# Patient Record
Sex: MALE | Race: WHITE | NOT HISPANIC OR LATINO
[De-identification: names, ages, dates, MRNs, and addresses within clinical notes are randomized per-mention and may not be internally consistent; named-entity substitution may affect disease eponyms.]

---

## 2017-03-29 ENCOUNTER — APPOINTMENT (OUTPATIENT)
Dept: ELECTROPHYSIOLOGY | Facility: CLINIC | Age: 82
End: 2017-03-29

## 2017-05-16 ENCOUNTER — APPOINTMENT (OUTPATIENT)
Dept: INTERNAL MEDICINE | Facility: CLINIC | Age: 82
End: 2017-05-16

## 2017-05-16 ENCOUNTER — RECORD ABSTRACTING (OUTPATIENT)
Age: 82
End: 2017-05-16

## 2017-05-16 VITALS
WEIGHT: 229 LBS | RESPIRATION RATE: 18 BRPM | DIASTOLIC BLOOD PRESSURE: 70 MMHG | TEMPERATURE: 98.3 F | HEIGHT: 72 IN | SYSTOLIC BLOOD PRESSURE: 132 MMHG | HEART RATE: 82 BPM | OXYGEN SATURATION: 96 % | BODY MASS INDEX: 31.02 KG/M2

## 2017-05-16 DIAGNOSIS — M25.561 PAIN IN RIGHT KNEE: ICD-10-CM

## 2017-05-16 DIAGNOSIS — E11.9 TYPE 2 DIABETES MELLITUS W/OUT COMPLICATIONS: ICD-10-CM

## 2017-05-16 DIAGNOSIS — I11.9 HYPERTENSIVE HEART DISEASE W/OUT HEART FAILURE: ICD-10-CM

## 2017-05-16 DIAGNOSIS — Z87.891 PERSONAL HISTORY OF NICOTINE DEPENDENCE: ICD-10-CM

## 2017-08-09 ENCOUNTER — EMERGENCY (EMERGENCY)
Facility: HOSPITAL | Age: 82
LOS: 0 days | Discharge: HOME | End: 2017-08-09
Attending: EMERGENCY MEDICINE | Admitting: EMERGENCY MEDICINE
Payer: MEDICARE

## 2017-08-09 VITALS
WEIGHT: 227.96 LBS | SYSTOLIC BLOOD PRESSURE: 134 MMHG | HEART RATE: 76 BPM | TEMPERATURE: 98 F | OXYGEN SATURATION: 99 % | DIASTOLIC BLOOD PRESSURE: 70 MMHG | RESPIRATION RATE: 16 BRPM

## 2017-08-09 VITALS
RESPIRATION RATE: 20 BRPM | SYSTOLIC BLOOD PRESSURE: 164 MMHG | HEART RATE: 72 BPM | OXYGEN SATURATION: 100 % | TEMPERATURE: 99 F | DIASTOLIC BLOOD PRESSURE: 82 MMHG

## 2017-08-09 DIAGNOSIS — S09.90XA UNSPECIFIED INJURY OF HEAD, INITIAL ENCOUNTER: ICD-10-CM

## 2017-08-09 DIAGNOSIS — Z95.810 PRESENCE OF AUTOMATIC (IMPLANTABLE) CARDIAC DEFIBRILLATOR: Chronic | ICD-10-CM

## 2017-08-09 DIAGNOSIS — I10 ESSENTIAL (PRIMARY) HYPERTENSION: ICD-10-CM

## 2017-08-09 DIAGNOSIS — Z95.810 PRESENCE OF AUTOMATIC (IMPLANTABLE) CARDIAC DEFIBRILLATOR: ICD-10-CM

## 2017-08-09 DIAGNOSIS — I25.10 ATHEROSCLEROTIC HEART DISEASE OF NATIVE CORONARY ARTERY WITHOUT ANGINA PECTORIS: ICD-10-CM
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
## 2017-08-09 DIAGNOSIS — M54.9 DORSALGIA, UNSPECIFIED: ICD-10-CM
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
## 2017-08-09 PROCEDURE — 72128 CT CHEST SPINE W/O DYE: CPT | Mod: 26

## 2017-08-09 PROCEDURE — 72125 CT NECK SPINE W/O DYE: CPT | Mod: 26

## 2017-08-09 PROCEDURE — 99285 EMERGENCY DEPT VISIT HI MDM: CPT

## 2017-08-09 PROCEDURE — 70450 CT HEAD/BRAIN W/O DYE: CPT | Mod: 26

## 2017-08-09 PROCEDURE — 71010: CPT | Mod: 26

## 2017-08-09 NOTE — ED ADULT NURSE NOTE - OBJECTIVE STATEMENT
Pt was pushing garbage down in trash receptacle and fell backward, striking his head.  Pt without complaints on arrival.  Trauma Alert called on arrival.  VS and cardiac monitoring initiated.  See flowsheet.

## 2017-08-09 NOTE — ED ADULT NURSE NOTE - CHIEF COMPLAINT QUOTE
pt was pushing garbage down in trash receptacle and fell backward, striking his head.  pt without complaints on arrival.

## 2017-08-09 NOTE — ED PROVIDER NOTE - SKIN, MLM
Skin normal color for race, warm, dry and intact. Abrasion to posterior scalp.  No bleeding.  No laceration.

## 2017-08-09 NOTE — ED PROVIDER NOTE - CARE PLAN
Principal Discharge DX:	Closed head injury  Secondary Diagnosis:	Hypertension  Secondary Diagnosis:	Upper back pain

## 2017-08-09 NOTE — ED ADULT NURSE NOTE - CHPI ED SYMPTOMS NEG
no vomiting/no tingling/no weakness/no confusion/no fever/no deformity/no loss of consciousness/no numbness/no bleeding

## 2017-08-09 NOTE — ED PROVIDER NOTE - PROGRESS NOTE DETAILS
Pt feels well and passed ambulation challenge.  BP elevated at bedside, pt w/o symptoms.  Discussed with pt that he needs to f/u with his PMD for bp recheck and possible changes in medicine. Given the circumstances of being in the ed after fall and w/ some pain, BP elevated and may not reflect accurate daily BP. Family agrees to f/u.

## 2017-08-09 NOTE — ED PROVIDER NOTE - MEDICAL DECISION MAKING DETAILS
82yr old male w/ mechanical fall, CHI.  CT head/neck/thoracic spine.  Appears well overall. Mechanical fall, low risk of injury, will hold on labs.

## 2017-08-09 NOTE — ED PROVIDER NOTE - OBJECTIVE STATEMENT
82yr old male w/ PMHx of AICD/Pacer, CABG, CAD, HTN, on ecotrin presenting after fall w/ CHI.  Pt put foot in garbage to compress the trash when he lost balance and fell back.  Fall on back, no LOC, no N/V.  C/o upper back pain.  Able to walk after fall.

## 2017-12-07 ENCOUNTER — APPOINTMENT (OUTPATIENT)
Dept: INTERNAL MEDICINE | Facility: CLINIC | Age: 82
End: 2017-12-07
Payer: MEDICARE

## 2017-12-07 ENCOUNTER — NON-APPOINTMENT (OUTPATIENT)
Age: 82
End: 2017-12-07

## 2017-12-07 VITALS
TEMPERATURE: 98.5 F | WEIGHT: 228 LBS | SYSTOLIC BLOOD PRESSURE: 132 MMHG | BODY MASS INDEX: 30.88 KG/M2 | HEART RATE: 66 BPM | OXYGEN SATURATION: 97 % | DIASTOLIC BLOOD PRESSURE: 72 MMHG | HEIGHT: 72 IN | RESPIRATION RATE: 18 BRPM

## 2017-12-07 PROCEDURE — 94060 EVALUATION OF WHEEZING: CPT

## 2017-12-07 PROCEDURE — 99214 OFFICE O/P EST MOD 30 MIN: CPT | Mod: 25

## 2018-03-05 ENCOUNTER — APPOINTMENT (OUTPATIENT)
Dept: ELECTROPHYSIOLOGY | Facility: CLINIC | Age: 83
End: 2018-03-05
Payer: MEDICARE

## 2018-03-05 DIAGNOSIS — Z86.79 PERSONAL HISTORY OF OTHER DISEASES OF THE CIRCULATORY SYSTEM: ICD-10-CM

## 2018-03-05 PROCEDURE — 93297 REM INTERROG DEV EVAL ICPMS: CPT

## 2018-03-05 PROCEDURE — 93296 REM INTERROG EVL PM/IDS: CPT

## 2018-03-05 PROCEDURE — 93295 DEV INTERROG REMOTE 1/2/MLT: CPT

## 2018-03-12 PROBLEM — Z86.79 HISTORY OF VENTRICULAR FIBRILLATION: Status: ACTIVE | Noted: 2017-05-16

## 2018-04-03 ENCOUNTER — RX RENEWAL (OUTPATIENT)
Age: 83
End: 2018-04-03

## 2018-06-07 RX ORDER — PRAVASTATIN SODIUM 40 MG/1
40 TABLET ORAL
Refills: 0 | Status: ACTIVE | COMMUNITY

## 2018-06-08 ENCOUNTER — APPOINTMENT (OUTPATIENT)
Dept: INTERNAL MEDICINE | Facility: CLINIC | Age: 83
End: 2018-06-08
Payer: MEDICARE

## 2018-06-08 VITALS
WEIGHT: 221 LBS | OXYGEN SATURATION: 95 % | HEIGHT: 72 IN | DIASTOLIC BLOOD PRESSURE: 72 MMHG | TEMPERATURE: 98 F | SYSTOLIC BLOOD PRESSURE: 128 MMHG | HEART RATE: 78 BPM | RESPIRATION RATE: 18 BRPM | BODY MASS INDEX: 29.93 KG/M2

## 2018-06-08 DIAGNOSIS — R09.02 HYPOXEMIA: ICD-10-CM

## 2018-06-08 DIAGNOSIS — Z72.3 LACK OF PHYSICAL EXERCISE: ICD-10-CM

## 2018-06-08 PROCEDURE — 99215 OFFICE O/P EST HI 40 MIN: CPT | Mod: 25

## 2018-06-08 PROCEDURE — 94727 GAS DIL/WSHOT DETER LNG VOL: CPT

## 2018-06-08 PROCEDURE — 94729 DIFFUSING CAPACITY: CPT

## 2018-06-08 PROCEDURE — 94060 EVALUATION OF WHEEZING: CPT

## 2018-06-08 PROCEDURE — ZZZZZ: CPT

## 2018-06-08 NOTE — HISTORY OF PRESENT ILLNESS
[de-identified] : Patient comes in today for a followup evaluation, and yearly reassessment of his pulmonary status.\par \par From a pulmonary standpoint, the patient continues to do well. He is not on any maintenance medications for treatment of his underlying mild obstructive lung disease. He denies any significant cough or dyspnea. Occasionally, he does complain of pharyngeal type mucus upon awakening in the morning. He attributes this recently, to the pollen and other seasonal type allergens. He is otherwise asymptomatic.\par \par The patient does not exercise. He does not have difficulty performing activities of daily living. He denies any chest pain or pleuritic pain. He states that he recently underwent an extensive workup at [facility] determine whether or not he is a candidate for an aortic valve replacement, but he states that he has not yet gotten the results of that evaluation. He now comes in for this assessment

## 2018-06-08 NOTE — PLAN
[FreeTextEntry1] : 1. At this time, will continue to observe pulmonary status without maintenance medications.\par \par 2. A repeat CAT scan of the chest will be performed in the spring of 2019 for surveillance.\par \par 3. The patient will contact his primary care physician regarding his recent weight loss.\par \par 4. Follow up with myself in 6 months with pre-post spirometry and DLCO.

## 2018-06-08 NOTE — DATA REVIEWED
[FreeTextEntry1] : A pulmonary function test is performed. Lung volumes are symmetrically reduced and a mild  fashion. Lung mechanics reveal a mild decrease in flow rates with slight upper motor activity. The DLCO is mildly reduced. This are present and mildly restrictive lung disease most likely secondary to the patient's prior lung resection. A destructive process cannot be entirely ruled out. Mildly reactive is demonstrated.

## 2018-06-08 NOTE — PHYSICAL EXAM
[General Appearance - Alert] : alert [General Appearance - In No Acute Distress] : in no acute distress [Sclera] : the sclera and conjunctiva were normal [PERRL With Normal Accommodation] : pupils were equal in size, round, and reactive to light [Extraocular Movements] : extraocular movements were intact [Neck Appearance] : the appearance of the neck was normal [] : the neck was supple [Neck Cervical Mass (___cm)] : no neck mass was observed [Edema] : there was no peripheral edema [Cervical Lymph Nodes Enlarged Posterior Bilaterally] : posterior cervical [Nail Clubbing] : no clubbing  or cyanosis of the fingernails [FreeTextEntry1] : Seborrheic keratoses

## 2018-06-08 NOTE — REVIEW OF SYSTEMS
[Recent Change In Weight] : ~T recent weight change [Negative] : Heme/Lymph [FreeTextEntry2] : The patient notes having an approximate 10 pound weight loss over the past few months

## 2018-06-18 ENCOUNTER — EMERGENCY (EMERGENCY)
Facility: HOSPITAL | Age: 83
LOS: 0 days | Discharge: HOME | End: 2018-06-18
Attending: EMERGENCY MEDICINE | Admitting: EMERGENCY MEDICINE
Payer: MEDICARE

## 2018-06-18 VITALS
DIASTOLIC BLOOD PRESSURE: 56 MMHG | SYSTOLIC BLOOD PRESSURE: 123 MMHG | RESPIRATION RATE: 23 BRPM | TEMPERATURE: 98 F | HEART RATE: 74 BPM | OXYGEN SATURATION: 96 %

## 2018-06-18 VITALS
WEIGHT: 220.9 LBS | TEMPERATURE: 98 F | OXYGEN SATURATION: 98 % | HEIGHT: 73 IN | RESPIRATION RATE: 18 BRPM | SYSTOLIC BLOOD PRESSURE: 141 MMHG | HEART RATE: 77 BPM | DIASTOLIC BLOOD PRESSURE: 72 MMHG

## 2018-06-18 DIAGNOSIS — Z95.810 PRESENCE OF AUTOMATIC (IMPLANTABLE) CARDIAC DEFIBRILLATOR: Chronic | ICD-10-CM

## 2018-06-18 LAB
ALBUMIN SERPL ELPH-MCNC: 3.9 G/DL — SIGNIFICANT CHANGE UP (ref 3.3–5)
ALP SERPL-CCNC: 62 U/L — SIGNIFICANT CHANGE UP (ref 40–120)
ALT FLD-CCNC: 26 U/L — SIGNIFICANT CHANGE UP (ref 12–78)
ANION GAP SERPL CALC-SCNC: 8 MMOL/L — SIGNIFICANT CHANGE UP (ref 5–17)
APPEARANCE UR: CLEAR — SIGNIFICANT CHANGE UP
APTT BLD: 28.9 SEC — SIGNIFICANT CHANGE UP (ref 27.5–37.4)
AST SERPL-CCNC: 46 U/L — HIGH (ref 15–37)
BACTERIA # UR AUTO: ABNORMAL
BASOPHILS # BLD AUTO: 0.03 K/UL — SIGNIFICANT CHANGE UP (ref 0–0.2)
BASOPHILS NFR BLD AUTO: 0.4 % — SIGNIFICANT CHANGE UP (ref 0–2)
BILIRUB SERPL-MCNC: 0.6 MG/DL — SIGNIFICANT CHANGE UP (ref 0.2–1.2)
BILIRUB UR-MCNC: NEGATIVE — SIGNIFICANT CHANGE UP
BUN SERPL-MCNC: 25 MG/DL — HIGH (ref 7–23)
CALCIUM SERPL-MCNC: 9.1 MG/DL — SIGNIFICANT CHANGE UP (ref 8.5–10.1)
CHLORIDE SERPL-SCNC: 103 MMOL/L — SIGNIFICANT CHANGE UP (ref 96–108)
CK SERPL-CCNC: 258 U/L — SIGNIFICANT CHANGE UP (ref 26–308)
CO2 SERPL-SCNC: 29 MMOL/L — SIGNIFICANT CHANGE UP (ref 22–31)
COLOR SPEC: YELLOW — SIGNIFICANT CHANGE UP
COMMENT - URINE: SIGNIFICANT CHANGE UP
CREAT SERPL-MCNC: 1.52 MG/DL — HIGH (ref 0.5–1.3)
DIFF PNL FLD: NEGATIVE — SIGNIFICANT CHANGE UP
EOSINOPHIL # BLD AUTO: 0.22 K/UL — SIGNIFICANT CHANGE UP (ref 0–0.5)
EOSINOPHIL NFR BLD AUTO: 3.2 % — SIGNIFICANT CHANGE UP (ref 0–6)
EPI CELLS # UR: SIGNIFICANT CHANGE UP
GLUCOSE SERPL-MCNC: 129 MG/DL — HIGH (ref 70–99)
GLUCOSE UR QL: NEGATIVE MG/DL — SIGNIFICANT CHANGE UP
HCT VFR BLD CALC: 40.9 % — SIGNIFICANT CHANGE UP (ref 39–50)
HGB BLD-MCNC: 14 G/DL — SIGNIFICANT CHANGE UP (ref 13–17)
IMM GRANULOCYTES NFR BLD AUTO: 0.4 % — SIGNIFICANT CHANGE UP (ref 0–1.5)
INR BLD: 1.07 RATIO — SIGNIFICANT CHANGE UP (ref 0.88–1.16)
KETONES UR-MCNC: ABNORMAL
LEUKOCYTE ESTERASE UR-ACNC: ABNORMAL
LYMPHOCYTES # BLD AUTO: 1.44 K/UL — SIGNIFICANT CHANGE UP (ref 1–3.3)
LYMPHOCYTES # BLD AUTO: 20.9 % — SIGNIFICANT CHANGE UP (ref 13–44)
MCHC RBC-ENTMCNC: 31.5 PG — SIGNIFICANT CHANGE UP (ref 27–34)
MCHC RBC-ENTMCNC: 34.2 GM/DL — SIGNIFICANT CHANGE UP (ref 32–36)
MCV RBC AUTO: 92.1 FL — SIGNIFICANT CHANGE UP (ref 80–100)
MONOCYTES # BLD AUTO: 0.61 K/UL — SIGNIFICANT CHANGE UP (ref 0–0.9)
MONOCYTES NFR BLD AUTO: 8.9 % — SIGNIFICANT CHANGE UP (ref 2–14)
NEUTROPHILS # BLD AUTO: 4.55 K/UL — SIGNIFICANT CHANGE UP (ref 1.8–7.4)
NEUTROPHILS NFR BLD AUTO: 66.2 % — SIGNIFICANT CHANGE UP (ref 43–77)
NITRITE UR-MCNC: NEGATIVE — SIGNIFICANT CHANGE UP
NRBC # BLD: 0 /100 WBCS — SIGNIFICANT CHANGE UP (ref 0–0)
NT-PROBNP SERPL-SCNC: 992 PG/ML — HIGH (ref 0–450)
PH UR: 6 — SIGNIFICANT CHANGE UP (ref 5–8)
PLATELET # BLD AUTO: 179 K/UL — SIGNIFICANT CHANGE UP (ref 150–400)
POTASSIUM SERPL-MCNC: 4.8 MMOL/L — SIGNIFICANT CHANGE UP (ref 3.5–5.3)
POTASSIUM SERPL-SCNC: 4.8 MMOL/L — SIGNIFICANT CHANGE UP (ref 3.5–5.3)
PROT SERPL-MCNC: 7.6 GM/DL — SIGNIFICANT CHANGE UP (ref 6–8.3)
PROT UR-MCNC: NEGATIVE MG/DL — SIGNIFICANT CHANGE UP
PROTHROM AB SERPL-ACNC: 11.6 SEC — SIGNIFICANT CHANGE UP (ref 9.8–12.7)
RBC # BLD: 4.44 M/UL — SIGNIFICANT CHANGE UP (ref 4.2–5.8)
RBC # FLD: 13.7 % — SIGNIFICANT CHANGE UP (ref 10.3–14.5)
RBC CASTS # UR COMP ASSIST: SIGNIFICANT CHANGE UP /HPF (ref 0–4)
SODIUM SERPL-SCNC: 140 MMOL/L — SIGNIFICANT CHANGE UP (ref 135–145)
SP GR SPEC: 1.01 — SIGNIFICANT CHANGE UP (ref 1.01–1.02)
TROPONIN I SERPL-MCNC: <0.015 NG/ML — SIGNIFICANT CHANGE UP (ref 0.01–0.04)
TROPONIN I SERPL-MCNC: <0.015 NG/ML — SIGNIFICANT CHANGE UP (ref 0.01–0.04)
UROBILINOGEN FLD QL: NEGATIVE MG/DL — SIGNIFICANT CHANGE UP
WBC # BLD: 6.88 K/UL — SIGNIFICANT CHANGE UP (ref 3.8–10.5)
WBC # FLD AUTO: 6.88 K/UL — SIGNIFICANT CHANGE UP (ref 3.8–10.5)
WBC UR QL: SIGNIFICANT CHANGE UP

## 2018-06-18 PROCEDURE — 71045 X-RAY EXAM CHEST 1 VIEW: CPT | Mod: 26

## 2018-06-18 PROCEDURE — 99285 EMERGENCY DEPT VISIT HI MDM: CPT

## 2018-06-18 PROCEDURE — 93010 ELECTROCARDIOGRAM REPORT: CPT

## 2018-06-18 RX ORDER — SODIUM CHLORIDE 9 MG/ML
3 INJECTION INTRAMUSCULAR; INTRAVENOUS; SUBCUTANEOUS ONCE
Qty: 0 | Refills: 0 | Status: COMPLETED | OUTPATIENT
Start: 2018-06-18 | End: 2018-06-18

## 2018-06-18 RX ADMIN — SODIUM CHLORIDE 3 MILLILITER(S): 9 INJECTION INTRAMUSCULAR; INTRAVENOUS; SUBCUTANEOUS at 19:06

## 2018-06-18 NOTE — ED PROVIDER NOTE - SKIN, MLM
Skin normal color for race, warm, dry and intact. No evidence of rash. no Diaphoresis. superficial scratch forearm no evidence of cellulitis.

## 2018-06-18 NOTE — ED PROVIDER NOTE - MEDICAL DECISION MAKING DETAILS
Elderly male BIBA from home s/p 2 episodes of light headedness, no LOC, CP, abdominal pain, or HA. +pacemaker AICD. Pt is presently asymptomatic upon ED arrival. Plans for CXR, EKG, monitor cardiac enzymes, labs. Monitor, observe and reassess

## 2018-06-18 NOTE — ED PROVIDER NOTE - CONSTITUTIONAL, MLM
normal... 83 year old male well appearing, well nourished, awake, alert, oriented to person, place, time/situation and in no apparent distress. No sentence shortening

## 2018-06-18 NOTE — ED PROVIDER NOTE - ENMT, MLM
Airway patent, Nasal mucosa clear. Mouth with dry mucosa. Throat has no vesicles, no oropharyngeal exudates and uvula is midline. PERRL. EOMI

## 2018-06-18 NOTE — ED ADULT NURSE NOTE - OBJECTIVE STATEMENT
pt reports sudden episode of sob while reading newspaper , household was very hot when ems arrived , air conditioner was off

## 2018-06-18 NOTE — ED PROVIDER NOTE - OBJECTIVE STATEMENT
82 y/o male PMhx AICD/PPM, CAD , pacemaker, defibrillator, CABG, HTN BIBA from home presents to the ED  c/o acute spontaneous onset of SOB. Pt describes feeling lightheaded around 2:00 pm during his first episode which lasted about 15 minutes. During the second episode, the pt states feeling nausea and SOB. Sx were resolved after ems administered o2. Denies LOC, CP, abd pain  +plegm. 84 y/o male PMhx AICD/PPM, CAD , pacemaker, defibrillator, CABG, HTN BIBA from home presents to the ED  c/o acute spontaneous onset of SOB. Pt describes feeling lightheaded around 2:00 pm during his first episode which lasted about 15 minutes. During the second episode, the pt states feeling nausea and SOB. Sx were resolved after EMS  administered O2. Denies LOC, CP, abd pain  +plegm. 82 y/o male PMhx AICD/PPM, CAD , pacemaker, defibrillator, CABG, HTN BIBA from home presents to the ED  c/o acute spontaneous onset of SOB. Pt describes feeling lightheaded around 2:00 pm during his first episode which lasted about 15 minutes. During the second episode, the pt states feeling nausea and SOB. +plegm lingering at the back of throat. Sx were resolved after EMS  administered O2. Denies LOC, CP, abd pain 82 y/o male PMhx AICD/PPM, CAD , pacemaker, defibrillator, CABG, HTN BIBA from home presents to the ED  c/o acute spontaneous onset of SOB. Pt describes feeling lightheaded around 2:00 pm during his first episode which lasted about 15 minutes. During the second episode of lightheadedness, the pt states was also feeling nausea and SOB. +phlegm lingering at the back of throat. Sx were resolved after EMS  administered O2. Denies LOC, CP, abd pain

## 2018-06-18 NOTE — ED PROVIDER NOTE - CONDUCTED A DETAILED DISCUSSION WITH PATIENT AND/OR GUARDIAN REGARDING, MDM
ppm interrogation report/lab results/need for outpatient follow-up/return to ED if symptoms worsen, persist or questions arise/radiology results

## 2018-06-18 NOTE — ED PROVIDER NOTE - MUSCULOSKELETAL, MLM
Spine appears normal, range of motion is not limited, no muscle or joint tenderness. TORRES x4. No focal deficits, or swelling.

## 2018-06-18 NOTE — ED PROVIDER NOTE - PROGRESS NOTE DETAILS
Dr. Pedraza:  Interrogation of ppm verbal report by Dr. Waters: negative study, fax of written report to arrive any moment. Dr. Pedraza:  Official fax of interrogation received & included in ED chart: negative arrythmia.

## 2018-06-19 ENCOUNTER — APPOINTMENT (OUTPATIENT)
Dept: ELECTROPHYSIOLOGY | Facility: CLINIC | Age: 83
End: 2018-06-19
Payer: MEDICARE

## 2018-06-19 VITALS
WEIGHT: 222 LBS | SYSTOLIC BLOOD PRESSURE: 118 MMHG | HEIGHT: 72 IN | HEART RATE: 72 BPM | BODY MASS INDEX: 30.07 KG/M2 | DIASTOLIC BLOOD PRESSURE: 72 MMHG

## 2018-06-19 PROCEDURE — 93290 INTERROG DEV EVAL ICPMS IP: CPT | Mod: 26

## 2018-06-19 PROCEDURE — 93284 PRGRMG EVAL IMPLANTABLE DFB: CPT

## 2018-06-19 RX ORDER — FUROSEMIDE 40 MG/1
40 TABLET ORAL
Refills: 0 | Status: DISCONTINUED | COMMUNITY
End: 2018-06-19

## 2018-06-20 DIAGNOSIS — R11.0 NAUSEA: ICD-10-CM

## 2018-06-20 DIAGNOSIS — Z95.1 PRESENCE OF AORTOCORONARY BYPASS GRAFT: ICD-10-CM

## 2018-06-20 DIAGNOSIS — Z95.0 PRESENCE OF CARDIAC PACEMAKER: ICD-10-CM

## 2018-06-20 DIAGNOSIS — I10 ESSENTIAL (PRIMARY) HYPERTENSION: ICD-10-CM

## 2018-06-20 DIAGNOSIS — R06.02 SHORTNESS OF BREATH: ICD-10-CM

## 2018-06-20 DIAGNOSIS — R42 DIZZINESS AND GIDDINESS: ICD-10-CM

## 2018-06-20 DIAGNOSIS — R55 SYNCOPE AND COLLAPSE: ICD-10-CM

## 2018-06-20 DIAGNOSIS — Z79.899 OTHER LONG TERM (CURRENT) DRUG THERAPY: ICD-10-CM

## 2018-06-20 DIAGNOSIS — Z95.810 PRESENCE OF AUTOMATIC (IMPLANTABLE) CARDIAC DEFIBRILLATOR: ICD-10-CM

## 2018-06-20 DIAGNOSIS — I25.10 ATHEROSCLEROTIC HEART DISEASE OF NATIVE CORONARY ARTERY WITHOUT ANGINA PECTORIS: ICD-10-CM

## 2018-09-24 ENCOUNTER — APPOINTMENT (OUTPATIENT)
Dept: ELECTROPHYSIOLOGY | Facility: CLINIC | Age: 83
End: 2018-09-24
Payer: MEDICARE

## 2018-09-24 PROCEDURE — 93295 DEV INTERROG REMOTE 1/2/MLT: CPT

## 2018-09-24 PROCEDURE — 93297 REM INTERROG DEV EVAL ICPMS: CPT

## 2018-09-24 PROCEDURE — 93296 REM INTERROG EVL PM/IDS: CPT

## 2018-12-14 ENCOUNTER — APPOINTMENT (OUTPATIENT)
Dept: INTERNAL MEDICINE | Facility: CLINIC | Age: 83
End: 2018-12-14
Payer: MEDICARE

## 2018-12-14 VITALS
SYSTOLIC BLOOD PRESSURE: 120 MMHG | TEMPERATURE: 98.6 F | BODY MASS INDEX: 30.2 KG/M2 | WEIGHT: 223 LBS | HEART RATE: 70 BPM | HEIGHT: 72 IN | OXYGEN SATURATION: 97 % | DIASTOLIC BLOOD PRESSURE: 62 MMHG | RESPIRATION RATE: 18 BRPM

## 2018-12-14 DIAGNOSIS — I47.2 VENTRICULAR TACHYCARDIA: ICD-10-CM

## 2018-12-14 PROCEDURE — 94060 EVALUATION OF WHEEZING: CPT

## 2018-12-14 PROCEDURE — 99214 OFFICE O/P EST MOD 30 MIN: CPT | Mod: 25

## 2018-12-14 PROCEDURE — 94729 DIFFUSING CAPACITY: CPT

## 2018-12-14 NOTE — HISTORY OF PRESENT ILLNESS
[de-identified] : Patient comes in today for a followup evaluation, and reassessment of his pulmonary status.\par \par Overall, from a primary standpoint, the patient states that he is relatively stable. He denies any dyspnea, or dyspnea on exertion. He does not perform any formal type of exercise, but he continues to remain active. He does perform yard work. He denies any sputum production. There has been no pleuritic pain or hemoptysis. He does occasionally complain of a postnasal drip.\par \par The patient recently, has noted a mild degree of dizziness. He has been using a topical over-the-counter natural substance which has been helping. He has not loss consciousness. He denies any palpitations or chest pain. He now comes in for this assessment.

## 2018-12-14 NOTE — DATA REVIEWED
[FreeTextEntry1] : Spirometric analysis with DLCO is performed. The vital capacity is moderately reduced at 56%. Lung mechanics reveal a moderate decrease in flow rates with mild bronchodilator reactivity. The DLCO is mildly reduced. Saturation is maintained. This represents a combination of both restrictive and obstructive processes. Mild bronchodilator reactivity is demonstrated.

## 2018-12-14 NOTE — PLAN
[FreeTextEntry1] : 1. Continue with medication as outlined above.\par \par 2. A repeat CAT scan of the chest will performed in the spring. This will be a surveillance study. Of note is the fact that the patient is somewhat more restricted so we'll be looking for any possible pleural effusion.\par \par 3. Routine medical followup with his primary care physician, Dr. Maldonado.\par \par 4. The patient has been told that he needs to contact either his primary care physician, or his cardiologist, Dr. Tinoco and if his episodes of dizziness persist or worsen. I have told him that an arrhythmia would need to be potentially evaluated as a cause of his symptoms.\par \par 5. Follow up with myself in 6 months with full pulmonary function testing and review of the most recent CAT scan of the chest.

## 2018-12-14 NOTE — PHYSICAL EXAM
[General Appearance - Alert] : alert [General Appearance - In No Acute Distress] : in no acute distress [Neck Appearance] : the appearance of the neck was normal [] : the neck was supple [Neck Cervical Mass (___cm)] : no neck mass was observed [Edema] : there was no peripheral edema [FreeTextEntry1] : Centripetal obesity [Cervical Lymph Nodes Enlarged Posterior Bilaterally] : posterior cervical [Nail Clubbing] : no clubbing  or cyanosis of the fingernails

## 2018-12-15 PROBLEM — I10 ESSENTIAL (PRIMARY) HYPERTENSION: Chronic | Status: ACTIVE | Noted: 2017-08-12

## 2018-12-15 PROBLEM — I25.10 ATHEROSCLEROTIC HEART DISEASE OF NATIVE CORONARY ARTERY WITHOUT ANGINA PECTORIS: Chronic | Status: ACTIVE | Noted: 2017-08-12

## 2018-12-25 ENCOUNTER — INPATIENT (INPATIENT)
Facility: HOSPITAL | Age: 83
LOS: 1 days | Discharge: HOME | End: 2018-12-27
Attending: INTERNAL MEDICINE | Admitting: INTERNAL MEDICINE
Payer: MEDICARE

## 2018-12-25 VITALS
HEART RATE: 85 BPM | OXYGEN SATURATION: 100 % | TEMPERATURE: 98 F | HEIGHT: 71 IN | DIASTOLIC BLOOD PRESSURE: 71 MMHG | SYSTOLIC BLOOD PRESSURE: 146 MMHG | RESPIRATION RATE: 19 BRPM | WEIGHT: 222.01 LBS

## 2018-12-25 DIAGNOSIS — Z90.2 ACQUIRED ABSENCE OF LUNG [PART OF]: Chronic | ICD-10-CM

## 2018-12-25 DIAGNOSIS — Z98.890 OTHER SPECIFIED POSTPROCEDURAL STATES: Chronic | ICD-10-CM

## 2018-12-25 DIAGNOSIS — Z90.49 ACQUIRED ABSENCE OF OTHER SPECIFIED PARTS OF DIGESTIVE TRACT: Chronic | ICD-10-CM

## 2018-12-25 DIAGNOSIS — Z95.810 PRESENCE OF AUTOMATIC (IMPLANTABLE) CARDIAC DEFIBRILLATOR: Chronic | ICD-10-CM

## 2018-12-25 LAB
ALBUMIN SERPL ELPH-MCNC: 3.9 G/DL — SIGNIFICANT CHANGE UP (ref 3.3–5)
ALP SERPL-CCNC: 74 U/L — SIGNIFICANT CHANGE UP (ref 40–120)
ALT FLD-CCNC: 20 U/L — SIGNIFICANT CHANGE UP (ref 12–78)
ANION GAP SERPL CALC-SCNC: 8 MMOL/L — SIGNIFICANT CHANGE UP (ref 5–17)
APTT BLD: 31.1 SEC — SIGNIFICANT CHANGE UP (ref 27.5–36.3)
AST SERPL-CCNC: 24 U/L — SIGNIFICANT CHANGE UP (ref 15–37)
BASOPHILS # BLD AUTO: 0.05 K/UL — SIGNIFICANT CHANGE UP (ref 0–0.2)
BASOPHILS NFR BLD AUTO: 0.5 % — SIGNIFICANT CHANGE UP (ref 0–2)
BILIRUB SERPL-MCNC: 0.4 MG/DL — SIGNIFICANT CHANGE UP (ref 0.2–1.2)
BUN SERPL-MCNC: 27 MG/DL — HIGH (ref 7–23)
CALCIUM SERPL-MCNC: 9.2 MG/DL — SIGNIFICANT CHANGE UP (ref 8.5–10.1)
CHLORIDE SERPL-SCNC: 105 MMOL/L — SIGNIFICANT CHANGE UP (ref 96–108)
CO2 SERPL-SCNC: 28 MMOL/L — SIGNIFICANT CHANGE UP (ref 22–31)
CREAT SERPL-MCNC: 1.51 MG/DL — HIGH (ref 0.5–1.3)
EOSINOPHIL # BLD AUTO: 0.3 K/UL — SIGNIFICANT CHANGE UP (ref 0–0.5)
EOSINOPHIL NFR BLD AUTO: 3.3 % — SIGNIFICANT CHANGE UP (ref 0–6)
GLUCOSE SERPL-MCNC: 134 MG/DL — HIGH (ref 70–99)
HCT VFR BLD CALC: 43.9 % — SIGNIFICANT CHANGE UP (ref 39–50)
HGB BLD-MCNC: 14.8 G/DL — SIGNIFICANT CHANGE UP (ref 13–17)
IMM GRANULOCYTES NFR BLD AUTO: 0.3 % — SIGNIFICANT CHANGE UP (ref 0–1.5)
INR BLD: 1.16 RATIO — SIGNIFICANT CHANGE UP (ref 0.88–1.16)
LYMPHOCYTES # BLD AUTO: 1.82 K/UL — SIGNIFICANT CHANGE UP (ref 1–3.3)
LYMPHOCYTES # BLD AUTO: 19.7 % — SIGNIFICANT CHANGE UP (ref 13–44)
MCHC RBC-ENTMCNC: 31.4 PG — SIGNIFICANT CHANGE UP (ref 27–34)
MCHC RBC-ENTMCNC: 33.7 GM/DL — SIGNIFICANT CHANGE UP (ref 32–36)
MCV RBC AUTO: 93 FL — SIGNIFICANT CHANGE UP (ref 80–100)
MONOCYTES # BLD AUTO: 0.84 K/UL — SIGNIFICANT CHANGE UP (ref 0–0.9)
MONOCYTES NFR BLD AUTO: 9.1 % — SIGNIFICANT CHANGE UP (ref 2–14)
NEUTROPHILS # BLD AUTO: 6.18 K/UL — SIGNIFICANT CHANGE UP (ref 1.8–7.4)
NEUTROPHILS NFR BLD AUTO: 67.1 % — SIGNIFICANT CHANGE UP (ref 43–77)
NRBC # BLD: 0 /100 WBCS — SIGNIFICANT CHANGE UP (ref 0–0)
PLATELET # BLD AUTO: 206 K/UL — SIGNIFICANT CHANGE UP (ref 150–400)
POTASSIUM SERPL-MCNC: 4.4 MMOL/L — SIGNIFICANT CHANGE UP (ref 3.5–5.3)
POTASSIUM SERPL-SCNC: 4.4 MMOL/L — SIGNIFICANT CHANGE UP (ref 3.5–5.3)
PROT SERPL-MCNC: 7.4 GM/DL — SIGNIFICANT CHANGE UP (ref 6–8.3)
PROTHROM AB SERPL-ACNC: 12.9 SEC — SIGNIFICANT CHANGE UP (ref 10–12.9)
RBC # BLD: 4.72 M/UL — SIGNIFICANT CHANGE UP (ref 4.2–5.8)
RBC # FLD: 13.3 % — SIGNIFICANT CHANGE UP (ref 10.3–14.5)
SODIUM SERPL-SCNC: 141 MMOL/L — SIGNIFICANT CHANGE UP (ref 135–145)
TROPONIN I SERPL-MCNC: <0.015 NG/ML — SIGNIFICANT CHANGE UP (ref 0.01–0.04)
WBC # BLD: 9.22 K/UL — SIGNIFICANT CHANGE UP (ref 3.8–10.5)
WBC # FLD AUTO: 9.22 K/UL — SIGNIFICANT CHANGE UP (ref 3.8–10.5)

## 2018-12-25 PROCEDURE — 71045 X-RAY EXAM CHEST 1 VIEW: CPT | Mod: 26

## 2018-12-25 PROCEDURE — 93010 ELECTROCARDIOGRAM REPORT: CPT

## 2018-12-25 PROCEDURE — 99285 EMERGENCY DEPT VISIT HI MDM: CPT

## 2018-12-25 RX ORDER — SODIUM CHLORIDE 9 MG/ML
1000 INJECTION INTRAMUSCULAR; INTRAVENOUS; SUBCUTANEOUS
Qty: 0 | Refills: 0 | Status: DISCONTINUED | OUTPATIENT
Start: 2018-12-25 | End: 2018-12-26

## 2018-12-25 RX ADMIN — SODIUM CHLORIDE 150 MILLILITER(S): 9 INJECTION INTRAMUSCULAR; INTRAVENOUS; SUBCUTANEOUS at 20:13

## 2018-12-25 NOTE — H&P ADULT - RS GEN PE MLT RESP DETAILS PC
no chest wall tenderness/no intercostal retractions/airway patent/breath sounds equal/good air movement

## 2018-12-25 NOTE — H&P ADULT - PMH
CAD (coronary artery disease)    Cardiac arrest with successful resuscitation    CKD (chronic kidney disease)    Heart failure    Hypertension    Lung cancer    PAD (peripheral artery disease)

## 2018-12-25 NOTE — H&P ADULT - HISTORY OF PRESENT ILLNESS
83 yrs M with PMH of HTN ,CHF( ECHO 2016 :EF 25-30% left ventricular dysfunction ,mild TR and Mild MR ),COPD ,CAD s/p CABG and 1 stent ,Left neck CEA ,PVD S/P stent  ,? CKD-3 ,HLD ,lung cancer ,Cardiac arrest s/p AICD/pacemaker Implanted 2016  comes to ED with CC of Dizziness for the past 4 days .  Patient states             Family h/o : Doesn't remember 81 yrs M with PMH of HTN ,CHF (EF 25-30% left ventricular dysfunction ,mild TR and Mild MR)  ,COPD ,CAD s/p CABG and 1 stent ,Left neck CEA ,PVD S/P stent CKD-3 ,HLD ,lung cancer s/p Lobectomy ,Cardiac arrest s/p AICD/pacemaker Implanted 2016  comes to ED with CC of Dizziness for the past 4 days.  Patient reports for the past  4 days he have been feeling dizzy especially when he walks up from the bed in the AM and lasts until middle of days and resolves worse while tries to position/rolls himself in the bed . Feels like he spinning . Today when he woke up he felt dizzy and was associated with nausea which resolved . As per ED chart note he was SOB and had Right ear popping sensation .But he denies chest pain  , palpations , SOB , diaphoresis ,vomiting ,abdominal pain  ,dysuria ,diarrhoea ,leg swelling ,loss of consciousness .    Patient forgetful/mild cognitive impairment doesn't remember his history and medication and called spouse for clarification of h/o and med reconciliation  phone number listed in the chart no answer .    Family h/o : Doesn't remember 81 yrs M with PMH of HTN ,CHF ( 2016 :EF 25-30% left ventricular dysfunction ,mild TR and Mild MR)  ,COPD ,CAD s/p CABG and 1 stent ,Left neck CEA ,PVD S/P stent CKD-3 ,HLD ,lung cancer s/p Lobectomy ,Cardiac arrest s/p AICD/pacemaker Implanted 2016  comes to ED with CC of Dizziness for the past 4 days.  Patient reports for the past  4 days he have been feeling dizzy especially when he walks up from the bed in the AM and lasts until middle of days and resolves worse while tries to position/rolls himself in the bed . Feels like he spinning . Today when he woke up he felt dizzy and was associated with nausea which resolved . As per ED chart note he was SOB and had Right ear popping sensation .But he denies chest pain  , palpations , SOB , diaphoresis ,vomiting ,abdominal pain  ,dysuria ,diarrhoea ,leg swelling ,loss of consciousness .    Patient forgetful/mild cognitive impairment doesn't remember his history and medication and called spouse for clarification of h/o and med reconciliation  phone number listed in the chart no answer .    Family h/o : Doesn't remember 81 yrs M with PMH of HTN ,CHF ( 2016 :EF 25-30% left ventricular dysfunction ,mild TR and Mild MR)  ,COPD ,CAD s/p CABG and 1 stent ,Left neck CEA ,PVD S/P stent CKD-3 ,HLD ,lung cancer s/p Lobectomy ,Cardiac arrest s/p AICD/pacemaker Implanted 2016  comes to ED with CC of Dizziness for the past 4 days.  Patient reports for the past  4 days he have been feeling dizzy especially when he walks up from the bed in the AM and lasts until middle of days and resolves worse while tries to position/rolls himself in the bed . Feels like he spinning . Today when he woke up he felt dizzy and was associated with nausea which resolved in the ED . As per ED chart note he was SOB and had Right ear popping sensation .But he denies chest pain  , palpations , SOB , diaphoresis ,vomiting ,abdominal pain  ,dysuria ,diarrhoea ,leg swelling ,loss of consciousness .    Patient forgetful/mild cognitive impairment doesn't remember his history and medication and called spouse for clarification of h/o and med reconciliation  phone number listed in the chart no answer .    Family h/o : Doesn't remember

## 2018-12-25 NOTE — ED ADULT NURSE NOTE - OBJECTIVE STATEMENT
Patient shortness of breath at home, dizziness and nausea.  Patient states he has had a similar episode in the past.  Patient reports that he had a CT scan 2 weeks ago for dizziness.  Color pale skin warm and dry.  Patient reports that by the time he arrived in the ED all of his symptoms had dissipated.  Hx of pacemaker with AICD.

## 2018-12-25 NOTE — H&P ADULT - ATTENDING COMMENTS
82 y/o M with PMH of HTN, CHF, COPD, CAD s/p CABG, L neck CEA, PVD s/p stent, CKD III, dyslipidemia, lung cancer s/p lobectomy, cardiac arrest s/p AICD, p/w dizziness    *Dizziness  -EP consult for AICD interrogation to check for arrhythmia  -Neuro checks  -Neuro consult  -Fall precautions  -CT Head  -Echo  -ASA  -Orthostatics  -Possibly benign positional vertigo     *CKD III  -Continue to monitor creatinine  -Avoid nephrotoxic agents    *HTN / CHF / COPD / CAD / PVD / dyslipidemia / lung cancer   -C/w home meds and outpatient management if conditions remain stable during hospitalization     *DVT ppx  -Heparin subQ

## 2018-12-25 NOTE — H&P ADULT - NSHPSOCIALHISTORY_GEN_ALL_CORE
Lives with family ,past smoker quit 1996 and drinks 1-2 cocktails daily for the past many years at dinner time

## 2018-12-25 NOTE — ED PROVIDER NOTE - MUSCULOSKELETAL, MLM
Spine appears normal, range of motion is not limited, no muscle or joint tenderness Spine appears normal, range of motion is not limited, no muscle or joint tenderness. +1+ LE edema.

## 2018-12-25 NOTE — ED ADULT TRIAGE NOTE - CHIEF COMPLAINT QUOTE
pt brought by EMS from home c/o dizziness SOB for the past 3 days, worsening today when he was eating dinner. associated with chest discomfort. hx of cardiac arrest.

## 2018-12-25 NOTE — H&P ADULT - ASSESSMENT
81 yrs M with PMH of HTN ,CHF (EF 25-30% left ventricular dysfunction ,mild TR and Mild MR)  ,COPD ,CAD s/p CABG and 1 stent ,Left neck CEA ,PVD S/P stent CKD-3 ,HLD ,lung cancer s/p Lobectomy ,Cardiac arrest s/p AICD/pacemaker Implanted 2016  comes to ED with CC of Dizziness for the past 4 days.    #Dizziness   Dizziness multifactorial  likely secondary underlying cardiac arrhythmia and or valvular heart dz and or orthostasis .   -Admit to Telemetry   -Trend troponin   -cardiology consult  -orthostatics vitals  -EP consult -AICD/ Pacemaker interrogation     #CAD s/p CABG and stent s/p AICD and pacemaker    -Last ECHO 2016:  EF 25-30% left ventricular dysfunction ,mild TR and Mild MR  -continue home medication -ASA  ,Metoprolol ,lisinopril    -Trend troponin    #CHF   -continue ASA ,metoprolol and lisinopril   -hold lasix in light of dizziness   -In and outs   -daily weights   -ECHO  -Cardiology consult    #COPD  -Stable  -Duoneb PRN    #PAD s/p stents   -continue ASA     #Elevated creatinine   -continue to monitor   -As per previous charts CKD stage 3  -Chart review baseline creatinine 1.3     #HTN  -Elevated   -continue home medication    #HLD  -continue home medication    #DVT prophylaxis  -IMPROVE VTE Individual Risk Assessment    RISK                                                                Points    [  ] Previous VTE                                                  3    [  ] Thrombophilia                                               2    [  ] Lower limb paralysis                                      2        (unable to hold up >15 seconds)      [  ] Current Cancer                                              2         (within 6 months)    [  1] Immobilization > 24 hrs                                1    [  ] ICU/CCU stay > 24 hours                              1    [ 1 ] Age > 60                                                      1    IMPROVE VTE Score ___2______ 81 yrs M with PMH of HTN ,CHF (EF 25-30% left ventricular dysfunction ,mild TR and Mild MR)  ,COPD ,CAD s/p CABG and 1 stent ,Left neck CEA ,PVD S/P stent CKD-3 ,HLD ,lung cancer s/p Lobectomy ,Cardiac arrest s/p AICD/pacemaker Implanted 2016  comes to ED with CC of Dizziness for the past 4 days.    #Dizziness /Near syncope   Dizziness multifactorial  likely secondary underlying cardiac arrhythmia and or valvular heart dz and or orthostasis and or Neurological .   -Admit to Telemetry   -Trend troponin   -cardiology consult  -Ct head   -Neurology consult   -orthostatics vitals  -EP consult -AICD/ Pacemaker interrogation     #CAD s/p CABG and stent s/p AICD and pacemaker    -Last ECHO 2016:  EF 25-30% left ventricular dysfunction ,mild TR and Mild MR  -continue home medication -ASA  ,Metoprolol ,lisinopril    -Trend troponin    #CHF   -continue ASA ,metoprolol and lisinopril   -hold lasix in light of dizziness   -In and outs   -daily weights   -ECHO  -Cardiology consult    #COPD  -Stable  -Duoneb PRN    #PAD s/p stents   -continue ASA     #Elevated creatinine   -continue to monitor   -As per previous charts CKD stage 3  -Chart review baseline creatinine 1.3     #HTN  -Elevated   -continue home medication    #HLD  -continue home medication    #DVT prophylaxis  -IMPROVE VTE Individual Risk Assessment    RISK                                                                Points    [  ] Previous VTE                                                  3    [  ] Thrombophilia                                               2    [  ] Lower limb paralysis                                      2        (unable to hold up >15 seconds)      [  ] Current Cancer                                              2         (within 6 months)    [  1] Immobilization > 24 hrs                                1    [  ] ICU/CCU stay > 24 hours                              1    [ 1 ] Age > 60                                                      1    IMPROVE VTE Score ___2______ 81 yrs M with PMH of HTN ,CHF (EF 25-30% left ventricular dysfunction ,mild TR and Mild MR)  ,COPD ,CAD s/p CABG and 1 stent ,Left neck CEA ,PVD S/P stent CKD-3 ,HLD ,lung cancer s/p Lobectomy ,Cardiac arrest s/p AICD/pacemaker Implanted 2016  comes to ED with CC of Dizziness for the past 4 days.    #Dizziness /Near syncope   Dizziness multifactorial  likely secondary underlying cardiac arrhythmia and or valvular heart dz and or orthostasis and or Neurological .   -Admit to Telemetry   -Trend troponin   -cardiology consult  -Ct head   -Neurology consult   -orthostatics vitals  -EP consult -AICD/ Pacemaker interrogation     #CAD s/p CABG and stent s/p AICD and pacemaker    -Last ECHO 2016:  EF 25-30% left ventricular dysfunction ,mild TR and Mild MR  -continue home medication -ASA  ,Metoprolol ,lisinopril    -Trend troponin    #CHF   -stable   -continue ASA ,metoprolol and lisinopril   -hold lasix in light of dizziness   -In and outs   -daily weights   -ECHO  -Cardiology consult    #COPD  -Stable  -Duoneb PRN    #PAD s/p stents   -continue ASA     #Elevated creatinine   -continue to monitor   -As per previous charts CKD stage 3  -Chart review baseline creatinine 1.3     #HTN  -Elevated   -Will give extra lisinopril 5 mg ,will increase to lisinopril 10 mg  -continue Metoprolol 25 bid ,and lisinopril 5mg  hold lasix     #HLD  -continue home medication    #DVT prophylaxis  -IMPROVE VTE Individual Risk Assessment    RISK                                                                Points    [  ] Previous VTE                                                  3    [  ] Thrombophilia                                               2    [  ] Lower limb paralysis                                      2        (unable to hold up >15 seconds)      [  ] Current Cancer                                              2         (within 6 months)    [  1] Immobilization > 24 hrs                                1    [  ] ICU/CCU stay > 24 hours                              1    [ 1 ] Age > 60                                                      1    IMPROVE VTE Score ___2______

## 2018-12-25 NOTE — H&P ADULT - NEUROLOGICAL DETAILS
sensation intact/deep reflexes intact/alert and oriented x 3/responds to verbal commands/responds to pain

## 2018-12-25 NOTE — ED PROVIDER NOTE - CARDIAC, MLM
Normal rate, regular rhythm.  Heart sounds S1, S2.  No murmurs, rubs or gallops. Normal rate, regular rhythm.  Heart sounds S1, S2.  +Systolic murmur. +Left sternal border murmur.

## 2018-12-25 NOTE — H&P ADULT - PSH
AICD (automatic cardioverter/defibrillator) present    H/O left knee surgery    History of appendectomy    History of lobectomy of lung    History of tonsillectomy and adenoidectomy    S/P carotid endarterectomy

## 2018-12-25 NOTE — ED PROVIDER NOTE - OBJECTIVE STATEMENT
84 y/o male with a PMHx of CAD s/p AICD, HTN presents to the ED c/o worsening intermittent episodes of lightheadedness with nausea x 4 days. According to pt and pt's family, he has been waking up with worsening spells of lightheadedness with nausea and dizziness that continue intermittently throughout the day. This morning pt's lightheadedness with nausea and dizziness were much more severe, and were accompanied by SOB later in the day. According to pt's family who is present, pt's symptoms cause a near-syncope sensation. Evaluated at [facility] 2 months ago for similar symptoms with a dx of dehydration. H/o cardiac arrest with 22 minutes of CPR and ICU recovery. C/o right ear discomfort secondary to lightheadedness and SOB. Cardiologist: Frank Victor. [facility] Cardiologist: Jt. 82 y/o male with a PMHx of CAD s/p AICD, HTN presents to the ED c/o worsening intermittent episodes of lightheadedness with nausea and dizziness that occur shortly after waking up x 4 days. This morning pt's lightheadedness with nausea and dizziness were much more severe, and were accompanied by SOB later in the day. According to pt's family who is present, pt's symptoms cause a near-syncope sensation. Evaluated at [facility] 2 months ago for similar symptoms with a dx of dehydration. H/o cardiac arrest with 22 minutes of CPR and ICU recovery. C/o right ear discomfort, described as "popping" secondary to chief complaint. Cardiologist: Frank Victor. [facility] Cardiologist: Yanick Waters.

## 2018-12-26 LAB
ADD ON TEST-SPECIMEN IN LAB: SIGNIFICANT CHANGE UP
ANION GAP SERPL CALC-SCNC: 8 MMOL/L — SIGNIFICANT CHANGE UP (ref 5–17)
BASOPHILS # BLD AUTO: 0.04 K/UL — SIGNIFICANT CHANGE UP (ref 0–0.2)
BASOPHILS NFR BLD AUTO: 0.5 % — SIGNIFICANT CHANGE UP (ref 0–2)
CHLORIDE SERPL-SCNC: 106 MMOL/L — SIGNIFICANT CHANGE UP (ref 96–108)
CO2 SERPL-SCNC: 28 MMOL/L — SIGNIFICANT CHANGE UP (ref 22–31)
CREAT SERPL-MCNC: 1.29 MG/DL — SIGNIFICANT CHANGE UP (ref 0.5–1.3)
EOSINOPHIL # BLD AUTO: 0.36 K/UL — SIGNIFICANT CHANGE UP (ref 0–0.5)
EOSINOPHIL NFR BLD AUTO: 4.3 % — SIGNIFICANT CHANGE UP (ref 0–6)
HCT VFR BLD CALC: 42.2 % — SIGNIFICANT CHANGE UP (ref 39–50)
HGB BLD-MCNC: 14.2 G/DL — SIGNIFICANT CHANGE UP (ref 13–17)
IMM GRANULOCYTES NFR BLD AUTO: 0.6 % — SIGNIFICANT CHANGE UP (ref 0–1.5)
LYMPHOCYTES # BLD AUTO: 1.94 K/UL — SIGNIFICANT CHANGE UP (ref 1–3.3)
LYMPHOCYTES # BLD AUTO: 22.9 % — SIGNIFICANT CHANGE UP (ref 13–44)
MAGNESIUM SERPL-MCNC: 1.8 MG/DL — SIGNIFICANT CHANGE UP (ref 1.6–2.6)
MCHC RBC-ENTMCNC: 31 PG — SIGNIFICANT CHANGE UP (ref 27–34)
MCHC RBC-ENTMCNC: 33.6 GM/DL — SIGNIFICANT CHANGE UP (ref 32–36)
MCV RBC AUTO: 92.1 FL — SIGNIFICANT CHANGE UP (ref 80–100)
MONOCYTES # BLD AUTO: 0.73 K/UL — SIGNIFICANT CHANGE UP (ref 0–0.9)
MONOCYTES NFR BLD AUTO: 8.6 % — SIGNIFICANT CHANGE UP (ref 2–14)
NEUTROPHILS # BLD AUTO: 5.34 K/UL — SIGNIFICANT CHANGE UP (ref 1.8–7.4)
NEUTROPHILS NFR BLD AUTO: 63.1 % — SIGNIFICANT CHANGE UP (ref 43–77)
NRBC # BLD: 0 /100 WBCS — SIGNIFICANT CHANGE UP (ref 0–0)
PHOSPHATE SERPL-MCNC: 2.3 MG/DL — LOW (ref 2.5–4.5)
PLATELET # BLD AUTO: 192 K/UL — SIGNIFICANT CHANGE UP (ref 150–400)
POTASSIUM SERPL-MCNC: 4.1 MMOL/L — SIGNIFICANT CHANGE UP (ref 3.5–5.3)
POTASSIUM SERPL-SCNC: 4.1 MMOL/L — SIGNIFICANT CHANGE UP (ref 3.5–5.3)
RBC # BLD: 4.58 M/UL — SIGNIFICANT CHANGE UP (ref 4.2–5.8)
RBC # FLD: 13.4 % — SIGNIFICANT CHANGE UP (ref 10.3–14.5)
SODIUM SERPL-SCNC: 142 MMOL/L — SIGNIFICANT CHANGE UP (ref 135–145)
TROPONIN I SERPL-MCNC: 0.02 NG/ML — SIGNIFICANT CHANGE UP (ref 0.01–0.04)
TROPONIN I SERPL-MCNC: <0.015 NG/ML — SIGNIFICANT CHANGE UP (ref 0.01–0.04)
WBC # BLD: 8.46 K/UL — SIGNIFICANT CHANGE UP (ref 3.8–10.5)
WBC # FLD AUTO: 8.46 K/UL — SIGNIFICANT CHANGE UP (ref 3.8–10.5)

## 2018-12-26 PROCEDURE — 93306 TTE W/DOPPLER COMPLETE: CPT | Mod: 26

## 2018-12-26 PROCEDURE — 99222 1ST HOSP IP/OBS MODERATE 55: CPT

## 2018-12-26 PROCEDURE — 93284 PRGRMG EVAL IMPLANTABLE DFB: CPT | Mod: 26

## 2018-12-26 PROCEDURE — 70450 CT HEAD/BRAIN W/O DYE: CPT | Mod: 26

## 2018-12-26 RX ORDER — IPRATROPIUM/ALBUTEROL SULFATE 18-103MCG
3 AEROSOL WITH ADAPTER (GRAM) INHALATION EVERY 6 HOURS
Qty: 0 | Refills: 0 | Status: DISCONTINUED | OUTPATIENT
Start: 2018-12-26 | End: 2018-12-27

## 2018-12-26 RX ORDER — PANTOPRAZOLE SODIUM 20 MG/1
40 TABLET, DELAYED RELEASE ORAL
Qty: 0 | Refills: 0 | Status: DISCONTINUED | OUTPATIENT
Start: 2018-12-26 | End: 2018-12-27

## 2018-12-26 RX ORDER — METOPROLOL TARTRATE 50 MG
25 TABLET ORAL
Qty: 0 | Refills: 0 | Status: DISCONTINUED | OUTPATIENT
Start: 2018-12-26 | End: 2018-12-27

## 2018-12-26 RX ORDER — HEPARIN SODIUM 5000 [USP'U]/ML
5000 INJECTION INTRAVENOUS; SUBCUTANEOUS EVERY 12 HOURS
Qty: 0 | Refills: 0 | Status: DISCONTINUED | OUTPATIENT
Start: 2018-12-26 | End: 2018-12-27

## 2018-12-26 RX ORDER — SODIUM CHLORIDE 0.65 %
1 AEROSOL, SPRAY (ML) NASAL
Qty: 0 | Refills: 0 | Status: DISCONTINUED | OUTPATIENT
Start: 2018-12-26 | End: 2018-12-27

## 2018-12-26 RX ORDER — ASPIRIN/CALCIUM CARB/MAGNESIUM 324 MG
81 TABLET ORAL DAILY
Qty: 0 | Refills: 0 | Status: DISCONTINUED | OUTPATIENT
Start: 2018-12-26 | End: 2018-12-27

## 2018-12-26 RX ORDER — LISINOPRIL 2.5 MG/1
5 TABLET ORAL ONCE
Qty: 0 | Refills: 0 | Status: COMPLETED | OUTPATIENT
Start: 2018-12-26 | End: 2018-12-26

## 2018-12-26 RX ORDER — LISINOPRIL 2.5 MG/1
5 TABLET ORAL DAILY
Qty: 0 | Refills: 0 | Status: DISCONTINUED | OUTPATIENT
Start: 2018-12-26 | End: 2018-12-26

## 2018-12-26 RX ORDER — MECLIZINE HCL 12.5 MG
25 TABLET ORAL EVERY 6 HOURS
Qty: 0 | Refills: 0 | Status: DISCONTINUED | OUTPATIENT
Start: 2018-12-26 | End: 2018-12-27

## 2018-12-26 RX ORDER — LANOLIN ALCOHOL/MO/W.PET/CERES
5 CREAM (GRAM) TOPICAL AT BEDTIME
Qty: 0 | Refills: 0 | Status: DISCONTINUED | OUTPATIENT
Start: 2018-12-26 | End: 2018-12-27

## 2018-12-26 RX ORDER — LISINOPRIL 2.5 MG/1
10 TABLET ORAL DAILY
Qty: 0 | Refills: 0 | Status: DISCONTINUED | OUTPATIENT
Start: 2018-12-26 | End: 2018-12-27

## 2018-12-26 RX ORDER — ATORVASTATIN CALCIUM 80 MG/1
10 TABLET, FILM COATED ORAL AT BEDTIME
Qty: 0 | Refills: 0 | Status: DISCONTINUED | OUTPATIENT
Start: 2018-12-26 | End: 2018-12-27

## 2018-12-26 RX ADMIN — LISINOPRIL 10 MILLIGRAM(S): 2.5 TABLET ORAL at 13:26

## 2018-12-26 RX ADMIN — HEPARIN SODIUM 5000 UNIT(S): 5000 INJECTION INTRAVENOUS; SUBCUTANEOUS at 19:00

## 2018-12-26 RX ADMIN — ATORVASTATIN CALCIUM 10 MILLIGRAM(S): 80 TABLET, FILM COATED ORAL at 21:38

## 2018-12-26 RX ADMIN — Medication 25 MILLIGRAM(S): at 06:07

## 2018-12-26 RX ADMIN — Medication 25 MILLIGRAM(S): at 17:19

## 2018-12-26 RX ADMIN — PANTOPRAZOLE SODIUM 40 MILLIGRAM(S): 20 TABLET, DELAYED RELEASE ORAL at 06:07

## 2018-12-26 RX ADMIN — HEPARIN SODIUM 5000 UNIT(S): 5000 INJECTION INTRAVENOUS; SUBCUTANEOUS at 06:06

## 2018-12-26 RX ADMIN — Medication 81 MILLIGRAM(S): at 13:26

## 2018-12-26 RX ADMIN — LISINOPRIL 5 MILLIGRAM(S): 2.5 TABLET ORAL at 06:07

## 2018-12-26 RX ADMIN — Medication 5 MILLIGRAM(S): at 21:38

## 2018-12-26 NOTE — PROGRESS NOTE ADULT - SUBJECTIVE AND OBJECTIVE BOX
c/c: dizziness  HPI:  81 yrs M with PMH of HTN ,CHF ( 2016 :EF 25-30% left ventricular dysfunction ,mild TR and Mild MR)  ,COPD ,CAD s/p CABG and 1 stent ,Left neck CEA ,PVD S/P stent CKD-3 ,HLD ,lung cancer s/p Lobectomy ,Cardiac arrest s/p AICD/pacemaker Implanted 2016  comes to ED with CC of Dizziness for the past 4 days.  Patient reports for the past  4 days he have been feeling dizzy especially when he walks up from the bed in the AM and lasts until middle of days and resolves worse while tries to position/rolls himself in the bed . Feels like he spinning . on the day of admission, when he woke up he felt dizzy and was associated with nausea which resolved in the ED . As per ED chart note he was SOB and had Right ear popping sensation .But he denies chest pain  , palpations , SOB , diaphoresis ,vomiting ,abdominal pain  ,dysuria ,diarrhoea ,leg swelling ,loss of consciousness .    Patient forgetful/mild cognitive impairment doesn't remember his history and medication and called spouse for clarification of h/o and med reconciliation  phone number listed in the chart no answer .    12/26: pt seen and examined this am. Felt a little better than yesterday. denies tinnitus/fullness of ears or ear pain. no recent URI. States he was going to see Dr. Macias as outpt for ENT.    Review of system- All 10 systems reviewed and is as per HPI otherwise negative.     VITALS  T(C): 36.2 (12-26-18 @ 15:48), Max: 36.7 (12-25-18 @ 18:53)  HR: 70 (12-26-18 @ 11:00) (60 - 85)  BP: 141/116 (12-26-18 @ 11:00) (104/81 - 176/84)  RR: 17 (12-26-18 @ 11:00) (10 - 21)  SpO2: 100% (12-26-18 @ 09:00) (93% - 100%)      PHYSICAL EXAM:    GENERAL: Comfortable, no acute distress  HEAD:  Atraumatic, Normocephalic  EYES: EOMI, PERRLA  HEENT: Moist mucous membranes  NECK: Supple, No JVD  NERVOUS SYSTEM:  Alert & Oriented X3, non focal  CHEST/LUNG: Clear to auscultation bilaterally  HEART: Regular rate and rhythm; No murmurs, rubs, or gallops  ABDOMEN: Soft, Nontender, Nondistended; Bowel sounds present  GENITOURINARY- Voiding, no palpable bladder  EXTREMITIES:  No clubbing, cyanosis, or edema  MUSCULOSKELETAL- No muscle tenderness, No joint tenderness  SKIN-no rash        LABS:                        14.2   8.46  )-----------( 192      ( 26 Dec 2018 04:53 )             42.2     12-26    142  |  106  |  x   ----------------------------<  x   4.1   |  28  |  1.29    Ca    9.2      25 Dec 2018 19:51  Phos  2.3     12-26  Mg     1.8     12-26    TPro  7.4  /  Alb  3.9  /  TBili  0.4  /  DBili  x   /  AST  24  /  ALT  20  /  AlkPhos  74  12-25    PT/INR - ( 25 Dec 2018 19:51 )   PT: 12.9 sec;   INR: 1.16 ratio         PTT - ( 25 Dec 2018 19:51 )  PTT:31.1 sec        CARDIAC MARKERS ( 26 Dec 2018 04:53 )  0.017 ng/mL / x     / x     / x     / x      CARDIAC MARKERS ( 26 Dec 2018 00:15 )  <0.015 ng/mL / x     / x     / x     / x      CARDIAC MARKERS ( 25 Dec 2018 19:51 )  <0.015 ng/mL / x     / x     / x     / x            MEDS  ALBUTerol/ipratropium for Nebulization 3 milliLiter(s) Nebulizer every 6 hours PRN  aspirin enteric coated 81 milliGRAM(s) Oral daily  atorvastatin 10 milliGRAM(s) Oral at bedtime  heparin  Injectable 5000 Unit(s) SubCutaneous every 12 hours  lisinopril 10 milliGRAM(s) Oral daily  melatonin 5 milliGRAM(s) Oral at bedtime  metoprolol tartrate 25 milliGRAM(s) Oral two times a day  pantoprazole    Tablet 40 milliGRAM(s) Oral before breakfast    ASSESSMENT AND PLAN:  81m, PMH AS ABOVE A/W:    1. DIZZINESS:  -?bppv  -trial of meclizine.   -ENT eval   -cardio eval appreciated, PPM interogation with no ventricular arrhythmias  -f/u echo   -orthostatics negative.     2. h/o CAd/CABG/Stent/aortic stenosis/chronic systolic CHF:  -f/u echo  -lasix on hold for now.     3. COPD  -stable  -prn nebs    4. PAD/stents:  -asa/statin    5. CKD III:  -stable.     6. HTN:  -continue acei (dose increased), bb  -lasix on hold      7. DVT px

## 2018-12-26 NOTE — PROCEDURE NOTE - INTERROGATION NOTE: COMMENTS
No vent arrhythmias noted since last interrogation on Dec 25, 2018.  No interrogations noted on chart.

## 2018-12-26 NOTE — CONSULT NOTE ADULT - ASSESSMENT
81 yrs M with PMH of HTN ,CHF ( 2016 :EF 25-30% left ventricular dysfunction ,mild TR and Mild MR)  ,COPD ,CAD s/p CABG and 1 stent ,Left neck CEA ,PVD S/P stent CKD-3 ,HLD ,lung cancer s/p Lobectomy ,Cardiac arrest s/p AICD/pacemaker Implanted 2016  comes to ED with CC of Dizziness for the past 4 days.  Patient reports for the past  4 days he have been feeling dizzy especially when he walks up from the bed in the AM and lasts until middle of days and resolves worse while tries to position/rolls himself in the bed . Feels like he spinning . Today when he woke up he felt dizzy and was associated with nausea which resolved in the ED . As per ED chart note he was SOB and had Right ear popping sensation .But he denies chest pain  , palpations , SOB , diaphoresis ,vomiting ,abdominal pain  ,dysuria ,diarrhoea ,leg swelling ,loss of consciousness .    Patient forgetful/mild cognitive impairment doesn't remember his history and medication and called spouse for clarification of h/o and med reconciliation  phone number listed in the chart no answer .    States sx's are more vertiginous, described as a sensation of imbalance and dysequilibrium, that even sitting at the edge of the bed, he feels the need to hold on or he might fall, associated with mild nausea. States he gets similar sensation, but very transient, when he rolls over in bed. Also has noted popping and crackling noises in right ear sporadically over the past month. Has known hx of significant AS and cardiomyopathy, which  has been followed at [facility] over past several years. Although he previously had had EF as low as 20-25% at the time of his cardiac arrest and ICD implantation, more recent echo from [facility] from 2017 revealed a 55-60% EF. KACIE was 1.2cm2, consistent with moderate but non-critical AS.     Impression is of episodic dizziness, etiology unclear. Character of sx's sound more vertiginous by hx., but obviously concern for arrythmia in light of prior hx., as well as potential for worsening of AS, orthostasis, etc... EP evaluation and ICD interrogation pending. No arrythmia by tele monitoring thus far. Echo ordered to reassess LVF and extent of AS. Orthostatic VS ordered. Consider ENT eval for right ear sx's.
81 yrs M with PMH of HTN ,CHF (EF 25-30%), COPD ,CAD s/p CABG, Left CEA ,PVD S/P stent, CKD-3 ,HLD ,lung cancer s/p Lobectomy, Cardiac arrest s/p AICD implantation in 2016 presented to ED with C/o Dizziness with associated with nausea, sx improved in ED. Pt has had dizziness off and on since past  4-5 days, usually starts in the mornings, is excaerbated with postural changes or when he rolls over in the bed especially to his right side, at times he feels his head is spinning; he denies URI or fever, he does admit he has had right ear popping sensation and some discomfort for which he needs to see ENT.    # Vertigo; most likley positional ; underlying vestibular neuritis needs to be excluded    # Multiple risk factors for stroke    - CT angio H/N can be obtained if renal fn permits   - Vestibular therapy as OP  - Continue ASA / Pravastatin    D/W pt

## 2018-12-26 NOTE — CONSULT NOTE ADULT - SUBJECTIVE AND OBJECTIVE BOX
CC: 83 y old  Male who presents with a chief complaint of Dizziness (26 Dec 2018 16:54)    HPI:  81 yrs M with PMH of HTN ,CHF (EF 25-30%)  ,COPD ,CAD s/p CABG,Left CEA ,PVD S/P stent CKD-3 ,HLD ,lung cancer s/p Lobectomy, Cardiac arrest s/p AICD/pacemaker Implanted 2016 presented to ED with CC of Dizziness for the past 4 days.  Patient reports for the past  4 days he have been feeling dizzy especially when he walks up from the bed in the AM and lasts until middle of days and resolves worse while tries to position/rolls himself in the bed . Feels like he spinning . Today when he woke up he felt dizzy and was associated with nausea which resolved in the ED . As per ED chart note he was SOB and had Right ear popping sensation .But he denies chest pain  , palpations , SOB , diaphoresis ,vomiting ,abdominal pain  ,dysuria ,diarrhoea ,leg swelling ,loss of consciousness .    Patient forgetful/mild cognitive impairment doesn't remember his history and medication and called spouse for clarification of h/o and med reconciliation  phone number listed in the chart no answer .    PAST MEDICAL & SURGICAL HISTORY:  Heart failure  Lung cancer  PAD (peripheral artery disease)  CKD (chronic kidney disease)  Cardiac arrest with successful resuscitation  CAD (coronary artery disease)  Hypertension  History of tonsillectomy and adenoidectomy  H/O left knee surgery  History of appendectomy  History of lobectomy of lung  S/P carotid endarterectomy  AICD (automatic cardioverter/defibrillator) present    FAMILY HISTORY: Negative    Social Hx: Lives with family ,past smoker quit 1996 and drinks 1-2 cocktails daily for the past many years at dinner time    MEDICATIONS  (STANDING):  aspirin enteric coated 81 milliGRAM(s) Oral daily  atorvastatin 10 milliGRAM(s) Oral at bedtime  heparin  Injectable 5000 Unit(s) SubCutaneous every 12 hours  lisinopril 10 milliGRAM(s) Oral daily  melatonin 5 milliGRAM(s) Oral at bedtime  metoprolol tartrate 25 milliGRAM(s) Oral two times a day  pantoprazole    Tablet 40 milliGRAM(s) Oral before breakfast     Home Medications:   * Incomplete Medication History as of 25-Dec-2018 19:13 documented in Structured Notes  · 	metoprolol tartrate 25 mg oral tablet: 1 tab(s) orally 2 times a day, Last Dose Taken:    · 	Ecotrin Adult Low Strength 81 mg oral delayed release tablet: 1 tab(s) orally once a day, Last Dose Taken:    · 	pantoprazole 40 mg oral delayed release tablet: 1 tab(s) orally once a day, Last Dose Taken:    · 	lisinopril 5 mg oral tablet: 1 tab(s) orally once a day, Last Dose Taken:    · 	pravastatin 40 mg oral tablet: 1 tab(s) orally once a day, Last Dose Taken:    · 	melatonin 5 mg oral tablet: 1 tab(s) orally once a day (at bedtime), Last Dose Taken:    · 	furosemide 20 mg oral tablet: 1 tab(s) orally once a day, Last Dose Taken:      Allergies  No Known Allergies    Intolerances    ROS: Pertinent positives in HPI, all other ROS were reviewed and are negative.      Vital Signs Last 24 Hrs  T(C): 36.2 (26 Dec 2018 15:48), Max: 36.7 (26 Dec 2018 00:28)  T(F): 97.2 (26 Dec 2018 15:48), Max: 98 (26 Dec 2018 00:28)  HR: 70 (26 Dec 2018 11:00) (60 - 72)  BP: 141/116 (26 Dec 2018 11:00) (104/81 - 176/84)  BP(mean): 122 (26 Dec 2018 11:00) (81 - 122)  RR: 17 (26 Dec 2018 11:00) (10 - 21)  SpO2: 100% (26 Dec 2018 09:00) (93% - 100%)    GE:   Constitutional: awake and alert, normocephalic  HEENT: PERRLA, EOMI,   Neck: Supple.  Respiratory: Breath sounds are clear bilaterally  Cardiovascular: S1 and S2, regular  Extremities:  no edema  Vascular: Caritid Bruit - no  Musculoskeletal: no joint swelling/tenderness, no abnormal movements  Skin: No rashes    Neurological exam:  HF: A x O x 3. Appropriately interactive, normal affect. Speech fluent, No Aphasia or paraphasic errors. Naming /repetition intact   CN: PRISCILLA, EOMI, VFF, facial sensation normal, no NLFD, tongue midline, Palate moves equally, SCM equal bilaterally  Motor: No pronator drift, Strength 5/5 in all 4 ext, normal bulk and tone, no tremor, rigidity.    Sens: Intact to light touch / PP/ VS/ JS    Reflexes: Symmetric and normal .  downgoing toes b/l  Coord:  No FNFA, dysmetria, SO intact   Gait/Balance: Normal    Labs:   12-26    142  |  106  |  x   ----------------------------<  x   4.1   |  28  |  1.29    Ca    9.2      25 Dec 2018 19:51  Phos  2.3     12-26  Mg     1.8     12-26    TPro  7.4  /  Alb  3.9  /  TBili  0.4  /  DBili  x   /  AST  24  /  ALT  20  /  AlkPhos  74  12-25                        14.2   8.46  )-----------( 192      ( 26 Dec 2018 04:53 )             42.2       Radiology:  - CT Head: < from: CT Head No Cont (12.26.18 @ 09:20) >  No acute intracranial hemorrhage, mass effect or midline shift. No   interval change of the intracranial contents sincehead CT of 8/9/17.    Chronic left thalamocapsular lacunar infarct.    Chronic left caudate head lacunar infarct.    Chronic mild bilateral cerebral white matter microvascular changes.    New moderate mucosal thickening of the right maxillary sinus. CC: 83 y old  Male who presents with a chief complaint of Dizziness (26 Dec 2018 16:54)    HPI:  81 yrs M with PMH of HTN ,CHF (EF 25-30%)  ,COPD ,CAD s/p CABG,Left CEA ,PVD S/P stent CKD-3 ,HLD ,lung cancer s/p Lobectomy, Cardiac arrest s/p AICD/pacemaker Implanted 2016 presented to ED with CC of Dizziness with associated with nausea, sx improved in ED.    Pt reports that for the past  4-5 days he have been feeling dizzy off and on when he gets out of bed in the morning and starts walking, is excaerbated with postural changes or when he rolls over in the bed especially to his right side, at times he feels his head is spinning. He denies chest pain, palpations , HA, SOB , diaphoresis ,vomiting , tinnitus, loss of consciousness, URI or fever / skin rashes, he does admit he has had Right ear popping sensation and some discomfort for which he needs to see ENT.    PAST MEDICAL & SURGICAL HISTORY:  Heart failure  Lung cancer  PAD (peripheral artery disease)  CKD (chronic kidney disease)  Cardiac arrest with successful resuscitation  CAD (coronary artery disease)  Hypertension  History of tonsillectomy and adenoidectomy  H/O left knee surgery  History of appendectomy  History of lobectomy of lung  S/P carotid endarterectomy  AICD (automatic cardioverter/defibrillator) present    FAMILY HISTORY: Negative    Social Hx: Lives with family ,past smoker quit 1996 and drinks 1-2 cocktails daily for the past many years at dinner time    MEDICATIONS  (STANDING):  aspirin enteric coated 81 milliGRAM(s) Oral daily  atorvastatin 10 milliGRAM(s) Oral at bedtime  heparin  Injectable 5000 Unit(s) SubCutaneous every 12 hours  lisinopril 10 milliGRAM(s) Oral daily  melatonin 5 milliGRAM(s) Oral at bedtime  metoprolol tartrate 25 milliGRAM(s) Oral two times a day  pantoprazole    Tablet 40 milliGRAM(s) Oral before breakfast     Home Medications:   * Incomplete Medication History as of 25-Dec-2018 19:13 documented in Structured Notes  · 	metoprolol tartrate 25 mg oral tablet: 1 tab(s) orally 2 times a day, Last Dose Taken:    · 	Ecotrin Adult Low Strength 81 mg oral delayed release tablet: 1 tab(s) orally once a day, Last Dose Taken:    · 	pantoprazole 40 mg oral delayed release tablet: 1 tab(s) orally once a day, Last Dose Taken:    · 	lisinopril 5 mg oral tablet: 1 tab(s) orally once a day, Last Dose Taken:    · 	pravastatin 40 mg oral tablet: 1 tab(s) orally once a day, Last Dose Taken:    · 	melatonin 5 mg oral tablet: 1 tab(s) orally once a day (at bedtime), Last Dose Taken:    · 	furosemide 20 mg oral tablet: 1 tab(s) orally once a day, Last Dose Taken:      Allergies  No Known Allergies    Intolerances    ROS: Pertinent positives in HPI, all other ROS were reviewed and are negative.      Vital Signs Last 24 Hrs  T(C): 36.2 (26 Dec 2018 15:48), Max: 36.7 (26 Dec 2018 00:28)  T(F): 97.2 (26 Dec 2018 15:48), Max: 98 (26 Dec 2018 00:28)  HR: 70 (26 Dec 2018 11:00) (60 - 72)  BP: 141/116 (26 Dec 2018 11:00) (104/81 - 176/84)  BP(mean): 122 (26 Dec 2018 11:00) (81 - 122)  RR: 17 (26 Dec 2018 11:00) (10 - 21)  SpO2: 100% (26 Dec 2018 09:00) (93% - 100%)    GE:   Constitutional: awake and alert, normocephalic  HEENT: PERRLA, EOMI,   Neck: Supple.  Respiratory: Breath sounds are clear bilaterally  Cardiovascular: S1 and S2, regular  Extremities:  no edema  Vascular: Caritid Bruit - no  Musculoskeletal: no joint swelling/tenderness, no abnormal movements  Skin: No rashes    Neurological exam:  HF: A x O x 3. Appropriately interactive, normal affect. Speech fluent, No Aphasia or paraphasic errors. Naming /repetition intact   CN: PRISCILLA, EOMI, VFF, facial sensation normal, no NLFD, tongue midline, Palate moves equally, SCM equal bilaterally  Motor: No pronator drift, Strength 5/5 in all 4 ext, normal bulk and tone, no tremor, rigidity.    Sens: Intact to light touch / PP/ VS/ JS    Reflexes: Symmetric and normal .  downgoing toes b/l  Coord:  No FNFA, dysmetria, SO intact   Gait/Balance: Normal    Labs:   12-26    142  |  106  |  x   ----------------------------<  x   4.1   |  28  |  1.29    Ca    9.2      25 Dec 2018 19:51  Phos  2.3     12-26  Mg     1.8     12-26    TPro  7.4  /  Alb  3.9  /  TBili  0.4  /  DBili  x   /  AST  24  /  ALT  20  /  AlkPhos  74  12-25                        14.2   8.46  )-----------( 192      ( 26 Dec 2018 04:53 )             42.2       Radiology:  - CT Head: < from: CT Head No Cont (12.26.18 @ 09:20) >  No acute intracranial hemorrhage, mass effect or midline shift. No   interval change of the intracranial contents sincehead CT of 8/9/17.    Chronic left thalamocapsular lacunar infarct.    Chronic left caudate head lacunar infarct.    Chronic mild bilateral cerebral white matter microvascular changes.    New moderate mucosal thickening of the right maxillary sinus.

## 2018-12-26 NOTE — PROVIDER CONTACT NOTE (OTHER) - SITUATION
Called office of Dr. CRAMER, Neurology on call as per Dr. Doe. Dr. Cramer 's office aware of consult.

## 2018-12-26 NOTE — CONSULT NOTE ADULT - SUBJECTIVE AND OBJECTIVE BOX
Cardiology Consultation    HPI:  81 yrs M with PMH of HTN ,CHF ( 2016 :EF 25-30% left ventricular dysfunction ,mild TR and Mild MR)  ,COPD ,CAD s/p CABG and 1 stent ,Left neck CEA ,PVD S/P stent CKD-3 ,HLD ,lung cancer s/p Lobectomy ,Cardiac arrest s/p AICD/pacemaker Implanted 2016  comes to ED with CC of Dizziness for the past 4 days.  Patient reports for the past  4 days he have been feeling dizzy especially when he walks up from the bed in the AM and lasts until middle of days and resolves worse while tries to position/rolls himself in the bed . Feels like he spinning . Today when he woke up he felt dizzy and was associated with nausea which resolved in the ED . As per ED chart note he was SOB and had Right ear popping sensation .But he denies chest pain  , palpations , SOB , diaphoresis ,vomiting ,abdominal pain  ,dysuria ,diarrhoea ,leg swelling ,loss of consciousness .    Patient forgetful/mild cognitive impairment doesn't remember his history and medication and called spouse for clarification of h/o and med reconciliation  phone number listed in the chart no answer .    States sx's are more vertiginous, described as a sensation of imbalance and dysequilibrium, that even sitting at the edge of the bed, he feels the need to hold on or he might fall, associated with mild nausea. States he gets similar sensation, but very transient, when he rolls over in bed. Also has noted popping and crackling noises in right ear sporadically over the past month. Has known hx of significant AS and cardiomyopathy, which  has been followed at [facility] over past several years. Although he previously had had EF as low as 20-25% at the time of his cardiac arrest and ICD implantation, more recent echo from [facility] from 2017 revealed a 55-60% EF. KACIE was 1.2cm2, consistent with moderate but non-critical AS.     Family h/o : Doesn't remember (25 Dec 2018 23:30)      PAST MEDICAL & SURGICAL HISTORY:  Heart failure  Lung cancer  PAD (peripheral artery disease)  CKD (chronic kidney disease)  Cardiac arrest with successful resuscitation  CAD (coronary artery disease)  Hypertension  History of tonsillectomy and adenoidectomy  H/O left knee surgery  History of appendectomy  History of lobectomy of lung  S/P carotid endarterectomy  AICD (automatic cardioverter/defibrillator) present    SOCIAL HISTORY: Former smoker    MEDICATIONS  (STANDING):  aspirin enteric coated 81 milliGRAM(s) Oral daily  atorvastatin 10 milliGRAM(s) Oral at bedtime  heparin  Injectable 5000 Unit(s) SubCutaneous every 12 hours  lisinopril 10 milliGRAM(s) Oral daily  lisinopril 5 milliGRAM(s) Oral once  melatonin 5 milliGRAM(s) Oral at bedtime  metoprolol tartrate 25 milliGRAM(s) Oral two times a day  pantoprazole    Tablet 40 milliGRAM(s) Oral before breakfast    MEDICATIONS  (PRN):  ALBUTerol/ipratropium for Nebulization 3 milliLiter(s) Nebulizer every 6 hours PRN Shortness of Breath and/or Wheezing      REVIEW OF SYSTEMS:    CARDIOVASCULAR: As per HPI  REMAINDER: Non-contributory    PHYSICAL EXAMINATION:  Vital Signs Last 24 Hrs  T(C): 36.6 (26 Dec 2018 07:22), Max: 36.7 (25 Dec 2018 18:53)  T(F): 97.9 (26 Dec 2018 07:22), Max: 98 (25 Dec 2018 18:53)  HR: 70 (26 Dec 2018 11:00) (60 - 85)  BP: 141/116 (26 Dec 2018 11:00) (104/81 - 176/84)  BP(mean): 122 (26 Dec 2018 11:00) (81 - 122)  RR: 17 (26 Dec 2018 11:00) (10 - 21)  SpO2: 100% (26 Dec 2018 09:00) (93% - 100%)  GENERAL APPEARANCE: Alert and oriented. No apparent distress.  NECK: Supple. Without adenopathy, thyromegaly, or bruit.  CHEST:  Clear to auscultation.    HEART:   Normal S1, S2. III/VI AS murmur  ABDOMEN:  Soft, nontender.   EXTREMITIES:  Without clubbing, cyanosis, or edema.    I&O's Summary    25 Dec 2018 07:01  -  26 Dec 2018 07:00  --------------------------------------------------------  IN: 1000 mL / OUT: 200 mL / NET: 800 mL    26 Dec 2018 07:01  -  26 Dec 2018 11:20  --------------------------------------------------------  IN: 0 mL / OUT: 450 mL / NET: -450 mL        LABS:                        14.2   8.46  )-----------( 192      ( 26 Dec 2018 04:53 )             42.2     12-26    142  |  106  |  x   ----------------------------<  x   4.1   |  28  |  1.29    Ca    9.2      25 Dec 2018 19:51  Phos  2.3     12-26  Mg     1.8     12-26    TPro  7.4  /  Alb  3.9  /  TBili  0.4  /  DBili  x   /  AST  24  /  ALT  20  /  AlkPhos  74  12-25    LIVER FUNCTIONS - ( 25 Dec 2018 19:51 )  Alb: 3.9 g/dL / Pro: 7.4 gm/dL / ALK PHOS: 74 U/L / ALT: 20 U/L / AST: 24 U/L / GGT: x           PT/INR - ( 25 Dec 2018 19:51 )   PT: 12.9 sec;   INR: 1.16 ratio         PTT - ( 25 Dec 2018 19:51 )  PTT:31.1 sec  CARDIAC MARKERS ( 26 Dec 2018 04:53 )  0.017 ng/mL / x     / x     / x     / x      CARDIAC MARKERS ( 26 Dec 2018 00:15 )  <0.015 ng/mL / x     / x     / x     / x      CARDIAC MARKERS ( 25 Dec 2018 19:51 )  <0.015 ng/mL / x     / x     / x     / x        EKG: < from: 12 Lead ECG (12.25.18 @ 18:59) >  Ventricular Rate 71 BPM    Atrial Rate 71 BPM    P-R Interval 148 ms    QRS Duration 120 ms    Q-T Interval 402 ms    QTC Calculation(Bezet) 436 ms    P Axis 83 degrees    R Axis 83 degrees    T Axis 248 degrees    Diagnosis Line Atrial-sensed ventricular-paced rhythm  Abnormal ECG  When compared with ECG of 18-JUN-2018 16:37,  Vent. rate has decreased BY   2 BPM  Confirmed by Palla MD, Venugopal ([id number]) on 12/26/2018 9:43:48 AM    < end of copied text >

## 2018-12-27 ENCOUNTER — TRANSCRIPTION ENCOUNTER (OUTPATIENT)
Age: 83
End: 2018-12-27

## 2018-12-27 VITALS — WEIGHT: 225.53 LBS

## 2018-12-27 LAB
ALBUMIN SERPL ELPH-MCNC: 3.7 G/DL — SIGNIFICANT CHANGE UP (ref 3.3–5)
ALP SERPL-CCNC: 54 U/L — SIGNIFICANT CHANGE UP (ref 40–120)
ALT FLD-CCNC: 21 U/L — SIGNIFICANT CHANGE UP (ref 12–78)
ANION GAP SERPL CALC-SCNC: 6 MMOL/L — SIGNIFICANT CHANGE UP (ref 5–17)
AST SERPL-CCNC: 19 U/L — SIGNIFICANT CHANGE UP (ref 15–37)
BILIRUB SERPL-MCNC: 0.7 MG/DL — SIGNIFICANT CHANGE UP (ref 0.2–1.2)
BUN SERPL-MCNC: 17 MG/DL — SIGNIFICANT CHANGE UP (ref 7–23)
CALCIUM SERPL-MCNC: 9.4 MG/DL — SIGNIFICANT CHANGE UP (ref 8.5–10.1)
CHLORIDE SERPL-SCNC: 105 MMOL/L — SIGNIFICANT CHANGE UP (ref 96–108)
CO2 SERPL-SCNC: 30 MMOL/L — SIGNIFICANT CHANGE UP (ref 22–31)
CREAT SERPL-MCNC: 1.17 MG/DL — SIGNIFICANT CHANGE UP (ref 0.5–1.3)
GLUCOSE SERPL-MCNC: 138 MG/DL — HIGH (ref 70–99)
POTASSIUM SERPL-MCNC: 4.5 MMOL/L — SIGNIFICANT CHANGE UP (ref 3.5–5.3)
POTASSIUM SERPL-SCNC: 4.5 MMOL/L — SIGNIFICANT CHANGE UP (ref 3.5–5.3)
PROT SERPL-MCNC: 7.3 GM/DL — SIGNIFICANT CHANGE UP (ref 6–8.3)
SODIUM SERPL-SCNC: 141 MMOL/L — SIGNIFICANT CHANGE UP (ref 135–145)

## 2018-12-27 RX ORDER — FUROSEMIDE 40 MG
20 TABLET ORAL ONCE
Qty: 0 | Refills: 0 | Status: COMPLETED | OUTPATIENT
Start: 2018-12-27 | End: 2018-12-27

## 2018-12-27 RX ORDER — MECLIZINE HCL 12.5 MG
1 TABLET ORAL
Qty: 30 | Refills: 0
Start: 2018-12-27

## 2018-12-27 RX ORDER — LISINOPRIL 2.5 MG/1
1 TABLET ORAL
Qty: 0 | Refills: 0 | COMMUNITY

## 2018-12-27 RX ORDER — LISINOPRIL 2.5 MG/1
1 TABLET ORAL
Qty: 30 | Refills: 0
Start: 2018-12-27

## 2018-12-27 RX ORDER — FUROSEMIDE 40 MG
1 TABLET ORAL
Qty: 0 | Refills: 0 | COMMUNITY

## 2018-12-27 RX ORDER — SODIUM CHLORIDE 0.65 %
2 AEROSOL, SPRAY (ML) NASAL
Qty: 0 | Refills: 0 | DISCHARGE
Start: 2018-12-27

## 2018-12-27 RX ADMIN — PANTOPRAZOLE SODIUM 40 MILLIGRAM(S): 20 TABLET, DELAYED RELEASE ORAL at 06:08

## 2018-12-27 RX ADMIN — LISINOPRIL 10 MILLIGRAM(S): 2.5 TABLET ORAL at 06:08

## 2018-12-27 RX ADMIN — Medication 25 MILLIGRAM(S): at 06:08

## 2018-12-27 RX ADMIN — Medication 81 MILLIGRAM(S): at 12:06

## 2018-12-27 RX ADMIN — HEPARIN SODIUM 5000 UNIT(S): 5000 INJECTION INTRAVENOUS; SUBCUTANEOUS at 06:08

## 2018-12-27 RX ADMIN — Medication 20 MILLIGRAM(S): at 12:05

## 2018-12-27 NOTE — DISCHARGE NOTE ADULT - MEDICATION SUMMARY - MEDICATIONS TO TAKE
I will START or STAY ON the medications listed below when I get home from the hospital:    Ecotrin Adult Low Strength 81 mg oral delayed release tablet  -- 1 tab(s) by mouth once a day  -- Indication: For Home med    lisinopril 10 mg oral tablet  -- 1 tab(s) by mouth once a day  -- Indication: For Home med, dose increased for high blood pressure    meclizine 25 mg oral tablet  -- 1 tab(s) by mouth every 6 hours, As needed, Dizziness  -- Indication: For dizziness/vertigo    pravastatin 40 mg oral tablet  -- 1 tab(s) by mouth once a day  -- Indication: For Home med for cholesterol    metoprolol tartrate 25 mg oral tablet  -- 1 tab(s) by mouth 2 times a day  -- Indication: For Home med for hypertension    furosemide 20 mg oral tablet  -- 1 tab(s) by mouth every other day  -- Indication: For  home med for hypertension, dose changed to every other day    sodium chloride 0.65% nasal spray  -- 2 spray(s) into nose every 2 hours, As Needed for into nose congestion  -- Indication: For Nasal congestion    melatonin 5 mg oral tablet  -- 1 tab(s) by mouth once a day (at bedtime)  -- Indication: For Home med for sleep    pantoprazole 40 mg oral delayed release tablet  -- 1 tab(s) by mouth once a day  -- Indication: For Home med for gerd

## 2018-12-27 NOTE — DISCHARGE NOTE ADULT - PATIENT PORTAL LINK FT
You can access the [facility] Patient Portal, offered by [facility], by registering with the following website: [email or web address]

## 2018-12-27 NOTE — DISCHARGE NOTE ADULT - HOSPITAL COURSE
81M with PMH of HTN , Chronic systolic CHF ( 2016 :EF 25-30% left ventricular dysfunction ,mild TR and Mild MR) , Moderate AS, COPD ,CAD s/p CABG and 1 stent, Left neck CEA ,PVD S/P stent CKD-3 , HLD  ,lung cancer s/p Lobectomy, Cardiac arrest s/p AICD/pacemaker Implanted 2016 p/w CC of Dizziness for the past 4 days.  Patient reports for the past  4 days he have been feeling dizzy especially when he walks up from the bed in the AM and lasts until middle of days and resolves worse while tries to position/rolls himself in the bed . Feels like he spinning . on the day of admission, when he woke up he felt dizzy and was associated with nausea which resolved in the ED . As per ED chart note he was SOB and had Right ear popping sensation .But he denies chest pain  , palpations , SOB , diaphoresis ,vomiting ,abdominal pain  ,dysuria ,diarrhoea ,leg swelling ,loss of consciousness .  He was admitted for further evaluation. His diuretic was held due to dehydration. Tele was negative for significant arrythmia. His ace-i was increased for uncontrolled hypertension. He was seen by cardiology and neurology while here. He underwent echo which showed LVEF 40% and moderate AS. Orthostatic vitals were negative.  It was felt his symptoms were most likely related to vertigo and he was advised outpt f/u with Dr. Macias . His symptoms have improved and he is ambulating without difficuly.  He will be discharged on meclizine for vertigo, an increased dose of lisinopril for hypertension and advised to decrease lasix to everyother day for now due to OLIVIA/Dehydration on admission. He is advised close outpt f/u with cardio as well.     Vital Signs Last 24 Hrs  T(C): 36.6 (27 Dec 2018 06:00), Max: 36.6 (27 Dec 2018 06:00)  T(F): 97.9 (27 Dec 2018 06:00), Max: 97.9 (27 Dec 2018 06:00)  HR: 60 (27 Dec 2018 11:55) (60 - 73)  BP: 139/65 (27 Dec 2018 11:55) (120/53 - 175/66)  BP(mean): 83 (27 Dec 2018 11:55) (70 - 107)  RR: 16 (27 Dec 2018 11:55) (0 - 27)  SpO2: 100% (27 Dec 2018 11:55) (91% - 100%)    PHYSICAL EXAM:    GENERAL: Comfortable, no acute distress   HEAD:  Normocephalic, atraumatic  EYES: EOMI, PERRLA  HEENT: Moist mucous membranes  NECK: Supple, No JVD  NERVOUS SYSTEM:  Alert & Oriented X3, non focal.   CHEST/LUNG: Clear to auscultation bilaterally  HEART: Regular rate and rhythm  ABDOMEN: Soft, Nontender, Nondistended, Bowel sounds present  GENITOURINARY: Voiding, no palpable bladder  EXTREMITIES:   No clubbing, cyanosis, or edema  MUSCULOSKELETAL- No muscle tenderness, no joint tenderness  SKIN-no rash  LABS:                        14.2   8.46  )-----------( 192      ( 26 Dec 2018 04:53 )             42.2     12-27    141  |  105  |  17  ----------------------------<  138<H>  4.5   |  30  |  1.17    Ca    9.4      27 Dec 2018 04:34  Phos  2.3     12-26  Mg     1.8     12-26    TPro  7.3  /  Alb  3.7  /  TBili  0.7  /  DBili  x   /  AST  19  /  ALT  21  /  AlkPhos  54  12-27    PT/INR - ( 25 Dec 2018 19:51 )   PT: 12.9 sec;   INR: 1.16 ratio    PTT - ( 25 Dec 2018 19:51 )  PTT:31.1 sec    1. Vertigo.   2. h/o CAd/CABG/Stent/aortic stenosis/chronic systolic CHF:  3. COPD  4. PAD/stents  5. OLIVIA due to hypovolemia  6. HTN    time taken for dc 65 min  plan d/w patient in detail.   left message for pcp re: dc.

## 2018-12-27 NOTE — DISCHARGE NOTE ADULT - CARE PROVIDERS DIRECT ADDRESSES
,DirectAddress_Unknown,DirectAddress_Unknown,[email or web address]

## 2018-12-27 NOTE — DISCHARGE NOTE ADULT - PLAN OF CARE
prevent recurrence follow up with Dr. Macias from ENT for further evaluation  can use meclizine as needed for dizziness  dose of lasix was changed to every other day for dehydration.   Lisinopril dose was increased for hypertension.

## 2018-12-27 NOTE — PROGRESS NOTE ADULT - ASSESSMENT
81 yrs M with PMH of HTN ,CHF ( 2016 :EF 25-30% left ventricular dysfunction ,mild TR and Mild MR)  ,COPD ,CAD s/p CABG and 1 stent ,Left neck CEA ,PVD S/P stent CKD-3 ,HLD ,lung cancer s/p Lobectomy ,Cardiac arrest s/p AICD/pacemaker Implanted 2016  comes to ED with CC of Dizziness for the past 4 days.  Patient reports for the past  4 days he have been feeling dizzy especially when he walks up from the bed in the AM and lasts until middle of days and resolves worse while tries to position/rolls himself in the bed . Feels like he spinning . Today when he woke up he felt dizzy and was associated with nausea which resolved in the ED . As per ED chart note he was SOB and had Right ear popping sensation .But he denies chest pain  , palpations , SOB , diaphoresis ,vomiting ,abdominal pain  ,dysuria ,diarrhoea ,leg swelling ,loss of consciousness .    Patient forgetful/mild cognitive impairment doesn't remember his history and medication and called spouse for clarification of h/o and med reconciliation  phone number listed in the chart no answer .    States sx's are more vertiginous, described as a sensation of imbalance and dysequilibrium, that even sitting at the edge of the bed, he feels the need to hold on or he might fall, associated with mild nausea. States he gets similar sensation, but very transient, when he rolls over in bed. Also has noted popping and crackling noises in right ear sporadically over the past month. Has known hx of significant AS and cardiomyopathy, which  has been followed at [facility] over past several years. Although he previously had had EF as low as 20-25% at the time of his cardiac arrest and ICD implantation, more recent echo from [facility] from 2017 revealed a 55-60% EF. KACIE was 1.2cm2, consistent with moderate but non-critical AS.     Impression is of episodic dizziness, etiology unclear. Character of sx's sound more vertiginous by hx., but obviously concern for arrythmia in light of prior hx., as well as potential for worsening of AS, orthostasis, etc... EP evaluation and ICD interrogation pending. No arrythmia by tele monitoring thus far. Echo ordered to reassess LVF and extent of AS. Orthostatic VS ordered. Consider ENT eval for right ear sx's.     12/27 - Clinically improved. No further vertigo. Echo confirms moderate AS. TLS with ?mild diffuse hypokinesia and ?40% EF. Last echo from [facility] with normal EF. Prior  echo's from 2016 with 20% EF. Regardless, is clinically stable on medical therapy with no evidence CHF. ICD interrogation negative. Monitor negative. Ok for DC from cardiac standpoint. Disposition per medicine.

## 2018-12-27 NOTE — PROGRESS NOTE ADULT - SUBJECTIVE AND OBJECTIVE BOX
Subjective: Asymptomatic    PHYSICAL EXAM:    Vital Signs Last 24 Hrs  T(C): 36.6 (27 Dec 2018 06:00), Max: 36.6 (27 Dec 2018 06:00)  T(F): 97.9 (27 Dec 2018 06:00), Max: 97.9 (27 Dec 2018 06:00)  HR: 60 (27 Dec 2018 06:00) (60 - 73)  BP: 128/50 (27 Dec 2018 06:00) (120/53 - 175/66)  BP(mean): 70 (27 Dec 2018 06:00) (70 - 122)  RR: 17 (27 Dec 2018 06:00) (0 - 27)  SpO2: 97% (27 Dec 2018 04:00) (97% - 100%)    Constitutional: A + O x 3  HEENT: WNL  Neck: Supple, without adenopathy, thyromegaly, or bruits  Lungs: Clear to auscultation  Cardiovascular: Normal S1, S2, III/VI AS murmur  Gastrointestinal: Positive bowel sounds, soft, non-tender  Extremities: Without clubbing, cyanosis, or edema  Neurological: Without focal deficit    MEDICATIONS  (STANDING):  aspirin enteric coated 81 milliGRAM(s) Oral daily  atorvastatin 10 milliGRAM(s) Oral at bedtime  heparin  Injectable 5000 Unit(s) SubCutaneous every 12 hours  lisinopril 10 milliGRAM(s) Oral daily  melatonin 5 milliGRAM(s) Oral at bedtime  metoprolol tartrate 25 milliGRAM(s) Oral two times a day  pantoprazole    Tablet 40 milliGRAM(s) Oral before breakfast    MEDICATIONS  (PRN):  ALBUTerol/ipratropium for Nebulization 3 milliLiter(s) Nebulizer every 6 hours PRN Shortness of Breath and/or Wheezing  meclizine 25 milliGRAM(s) Oral every 6 hours PRN Dizziness  sodium chloride 0.65% Nasal 1 Spray(s) Both Nostrils every 2 hours PRN Nasal Congestion      LABS:                          14.2   8.46  )-----------( 192      ( 26 Dec 2018 04:53 )             42.2     12-27    141  |  105  |  17  ----------------------------<  138<H>  4.5   |  30  |  1.17    Ca    9.4      27 Dec 2018 04:34  Phos  2.3     12-26  Mg     1.8     12-26    TPro  7.3  /  Alb  3.7  /  TBili  0.7  /  DBili  x   /  AST  19  /  ALT  21  /  AlkPhos  54  12-27    CARDIAC MARKERS ( 26 Dec 2018 04:53 )  0.017 ng/mL / x     / x     / x     / x      CARDIAC MARKERS ( 26 Dec 2018 00:15 )  <0.015 ng/mL / x     / x     / x     / x      CARDIAC MARKERS ( 25 Dec 2018 19:51 )  <0.015 ng/mL / x     / x     / x     / x          PT/INR - ( 25 Dec 2018 19:51 )   PT: 12.9 sec;   INR: 1.16 ratio         PTT - ( 25 Dec 2018 19:51 )  PTT:31.1 sec  BNP    I&O's Summary    26 Dec 2018 07:01  -  27 Dec 2018 07:00  --------------------------------------------------------  IN: 0 mL / OUT: 1250 mL / NET: -1250 mL

## 2018-12-27 NOTE — DISCHARGE NOTE ADULT - CARE PLAN
Principal Discharge DX:	Vertigo  Goal:	prevent recurrence  Assessment and plan of treatment:	follow up with Dr. Macias from ENT for further evaluation  can use meclizine as needed for dizziness  dose of lasix was changed to every other day for dehydration.   Lisinopril dose was increased for hypertension.

## 2018-12-27 NOTE — DISCHARGE NOTE ADULT - MEDICATION SUMMARY - MEDICATIONS TO CHANGE
I will SWITCH the dose or number of times a day I take the medications listed below when I get home from the hospital:    lisinopril 5 mg oral tablet  -- 1 tab(s) by mouth once a day    furosemide 20 mg oral tablet  -- 1 tab(s) by mouth once a day

## 2018-12-27 NOTE — DISCHARGE NOTE ADULT - CARE PROVIDER_API CALL
Albaro Maldonado), Internal Medicine  [address]  [place] [address]  Phone: [phone]  Fax: [phone]    Ced Tinoco (MD), Cardiology  [address] [place] [address] [address]  [place] [address]  Phone: [phone]  Fax: [phone]    Trevon Macias), Otolaryngology  [address] [facility]  [place] [address]  Phone: [phone]  Fax: [phone]

## 2018-12-31 PROBLEM — I50.9 HEART FAILURE, UNSPECIFIED: Chronic | Status: ACTIVE | Noted: 2018-12-25

## 2018-12-31 PROBLEM — C34.90 MALIGNANT NEOPLASM OF UNSPECIFIED PART OF UNSPECIFIED BRONCHUS OR LUNG: Chronic | Status: ACTIVE | Noted: 2018-12-25

## 2018-12-31 PROBLEM — I73.9 PERIPHERAL VASCULAR DISEASE, UNSPECIFIED: Chronic | Status: ACTIVE | Noted: 2018-12-25

## 2018-12-31 PROBLEM — I46.9 CARDIAC ARREST, CAUSE UNSPECIFIED: Chronic | Status: ACTIVE | Noted: 2018-12-25

## 2018-12-31 PROBLEM — N18.9 CHRONIC KIDNEY DISEASE, UNSPECIFIED: Chronic | Status: ACTIVE | Noted: 2018-12-25

## 2019-01-02 DIAGNOSIS — E86.1 HYPOVOLEMIA: ICD-10-CM

## 2019-01-02 DIAGNOSIS — E78.5 HYPERLIPIDEMIA, UNSPECIFIED: ICD-10-CM

## 2019-01-02 DIAGNOSIS — Z87.891 PERSONAL HISTORY OF NICOTINE DEPENDENCE: ICD-10-CM

## 2019-01-02 DIAGNOSIS — Z95.810 PRESENCE OF AUTOMATIC (IMPLANTABLE) CARDIAC DEFIBRILLATOR: ICD-10-CM

## 2019-01-02 DIAGNOSIS — R42 DIZZINESS AND GIDDINESS: ICD-10-CM

## 2019-01-02 DIAGNOSIS — I73.9 PERIPHERAL VASCULAR DISEASE, UNSPECIFIED: ICD-10-CM

## 2019-01-02 DIAGNOSIS — I25.10 ATHEROSCLEROTIC HEART DISEASE OF NATIVE CORONARY ARTERY WITHOUT ANGINA PECTORIS: ICD-10-CM

## 2019-01-02 DIAGNOSIS — N17.9 ACUTE KIDNEY FAILURE, UNSPECIFIED: ICD-10-CM

## 2019-01-02 DIAGNOSIS — J44.9 CHRONIC OBSTRUCTIVE PULMONARY DISEASE, UNSPECIFIED: ICD-10-CM

## 2019-01-02 DIAGNOSIS — Z79.82 LONG TERM (CURRENT) USE OF ASPIRIN: ICD-10-CM

## 2019-01-02 DIAGNOSIS — Z95.0 PRESENCE OF CARDIAC PACEMAKER: ICD-10-CM

## 2019-01-02 DIAGNOSIS — Z95.1 PRESENCE OF AORTOCORONARY BYPASS GRAFT: ICD-10-CM

## 2019-01-02 DIAGNOSIS — I35.0 NONRHEUMATIC AORTIC (VALVE) STENOSIS: ICD-10-CM

## 2019-01-02 DIAGNOSIS — N18.3 CHRONIC KIDNEY DISEASE, STAGE 3 (MODERATE): ICD-10-CM

## 2019-01-02 DIAGNOSIS — Z85.118 PERSONAL HISTORY OF OTHER MALIGNANT NEOPLASM OF BRONCHUS AND LUNG: ICD-10-CM

## 2019-01-02 DIAGNOSIS — I50.22 CHRONIC SYSTOLIC (CONGESTIVE) HEART FAILURE: ICD-10-CM

## 2019-01-07 ENCOUNTER — APPOINTMENT (OUTPATIENT)
Dept: OTOLARYNGOLOGY | Facility: CLINIC | Age: 84
End: 2019-01-07
Payer: MEDICARE

## 2019-01-07 ENCOUNTER — APPOINTMENT (OUTPATIENT)
Dept: ELECTROPHYSIOLOGY | Facility: CLINIC | Age: 84
End: 2019-01-07
Payer: MEDICARE

## 2019-01-07 VITALS
SYSTOLIC BLOOD PRESSURE: 158 MMHG | WEIGHT: 222 LBS | BODY MASS INDEX: 30.07 KG/M2 | DIASTOLIC BLOOD PRESSURE: 76 MMHG | HEART RATE: 77 BPM | HEIGHT: 72 IN

## 2019-01-07 DIAGNOSIS — H81.23 VESTIBULAR NEURONITIS, BILATERAL: ICD-10-CM

## 2019-01-07 PROCEDURE — 93297 REM INTERROG DEV EVAL ICPMS: CPT

## 2019-01-07 PROCEDURE — 93296 REM INTERROG EVL PM/IDS: CPT

## 2019-01-07 PROCEDURE — 99214 OFFICE O/P EST MOD 30 MIN: CPT

## 2019-01-07 PROCEDURE — 93295 DEV INTERROG REMOTE 1/2/MLT: CPT

## 2019-01-07 NOTE — PHYSICAL EXAM
[Normal] : mucosa is normal [Midline] : trachea located in midline position [de-identified] : cerumen impaction cleared au

## 2019-01-07 NOTE — ASSESSMENT
[FreeTextEntry1] : cerumen cleared au\par hx most consistent w vestibular neuritis\par now asymptomatic\par observe\par consider vng if relapse

## 2019-01-07 NOTE — REVIEW OF SYSTEMS
[Hearing Loss] : hearing loss [Dizziness] : dizziness [Vertigo] : vertigo [Negative] : Heme/Lymph [Patient Intake Form Reviewed] : Patient intake form was reviewed [FreeTextEntry1] : joint aches

## 2019-01-07 NOTE — HISTORY OF PRESENT ILLNESS
[de-identified] : 12/26/18 acute spinning and nausea lasting 30 min \par to hospital relapse w movement and w turning to either side in bed\par slow improvement now no symptoms no focal weakness\par had mri head and ct reported negative\par no acute hearing loss\par au aids for sn loss

## 2019-03-19 ENCOUNTER — APPOINTMENT (OUTPATIENT)
Dept: ELECTROPHYSIOLOGY | Facility: CLINIC | Age: 84
End: 2019-03-19
Payer: MEDICARE

## 2019-03-19 VITALS
WEIGHT: 224 LBS | HEART RATE: 79 BPM | HEIGHT: 71 IN | BODY MASS INDEX: 31.36 KG/M2 | DIASTOLIC BLOOD PRESSURE: 79 MMHG | SYSTOLIC BLOOD PRESSURE: 157 MMHG

## 2019-03-19 PROCEDURE — 93290 INTERROG DEV EVAL ICPMS IP: CPT | Mod: 26

## 2019-03-19 PROCEDURE — 93284 PRGRMG EVAL IMPLANTABLE DFB: CPT

## 2019-03-19 RX ORDER — LISINOPRIL 5 MG/1
5 TABLET ORAL
Refills: 0 | Status: DISCONTINUED | COMMUNITY
End: 2019-03-19

## 2019-03-19 RX ORDER — ASPIRIN 81 MG/1
81 TABLET ORAL
Refills: 0 | Status: DISCONTINUED | COMMUNITY
End: 2019-03-19

## 2019-03-19 RX ORDER — PANTOPRAZOLE SODIUM 40 MG/1
40 TABLET, DELAYED RELEASE ORAL
Refills: 0 | Status: DISCONTINUED | COMMUNITY
End: 2019-03-19

## 2019-03-19 RX ORDER — POTASSIUM CHLORIDE 750 MG/1
10 TABLET, FILM COATED, EXTENDED RELEASE ORAL
Refills: 0 | Status: DISCONTINUED | COMMUNITY
End: 2019-03-19

## 2019-04-09 ENCOUNTER — RX RENEWAL (OUTPATIENT)
Age: 84
End: 2019-04-09

## 2019-06-10 ENCOUNTER — APPOINTMENT (OUTPATIENT)
Dept: ELECTROPHYSIOLOGY | Facility: CLINIC | Age: 84
End: 2019-06-10
Payer: MEDICARE

## 2019-06-10 ENCOUNTER — APPOINTMENT (OUTPATIENT)
Dept: INTERNAL MEDICINE | Facility: CLINIC | Age: 84
End: 2019-06-10
Payer: MEDICARE

## 2019-06-10 VITALS
DIASTOLIC BLOOD PRESSURE: 60 MMHG | RESPIRATION RATE: 18 BRPM | WEIGHT: 224 LBS | HEIGHT: 71 IN | SYSTOLIC BLOOD PRESSURE: 122 MMHG | BODY MASS INDEX: 31.36 KG/M2 | TEMPERATURE: 98.7 F | HEART RATE: 80 BPM | OXYGEN SATURATION: 96 %

## 2019-06-10 PROCEDURE — 94060 EVALUATION OF WHEEZING: CPT

## 2019-06-10 PROCEDURE — 93295 DEV INTERROG REMOTE 1/2/MLT: CPT

## 2019-06-10 PROCEDURE — 94729 DIFFUSING CAPACITY: CPT

## 2019-06-10 PROCEDURE — 99215 OFFICE O/P EST HI 40 MIN: CPT | Mod: 25

## 2019-06-10 PROCEDURE — 93296 REM INTERROG EVL PM/IDS: CPT

## 2019-06-10 PROCEDURE — ZZZZZ: CPT

## 2019-06-10 PROCEDURE — 94727 GAS DIL/WSHOT DETER LNG VOL: CPT

## 2019-06-10 PROCEDURE — 93297 REM INTERROG DEV EVAL ICPMS: CPT

## 2019-06-10 NOTE — HISTORY OF PRESENT ILLNESS
[de-identified] : The patient comes in today for a followup evaluation, and yearly reassessment of his pulmonary status.\par \par Overall, from a palmar standpoint, the patient remains stable. He is not on any bronchodilators at this time. He does note that he will develop mild degrees of shortness of breath primarily with exertional type activities. He is not very active due to the fact that he has significant problems with both knees. He denies any cough or sputum production.\par \par With regards for his underlying past history of lung cancer, he continues in a surveillance program. He did undergo a followup CAT scan of the chest performed one month ago which was remarkable for the fact that he is status post right upper lobectomy. Peripheral scarring was noted. There is no evidence for recurrence of tumor.\par \par The patient continues to be followed by his primary care physician Dr. Ruiz he'll, as well as his cardiologist, Dr. Tinoco. He has been having issues with labile hypertension. His medications are currently in the midst of being adjusted. He denies chest pains. He now comes in for this assessment

## 2019-06-10 NOTE — PHYSICAL EXAM
[General Appearance - In No Acute Distress] : in no acute distress [General Appearance - Alert] : alert [Sclera] : the sclera and conjunctiva were normal [PERRL With Normal Accommodation] : pupils were equal in size, round, and reactive to light [Extraocular Movements] : extraocular movements were intact [Neck Appearance] : the appearance of the neck was normal [] : the neck was supple [Neck Cervical Mass (___cm)] : no neck mass was observed [Edema] : there was no peripheral edema [Nail Clubbing] : no clubbing  or cyanosis of the fingernails [Cervical Lymph Nodes Enlarged Posterior Bilaterally] : posterior cervical [FreeTextEntry1] : Seborrheic keratoses

## 2019-06-10 NOTE — REVIEW OF SYSTEMS
[Negative] : Psychiatric [FreeTextEntry6] : see history of present illness [FreeTextEntry9] : see history of present illness

## 2019-06-10 NOTE — PLAN
[FreeTextEntry1] : 1. Continue with medication as outlined above.\par \par 2. Will observe pulmonary status without inhalers.\par \par 3. The patient will contact me immediately if he develops breathlessness with exertional activity. At that point, we would consider further workup to possibly include a 6 minute walk test.\par \par 4. Followup in 6 months with pre-post spirometry and DLCO.\par \par 5. Routine medical followup with his primary care physician, Dr. Maldonado, and his cardiologist, Dr. Tinoco.

## 2019-06-10 NOTE — DATA REVIEWED
[FreeTextEntry1] : A pulmonary function test is performed. Lung volumes are moderately reduced in a symmetric fashion. Lung mechanics reveal a mild to moderate decrease in flow rates with negligible bronchodilator reactivity. The DLCO is mildly reduced to 61%, however Klarissa for alveolar volume it is normal. Saturation is maintained. This represents a combination of both restrictive and obstructive processes. The restriction is most likely due to his lung resection and centripetal obesity. A mild diffusion abnormality is present.\par \par A CAT scan of the chest is reviewed from 5/8/19. The patient is status post right upper lobectomy. Bilateral fibrotic changes are noted with atelectasis. There are no new masses or lesions demonstrated. There are no acute consolidations noted. There is no pleural or pericardial effusion.

## 2019-09-17 ENCOUNTER — APPOINTMENT (OUTPATIENT)
Dept: ELECTROPHYSIOLOGY | Facility: CLINIC | Age: 84
End: 2019-09-17
Payer: MEDICARE

## 2019-09-17 VITALS
OXYGEN SATURATION: 95 % | SYSTOLIC BLOOD PRESSURE: 124 MMHG | TEMPERATURE: 98.6 F | DIASTOLIC BLOOD PRESSURE: 71 MMHG | HEIGHT: 71 IN | HEART RATE: 79 BPM | BODY MASS INDEX: 31.08 KG/M2 | WEIGHT: 222 LBS

## 2019-09-17 DIAGNOSIS — I46.9 CARDIAC ARREST, CAUSE UNSPECIFIED: ICD-10-CM

## 2019-09-17 PROCEDURE — 99024 POSTOP FOLLOW-UP VISIT: CPT

## 2019-11-29 ENCOUNTER — EMERGENCY (EMERGENCY)
Facility: HOSPITAL | Age: 84
LOS: 0 days | Discharge: HOME | End: 2019-11-29
Attending: EMERGENCY MEDICINE
Payer: MEDICARE

## 2019-11-29 VITALS
DIASTOLIC BLOOD PRESSURE: 75 MMHG | HEART RATE: 69 BPM | OXYGEN SATURATION: 98 % | TEMPERATURE: 98 F | SYSTOLIC BLOOD PRESSURE: 133 MMHG | RESPIRATION RATE: 22 BRPM

## 2019-11-29 VITALS — WEIGHT: 218.92 LBS | HEIGHT: 72 IN

## 2019-11-29 DIAGNOSIS — I73.9 PERIPHERAL VASCULAR DISEASE, UNSPECIFIED: ICD-10-CM

## 2019-11-29 DIAGNOSIS — Z90.49 ACQUIRED ABSENCE OF OTHER SPECIFIED PARTS OF DIGESTIVE TRACT: Chronic | ICD-10-CM
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
## 2019-11-29 DIAGNOSIS — Z95.810 PRESENCE OF AUTOMATIC (IMPLANTABLE) CARDIAC DEFIBRILLATOR: Chronic | ICD-10-CM

## 2019-11-29 DIAGNOSIS — Z98.890 OTHER SPECIFIED POSTPROCEDURAL STATES: Chronic | ICD-10-CM

## 2019-11-29 DIAGNOSIS — Z90.2 ACQUIRED ABSENCE OF LUNG [PART OF]: Chronic | ICD-10-CM

## 2019-11-29 DIAGNOSIS — N18.9 CHRONIC KIDNEY DISEASE, UNSPECIFIED: ICD-10-CM

## 2019-11-29 DIAGNOSIS — I50.9 HEART FAILURE, UNSPECIFIED: ICD-10-CM

## 2019-11-29 DIAGNOSIS — S00.83XA CONTUSION OF OTHER PART OF HEAD, INITIAL ENCOUNTER: ICD-10-CM

## 2019-11-29 DIAGNOSIS — Z85.118 PERSONAL HISTORY OF OTHER MALIGNANT NEOPLASM OF BRONCHUS AND LUNG: ICD-10-CM

## 2019-11-29 DIAGNOSIS — I13.0 HYPERTENSIVE HEART AND CHRONIC KIDNEY DISEASE WITH HEART FAILURE AND STAGE 1 THROUGH STAGE 4 CHRONIC KIDNEY DISEASE, OR UNSPECIFIED CHRONIC KIDNEY DISEASE: ICD-10-CM

## 2019-11-29 PROCEDURE — 73120 X-RAY EXAM OF HAND: CPT | Mod: RT

## 2019-11-29 PROCEDURE — 70450 CT HEAD/BRAIN W/O DYE: CPT | Mod: 26

## 2019-11-29 PROCEDURE — 70450 CT HEAD/BRAIN W/O DYE: CPT

## 2019-11-29 PROCEDURE — 72125 CT NECK SPINE W/O DYE: CPT

## 2019-11-29 PROCEDURE — 99284 EMERGENCY DEPT VISIT MOD MDM: CPT

## 2019-11-29 PROCEDURE — 73120 X-RAY EXAM OF HAND: CPT | Mod: 26,RT

## 2019-11-29 PROCEDURE — 72125 CT NECK SPINE W/O DYE: CPT | Mod: 26

## 2019-11-29 PROCEDURE — 73620 X-RAY EXAM OF FOOT: CPT | Mod: 26,RT

## 2019-11-29 PROCEDURE — 99284 EMERGENCY DEPT VISIT MOD MDM: CPT | Mod: 25

## 2019-11-29 PROCEDURE — 93005 ELECTROCARDIOGRAM TRACING: CPT

## 2019-11-29 PROCEDURE — 93010 ELECTROCARDIOGRAM REPORT: CPT

## 2019-11-29 PROCEDURE — 73620 X-RAY EXAM OF FOOT: CPT | Mod: RT

## 2019-11-29 NOTE — ED PROVIDER NOTE - MUSCULOSKELETAL, MLM
Spine appears normal. +Blueness to right hand, 3rd digit, fairly stable and can move it reasonably. +Small abrasion to right big toe with some TTP, no deformity. +Bruising on left side of forehead with some mild tenderness, no crepitus or stepoff. +Neck discomfort in general, but no bony tenderness.

## 2019-11-29 NOTE — ED ADULT TRIAGE NOTE - CHIEF COMPLAINT QUOTE
c/o mechanical unwitnessed fall on stoop while taking garbage out at 6 am this morning, pt states he hit forehead on door and right hand, c/o right hand pain, right foot pain and forehead pain, denies LOC, takes ecotrin daily

## 2019-11-29 NOTE — ED ADULT NURSE NOTE - OBJECTIVE STATEMENT
Patient presents with family, reports unwitnessed fall this morning, patient tripped and hit head on the door and caught his right hand on the door. Patient denies LOC reports taking ecotrin daily. Patient baseline mental status.

## 2019-11-29 NOTE — ED PROVIDER NOTE - OBJECTIVE STATEMENT
83 y/o male with a PMHx of heart failure, cardiac arrest with successful resuscitation, CAD, HTN, CKD, PAD, lung CA, PSHx of s/p AICD, s/p carotid endarterectomy, s/p lobectomy, s/p appendectomy presents to the ED s/p mechanical fall this AM at approximately 06:30. Pt was reaching in front of him to open his front door while taking out garbage and struck the left side of his forehead on the door after tripping forward. No LOC. Currently c/o right knee pain, right great toe pain and right hand pain. +Bruising to left side of forehead. Denies abd pain, hip pain. +Neck discomfort. Per daughter at bedside pt takes 81 mg Ecotrin every day, last dose this morning. Daughter states pt was home alone at time of fall.

## 2019-11-29 NOTE — ED PROVIDER NOTE - PATIENT PORTAL LINK FT
You can access the FollowMyHealth Patient Portal offered by [facility] by registering at the following website: [email or web address]. By joining [email or web address]’s FollowMyHealth portal, you will also be able to view your health information using other applications (apps) compatible with our system.

## 2019-11-29 NOTE — ED ADULT NURSE NOTE - NSIMPLEMENTINTERV_GEN_ALL_ED
Implemented All Universal Safety Interventions:  [place] to call system. Call bell, personal items and telephone within reach. Instruct patient to call for assistance. Room bathroom lighting operational. Non-slip footwear when patient is off stretcher. Physically safe environment: no spills, clutter or unnecessary equipment. Stretcher in lowest position, wheels locked, appropriate side rails in place.

## 2019-12-09 ENCOUNTER — APPOINTMENT (OUTPATIENT)
Dept: ELECTROPHYSIOLOGY | Facility: CLINIC | Age: 84
End: 2019-12-09
Payer: MEDICARE

## 2019-12-09 PROCEDURE — 93297 REM INTERROG DEV EVAL ICPMS: CPT

## 2019-12-09 PROCEDURE — 93295 DEV INTERROG REMOTE 1/2/MLT: CPT

## 2019-12-09 PROCEDURE — 93296 REM INTERROG EVL PM/IDS: CPT

## 2020-01-09 ENCOUNTER — APPOINTMENT (OUTPATIENT)
Dept: ELECTROPHYSIOLOGY | Facility: CLINIC | Age: 85
End: 2020-01-09

## 2020-01-13 ENCOUNTER — APPOINTMENT (OUTPATIENT)
Dept: INTERNAL MEDICINE | Facility: CLINIC | Age: 85
End: 2020-01-13
Payer: MEDICARE

## 2020-01-13 VITALS
HEIGHT: 71 IN | WEIGHT: 218 LBS | SYSTOLIC BLOOD PRESSURE: 150 MMHG | TEMPERATURE: 98 F | OXYGEN SATURATION: 99 % | DIASTOLIC BLOOD PRESSURE: 65 MMHG | RESPIRATION RATE: 16 BRPM | HEART RATE: 58 BPM | BODY MASS INDEX: 30.52 KG/M2

## 2020-01-13 DIAGNOSIS — I10 ESSENTIAL (PRIMARY) HYPERTENSION: ICD-10-CM

## 2020-01-13 DIAGNOSIS — K21.9 GASTRO-ESOPHAGEAL REFLUX DISEASE W/OUT ESOPHAGITIS: ICD-10-CM

## 2020-01-13 DIAGNOSIS — E78.00 PURE HYPERCHOLESTEROLEMIA, UNSPECIFIED: ICD-10-CM

## 2020-01-13 PROCEDURE — 94729 DIFFUSING CAPACITY: CPT

## 2020-01-13 PROCEDURE — 94010 BREATHING CAPACITY TEST: CPT

## 2020-01-13 PROCEDURE — 99214 OFFICE O/P EST MOD 30 MIN: CPT | Mod: 25

## 2020-01-13 PROCEDURE — ZZZZZ: CPT

## 2020-01-13 RX ORDER — AMLODIPINE BESYLATE 5 MG/1
5 TABLET ORAL
Refills: 0 | Status: COMPLETED | COMMUNITY

## 2020-01-13 NOTE — PLAN
[FreeTextEntry1] : 1. Continue with medication outlined above.\par \par 2. Follow up with Dr. Tinoco for routine cardiac evaluation.\par \par 3. A diet and weight loss regimen was recommended.\par \par 4. Follow up with myself in 6 months for a PFT.\par \par 5. Follow up with PCP, Dr. Maldonado, for routine evaluation.\par \par 6. Begin flonase, 2 sprays per nostril, qhs for nocturnal postnasal drip.\par \par 7. A follow up surveillance CT scan of his chest will be performed in 2021 due to history of lung cancer.

## 2020-01-13 NOTE — HISTORY OF PRESENT ILLNESS
[de-identified] : This patient comes in today for a follow up evaluation and reassessment of his pulmonary status.\par \par Overall, from a pulmonary standpoint, the patient states that he is stable. He has a history of COPD. He is not on any bronchodilators at this time. He notes postnasal drip nocturnally. His last CT scan of his chest was performed in May 2019 due to his history of lung cancer. The results indicated s/p right upper lobectomy, bilateral chronic fibrotic change with atelectatic change, and no recurrent mass or adenopathy. He denies coughing, wheezing, SOB, purulent nasal secretions, and sputum production.\par \par The patient has a history of HLD, CHF, and HTN. He remains compliant with his maintenance regimen for management. He does not follow a formal exercise regimen due to bilateral knee pain. He denies CP, tightness, palpitations, and dyspnea on exertion. He is followed by Dr. Tinoco.\par \par He denies fevers, chills, night sweats, and any other constitutional symptoms. He now comes in for this assessment.

## 2020-01-13 NOTE — ADDENDUM
[FreeTextEntry1] : I, Dylan Hill, acted solely as a scribe for Dr. Paul Garza on this date 01/13/2020.

## 2020-01-13 NOTE — HISTORY OF PRESENT ILLNESS
[de-identified] : This patient comes in today for a follow up evaluation and reassessment of his pulmonary status.\par \par Overall, from a pulmonary standpoint, the patient states that he is stable. He has a history of COPD. He is not on any bronchodilators at this time. He notes postnasal drip nocturnally. His last CT scan of his chest was performed in May 2019 due to his history of lung cancer. The results indicated s/p right upper lobectomy, bilateral chronic fibrotic change with atelectatic change, and no recurrent mass or adenopathy. He denies coughing, wheezing, SOB, purulent nasal secretions, and sputum production.\par \par The patient has a history of HLD, CHF, and HTN. He remains compliant with his maintenance regimen for management. He does not follow a formal exercise regimen due to bilateral knee pain. He denies CP, tightness, palpitations, and dyspnea on exertion. He is followed by Dr. Tinoco.\par \par He denies fevers, chills, night sweats, and any other constitutional symptoms. He now comes in for this assessment.

## 2020-01-13 NOTE — PHYSICAL EXAM
[Sclera] : the sclera and conjunctiva were normal [PERRL With Normal Accommodation] : pupils were equal in size, round, and reactive to light [Extraocular Movements] : extraocular movements were intact [General Appearance - Alert] : alert [General Appearance - In No Acute Distress] : in no acute distress [Neck Appearance] : the appearance of the neck was normal [] : the neck was supple [Neck Cervical Mass (___cm)] : no neck mass was observed [Edema] : there was no peripheral edema [Cervical Lymph Nodes Enlarged Posterior Bilaterally] : posterior cervical [Nail Clubbing] : no clubbing  or cyanosis of the fingernails [FreeTextEntry1] : Centripetal obesity

## 2020-01-13 NOTE — END OF VISIT
[FreeTextEntry3] : All medical record entries made by the scribe were at my, Dr. Paul Garza, direction and personally dictated by me on 01/13/2020. I have reviewed the chart and agree that the record accurately reflects my personal performance of the history, physical exam, assessment and plan. I have also personally directed, reviewed, and agreed with the chart.

## 2020-01-13 NOTE — DATA REVIEWED
[FreeTextEntry1] : Pre-post spirometry with DLCO was performed. The vital capacity is mildly reduced. Lung mechanics reveal a mild to moderate decrease in flow rates. Bronchodilator reactivity is not assessed. The DLCO is at the low end of normal at 79%, and when correct for alveolar volume it is normal at 117%. Saturation is normal. This represents a mild degree of restriction. Obstruction is most likely present as well.

## 2020-01-30 ENCOUNTER — APPOINTMENT (OUTPATIENT)
Dept: OTOLARYNGOLOGY | Facility: CLINIC | Age: 85
End: 2020-01-30
Payer: MEDICARE

## 2020-01-30 VITALS — BODY MASS INDEX: 30.52 KG/M2 | WEIGHT: 218 LBS | HEIGHT: 71 IN

## 2020-01-30 PROCEDURE — 99213 OFFICE O/P EST LOW 20 MIN: CPT

## 2020-01-30 NOTE — ASSESSMENT
[FreeTextEntry1] : Large amount cerumen cleared each ear canal.\par Ear canals and tympanic membranes  unremarkable.\par symptoms relieved. F/u 6 mo\par \par

## 2020-01-30 NOTE — PHYSICAL EXAM
[de-identified] : large amount cerumen cleared au irrigation [Midline] : trachea located in midline position [Normal] : orientation to person, place, and time: normal

## 2020-03-16 NOTE — PATIENT PROFILE ADULT - NSTOBACCO TYPE_GEN_A_CORE_RD
Patient Education     Constipation (Adult)  Constipation means that you have bowel movements that are less frequent than usual. Stools often become very hard and difficult to pass.  Constipation is very common. At some point in life it affects almost everyone. Since everyone's bowel habits are different, what is constipation to one person may not be to another. Your healthcare provider may do tests to diagnose constipation. It depends on what he or she finds when evaluating you.    Symptoms of constipation include:  · Abdominal pain  · Bloating  · Vomiting  · Painful bowel movements  · Itching, swelling, bleeding, or pain around the anus  Causes  Constipation can have many causes. These include:  · Diet low in fiber  · Too much dairy  · Not drinking enough liquids  · Lack of exercise or physical activity. This is especially true for older adults.  · Changes in lifestyle or daily routine, including pregnancy, aging, work, and travel  · Frequent use or misuse of laxatives  · Ignoring the urge to have a bowel movement or delaying it until later  · Medicines, such as certain prescription pain medicines, iron supplements, antacids, certain antidepressants, and calcium supplements  · Diseases like irritable bowel syndrome, bowel obstructions, stroke, diabetes, thyroid disease, Parkinson disease, hemorrhoids, and colon cancer  Complications  Potential complications of constipation can include:  · Hemorrhoids  · Rectal bleeding from hemorrhoids or anal fissures (skin tears)  · Hernias  · Dependency on laxatives  · Chronic constipation  · Fecal impaction  · Bowel obstruction or perforation  Home care  All treatment should be done after talking with your healthcare provider. This is especially true if you have another medical problems, are taking prescription medicines, or are an older adult. Treatment most often involves lifestyle changes. You may also need medicines. Your healthcare provider will tell you which will work  best for you. Follow the advice below to help avoid this problem in the future.  Lifestyle changes  These lifestyle changes can help prevent constipation:  · Diet. Eat a high-fiber diet, with fresh fruit and vegetables, and reduce dairy intake, meats, and processed foods  · Fluids. It's important to get enough fluids each day. Drink plenty of water when you eat more fiber. If you are on diet that limits the amount of fluid you can have, talk about this with your healthcare provider.  · Regular exercise. Check with your healthcare provider first.  Medicines  Take any medicines as directed. Some laxatives are safe to use only every now and then. Others can be taken on a regular basis. Talk with your doctor or pharmacist if you have questions.  Prescription pain medicines can cause constipation. If you are taking this kind of medicine, ask your healthcare provider if you should also take a stool softener.  Medicines you may take to treat constipation include:  · Fiber supplements  · Stool softeners  · Laxatives  · Enemas  · Rectal suppositories  Follow-up care  Follow up with your healthcare provider if symptoms don't get better in the next few days. You may need to have more tests or see a specialist.  Call 911  Call 911 if any of these occur:  · Trouble breathing  · Stiff, rigid abdomen that is severely painful to touch  · Confusion  · Fainting or loss of consciousness  · Rapid heart rate  · Chest pain  When to seek medical advice  Call your healthcare provider right away if any of these occur:  · Fever of 100.4°F (38°C) or higher, or as directed by your healthcare provider  · Failure to resume normal bowel movements  · Pain in your abdomen or back gets worse  · Nausea or vomiting  · Swelling in your abdomen  · Blood in the stool  · Black, tarry stool  · Involuntary weight loss  · Weakness  Date Last Reviewed: 12/30/2015  © 6741-9991 [employer]. [address] [place] [address], [place] [address]. All rights  reserved. This information is not intended as a substitute for professional medical care. Always follow your healthcare professional's instructions.           Patient Education     Treating Constipation    Constipation is a common and often uncomfortable problem. Constipation means you have bowel movements fewer than 3 times per week, or strain to pass hard, dry stool. It can last a short time. Or it can be a problem that never seems to go away. The good news is that it can often be treated and controlled.  Eat more fiber  One of the best ways to help treat constipation is to increase your fiber intake. You can do this either through diet or by using fiber supplements. Fiber (in whole grains, fruits, and vegetables) adds bulk and absorbs water to soften the stool. This helps the stool pass through the colon more easily. When you increase your fiber intake, do it slowly to avoid side effects such as bloating. Also increase the amount of water that you drink. Eating more of the following foods can add fiber to your diet.  · High-fiber cereals  · Whole grains, bran, and brown rice  · Vegetables such as carrots, broccoli, and greens  · Fresh fruits (especially apples, pears, and dried fruits like raisins and apricots)  · Nuts and legumes (especially beans such as lentils, kidney beans, and lima beans)  Get physically active  Exercise helps improve the working of your colon which helps ease constipation. Try to get some physical activity every day. If you haven’t been active for a while, talk to your healthcare provider before starting again.  Laxatives  Your healthcare provider may suggest an over-the-counter product to help ease your constipation. He or she may suggest the use of bulk-forming agents or laxatives. The use of laxatives, if used as directed, is common and safe. Follow directions carefully when using them. See your healthcare provider for new-onset constipation, or long-term constipation, to rule out other  causes such as medicines or thyroid disease.  Date Last Reviewed: 7/1/2016  © 8445-0936 [employer]. [address] [place] [address], [place] [address]. All rights reserved. This information is not intended as a substitute for professional medical care. Always follow your healthcare professional's instructions.           Patient Education     Discharge Instructions: Eating a High-Fiber Diet    Your healthcare provider has prescribed a high-fiber diet for you. Fiber is what gives strength and structure to plants. Most grains, beans, vegetables, and fruits contain fiber. Foods rich in fiber are often low in calories and fat, but they fill you up more. These foods may also reduce the risk of certain health problems.  There are two types of fiber:  · Insoluble fiber. This is found in whole-grains, cereals, and certain fruits and vegetables (such as apple skins, corn, and beans). Insoluble fiber is made up mainly of plant cell walls. It may prevent constipation and reduce the risk of certain types of cancer.  · Soluble fiber. This type of fiber is found in oats, beans, nuts, and certain fruits and vegetables (such as strawberries and peas). Soluble fiber turns to gel in the digestive system, slowing the movement of the digestive tract. It helps control blood sugar levels and can reduce cholesterol, which may help lower the risk of heart disease. Soluble fiber can also help control appetite.   Home care  · Know how much fiber you need a day. The recommended daily amount of fiber is 25 grams for women and 38 grams for men. After age 50, daily fiber needs drop to 21 grams for women and 30 grams for men.  · Ask your healthcare provider about a fiber supplement. (Always take fiber supplements with a large glass of water.)  · Keep track of how much fiber you eat.  · Eat a variety of foods high in fiber.  · Learn to read and understand food labels.  · Ask your healthcare provider how much water you should be  drinking.  · Look for these high-fiber foods:  ? Whole-grain breads and cereals  § 6 ounces a day give you about 18 grams of fiber (1 ounce is equal to 1 slice of bread, 1 cup of dry cereal, or 1/2 cup of cooked rice).  § Include wheat and oat bran cereals, whole-wheat muffins or toast, and corn tortillas in your meals.  ? Fruits   § 2 cups a day give you about 8 grams of fiber.  § Apples, oranges, strawberries, pears, and bananas are good sources.  § Fruit juice does not contain as much fiber as the fruit it was made from.  ? Vegetables  § 2½ cups a day give you about 11 grams of fiber. Add asparagus, carrots, broccoli, peas, and corn to your meals.  ? Legumes  § 1/4 cup a day (in place of meat) gives you about 4 grams of fiber. Try navy beans, lentils, chickpeas, and soybeans.  ? Seeds   § A small handful of seeds gives you about 3 grams of fiber. Try sunflower seeds.  Follow-up  Make a follow-up appointment, or as advised. Ask your healthcare provider if seeing a registered dietitian may help you plan a high fiber diet.  Date Last Reviewed: 6/1/2017 © 2000-2018 [employer]. [address] [place] [address], [place] [address]. All rights reserved. This information is not intended as a substitute for professional medical care. Always follow your healthcare professional's instructions.           Patient Education     Eating a High-Fiber Diet  Fiber is what gives strength and structure to plants. Most grains, beans, vegetables, and fruits contain fiber. Foods rich in fiber are often low in calories and fat, and they fill you up more. They may also reduce your risks for certain health problems. To find out the amount of fiber in canned, packaged, or frozen foods, read the Nutrition Facts label. It tells you how much fiber is in one serving.    Types of fiber and their benefits  There are two types of fiber: insoluble and soluble. They both aid digestion and help you maintain a healthy weight.  · Insoluble fiber.  This is found in whole grains, cereals, certain fruits and vegetables such as apple skin, corn, and carrots. Insoluble fiber may prevent constipation and reduce the risk for certain types of cancer. It is called insoluble because it does not dissolve in water.  · Soluble fiber. This type of fiber is in oats, beans, and certain fruits and vegetables such as strawberries and peas. Soluble fiber can reduce cholesterol, which may help lower the risk for heart disease. It also helps control blood sugar levels.  Look for high-fiber foods  Try these foods to add fiber to your diet:  · Whole-grain breads and cereals. Try to eat 6 to 8 ounces a day. Include wheat and oat bran cereals, whole-wheat muffins or toast, and corn tortillas in your meals.  · Fruits. Try to eat 2 cups a day. Apples, oranges, strawberries, pears, and bananas are good sources. (Note: Fruit juice is low in fiber.)  · Vegetables. Try to eat at least 2.5 cups a day. Add asparagus, carrots, broccoli, peas, and corn to your meals.  · Beans. One cup of cooked lentils gives you over 15 grams of fiber. Try navy beans, lentils, and chickpeas.  · Seeds. A small handful of seeds gives you about 3 grams of fiber. Try sunflower or isaac seeds.  Keep track of your fiber  Keep track of how much fiber you eat. Start by reading food labels. Then eat a variety of foods high in fiber. As you start to eat more fiber, ask your healthcare provider how much water you should be drinking to keep your digestive system working smoothly.  Aim for a certain amount of fiber in your diet each day. If you are a woman, that amount is between 25 and 28 grams per day. Men should aim for 30 to 33 grams per day. After age 50, your daily fiber needs drop to 22 grams for women and 28 grams for men.  Before you reach for the fiber supplements, think about this. Fiber is found naturally in healthy whole foods. It gives you that feeling of fullness after you eat. Taking fiber supplements or  eating fiber-enriched foods will not give you this full feeling.  Your fiber intake is a good measure for the quality of your overall diet. If you are missing out on your daily amount of fiber, you may be lacking other important nutrients as well.  Date Last Reviewed: 6/1/2017  © 4701-9261 [employer]. [address] [place] [address]. All rights reserved. This information is not intended as a substitute for professional medical care. Always follow your healthcare professional's instructions.         Constipation:  1) increase fiber in diet  2) miralax (over the counter) 17g powder in water daily   Cigarettes

## 2020-03-27 ENCOUNTER — APPOINTMENT (OUTPATIENT)
Dept: ELECTROPHYSIOLOGY | Facility: CLINIC | Age: 85
End: 2020-03-27
Payer: MEDICARE

## 2020-03-27 PROCEDURE — 93295 DEV INTERROG REMOTE 1/2/MLT: CPT

## 2020-03-27 PROCEDURE — 93296 REM INTERROG EVL PM/IDS: CPT

## 2020-06-15 RX ORDER — CHLORHEXIDINE GLUCONATE 4 %
5 LIQUID (ML) TOPICAL
Refills: 0 | Status: ACTIVE | COMMUNITY

## 2020-06-15 RX ORDER — PANTOPRAZOLE 40 MG/1
40 TABLET, DELAYED RELEASE ORAL DAILY
Refills: 0 | Status: ACTIVE | COMMUNITY

## 2020-06-16 ENCOUNTER — APPOINTMENT (OUTPATIENT)
Dept: ELECTROPHYSIOLOGY | Facility: CLINIC | Age: 85
End: 2020-06-16
Payer: MEDICARE

## 2020-06-16 VITALS
TEMPERATURE: 97 F | HEART RATE: 78 BPM | BODY MASS INDEX: 30.1 KG/M2 | WEIGHT: 215 LBS | OXYGEN SATURATION: 94 % | HEIGHT: 71 IN

## 2020-06-16 PROCEDURE — 93284 PRGRMG EVAL IMPLANTABLE DFB: CPT

## 2020-06-16 PROCEDURE — 93290 INTERROG DEV EVAL ICPMS IP: CPT

## 2020-07-22 ENCOUNTER — APPOINTMENT (OUTPATIENT)
Dept: OTOLARYNGOLOGY | Facility: CLINIC | Age: 85
End: 2020-07-22
Payer: MEDICARE

## 2020-07-22 VITALS — TEMPERATURE: 98.3 F | HEIGHT: 71 IN | WEIGHT: 211 LBS | BODY MASS INDEX: 29.54 KG/M2

## 2020-07-22 PROCEDURE — 99213 OFFICE O/P EST LOW 20 MIN: CPT

## 2020-07-22 NOTE — PHYSICAL EXAM
[de-identified] : mel cleared au [Midline] : trachea located in midline position [Normal] : no rashes

## 2020-07-22 NOTE — ASSESSMENT
[FreeTextEntry1] : Large amount cerumen cleared each ear canal.\par Ear canals and tympanic membranes  unremarkable.\par symptoms relieved. F/u   .\par \par 4 mo

## 2020-07-22 NOTE — HISTORY OF PRESENT ILLNESS
[de-identified] : Patient is complaining of ear plugging of the left /right ear over the past few weeks.\par There is  hearing loss no  tinnitus.\par The history is significant for hearing aids.\par \par

## 2020-07-25 NOTE — DISCHARGE NOTE ADULT - NS AS DC PROVIDER CONTACT Y/N MULTI
AC as per above, currently rate controlled.  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    nevertheless check lipid panel    continue preventive measures and treatment for cva    get pt/ot

## 2020-07-28 ENCOUNTER — APPOINTMENT (OUTPATIENT)
Dept: INTERNAL MEDICINE | Facility: CLINIC | Age: 85
End: 2020-07-28
Payer: MEDICARE

## 2020-07-28 VITALS
RESPIRATION RATE: 16 BRPM | TEMPERATURE: 98.7 F | BODY MASS INDEX: 30.1 KG/M2 | DIASTOLIC BLOOD PRESSURE: 80 MMHG | OXYGEN SATURATION: 95 % | HEIGHT: 71 IN | HEART RATE: 66 BPM | WEIGHT: 215 LBS | SYSTOLIC BLOOD PRESSURE: 150 MMHG

## 2020-07-28 DIAGNOSIS — Z11.59 ENCOUNTER FOR SCREENING FOR OTHER VIRAL DISEASES: ICD-10-CM

## 2020-07-28 DIAGNOSIS — I25.10 ATHEROSCLEROTIC HEART DISEASE OF NATIVE CORONARY ARTERY W/OUT ANGINA PECTORIS: ICD-10-CM

## 2020-07-28 PROCEDURE — 99214 OFFICE O/P EST MOD 30 MIN: CPT | Mod: 25

## 2020-07-28 NOTE — PLAN
[FreeTextEntry1] : 1. At this time, will continue to observe on her status without bronchodilators.\par \par 2. The patient will return on 7/30/24 his physiologic pulmonary function study.\par \par 3. Will now institute Flonase 2 squirts in each nostril b.i.d. for his postnasal drip.\par \par 4. Flu shot in the fall.\par \par 5. Followup in 6 months with pre-post-spirometry and DLCO. The patient will be scheduled for his followup surveillance CAT scan of the chest which will be performed in the spring of 2021, at that visit.\par \par 6. Routine medical followup with his primary care physician, Dr. Maldonado.\par \par

## 2020-07-28 NOTE — HISTORY OF PRESENT ILLNESS
[de-identified] : The patient comes in today for a followup evaluation, and reassessment of his pulmonary status.\par \par Overall, at this time, the patient states that he is doing well. He has been compliant with his cardiac regimen of medications. He notes that his breathing has been good. He does not use any metered-dose inhalers. He denies any dyspnea on exertion, sputum production, or hemoptysis.\par \par The patient has been complaining of postnasal drip. He has been using only saline nasal spray which gives him some relief, but only if he is congested. He denies having any allergy mediated symptoms at this time. He denies purulent nasal secretions. He now comes in for this assessment.

## 2020-07-29 LAB — SARS-COV-2 N GENE NPH QL NAA+PROBE: NOT DETECTED

## 2020-07-30 ENCOUNTER — APPOINTMENT (OUTPATIENT)
Dept: INTERNAL MEDICINE | Facility: CLINIC | Age: 85
End: 2020-07-30
Payer: MEDICARE

## 2020-07-30 VITALS
OXYGEN SATURATION: 96 % | BODY MASS INDEX: 29.82 KG/M2 | WEIGHT: 213 LBS | HEART RATE: 72 BPM | TEMPERATURE: 99.1 F | HEIGHT: 71 IN | DIASTOLIC BLOOD PRESSURE: 78 MMHG | RESPIRATION RATE: 16 BRPM | SYSTOLIC BLOOD PRESSURE: 162 MMHG

## 2020-07-30 PROCEDURE — 94729 DIFFUSING CAPACITY: CPT

## 2020-07-30 PROCEDURE — 94727 GAS DIL/WSHOT DETER LNG VOL: CPT

## 2020-07-30 PROCEDURE — 94010 BREATHING CAPACITY TEST: CPT

## 2020-08-06 NOTE — ED PROVIDER NOTE - GASTROINTESTINAL NEGATIVE STATEMENT, MLM
no known allergies
no abdominal pain, no bloating, no constipation, no diarrhea, no nausea and no vomiting.

## 2020-09-16 ENCOUNTER — APPOINTMENT (OUTPATIENT)
Dept: ELECTROPHYSIOLOGY | Facility: CLINIC | Age: 85
End: 2020-09-16
Payer: MEDICARE

## 2020-09-16 PROCEDURE — 93296 REM INTERROG EVL PM/IDS: CPT

## 2020-09-16 PROCEDURE — 93294 REM INTERROG EVL PM/LDLS PM: CPT

## 2020-11-23 ENCOUNTER — APPOINTMENT (OUTPATIENT)
Dept: OTOLARYNGOLOGY | Facility: CLINIC | Age: 85
End: 2020-11-23
Payer: MEDICARE

## 2020-11-23 VITALS — TEMPERATURE: 97.2 F | BODY MASS INDEX: 29.54 KG/M2 | WEIGHT: 211 LBS | HEIGHT: 71 IN

## 2020-11-23 DIAGNOSIS — H69.83 OTHER SPECIFIED DISORDERS OF EUSTACHIAN TUBE, BILATERAL: ICD-10-CM

## 2020-11-23 PROCEDURE — 92557 COMPREHENSIVE HEARING TEST: CPT

## 2020-11-23 PROCEDURE — 92567 TYMPANOMETRY: CPT

## 2020-11-23 PROCEDURE — 99213 OFFICE O/P EST LOW 20 MIN: CPT

## 2020-11-23 NOTE — ASSESSMENT
[FreeTextEntry1] : cerumen cleared au\par audio moderate au sn loss fair disc\par fu for hearing aid refitting\par fu 4 no

## 2020-12-07 NOTE — PATIENT PROFILE ADULT - NSPROPOAPRESSUREINJURY_GEN_A_NUR
Price (Do Not Change): 0.00 Detail Level: Simple Instructions: This plan will send the code FBSE to the PM system.  DO NOT or CHANGE the price. no

## 2020-12-16 ENCOUNTER — APPOINTMENT (OUTPATIENT)
Dept: ELECTROPHYSIOLOGY | Facility: CLINIC | Age: 85
End: 2020-12-16
Payer: MEDICARE

## 2020-12-16 PROCEDURE — 93295 DEV INTERROG REMOTE 1/2/MLT: CPT

## 2020-12-16 PROCEDURE — 93296 REM INTERROG EVL PM/IDS: CPT

## 2021-02-17 ENCOUNTER — NON-APPOINTMENT (OUTPATIENT)
Age: 86
End: 2021-02-17

## 2021-02-23 ENCOUNTER — APPOINTMENT (OUTPATIENT)
Dept: INTERNAL MEDICINE | Facility: CLINIC | Age: 86
End: 2021-02-23
Payer: MEDICARE

## 2021-02-23 ENCOUNTER — NON-APPOINTMENT (OUTPATIENT)
Age: 86
End: 2021-02-23

## 2021-02-23 VITALS
WEIGHT: 213 LBS | SYSTOLIC BLOOD PRESSURE: 164 MMHG | HEART RATE: 82 BPM | RESPIRATION RATE: 16 BRPM | TEMPERATURE: 98.4 F | OXYGEN SATURATION: 94 % | HEIGHT: 71 IN | BODY MASS INDEX: 29.82 KG/M2 | DIASTOLIC BLOOD PRESSURE: 90 MMHG

## 2021-02-23 PROCEDURE — 99214 OFFICE O/P EST MOD 30 MIN: CPT

## 2021-02-23 NOTE — PHYSICAL EXAM
[General Appearance - In No Acute Distress] : in no acute distress [General Appearance - Alert] : alert [Neck Appearance] : the appearance of the neck was normal [] : the neck was supple [Neck Cervical Mass (___cm)] : no neck mass was observed [Edema] : there was no peripheral edema [FreeTextEntry1] : Centripetal obesity [Cervical Lymph Nodes Enlarged Posterior Bilaterally] : posterior cervical [Nail Clubbing] : no clubbing  or cyanosis of the fingernails

## 2021-02-23 NOTE — HISTORY OF PRESENT ILLNESS
[FreeTextEntry1] : The patient comes in today for a routine followup pulmonary evaluation.\par \par  [de-identified] : The patient states, that from a pulmonary standpoint he is doing relatively well. He currently, he is not using any inhaled bronchodilators. He feels that he does not need them. He did last undergoing pulmonary function test in July of 2020. He denies having any dyspnea. At times he does develop slight shortness of breath with moderate amounts of exertion. He denies any significant sputum production. There has been no hemoptysis. His appetite is good his weight is stable.\par \par The patient does complain of postnasal drip symptoms. He is currently seeing both an ENT physician as well as his ophthalmologist. He states that he was told that he had macular degeneration.\par \par The patient follows up with his new cardiologist, Dr. Garcia. His cardiac function at this time is stable. He remains compliant with his medical regimen for treatment of heart failure and ventricular arrhythmias, as prescribed. He now comes in this assessment

## 2021-02-23 NOTE — PLAN
[FreeTextEntry1] : 1 continue to observe pulmonary status without rhonchi dilators.\par \par 2. Followup surveillance CAT scan will be performed one week prior to his next visit. Of note is the fact that the patient does have a past history of non-small cell lung carcinoma.\par \par 3. Routine medical followup with his primary care physician.\par \par 4. Follow up with myself in 6 months with full pulmonary function testing and review of the CAT scan.

## 2021-03-13 ENCOUNTER — EMERGENCY (EMERGENCY)
Facility: HOSPITAL | Age: 86
LOS: 0 days | Discharge: HOME | End: 2021-03-13
Attending: EMERGENCY MEDICINE
Payer: MEDICARE

## 2021-03-13 VITALS
SYSTOLIC BLOOD PRESSURE: 151 MMHG | RESPIRATION RATE: 16 BRPM | DIASTOLIC BLOOD PRESSURE: 67 MMHG | OXYGEN SATURATION: 97 % | HEART RATE: 62 BPM

## 2021-03-13 VITALS
WEIGHT: 179.9 LBS | RESPIRATION RATE: 17 BRPM | DIASTOLIC BLOOD PRESSURE: 99 MMHG | HEIGHT: 72 IN | HEART RATE: 71 BPM | OXYGEN SATURATION: 94 % | SYSTOLIC BLOOD PRESSURE: 155 MMHG | TEMPERATURE: 98 F

## 2021-03-13 DIAGNOSIS — Z95.5 PRESENCE OF CORONARY ANGIOPLASTY IMPLANT AND GRAFT: ICD-10-CM

## 2021-03-13 DIAGNOSIS — I25.10 ATHEROSCLEROTIC HEART DISEASE OF NATIVE CORONARY ARTERY WITHOUT ANGINA PECTORIS: ICD-10-CM

## 2021-03-13 DIAGNOSIS — Z90.2 ACQUIRED ABSENCE OF LUNG [PART OF]: ICD-10-CM

## 2021-03-13 DIAGNOSIS — Z95.810 PRESENCE OF AUTOMATIC (IMPLANTABLE) CARDIAC DEFIBRILLATOR: Chronic | ICD-10-CM

## 2021-03-13 DIAGNOSIS — Z04.3 ENCOUNTER FOR EXAMINATION AND OBSERVATION FOLLOWING OTHER ACCIDENT: ICD-10-CM

## 2021-03-13 DIAGNOSIS — Z98.890 OTHER SPECIFIED POSTPROCEDURAL STATES: Chronic | ICD-10-CM
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
## 2021-03-13 DIAGNOSIS — Z95.1 PRESENCE OF AORTOCORONARY BYPASS GRAFT: ICD-10-CM

## 2021-03-13 DIAGNOSIS — Z79.82 LONG TERM (CURRENT) USE OF ASPIRIN: ICD-10-CM

## 2021-03-13 DIAGNOSIS — R42 DIZZINESS AND GIDDINESS: ICD-10-CM

## 2021-03-13 DIAGNOSIS — I13.0 HYPERTENSIVE HEART AND CHRONIC KIDNEY DISEASE WITH HEART FAILURE AND STAGE 1 THROUGH STAGE 4 CHRONIC KIDNEY DISEASE, OR UNSPECIFIED CHRONIC KIDNEY DISEASE: ICD-10-CM

## 2021-03-13 DIAGNOSIS — Z86.74 PERSONAL HISTORY OF SUDDEN CARDIAC ARREST: ICD-10-CM

## 2021-03-13 DIAGNOSIS — F10.929 ALCOHOL USE, UNSPECIFIED WITH INTOXICATION, UNSPECIFIED: ICD-10-CM

## 2021-03-13 DIAGNOSIS — E78.5 HYPERLIPIDEMIA, UNSPECIFIED: ICD-10-CM

## 2021-03-13 DIAGNOSIS — I50.9 HEART FAILURE, UNSPECIFIED: ICD-10-CM

## 2021-03-13 DIAGNOSIS — Z85.118 PERSONAL HISTORY OF OTHER MALIGNANT NEOPLASM OF BRONCHUS AND LUNG: ICD-10-CM

## 2021-03-13 DIAGNOSIS — N18.30 CHRONIC KIDNEY DISEASE, STAGE 3 UNSPECIFIED: ICD-10-CM

## 2021-03-13 DIAGNOSIS — Z95.820 PERIPHERAL VASCULAR ANGIOPLASTY STATUS WITH IMPLANTS AND GRAFTS: ICD-10-CM

## 2021-03-13 DIAGNOSIS — Z90.2 ACQUIRED ABSENCE OF LUNG [PART OF]: Chronic | ICD-10-CM
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
## 2021-03-13 DIAGNOSIS — J44.9 CHRONIC OBSTRUCTIVE PULMONARY DISEASE, UNSPECIFIED: ICD-10-CM

## 2021-03-13 DIAGNOSIS — Z90.49 ACQUIRED ABSENCE OF OTHER SPECIFIED PARTS OF DIGESTIVE TRACT: Chronic | ICD-10-CM

## 2021-03-13 DIAGNOSIS — Z95.810 PRESENCE OF AUTOMATIC (IMPLANTABLE) CARDIAC DEFIBRILLATOR: ICD-10-CM

## 2021-03-13 LAB
ANION GAP SERPL CALC-SCNC: 10 MMOL/L — SIGNIFICANT CHANGE UP (ref 5–17)
BASOPHILS # BLD AUTO: 0.06 K/UL — SIGNIFICANT CHANGE UP (ref 0–0.2)
BASOPHILS NFR BLD AUTO: 0.8 % — SIGNIFICANT CHANGE UP (ref 0–2)
BUN SERPL-MCNC: 25 MG/DL — HIGH (ref 7–23)
CALCIUM SERPL-MCNC: 9.5 MG/DL — SIGNIFICANT CHANGE UP (ref 8.5–10.1)
CHLORIDE SERPL-SCNC: 102 MMOL/L — SIGNIFICANT CHANGE UP (ref 96–108)
CO2 SERPL-SCNC: 27 MMOL/L — SIGNIFICANT CHANGE UP (ref 22–31)
CREAT SERPL-MCNC: 1.4 MG/DL — HIGH (ref 0.5–1.3)
EOSINOPHIL # BLD AUTO: 0.29 K/UL — SIGNIFICANT CHANGE UP (ref 0–0.5)
EOSINOPHIL NFR BLD AUTO: 3.7 % — SIGNIFICANT CHANGE UP (ref 0–6)
GLUCOSE SERPL-MCNC: 102 MG/DL — HIGH (ref 70–99)
HCT VFR BLD CALC: 49.4 % — SIGNIFICANT CHANGE UP (ref 39–50)
HGB BLD-MCNC: 17.1 G/DL — HIGH (ref 13–17)
IMM GRANULOCYTES NFR BLD AUTO: 0.5 % — SIGNIFICANT CHANGE UP (ref 0–1.5)
LYMPHOCYTES # BLD AUTO: 2.35 K/UL — SIGNIFICANT CHANGE UP (ref 1–3.3)
LYMPHOCYTES # BLD AUTO: 30.4 % — SIGNIFICANT CHANGE UP (ref 13–44)
MCHC RBC-ENTMCNC: 32.6 PG — SIGNIFICANT CHANGE UP (ref 27–34)
MCHC RBC-ENTMCNC: 34.6 GM/DL — SIGNIFICANT CHANGE UP (ref 32–36)
MCV RBC AUTO: 94.1 FL — SIGNIFICANT CHANGE UP (ref 80–100)
MONOCYTES # BLD AUTO: 0.75 K/UL — SIGNIFICANT CHANGE UP (ref 0–0.9)
MONOCYTES NFR BLD AUTO: 9.7 % — SIGNIFICANT CHANGE UP (ref 2–14)
NEUTROPHILS # BLD AUTO: 4.25 K/UL — SIGNIFICANT CHANGE UP (ref 1.8–7.4)
NEUTROPHILS NFR BLD AUTO: 54.9 % — SIGNIFICANT CHANGE UP (ref 43–77)
PLATELET # BLD AUTO: 181 K/UL — SIGNIFICANT CHANGE UP (ref 150–400)
POTASSIUM SERPL-MCNC: 4.2 MMOL/L — SIGNIFICANT CHANGE UP (ref 3.5–5.3)
POTASSIUM SERPL-SCNC: 4.2 MMOL/L — SIGNIFICANT CHANGE UP (ref 3.5–5.3)
RBC # BLD: 5.25 M/UL — SIGNIFICANT CHANGE UP (ref 4.2–5.8)
RBC # FLD: 13.7 % — SIGNIFICANT CHANGE UP (ref 10.3–14.5)
SODIUM SERPL-SCNC: 139 MMOL/L — SIGNIFICANT CHANGE UP (ref 135–145)
TROPONIN I SERPL-MCNC: <0.015 NG/ML — SIGNIFICANT CHANGE UP (ref 0.01–0.04)
WBC # BLD: 7.74 K/UL — SIGNIFICANT CHANGE UP (ref 3.8–10.5)
WBC # FLD AUTO: 7.74 K/UL — SIGNIFICANT CHANGE UP (ref 3.8–10.5)

## 2021-03-13 PROCEDURE — 71045 X-RAY EXAM CHEST 1 VIEW: CPT

## 2021-03-13 PROCEDURE — 93005 ELECTROCARDIOGRAM TRACING: CPT

## 2021-03-13 PROCEDURE — 70450 CT HEAD/BRAIN W/O DYE: CPT | Mod: 26

## 2021-03-13 PROCEDURE — 99285 EMERGENCY DEPT VISIT HI MDM: CPT | Mod: 25

## 2021-03-13 PROCEDURE — 80048 BASIC METABOLIC PNL TOTAL CA: CPT

## 2021-03-13 PROCEDURE — 99285 EMERGENCY DEPT VISIT HI MDM: CPT

## 2021-03-13 PROCEDURE — 85025 COMPLETE CBC W/AUTO DIFF WBC: CPT

## 2021-03-13 PROCEDURE — 70450 CT HEAD/BRAIN W/O DYE: CPT

## 2021-03-13 PROCEDURE — 93010 ELECTROCARDIOGRAM REPORT: CPT

## 2021-03-13 PROCEDURE — 72125 CT NECK SPINE W/O DYE: CPT

## 2021-03-13 PROCEDURE — 71045 X-RAY EXAM CHEST 1 VIEW: CPT | Mod: 26

## 2021-03-13 PROCEDURE — 72125 CT NECK SPINE W/O DYE: CPT | Mod: 26

## 2021-03-13 PROCEDURE — 36415 COLL VENOUS BLD VENIPUNCTURE: CPT

## 2021-03-13 PROCEDURE — 84484 ASSAY OF TROPONIN QUANT: CPT

## 2021-03-13 RX ORDER — SODIUM CHLORIDE 9 MG/ML
500 INJECTION INTRAMUSCULAR; INTRAVENOUS; SUBCUTANEOUS ONCE
Refills: 0 | Status: COMPLETED | OUTPATIENT
Start: 2021-03-13 | End: 2021-03-13

## 2021-03-13 RX ADMIN — SODIUM CHLORIDE 500 MILLILITER(S): 9 INJECTION INTRAMUSCULAR; INTRAVENOUS; SUBCUTANEOUS at 22:31

## 2021-03-13 NOTE — ED PROVIDER NOTE - SHIFT CHANGE DETAILS
Brian Linda: pt signed out to next Physician. answered all questions. PENDING: repeat vitals, reassesment of patient for final disposition, labs

## 2021-03-13 NOTE — ED PROVIDER NOTE - PHYSICAL EXAMINATION
*GEN: No acute distress, well appearing   *HEAD: Normocephalic, Atraumatic  *EYES/NOSE: b/l Pupils symmetric & Reactive to ligth, EOMI b/l  *THROAT: airway patent, moist mucous membranes  *NECK: Neck supple  *PULMONARY: No Respiratory distress, symmetric b/l chest rise  *CARDIAC: s1s2, regular rhythm   *ABDOMEN:  Non Tender, Non Distended, soft, no guarding, no rebound, no masses   *BACK: no CVA tenderness, No midline vertebral tenderness to palpation   *EXTREMITIES: symmetric pulses, 2+ DP & radial pulses, no cyanosis, no edema   *SKIN: no rash, no bruising   *NEUROLOGIC: CN 2-12 intact, normal finger to nose & heel to shin; no dysdiadochokinesis; equal & normal strength & sensation in b/l UE/LE; full active & passive ROM in all extremeties,  no pronator drift, normal patellar reflex, normal gait, romberg sign negative   *PSYCH: appropriate concern about symptoms, pleasantly drunk

## 2021-03-13 NOTE — ED PROVIDER NOTE - PATIENT PORTAL LINK FT
You can access the FollowMyHealth Patient Portal offered by [facility] by registering at the following website: [email or web address]. By joining [facility]’s FollowMyHealth portal, you will also be able to view your health information using other applications (apps) compatible with our system.

## 2021-03-13 NOTE — ED ADULT TRIAGE NOTE - TEMPERATURE IN FAHRENHEIT (DEGREES F)
Called pt to ask if it would be okay to put him on the waiting list. Pt said yes and he will keep current j carlos.   97.6

## 2021-03-13 NOTE — ED PROVIDER NOTE - PROGRESS NOTE DETAILS
carl: cr & labs around baseline, daughter will pick pt up carl: PT seen and reassessed.  Patient symptomatically improved.  Wants to be discharged AAOX3, NAD, VSS.  Discussed test results w/ patient. Patient verbalized understanding of hospital course and outpatient plans, has decisional making capacity.  Will f/u w/ pmd in the next few days; patient will call for an appointment. Will return to the ED if there is any worsening of symptoms.  Patient able to ambulate at baseline, is tolerating PO intake. Has safe way of getting home.

## 2021-03-13 NOTE — ED PROVIDER NOTE - CLINICAL SUMMARY MEDICAL DECISION MAKING FREE TEXT BOX
pt is aaox4, states he had rum tonight w/o having dinner. took lift chair upstair & legs buckled causing pt to fall. no head trauma, no LOC. family helped pt up & he felt lightheaded & was about to fall 2 more times but family prevented fall. story confirmed with daughter daughter darius [phone].   lightheadedness likely from etoh but given extensive cardiac hx will check troponin, ekg, electrolytes.

## 2021-03-13 NOTE — ED ADULT TRIAGE NOTE - CHIEF COMPLAINT QUOTE
Patient from home had unwitnessed slip and fall.  On blood thinners, +ETOH.  Denies pain, denies LOC.

## 2021-03-13 NOTE — ED ADULT NURSE NOTE - OBJECTIVE STATEMENT
pt BIBEMS from home s/p trip and fall. pt states he had some rum tonight at dinner. when walking upstairs, states his knee buckled and he tripped and fell. denies head trauma, LOC. (+) anticoagulant use. as per family pt lightheaded after fall. pt is A&O x4, denies any pain or complaints in ED. pt able to ambulate with assistance in ED.

## 2021-03-13 NOTE — ED PROVIDER NOTE - NS ED ROS FT
Review of Systems:  	•	CONSTITUTIONAL: no fever  	•	SKIN: no rash  	•	RESPIRATORY: no shortness of breath  	•	CARDIAC: no chest pain  	•	GI:  no abd pain, no nausea, no vomiting, no diarrhea  	•	GENITO-URINARY:  no dysuria  	•	MUSCULOSKELETAL:  no back pain  	•	NEUROLOGIC: no weakness, fall  	•	ALLERGY: no rhinorrhea  	•	PSYSCHIATRIC: appropriate concern about symptoms

## 2021-03-13 NOTE — ED PROVIDER NOTE - OBJECTIVE STATEMENT
Pertinent HPI/PMH/PSH/FHx/SHx and Review of Systems contained within  HPI:  Patient bibems from home after fall. pt w/o any complaints or pain.  pt is aaox4, states he had rum tonight w/o having dinner. took lift chair upstair & legs buckled causing pt to fall. no head trauma, no LOC. family helped pt up & he felt lightheaded & was about to fall 2 more times but family prevented fall. story confirmed with daughter daughter darius [phone]  PMH/PSH relevant for: HTN ,CHF ( 2016 :EF 25-30% left ventricular dysfunction ,mild TR and Mild MR)  ,COPD ,CAD s/p CABG and 1 stent ,Left neck CEA ,PVD S/P stent CKD-3 ,HLD ,lung cancer s/p Lobectomy ,Cardiac arrest s/p AICD/pacemaker Implanted 2016, on 81mg ASA  ROS negative for: fever, Chest pain, SOB, Nausea, vomiting, diarrhea, abdominal pain, dysuria    FamilyHx and SocialHx not otherwise contributory

## 2021-03-22 ENCOUNTER — APPOINTMENT (OUTPATIENT)
Dept: ELECTROPHYSIOLOGY | Facility: CLINIC | Age: 86
End: 2021-03-22
Payer: MEDICARE

## 2021-03-22 VITALS
HEIGHT: 71 IN | OXYGEN SATURATION: 98 % | SYSTOLIC BLOOD PRESSURE: 153 MMHG | BODY MASS INDEX: 29.12 KG/M2 | HEART RATE: 67 BPM | DIASTOLIC BLOOD PRESSURE: 64 MMHG | WEIGHT: 208 LBS

## 2021-03-22 PROCEDURE — 93284 PRGRMG EVAL IMPLANTABLE DFB: CPT

## 2021-03-22 PROCEDURE — 93290 INTERROG DEV EVAL ICPMS IP: CPT | Mod: 26

## 2021-04-05 ENCOUNTER — APPOINTMENT (OUTPATIENT)
Dept: OTOLARYNGOLOGY | Facility: CLINIC | Age: 86
End: 2021-04-05
Payer: MEDICARE

## 2021-04-05 VITALS — HEIGHT: 72 IN | WEIGHT: 208 LBS | BODY MASS INDEX: 28.17 KG/M2 | TEMPERATURE: 97.6 F

## 2021-04-05 PROCEDURE — 99213 OFFICE O/P EST LOW 20 MIN: CPT | Mod: 25

## 2021-04-05 PROCEDURE — 69210 REMOVE IMPACTED EAR WAX UNI: CPT | Mod: RT

## 2021-04-05 NOTE — ASSESSMENT
[FreeTextEntry1] : rhinitis vasomotor\par trial ipratropium spray \par Large amount cerumen cleared each ear canal.\par Ear canals and tympanic membranes  unremarkable.\par symptoms relieved. F/u    .\par \par

## 2021-06-07 ENCOUNTER — INPATIENT (INPATIENT)
Facility: HOSPITAL | Age: 86
LOS: 15 days | Discharge: NURSING, REHAB OR LONG-TERM CARE | DRG: 208 | End: 2021-06-23
Attending: FAMILY MEDICINE | Admitting: FAMILY MEDICINE
Payer: MEDICARE

## 2021-06-07 VITALS
RESPIRATION RATE: 17 BRPM | DIASTOLIC BLOOD PRESSURE: 65 MMHG | WEIGHT: 139.99 LBS | OXYGEN SATURATION: 97 % | HEART RATE: 67 BPM | SYSTOLIC BLOOD PRESSURE: 128 MMHG | HEIGHT: 72 IN | TEMPERATURE: 98 F

## 2021-06-07 DIAGNOSIS — Z95.810 PRESENCE OF AUTOMATIC (IMPLANTABLE) CARDIAC DEFIBRILLATOR: ICD-10-CM

## 2021-06-07 DIAGNOSIS — G31.2 DEGENERATION OF NERVOUS SYSTEM DUE TO ALCOHOL: ICD-10-CM

## 2021-06-07 DIAGNOSIS — Z85.118 PERSONAL HISTORY OF OTHER MALIGNANT NEOPLASM OF BRONCHUS AND LUNG: ICD-10-CM

## 2021-06-07 DIAGNOSIS — Z98.890 OTHER SPECIFIED POSTPROCEDURAL STATES: Chronic | ICD-10-CM

## 2021-06-07 DIAGNOSIS — Z79.82 LONG TERM (CURRENT) USE OF ASPIRIN: ICD-10-CM

## 2021-06-07 DIAGNOSIS — Z95.828 PRESENCE OF OTHER VASCULAR IMPLANTS AND GRAFTS: ICD-10-CM

## 2021-06-07 DIAGNOSIS — R57.9 SHOCK, UNSPECIFIED: ICD-10-CM

## 2021-06-07 DIAGNOSIS — I48.91 UNSPECIFIED ATRIAL FIBRILLATION: ICD-10-CM

## 2021-06-07 DIAGNOSIS — F05 DELIRIUM DUE TO KNOWN PHYSIOLOGICAL CONDITION: ICD-10-CM

## 2021-06-07 DIAGNOSIS — F10.231 ALCOHOL DEPENDENCE WITH WITHDRAWAL DELIRIUM: ICD-10-CM

## 2021-06-07 DIAGNOSIS — Z90.2 ACQUIRED ABSENCE OF LUNG [PART OF]: Chronic | ICD-10-CM

## 2021-06-07 DIAGNOSIS — Z90.2 ACQUIRED ABSENCE OF LUNG [PART OF]: ICD-10-CM

## 2021-06-07 DIAGNOSIS — I25.10 ATHEROSCLEROTIC HEART DISEASE OF NATIVE CORONARY ARTERY WITHOUT ANGINA PECTORIS: ICD-10-CM
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
## 2021-06-07 DIAGNOSIS — E87.6 HYPOKALEMIA: ICD-10-CM

## 2021-06-07 DIAGNOSIS — F10.229 ALCOHOL DEPENDENCE WITH INTOXICATION, UNSPECIFIED: ICD-10-CM

## 2021-06-07 DIAGNOSIS — R55 SYNCOPE AND COLLAPSE: ICD-10-CM

## 2021-06-07 DIAGNOSIS — I73.9 PERIPHERAL VASCULAR DISEASE, UNSPECIFIED: ICD-10-CM

## 2021-06-07 DIAGNOSIS — I47.2 VENTRICULAR TACHYCARDIA: ICD-10-CM
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
## 2021-06-07 DIAGNOSIS — Z95.1 PRESENCE OF AORTOCORONARY BYPASS GRAFT: ICD-10-CM

## 2021-06-07 DIAGNOSIS — J44.9 CHRONIC OBSTRUCTIVE PULMONARY DISEASE, UNSPECIFIED: ICD-10-CM
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
## 2021-06-07 DIAGNOSIS — I50.22 CHRONIC SYSTOLIC (CONGESTIVE) HEART FAILURE: ICD-10-CM

## 2021-06-07 DIAGNOSIS — E87.2 ACIDOSIS: ICD-10-CM
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
## 2021-06-07 DIAGNOSIS — Z86.74 PERSONAL HISTORY OF SUDDEN CARDIAC ARREST: ICD-10-CM

## 2021-06-07 DIAGNOSIS — N18.30 CHRONIC KIDNEY DISEASE, STAGE 3 UNSPECIFIED: ICD-10-CM

## 2021-06-07 DIAGNOSIS — S02.2XXA FRACTURE OF NASAL BONES, INITIAL ENCOUNTER FOR CLOSED FRACTURE: ICD-10-CM

## 2021-06-07 DIAGNOSIS — J96.01 ACUTE RESPIRATORY FAILURE WITH HYPOXIA: ICD-10-CM

## 2021-06-07 DIAGNOSIS — Z90.49 ACQUIRED ABSENCE OF OTHER SPECIFIED PARTS OF DIGESTIVE TRACT: Chronic | ICD-10-CM

## 2021-06-07 DIAGNOSIS — E78.5 HYPERLIPIDEMIA, UNSPECIFIED: ICD-10-CM

## 2021-06-07 DIAGNOSIS — Z95.810 PRESENCE OF AUTOMATIC (IMPLANTABLE) CARDIAC DEFIBRILLATOR: Chronic | ICD-10-CM
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
## 2021-06-07 DIAGNOSIS — I13.0 HYPERTENSIVE HEART AND CHRONIC KIDNEY DISEASE WITH HEART FAILURE AND STAGE 1 THROUGH STAGE 4 CHRONIC KIDNEY DISEASE, OR UNSPECIFIED CHRONIC KIDNEY DISEASE: ICD-10-CM

## 2021-06-07 DIAGNOSIS — J69.0 PNEUMONITIS DUE TO INHALATION OF FOOD AND VOMIT: ICD-10-CM

## 2021-06-07 LAB
ALBUMIN SERPL ELPH-MCNC: 3.7 G/DL — SIGNIFICANT CHANGE UP (ref 3.3–5)
ALP SERPL-CCNC: 81 U/L — SIGNIFICANT CHANGE UP (ref 40–120)
ALT FLD-CCNC: 27 U/L — SIGNIFICANT CHANGE UP (ref 12–78)
ANION GAP SERPL CALC-SCNC: 11 MMOL/L — SIGNIFICANT CHANGE UP (ref 5–17)
APPEARANCE UR: CLEAR — SIGNIFICANT CHANGE UP
APTT BLD: 33.9 SEC — SIGNIFICANT CHANGE UP (ref 27.5–35.5)
AST SERPL-CCNC: 29 U/L — SIGNIFICANT CHANGE UP (ref 15–37)
BASOPHILS # BLD AUTO: 0.05 K/UL — SIGNIFICANT CHANGE UP (ref 0–0.2)
BASOPHILS NFR BLD AUTO: 0.7 % — SIGNIFICANT CHANGE UP (ref 0–2)
BILIRUB SERPL-MCNC: 0.6 MG/DL — SIGNIFICANT CHANGE UP (ref 0.2–1.2)
BILIRUB UR-MCNC: NEGATIVE — SIGNIFICANT CHANGE UP
BUN SERPL-MCNC: 16 MG/DL — SIGNIFICANT CHANGE UP (ref 7–23)
CALCIUM SERPL-MCNC: 9.1 MG/DL — SIGNIFICANT CHANGE UP (ref 8.5–10.1)
CHLORIDE SERPL-SCNC: 101 MMOL/L — SIGNIFICANT CHANGE UP (ref 96–108)
CK SERPL-CCNC: 181 U/L — SIGNIFICANT CHANGE UP (ref 26–308)
CO2 SERPL-SCNC: 26 MMOL/L — SIGNIFICANT CHANGE UP (ref 22–31)
COLOR SPEC: YELLOW — SIGNIFICANT CHANGE UP
CREAT SERPL-MCNC: 1.17 MG/DL — SIGNIFICANT CHANGE UP (ref 0.5–1.3)
DIFF PNL FLD: ABNORMAL
EOSINOPHIL # BLD AUTO: 0.3 K/UL — SIGNIFICANT CHANGE UP (ref 0–0.5)
EOSINOPHIL NFR BLD AUTO: 3.9 % — SIGNIFICANT CHANGE UP (ref 0–6)
ETHANOL SERPL-MCNC: 186 MG/DL — HIGH (ref 0–10)
GLUCOSE SERPL-MCNC: 147 MG/DL — HIGH (ref 70–99)
GLUCOSE UR QL: NEGATIVE MG/DL — SIGNIFICANT CHANGE UP
HCT VFR BLD CALC: 47 % — SIGNIFICANT CHANGE UP (ref 39–50)
HGB BLD-MCNC: 15.7 G/DL — SIGNIFICANT CHANGE UP (ref 13–17)
IMM GRANULOCYTES NFR BLD AUTO: 0.4 % — SIGNIFICANT CHANGE UP (ref 0–1.5)
INR BLD: 1.13 RATIO — SIGNIFICANT CHANGE UP (ref 0.88–1.16)
KETONES UR-MCNC: NEGATIVE — SIGNIFICANT CHANGE UP
LACTATE SERPL-SCNC: 3.2 MMOL/L — HIGH (ref 0.7–2)
LEUKOCYTE ESTERASE UR-ACNC: NEGATIVE — SIGNIFICANT CHANGE UP
LIDOCAIN IGE QN: 109 U/L — SIGNIFICANT CHANGE UP (ref 73–393)
LYMPHOCYTES # BLD AUTO: 1.82 K/UL — SIGNIFICANT CHANGE UP (ref 1–3.3)
LYMPHOCYTES # BLD AUTO: 23.7 % — SIGNIFICANT CHANGE UP (ref 13–44)
MCHC RBC-ENTMCNC: 31.5 PG — SIGNIFICANT CHANGE UP (ref 27–34)
MCHC RBC-ENTMCNC: 33.4 GM/DL — SIGNIFICANT CHANGE UP (ref 32–36)
MCV RBC AUTO: 94.2 FL — SIGNIFICANT CHANGE UP (ref 80–100)
MONOCYTES # BLD AUTO: 0.67 K/UL — SIGNIFICANT CHANGE UP (ref 0–0.9)
MONOCYTES NFR BLD AUTO: 8.7 % — SIGNIFICANT CHANGE UP (ref 2–14)
NEUTROPHILS # BLD AUTO: 4.81 K/UL — SIGNIFICANT CHANGE UP (ref 1.8–7.4)
NEUTROPHILS NFR BLD AUTO: 62.6 % — SIGNIFICANT CHANGE UP (ref 43–77)
NITRITE UR-MCNC: NEGATIVE — SIGNIFICANT CHANGE UP
PH UR: 6.5 — SIGNIFICANT CHANGE UP (ref 5–8)
PLATELET # BLD AUTO: 169 K/UL — SIGNIFICANT CHANGE UP (ref 150–400)
POTASSIUM SERPL-MCNC: 3.2 MMOL/L — LOW (ref 3.5–5.3)
POTASSIUM SERPL-SCNC: 3.2 MMOL/L — LOW (ref 3.5–5.3)
PROT SERPL-MCNC: 7.3 GM/DL — SIGNIFICANT CHANGE UP (ref 6–8.3)
PROT UR-MCNC: 15 MG/DL
PROTHROM AB SERPL-ACNC: 13 SEC — SIGNIFICANT CHANGE UP (ref 10.6–13.6)
RBC # BLD: 4.99 M/UL — SIGNIFICANT CHANGE UP (ref 4.2–5.8)
RBC # FLD: 13.7 % — SIGNIFICANT CHANGE UP (ref 10.3–14.5)
SODIUM SERPL-SCNC: 138 MMOL/L — SIGNIFICANT CHANGE UP (ref 135–145)
SP GR SPEC: 1 — LOW (ref 1.01–1.02)
TROPONIN I SERPL-MCNC: <0.015 NG/ML — SIGNIFICANT CHANGE UP (ref 0.01–0.04)
UROBILINOGEN FLD QL: NEGATIVE MG/DL — SIGNIFICANT CHANGE UP
WBC # BLD: 7.68 K/UL — SIGNIFICANT CHANGE UP (ref 3.8–10.5)
WBC # FLD AUTO: 7.68 K/UL — SIGNIFICANT CHANGE UP (ref 3.8–10.5)

## 2021-06-07 PROCEDURE — 99285 EMERGENCY DEPT VISIT HI MDM: CPT

## 2021-06-07 PROCEDURE — 71045 X-RAY EXAM CHEST 1 VIEW: CPT | Mod: 26

## 2021-06-07 RX ORDER — TETANUS TOXOID, REDUCED DIPHTHERIA TOXOID AND ACELLULAR PERTUSSIS VACCINE, ADSORBED 5; 2.5; 8; 8; 2.5 [IU]/.5ML; [IU]/.5ML; UG/.5ML; UG/.5ML; UG/.5ML
0.5 SUSPENSION INTRAMUSCULAR ONCE
Refills: 0 | Status: COMPLETED | OUTPATIENT
Start: 2021-06-07 | End: 2021-06-07

## 2021-06-07 RX ORDER — SODIUM CHLORIDE 9 MG/ML
500 INJECTION INTRAMUSCULAR; INTRAVENOUS; SUBCUTANEOUS ONCE
Refills: 0 | Status: COMPLETED | OUTPATIENT
Start: 2021-06-07 | End: 2021-06-07

## 2021-06-07 RX ORDER — POTASSIUM CHLORIDE 20 MEQ
40 PACKET (EA) ORAL ONCE
Refills: 0 | Status: COMPLETED | OUTPATIENT
Start: 2021-06-07 | End: 2021-06-07

## 2021-06-07 RX ORDER — SODIUM CHLORIDE 9 MG/ML
1000 INJECTION INTRAMUSCULAR; INTRAVENOUS; SUBCUTANEOUS ONCE
Refills: 0 | Status: DISCONTINUED | OUTPATIENT
Start: 2021-06-07 | End: 2021-06-07

## 2021-06-07 RX ORDER — FUROSEMIDE 40 MG
1 TABLET ORAL
Qty: 0 | Refills: 0 | DISCHARGE

## 2021-06-07 RX ORDER — ACETAMINOPHEN 500 MG
650 TABLET ORAL EVERY 6 HOURS
Refills: 0 | Status: DISCONTINUED | OUTPATIENT
Start: 2021-06-07 | End: 2021-06-23

## 2021-06-07 RX ADMIN — SODIUM CHLORIDE 500 MILLILITER(S): 9 INJECTION INTRAMUSCULAR; INTRAVENOUS; SUBCUTANEOUS at 23:20

## 2021-06-07 RX ADMIN — Medication 40 MILLIEQUIVALENT(S): at 23:20

## 2021-06-07 RX ADMIN — TETANUS TOXOID, REDUCED DIPHTHERIA TOXOID AND ACELLULAR PERTUSSIS VACCINE, ADSORBED 0.5 MILLILITER(S): 5; 2.5; 8; 8; 2.5 SUSPENSION INTRAMUSCULAR at 21:51

## 2021-06-07 NOTE — ED PROVIDER NOTE - PHYSICAL EXAMINATION
*GEN: No acute distress, well appearing   *HEAD: Normocephalic   *EYES/NOSE: b/l Pupils symmetric & Reactive to ligth, EOMI b/l  *THROAT: airway patent, moist mucous membranes  *NECK: Neck supple  *PULMONARY: No Respiratory distress, symmetric b/l chest rise  *CARDIAC: s1s2, regular rhythm   *ABDOMEN:  Non Tender, Non Distended, soft, no guarding, no rebound, no masses   *BACK: no CVA tenderness, No midline vertebral tenderness to palpation   *EXTREMITIES: symmetric pulses, 2+ DP & radial pulses, no cyanosis, no edema   *SKIN: no rash, no bruising   *NEUROLOGIC: alert,  full active & passive ROM in all 4 extremities,   *PSYCH: appropriate concern about symptoms, pleasant *GEN: No acute distress, well appearing   *HEAD: Normocephalic   *EYES/NOSE: b/l Pupils symmetric & Reactive to ligth, EOMI b/l  *THROAT: airway patent, moist mucous membranes  *NECK: Neck supple  *PULMONARY: No Respiratory distress, symmetric b/l chest rise  *CARDIAC: s1s2, regular rhythm   *ABDOMEN:  Non Tender, Non Distended, soft, no guarding, no rebound, no masses   *BACK: no CVA tenderness, No midline vertebral tenderness to palpation   *EXTREMITIES: symmetric pulses, 2+ DP & radial pulses, no cyanosis, no edema   *SKIN: no rash, no bruising, +abrasion L forehead w/ skin tear   *NEUROLOGIC: alert,  full active & passive ROM in all 4 extremities,   *PSYCH: appropriate concern about symptoms, pleasant *GEN: No acute distress, well appearing   *HEAD: Normocephalic   *EYES/NOSE: b/l Pupils symmetric & Reactive to ligth, EOMI b/l  *THROAT: airway patent, moist mucous membranes  *NECK: Neck supple  *PULMONARY: No Respiratory distress, symmetric b/l chest rise  *CARDIAC: s1s2, regular rhythm   *ABDOMEN:  Non Tender, Non Distended, soft, no guarding, no rebound, no masses   *BACK: no CVA tenderness, No midline vertebral tenderness to palpation   *EXTREMITIES: symmetric pulses, 2+ DP & radial pulses, no cyanosis, no edema   *SKIN: no rash, no bruising, +abrasion L forehead w/ skin tear   *NEUROLOGIC: alert,  full active & passive ROM in all 4 extremities, normal gait  *PSYCH: appropriate concern about symptoms, pleasant

## 2021-06-07 NOTE — ED PROVIDER NOTE - OBJECTIVE STATEMENT
Pertinent HPI/PMH/PSH/FHx/SHx and Review of Systems contained within  HPI:  Patient s/p unwitnessed fall PTA, new onset. Pt comes BIBA from home after waking up from a fall w/ +head strike and +loc. Pt does not remember how he fell. Pt does not know if he is on blood thinners. Denies CP, SOB, abd pain, HA, dizziness, nausea or other complaints. Pt presents w/ an abrasion to his L forehead. Pt lives at home w/ his wife and walks w/ a cane or walker occasionally.   PMH/PSH relevant for: CHF, lung cancer, PAD, CKD, CAD, HTN  ROS negative for: fever, Chest pain, SOB, Nausea, vomiting, diarrhea, abdominal pain, HA, dizziness, dysuria    FamilyHx and SocialHx not otherwise contributory Pertinent HPI/PMH/PSH/FHx/SHx and Review of Systems contained within  HPI:  Patient BIBA s/p unwitnessed fall PTA, new onset. Pt from home after waking up from a fall w/ +head strike and +loc. Pt does not remember how he fell. Denies CP, SOB, abd pain, HA, dizziness, nausea or other complaints. Pt presents w/ an abrasion to his L forehead. Pt lives at home w/ his wife and walks w/ a cane or walker occasionally. Pt is on 81mg ASA.   PMH/PSH relevant for: HTN ,CHF ( 2016 :EF 25-30% left ventricular dysfunction ,mild TR and Mild MR)  ,COPD ,CAD s/p CABG and 1 stent ,Left neck CEA ,PVD S/P stent CKD-3 ,HLD ,lung cancer s/p Lobectomy ,Cardiac arrest s/p AICD/pacemaker Implanted 2016, on 81mg ASA.   ROS negative for: fever, Chest pain, SOB, Nausea, vomiting, diarrhea, abdominal pain, HA, dizziness, dysuria    FamilyHx and SocialHx not otherwise contributory Pertinent HPI/PMH/PSH/FHx/SHx and Review of Systems contained within  HPI:  Patient BIBA s/p unwitnessed fall PTA, new onset. Pt from home after waking up from a fall w/ +head strike and +loc. Pt does not remember how he fell. Denies CP, SOB, abd pain, HA, dizziness, nausea or other complaints. Pt presents w/ an abrasion to his L forehead. Pt lives alone at home, as his wife is currently at a rehab facility. Pt walks w/ a cane occasionally. Pt is on 81mg ASA.   PMH/PSH relevant for: HTN ,CHF ( 2016 :EF 25-30% left ventricular dysfunction ,mild TR and Mild MR)  ,COPD ,CAD s/p CABG and 1 stent ,Left neck CEA ,PVD S/P stent CKD-3 ,HLD ,lung cancer s/p Lobectomy ,Cardiac arrest s/p AICD/pacemaker Implanted 2016, on 81mg ASA.   ROS negative for: fever, Chest pain, SOB, Nausea, vomiting, diarrhea, abdominal pain, HA, dizziness, dysuria    FamilyHx and SocialHx not otherwise contributory Pertinent HPI/PMH/PSH/FHx/SHx and Review of Systems contained within  HPI:  Patient BIBA s/p unwitnessed fall PTA, new onset. Pt from home after waking up from a fall w/ +head strike and +loc. Pt does not remember how he fell. Denies CP, SOB, abd pain, HA, dizziness, nausea or other complaints. Pt presents w/ an abrasion to his L forehead. Pt lives alone at home, as his wife is currently at a rehab facility. Pt walks w/ a cane occasionally. Pt is on 81mg ASA.   PMH/PSH relevant for: HTN ,CHF ( 2016 EF 25-30% left ventricular dysfunction ,mild TR and Mild MR)  ,COPD ,CAD s/p CABG and 1 stent ,Left neck CEA ,PVD S/P stent CKD-3 ,HLD ,lung cancer s/p Lobectomy ,Cardiac arrest s/p AICD/pacemaker Implanted 2016, on 81mg ASA.   ROS negative for: fever, Chest pain, SOB, Nausea, vomiting, diarrhea, abdominal pain, HA, dizziness, dysuria    FamilyHx and SocialHx not otherwise contributory

## 2021-06-07 NOTE — ED PROVIDER NOTE - NS ED ROS FT
Review of Systems:  	•	CONSTITUTIONAL: no fever  	•	SKIN: no rash, +abrasion   	•	RESPIRATORY: no shortness of breath  	•	CARDIAC: no chest pain  	•	GI:  no abd pain, no nausea, no vomiting, no diarrhea  	•	GENITO-URINARY:  no dysuria  	•	MUSCULOSKELETAL:  no back pain  	•	NEUROLOGIC: no weakness  	•	ALLERGY: no rhinorrhea  	•	PSYSCHIATRIC: appropriate concern about symptoms

## 2021-06-07 NOTE — ED PROVIDER NOTE - PROGRESS NOTE DETAILS
carl: Discussed need to admit with patient & discussed risk and benefits.  Patient agreed to admission.  Discussed case w/ admitting doctor - agreed to admit to their service. will place bridge orders. Accepting physician said: APPROVE PT TO MOVE   prior to inpatient team evaluation

## 2021-06-07 NOTE — ED ADULT TRIAGE NOTE - CHIEF COMPLAINT QUOTE
pt bibems from home s/p unwitnessed fall. +headstrike, +LOC, pt does not know if he takes any blood thinners. Pt presents with head injury, bleeding controlled at this time. Pt states he usually walks with a walker. Pt A+Ox4, denies chest pain, SOB, abdominal pain, back pain, blurred vision, dizziness, nausea. Moving all extremities at this time. As per doctor SHAUN no trauma alert, pt will be a neuro alert. straight to cat scan

## 2021-06-07 NOTE — ED PROVIDER NOTE - CLINICAL SUMMARY MEDICAL DECISION MAKING FREE TEXT BOX
unwitnessed fall. likely had LOC. +head trauma, will likely need admission. Pt currently A&Ox4 w/o any pain or sx.

## 2021-06-07 NOTE — ED PROVIDER NOTE - CARE PLAN
Principal Discharge DX:	Syncope  Secondary Diagnosis:	Head trauma  Secondary Diagnosis:	Lactate blood increase

## 2021-06-08 ENCOUNTER — TRANSCRIPTION ENCOUNTER (OUTPATIENT)
Age: 86
End: 2021-06-08

## 2021-06-08 DIAGNOSIS — R55 SYNCOPE AND COLLAPSE: ICD-10-CM

## 2021-06-08 LAB
ADD ON TEST-SPECIMEN IN LAB: SIGNIFICANT CHANGE UP
ALBUMIN SERPL ELPH-MCNC: 3.7 G/DL — SIGNIFICANT CHANGE UP (ref 3.3–5)
ALP SERPL-CCNC: 74 U/L — SIGNIFICANT CHANGE UP (ref 40–120)
ALT FLD-CCNC: 23 U/L — SIGNIFICANT CHANGE UP (ref 12–78)
ANION GAP SERPL CALC-SCNC: 6 MMOL/L — SIGNIFICANT CHANGE UP (ref 5–17)
APTT BLD: 33 SEC — SIGNIFICANT CHANGE UP (ref 27.5–35.5)
AST SERPL-CCNC: 28 U/L — SIGNIFICANT CHANGE UP (ref 15–37)
BASE EXCESS BLDA CALC-SCNC: -6.6 MMOL/L — LOW (ref -2–2)
BASOPHILS # BLD AUTO: 0.05 K/UL — SIGNIFICANT CHANGE UP (ref 0–0.2)
BASOPHILS NFR BLD AUTO: 0.7 % — SIGNIFICANT CHANGE UP (ref 0–2)
BILIRUB SERPL-MCNC: 0.7 MG/DL — SIGNIFICANT CHANGE UP (ref 0.2–1.2)
BLOOD GAS COMMENTS ARTERIAL: SIGNIFICANT CHANGE UP
BUN SERPL-MCNC: 11 MG/DL — SIGNIFICANT CHANGE UP (ref 7–23)
CALCIUM SERPL-MCNC: 9 MG/DL — SIGNIFICANT CHANGE UP (ref 8.5–10.1)
CHLORIDE SERPL-SCNC: 103 MMOL/L — SIGNIFICANT CHANGE UP (ref 96–108)
CHOLEST SERPL-MCNC: 175 MG/DL — SIGNIFICANT CHANGE UP
CO2 SERPL-SCNC: 31 MMOL/L — SIGNIFICANT CHANGE UP (ref 22–31)
CREAT SERPL-MCNC: 0.95 MG/DL — SIGNIFICANT CHANGE UP (ref 0.5–1.3)
EOSINOPHIL # BLD AUTO: 0.25 K/UL — SIGNIFICANT CHANGE UP (ref 0–0.5)
EOSINOPHIL NFR BLD AUTO: 3.4 % — SIGNIFICANT CHANGE UP (ref 0–6)
GAS PNL BLDA: SIGNIFICANT CHANGE UP
GLUCOSE SERPL-MCNC: 126 MG/DL — HIGH (ref 70–99)
HCO3 BLDA-SCNC: 24 MMOL/L — SIGNIFICANT CHANGE UP (ref 21–29)
HCT VFR BLD CALC: 47.3 % — SIGNIFICANT CHANGE UP (ref 39–50)
HDLC SERPL-MCNC: 80 MG/DL — SIGNIFICANT CHANGE UP
HGB BLD-MCNC: 16.1 G/DL — SIGNIFICANT CHANGE UP (ref 13–17)
IMM GRANULOCYTES NFR BLD AUTO: 0.4 % — SIGNIFICANT CHANGE UP (ref 0–1.5)
INR BLD: 1.1 RATIO — SIGNIFICANT CHANGE UP (ref 0.88–1.16)
LIPID PNL WITH DIRECT LDL SERPL: 73 MG/DL — SIGNIFICANT CHANGE UP
LYMPHOCYTES # BLD AUTO: 1.81 K/UL — SIGNIFICANT CHANGE UP (ref 1–3.3)
LYMPHOCYTES # BLD AUTO: 24.6 % — SIGNIFICANT CHANGE UP (ref 13–44)
MAGNESIUM SERPL-MCNC: 1.5 MG/DL — LOW (ref 1.6–2.6)
MCHC RBC-ENTMCNC: 31.6 PG — SIGNIFICANT CHANGE UP (ref 27–34)
MCHC RBC-ENTMCNC: 34 GM/DL — SIGNIFICANT CHANGE UP (ref 32–36)
MCV RBC AUTO: 92.9 FL — SIGNIFICANT CHANGE UP (ref 80–100)
MONOCYTES # BLD AUTO: 0.72 K/UL — SIGNIFICANT CHANGE UP (ref 0–0.9)
MONOCYTES NFR BLD AUTO: 9.8 % — SIGNIFICANT CHANGE UP (ref 2–14)
NEUTROPHILS # BLD AUTO: 4.49 K/UL — SIGNIFICANT CHANGE UP (ref 1.8–7.4)
NEUTROPHILS NFR BLD AUTO: 61.1 % — SIGNIFICANT CHANGE UP (ref 43–77)
NON HDL CHOLESTEROL: 95 MG/DL — SIGNIFICANT CHANGE UP
PCO2 BLDA: 68 MMHG — HIGH (ref 32–46)
PH BLDA: 7.17 — CRITICAL LOW (ref 7.35–7.45)
PHOSPHATE SERPL-MCNC: 2.1 MG/DL — LOW (ref 2.5–4.5)
PLATELET # BLD AUTO: 172 K/UL — SIGNIFICANT CHANGE UP (ref 150–400)
PO2 BLDA: 174 MMHG — HIGH (ref 74–108)
POTASSIUM SERPL-MCNC: 3.1 MMOL/L — LOW (ref 3.5–5.3)
POTASSIUM SERPL-SCNC: 3.1 MMOL/L — LOW (ref 3.5–5.3)
PROT SERPL-MCNC: 7.2 GM/DL — SIGNIFICANT CHANGE UP (ref 6–8.3)
PROTHROM AB SERPL-ACNC: 12.8 SEC — SIGNIFICANT CHANGE UP (ref 10.6–13.6)
RBC # BLD: 5.09 M/UL — SIGNIFICANT CHANGE UP (ref 4.2–5.8)
RBC # FLD: 13.5 % — SIGNIFICANT CHANGE UP (ref 10.3–14.5)
SAO2 % BLDA: 98 % — HIGH (ref 92–96)
SARS-COV-2 RNA SPEC QL NAA+PROBE: SIGNIFICANT CHANGE UP
SODIUM SERPL-SCNC: 140 MMOL/L — SIGNIFICANT CHANGE UP (ref 135–145)
TRIGL SERPL-MCNC: 110 MG/DL — SIGNIFICANT CHANGE UP
TROPONIN I SERPL-MCNC: 0.02 NG/ML — SIGNIFICANT CHANGE UP (ref 0.01–0.04)
TROPONIN I SERPL-MCNC: <0.015 NG/ML — SIGNIFICANT CHANGE UP (ref 0.01–0.04)
WBC # BLD: 7.35 K/UL — SIGNIFICANT CHANGE UP (ref 3.8–10.5)
WBC # FLD AUTO: 7.35 K/UL — SIGNIFICANT CHANGE UP (ref 3.8–10.5)

## 2021-06-08 PROCEDURE — 81001 URINALYSIS AUTO W/SCOPE: CPT

## 2021-06-08 PROCEDURE — 71045 X-RAY EXAM CHEST 1 VIEW: CPT | Mod: 26

## 2021-06-08 PROCEDURE — 80048 BASIC METABOLIC PNL TOTAL CA: CPT

## 2021-06-08 PROCEDURE — 92610 EVALUATE SWALLOWING FUNCTION: CPT | Mod: GN

## 2021-06-08 PROCEDURE — 82962 GLUCOSE BLOOD TEST: CPT

## 2021-06-08 PROCEDURE — 84100 ASSAY OF PHOSPHORUS: CPT

## 2021-06-08 PROCEDURE — 94640 AIRWAY INHALATION TREATMENT: CPT

## 2021-06-08 PROCEDURE — 86769 SARS-COV-2 COVID-19 ANTIBODY: CPT

## 2021-06-08 PROCEDURE — 95816 EEG AWAKE AND DROWSY: CPT | Mod: 26

## 2021-06-08 PROCEDURE — 85025 COMPLETE CBC W/AUTO DIFF WBC: CPT

## 2021-06-08 PROCEDURE — 36600 WITHDRAWAL OF ARTERIAL BLOOD: CPT

## 2021-06-08 PROCEDURE — C9113: CPT

## 2021-06-08 PROCEDURE — 80053 COMPREHEN METABOLIC PANEL: CPT

## 2021-06-08 PROCEDURE — 84443 ASSAY THYROID STIM HORMONE: CPT

## 2021-06-08 PROCEDURE — 97162 PT EVAL MOD COMPLEX 30 MIN: CPT | Mod: GP

## 2021-06-08 PROCEDURE — 70450 CT HEAD/BRAIN W/O DYE: CPT

## 2021-06-08 PROCEDURE — 85027 COMPLETE CBC AUTOMATED: CPT

## 2021-06-08 PROCEDURE — 92523 SPEECH SOUND LANG COMPREHEN: CPT | Mod: GN

## 2021-06-08 PROCEDURE — 87086 URINE CULTURE/COLONY COUNT: CPT

## 2021-06-08 PROCEDURE — 82607 VITAMIN B-12: CPT

## 2021-06-08 PROCEDURE — 82140 ASSAY OF AMMONIA: CPT

## 2021-06-08 PROCEDURE — 94002 VENT MGMT INPAT INIT DAY: CPT

## 2021-06-08 PROCEDURE — 83735 ASSAY OF MAGNESIUM: CPT

## 2021-06-08 PROCEDURE — 93306 TTE W/DOPPLER COMPLETE: CPT | Mod: 26

## 2021-06-08 PROCEDURE — 93005 ELECTROCARDIOGRAM TRACING: CPT

## 2021-06-08 PROCEDURE — 87070 CULTURE OTHR SPECIMN AEROBIC: CPT

## 2021-06-08 PROCEDURE — U0003: CPT

## 2021-06-08 PROCEDURE — 99223 1ST HOSP IP/OBS HIGH 75: CPT

## 2021-06-08 PROCEDURE — 82803 BLOOD GASES ANY COMBINATION: CPT

## 2021-06-08 PROCEDURE — 36415 COLL VENOUS BLD VENIPUNCTURE: CPT

## 2021-06-08 PROCEDURE — 97116 GAIT TRAINING THERAPY: CPT | Mod: GP

## 2021-06-08 PROCEDURE — 93971 EXTREMITY STUDY: CPT | Mod: LT

## 2021-06-08 PROCEDURE — 97530 THERAPEUTIC ACTIVITIES: CPT | Mod: GP

## 2021-06-08 PROCEDURE — 71045 X-RAY EXAM CHEST 1 VIEW: CPT

## 2021-06-08 PROCEDURE — U0005: CPT

## 2021-06-08 PROCEDURE — 95816 EEG AWAKE AND DROWSY: CPT

## 2021-06-08 RX ORDER — FUROSEMIDE 40 MG
20 TABLET ORAL DAILY
Refills: 0 | Status: DISCONTINUED | OUTPATIENT
Start: 2021-06-08 | End: 2021-06-08

## 2021-06-08 RX ORDER — ATORVASTATIN CALCIUM 80 MG/1
10 TABLET, FILM COATED ORAL AT BEDTIME
Refills: 0 | Status: DISCONTINUED | OUTPATIENT
Start: 2021-06-08 | End: 2021-06-23

## 2021-06-08 RX ORDER — MECLIZINE HCL 12.5 MG
25 TABLET ORAL EVERY 12 HOURS
Refills: 0 | Status: DISCONTINUED | OUTPATIENT
Start: 2021-06-08 | End: 2021-06-15

## 2021-06-08 RX ORDER — POTASSIUM CHLORIDE 20 MEQ
10 PACKET (EA) ORAL
Refills: 0 | Status: COMPLETED | OUTPATIENT
Start: 2021-06-08 | End: 2021-06-08

## 2021-06-08 RX ORDER — LISINOPRIL 2.5 MG/1
1 TABLET ORAL
Qty: 0 | Refills: 0 | DISCHARGE

## 2021-06-08 RX ORDER — PANTOPRAZOLE SODIUM 20 MG/1
40 TABLET, DELAYED RELEASE ORAL
Refills: 0 | Status: DISCONTINUED | OUTPATIENT
Start: 2021-06-08 | End: 2021-06-09

## 2021-06-08 RX ORDER — PROPOFOL 10 MG/ML
20 INJECTION, EMULSION INTRAVENOUS
Qty: 1000 | Refills: 0 | Status: DISCONTINUED | OUTPATIENT
Start: 2021-06-08 | End: 2021-06-09

## 2021-06-08 RX ORDER — AMLODIPINE BESYLATE 2.5 MG/1
1 TABLET ORAL
Qty: 0 | Refills: 0 | DISCHARGE

## 2021-06-08 RX ORDER — PIPERACILLIN AND TAZOBACTAM 4; .5 G/20ML; G/20ML
3.38 INJECTION, POWDER, LYOPHILIZED, FOR SOLUTION INTRAVENOUS EVERY 8 HOURS
Refills: 0 | Status: COMPLETED | OUTPATIENT
Start: 2021-06-09 | End: 2021-06-15

## 2021-06-08 RX ORDER — FUROSEMIDE 40 MG
40 TABLET ORAL ONCE
Refills: 0 | Status: COMPLETED | OUTPATIENT
Start: 2021-06-08 | End: 2021-06-08

## 2021-06-08 RX ORDER — LANOLIN ALCOHOL/MO/W.PET/CERES
5 CREAM (GRAM) TOPICAL AT BEDTIME
Refills: 0 | Status: DISCONTINUED | OUTPATIENT
Start: 2021-06-08 | End: 2021-06-23

## 2021-06-08 RX ORDER — FOLIC ACID 0.8 MG
1 TABLET ORAL DAILY
Refills: 0 | Status: DISCONTINUED | OUTPATIENT
Start: 2021-06-08 | End: 2021-06-23

## 2021-06-08 RX ORDER — ASPIRIN/CALCIUM CARB/MAGNESIUM 324 MG
81 TABLET ORAL DAILY
Refills: 0 | Status: DISCONTINUED | OUTPATIENT
Start: 2021-06-08 | End: 2021-06-09

## 2021-06-08 RX ORDER — HYDRALAZINE HCL 50 MG
10 TABLET ORAL EVERY 6 HOURS
Refills: 0 | Status: DISCONTINUED | OUTPATIENT
Start: 2021-06-08 | End: 2021-06-09

## 2021-06-08 RX ORDER — NOREPINEPHRINE BITARTRATE/D5W 8 MG/250ML
0.05 PLASTIC BAG, INJECTION (ML) INTRAVENOUS
Qty: 8 | Refills: 0 | Status: DISCONTINUED | OUTPATIENT
Start: 2021-06-08 | End: 2021-06-09

## 2021-06-08 RX ORDER — THIAMINE MONONITRATE (VIT B1) 100 MG
100 TABLET ORAL DAILY
Refills: 0 | Status: COMPLETED | OUTPATIENT
Start: 2021-06-08 | End: 2021-06-11

## 2021-06-08 RX ORDER — LISINOPRIL 2.5 MG/1
20 TABLET ORAL
Refills: 0 | Status: DISCONTINUED | OUTPATIENT
Start: 2021-06-08 | End: 2021-06-09

## 2021-06-08 RX ORDER — PIPERACILLIN AND TAZOBACTAM 4; .5 G/20ML; G/20ML
3.38 INJECTION, POWDER, LYOPHILIZED, FOR SOLUTION INTRAVENOUS ONCE
Refills: 0 | Status: COMPLETED | OUTPATIENT
Start: 2021-06-08 | End: 2021-06-08

## 2021-06-08 RX ORDER — MAGNESIUM SULFATE 500 MG/ML
2 VIAL (ML) INJECTION ONCE
Refills: 0 | Status: COMPLETED | OUTPATIENT
Start: 2021-06-08 | End: 2021-06-08

## 2021-06-08 RX ORDER — METOPROLOL TARTRATE 50 MG
100 TABLET ORAL
Refills: 0 | Status: DISCONTINUED | OUTPATIENT
Start: 2021-06-08 | End: 2021-06-09

## 2021-06-08 RX ORDER — HYDRALAZINE HCL 50 MG
10 TABLET ORAL ONCE
Refills: 0 | Status: COMPLETED | OUTPATIENT
Start: 2021-06-08 | End: 2021-06-08

## 2021-06-08 RX ADMIN — Medication 100 MILLIEQUIVALENT(S): at 14:16

## 2021-06-08 RX ADMIN — Medication 50 GRAM(S): at 15:48

## 2021-06-08 RX ADMIN — Medication 81 MILLIGRAM(S): at 10:05

## 2021-06-08 RX ADMIN — LISINOPRIL 20 MILLIGRAM(S): 2.5 TABLET ORAL at 21:07

## 2021-06-08 RX ADMIN — PROPOFOL 11.5 MICROGRAM(S)/KG/MIN: 10 INJECTION, EMULSION INTRAVENOUS at 23:49

## 2021-06-08 RX ADMIN — Medication 40 MILLIGRAM(S): at 21:53

## 2021-06-08 RX ADMIN — Medication 2 MILLIGRAM(S): at 20:49

## 2021-06-08 RX ADMIN — PIPERACILLIN AND TAZOBACTAM 200 GRAM(S): 4; .5 INJECTION, POWDER, LYOPHILIZED, FOR SOLUTION INTRAVENOUS at 23:47

## 2021-06-08 RX ADMIN — LISINOPRIL 20 MILLIGRAM(S): 2.5 TABLET ORAL at 06:16

## 2021-06-08 RX ADMIN — Medication 100 MILLIGRAM(S): at 21:07

## 2021-06-08 RX ADMIN — Medication 100 MILLIEQUIVALENT(S): at 13:05

## 2021-06-08 RX ADMIN — Medication 100 MILLIGRAM(S): at 13:05

## 2021-06-08 RX ADMIN — Medication 1 MILLIGRAM(S): at 19:35

## 2021-06-08 RX ADMIN — Medication 5 MILLIGRAM(S): at 21:07

## 2021-06-08 RX ADMIN — ATORVASTATIN CALCIUM 10 MILLIGRAM(S): 80 TABLET, FILM COATED ORAL at 21:07

## 2021-06-08 RX ADMIN — PANTOPRAZOLE SODIUM 40 MILLIGRAM(S): 20 TABLET, DELAYED RELEASE ORAL at 10:05

## 2021-06-08 RX ADMIN — Medication 100 MILLIEQUIVALENT(S): at 10:05

## 2021-06-08 NOTE — CONSULT NOTE ADULT - SUBJECTIVE AND OBJECTIVE BOX
CC: 86 y old  Male who presents with a chief complaint of Syncope (08 Jun 2021 08:40)      HPI:  87 y/o M w/ PMH of HTN, CHF, COPD, CAD s/p CABG, PVD, CKD, HLD, lung CA s/p lobectomy, cardiac arrest s/p AICD, CEA, p/w syncope/ fall.. Patient states that he was in the kitchen, he woke up on the floor with bruise on his forehead, the last thing he remembers is walking from kitchen to his living room. Patient was not confused but may have had urinary incontinence, he pressed his life alert button and was brought to ED. He denies having any premonition, aura or diaphoresis before the fall.    Pt also informs me that he had a similar brief episode like this a week ago, sustained trauma to his left knee      PAST MEDICAL & SURGICAL HISTORY:  Hypertension  CAD (coronary artery disease)  Cardiac arrest with successful resuscitation  CKD (chronic kidney disease)  PAD (peripheral artery disease)  Lung cancer  Heart failure  AICD (automatic cardioverter/defibrillator) present  S/P carotid endarterectomy  History of lobectomy of lung  History of appendectomy  H/O left knee surgery  History of tonsillectomy and adenoidectomy      FAMILY HISTORY: No relevant history      Social Hx:  Nonsmoker, no drug or alcohol use      MEDICATIONS  (STANDING):  aspirin enteric coated 81 milliGRAM(s) Oral daily  atorvastatin 10 milliGRAM(s) Oral at bedtime  lisinopril 20 milliGRAM(s) Oral two times a day  melatonin 5 milliGRAM(s) Oral at bedtime  metoprolol tartrate 100 milliGRAM(s) Oral two times a day  pantoprazole    Tablet 40 milliGRAM(s) Oral before breakfast  potassium chloride  10 mEq/100 mL IVPB 10 milliEquivalent(s) IV Intermittent every 1 hour       Allergies  No Known Allergies      ROS: Pertinent positives in HPI, all other ROS were reviewed and are negative.        Vital Signs Last 24 Hrs  T(C): 36.6 (08 Jun 2021 08:58), Max: 36.9 (08 Jun 2021 04:46)  T(F): 97.9 (08 Jun 2021 08:58), Max: 98.4 (08 Jun 2021 04:46)  HR: 76 (08 Jun 2021 08:58) (64 - 76)  BP: 161/63 (08 Jun 2021 08:58) (128/65 - 184/67)  BP(mean): 85 (08 Jun 2021 04:46) (85 - 95)  RR: 18 (08 Jun 2021 08:58) (16 - 18)  SpO2: 96% (08 Jun 2021 08:58) (96% - 100%)      Gen exam:  Normocephalic, large left frontal abrasion/ echymosis, awake and alert.  HEENT: PERRLA, EOMI,   Neck: Supple.  Respiratory: Breath sounds are clear bilaterally  Cardiovascular: S1 and S2, regular  Extremities:  no edema  Vascular: Caritid Bruit - no  Musculoskeletal: no joint swelling/tenderness, no abnormal movements  Skin: No rashes      Neurological exam:  HF: A x O x 3. Appropriately interactive, normal affect. Speech fluent, No Aphasia or paraphasic errors. Naming /repetition intact   CN: PRISCILLA, EOMI, VFF, facial sensation normal, no NLFD, tongue midline, Palate moves equally, SCM equal bilaterally  Motor: No pronator drift, Strength 5/5 in all 4 ext, normal bulk and tone, tremor Right hand in extension posture, no rigidity or bradykinesia.    Sens: Intact to light touch    Reflexes:  BJ 2+, BR 2+, KJ trace,  AJ 0, downgoing toes b/l  Coord:  No FNFA,    Gait/Balance: Not tested      Labs:   06-08    140  |  103  |  11  ----------------------------<  126<H>  3.1<L>   |  31  |  0.95    Ca    9.0      08 Jun 2021 06:32  Phos  2.1     06-08  Mg     1.5     06-08    TPro  7.2  /  Alb  3.7  /  TBili  0.7  /  DBili  x   /  AST  28  /  ALT  23  /  AlkPhos  74  06-08                        16.1   7.35  )-----------( 172      ( 08 Jun 2021 06:32 )             47.3       Radiology:  < from: CT Head No Cont (06.07.21 @ 21:22) >    CT HEAD: No acute intracranial hemorrhage, mass effect, or osseous fracture.    CT MAXILLOFACIAL BONES: Comminuted nasal bone fractures. There is no other acute maxillofacial bone fracture.    CT CERVICAL SPINE: No acute cervical spine fracture or traumatic malalignment.

## 2021-06-08 NOTE — PROGRESS NOTE ADULT - ASSESSMENT
87 y/o M w/ PMH of HTN, systolic CHF, COPD, CAD s/p CABG / PCI, PVD s/p stent, CKD III, dyslipidemia, lung cancer s/p lobectomy, cardiac arrest s/p AICD, CEA, p/w syncope    *Syncope   -Tele monitoring  -Check orthostatics: negative so far  -Troponin negative x 1  -Echo: f/u  -CTH- nasal fracture   - EP interrogated device  -Cardio recs appreciated  -Neuro recs appreciated  -EP consult for AICD interrogation   -Alcohol level - 186  - CIWA protocol. Triggered symptom  -Wound care for facial injury     *Lactic acidosis of 3.6  -Will hold lasix temporarily   -F/u repeat lactate  -No signs / symptoms of infection     *Nasal bone fracture  -OMFS consult     *Hypokalemia  -Replete and recheck     *H/o systolic CHF / COPD / CAD / PVD / CKD III / dyslipidemia / lung cancer   -C/w home meds and f/u outpatient for further management if conditions remain stable during hospitalization     *DVT ppx  -SCDs     d/w Dr. Perez

## 2021-06-08 NOTE — PROCEDURE NOTE - NSPROCDETAILS_GEN_ALL_CORE
patient pre-oxygenated, tube inserted, placement confirmed
orogastric/gastric secretions aspirated, placement confirmed

## 2021-06-08 NOTE — DISCHARGE NOTE NURSING/CASE MANAGEMENT/SOCIAL WORK - PATIENT PORTAL LINK FT
You can access the FollowMyHealth Patient Portal offered by [facility] by registering at the following website: [email or web address]. By joining [employer]’s FollowMyHealth portal, you will also be able to view your health information using other applications (apps) compatible with our system.

## 2021-06-08 NOTE — PROGRESS NOTE ADULT - SUBJECTIVE AND OBJECTIVE BOX
87 y/o M w/ PMH of HTN, systolic CHF, COPD, CAD s/p CABG / PCI, PVD s/p stent, CKD III, dyslipidemia, lung cancer s/p lobectomy, cardiac arrest s/p AICD, CEA, p/w syncope. Patient states that the last thing he remembers is walking from kitchen to his living room, and then he woke up on the floor. Denies having any symptoms prior to syncope. Denies CP, SOB, palpitations, LH, dizziness. Patient states when he woke up he noticed blood dripping on his chest, so he pressed his life alert button and was brought to ED. Patient c/o mild pain on R side of nose. Also has injury to his head. Denies weakness in arms / legs, facial droop, sensory deficits, pain, cough, runny nose, sore thoroat, nausea, vomiting, abdominal pain, urinary incontinence, tongue bite.     Subjective: patient seen and examined at bedside. no acute distress. being worked up for syncope. Dose not recall events. Was drinking Rum.       REVIEW OF SYSTEMS:  CONSTITUTIONAL: No weakness, fevers or chills  EYES/ENT: No visual changes;  No vertigo or throat pain   NECK: No pain or stiffness  RESPIRATORY: No cough, wheezing, hemoptysis; No shortness of breath  CARDIOVASCULAR: No chest pain or palpitations  GASTROINTESTINAL: No abdominal or epigastric pain. No nausea, vomiting, or hematemesis; No diarrhea or constipation. No melena or hematochezia.  GENITOURINARY: No dysuria, frequency or hematuria  NEUROLOGICAL: No numbness or weakness  SKIN: No itching, burning, rashes, or lesions   All other review of systems is negative unless indicated above    PHYSICAL EXAM:  Vital Signs Last 24 Hrs  T(C): 36.6 (08 Jun 2021 08:58), Max: 36.9 (08 Jun 2021 04:46)  T(F): 97.9 (08 Jun 2021 08:58), Max: 98.4 (08 Jun 2021 04:46)  HR: 76 (08 Jun 2021 08:58) (64 - 76)  BP: 161/63 (08 Jun 2021 08:58) (128/65 - 184/67)  BP(mean): 85 (08 Jun 2021 04:46) (85 - 95)  RR: 18 (08 Jun 2021 08:58) (16 - 18)  SpO2: 96% (08 Jun 2021 08:58) (96% - 100%)    Constitutional: Pt lying in bed, awake and alert, NAD  HEENT: EOMI, normal hearing, moist mucous membranes  Neck: Soft and supple, no JVD  Respiratory: CTABL, No wheezing, rales or rhonchi  Cardiovascular: S1S2+, RRR, no M/G/R  Gastrointestinal: BS+, soft, NT/ND, no guarding, no rebound  Extremities: No peripheral edema  Vascular: 2+ peripheral pulses  Neurological: AAOx3, no focal deficits  Musculoskeletal: 5/5 strength b/l upper and lower extremities  Skin: No rashes    MEDICATIONS:  MEDICATIONS  (STANDING):  aspirin enteric coated 81 milliGRAM(s) Oral daily  atorvastatin 10 milliGRAM(s) Oral at bedtime  lisinopril 20 milliGRAM(s) Oral two times a day  melatonin 5 milliGRAM(s) Oral at bedtime  metoprolol tartrate 100 milliGRAM(s) Oral two times a day  pantoprazole    Tablet 40 milliGRAM(s) Oral before breakfast      LABS: All Labs Reviewed:                        16.1   7.35  )-----------( 172      ( 08 Jun 2021 06:32 )             47.3     06-08    140  |  103  |  11  ----------------------------<  126<H>  3.1<L>   |  31  |  0.95    Ca    9.0      08 Jun 2021 06:32  Phos  2.1     06-08  Mg     1.5     06-08    TPro  7.2  /  Alb  3.7  /  TBili  0.7  /  DBili  x   /  AST  28  /  ALT  23  /  AlkPhos  74  06-08    PT/INR - ( 08 Jun 2021 06:32 )   PT: 12.8 sec;   INR: 1.10 ratio         PTT - ( 08 Jun 2021 06:32 )  PTT:33.0 sec  CARDIAC MARKERS ( 08 Jun 2021 04:56 )  0.016 ng/mL / x     / x     / x     / x      CARDIAC MARKERS ( 08 Jun 2021 00:58 )  <0.015 ng/mL / x     / x     / x     / x      CARDIAC MARKERS ( 07 Jun 2021 21:32 )  <0.015 ng/mL / x     / 181 U/L / x     / x        RADIOLOGY/EKG:    rad< from: CT Head No Cont (06.07.21 @ 21:22) >  CT HEAD: No acute intracranial hemorrhage, mass effect, or osseous fracture.    CT MAXILLOFACIAL BONES: Comminuted nasal bone fractures. There is no other acute maxillofacial bone fracture.    CT CERVICAL SPINE: No acute cervical spine fracture or traumatic malalignment.    < end of copied text >  < from: Xray Chest 1 View AP/PA. (06.07.21 @ 21:33) >  Chest is similar to March 13 of this year.    IMPRESSION: Stable findings as above.    < end of copied text >

## 2021-06-08 NOTE — PROCEDURE NOTE - ADDITIONAL PROCEDURE DETAILS
Normal CRT-D function.  Battery status 1.9 yrs longevity
OGT placed insetting of hypoxic respiratory failure  not included in cc time
Tracheal intubation in setting of hypoxic/hypercapnic respiratory failure/reaspiratory distress  not included in cc time

## 2021-06-08 NOTE — H&P ADULT - ASSESSMENT
85 y/o M w/ PMH of HTN, systolic CHF, COPD, CAD s/p CABG / PCI, PVD s/p stent, CKD III, dyslipidemia, lung cancer s/p lobectomy, cardiac arrest s/p AICD, CEA, p/w syncope    *Syncope   -Tele monitoring  -Check orthostatics  -EKG  -Troponin negative x 1  -Echo  -CTH- nasal fracture   -Cardio consult  -Neuro consult  -EP consult for AICD interrogation   -Alcohol level - 186    *Lactic acidosis of 3.6  -Will hold lasix and give IVF and re-check lactate level  -No signs / symptoms of infection     *Nasal bone fracture  -OMFS consult     *Hypokalemia  -Replete and recheck     *H/o systolic CHF / COPD / CAD / PVD / CKD III / dyslipidemia / lung cancer   -C/w home meds and f/u outpatient for further management if conditions remain stable during hospitalization     *DVT ppx  -SCDs    85 y/o M w/ PMH of HTN, systolic CHF, COPD, CAD s/p CABG / PCI, PVD s/p stent, CKD III, dyslipidemia, lung cancer s/p lobectomy, cardiac arrest s/p AICD, CEA, p/w syncope    *Syncope   -Tele monitoring  -Check orthostatics  -EKG  -Troponin negative x 1  -Echo  -CTH- nasal fracture   -Cardio consult  -Neuro consult  -EP consult for AICD interrogation   -Alcohol level - 186  -Wound care for facial injury     *Lactic acidosis of 3.6  -Will hold lasix temporarily   -F/u repeat lactate  -No signs / symptoms of infection     *Nasal bone fracture  -OMFS consult     *Hypokalemia  -Replete and recheck     *H/o systolic CHF / COPD / CAD / PVD / CKD III / dyslipidemia / lung cancer   -C/w home meds and f/u outpatient for further management if conditions remain stable during hospitalization     *DVT ppx  -SCDs

## 2021-06-08 NOTE — H&P ADULT - HISTORY OF PRESENT ILLNESS
87 y/o M w/ PMH of HTN, systolic CHF, COPD, CAD s/p CABG / PCI, PVD s/p stent, CKD III, dyslipidemia, lung cancer s/p lobectomy, cardiac arrest s/p AICD, CEA, p/w syncope. Patient states that the last thing he remembers is walking from kitchen to his living room, and then he woke up on the floor. Denies having any symptoms prior to syncope. Denies CP, SOB, palpitations, LH, dizziness. Patient states when he woke up he noticed blood dripping on his chest, so he pressed his life alert button and was brought to ED. Patient c/o mild pain on R side of nose. Also has injury to his head. Denies weakness in arms / legs, facial droop, sensory deficits, pain, cough, runny nose, sore thoroat, nausea, vomiting, abdominal pain, urinary incontinence, tongue bite.     PSH: CEA, CABG, AICD placement, knee surgery, appendectomy, lung lobectomy, tonsillectomy / adenoidectomy    Social hx: Denies x 3    Family Hx: Denies

## 2021-06-08 NOTE — ED ADULT NURSE NOTE - NSIMPLEMENTINTERV_GEN_ALL_ED
Implemented All Fall with Harm Risk Interventions:  [place] to call system. Call bell, personal items and telephone within reach. Instruct patient to call for assistance. Room bathroom lighting operational. Non-slip footwear when patient is off stretcher. Physically safe environment: no spills, clutter or unnecessary equipment. Stretcher in lowest position, wheels locked, appropriate side rails in place. Provide visual cue, wrist band, yellow gown, etc. Monitor gait and stability. Monitor for mental status changes and reorient to person, place, and time. Review medications for side effects contributing to fall risk. Reinforce activity limits and safety measures with patient and family. Provide visual clues: red socks.

## 2021-06-08 NOTE — PROVIDER CONTACT NOTE (CHANGE IN STATUS NOTIFICATION) - BACKGROUND
Patient admitted s/p fall, hx CHF, COPD, AICD, ETOH Abuse. Code gray called twice this evening for agitated and combative against staff.  Patient trying to leave and hit staff. Received a total of 3mg Ativan IV. Patient admitted s/p fall, hx CHF, COPD, AICD, ETOH Abuse. Code gray called twice this evening for agitation and combative against staff.  Patient trying to leave and hit staff. Received a total of 3mg Ativan IV.

## 2021-06-08 NOTE — ED ADULT NURSE NOTE - OBJECTIVE STATEMENT
pt bibems from home s/p unwitnessed fall. +headstrike, +LOC, pt does not know if he takes any blood thinners. Pt presents with head injury, bleeding controlled at this time. Pt states he usually walks with a walker. Pt A+Ox4, denies chest pain, SOB, abdominal pain, back pain, blurred vision, dizziness, nausea. Moving all extremities at this time.

## 2021-06-08 NOTE — CONSULT NOTE ADULT - SUBJECTIVE AND OBJECTIVE BOX
Patient is a 86y old  Male who presents with a chief complaint of Syncope.       HPI:  85 y/o M w/ PMH of HTN, systolic CHF, COPD, CAD s/p CABG / PCI, PVD s/p stent, CKD III, dyslipidemia, lung cancer s/p lobectomy, cardiac arrest s/p AICD, CEA, p/w syncope. Patient states that the last thing he remembers is walking from kitchen to his living room, and then he woke up on the floor. Denies having any symptoms prior to syncope. Denies CP, SOB, palpitations, LH, dizziness. Patient states when he woke up he noticed blood dripping on his chest, so he pressed his life alert button and was brought to ED. Patient c/o mild pain on R side of nose. Also has injury to his head. Denies weakness in arms / legs, facial droop, sensory deficits, pain, cough, runny nose, sore thorat, nausea, vomiting, abdominal pain, urinary incontinence, tongue bite.     Pt seen and examined by me this am.  He does not remember how this happened to him.  He states he had been drinking Rum.         PSH: CEA, CABG, AICD placement, knee surgery, appendectomy, lung lobectomy, tonsillectomy / adenoidectomy    Social hx: Denies x 3    Family Hx: Denies         PAST MEDICAL & SURGICAL HISTORY:  Hypertension    CAD (coronary artery disease)    Cardiac arrest with successful resuscitation    CKD (chronic kidney disease)    PAD (peripheral artery disease)    Lung cancer    Heart failure    AICD (automatic cardioverter/defibrillator) present    S/P carotid endarterectomy    History of lobectomy of lung    History of appendectomy    H/O left knee surgery    History of tonsillectomy and adenoidectomy        MEDICATIONS  (STANDING):  aspirin enteric coated 81 milliGRAM(s) Oral daily  atorvastatin 10 milliGRAM(s) Oral at bedtime  lisinopril 20 milliGRAM(s) Oral two times a day  melatonin 5 milliGRAM(s) Oral at bedtime  metoprolol tartrate 100 milliGRAM(s) Oral two times a day  pantoprazole    Tablet 40 milliGRAM(s) Oral before breakfast    MEDICATIONS  (PRN):  acetaminophen   Tablet .. 650 milliGRAM(s) Oral every 6 hours PRN Mild Pain (1 - 3)  meclizine 25 milliGRAM(s) Oral every 12 hours PRN Dizziness      FAMILY HISTORY: no recent smoking       SOCIAL HISTORY: alcohol abuse    REVIEW OF SYSTEMS:  CONSTITUTIONAL:    No fatigue, malaise, lethargy.  No fever or chills.  RESPIRATORY:  No cough.  No wheeze.  No hemoptysis.  No shortness of breath.  CARDIOVASCULAR:  No chest pains.  No palpitations. No shortness of breath, No orthopnea or PND.  GASTROINTESTINAL:  No abdominal pain.  No nausea or vomiting.    GENITOURINARY:    No hematuria.    MUSCULOSKELETAL:  No musculoskeletal pain.  No joint swelling.  No arthritis.  NEUROLOGICAL:  No tingling or numbness or weakness.  PSYCHIATRIC:  c/o confusion  SKIN:  Laceration on L temple.           Vital Signs Last 24 Hrs  T(C): 36.8 (2021 06:00), Max: 36.9 (2021 04:46)  T(F): 98.3 (2021 06:00), Max: 98.4 (2021 04:46)  HR: 71 (2021 06:00) (64 - 71)  BP: 184/67 (2021 06:00) (128/65 - 184/67)  BP(mean): 85 (2021 04:46) (85 - 95)  RR: 17 (2021 06:00) (16 - 18)  SpO2: 100% (2021 06:00) (96% - 100%)    PHYSICAL EXAM-    Constitutional: no acute distress     Head: Head is normocephalic and atraumatic.      Neck:No JVD.     Cardiovascular: Regular rate and rhythm without S3, S4. No murmurs or rubs are appreciated.      Respiratory: Breath sounds are normal. No rales. No wheezing.    Abdomen: Soft, nontender, nondistended with positive bowel sounds.      Extremity: No tenderness. No  pitting edema     Neurologic: The patient is alert and oriented.      Skin: No rash, no obvious lesions noted.      Psychiatric: The patient appears to be emotionally stable.      INTERPRETATION OF TELEMETRY: A sensed V paced , PVCs     ECG: A sensed V paced PVCs     I&O's Detail    2021 07:01  -  2021 07:00  --------------------------------------------------------  IN:  Total IN: 0 mL    OUT:    Voided (mL): 200 mL  Total OUT: 200 mL    Total NET: -200 mL          LABS:                        16.1   7.35  )-----------( 172      ( 2021 06:32 )             47.3     06-08    140  |  103  |  11  ----------------------------<  126<H>  3.1<L>   |  31  |  0.95    Ca    9.0      2021 06:32  Phos  2.1     06-08  Mg     1.5     06-08    TPro  7.2  /  Alb  3.7  /  TBili  0.7  /  DBili  x   /  AST  28  /  ALT  23  /  AlkPhos  74  06-08    CARDIAC MARKERS ( 2021 04:56 )  0.016 ng/mL / x     / x     / x     / x      CARDIAC MARKERS ( 2021 00:58 )  <0.015 ng/mL / x     / x     / x     / x      CARDIAC MARKERS ( 2021 21:32 )  <0.015 ng/mL / x     / 181 U/L / x     / x          PT/INR - ( 2021 06:32 )   PT: 12.8 sec;   INR: 1.10 ratio         PTT - ( 2021 06:32 )  PTT:33.0 sec  Urinalysis Basic - ( 2021 21:49 )    Color: Yellow / Appearance: Clear / S.005 / pH: x  Gluc: x / Ketone: Negative  / Bili: Negative / Urobili: Negative mg/dL   Blood: x / Protein: 15 mg/dL / Nitrite: Negative   Leuk Esterase: Negative / RBC: 3-5 /HPF / WBC 3-5   Sq Epi: x / Non Sq Epi: Few / Bacteria: Few      I&O's Summary    2021 07:01  -  2021 07:00  --------------------------------------------------------  IN: 0 mL / OUT: 200 mL / NET: -200 mL      BNP  RADIOLOGY & ADDITIONAL STUDIES:  < from: Transthoracic Echocardiogram (18 @ 12:45) >  Impression     Summary     Left ventricle systolic function appears mildly impaired based on   difficult transthoracic views; segmental wall motion abnormalities can   not   be rule out.   Estimated left ventricular ejection fraction is 40 %.   The left atrium is moderately dilated.   Normal appearing right atrium.   Normal appearing right ventricle structure and function.   Significant fibrocalcific changes noted to the aortic valve leaflets with   restriction in leaflet excursion. Peak transaortic gradient is 30mmHg;   this finding is consistent with moderate aortic stenosis.   Fibrocalcific changes noted to the mitral valve leaflets with restriction   in leaflet excursion without stenosis. Mild mitral regurgitation noted.   Moderate posterior mitral annular calcification noted.   EA reversal of the mitral inflow consistent with reduced compliance of   the   left ventricle   The tricuspid valve leaflets are well seen and appear thin and pliable   with preserved leaflets excursion. Trivial tricuspid regurgitation noted.   No evidence of pericardial effusion.     Signature     ----------------------------------------------------------------   Electronically signed by Mike Bianchi MD(Interpreting physician)   on 2018 05:33 PM   ----------------------------------------------------------------    < end of copied text >  < from: CT Head No Cont (21 @ 21:22) >    EXAM:  CT MAXILLOFACIAL                          EXAM:  CT 3D RECONSTRUCT LOI MALONEKSJOSESTEPHANIE                          EXAM:  CT BRAIN                          EXAM:  CT CERVICAL SPINE                            PROCEDURE DATE:  2021          INTERPRETATION:  CLINICAL INFORMATION: Trauma    TECHNIQUE:  1.  Axial CT images were acquired through the head.  2.  Axial CT images were acquired through the maxillofacial bones.  3.  Axial CT images were acquired through the cervical spine.  Intravenous contrast: None  Two-dimensional reformats were generated. Three-D reformatted images of the facial bones were also obtained.    COMPARISON STUDY: None    FINDINGS:  CT HEAD:    There is no CT evidence of acute intracranial hemorrhage,  mass effect, midline shift, or acute territorial infarct.    The ventricles and sulci are normal in size and configuration. The basal cisterns are patent. There are periventricular white matter hypodensities that are nonspecific in nature but may reflect chronic ischemic microvascular disease.      The mastoid air cells and middle ear cavities are grossly clear.    There is no depressed calvarial fracture.      CT MAXILLOFACIAL BONES:    Comminuted nasal bone fractures. There is no other acute maxillofacial bonefracture.    The mandible is intact.  The temporomandibular joints are not dislocated.    Mild polypoid mucosal thickening of the maxillary sinuses, right greater the left.  There is no hemorrhage or air-fluid level.    The nasal septum is grossly intact.  There is no abnormal soft tissue within the nasal cavity.    There is no gross CT evidence of traumatic globe injury.  The optic globes are smooth and symmetric in contour. The retrobulbar and extraconal fat is well-preserved. The extraocular muscles are not enlarged or deviated.      CT CERVICAL SPINE:    There is straightening of the cervical lordosis.  There is no evidence of an acute cervical spine fracture or traumatic malalignment.  There is no suspicious lytic or blastic lesion.  Theparaspinous soft tissues are unremarkable within limits of CT scan.    Degenerative changes:  There are multilevel degenerative changes characterized by disc osteophyte complexes and facet and uncinate hypertrophy with resultant moderate multilevel central canal and neural foraminal stenosis.    Incidental findings:  Visualized soft tissues of the neck appear unremarkable.  Visualized upper chest appears unremarkable.      IMPRESSION:    CT HEAD: No acute intracranial hemorrhage, mass effect, or osseous fracture.    CT MAXILLOFACIAL BONES: Comminuted nasal bone fractures. There is no other acute maxillofacial bone fracture.    CT CERVICAL SPINE: No acute cervical spine fracture or traumatic malalignment.            MIHAI CHI MD; Attending Radiologist  This document has been electronically signed. 2021  9:48PM    < end of copied text >

## 2021-06-08 NOTE — DISCHARGE NOTE NURSING/CASE MANAGEMENT/SOCIAL WORK - NSDCVIVACCINE_GEN_ALL_CORE_FT
Tdap , 2021/6/7 21:51 , Dylan Monahan (RN)   Tdap; 07-Jun-2021 21:51; Dylan Monahan (WOO); [employer]; [id number] (Exp. Date: 18-Nov-2022); IntraMuscular; Deltoid Right.; 0.5 milliLiter(s); VIS (VIS Published: 09-May-2013, VIS Presented: 07-Jun-2021);

## 2021-06-08 NOTE — PROVIDER CONTACT NOTE (CHANGE IN STATUS NOTIFICATION) - ASSESSMENT
Patient restless, agitated, repeatedly attempting to climb out of bed and hit staff.  Vitals as charted.

## 2021-06-08 NOTE — CONSULT NOTE ADULT - SUBJECTIVE AND OBJECTIVE BOX
Patient is a 86y old  Male who presents with a chief complaint of Syncope (2021 18:38)      BRIEF HOSPITAL COURSE: ***    Events last 24 hours: ***    PAST MEDICAL & SURGICAL HISTORY:  Hypertension    CAD (coronary artery disease)    Cardiac arrest with successful resuscitation    CKD (chronic kidney disease)    PAD (peripheral artery disease)    Lung cancer    Heart failure    AICD (automatic cardioverter/defibrillator) present    S/P carotid endarterectomy    History of lobectomy of lung    History of appendectomy    H/O left knee surgery    History of tonsillectomy and adenoidectomy      Allergies    No Known Allergies    Intolerances      FAMILY HISTORY:      Social History:     Review of Systems:  CONSTITUTIONAL: No fever, chills, or fatigue  EYES: No eye pain, visual disturbances, or discharge  ENMT:  No difficulty hearing, tinnitus, vertigo; No sinus or throat pain  NECK: No pain or stiffness  RESPIRATORY: No cough, wheezing, chills or hemoptysis; No shortness of breath  CARDIOVASCULAR: No chest pain, palpitations, dizziness, or leg swelling  GASTROINTESTINAL: No abdominal or epigastric pain. No nausea, vomiting, or hematemesis; No diarrhea or constipation. No melena or hematochezia.  GENITOURINARY: No dysuria, frequency, hematuria, or incontinence  NEUROLOGICAL: No headaches, memory loss, loss of strength, numbness, or tremors  SKIN: No itching, burning, rashes, or lesions   MUSCULOSKELETAL: No joint pain or swelling; No muscle, back, or extremity pain  PSYCHIATRIC: No depression, anxiety, mood swings, or difficulty sleeping      Vitals During Exam:   HR:   BP:   RR:  sPO2:     Physical Examination:    General: No acute distress.      HEENT: Pupils equal, reactive to light.  Symmetric.    PULM: Clear to auscultation bilaterally, no significant sputum production    CVS: Regular rate and rhythm, no murmurs, rubs, or gallops    ABD: Soft, nondistended, nontender, normoactive bowel sounds, no masses    EXT: No edema, nontender    SKIN: Warm and well perfused, no rashes noted.    NEURO: Alert, oriented, interactive, nonfocal      Medications:  piperacillin/tazobactam IVPB. 3.375 Gram(s) IV Intermittent once  piperacillin/tazobactam IVPB.. 3.375 Gram(s) IV Intermittent every 8 hours    furosemide   Injectable 40 milliGRAM(s) IV Push once  hydrALAZINE Injectable 10 milliGRAM(s) IV Push once  lisinopril 20 milliGRAM(s) Oral two times a day  metoprolol tartrate 100 milliGRAM(s) Oral two times a day      acetaminophen   Tablet .. 650 milliGRAM(s) Oral every 6 hours PRN  LORazepam   Injectable   IV Push   LORazepam   Injectable 2 milliGRAM(s) IV Push every 2 hours PRN  LORazepam   Injectable 2 milliGRAM(s) IV Push every 1 hour PRN  LORazepam   Injectable 2 milliGRAM(s) IV Push every 4 hours  meclizine 25 milliGRAM(s) Oral every 12 hours PRN  melatonin 5 milliGRAM(s) Oral at bedtime      aspirin enteric coated 81 milliGRAM(s) Oral daily    pantoprazole    Tablet 40 milliGRAM(s) Oral before breakfast      atorvastatin 10 milliGRAM(s) Oral at bedtime    folic acid 1 milliGRAM(s) Oral daily  multivitamin 1 Tablet(s) Oral daily  thiamine 100 milliGRAM(s) Oral daily                ICU Vital Signs Last 24 Hrs  T(C): 36.6 (2021 19:25), Max: 36.9 (2021 04:46)  T(F): 97.9 (2021 19:25), Max: 98.4 (2021 04:46)  HR: 71 (2021 20:30) (64 - 76)  BP: 180/71 (2021 20:30) (152/63 - 184/67)  BP(mean): 85 (2021 04:46) (85 - 95)  ABP: --  ABP(mean): --  RR: 22 (2021 20:30) (16 - 22)  SpO2: 98% (2021 20:30) (96% - 100%)    Vital Signs Last 24 Hrs  T(C): 36.6 (2021 19:25), Max: 36.9 (2021 04:46)  T(F): 97.9 (2021 19:25), Max: 98.4 (2021 04:46)  HR: 71 (2021 20:30) (64 - 76)  BP: 180/71 (2021 20:30) (152/63 - 184/67)  BP(mean): 85 (2021 04:46) (85 - 95)  RR: 22 (2021 20:30) (16 - 22)  SpO2: 98% (2021 20:30) (96% - 100%)        I&O's Detail    2021 07:01  -  2021 07:00  --------------------------------------------------------  IN:  Total IN: 0 mL    OUT:    Voided (mL): 200 mL  Total OUT: 200 mL    Total NET: -200 mL            LABS:                        16.1   7.35  )-----------( 172      ( 2021 06:32 )             47.3     06-08    140  |  103  |  11  ----------------------------<  126<H>  3.1<L>   |  31  |  0.95    Ca    9.0      2021 06:32  Phos  2.1     06-08  Mg     1.5     06-08    TPro  7.2  /  Alb  3.7  /  TBili  0.7  /  DBili  x   /  AST  28  /  ALT  23  /  AlkPhos  74  06-08      CARDIAC MARKERS ( 2021 04:56 )  0.016 ng/mL / x     / x     / x     / x      CARDIAC MARKERS ( 2021 00:58 )  <0.015 ng/mL / x     / x     / x     / x      CARDIAC MARKERS ( 2021 21:32 )  <0.015 ng/mL / x     / 181 U/L / x     / x          CAPILLARY BLOOD GLUCOSE      POCT Blood Glucose.: 228 mg/dL (2021 21:47)    PT/INR - ( 2021 06:32 )   PT: 12.8 sec;   INR: 1.10 ratio         PTT - ( 2021 06:32 )  PTT:33.0 sec  Urinalysis Basic - ( 2021 21:49 )    Color: Yellow / Appearance: Clear / S.005 / pH: x  Gluc: x / Ketone: Negative  / Bili: Negative / Urobili: Negative mg/dL   Blood: x / Protein: 15 mg/dL / Nitrite: Negative   Leuk Esterase: Negative / RBC: 3-5 /HPF / WBC 3-5   Sq Epi: x / Non Sq Epi: Few / Bacteria: Few      CULTURES:        RADIOLOGY: ***      SUPPLEMENTAL O2:   LINES:  IVF:  ANGIE:   PPx:   CONTACT: N  Patient is a 86y old  Male who presents with a chief complaint of Syncope (2021 18:38)      BRIEF HOSPITAL COURSE: ***    Events last 24 hours: ***    PAST MEDICAL & SURGICAL HISTORY:  Hypertension  CAD (coronary artery disease)  Cardiac arrest with successful resuscitation  CKD (chronic kidney disease)  PAD (peripheral artery disease)  Lung cancer  Heart failure  AICD (automatic cardioverter/defibrillator) present  S/P carotid endarterectomy  History of lobectomy of lung  History of appendectomy  H/O left knee surgery  History of tonsillectomy and adenoidectomy      Allergies  No Known Allergies      FAMILY HISTORY:  Unknown      Social History:   ETOH abuse.       Review of Systems:  Unable to obtain 2/2 mental status.      Physical Examination:    General: No acute distress.      HEENT: Pupils equal, reactive to light.  Symmetric.    PULM: Clear to auscultation bilaterally, no significant sputum production    CVS: Regular rate and rhythm, no murmurs, rubs, or gallops    ABD: Soft, nondistended, nontender, normoactive bowel sounds, no masses    EXT: No edema, nontender    SKIN: Warm and well perfused, no rashes noted.    NEURO: Alert, oriented, interactive, nonfocal      Medications:  piperacillin/tazobactam IVPB. 3.375 Gram(s) IV Intermittent once  piperacillin/tazobactam IVPB.. 3.375 Gram(s) IV Intermittent every 8 hours    furosemide   Injectable 40 milliGRAM(s) IV Push once  hydrALAZINE Injectable 10 milliGRAM(s) IV Push once  lisinopril 20 milliGRAM(s) Oral two times a day  metoprolol tartrate 100 milliGRAM(s) Oral two times a day      acetaminophen   Tablet .. 650 milliGRAM(s) Oral every 6 hours PRN  LORazepam   Injectable   IV Push   LORazepam   Injectable 2 milliGRAM(s) IV Push every 2 hours PRN  LORazepam   Injectable 2 milliGRAM(s) IV Push every 1 hour PRN  LORazepam   Injectable 2 milliGRAM(s) IV Push every 4 hours  meclizine 25 milliGRAM(s) Oral every 12 hours PRN  melatonin 5 milliGRAM(s) Oral at bedtime      aspirin enteric coated 81 milliGRAM(s) Oral daily    pantoprazole    Tablet 40 milliGRAM(s) Oral before breakfast      atorvastatin 10 milliGRAM(s) Oral at bedtime    folic acid 1 milliGRAM(s) Oral daily  multivitamin 1 Tablet(s) Oral daily  thiamine 100 milliGRAM(s) Oral daily                ICU Vital Signs Last 24 Hrs  T(C): 36.6 (2021 19:25), Max: 36.9 (2021 04:46)  T(F): 97.9 (2021 19:25), Max: 98.4 (2021 04:46)  HR: 71 (2021 20:30) (64 - 76)  BP: 180/71 (2021 20:30) (152/63 - 184/67)  BP(mean): 85 (2021 04:46) (85 - 95)  ABP: --  ABP(mean): --  RR: 22 (2021 20:30) (16 - 22)  SpO2: 98% (2021 20:30) (96% - 100%)    Vital Signs Last 24 Hrs  T(C): 36.6 (2021 19:25), Max: 36.9 (2021 04:46)  T(F): 97.9 (2021 19:25), Max: 98.4 (2021 04:46)  HR: 71 (2021 20:30) (64 - 76)  BP: 180/71 (2021 20:30) (152/63 - 184/67)  BP(mean): 85 (2021 04:46) (85 - 95)  RR: 22 (2021 20:30) (16 - 22)  SpO2: 98% (2021 20:30) (96% - 100%)        I&O's Detail    2021 07:01  -  2021 07:00  --------------------------------------------------------  IN:  Total IN: 0 mL    OUT:    Voided (mL): 200 mL  Total OUT: 200 mL    Total NET: -200 mL            LABS:                        16.1   7.35  )-----------( 172      ( 2021 06:32 )             47.3     06-08    140  |  103  |  11  ----------------------------<  126<H>  3.1<L>   |  31  |  0.95    Ca    9.0      2021 06:32  Phos  2.1     06-08  Mg     1.5     06-08    TPro  7.2  /  Alb  3.7  /  TBili  0.7  /  DBili  x   /  AST  28  /  ALT  23  /  AlkPhos  74  06-08      CARDIAC MARKERS ( 2021 04:56 )  0.016 ng/mL / x     / x     / x     / x      CARDIAC MARKERS ( 2021 00:58 )  <0.015 ng/mL / x     / x     / x     / x      CARDIAC MARKERS ( 2021 21:32 )  <0.015 ng/mL / x     / 181 U/L / x     / x          CAPILLARY BLOOD GLUCOSE      POCT Blood Glucose.: 228 mg/dL (2021 21:47)    PT/INR - ( 2021 06:32 )   PT: 12.8 sec;   INR: 1.10 ratio         PTT - ( 2021 06:32 )  PTT:33.0 sec  Urinalysis Basic - ( 2021 21:49 )    Color: Yellow / Appearance: Clear / S.005 / pH: x  Gluc: x / Ketone: Negative  / Bili: Negative / Urobili: Negative mg/dL   Blood: x / Protein: 15 mg/dL / Nitrite: Negative   Leuk Esterase: Negative / RBC: 3-5 /HPF / WBC 3-5   Sq Epi: x / Non Sq Epi: Few / Bacteria: Few      CULTURES:        RADIOLOGY: ***      SUPPLEMENTAL O2:   LINES:  IVF:  ADAMS:   PPx:   CONTACT: N  Patient is a 86y old  Male who presents with a chief complaint of Syncope (2021 18:38)      BRIEF HOSPITAL COURSE:   85 yo m pmhx HTN, HFrEF 40%, COPD, CAD s/p CABG/PCI, PVD s/p stent, CKD3, DLD, lung cancer s/p lobectomy, cardiac arrest s/p AICD, CEA biba from home s/p mechanical fall 2/2 etoh intoxication.    Events last 24 hours:   RRT called this evening 2/2 patient deteriorating into respiratory distress.  Upon arrival to patients bedside he was lethargic, tachypneic, using accessory muscles and gurgling.  Patient nasotracheally and orally suctioned, became agitated with suctioning and then lethargic once left alone. spo2 slightly improved. HFNC ordered for additional support.  Not a candidate for bipap with currently clinical status.  Chest xray ordered, revealing b/l cephalization and possible infiltrates. BP 190s/110s, Lasix 40mg IVP for diuresis.  ABG obtained 7.17/68/174/24/-.6, patient transferred to CCU and was subsequently intubated at that time.  Patient became hypotensive with sedation, low dose levophed ordered for BP support.        PAST MEDICAL & SURGICAL HISTORY:  Hypertension  CAD (coronary artery disease)  Cardiac arrest with successful resuscitation  CKD (chronic kidney disease)  PAD (peripheral artery disease)  Lung cancer  Heart failure  AICD (automatic cardioverter/defibrillator) present  S/P carotid endarterectomy  History of lobectomy of lung  History of appendectomy  H/O left knee surgery  History of tonsillectomy and adenoidectomy      Allergies  No Known Allergies      FAMILY HISTORY:  Unknown      Social History:   ETOH abuse.       Review of Systems:  Unable to obtain 2/2 mental status.      Physical Examination:    General: No acute distress.      HEENT: Pupils equal, reactive to light.  Symmetric.    PULM: Clear to auscultation bilaterally, no significant sputum production    CVS: Regular rate and rhythm, no murmurs, rubs, or gallops    ABD: Soft, nondistended, nontender, normoactive bowel sounds, no masses    EXT: No edema, nontender    SKIN: Warm and well perfused, no rashes noted.    NEURO: Alert, oriented, interactive, nonfocal      Medications:  piperacillin/tazobactam IVPB. 3.375 Gram(s) IV Intermittent once  piperacillin/tazobactam IVPB.. 3.375 Gram(s) IV Intermittent every 8 hours    furosemide   Injectable 40 milliGRAM(s) IV Push once  hydrALAZINE Injectable 10 milliGRAM(s) IV Push once  lisinopril 20 milliGRAM(s) Oral two times a day  metoprolol tartrate 100 milliGRAM(s) Oral two times a day      acetaminophen   Tablet .. 650 milliGRAM(s) Oral every 6 hours PRN  LORazepam   Injectable   IV Push   LORazepam   Injectable 2 milliGRAM(s) IV Push every 2 hours PRN  LORazepam   Injectable 2 milliGRAM(s) IV Push every 1 hour PRN  LORazepam   Injectable 2 milliGRAM(s) IV Push every 4 hours  meclizine 25 milliGRAM(s) Oral every 12 hours PRN  melatonin 5 milliGRAM(s) Oral at bedtime      aspirin enteric coated 81 milliGRAM(s) Oral daily    pantoprazole    Tablet 40 milliGRAM(s) Oral before breakfast      atorvastatin 10 milliGRAM(s) Oral at bedtime    folic acid 1 milliGRAM(s) Oral daily  multivitamin 1 Tablet(s) Oral daily  thiamine 100 milliGRAM(s) Oral daily                ICU Vital Signs Last 24 Hrs  T(C): 36.6 (2021 19:25), Max: 36.9 (2021 04:46)  T(F): 97.9 (2021 19:25), Max: 98.4 (2021 04:46)  HR: 71 (2021 20:30) (64 - 76)  BP: 180/71 (2021 20:30) (152/63 - 184/67)  BP(mean): 85 (2021 04:46) (85 - 95)  ABP: --  ABP(mean): --  RR: 22 (2021 20:30) (16 - 22)  SpO2: 98% (2021 20:30) (96% - 100%)    Vital Signs Last 24 Hrs  T(C): 36.6 (2021 19:25), Max: 36.9 (2021 04:46)  T(F): 97.9 (2021 19:25), Max: 98.4 (2021 04:46)  HR: 71 (2021 20:30) (64 - 76)  BP: 180/71 (2021 20:30) (152/63 - 184/67)  BP(mean): 85 (2021 04:46) (85 - 95)  RR: 22 (2021 20:30) (16 - 22)  SpO2: 98% (2021 20:30) (96% - 100%)        I&O's Detail    2021 07:01  -  2021 07:00  --------------------------------------------------------  IN:  Total IN: 0 mL    OUT:    Voided (mL): 200 mL  Total OUT: 200 mL    Total NET: -200 mL            LABS:                        16.1   7.35  )-----------( 172      ( 2021 06:32 )             47.3     06-08    140  |  103  |  11  ----------------------------<  126<H>  3.1<L>   |  31  |  0.95    Ca    9.0      2021 06:32  Phos  2.1     06-08  Mg     1.5     06-08    TPro  7.2  /  Alb  3.7  /  TBili  0.7  /  DBili  x   /  AST  28  /  ALT  23  /  AlkPhos  74  06-08      CARDIAC MARKERS ( 2021 04:56 )  0.016 ng/mL / x     / x     / x     / x      CARDIAC MARKERS ( 2021 00:58 )  <0.015 ng/mL / x     / x     / x     / x      CARDIAC MARKERS ( 2021 21:32 )  <0.015 ng/mL / x     / 181 U/L / x     / x          CAPILLARY BLOOD GLUCOSE      POCT Blood Glucose.: 228 mg/dL (2021 21:47)    PT/INR - ( 2021 06:32 )   PT: 12.8 sec;   INR: 1.10 ratio         PTT - ( 2021 06:32 )  PTT:33.0 sec  Urinalysis Basic - ( 2021 21:49 )    Color: Yellow / Appearance: Clear / S.005 / pH: x  Gluc: x / Ketone: Negative  / Bili: Negative / Urobili: Negative mg/dL   Blood: x / Protein: 15 mg/dL / Nitrite: Negative   Leuk Esterase: Negative / RBC: 3-5 /HPF / WBC 3-5   Sq Epi: x / Non Sq Epi: Few / Bacteria: Few      CULTURES:        RADIOLOGY: ***      SUPPLEMENTAL O2:   LINES:  IVF:  ADAMS:   PPx:   CONTACT: N

## 2021-06-08 NOTE — PROCEDURE NOTE - NSPROCNAME_GEN_A_CORE
Gastric Intubation/Gastric Lavage
CRT-D (Cardiac Resynchronization Therapy with Defibrillation Capabilities) Interrogation Note
Tracheal Intubation

## 2021-06-09 LAB
ANION GAP SERPL CALC-SCNC: 6 MMOL/L — SIGNIFICANT CHANGE UP (ref 5–17)
APPEARANCE UR: ABNORMAL
BACTERIA # UR AUTO: ABNORMAL
BASE EXCESS BLDA CALC-SCNC: 0.6 MMOL/L — SIGNIFICANT CHANGE UP (ref -2–2)
BILIRUB UR-MCNC: NEGATIVE — SIGNIFICANT CHANGE UP
BLOOD GAS COMMENTS ARTERIAL: SIGNIFICANT CHANGE UP
BUN SERPL-MCNC: 15 MG/DL — SIGNIFICANT CHANGE UP (ref 7–23)
CALCIUM SERPL-MCNC: 8.6 MG/DL — SIGNIFICANT CHANGE UP (ref 8.5–10.1)
CHLORIDE SERPL-SCNC: 103 MMOL/L — SIGNIFICANT CHANGE UP (ref 96–108)
CO2 SERPL-SCNC: 27 MMOL/L — SIGNIFICANT CHANGE UP (ref 22–31)
COLOR SPEC: ABNORMAL
COMMENT - URINE: SIGNIFICANT CHANGE UP
COMMENT - URINE: SIGNIFICANT CHANGE UP
COVID-19 SPIKE DOMAIN AB INTERP: POSITIVE
COVID-19 SPIKE DOMAIN ANTIBODY RESULT: >250 U/ML — HIGH
CREAT SERPL-MCNC: 1.35 MG/DL — HIGH (ref 0.5–1.3)
DIFF PNL FLD: ABNORMAL
EPI CELLS # UR: SIGNIFICANT CHANGE UP
GAS PNL BLDA: SIGNIFICANT CHANGE UP
GLUCOSE SERPL-MCNC: 195 MG/DL — HIGH (ref 70–99)
GLUCOSE UR QL: NEGATIVE MG/DL — SIGNIFICANT CHANGE UP
GRAM STN FLD: SIGNIFICANT CHANGE UP
HCO3 BLDA-SCNC: 25 MMOL/L — SIGNIFICANT CHANGE UP (ref 21–29)
HYALINE CASTS # UR AUTO: ABNORMAL /LPF
KETONES UR-MCNC: NEGATIVE — SIGNIFICANT CHANGE UP
LEUKOCYTE ESTERASE UR-ACNC: ABNORMAL
MAGNESIUM SERPL-MCNC: 1.9 MG/DL — SIGNIFICANT CHANGE UP (ref 1.6–2.6)
NITRITE UR-MCNC: NEGATIVE — SIGNIFICANT CHANGE UP
PCO2 BLDA: 42 MMHG — SIGNIFICANT CHANGE UP (ref 32–46)
PH BLDA: 7.4 — SIGNIFICANT CHANGE UP (ref 7.35–7.45)
PH UR: 5 — SIGNIFICANT CHANGE UP (ref 5–8)
PHOSPHATE SERPL-MCNC: 2.4 MG/DL — LOW (ref 2.5–4.5)
PO2 BLDA: 160 MMHG — HIGH (ref 74–108)
POTASSIUM SERPL-MCNC: 3.9 MMOL/L — SIGNIFICANT CHANGE UP (ref 3.5–5.3)
POTASSIUM SERPL-SCNC: 3.9 MMOL/L — SIGNIFICANT CHANGE UP (ref 3.5–5.3)
PROT UR-MCNC: 30 MG/DL
RBC CASTS # UR COMP ASSIST: >50 /HPF (ref 0–4)
SAO2 % BLDA: 99 % — HIGH (ref 92–96)
SARS-COV-2 IGG+IGM SERPL QL IA: >250 U/ML — HIGH
SARS-COV-2 IGG+IGM SERPL QL IA: POSITIVE
SODIUM SERPL-SCNC: 136 MMOL/L — SIGNIFICANT CHANGE UP (ref 135–145)
SP GR SPEC: 1.03 — HIGH (ref 1.01–1.02)
SPECIMEN SOURCE: SIGNIFICANT CHANGE UP
UROBILINOGEN FLD QL: NEGATIVE MG/DL — SIGNIFICANT CHANGE UP
WBC UR QL: ABNORMAL

## 2021-06-09 PROCEDURE — 99233 SBSQ HOSP IP/OBS HIGH 50: CPT | Mod: GC

## 2021-06-09 PROCEDURE — 71045 X-RAY EXAM CHEST 1 VIEW: CPT | Mod: 26

## 2021-06-09 PROCEDURE — 93010 ELECTROCARDIOGRAM REPORT: CPT

## 2021-06-09 RX ORDER — PANTOPRAZOLE SODIUM 20 MG/1
40 TABLET, DELAYED RELEASE ORAL DAILY
Refills: 0 | Status: DISCONTINUED | OUTPATIENT
Start: 2021-06-09 | End: 2021-06-11

## 2021-06-09 RX ORDER — ASPIRIN/CALCIUM CARB/MAGNESIUM 324 MG
81 TABLET ORAL DAILY
Refills: 0 | Status: DISCONTINUED | OUTPATIENT
Start: 2021-06-09 | End: 2021-06-23

## 2021-06-09 RX ORDER — MIDODRINE HYDROCHLORIDE 2.5 MG/1
10 TABLET ORAL EVERY 8 HOURS
Refills: 0 | Status: DISCONTINUED | OUTPATIENT
Start: 2021-06-09 | End: 2021-06-10

## 2021-06-09 RX ORDER — CHLORHEXIDINE GLUCONATE 213 G/1000ML
15 SOLUTION TOPICAL EVERY 12 HOURS
Refills: 0 | Status: DISCONTINUED | OUTPATIENT
Start: 2021-06-09 | End: 2021-06-09

## 2021-06-09 RX ORDER — ENOXAPARIN SODIUM 100 MG/ML
40 INJECTION SUBCUTANEOUS DAILY
Refills: 0 | Status: DISCONTINUED | OUTPATIENT
Start: 2021-06-09 | End: 2021-06-23

## 2021-06-09 RX ORDER — MULTIVIT-MIN/FERROUS GLUCONATE 9 MG/15 ML
15 LIQUID (ML) ORAL DAILY
Refills: 0 | Status: DISCONTINUED | OUTPATIENT
Start: 2021-06-09 | End: 2021-06-11

## 2021-06-09 RX ORDER — DEXMEDETOMIDINE HYDROCHLORIDE IN 0.9% SODIUM CHLORIDE 4 UG/ML
0.5 INJECTION INTRAVENOUS
Qty: 200 | Refills: 0 | Status: DISCONTINUED | OUTPATIENT
Start: 2021-06-09 | End: 2021-06-10

## 2021-06-09 RX ADMIN — PANTOPRAZOLE SODIUM 40 MILLIGRAM(S): 20 TABLET, DELAYED RELEASE ORAL at 10:06

## 2021-06-09 RX ADMIN — Medication 2 MILLIGRAM(S): at 21:05

## 2021-06-09 RX ADMIN — PIPERACILLIN AND TAZOBACTAM 25 GRAM(S): 4; .5 INJECTION, POWDER, LYOPHILIZED, FOR SOLUTION INTRAVENOUS at 21:14

## 2021-06-09 RX ADMIN — Medication 5 MILLIGRAM(S): at 21:05

## 2021-06-09 RX ADMIN — MIDODRINE HYDROCHLORIDE 10 MILLIGRAM(S): 2.5 TABLET ORAL at 10:06

## 2021-06-09 RX ADMIN — DEXMEDETOMIDINE HYDROCHLORIDE IN 0.9% SODIUM CHLORIDE 12 MICROGRAM(S)/KG/HR: 4 INJECTION INTRAVENOUS at 15:27

## 2021-06-09 RX ADMIN — Medication 1 MILLIGRAM(S): at 10:06

## 2021-06-09 RX ADMIN — Medication 100 MILLIGRAM(S): at 10:06

## 2021-06-09 RX ADMIN — PIPERACILLIN AND TAZOBACTAM 25 GRAM(S): 4; .5 INJECTION, POWDER, LYOPHILIZED, FOR SOLUTION INTRAVENOUS at 05:59

## 2021-06-09 RX ADMIN — DEXMEDETOMIDINE HYDROCHLORIDE IN 0.9% SODIUM CHLORIDE 12 MICROGRAM(S)/KG/HR: 4 INJECTION INTRAVENOUS at 22:00

## 2021-06-09 RX ADMIN — ENOXAPARIN SODIUM 40 MILLIGRAM(S): 100 INJECTION SUBCUTANEOUS at 10:06

## 2021-06-09 RX ADMIN — MIDODRINE HYDROCHLORIDE 10 MILLIGRAM(S): 2.5 TABLET ORAL at 14:19

## 2021-06-09 RX ADMIN — CHLORHEXIDINE GLUCONATE 15 MILLILITER(S): 213 SOLUTION TOPICAL at 10:19

## 2021-06-09 RX ADMIN — Medication 81 MILLIGRAM(S): at 10:06

## 2021-06-09 RX ADMIN — Medication 15 MILLILITER(S): at 10:19

## 2021-06-09 RX ADMIN — PIPERACILLIN AND TAZOBACTAM 25 GRAM(S): 4; .5 INJECTION, POWDER, LYOPHILIZED, FOR SOLUTION INTRAVENOUS at 14:16

## 2021-06-09 RX ADMIN — ATORVASTATIN CALCIUM 10 MILLIGRAM(S): 80 TABLET, FILM COATED ORAL at 21:05

## 2021-06-09 RX ADMIN — Medication 2 MILLIGRAM(S): at 18:36

## 2021-06-09 RX ADMIN — Medication 8.97 MICROGRAM(S)/KG/MIN: at 01:00

## 2021-06-09 NOTE — PROGRESS NOTE ADULT - SUBJECTIVE AND OBJECTIVE BOX
CC:  Patient is a 86y old  Male who presents with a chief complaint of Syncope (2021 08:29)      HPI/BRIEF HOSPITAL COURSE: ***    Events last 24 hours: ***    PAST MEDICAL & SURGICAL HISTORY:  Hypertension    CAD (coronary artery disease)    Cardiac arrest with successful resuscitation    CKD (chronic kidney disease)    PAD (peripheral artery disease)    Lung cancer    Heart failure    AICD (automatic cardioverter/defibrillator) present    S/P carotid endarterectomy    History of lobectomy of lung    History of appendectomy    H/O left knee surgery    History of tonsillectomy and adenoidectomy      Allergies    No Known Allergies    Intolerances      FAMILY HISTORY:      Review of Systems:  ROS unable to be obtained pt intubated and sedated     Medications:  piperacillin/tazobactam IVPB.. 3.375 Gram(s) IV Intermittent every 8 hours    midodrine 10 milliGRAM(s) Oral every 8 hours  norepinephrine Infusion 0.05 MICROgram(s)/kG/Min IV Continuous <Continuous>      acetaminophen   Tablet .. 650 milliGRAM(s) Oral every 6 hours PRN  dexMEDEtomidine Infusion 0.5 MICROgram(s)/kG/Hr IV Continuous <Continuous>  meclizine 25 milliGRAM(s) Oral every 12 hours PRN  melatonin 5 milliGRAM(s) Oral at bedtime  propofol Infusion 20 MICROgram(s)/kG/Min IV Continuous <Continuous>      aspirin  chewable 81 milliGRAM(s) Oral daily  enoxaparin Injectable 40 milliGRAM(s) SubCutaneous daily    pantoprazole  Injectable 40 milliGRAM(s) IV Push daily      atorvastatin 10 milliGRAM(s) Oral at bedtime    folic acid 1 milliGRAM(s) Oral daily  multivitamin/minerals/iron Oral Solution (CENTRUM) 15 milliLiter(s) Enteral Tube daily  thiamine 100 milliGRAM(s) Oral daily      chlorhexidine 0.12% Liquid 15 milliLiter(s) Oral Mucosa every 12 hours        Mode: AC/ CMV (Assist Control/ Continuous Mandatory Ventilation)  RR (machine): 16  TV (machine): 450  FiO2: 40  PEEP: 5  ITime: 1  MAP: 9  PIP: 21      ICU Vital Signs Last 24 Hrs  T(C): 37.2 (2021 06:48), Max: 37.3 (2021 00:24)  T(F): 98.9 (2021 06:48), Max: 99.2 (2021 00:24)  HR: 61 (2021 12:00) (60 - 117)  BP: 127/48 (2021 12:00) (51/28 - 216/113)  BP(mean): 69 (2021 12:00) (33 - 91)  ABP: --  ABP(mean): --  RR: 17 (2021 12:00) (9 - 30)  SpO2: 99% (2021 12:00) (67% - 100%)    Vital Signs Last 24 Hrs  T(C): 37.2 (2021 06:48), Max: 37.3 (2021 00:24)  T(F): 98.9 (2021 06:48), Max: 99.2 (2021 00:24)  HR: 61 (2021 12:00) (60 - 117)  BP: 127/48 (2021 12:00) (51/28 - 216/113)  BP(mean): 69 (2021 12:00) (33 - 91)  RR: 17 (2021 12:00) (9 - 30)  SpO2: 99% (2021 12:00) (67% - 100%)    ABG - ( 2021 00:10 )  pH, Arterial: 7.40  pH, Blood: x     /  pCO2: 42    /  pO2: 160   / HCO3: 25    / Base Excess: .6    /  SaO2: 99                  I&O's Detail    2021 07:01  -  2021 07:00  --------------------------------------------------------  IN:    IV PiggyBack: 200 mL    Norepinephrine: 94 mL    Propofol: 120 mL  Total IN: 414 mL    OUT:    Indwelling Catheter - Urethral (mL): 450 mL  Total OUT: 450 mL    Total NET: -36 mL            LABS:                        16.2   12.27 )-----------( 119      ( 2021 08:03 )             48.5     06-09    136  |  103  |  15  ----------------------------<  195<H>  3.9   |  27  |  1.35<H>    Ca    8.6      2021 06:27  Phos  2.4     06-  Mg     1.9     -    TPro  7.2  /  Alb  3.7  /  TBili  0.7  /  DBili  x   /  AST  28  /  ALT  23  /  AlkPhos  74  06-08      CARDIAC MARKERS ( 2021 04:56 )  0.016 ng/mL / x     / x     / x     / x      CARDIAC MARKERS ( 2021 00:58 )  <0.015 ng/mL / x     / x     / x     / x      CARDIAC MARKERS ( 2021 21:32 )  <0.015 ng/mL / x     / 181 U/L / x     / x          CAPILLARY BLOOD GLUCOSE      POCT Blood Glucose.: 182 mg/dL (2021 11:19)    PT/INR - ( 2021 06:32 )   PT: 12.8 sec;   INR: 1.10 ratio         PTT - ( 2021 06:32 )  PTT:33.0 sec  Urinalysis Basic - ( 2021 10:45 )    Color: Luz / Appearance: very cloudy / S.030 / pH: x  Gluc: x / Ketone: Negative  / Bili: Negative / Urobili: Negative mg/dL   Blood: x / Protein: 30 mg/dL / Nitrite: Negative   Leuk Esterase: Moderate / RBC: >50 /HPF / WBC 6-10   Sq Epi: x / Non Sq Epi: Few / Bacteria: Few      CULTURES:    piperacillin/tazobactam IVPB.. 3.375 Gram(s) IV Intermittent every 8 hours      Physical Examination:  General: No acute distress.  intubated and sedated  nonfocal  NEURO: A&O X3, motor function 5/5 BL UE/LE  HEENT: Pupils equal, reactive to light.  Symmetric.  PULM: CTA BL, no significant sputum production, no wheezes, rales, rhonchi  CVS: Regular rate and rhythm, no murmurs, rubs, or gallops  ABD: Soft, nondistended, nontender, normoactive bowel sounds, no masses  EXT: No edema, nontender  SKIN: Warm and well perfused, no rashes noted      EKG: ***    RADIOLOGY: ***      CENTRAL LINE: N          DATE INSERTED:                  ADAMS: N                        DATE INSERTED:                  A-LINE: N                       DATE INSERTED:                  GLOBAL ISSUE/BEST PRACTICE:  Analgesia: N/A  Sedation: N/A  HOB elevation: yes  Stress ulcer prophylaxis: protonix   VTE prophylaxis: SCD/Heparin SQ/Lovenox SQ  Glycemic control: N/A  Nutrition: NPO/Diet/TF

## 2021-06-09 NOTE — PROGRESS NOTE ADULT - ASSESSMENT
NEURO: Sedated on propofol.    CV: Shock state requiring vasopressor therapy, actively titrating levoped for MAP >65, weaning as tolerated.    RESP: Hypoxic respiratory failure 2/2 pulmonary edema/pneumonia requiring intubation, ac/vc 6cc/kg tv lung protective strategies, actively titrating fio2 and peep for spo2 >92%, wean as tolerated.    RENAL: Monitor lytes, replace as needed. Diuresing as tolerated, marin in place.   GI: NPO, OGT in place.   ENDO: POCT q6hrs while NPO  ID: Aspiration pneumonia, started on zosyn for empiric coverage.    HEME: Lovenox for vte ppx   DISPO: Full code.      Critical Care time: 60 mins assessing presenting problems of acute illness that poses high probability of life threatening deterioration or end organ damage/dysfunction.  Medical decision making including Initiating plan of care, reviewing data, reviewing radiology, discussing with multidisciplinary team, non inclusive of procedures, discussing goals of care with patient/family  ASSESSMENT   1. Acute hyposic RF   2. septic shock   3. pulm edema   4. aspiration PNA   5. ETOH withdrawal     NEURO:   -Sedated on propofol gtt will transition to precedex gtt   if extubated will restart on ativan taper and CIWA   CV:   Shock state requiring vasopressor therapy, actively titrating levophed for MAP >65, pt started on midodrine and titrated off levophed this afternoon     PULM   -Pt intubated last night for AMS -obtunded   -Pt waking up off sedation and given SAT/SBT trial on PSV - plan to extubate this afternoon     RENAL:   -s/p dose of lasix   -CXr with improve dpulm edema and pleural effusions   -Check repeat labs and monitor renal function trend  -Encourage PO intake as tolerated  -IVF hydration   -Monitor input and output.    GI: NPO  -protonix QD for GI ppx     ENDO: [id number] while NPO  ID:   -Aspiration pneumonia, started on zosyn for empiric coverage  -pending Bcx/UCX/sputum cx       HEME: Lovenox for vte ppx       FULL CODE     Critical Care time: 45 mins assessing presenting problems of acute illness that poses high probability of life threatening deterioration or end organ damage/dysfunction.  Medical decision making including Initiating plan of care, reviewing data, reviewing radiology, discussing with multidisciplinary team, non inclusive of procedures, discussing goals of care with patient/family

## 2021-06-09 NOTE — PROGRESS NOTE ADULT - SUBJECTIVE AND OBJECTIVE BOX
85 y/o M w/ PMH of HTN, systolic CHF, COPD, CAD s/p CABG / PCI, PVD s/p stent, CKD III, dyslipidemia, lung cancer s/p lobectomy, cardiac arrest s/p AICD, CEA, p/w syncope. Patient states that the last thing he remembers is walking from kitchen to his living room, and then he woke up on the floor. Denies having any symptoms prior to syncope. Denies CP, SOB, palpitations, LH, dizziness. Patient states when he woke up he noticed blood dripping on his chest, so he pressed his life alert button and was brought to ED. Patient c/o mild pain on R side of nose. Also has injury to his head. Denies weakness in arms / legs, facial droop, sensory deficits, pain, cough, runny nose, sore thoroat, nausea, vomiting, abdominal pain, urinary incontinence, tongue bite.     Subjective: Overnight patient intubated for hypercapnic respiratory failure after receiving Ativan for ETOH withdrawal, repeat chest xray showed worsening of pleural infiltrates 2/2 CHF vs PNA. Pt Extubated at 430 pm to 3L NC.      REVIEW OF SYSTEMS:  unable to obtain due to sedation    PHYSICAL EXAM:  Vital Signs Last 24 Hrs  T(C): 35.7 (09 Jun 2021 16:45), Max: 37.3 (09 Jun 2021 00:24)  T(F): 96.3 (09 Jun 2021 16:45), Max: 99.2 (09 Jun 2021 00:24)  HR: 70 (09 Jun 2021 17:00) (60 - 117)  BP: 137/57 (09 Jun 2021 17:00) (51/28 - 216/113)  BP(mean): 78 (09 Jun 2021 17:00) (33 - 101)  RR: 27 (09 Jun 2021 17:00) (9 - 30)  SpO2: 97% (09 Jun 2021 17:00) (67% - 100%)    General: elderly male NAD, extubated follows commands off sedation, nonfocal  NEURO: A&O X3, motor function 5/5 BL UE/LE  HEENT: Pupils equal, reactive to light.  Symmetric.  PULM: CTA , mild ralses at bases , no significant sputum production,   CVS: Regular rate and rhythm, no murmurs, rubs, or gallops  ABD: Soft, nondistended, nontender, normoactive bowel sounds, no masses  EXT: No edema, nontender  SKIN: Warm and well perfused, no rashes noted  5/5 strength b/l upper and lower extremities  Skin: No rashes    MEDICATIONS:  MEDICATIONS  (STANDING):  aspirin enteric coated 81 milliGRAM(s) Oral daily  atorvastatin 10 milliGRAM(s) Oral at bedtime  lisinopril 20 milliGRAM(s) Oral two times a day  melatonin 5 milliGRAM(s) Oral at bedtime  metoprolol tartrate 100 milliGRAM(s) Oral two times a day  pantoprazole    Tablet 40 milliGRAM(s) Oral before breakfast      LABS: All Labs Reviewed:                      LABS:  06-07 @ 21:32 Creatine 181 U/L [26 - 308]  cret                        16.2   12.27 )-----------( 119      ( 09 Jun 2021 08:03 )             48.5     06-09    136  |  103  |  15  ----------------------------<  195<H>  3.9   |  27  |  1.35<H>    Ca    8.6      09 Jun 2021 06:27  Phos  2.4     06-09  Mg     1.9     06-09    TPro  7.2  /  Alb  3.7  /  TBili  0.7  /  DBili  x   /  AST  28  /  ALT  23  /  AlkPhos  74  06-08    PT/INR - ( 08 Jun 2021 06:32 )   PT: 12.8 sec;   INR: 1.10 ratio         PTT - ( 08 Jun 2021 06:32 )  PTT:33.0 sec    RADIOLOGY/EKG:    rad< from: CT Head No Cont (06.07.21 @ 21:22) >  CT HEAD: No acute intracranial hemorrhage, mass effect, or osseous fracture.    CT MAXILLOFACIAL BONES: Comminuted nasal bone fractures. There is no other acute maxillofacial bone fracture.    CT CERVICAL SPINE: No acute cervical spine fracture or traumatic malalignment.    < end of copied text >  < from: Xray Chest 1 View AP/PA. (06.07.21 @ 21:33) >  Chest is similar to March 13 of this year.    IMPRESSION: Stable findings as above.    < end of copied text >

## 2021-06-09 NOTE — PROGRESS NOTE ADULT - SUBJECTIVE AND OBJECTIVE BOX
Patient is a 86y old  Male who presents with a chief complaint of Syncope (2021 00:53)    6/9- events of last night noted , now intubated ICD interrogation shows no arrhytmias  EF 4045% by echo  MEDICATIONS  (STANDING):  aspirin enteric coated 81 milliGRAM(s) Oral daily  atorvastatin 10 milliGRAM(s) Oral at bedtime  chlorhexidine 0.12% Liquid 15 milliLiter(s) Oral Mucosa every 12 hours  enoxaparin Injectable 40 milliGRAM(s) SubCutaneous daily  folic acid 1 milliGRAM(s) Oral daily  lisinopril 20 milliGRAM(s) Oral two times a day  LORazepam   Injectable   IV Push   LORazepam   Injectable 1.5 milliGRAM(s) IV Push every 4 hours  melatonin 5 milliGRAM(s) Oral at bedtime  metoprolol tartrate 100 milliGRAM(s) Oral two times a day  multivitamin 1 Tablet(s) Oral daily  norepinephrine Infusion 0.05 MICROgram(s)/kG/Min (8.97 mL/Hr) IV Continuous <Continuous>  pantoprazole    Tablet 40 milliGRAM(s) Oral before breakfast  piperacillin/tazobactam IVPB.. 3.375 Gram(s) IV Intermittent every 8 hours  propofol Infusion 20 MICROgram(s)/kG/Min (11.5 mL/Hr) IV Continuous <Continuous>  thiamine 100 milliGRAM(s) Oral daily    MEDICATIONS  (PRN):  acetaminophen   Tablet .. 650 milliGRAM(s) Oral every 6 hours PRN Mild Pain (1 - 3)  hydrALAZINE Injectable 10 milliGRAM(s) IV Push every 6 hours PRN >180  LORazepam   Injectable 2 milliGRAM(s) IV Push every 2 hours PRN Symptom-triggered: 2 point increase in CIWA -Ar score and a total score of 7 or LESS  LORazepam   Injectable 2 milliGRAM(s) IV Push every 1 hour PRN Symptom-triggered: each CIWA -Ar score 8 or GREATER  meclizine 25 milliGRAM(s) Oral every 12 hours PRN Dizziness            Vital Signs Last 24 Hrs  T(C): 37.2 (2021 06:48), Max: 37.3 (2021 00:24)  T(F): 98.9 (2021 06:48), Max: 99.2 (2021 00:24)  HR: 60 (2021 08:18) (60 - 117)  BP: 118/60 (2021 07:00) (51/28 - 216/113)  BP(mean): 75 (2021 07:00) (33 - 91)  RR: 17 (2021 06:00) (9 - 30)  SpO2: 100% (2021 08:18) (67% - 100%)            INTERPRETATION OF TELEMETRY:    ECG:        LABS:                        16.1   7.35  )-----------( 172      ( 2021 06:32 )             47.3     06-09    136  |  103  |  15  ----------------------------<  195<H>  3.9   |  27  |  1.35<H>    Ca    8.6      2021 06:27  Phos  2.4     06-09  Mg     1.9     06-09    TPro  7.2  /  Alb  3.7  /  TBili  0.7  /  DBili  x   /  AST  28  /  ALT  23  /  AlkPhos  74  06-08    CARDIAC MARKERS ( 2021 04:56 )  0.016 ng/mL / x     / x     / x     / x      CARDIAC MARKERS ( 2021 00:58 )  <0.015 ng/mL / x     / x     / x     / x      CARDIAC MARKERS ( 2021 21:32 )  <0.015 ng/mL / x     / 181 U/L / x     / x          PT/INR - ( 2021 06:32 )   PT: 12.8 sec;   INR: 1.10 ratio         PTT - ( 2021 06:32 )  PTT:33.0 sec  Urinalysis Basic - ( 2021 21:49 )    Color: Yellow / Appearance: Clear / S.005 / pH: x  Gluc: x / Ketone: Negative  / Bili: Negative / Urobili: Negative mg/dL   Blood: x / Protein: 15 mg/dL / Nitrite: Negative   Leuk Esterase: Negative / RBC: 3-5 /HPF / WBC 3-5   Sq Epi: x / Non Sq Epi: Few / Bacteria: Few      I&O's Summary    2021 07:01  -  2021 07:00  --------------------------------------------------------  IN: 414 mL / OUT: 450 mL / NET: -36 mL      BNP  RADIOLOGY & ADDITIONAL STUDIES:

## 2021-06-09 NOTE — PROGRESS NOTE ADULT - SUBJECTIVE AND OBJECTIVE BOX
CC:  Patient is a 86y old  Male who presents with a chief complaint of Syncope (2021 13:46)      HPI/BRIEF HOSPITAL COURSE: ***    Events last 24 hours: ***    PAST MEDICAL & SURGICAL HISTORY:  Hypertension    CAD (coronary artery disease)    Cardiac arrest with successful resuscitation    CKD (chronic kidney disease)    PAD (peripheral artery disease)    Lung cancer    Heart failure    AICD (automatic cardioverter/defibrillator) present    S/P carotid endarterectomy    History of lobectomy of lung    History of appendectomy    H/O left knee surgery    History of tonsillectomy and adenoidectomy      Allergies    No Known Allergies    Intolerances      FAMILY HISTORY:      Review of Systems:  ROS unable to be obtained     Medications:  piperacillin/tazobactam IVPB.. 3.375 Gram(s) IV Intermittent every 8 hours    midodrine 10 milliGRAM(s) Oral every 8 hours  norepinephrine Infusion 0.05 MICROgram(s)/kG/Min IV Continuous <Continuous>      acetaminophen   Tablet .. 650 milliGRAM(s) Oral every 6 hours PRN  dexMEDEtomidine Infusion 0.5 MICROgram(s)/kG/Hr IV Continuous <Continuous>  meclizine 25 milliGRAM(s) Oral every 12 hours PRN  melatonin 5 milliGRAM(s) Oral at bedtime  propofol Infusion 20 MICROgram(s)/kG/Min IV Continuous <Continuous>      aspirin  chewable 81 milliGRAM(s) Oral daily  enoxaparin Injectable 40 milliGRAM(s) SubCutaneous daily    pantoprazole  Injectable 40 milliGRAM(s) IV Push daily      atorvastatin 10 milliGRAM(s) Oral at bedtime    folic acid 1 milliGRAM(s) Oral daily  multivitamin/minerals/iron Oral Solution (CENTRUM) 15 milliLiter(s) Enteral Tube daily  thiamine 100 milliGRAM(s) Oral daily      chlorhexidine 0.12% Liquid 15 milliLiter(s) Oral Mucosa every 12 hours        Mode: AC/ CMV (Assist Control/ Continuous Mandatory Ventilation)  RR (machine): 16  TV (machine): 450  FiO2: 40  PEEP: 5  ITime: 1  MAP: 9  PIP: 21      ICU Vital Signs Last 24 Hrs  T(C): 37.2 (2021 06:48), Max: 37.3 (2021 00:24)  T(F): 98.9 (2021 06:48), Max: 99.2 (2021 00:24)  HR: 62 (2021 14:00) (60 - 117)  BP: 144/55 (2021 14:00) (51/28 - 216/113)  BP(mean): 78 (2021 14:00) (33 - 91)  ABP: --  ABP(mean): --  RR: 19 (2021 14:00) (9 - 30)  SpO2: 100% (2021 14:00) (67% - 100%)    Vital Signs Last 24 Hrs  T(C): 37.2 (2021 06:48), Max: 37.3 (2021 00:24)  T(F): 98.9 (2021 06:48), Max: 99.2 (2021 00:24)  HR: 62 (2021 14:00) (60 - 117)  BP: 144/55 (2021 14:00) (51/28 - 216/113)  BP(mean): 78 (2021 14:00) (33 - 91)  RR: 19 (2021 14:00) (9 - 30)  SpO2: 100% (2021 14:00) (67% - 100%)    ABG - ( 2021 00:10 )  pH, Arterial: 7.40  pH, Blood: x     /  pCO2: 42    /  pO2: 160   / HCO3: 25    / Base Excess: .6    /  SaO2: 99                  I&O's Detail    2021 07:01  -  2021 07:00  --------------------------------------------------------  IN:    IV PiggyBack: 200 mL    Norepinephrine: 94 mL    Propofol: 120 mL  Total IN: 414 mL    OUT:    Indwelling Catheter - Urethral (mL): 450 mL  Total OUT: 450 mL    Total NET: -36 mL            LABS:                        16.2   12.27 )-----------( 119      ( 2021 08:03 )             48.5     06-09    136  |  103  |  15  ----------------------------<  195<H>  3.9   |  27  |  1.35<H>    Ca    8.6      2021 06:27  Phos  2.4     06-  Mg     1.9     -    TPro  7.2  /  Alb  3.7  /  TBili  0.7  /  DBili  x   /  AST  28  /  ALT  23  /  AlkPhos  74  06-08      CARDIAC MARKERS ( 2021 04:56 )  0.016 ng/mL / x     / x     / x     / x      CARDIAC MARKERS ( 2021 00:58 )  <0.015 ng/mL / x     / x     / x     / x      CARDIAC MARKERS ( 2021 21:32 )  <0.015 ng/mL / x     / 181 U/L / x     / x          CAPILLARY BLOOD GLUCOSE      POCT Blood Glucose.: 182 mg/dL (2021 11:19)    PT/INR - ( 2021 06:32 )   PT: 12.8 sec;   INR: 1.10 ratio         PTT - ( 2021 06:32 )  PTT:33.0 sec  Urinalysis Basic - ( 2021 10:45 )    Color: Luz / Appearance: very cloudy / S.030 / pH: x  Gluc: x / Ketone: Negative  / Bili: Negative / Urobili: Negative mg/dL   Blood: x / Protein: 30 mg/dL / Nitrite: Negative   Leuk Esterase: Moderate / RBC: >50 /HPF / WBC 6-10   Sq Epi: x / Non Sq Epi: Few / Bacteria: Few      CULTURES:    piperacillin/tazobactam IVPB.. 3.375 Gram(s) IV Intermittent every 8 hours      Physical Examination:  General: No acute distress.  Alert, oriented, interactive, nonfocal  NEURO: A&O X3, motor function 5/5 BL UE/LE  HEENT: Pupils equal, reactive to light.  Symmetric.  PULM: CTA BL, no significant sputum production, no wheezes, rales, rhonchi  CVS: Regular rate and rhythm, no murmurs, rubs, or gallops  ABD: Soft, nondistended, nontender, normoactive bowel sounds, no masses  EXT: No edema, nontender  SKIN: Warm and well perfused, no rashes noted      EKG: ***    RADIOLOGY: ***      CENTRAL LINE: N          DATE INSERTED:                  ADAMS: N                        DATE INSERTED:                  A-LINE: N                       DATE INSERTED:                  GLOBAL ISSUE/BEST PRACTICE:  Analgesia: N/A  Sedation: N/A  HOB elevation: yes  Stress ulcer prophylaxis: protonix   VTE prophylaxis: SCD/Heparin SQ/Lovenox SQ  Glycemic control: N/A  Nutrition: NPO/Diet/TF       CC:  Patient is a 86y old  Male who presents with a chief complaint of Syncope (2021 13:46)      HPI/BRIEF HOSPITAL COURSE:   85 yo m pmhx HTN, HFrEF 40%, COPD, CAD s/p CABG/PCI, PVD s/p stent, CKD3, DLD, lung cancer s/p lobectomy, cardiac arrest s/p AICD, CEA biba from home s/p mechanical fall 2/2 etoh intoxication.    Events last 24 hours:   Pt was RRT last night, pt admitted to ICU after was found to be obtunded after receiving Ativan for ETOH withdrawal, was subsequently intubated     Pt transitioned to precedex this after noon and placed on PSV - plan to extubate CCXr this afternoon with improved pulm edema     PAST MEDICAL & SURGICAL HISTORY:  Hypertension    CAD (coronary artery disease)    Cardiac arrest with successful resuscitation    CKD (chronic kidney disease)    PAD (peripheral artery disease)    Lung cancer    Heart failure    AICD (automatic cardioverter/defibrillator) present    S/P carotid endarterectomy    History of lobectomy of lung    History of appendectomy    H/O left knee surgery    History of tonsillectomy and adenoidectomy      Allergies    No Known Allergies    Intolerances      FAMILY HISTORY:      Review of Systems:  ROS unable to be obtained pt intubated and sedated     Medications:  piperacillin/tazobactam IVPB.. 3.375 Gram(s) IV Intermittent every 8 hours    midodrine 10 milliGRAM(s) Oral every 8 hours  norepinephrine Infusion 0.05 MICROgram(s)/kG/Min IV Continuous <Continuous>      acetaminophen   Tablet .. 650 milliGRAM(s) Oral every 6 hours PRN  dexMEDEtomidine Infusion 0.5 MICROgram(s)/kG/Hr IV Continuous <Continuous>  meclizine 25 milliGRAM(s) Oral every 12 hours PRN  melatonin 5 milliGRAM(s) Oral at bedtime  propofol Infusion 20 MICROgram(s)/kG/Min IV Continuous <Continuous>      aspirin  chewable 81 milliGRAM(s) Oral daily  enoxaparin Injectable 40 milliGRAM(s) SubCutaneous daily    pantoprazole  Injectable 40 milliGRAM(s) IV Push daily      atorvastatin 10 milliGRAM(s) Oral at bedtime    folic acid 1 milliGRAM(s) Oral daily  multivitamin/minerals/iron Oral Solution (CENTRUM) 15 milliLiter(s) Enteral Tube daily  thiamine 100 milliGRAM(s) Oral daily      chlorhexidine 0.12% Liquid 15 milliLiter(s) Oral Mucosa every 12 hours        Mode: AC/ CMV (Assist Control/ Continuous Mandatory Ventilation)  RR (machine): 16  TV (machine): 450  FiO2: 40  PEEP: 5  ITime: 1  MAP: 9  PIP: 21      ICU Vital Signs Last 24 Hrs  T(C): 37.2 (2021 06:48), Max: 37.3 (2021 00:24)  T(F): 98.9 (2021 06:48), Max: 99.2 (2021 00:24)  HR: 62 (2021 14:00) (60 - 117)  BP: 144/55 (2021 14:00) (51/28 - 216/113)  BP(mean): 78 (2021 14:00) (33 - 91)  ABP: --  ABP(mean): --  RR: 19 (2021 14:00) (9 - 30)  SpO2: 100% (2021 14:00) (67% - 100%)    Vital Signs Last 24 Hrs  T(C): 37.2 (2021 06:48), Max: 37.3 (2021 00:24)  T(F): 98.9 (2021 06:48), Max: 99.2 (2021 00:24)  HR: 62 (2021 14:00) (60 - 117)  BP: 144/55 (2021 14:00) (51/28 - 216/113)  BP(mean): 78 (2021 14:00) (33 - 91)  RR: 19 (2021 14:00) (9 - 30)  SpO2: 100% (2021 14:00) (67% - 100%)    ABG - ( 2021 00:10 )  pH, Arterial: 7.40  pH, Blood: x     /  pCO2: 42    /  pO2: 160   / HCO3: 25    / Base Excess: .6    /  SaO2: 99                  I&O's Detail    2021 07:01  -  2021 07:00  --------------------------------------------------------  IN:    IV PiggyBack: 200 mL    Norepinephrine: 94 mL    Propofol: 120 mL  Total IN: 414 mL    OUT:    Indwelling Catheter - Urethral (mL): 450 mL  Total OUT: 450 mL    Total NET: -36 mL            LABS:                        16.2   12.27 )-----------( 119      ( 2021 08:03 )             48.5         136  |  103  |  15  ----------------------------<  195<H>  3.9   |  27  |  1.35<H>    Ca    8.6      2021 06:27  Phos  2.4     06-  Mg     1.9         TPro  7.2  /  Alb  3.7  /  TBili  0.7  /  DBili  x   /  AST  28  /  ALT  23  /  AlkPhos  74  06-08      CARDIAC MARKERS ( 2021 04:56 )  0.016 ng/mL / x     / x     / x     / x      CARDIAC MARKERS ( 2021 00:58 )  <0.015 ng/mL / x     / x     / x     / x      CARDIAC MARKERS ( 2021 21:32 )  <0.015 ng/mL / x     / 181 U/L / x     / x          CAPILLARY BLOOD GLUCOSE      POCT Blood Glucose.: 182 mg/dL (2021 11:19)    PT/INR - ( 2021 06:32 )   PT: 12.8 sec;   INR: 1.10 ratio         PTT - ( 2021 06:32 )  PTT:33.0 sec  Urinalysis Basic - ( 2021 10:45 )    Color: Luz / Appearance: very cloudy / S.030 / pH: x  Gluc: x / Ketone: Negative  / Bili: Negative / Urobili: Negative mg/dL   Blood: x / Protein: 30 mg/dL / Nitrite: Negative   Leuk Esterase: Moderate / RBC: >50 /HPF / WBC 6-10   Sq Epi: x / Non Sq Epi: Few / Bacteria: Few      CULTURES:    piperacillin/tazobactam IVPB.. 3.375 Gram(s) IV Intermittent every 8 hours      Physical Examination:  General: elderly male NAD, intubated and sedated, follows commands off sedation, nonfocal  NEURO: A&O X3, motor function 5/5 BL UE/LE  HEENT: Pupils equal, reactive to light.  Symmetric.  PULM: CTA , mild ralses at bases , no significant sputum production,   CVS: Regular rate and rhythm, no murmurs, rubs, or gallops  ABD: Soft, nondistended, nontender, normoactive bowel sounds, no masses  EXT: No edema, nontender  SKIN: Warm and well perfused, no rashes noted      EKG: NSR     RADIOLOGY:   < from: Xray Chest 1 View-PORTABLE IMMEDIATE (Xray Chest 1 View-PORTABLE IMMEDIATE .) (21 @ 23:13) >  S/P sternotomy.  Left-sided defibrillator in situ.  Enteric tube tip in stomach.  ET tube tip is 4.7cm above the al.  The heart is top normal in size.  Persistent pulmonic congestive changes.  Persistent small bilateral pleural effusions with associated bibasilar atelectasis.        < end of copied text >    CENTRAL LINE: N          DATE INSERTED:                  ADAMS: Y                        DATE INSERTED:                  A-LINE: N                       DATE INSERTED:                       CC:  Patient is a 86y old  Male who presents with a chief complaint of Syncope (2021 13:46)      HPI/BRIEF HOSPITAL COURSE:   87 yo m pmhx HTN, HFrEF 40%, COPD, CAD s/p CABG/PCI, PVD s/p stent, CKD3, DLD, lung cancer s/p lobectomy, cardiac arrest s/p AICD, CEA biba from home s/p mechanical fall 2/2 etoh intoxication.    Events last 24 hours:   Pt was RRT last night, pt admitted to ICU after was found to be obtunded after receiving Ativan for ETOH withdrawal, was subsequently intubated     Pt transitioned to precedex this after noon and placed on PSV - plan to extubate CCXr this afternoon with improved pulm edema     ..... pt extubated at 430 pm to 3L NC     PAST MEDICAL & SURGICAL HISTORY:  Hypertension    CAD (coronary artery disease)    Cardiac arrest with successful resuscitation    CKD (chronic kidney disease)    PAD (peripheral artery disease)    Lung cancer    Heart failure    AICD (automatic cardioverter/defibrillator) present    S/P carotid endarterectomy    History of lobectomy of lung    History of appendectomy    H/O left knee surgery    History of tonsillectomy and adenoidectomy      Allergies    No Known Allergies    Intolerances      FAMILY HISTORY:      Review of Systems:  ROS unable to be obtained pt intubated and sedated     Medications:  piperacillin/tazobactam IVPB.. 3.375 Gram(s) IV Intermittent every 8 hours    midodrine 10 milliGRAM(s) Oral every 8 hours  norepinephrine Infusion 0.05 MICROgram(s)/kG/Min IV Continuous <Continuous>      acetaminophen   Tablet .. 650 milliGRAM(s) Oral every 6 hours PRN  dexMEDEtomidine Infusion 0.5 MICROgram(s)/kG/Hr IV Continuous <Continuous>  meclizine 25 milliGRAM(s) Oral every 12 hours PRN  melatonin 5 milliGRAM(s) Oral at bedtime  propofol Infusion 20 MICROgram(s)/kG/Min IV Continuous <Continuous>      aspirin  chewable 81 milliGRAM(s) Oral daily  enoxaparin Injectable 40 milliGRAM(s) SubCutaneous daily    pantoprazole  Injectable 40 milliGRAM(s) IV Push daily      atorvastatin 10 milliGRAM(s) Oral at bedtime    folic acid 1 milliGRAM(s) Oral daily  multivitamin/minerals/iron Oral Solution (CENTRUM) 15 milliLiter(s) Enteral Tube daily  thiamine 100 milliGRAM(s) Oral daily      chlorhexidine 0.12% Liquid 15 milliLiter(s) Oral Mucosa every 12 hours        Mode: AC/ CMV (Assist Control/ Continuous Mandatory Ventilation)  RR (machine): 16  TV (machine): 450  FiO2: 40  PEEP: 5  ITime: 1  MAP: 9  PIP: 21      ICU Vital Signs Last 24 Hrs  T(C): 37.2 (2021 06:48), Max: 37.3 (2021 00:24)  T(F): 98.9 (2021 06:48), Max: 99.2 (2021 00:24)  HR: 62 (2021 14:00) (60 - 117)  BP: 144/55 (2021 14:00) (51/28 - 216/113)  BP(mean): 78 (2021 14:00) (33 - 91)  ABP: --  ABP(mean): --  RR: 19 (2021 14:00) (9 - 30)  SpO2: 100% (2021 14:00) (67% - 100%)    Vital Signs Last 24 Hrs  T(C): 37.2 (2021 06:48), Max: 37.3 (2021 00:24)  T(F): 98.9 (2021 06:48), Max: 99.2 (2021 00:24)  HR: 62 (2021 14:00) (60 - 117)  BP: 144/55 (2021 14:00) (51/28 - 216/113)  BP(mean): 78 (2021 14:00) (33 - 91)  RR: 19 (2021 14:00) (9 - 30)  SpO2: 100% (2021 14:00) (67% - 100%)    ABG - ( 2021 00:10 )  pH, Arterial: 7.40  pH, Blood: x     /  pCO2: 42    /  pO2: 160   / HCO3: 25    / Base Excess: .6    /  SaO2: 99                  I&O's Detail    2021 07:01  -  2021 07:00  --------------------------------------------------------  IN:    IV PiggyBack: 200 mL    Norepinephrine: 94 mL    Propofol: 120 mL  Total IN: 414 mL    OUT:    Indwelling Catheter - Urethral (mL): 450 mL  Total OUT: 450 mL    Total NET: -36 mL            LABS:                        16.2   12.27 )-----------( 119      ( 2021 08:03 )             48.5     06-09    136  |  103  |  15  ----------------------------<  195<H>  3.9   |  27  |  1.35<H>    Ca    8.6      2021 06:27  Phos  2.4     06-09  Mg     1.9     06-09    TPro  7.2  /  Alb  3.7  /  TBili  0.7  /  DBili  x   /  AST  28  /  ALT  23  /  AlkPhos  74  06-08      CARDIAC MARKERS ( 2021 04:56 )  0.016 ng/mL / x     / x     / x     / x      CARDIAC MARKERS ( 2021 00:58 )  <0.015 ng/mL / x     / x     / x     / x      CARDIAC MARKERS ( 2021 21:32 )  <0.015 ng/mL / x     / 181 U/L / x     / x          CAPILLARY BLOOD GLUCOSE      POCT Blood Glucose.: 182 mg/dL (2021 11:19)    PT/INR - ( 2021 06:32 )   PT: 12.8 sec;   INR: 1.10 ratio         PTT - ( 2021 06:32 )  PTT:33.0 sec  Urinalysis Basic - ( 2021 10:45 )    Color: Luz / Appearance: very cloudy / S.030 / pH: x  Gluc: x / Ketone: Negative  / Bili: Negative / Urobili: Negative mg/dL   Blood: x / Protein: 30 mg/dL / Nitrite: Negative   Leuk Esterase: Moderate / RBC: >50 /HPF / WBC 6-10   Sq Epi: x / Non Sq Epi: Few / Bacteria: Few      CULTURES:    piperacillin/tazobactam IVPB.. 3.375 Gram(s) IV Intermittent every 8 hours      Physical Examination:  General: elderly male NAD, intubated and sedated, follows commands off sedation, nonfocal  NEURO: A&O X3, motor function 5/5 BL UE/LE  HEENT: Pupils equal, reactive to light.  Symmetric.  PULM: CTA , mild ralses at bases , no significant sputum production,   CVS: Regular rate and rhythm, no murmurs, rubs, or gallops  ABD: Soft, nondistended, nontender, normoactive bowel sounds, no masses  EXT: No edema, nontender  SKIN: Warm and well perfused, no rashes noted      EKG: NSR     RADIOLOGY:   < from: Xray Chest 1 View-PORTABLE IMMEDIATE (Xray Chest 1 View-PORTABLE IMMEDIATE .) (21 @ 23:13) >  S/P sternotomy.  Left-sided defibrillator in situ.  Enteric tube tip in stomach.  ET tube tip is 4.7cm above the al.  The heart is top normal in size.  Persistent pulmonic congestive changes.  Persistent small bilateral pleural effusions with associated bibasilar atelectasis.        < end of copied text >    CENTRAL LINE: N          DATE INSERTED:                  ADAMS: Y                        DATE INSERTED:                  A-LINE: N                       DATE INSERTED:

## 2021-06-09 NOTE — CHART NOTE - NSCHARTNOTEFT_GEN_A_CORE
87 y/o M w/ PMH of HTN, systolic CHF, COPD, CAD s/p CABG / PCI, PVD s/p stent, CKD III, dyslipidemia, lung cancer s/p lobectomy, cardiac arrest s/p AICD, CEA, p/w syncope.     Called to bedside due to pt not following commands and wanting to get out of bed. Earlier in the day, started on low risk sx triggered CIWA. When seen at bedside, pt alert and oriented to self and date, but not to location. Confused, not understanding why he had to stay in bed. Various attempts at reorientation. Initially scoring CIWA 3.     -Shortly after, pt became increasingly agitated, attempting to injure nurses with his cane.   -Leonardo Osuna called twice  -Rescored at 8  -Switched to high risk CIWA sx triggered & Ativan taper  -Placed on constant observation  -Continue to monitor
Called and updated patient's son Tom Rojas [phone] to patient's change in status.
Pt seen and examined with house staff.  Plan formulated and reviewed on rounds     Briefly,  85 y/o male with HTN, CAD s/p PCI, S/P PPM/ICD, HFrEF, ETOH user and COPD admitted with syncope.  HCT and Facial HT negative for acute pathology.  Trop negative X 3.  Elevated ETOH level.  No events overnight   ROS o/w negative     Awake and alert  NAD  Stable vitals  No fevers    Grossly non focal     Labs reviewed    Normal Cr  Normal WBC    Syncope event--Cardiac vs Neuro vs intoxication     Interrogate ICD  Check TTE  EEG  Observe for ETOH WD  DVT prophy    Tele
Pt seen and examined with house staff.  Plan formulated and reviewed on rounds    Briefly,  87 y/o male with HTN, CAD s/p PCI, S/P PPM/ICD, HFrEF, ETOH user and COPD admitted with syncope.  HCT and Facial HT negative for acute pathology.  Trop negative X 3.  Elevated ETOH level  Above noted.  RRT O/N for hypoxia and tx to CCU for mech vent support  On Norepi gtt 0.06  ros Unobtainable due to clinical condition     Sedated  ill  Vented and sedated on propofol gtt    CXR-- Personally reviewed--suggestive of heart failure--no obvious infiltrates    Labs reviewed    WBC 12  Cr 1.3  TTE--40-45%/1+ MR and mild aortic stenosis   ICD Interrogation negative    Syncope event  Acute type 1 resp failure  Decompensated hf    Vented--Daily SBT  Empiric Abx for now  Keep negative fluid balance  EEG negative for seizures  DVT prophy    CCU care

## 2021-06-09 NOTE — PROGRESS NOTE ADULT - ASSESSMENT
Syncope- noted to have high alcohol level at presentation.  Pt to be placed on DT protocol.   ICD interrogation. shows no arrhytmias  orthostatic BP readings negative so far.    Chronic HfREF- s/p ICD  continue current GDMT to lisinopril.  Metoprolol to be switched to toprol.  appears euvolemic.    Aortic stenosis- check 2 D echo. only mild by echo    No active cardiac issues   cont supportive measures   will sign off please call us back for further questions

## 2021-06-09 NOTE — CONSULT NOTE ADULT - SUBJECTIVE AND OBJECTIVE BOX
REASON FOR CONSULT: Respiratory failure  Chief Complaint    HPI: 85 yo m pmhx HTN, HFrEF 40%, COPD, CAD s/p CABG/PCI, PVD s/p stent, CKD3, DLD, lung cancer s/p lobectomy, cardiac arrest s/p AICD, CEA biba from home s/p mechanical fall 2/2 etoh intoxication.    Events last 24 hours:   RRT called this evening 2/2 patient deteriorating into respiratory distress.  Upon arrival to patients bedside he was lethargic, tachypneic, using accessory muscles and gurgling.  Patient nasotracheally and orally suctioned, became agitated with suctioning and then lethargic once left alone. spo2 slightly improved. HFNC ordered for additional support.  Not a candidate for bipap with currently clinical status.  Chest xray ordered, revealing b/l cephalization and possible infiltrates. BP 190s/110s, Lasix 40mg IVP for diuresis.  ABG obtained 7.17/68/174/24/-.6, patient transferred to CCU and was subsequently intubated at that time.  Patient became hypotensive with sedation, low dose levophed ordered for BP support.    Case discussed i detail with the ICU PA.    PAST MEDICAL & SURGICAL HISTORY:  Hypertension  CAD (coronary artery disease)  Cardiac arrest with successful resuscitation  CKD (chronic kidney disease)  PAD (peripheral artery disease)  Lung cancer  Heart failure  AICD (automatic cardioverter/defibrillator) present  S/P carotid endarterectomy  History of lobectomy of lung  History of appendectomy  H/O left knee surgery  History of tonsillectomy and adenoidectomy      Allergies  No Known Allergies      Medications:  piperacillin/tazobactam IVPB.. 3.375 Gram(s) IV Intermittent every 8 hours  hydrALAZINE Injectable 10 milliGRAM(s) IV Push every 6 hours PRN  lisinopril 20 milliGRAM(s) Oral two times a day  metoprolol tartrate 100 milliGRAM(s) Oral two times a day  norepinephrine Infusion 0.05 MICROgram(s)/kG/Min IV Continuous <Continuous>  acetaminophen   Tablet .. 650 milliGRAM(s) Oral every 6 hours PRN  LORazepam   Injectable   IV Push   LORazepam   Injectable 2 milliGRAM(s) IV Push every 2 hours PRN  LORazepam   Injectable 2 milliGRAM(s) IV Push every 1 hour PRN  LORazepam   Injectable 2 milliGRAM(s) IV Push every 4 hours  LORazepam   Injectable 1.5 milliGRAM(s) IV Push every 4 hours  meclizine 25 milliGRAM(s) Oral every 12 hours PRN  melatonin 5 milliGRAM(s) Oral at bedtime  propofol Infusion 20 MICROgram(s)/kG/Min IV Continuous <Continuous>  aspirin enteric coated 81 milliGRAM(s) Oral daily  enoxaparin Injectable 40 milliGRAM(s) SubCutaneous daily  pantoprazole    Tablet 40 milliGRAM(s) Oral before breakfast  atorvastatin 10 milliGRAM(s) Oral at bedtime  folic acid 1 milliGRAM(s) Oral daily  multivitamin 1 Tablet(s) Oral daily  thiamine 100 milliGRAM(s) Oral daily  chlorhexidine 0.12% Liquid 15 milliLiter(s) Oral Mucosa every 12 hours        Mode: AC/ CMV (Assist Control/ Continuous Mandatory Ventilation)  RR (machine): 22  TV (machine): 450  FiO2: 100  PEEP: 5  ITime: 1  MAP: 12  PIP: 23        Vital Signs Last 24 Hrs  T(C): 37.3 (2021 00:24), Max: 37.3 (2021 00:24)  T(F): 99.2 (2021 00:24), Max: 99.2 (2021 00:24)  HR: 62 (2021 00:30) (62 - 117)  BP: 122/57 (2021 00:30) (51/28 - 216/113)  BP(mean): 73 (2021 00:30) (33 - 95)  RR: 20 (2021 00:30) (9 - 30)  SpO2: 98% (2021 00:30) (67% - 100%)    ABG - ( 2021 00:10 )  pH, Arterial: 7.40  pH, Blood: x     /  pCO2: 42    /  pO2: 160   / HCO3: 25    / Base Excess: .6    /  SaO2: 99            2021 07:01  -  2021 07:00  --------------------------------------------------------  IN:  Total IN: 0 mL    OUT:    Voided (mL): 200 mL  Total OUT: 200 mL    Total NET: -200 mL    LABS:                        16.1   7.35  )-----------( 172      ( 2021 06:32 )             47.3     06-08    140  |  103  |  11  ----------------------------<  126<H>  3.1<L>   |  31  |  0.95    Ca    9.0      2021 06:32  Phos  2.1     06-08  Mg     1.5     06-08    TPro  7.2  /  Alb  3.7  /  TBili  0.7  /  DBili  x   /  AST  28  /  ALT  23  /  AlkPhos  74  06-08      CARDIAC MARKERS ( 2021 04:56 )  0.016 ng/mL / x     / x     / x     / x      CARDIAC MARKERS ( 2021 00:58 )  <0.015 ng/mL / x     / x     / x     / x      CARDIAC MARKERS ( 2021 21:32 )  <0.015 ng/mL / x     / 181 U/L / x     / x          POCT Blood Glucose.: 228 mg/dL (2021 21:47)    PT/INR - ( 2021 06:32 )   PT: 12.8 sec;   INR: 1.10 ratio         PTT - ( 2021 06:32 )  PTT:33.0 sec  Urinalysis Basic - ( 2021 21:49 )    Color: Yellow / Appearance: Clear / S.005 / pH: x  Gluc: x / Ketone: Negative  / Bili: Negative / Urobili: Negative mg/dL   Blood: x / Protein: 15 mg/dL / Nitrite: Negative   Leuk Esterase: Negative / RBC: 3-5 /HPF / WBC 3-5   Sq Epi: x / Non Sq Epi: Few / Bacteria: Few      CULTURES: sent      Physical Examination:  Physical exam as per bedside MD (direct physical examination could not be performed because the visit was provided via Telemedicine):       RADIOLOGY: CXR- cephalization/infitrate    IMP-  Hypercapnic respiratory failure  pneumonia  CHF    Plan-  Admit to ICU  Vent support  Zosyn- empiric  Lasix  Inotropic support    CRITICAL CARE TIME SPENT: 40    This visit was provided via telemedicine. Patient was located at     [facility]  Provider was located at [facility].[address] [place] for the visit.

## 2021-06-09 NOTE — PROGRESS NOTE ADULT - ASSESSMENT
87 y/o M w/ PMH of HTN, systolic CHF, COPD, CAD s/p CABG / PCI, PVD s/p stent, CKD III, dyslipidemia, lung cancer s/p lobectomy, cardiac arrest s/p AICD, CEA, p/w syncope      *Acute hypoxic RF  - now extubated on 3L NC  - will transition from levophed to midodrine for MAP > 65  - Lasix for pulm edema/ repeat cxray with improvement of effusions  - protonix QD for GI ppx  - on zosyn for possible aspiration PNA pending bcx, ucx, sputumcx    *Syncope   -Check orthostatics: negative so far  -Troponin negative x 1  -Echo: f/u  -CTH- nasal fracture   - EP interrogated device  -Cardio recs appreciated  -Neuro recs appreciated  -EP consult for AICD interrogation   -Alcohol level - 186  - CIWA protocol once extubated  -Wound care for facial injury     *Lactic acidosis of 3.6  -Will hold lasix temporarily   -F/u repeat lactate  -No signs / symptoms of infection     *Nasal bone fracture  -OMFS consult     *Hypokalemia  -Replete and recheck     *H/o systolic CHF / COPD / CAD / PVD / CKD III / dyslipidemia / lung cancer   -C/w home meds and f/u outpatient for further management if conditions remain stable during hospitalization     *DVT ppx  -SCDs     d/w Dr. Perez

## 2021-06-10 LAB
ANION GAP SERPL CALC-SCNC: 5 MMOL/L — SIGNIFICANT CHANGE UP (ref 5–17)
BASOPHILS # BLD AUTO: 0.04 K/UL — SIGNIFICANT CHANGE UP (ref 0–0.2)
BASOPHILS NFR BLD AUTO: 0.5 % — SIGNIFICANT CHANGE UP (ref 0–2)
BUN SERPL-MCNC: 15 MG/DL — SIGNIFICANT CHANGE UP (ref 7–23)
CALCIUM SERPL-MCNC: 8.3 MG/DL — LOW (ref 8.5–10.1)
CHLORIDE SERPL-SCNC: 104 MMOL/L — SIGNIFICANT CHANGE UP (ref 96–108)
CO2 SERPL-SCNC: 31 MMOL/L — SIGNIFICANT CHANGE UP (ref 22–31)
CREAT SERPL-MCNC: 1.01 MG/DL — SIGNIFICANT CHANGE UP (ref 0.5–1.3)
CULTURE RESULTS: SIGNIFICANT CHANGE UP
EOSINOPHIL # BLD AUTO: 0.14 K/UL — SIGNIFICANT CHANGE UP (ref 0–0.5)
EOSINOPHIL NFR BLD AUTO: 1.8 % — SIGNIFICANT CHANGE UP (ref 0–6)
GLUCOSE SERPL-MCNC: 170 MG/DL — HIGH (ref 70–99)
HCT VFR BLD CALC: 44.8 % — SIGNIFICANT CHANGE UP (ref 39–50)
HGB BLD-MCNC: 14.7 G/DL — SIGNIFICANT CHANGE UP (ref 13–17)
IMM GRANULOCYTES NFR BLD AUTO: 0.4 % — SIGNIFICANT CHANGE UP (ref 0–1.5)
LYMPHOCYTES # BLD AUTO: 1.14 K/UL — SIGNIFICANT CHANGE UP (ref 1–3.3)
LYMPHOCYTES # BLD AUTO: 14.4 % — SIGNIFICANT CHANGE UP (ref 13–44)
MAGNESIUM SERPL-MCNC: 1.8 MG/DL — SIGNIFICANT CHANGE UP (ref 1.6–2.6)
MCHC RBC-ENTMCNC: 31.1 PG — SIGNIFICANT CHANGE UP (ref 27–34)
MCHC RBC-ENTMCNC: 32.8 GM/DL — SIGNIFICANT CHANGE UP (ref 32–36)
MCV RBC AUTO: 94.7 FL — SIGNIFICANT CHANGE UP (ref 80–100)
MONOCYTES # BLD AUTO: 0.59 K/UL — SIGNIFICANT CHANGE UP (ref 0–0.9)
MONOCYTES NFR BLD AUTO: 7.4 % — SIGNIFICANT CHANGE UP (ref 2–14)
NEUTROPHILS # BLD AUTO: 5.99 K/UL — SIGNIFICANT CHANGE UP (ref 1.8–7.4)
NEUTROPHILS NFR BLD AUTO: 75.5 % — SIGNIFICANT CHANGE UP (ref 43–77)
PHOSPHATE SERPL-MCNC: 2.5 MG/DL — SIGNIFICANT CHANGE UP (ref 2.5–4.5)
PLATELET # BLD AUTO: 144 K/UL — LOW (ref 150–400)
POTASSIUM SERPL-MCNC: 3.3 MMOL/L — LOW (ref 3.5–5.3)
POTASSIUM SERPL-SCNC: 3.3 MMOL/L — LOW (ref 3.5–5.3)
RBC # BLD: 4.73 M/UL — SIGNIFICANT CHANGE UP (ref 4.2–5.8)
RBC # FLD: 13.6 % — SIGNIFICANT CHANGE UP (ref 10.3–14.5)
SODIUM SERPL-SCNC: 140 MMOL/L — SIGNIFICANT CHANGE UP (ref 135–145)
SPECIMEN SOURCE: SIGNIFICANT CHANGE UP
WBC # BLD: 7.93 K/UL — SIGNIFICANT CHANGE UP (ref 3.8–10.5)
WBC # FLD AUTO: 7.93 K/UL — SIGNIFICANT CHANGE UP (ref 3.8–10.5)

## 2021-06-10 RX ORDER — POTASSIUM CHLORIDE 20 MEQ
10 PACKET (EA) ORAL
Refills: 0 | Status: COMPLETED | OUTPATIENT
Start: 2021-06-10 | End: 2021-06-10

## 2021-06-10 RX ORDER — ERYTHROMYCIN BASE 5 MG/GRAM
1 OINTMENT (GRAM) OPHTHALMIC (EYE) DAILY
Refills: 0 | Status: DISCONTINUED | OUTPATIENT
Start: 2021-06-10 | End: 2021-06-10

## 2021-06-10 RX ORDER — ERYTHROMYCIN BASE 5 MG/GRAM
1 OINTMENT (GRAM) OPHTHALMIC (EYE)
Refills: 0 | Status: COMPLETED | OUTPATIENT
Start: 2021-06-10 | End: 2021-06-17

## 2021-06-10 RX ORDER — HYDRALAZINE HCL 50 MG
5 TABLET ORAL EVERY 6 HOURS
Refills: 0 | Status: DISCONTINUED | OUTPATIENT
Start: 2021-06-10 | End: 2021-06-11

## 2021-06-10 RX ADMIN — Medication 1 MILLIGRAM(S): at 09:23

## 2021-06-10 RX ADMIN — Medication 100 MILLIEQUIVALENT(S): at 11:51

## 2021-06-10 RX ADMIN — Medication 100 MILLIGRAM(S): at 09:23

## 2021-06-10 RX ADMIN — ENOXAPARIN SODIUM 40 MILLIGRAM(S): 100 INJECTION SUBCUTANEOUS at 09:22

## 2021-06-10 RX ADMIN — PANTOPRAZOLE SODIUM 40 MILLIGRAM(S): 20 TABLET, DELAYED RELEASE ORAL at 09:23

## 2021-06-10 RX ADMIN — Medication 2 MILLIGRAM(S): at 06:20

## 2021-06-10 RX ADMIN — PIPERACILLIN AND TAZOBACTAM 25 GRAM(S): 4; .5 INJECTION, POWDER, LYOPHILIZED, FOR SOLUTION INTRAVENOUS at 13:50

## 2021-06-10 RX ADMIN — Medication 1 APPLICATION(S): at 23:25

## 2021-06-10 RX ADMIN — Medication 100 MILLIEQUIVALENT(S): at 09:20

## 2021-06-10 RX ADMIN — Medication 81 MILLIGRAM(S): at 09:22

## 2021-06-10 RX ADMIN — Medication 1 APPLICATION(S): at 20:26

## 2021-06-10 RX ADMIN — PIPERACILLIN AND TAZOBACTAM 25 GRAM(S): 4; .5 INJECTION, POWDER, LYOPHILIZED, FOR SOLUTION INTRAVENOUS at 21:16

## 2021-06-10 RX ADMIN — Medication 5 MILLIGRAM(S): at 21:17

## 2021-06-10 RX ADMIN — PIPERACILLIN AND TAZOBACTAM 25 GRAM(S): 4; .5 INJECTION, POWDER, LYOPHILIZED, FOR SOLUTION INTRAVENOUS at 06:20

## 2021-06-10 RX ADMIN — Medication 15 MILLILITER(S): at 09:22

## 2021-06-10 RX ADMIN — Medication 100 MILLIEQUIVALENT(S): at 10:33

## 2021-06-10 RX ADMIN — Medication 1.5 MILLIGRAM(S): at 21:17

## 2021-06-10 RX ADMIN — ATORVASTATIN CALCIUM 10 MILLIGRAM(S): 80 TABLET, FILM COATED ORAL at 21:17

## 2021-06-10 NOTE — PROGRESS NOTE ADULT - SUBJECTIVE AND OBJECTIVE BOX
85 y/o M w/ PMH of HTN, systolic CHF, COPD, CAD s/p CABG / PCI, PVD s/p stent, CKD III, dyslipidemia, lung cancer s/p lobectomy, cardiac arrest s/p AICD, CEA, p/w syncope. Patient states that the last thing he remembers is walking from kitchen to his living room, and then he woke up on the floor. Denies having any symptoms prior to syncope. Denies CP, SOB, palpitations, LH, dizziness. Patient states when he woke up he noticed blood dripping on his chest, so he pressed his life alert button and was brought to ED. Patient c/o mild pain on R side of nose. Also has injury to his head. Denies weakness in arms / legs, facial droop, sensory deficits, pain, cough, runny nose, sore thoroat, nausea, vomiting, abdominal pain, urinary incontinence, tongue bite.     Subjective:  Pt now Extubated ,lethargic, with GPC in chain and pairs in sputum culture     REVIEW OF SYSTEMS:  unable to obtain due to sedation    PHYSICAL EXAM:  Vital Signs Last 24 Hrs  T(C): 37.9 (10 Celso 2021 22:00), Max: 37.9 (10 Celso 2021 22:00)  T(F): 100.2 (10 Celso 2021 22:00), Max: 100.2 (10 Celso 2021 22:00)  HR: 101 (10 Celso 2021 23:00) (60 - 110)  BP: 124/52 (10 Celso 2021 23:00) (108/47 - 187/78)  BP(mean): 70 (10 Celso 2021 23:00) (63 - 103)  RR: 19 (10 Celso 2021 23:00) (11 - 27)  SpO2: 86% (10 Celso 2021 23:00) (86% - 100%))    General: elderly male NAD, extubated follows commands off sedation, nonfocal  NEURO: A&O X3, motor function 5/5 BL UE/LE  HEENT: Pupils equal, reactive to light.  Symmetric.  PULM: CTA , mild ralses at bases , no significant sputum production,   CVS: Regular rate and rhythm, no murmurs, rubs, or gallops  ABD: Soft, nondistended, nontender, normoactive bowel sounds, no masses  EXT: No edema, nontender  SKIN: Warm and well perfused, no rashes noted  5/5 strength b/l upper and lower extremities  Skin: No rashes    MEDICATIONS:  MEDICATIONS  (STANDING):  aspirin enteric coated 81 milliGRAM(s) Oral daily  atorvastatin 10 milliGRAM(s) Oral at bedtime  lisinopril 20 milliGRAM(s) Oral two times a day  melatonin 5 milliGRAM(s) Oral at bedtime  metoprolol tartrate 100 milliGRAM(s) Oral two times a day  pantoprazole    Tablet 40 milliGRAM(s) Oral before breakfast      LABS: All Labs Reviewed:                      LABS:                          14.7   7.93  )-----------( 144      ( 10 Celso 2021 06:01 )             44.8     06-10    140  |  104  |  15  ----------------------------<  170<H>  3.3<L>   |  31  |  1.01    Ca    8.3<L>      10 Celso 2021 06:01  Phos  2.5     06-10  Mg     1.8     06-10                   PTT - ( 08 Jun 2021 06:32 )  PTT:33.0 sec    RADIOLOGY/EKG:    rad< from: CT Head No Cont (06.07.21 @ 21:22) >  CT HEAD: No acute intracranial hemorrhage, mass effect, or osseous fracture.    CT MAXILLOFACIAL BONES: Comminuted nasal bone fractures. There is no other acute maxillofacial bone fracture.    CT CERVICAL SPINE: No acute cervical spine fracture or traumatic malalignment.    < end of copied text >  < from: Xray Chest 1 View AP/PA. (06.07.21 @ 21:33) >  Chest is similar to March 13 of this year.    IMPRESSION: Stable findings as above.    < end of copied text >

## 2021-06-10 NOTE — PROGRESS NOTE ADULT - SUBJECTIVE AND OBJECTIVE BOX
CC:  Patient is a 86y old  Male who presents with a chief complaint of Syncope (2021 18:02)      HPI/BRIEF HOSPITAL COURSE:   87 yo m PMH HTN, HFrEF 40%, COPD, CAD s/p CABG/PCI, PVD s/p stent, CKD3, DLD, lung cancer s/p lobectomy, cardiac arrest s/p AICD, CEA BIBA from home s/p mechanical fall 2/2 ETOH intoxication.. RRT called for AMS and pt obtunded likely related to benzo, not protecting airway s/p intubation on 21 and extubated on 21      Events last 24 hours:   -Pt tolerating well off vent  -lethargic and off precedex for entire day, hs not received ativan dose - held for lethargy   -sputum with GPC in pairs and chains continue BS ABX ad       PAST MEDICAL & SURGICAL HISTORY:  Hypertension    CAD (coronary artery disease)    Cardiac arrest with successful resuscitation    CKD (chronic kidney disease)    PAD (peripheral artery disease)    Lung cancer    Heart failure    AICD (automatic cardioverter/defibrillator) present    S/P carotid endarterectomy    History of lobectomy of lung    History of appendectomy    H/O left knee surgery    History of tonsillectomy and adenoidectomy      Allergies    No Known Allergies    Intolerances      FAMILY HISTORY:      Review of Systems:  ROS unable to be obtained 2/2 to mental status       Medications:  piperacillin/tazobactam IVPB.. 3.375 Gram(s) IV Intermittent every 8 hours    midodrine 10 milliGRAM(s) Oral every 8 hours      acetaminophen   Tablet .. 650 milliGRAM(s) Oral every 6 hours PRN  LORazepam   Injectable   IV Push   LORazepam   Injectable 1.5 milliGRAM(s) IV Push every 4 hours  LORazepam   Injectable 2 milliGRAM(s) IV Push every 2 hours PRN  meclizine 25 milliGRAM(s) Oral every 12 hours PRN  melatonin 5 milliGRAM(s) Oral at bedtime      aspirin  chewable 81 milliGRAM(s) Oral daily  enoxaparin Injectable 40 milliGRAM(s) SubCutaneous daily    pantoprazole  Injectable 40 milliGRAM(s) IV Push daily      atorvastatin 10 milliGRAM(s) Oral at bedtime    folic acid 1 milliGRAM(s) Oral daily  multivitamin/minerals/iron Oral Solution (CENTRUM) 15 milliLiter(s) Enteral Tube daily  thiamine 100 milliGRAM(s) Oral daily                ICU Vital Signs Last 24 Hrs  T(C): 36 (10 Celso 2021 04:45), Max: 36.1 (2021 20:12)  T(F): 96.8 (10 Celso 2021 04:45), Max: 97 (2021 20:12)  HR: 60 (10 Celso 2021 13:00) (60 - 93)  BP: 139/61 (10 Celso 2021 13:00) (77/40 - 181/56)  BP(mean): 81 (10 Celso 2021 13:00) (49 - 101)  ABP: --  ABP(mean): --  RR: 15 (10 Celso 2021 13:00) (14 - 27)  SpO2: 98% (10 Celso 2021 13:00) (93% - 100%)    Vital Signs Last 24 Hrs  T(C): 36 (10 Celso 2021 04:45), Max: 36.1 (2021 20:12)  T(F): 96.8 (10 Celso 2021 04:45), Max: 97 (2021 20:12)  HR: 60 (10 Celso 2021 13:00) (60 - 93)  BP: 139/61 (10 Celso 2021 13:00) (77/40 - 181/56)  BP(mean): 81 (10 Celso 2021 13:00) (49 - 101)  RR: 15 (10 Celso 2021 13:00) (14 - 27)  SpO2: 98% (10 Celso 2021 13:00) (93% - 100%)    ABG - ( 2021 00:10 )  pH, Arterial: 7.40  pH, Blood: x     /  pCO2: 42    /  pO2: 160   / HCO3: 25    / Base Excess: .6    /  SaO2: 99                  I&O's Detail    2021 07:01  -  10 Celso 2021 07:00  --------------------------------------------------------  IN:    Dexmedetomidine: 123 mL    IV PiggyBack: 300 mL    Norepinephrine: 87 mL    Oral Fluid: 120 mL    Propofol: 152 mL  Total IN: 782 mL    OUT:    Indwelling Catheter - Urethral (mL): 550 mL    Voided (mL): 300 mL  Total OUT: 850 mL    Total NET: -68 mL      10 Celso 2021 07:01  -  10 Celso 2021 14:52  --------------------------------------------------------  IN:    IV PiggyBack: 300 mL  Total IN: 300 mL    OUT:    Indwelling Catheter - Urethral (mL): 300 mL  Total OUT: 300 mL    Total NET: 0 mL            LABS:                        14.7   7.93  )-----------( 144      ( 10 Celso 2021 06:01 )             44.8     06-10    140  |  104  |  15  ----------------------------<  170<H>  3.3<L>   |  31  |  1.01    Ca    8.3<L>      10 Celso 2021 06:01  Phos  2.5     06-10  Mg     1.8     06-10            CAPILLARY BLOOD GLUCOSE      POCT Blood Glucose.: 110 mg/dL (10 Celso 2021 12:19)      Urinalysis Basic - ( 2021 10:45 )    Color: Luz / Appearance: very cloudy / S.030 / pH: x  Gluc: x / Ketone: Negative  / Bili: Negative / Urobili: Negative mg/dL   Blood: x / Protein: 30 mg/dL / Nitrite: Negative   Leuk Esterase: Moderate / RBC: >50 /HPF / WBC 6-10   Sq Epi: x / Non Sq Epi: Few / Bacteria: Few      CULTURES:  Culture Results:   <10,000 CFU/mL Normal Urogenital Celina ( @ 10:45)    piperacillin/tazobactam IVPB.. 3.375 Gram(s) IV Intermittent every 8 hours    Physical Examination:  General: elderly male NAD, follows commands arousable - lethargic, nonfocal  NEURO: A&O X3, motor function 5/5 BL UE/LE  HEENT: Pupils equal, reactive to light.  Symmetric.  PULM: coarse BS anteriorly , mild rales at bases , tenacious secretions, but strong cough   CVS: Regular rate and rhythm, no murmurs, rubs, or gallops  ABD: Soft, nondistended, nontender, normoactive bowel sounds, no masses  EXT: No edema, nontender  SKIN: Warm and well perfused, no rashes noted      EKG: NSR     RADIOLOGY:   < from: Xray Chest 1 View-PORTABLE IMMEDIATE (Xray Chest 1 View-PORTABLE IMMEDIATE .) (21 @ 23:13) >  S/P sternotomy.  Left-sided defibrillator in situ.  Enteric tube tip in stomach.  ET tube tip is 4.7cm above the al.  The heart is top normal in size.  Persistent pulmonic congestive changes.  Persistent small bilateral pleural effusions with associated bibasilar atelectasis.        < end of copied text >    CENTRAL LINE: N          DATE INSERTED:                  ADAMS: Y                        DATE INSERTED:                  A-LINE: N                       DATE INSERTED:

## 2021-06-10 NOTE — PHARMACOTHERAPY INTERVENTION NOTE - COMMENTS
MG=1.5
Erythromycin Oint to right eye daily for conjunctivitis was ordered. S/w PA who oked to change frequency to 4 times daily x 7 days

## 2021-06-10 NOTE — PROGRESS NOTE ADULT - ASSESSMENT
87 y/o M w/ PMH of HTN, systolic CHF, COPD, CAD s/p CABG / PCI, PVD s/p stent, CKD III, dyslipidemia, lung cancer s/p lobectomy, cardiac arrest s/p AICD, CEA, p/w syncope      *Acute hypoxic RF  - now extubated on 3L NC, continues to be lethargic  - Continue  monitoring renal function  - protonix QD for GI ppx  - on zosyn for possible aspiration PNA pending bcx, ucx, sputumcx    *Syncope   -Check orthostatics: negative so far  -Troponin negative x 1  -Echo: f/u  -CTH- nasal fracture   - EP interrogated device  -Cardio recs appreciated  -Neuro recs appreciated  -EP consult for AICD interrogation   -Alcohol level - 186  - CIWA protocol once extubated  -Wound care for facial injury     *Nasal bone fracture  -OMFS consult     *Hypokalemia  -Replete and recheck     *H/o systolic CHF / COPD / CAD / PVD / CKD III / dyslipidemia / lung cancer   -C/w home meds and f/u outpatient for further management if conditions remain stable during hospitalization     *DVT ppx  Lovenox    d/w Dr. Adame and Dr. Colunga

## 2021-06-10 NOTE — PROGRESS NOTE ADULT - ASSESSMENT
ASSESSMENT   1. Acute hypoxic RF   2. septic shock   3. pulm edema   4. aspiration PNA   5. ETOH withdrawal     NEURO:   -Pt off precedex gtt all day   - ativan taper and ativan PRN written - but pt too lethargic to receive doses during day and doses held   - CIWA     CV:   -Pt remains off pressors   -Pt SBP between 140-150 midodrine d/c'd    PULM   -extubated   -(+)tenacious secretions, but with strong cough   -sputum pending speciation + GPC in pairs and chains and GNR   - Will add hypertonic saline nebs     RENAL:   -Check repeat labs and monitor renal function trend  -Encourage PO intake as tolerated  -IVF hydration   -Monitor input and output.    GI:   -diet as tolerated   -protonix QD for GI ppx     ENDO:   -FS q12hr     ID:   -Aspiration pneumonia, started on zosyn for empiric coverage  -pending Bcx/UCX/sputum cx       HEME: Lovenox for vte ppx       FULL CODE     Critical Care time: 40 mins assessing presenting problems of acute illness that poses high probability of life threatening deterioration or end organ damage/dysfunction.  Medical decision making including Initiating plan of care, reviewing data, reviewing radiology, discussing with multidisciplinary team, non inclusive of procedures, discussing goals of care with patient/family

## 2021-06-11 LAB
AMMONIA BLD-MCNC: <10 UMOL/L — LOW (ref 11–32)
ANION GAP SERPL CALC-SCNC: 7 MMOL/L — SIGNIFICANT CHANGE UP (ref 5–17)
BASOPHILS # BLD AUTO: 0.04 K/UL — SIGNIFICANT CHANGE UP (ref 0–0.2)
BASOPHILS NFR BLD AUTO: 0.5 % — SIGNIFICANT CHANGE UP (ref 0–2)
BUN SERPL-MCNC: 13 MG/DL — SIGNIFICANT CHANGE UP (ref 7–23)
CALCIUM SERPL-MCNC: 8.5 MG/DL — SIGNIFICANT CHANGE UP (ref 8.5–10.1)
CHLORIDE SERPL-SCNC: 103 MMOL/L — SIGNIFICANT CHANGE UP (ref 96–108)
CO2 SERPL-SCNC: 29 MMOL/L — SIGNIFICANT CHANGE UP (ref 22–31)
CREAT SERPL-MCNC: 1.09 MG/DL — SIGNIFICANT CHANGE UP (ref 0.5–1.3)
CULTURE RESULTS: SIGNIFICANT CHANGE UP
EOSINOPHIL # BLD AUTO: 0.2 K/UL — SIGNIFICANT CHANGE UP (ref 0–0.5)
EOSINOPHIL NFR BLD AUTO: 2.4 % — SIGNIFICANT CHANGE UP (ref 0–6)
GLUCOSE SERPL-MCNC: 153 MG/DL — HIGH (ref 70–99)
HCT VFR BLD CALC: 46.7 % — SIGNIFICANT CHANGE UP (ref 39–50)
HGB BLD-MCNC: 15.4 G/DL — SIGNIFICANT CHANGE UP (ref 13–17)
IMM GRANULOCYTES NFR BLD AUTO: 0.5 % — SIGNIFICANT CHANGE UP (ref 0–1.5)
LYMPHOCYTES # BLD AUTO: 1.01 K/UL — SIGNIFICANT CHANGE UP (ref 1–3.3)
LYMPHOCYTES # BLD AUTO: 12.2 % — LOW (ref 13–44)
MAGNESIUM SERPL-MCNC: 2 MG/DL — SIGNIFICANT CHANGE UP (ref 1.6–2.6)
MCHC RBC-ENTMCNC: 31.5 PG — SIGNIFICANT CHANGE UP (ref 27–34)
MCHC RBC-ENTMCNC: 33 GM/DL — SIGNIFICANT CHANGE UP (ref 32–36)
MCV RBC AUTO: 95.5 FL — SIGNIFICANT CHANGE UP (ref 80–100)
MONOCYTES # BLD AUTO: 0.75 K/UL — SIGNIFICANT CHANGE UP (ref 0–0.9)
MONOCYTES NFR BLD AUTO: 9.1 % — SIGNIFICANT CHANGE UP (ref 2–14)
NEUTROPHILS # BLD AUTO: 6.24 K/UL — SIGNIFICANT CHANGE UP (ref 1.8–7.4)
NEUTROPHILS NFR BLD AUTO: 75.3 % — SIGNIFICANT CHANGE UP (ref 43–77)
PHOSPHATE SERPL-MCNC: 2 MG/DL — LOW (ref 2.5–4.5)
PLATELET # BLD AUTO: 153 K/UL — SIGNIFICANT CHANGE UP (ref 150–400)
POTASSIUM SERPL-MCNC: 3.2 MMOL/L — LOW (ref 3.5–5.3)
POTASSIUM SERPL-SCNC: 3.2 MMOL/L — LOW (ref 3.5–5.3)
RBC # BLD: 4.89 M/UL — SIGNIFICANT CHANGE UP (ref 4.2–5.8)
RBC # FLD: 13.5 % — SIGNIFICANT CHANGE UP (ref 10.3–14.5)
SODIUM SERPL-SCNC: 139 MMOL/L — SIGNIFICANT CHANGE UP (ref 135–145)
SPECIMEN SOURCE: SIGNIFICANT CHANGE UP
WBC # BLD: 8.28 K/UL — SIGNIFICANT CHANGE UP (ref 3.8–10.5)
WBC # FLD AUTO: 8.28 K/UL — SIGNIFICANT CHANGE UP (ref 3.8–10.5)

## 2021-06-11 RX ORDER — METOPROLOL TARTRATE 50 MG
25 TABLET ORAL
Refills: 0 | Status: DISCONTINUED | OUTPATIENT
Start: 2021-06-11 | End: 2021-06-11

## 2021-06-11 RX ORDER — PANTOPRAZOLE SODIUM 20 MG/1
40 TABLET, DELAYED RELEASE ORAL
Refills: 0 | Status: DISCONTINUED | OUTPATIENT
Start: 2021-06-11 | End: 2021-06-23

## 2021-06-11 RX ORDER — METOPROLOL TARTRATE 50 MG
50 TABLET ORAL
Refills: 0 | Status: DISCONTINUED | OUTPATIENT
Start: 2021-06-11 | End: 2021-06-12

## 2021-06-11 RX ORDER — METOPROLOL TARTRATE 50 MG
5 TABLET ORAL EVERY 6 HOURS
Refills: 0 | Status: DISCONTINUED | OUTPATIENT
Start: 2021-06-11 | End: 2021-06-11

## 2021-06-11 RX ORDER — QUETIAPINE FUMARATE 200 MG/1
25 TABLET, FILM COATED ORAL ONCE
Refills: 0 | Status: COMPLETED | OUTPATIENT
Start: 2021-06-11 | End: 2021-06-11

## 2021-06-11 RX ORDER — MAGNESIUM SULFATE 500 MG/ML
2 VIAL (ML) INJECTION ONCE
Refills: 0 | Status: COMPLETED | OUTPATIENT
Start: 2021-06-11 | End: 2021-06-11

## 2021-06-11 RX ORDER — POTASSIUM CHLORIDE 20 MEQ
40 PACKET (EA) ORAL ONCE
Refills: 0 | Status: COMPLETED | OUTPATIENT
Start: 2021-06-11 | End: 2021-06-11

## 2021-06-11 RX ORDER — POTASSIUM CHLORIDE 20 MEQ
10 PACKET (EA) ORAL
Refills: 0 | Status: COMPLETED | OUTPATIENT
Start: 2021-06-11 | End: 2021-06-11

## 2021-06-11 RX ORDER — METOPROLOL TARTRATE 50 MG
2.5 TABLET ORAL ONCE
Refills: 0 | Status: COMPLETED | OUTPATIENT
Start: 2021-06-11 | End: 2021-06-11

## 2021-06-11 RX ORDER — METOPROLOL TARTRATE 50 MG
5 TABLET ORAL ONCE
Refills: 0 | Status: COMPLETED | OUTPATIENT
Start: 2021-06-11 | End: 2021-06-11

## 2021-06-11 RX ORDER — MAGNESIUM SULFATE 500 MG/ML
1 VIAL (ML) INJECTION ONCE
Refills: 0 | Status: COMPLETED | OUTPATIENT
Start: 2021-06-11 | End: 2021-06-11

## 2021-06-11 RX ORDER — LISINOPRIL 2.5 MG/1
20 TABLET ORAL DAILY
Refills: 0 | Status: DISCONTINUED | OUTPATIENT
Start: 2021-06-11 | End: 2021-06-23

## 2021-06-11 RX ORDER — POTASSIUM PHOSPHATE, MONOBASIC POTASSIUM PHOSPHATE, DIBASIC 236; 224 MG/ML; MG/ML
15 INJECTION, SOLUTION INTRAVENOUS ONCE
Refills: 0 | Status: COMPLETED | OUTPATIENT
Start: 2021-06-11 | End: 2021-06-11

## 2021-06-11 RX ADMIN — Medication 100 MILLIEQUIVALENT(S): at 04:52

## 2021-06-11 RX ADMIN — Medication 81 MILLIGRAM(S): at 09:16

## 2021-06-11 RX ADMIN — Medication 5 MILLIGRAM(S): at 21:39

## 2021-06-11 RX ADMIN — ATORVASTATIN CALCIUM 10 MILLIGRAM(S): 80 TABLET, FILM COATED ORAL at 21:39

## 2021-06-11 RX ADMIN — PIPERACILLIN AND TAZOBACTAM 25 GRAM(S): 4; .5 INJECTION, POWDER, LYOPHILIZED, FOR SOLUTION INTRAVENOUS at 05:01

## 2021-06-11 RX ADMIN — Medication 50 GRAM(S): at 00:13

## 2021-06-11 RX ADMIN — Medication 0.5 MILLIGRAM(S): at 23:46

## 2021-06-11 RX ADMIN — POTASSIUM PHOSPHATE, MONOBASIC POTASSIUM PHOSPHATE, DIBASIC 62.5 MILLIMOLE(S): 236; 224 INJECTION, SOLUTION INTRAVENOUS at 08:32

## 2021-06-11 RX ADMIN — Medication 5 MILLIGRAM(S): at 01:37

## 2021-06-11 RX ADMIN — PIPERACILLIN AND TAZOBACTAM 25 GRAM(S): 4; .5 INJECTION, POWDER, LYOPHILIZED, FOR SOLUTION INTRAVENOUS at 21:14

## 2021-06-11 RX ADMIN — Medication 1 APPLICATION(S): at 13:26

## 2021-06-11 RX ADMIN — Medication 100 GRAM(S): at 03:37

## 2021-06-11 RX ADMIN — Medication 25 MILLIGRAM(S): at 09:16

## 2021-06-11 RX ADMIN — Medication 5 MILLIGRAM(S): at 20:04

## 2021-06-11 RX ADMIN — Medication 1 APPLICATION(S): at 17:17

## 2021-06-11 RX ADMIN — Medication 100 MILLIEQUIVALENT(S): at 05:55

## 2021-06-11 RX ADMIN — Medication 1.5 MILLIGRAM(S): at 01:42

## 2021-06-11 RX ADMIN — ENOXAPARIN SODIUM 40 MILLIGRAM(S): 100 INJECTION SUBCUTANEOUS at 09:28

## 2021-06-11 RX ADMIN — Medication 50 MILLIGRAM(S): at 21:39

## 2021-06-11 RX ADMIN — Medication 100 MILLIGRAM(S): at 09:16

## 2021-06-11 RX ADMIN — Medication 1 TABLET(S): at 09:16

## 2021-06-11 RX ADMIN — Medication 1 MILLIGRAM(S): at 09:16

## 2021-06-11 RX ADMIN — Medication 1 APPLICATION(S): at 05:01

## 2021-06-11 RX ADMIN — Medication 2.5 MILLIGRAM(S): at 02:30

## 2021-06-11 RX ADMIN — Medication 100 MILLIEQUIVALENT(S): at 03:49

## 2021-06-11 RX ADMIN — Medication 40 MILLIEQUIVALENT(S): at 03:34

## 2021-06-11 RX ADMIN — PIPERACILLIN AND TAZOBACTAM 25 GRAM(S): 4; .5 INJECTION, POWDER, LYOPHILIZED, FOR SOLUTION INTRAVENOUS at 14:07

## 2021-06-11 RX ADMIN — QUETIAPINE FUMARATE 25 MILLIGRAM(S): 200 TABLET, FILM COATED ORAL at 09:25

## 2021-06-11 RX ADMIN — PANTOPRAZOLE SODIUM 40 MILLIGRAM(S): 20 TABLET, DELAYED RELEASE ORAL at 09:16

## 2021-06-11 NOTE — SWALLOW BEDSIDE ASSESSMENT ADULT - SWALLOW EVAL: CRITERIA FOR SKILLED INTERVENTION MET
DO NOT FEEL THAT ACUTE SPEECH PATHOLOGY FOLLOW UP WOULD CHANGE CLINICAL MANAGEMENT/OUTCOME WHILE IN ACUTE HOSPITAL SETTING. NO ORAL MOTOR FOCALITY EVIDENT AND PHARYNGEAL INTEGRITY IS FELT TO BE AGE ACCEPTABLE WHEN IN AN ALERT STATE. FURTHER, HIS MOTOR SPEECH ABILITIES WERE GROSSLY FUNCTIONAL AND HIS VERBALIZATIONS WERE LINGUISTICALLY INTACT WHEN HE ATTEMPTED TO SPEAK WHILE IN AN ALERT STATE. AS SUCH, TRADITIONAL SPEECH/SWALLOWING THERAPY IS NOT CLINICALLY WARRANTED AND WOULD NOT BE OF CLINICAL BENEFIT. GIVEN ABOVE, WILL NOT ACTIVELY FOLLOW. RECONSULT PRN SHOULD STATUS CHANGE AND CONDITION WARRANT.

## 2021-06-11 NOTE — SWALLOW BEDSIDE ASSESSMENT ADULT - MODE OF PRESENTATION
Pt was externally fed. Delirium was variably noted when being fed and he sometimes purposefully kept his eyes closed even when in an awake state.

## 2021-06-11 NOTE — SWALLOW BEDSIDE ASSESSMENT ADULT - SWALLOW EVAL: RECOMMENDED DIET
SUGGEST A REGULAR TEXTURE DIET WITH THIN LIQUID CONSISTENCIES AS HE TOLERATED THESE FOOD TEXTURES FROM AN OROPHARYNGEAL SWALLOWING PERSPECTIVE ON EXAM. PT SHOULD BE FED ONLY WHEN IN AN ALERT/CALM STATE WHICH IS NOT ALWAYS THE CASE. SUGGEST A REGULAR TEXTURE DIET WITH THIN LIQUID CONSISTENCIES AS HE TOLERATED THESE FOOD TEXTURES FROM AN OROPHARYNGEAL SWALLOWING PERSPECTIVE ON EXAM WHEN IN AN ALERT CALM STATE. PT SHOULD BE FED ONLY WHEN IN AN ALERT/CALM STATE WHICH IS NOT ALWAYS THE CASE.

## 2021-06-11 NOTE — SWALLOW BEDSIDE ASSESSMENT ADULT - COMMENTS
The pt was admitted to  s/p fall vs syncope. Pt was reportedly intoxicated when he fell. Hospital course is notable for nasal bone fracture, hypokalemia, delirium, and transient respiratory failure requiring brief mechanical ventilation. This profile is superimposed upon a history of daily ETOH use, macular degeneration, hearing loss, CAD s/p CABG-stent placement, systolic CHF, cardiac arrest s/p AICD placement, COPD, lung cancer s/p lobectomy, HTN, CKD, carotid stenosis s/p CEA, PVD s/p stent placement, prior appy and past left knee surgery.  See below for additional prior medical information.

## 2021-06-11 NOTE — PROGRESS NOTE ADULT - ASSESSMENT
85 y/o M w/ PMH of HTN, systolic CHF, COPD, CAD s/p CABG / PCI, PVD s/p stent, CKD III, dyslipidemia, lung cancer s/p lobectomy, cardiac arrest s/p AICD, CEA, p/w syncope      *Acute hypoxic RF: now extubated  - on 3L NC, continues to be lethargic  - Continue  monitoring renal function  - Hold ativan as patient is obtunded  - Patient now experiencing delirium.    - protonix QD for GI ppx  - on zosyn for possible aspiration PNA pending bcx, ucx, sputumcx    *Syncope   -Check orthostatics: negative so far  -Troponin negative x 1  -Echo: f/u  -CTH- nasal fracture   - EP interrogated device  -Cardio recs appreciated  -Neuro recs appreciated  -EP consult for AICD interrogation   -Alcohol level - 186  - CIWA protocol once extubated  -Wound care for facial injury     *Nasal bone fracture  -OMFS consult     *Hypokalemia  -Replete and recheck     *H/o systolic CHF / COPD / CAD / PVD / CKD III / dyslipidemia / lung cancer   -C/w home meds and f/u outpatient for further management if conditions remain stable during hospitalization     *DVT ppx  Lovenox    d/w Dr. Adame and Dr. Colunga

## 2021-06-11 NOTE — PROGRESS NOTE ADULT - ASSESSMENT
85 yo male, PMHx HTN, HFrEF 40%, COPD, CAD s/p CABG/PCI, PVD s/p stent, CKD3, lung cancer s/p lobectomy, cardiac arrest s/p AICD, CEA, BIBA from home s/p mechanical fall secondary to ETOH intoxication. Patient's daughter states he has fallen due to inebriation 3x over the past 3 weeks. He was admitted to medical service for further care. RRT called 6/8 for AMS. Patient was initially agitated and delirious, had been given benzodiazepines, and was subsequently obtunded, unable to protect his airway. He was intubated on 6/8, and extubated on 6/9.       Neuro: Acute encephalopathy appears to be more consistent with Hospital-acquired delirium. Off Precedex, D/C Standing Ativan taper as this may be contributing to his delirium. That being said, patient has heavy alcohol consumption daily and is at risk for fulminant EtOH withdrawal in the coming days. This was discussed extensively with patient's daughter at bedside and over the phone today.  CV: Shock resolved, remains off pressors. Midodrine discontinued. Having episodes of NSVT and AFib RVR in the setting of hypokalemia, resume home beta blocker.  Pulm: Extubated to nasal cannula. High risk for aspiration given his altered mental status, keep HOB elevated   GI: Failed dysphagia screen, mentation improved off Ativan and was able to pass SLP eval this afternoon  Renal: Monitor renal function trend, aggressively supplement electrolytes to keep K >4, Mg >2 for optimal arrhythmia suppression.   ID: Treatment for presumed aspiration pneumonia, on Zosyn for empiric coverage. Pending BCx / UCx / sputum cx   Heme: Lovenox for DVT ppx   87 yo male, PMHx HTN, HFrEF 40%, COPD, CAD s/p CABG/PCI, PVD s/p stent, CKD3, lung cancer s/p lobectomy, cardiac arrest s/p AICD, CEA, BIBA from home s/p mechanical fall secondary to ETOH intoxication. Patient's daughter states he has fallen due to inebriation 3x over the past 3 weeks. He was admitted to medical service for further care. RRT called 6/8 for AMS. Patient was initially agitated and delirious, had been given benzodiazepines, and was subsequently obtunded, unable to protect his airway. He was intubated on 6/8, and extubated on 6/9.       Neuro: Acute encephalopathy appears to be more consistent with Hospital-acquired delirium. Off Precedex, D/C Standing Ativan taper as this may be contributing to his delirium. Given Seroquel with improvement, may continue standing daily. That being said, patient has heavy alcohol consumption daily and is at risk for fulminant EtOH withdrawal in the coming days. This was discussed extensively with patient's daughter at bedside and over the phone today.  CV: Shock resolved, remains off pressors. Midodrine discontinued. Having episodes of NSVT and AFib RVR in the setting of hypokalemia, resume home beta blocker.  Pulm: Extubated to nasal cannula. High risk for aspiration given his altered mental status, keep HOB elevated   GI: Failed dysphagia screen, mentation improved off Ativan and was able to pass SLP eval this afternoon  Renal: Monitor renal function trend, aggressively supplement electrolytes to keep K >4, Mg >2 for optimal arrhythmia suppression.   ID: Treatment for presumed aspiration pneumonia, on Zosyn for empiric coverage. Pending BCx / UCx / sputum cx   Heme: Lovenox for DVT ppx

## 2021-06-11 NOTE — SWALLOW BEDSIDE ASSESSMENT ADULT - SWALLOW EVAL: FEEDING ASSISTANCE
PT BENEFITTED FROM ASSISTANCE TO FEED DUE TO FLUCTUATING DELIRIUM AND A TENDENCY TO KEEP EYES CLOSED WHEN AWAKE.

## 2021-06-11 NOTE — SWALLOW BEDSIDE ASSESSMENT ADULT - NS SPL SWALLOW CLINIC TRIAL FT
NO BEHAVIORAL ASPIRATION SIGNS EXHIBITED. ODYNOPHAGIA WAS DENIED. NO CHANGE IN O2 SATS NOTED PRE/POST SWALLOWING TESTING.

## 2021-06-11 NOTE — DIETITIAN INITIAL EVALUATION ADULT. - OTHER INFO
85yo male with PMH significant for HTN, systolic CHF, COPD, CAD s/p CABG, CKD 3, dyslipidemia, lung CA s/p lobectomy, cardiac arrest s/p AICD, CEA, presented s/p syncope.  Pt was intubated for hypercapnic resp failure after receiving ativan for ETOH withdrawal.  chest xray shows worsening pleural infiltrates 2/2 CHF vs PNA.  Pt extubated on 6/9.  nasal bone Fx.      *pt lethargic, not opening eyes during visit.  d/w PA, pending SLP eval to ensure safe diet texture.    *pt admitted with wt of 210.9# on 6/8.  New wt of 206.1# on 6/11; wt loss of ~4# in 3 days (fluid loss?), 2% wt loss x 3 days.  clinically significant.

## 2021-06-11 NOTE — DIETITIAN INITIAL EVALUATION ADULT. - PERTINENT MEDS FT
MEDICATIONS  (STANDING):  aspirin  chewable 81 milliGRAM(s) Oral daily  atorvastatin 10 milliGRAM(s) Oral at bedtime  enoxaparin Injectable 40 milliGRAM(s) SubCutaneous daily  erythromycin   Ointment 1 Application(s) Right EYE four times a day  folic acid 1 milliGRAM(s) Oral daily  melatonin 5 milliGRAM(s) Oral at bedtime  metoprolol tartrate 25 milliGRAM(s) Oral two times a day  multivitamin 1 Tablet(s) Oral daily  pantoprazole    Tablet 40 milliGRAM(s) Oral before breakfast  piperacillin/tazobactam IVPB.. 3.375 Gram(s) IV Intermittent every 8 hours    MEDICATIONS  (PRN):  acetaminophen   Tablet .. 650 milliGRAM(s) Oral every 6 hours PRN Mild Pain (1 - 3)  meclizine 25 milliGRAM(s) Oral every 12 hours PRN Dizziness

## 2021-06-11 NOTE — SWALLOW BEDSIDE ASSESSMENT ADULT - ADDITIONAL RECOMMENDATIONS
NUTRITION F/U. PT WITH HYPOKALEMIA, HEART DZ, HTN AND CKD. PT OFTEN BENEFITS FROM ASSISTANCE TO FEED DUE TO DELIRIUM/TENDENCY TO KEEP EYES CLOSED WHEN IN AN AWAKE STATE.

## 2021-06-11 NOTE — SWALLOW BEDSIDE ASSESSMENT ADULT - SWALLOW EVAL: DIAGNOSIS
1) Feeding integrity is hindered by fatigue/delirium and a tendency to keep eyes closed when in an awake state in setting ofgetI d 1) Feeding integrity is hindered by delirium(ETOH use, fluctuating lethargy, fall w/nasal bone fracture, hypokalemia, respiratory failure s/p transient intubation) as well as a tendency to keep his eyes closed when in an awake state. With that being stated, he exhibited Oropharyngeal Swallowing abilities which subjectively appeared to be grossly within functional parameters for age when in an alert calm state. NO behavioral aspiration signs exhibited. Odynophagia was denied.   2) Pt is arousable/interactive but [place] and fatigued/communicatively passive/internally distractible in setting of delirium. Additionally, pt tended to keep his eyes closed, even when in an awake state. With that being stated, pt was able to verbalize during communicative probes. At these times, his motor speech abilities were grossly functional, his verbalizations were linguistically intact and his utterances were contextually appropriate. Pt was able to verbalize needs when in a an alert/calm state. 1) Feeding integrity is hindered by delirium(ETOH use, fluctuating lethargy, fall w/nasal bone fracture, hypokalemia, respiratory failure s/p transient intubation) as well as a tendency to keep his eyes closed when in an awake state. With that being stated, he exhibited Oropharyngeal Swallowing abilities which subjectively appeared to be grossly within functional parameters for age when in an alert calm state. NO behavioral aspiration signs exhibited. Odynophagia was denied.   2) Pt is arousable/interactive but [place] and fatigued/communicatively passive/internally distractible in setting of delirium. Additionally, pt tended to keep his eyes closed, even when in an awake state. With that being stated, pt was able to verbalize during communicative probes. At these times, his motor speech abilities were grossly functional, his verbalizations were linguistically intact and his utterances were contextually appropriate. Pt was able to verbalize needs when in an alert/calm state.

## 2021-06-11 NOTE — DIETITIAN INITIAL EVALUATION ADULT. - SIGNS/SYMPTOMS
Pt to intake began search process per protocol -pt refused search, reports he is not staying, has changed his mind and is leaving.  Ed provider was notified.    
pt altered, not able to eat x 3 days

## 2021-06-11 NOTE — DIETITIAN INITIAL EVALUATION ADULT. - ORAL INTAKE PTA/DIET HISTORY
pt not able to provide information; minimally responding to questions.  able to tell his name, where he is, and the year.  not much else.

## 2021-06-11 NOTE — SWALLOW BEDSIDE ASSESSMENT ADULT - ASR SWALLOW LINGUAL MOBILITY
Effort was reduced when executing volitional lingual actions but no tongue focality evident on exam.

## 2021-06-11 NOTE — ED PROVIDER NOTE - CPE EDP EYES NORM
Quality 154 Part B: Falls: Risk Screening (Should Be Reported With Measure 155.): Patient screened for future fall risk; documentation of no falls in the past year or only one fall without injury in the past year
Quality 47: Advance Care Plan: Advance Care Planning discussed and documented in the medical record; patient did not wish or was not able to name a surrogate decision maker or provide an advance care plan.
normal...
Detail Level: Detailed
Quality 431: Preventive Care And Screening: Unhealthy Alcohol Use - Screening: Patient screened for unhealthy alcohol use using a single question and scores less than 2 times per year
Quality 111:Pneumonia Vaccination Status For Older Adults: Pneumococcal Vaccination not Administered or Previously Received, Reason not Otherwise Specified
Quality 110: Preventive Care And Screening: Influenza Immunization: Influenza Immunization Administered during Influenza season
Quality 154 Part A: Falls: Risk Assessment (Should Be Reported With Measure 155.): Falls risk assessment completed and documented in the past 12 months.
Quality 226: Preventive Care And Screening: Tobacco Use: Screening And Cessation Intervention: Patient screened for tobacco use and is an ex/non-smoker
Quality 155 (Denominator): Falls Plan Of Care: Plan of Care not Documented, Reason not Otherwise Specified

## 2021-06-11 NOTE — PROGRESS NOTE ADULT - SUBJECTIVE AND OBJECTIVE BOX
Patient is a 86y old  Male who presents with a chief complaint of Syncope (11 Jun 2021 16:29)      BRIEF HOSPITAL COURSE: ***      Events last 24 hours: ***      PAST MEDICAL & SURGICAL HISTORY:  Hypertension    CAD (coronary artery disease)    Cardiac arrest with successful resuscitation    CKD (chronic kidney disease)    PAD (peripheral artery disease)    Lung cancer    Heart failure    AICD (automatic cardioverter/defibrillator) present    S/P carotid endarterectomy    History of lobectomy of lung    History of appendectomy    H/O left knee surgery    History of tonsillectomy and adenoidectomy            SOCIAL HISTORY:        Review of Systems:  CONSTITUTIONAL: No fever, chills, or fatigue  EYES: No visual disturbances  ENMT:  No difficulty hearing  NECK: No pain  RESPIRATORY: No cough. No shortness of breath  CARDIOVASCULAR: No chest pain, palpitations, or leg swelling  GASTROINTESTINAL: No abdominal pain. No nausea, vomiting, diarrhea, or constipation. No hematemesis, melena or hematochezia  GENITOURINARY: No dysuria, frequency, hematuria, or incontinence  NEUROLOGICAL: No headaches, loss of strength, numbness, or tremors  SKIN: No rashes  MUSCULOSKELETAL: No back or extremity pain. No calf pain  PSYCHIATRIC: No depression, anxiety, or difficulty sleeping      [  ] Due to altered mental status/intubation, subjective information was not able to be obtained from the patient. History was obtained, to the extent possible, from review of the chart and collateral sources of information.        Medications:  piperacillin/tazobactam IVPB.. 3.375 Gram(s) IV Intermittent every 8 hours    metoprolol tartrate 25 milliGRAM(s) Oral two times a day  metoprolol tartrate Injectable 5 milliGRAM(s) IV Push once      acetaminophen   Tablet .. 650 milliGRAM(s) Oral every 6 hours PRN  meclizine 25 milliGRAM(s) Oral every 12 hours PRN  melatonin 5 milliGRAM(s) Oral at bedtime      aspirin  chewable 81 milliGRAM(s) Oral daily  enoxaparin Injectable 40 milliGRAM(s) SubCutaneous daily    pantoprazole    Tablet 40 milliGRAM(s) Oral before breakfast      atorvastatin 10 milliGRAM(s) Oral at bedtime    folic acid 1 milliGRAM(s) Oral daily  multivitamin 1 Tablet(s) Oral daily      erythromycin   Ointment 1 Application(s) Right EYE four times a day            ICU Vital Signs Last 24 Hrs  T(C): 36.4 (11 Jun 2021 16:38), Max: 37.9 (10 Celso 2021 22:00)  T(F): 97.5 (11 Jun 2021 16:38), Max: 100.2 (10 Celso 2021 22:00)  HR: 97 (11 Jun 2021 18:00) (63 - 110)  BP: 162/78 (11 Jun 2021 18:00) (81/42 - 196/106)  BP(mean): 100 (11 Jun 2021 18:00) (49 - 129)  ABP: --  ABP(mean): --  RR: 24 (11 Jun 2021 18:00) (6 - 27)  SpO2: 99% (11 Jun 2021 18:00) (86% - 100%)          I&O's Detail    10 Celso 2021 07:01  -  11 Jun 2021 07:00  --------------------------------------------------------  IN:    IV PiggyBack: 800 mL  Total IN: 800 mL    OUT:    Indwelling Catheter - Urethral (mL): 300 mL  Total OUT: 300 mL    Total NET: 500 mL      11 Jun 2021 07:01  -  11 Jun 2021 18:57  --------------------------------------------------------  IN:  Total IN: 0 mL    OUT:    Voided (mL): 400 mL  Total OUT: 400 mL    Total NET: -400 mL            LABS:                        15.4   8.28  )-----------( 153      ( 11 Jun 2021 01:49 )             46.7     06-11    139  |  103  |  13  ----------------------------<  153<H>  3.2<L>   |  29  |  1.09    Ca    8.5      11 Jun 2021 01:49  Phos  2.0     06-11  Mg     2.0     06-11            CAPILLARY BLOOD GLUCOSE      POCT Blood Glucose.: 110 mg/dL (10 Celso 2021 12:19)        CULTURES:  Culture Results:   Normal Respiratory Celina present (06-09 @ 14:00)  Culture Results:   <10,000 CFU/mL Normal Urogenital Celina (06-09 @ 10:45)            Physical Examination:    General: No acute distress.      HEENT: Pupils equal, reactive to light.  Symmetric.    PULM: Clear to auscultation bilaterally, no significant sputum production    CVS: Regular rate and rhythm, no murmurs, rubs, or gallops    ABD: Soft, nondistended, nontender, normoactive bowel sounds, no masses    EXT: No edema, nontender    SKIN: Warm and well perfused, no rashes noted.    NEURO: Alert, oriented, interactive, nonfocal        RADIOLOGY: ***        INVASIVE LINES:  INDWELLING ADAMS:  VTE PROPHYLAXIS:  CAM ICU:  CODE STATUS:        CRITICAL CARE TIME SPENT: *** minutes spent performing frequent bedside reassessments and augmenting plan of care to address problems of acute critical illness that pose high probability of life threatening deterioration and/or end organ damage/dysfunction and discussing goals of care, non-inclusive of time spent on procedures performed. Patient is a 86y old  Male who presents with a chief complaint of Syncope (11 Jun 2021 16:29)      BRIEF HOSPITAL COURSE: 87 yo male, PMHx HTN, HFrEF 40%, COPD, CAD s/p CABG/PCI, PVD s/p stent, CKD3, lung cancer s/p lobectomy, cardiac arrest s/p AICD, CEA, BIBA from home s/p mechanical fall secondary to ETOH intoxication. Patient's daughter states he has fallen due to inebriation 3x over the past 3 weeks. He was admitted to medical service for further care. RRT called 6/8 for AMS. Patient was initially agitated and delirious, had been given benzodiazepines, and was subsequently obtunded, unable to protect his airway. He was intubated on 6/8, and extubated on 6/9.       Events last 24 hours: Lethargic this morning, delirious requiring frequent reorientation, not always redirectable, but calm. Stopped standing Ativan.          PAST MEDICAL & SURGICAL HISTORY:  Hypertension    CAD (coronary artery disease)    Cardiac arrest with successful resuscitation    CKD (chronic kidney disease)    PAD (peripheral artery disease)    Lung cancer    Heart failure    AICD (automatic cardioverter/defibrillator) present    S/P carotid endarterectomy    History of lobectomy of lung    History of appendectomy    H/O left knee surgery    History of tonsillectomy and adenoidectomy        SOCIAL HISTORY: Daily EtOH misuse, per daughter he drinks at least two glasses of Bacardi until the point that he is slurring his speech, dropping things, and falling.        Review of Systems: Due to altered mental status, subjective information was not able to be obtained from the patient. History was obtained, to the extent possible, from review of the chart and collateral sources of information.        Medications:  piperacillin/tazobactam IVPB.. 3.375 Gram(s) IV Intermittent every 8 hours    metoprolol tartrate 25 milliGRAM(s) Oral two times a day  metoprolol tartrate Injectable 5 milliGRAM(s) IV Push once      acetaminophen   Tablet .. 650 milliGRAM(s) Oral every 6 hours PRN  meclizine 25 milliGRAM(s) Oral every 12 hours PRN  melatonin 5 milliGRAM(s) Oral at bedtime      aspirin  chewable 81 milliGRAM(s) Oral daily  enoxaparin Injectable 40 milliGRAM(s) SubCutaneous daily    pantoprazole    Tablet 40 milliGRAM(s) Oral before breakfast      atorvastatin 10 milliGRAM(s) Oral at bedtime    folic acid 1 milliGRAM(s) Oral daily  multivitamin 1 Tablet(s) Oral daily      erythromycin   Ointment 1 Application(s) Right EYE four times a day            ICU Vital Signs Last 24 Hrs  T(C): 36.4 (11 Jun 2021 16:38), Max: 37.9 (10 Celso 2021 22:00)  T(F): 97.5 (11 Jun 2021 16:38), Max: 100.2 (10 Celso 2021 22:00)  HR: 97 (11 Jun 2021 18:00) (63 - 110)  BP: 162/78 (11 Jun 2021 18:00) (81/42 - 196/106)  BP(mean): 100 (11 Jun 2021 18:00) (49 - 129)  ABP: --  ABP(mean): --  RR: 24 (11 Jun 2021 18:00) (6 - 27)  SpO2: 99% (11 Jun 2021 18:00) (86% - 100%)          I&O's Detail    10 Celso 2021 07:01  -  11 Jun 2021 07:00  --------------------------------------------------------  IN:    IV PiggyBack: 800 mL  Total IN: 800 mL    OUT:    Indwelling Catheter - Urethral (mL): 300 mL  Total OUT: 300 mL    Total NET: 500 mL      11 Jun 2021 07:01  -  11 Jun 2021 18:57  --------------------------------------------------------  IN:  Total IN: 0 mL    OUT:    Voided (mL): 400 mL  Total OUT: 400 mL    Total NET: -400 mL            LABS:                        15.4   8.28  )-----------( 153      ( 11 Jun 2021 01:49 )             46.7     06-11    139  |  103  |  13  ----------------------------<  153<H>  3.2<L>   |  29  |  1.09    Ca    8.5      11 Jun 2021 01:49  Phos  2.0     06-11  Mg     2.0     06-11            CAPILLARY BLOOD GLUCOSE      POCT Blood Glucose.: 110 mg/dL (10 Celso 2021 12:19)        CULTURES:  Culture Results:   Normal Respiratory Celina present (06-09 @ 14:00)  Culture Results:   <10,000 CFU/mL Normal Urogenital Celina (06-09 @ 10:45)            Physical Examination:    General: Ill appearing. No acute distress.      HEENT: L scalp scab. Mild periorbital edema with faint ecchymosis. Pupils equal, reactive to light. Symmetric.     PULM: Clear to auscultation bilaterally    CVS: Regular rate and rhythm    ABD: Soft, nondistended, nontender, normoactive bowel sounds    EXT: No edema    SKIN: Warm and well perfused, no rashes noted.    NEURO: Somnolent, oriented to person, place, does not know why he is here, interactive but requires frequent reorientation and redirection, nonfocal        RADIOLOGY: < from: Xray Chest 1 View- PORTABLE-Urgent (Xray Chest 1 View- PORTABLE-Urgent .) (06.09.21 @ 15:55) >    EXAM:  XR CHEST PORTABLE URGENT 1V                            PROCEDURE DATE:  06/09/2021          INTERPRETATION:  Portable chest radiograph    CLINICAL INFORMATION: Intubation. Follow-up    TECHNIQUE:  Portable  AP view of the chest was obtained.    COMPARISON: 6/7/2021 chest available for review.    FINDINGS:  ET tube tip above tracheal bifurcation.  NG tube tip beyond GE junction.    The lungs show bibasilar mild diffuse airspace disease and/or effusions layering along posterior thoracic wall... No pneumothorax.    The  heart is enlarged in transverse diameter. No hilar mass.    Right atrium and biventricular cardiac wire leads in place.     Visualized osseous structures are intact.    IMPRESSION:   ET tube tip above tracheal bifurcation.  NG tube tip beyond GE junction.  .    Mild bibasilar  diffuse airspace disease and/or pleural effusions.    PRACHI EARLY MD; Attending Radiologist  This document has been electronically signed. Celso 10 2021  8:45AM          INVASIVE LINES: X   INDWELLING ADAMS: X  VTE PROPHYLAXIS:  CAM ICU:  CODE STATUS:        CRITICAL CARE TIME SPENT: 45 minutes spent performing frequent bedside reassessments and augmenting plan of care to address problems of acute critical illness that pose high probability of life threatening deterioration and/or end organ damage/dysfunction and discussing goals of care, non-inclusive of time spent on procedures performed. Patient is a 86y old  Male who presents with a chief complaint of Syncope (11 Jun 2021 16:29)      BRIEF HOSPITAL COURSE: 87 yo male, PMHx HTN, HFrEF 40%, COPD, CAD s/p CABG/PCI, PVD s/p stent, CKD3, lung cancer s/p lobectomy, cardiac arrest s/p AICD, CEA, BIBA from home s/p mechanical fall secondary to ETOH intoxication. Patient's daughter states he has fallen due to inebriation 3x over the past 3 weeks. He was admitted to medical service for further care. RRT called 6/8 for AMS. Patient was initially agitated and delirious, had been given benzodiazepines, and was subsequently obtunded, unable to protect his airway. He was intubated on 6/8, and extubated on 6/9.       Events last 24 hours: Lethargic this morning, delirious requiring frequent reorientation, not always redirectable, but calm. Stopped standing Ativan. Given Seroquel with improvement           PAST MEDICAL & SURGICAL HISTORY:  Hypertension    CAD (coronary artery disease)    Cardiac arrest with successful resuscitation    CKD (chronic kidney disease)    PAD (peripheral artery disease)    Lung cancer    Heart failure    AICD (automatic cardioverter/defibrillator) present    S/P carotid endarterectomy    History of lobectomy of lung    History of appendectomy    H/O left knee surgery    History of tonsillectomy and adenoidectomy        SOCIAL HISTORY: Daily EtOH misuse, per daughter he drinks at least two glasses of Bacardi until the point that he is slurring his speech, dropping things, and falling.        Review of Systems: Due to altered mental status, subjective information was not able to be obtained from the patient. History was obtained, to the extent possible, from review of the chart and collateral sources of information.        Medications:  piperacillin/tazobactam IVPB.. 3.375 Gram(s) IV Intermittent every 8 hours    metoprolol tartrate 25 milliGRAM(s) Oral two times a day  metoprolol tartrate Injectable 5 milliGRAM(s) IV Push once      acetaminophen   Tablet .. 650 milliGRAM(s) Oral every 6 hours PRN  meclizine 25 milliGRAM(s) Oral every 12 hours PRN  melatonin 5 milliGRAM(s) Oral at bedtime      aspirin  chewable 81 milliGRAM(s) Oral daily  enoxaparin Injectable 40 milliGRAM(s) SubCutaneous daily    pantoprazole    Tablet 40 milliGRAM(s) Oral before breakfast      atorvastatin 10 milliGRAM(s) Oral at bedtime    folic acid 1 milliGRAM(s) Oral daily  multivitamin 1 Tablet(s) Oral daily      erythromycin   Ointment 1 Application(s) Right EYE four times a day            ICU Vital Signs Last 24 Hrs  T(C): 36.4 (11 Jun 2021 16:38), Max: 37.9 (10 Celso 2021 22:00)  T(F): 97.5 (11 Jun 2021 16:38), Max: 100.2 (10 Celso 2021 22:00)  HR: 97 (11 Jun 2021 18:00) (63 - 110)  BP: 162/78 (11 Jun 2021 18:00) (81/42 - 196/106)  BP(mean): 100 (11 Jun 2021 18:00) (49 - 129)  ABP: --  ABP(mean): --  RR: 24 (11 Jun 2021 18:00) (6 - 27)  SpO2: 99% (11 Jun 2021 18:00) (86% - 100%)          I&O's Detail    10 Celso 2021 07:01  -  11 Jun 2021 07:00  --------------------------------------------------------  IN:    IV PiggyBack: 800 mL  Total IN: 800 mL    OUT:    Indwelling Catheter - Urethral (mL): 300 mL  Total OUT: 300 mL    Total NET: 500 mL      11 Jun 2021 07:01  -  11 Jun 2021 18:57  --------------------------------------------------------  IN:  Total IN: 0 mL    OUT:    Voided (mL): 400 mL  Total OUT: 400 mL    Total NET: -400 mL            LABS:                        15.4   8.28  )-----------( 153      ( 11 Jun 2021 01:49 )             46.7     06-11    139  |  103  |  13  ----------------------------<  153<H>  3.2<L>   |  29  |  1.09    Ca    8.5      11 Jun 2021 01:49  Phos  2.0     06-11  Mg     2.0     06-11            CAPILLARY BLOOD GLUCOSE      POCT Blood Glucose.: 110 mg/dL (10 Celso 2021 12:19)        CULTURES:  Culture Results:   Normal Respiratory Celina present (06-09 @ 14:00)  Culture Results:   <10,000 CFU/mL Normal Urogenital Celina (06-09 @ 10:45)            Physical Examination:    General: Ill appearing. No acute distress.      HEENT: L scalp scab. Mild periorbital edema with faint ecchymosis. Pupils equal, reactive to light. Symmetric.     PULM: Clear to auscultation bilaterally    CVS: Regular rate and rhythm    ABD: Soft, nondistended, nontender, normoactive bowel sounds    EXT: No edema    SKIN: Warm and well perfused, no rashes noted.    NEURO: Somnolent, oriented to person, place, does not know why he is here, interactive but requires frequent reorientation and redirection, nonfocal        RADIOLOGY: < from: Xray Chest 1 View- PORTABLE-Urgent (Xray Chest 1 View- PORTABLE-Urgent .) (06.09.21 @ 15:55) >    EXAM:  XR CHEST PORTABLE URGENT 1V                            PROCEDURE DATE:  06/09/2021          INTERPRETATION:  Portable chest radiograph    CLINICAL INFORMATION: Intubation. Follow-up    TECHNIQUE:  Portable  AP view of the chest was obtained.    COMPARISON: 6/7/2021 chest available for review.    FINDINGS:  ET tube tip above tracheal bifurcation.  NG tube tip beyond GE junction.    The lungs show bibasilar mild diffuse airspace disease and/or effusions layering along posterior thoracic wall... No pneumothorax.    The  heart is enlarged in transverse diameter. No hilar mass.    Right atrium and biventricular cardiac wire leads in place.     Visualized osseous structures are intact.    IMPRESSION:   ET tube tip above tracheal bifurcation.  NG tube tip beyond GE junction.  .    Mild bibasilar  diffuse airspace disease and/or pleural effusions.    PRACHI EARLY MD; Attending Radiologist  This document has been electronically signed. Celso 10 2021  8:45AM          INVASIVE LINES: X   INDWELLING ADAMS: X  VTE PROPHYLAXIS:  CAM ICU:  CODE STATUS:        CRITICAL CARE TIME SPENT: 45 minutes spent performing frequent bedside reassessments and augmenting plan of care to address problems of acute critical illness that pose high probability of life threatening deterioration and/or end organ damage/dysfunction and discussing goals of care, non-inclusive of time spent on procedures performed.

## 2021-06-11 NOTE — PROGRESS NOTE ADULT - SUBJECTIVE AND OBJECTIVE BOX
87 y/o M w/ PMH of HTN, systolic CHF, COPD, CAD s/p CABG / PCI, PVD s/p stent, CKD III, dyslipidemia, lung cancer s/p lobectomy, cardiac arrest s/p AICD, CEA, p/w syncope. Patient states that the last thing he remembers is walking from kitchen to his living room, and then he woke up on the floor. Denies having any symptoms prior to syncope. Denies CP, SOB, palpitations, LH, dizziness. Patient states when he woke up he noticed blood dripping on his chest, so he pressed his life alert button and was brought to ED. Patient c/o mild pain on R side of nose. Also has injury to his head. Denies weakness in arms / legs, facial droop, sensory deficits, pain, cough, runny nose, sore thoroat, nausea, vomiting, abdominal pain, urinary incontinence, tongue bite.     Subjective:  Spoke to HUSSEIN Shore in regards to patient updates as patient is too lethargic at the moment. Pt continue to be lethargic w/ episodes of delirium. Plan will be to hold off on Ativan and continue monitoring patient until mentation improves.     REVIEW OF SYSTEMS:  unable to obtain due to sedation    PHYSICAL EXAM:  Vital Signs Last 24 Hrs  T(C): 36.1 (11 Jun 2021 14:19), Max: 37.9 (10 Celso 2021 22:00)  T(F): 97 (11 Jun 2021 14:19), Max: 100.2 (10 Celso 2021 22:00)  HR: 73 (11 Jun 2021 15:00) (63 - 110)  BP: 143/60 (11 Jun 2021 15:00) (81/42 - 196/106)  BP(mean): 81 (11 Jun 2021 15:00) (49 - 129)  RR: 17 (11 Jun 2021 15:00) (10 - 27)  SpO2: 98% (11 Jun 2021 15:00) (86% - 100%)    General: elderly male NAD, lethrgic,  NEURO: A&O X 0  HEENT: Pupils equal, reactive to light.  Symmetric.  PULM: CTA , mild ralses at bases , no significant sputum production,   CVS: Regular rate and rhythm, no murmurs, rubs, or gallops  ABD: Soft, nondistended, nontender, normoactive bowel sounds, no masses  EXT: No edema, nontender  SKIN: Warm and well perfused, no rashes noted  5/5 strength b/l upper and lower extremities  Skin: No rashes    MEDICATIONS:  MEDICATIONS  (STANDING):  aspirin enteric coated 81 milliGRAM(s) Oral daily  atorvastatin 10 milliGRAM(s) Oral at bedtime  lisinopril 20 milliGRAM(s) Oral two times a day  melatonin 5 milliGRAM(s) Oral at bedtime  metoprolol tartrate 100 milliGRAM(s) Oral two times a day  pantoprazole    Tablet 40 milliGRAM(s) Oral before breakfast      LABS: All Labs Reviewed:               LABS:  cret                        15.4   8.28  )-----------( 153      ( 11 Jun 2021 01:49 )             46.7     06-11    139  |  103  |  13  ----------------------------<  153<H>  3.2<L>   |  29  |  1.09    Ca    8.5      11 Jun 2021 01:49  Phos  2.0     06-11  Mg     2.0     06-11    RADIOLOGY/EKG:    rad< from: CT Head No Cont (06.07.21 @ 21:22) >  CT HEAD: No acute intracranial hemorrhage, mass effect, or osseous fracture.    CT MAXILLOFACIAL BONES: Comminuted nasal bone fractures. There is no other acute maxillofacial bone fracture.    CT CERVICAL SPINE: No acute cervical spine fracture or traumatic malalignment.    < end of copied text >  < from: Xray Chest 1 View AP/PA. (06.07.21 @ 21:33) >  Chest is similar to March 13 of this year.    IMPRESSION: Stable findings as above.    < end of copied text >

## 2021-06-11 NOTE — SWALLOW BEDSIDE ASSESSMENT ADULT - SLP GENERAL OBSERVATIONS
On encounter, ecchymosis is noted by right eye. Pt is arousable/interactive but Lummi and fatigued/communicatively passive/internally distractible in setting of delirium. Additionally, pt tended to keep his eyes closed, even when in an awake state. With that being stated, pt was able to verbalize during communicative probes. At these times, his motor speech abilities were grossly functional, his verbalizations were linguistically intact and his utterances were contextually appropriate. Pt was able to verbalize needs when in a an alert/calm state. On encounter, ecchymosis is noted by right eye. Pt is arousable/interactive but Habematolel and fatigued/communicatively passive/internally distractible in setting of delirium. Additionally, pt tended to keep his eyes closed, even when in an awake state. With that being stated, pt was able to verbalize during communicative probes. At these times, his motor speech abilities were grossly functional, his verbalizations were linguistically intact and his utterances were contextually appropriate. Pt was able to verbalize needs when in an alert/calm state.

## 2021-06-11 NOTE — DIETITIAN INITIAL EVALUATION ADULT. - ADD RECOMMEND
1) if SLP approves PO diet, add ensure enlive TID 2) consider checking vitamin D level and supplement prn 3) monitor PO intake/tolerance 4) daily wt checks to track/trend changes

## 2021-06-11 NOTE — DIETITIAN INITIAL EVALUATION ADULT. - PERTINENT LABORATORY DATA
06-11    139  |  103  |  13  ----------------------------<  153<H>  3.2<L>   |  29  |  1.09    Ca    8.5      11 Jun 2021 01:49  Phos  2.0     06-11  Mg     2.0     06-11    BMI: BMI (kg/m2): 28.6 (06-08-21 @ 06:00)  HbA1c:   Glucose: POCT Blood Glucose.: 110 mg/dL (06-10-21 @ 12:19)    BP: 105/54 (06-11-21 @ 08:00) (51/28 - 216/113)  Lipid Panel: Date/Time: 06-08-21 @ 06:32  Cholesterol, Serum: 175  Direct LDL: --  HDL Cholesterol, Serum: 80  Total Cholesterol/HDL Ration Measurement: --  Triglycerides, Serum: 110

## 2021-06-12 LAB
ANION GAP SERPL CALC-SCNC: 10 MMOL/L — SIGNIFICANT CHANGE UP (ref 5–17)
BUN SERPL-MCNC: 12 MG/DL — SIGNIFICANT CHANGE UP (ref 7–23)
CALCIUM SERPL-MCNC: 9.3 MG/DL — SIGNIFICANT CHANGE UP (ref 8.5–10.1)
CHLORIDE SERPL-SCNC: 103 MMOL/L — SIGNIFICANT CHANGE UP (ref 96–108)
CO2 SERPL-SCNC: 26 MMOL/L — SIGNIFICANT CHANGE UP (ref 22–31)
CREAT SERPL-MCNC: 0.96 MG/DL — SIGNIFICANT CHANGE UP (ref 0.5–1.3)
GLUCOSE SERPL-MCNC: 144 MG/DL — HIGH (ref 70–99)
HCT VFR BLD CALC: 47.7 % — SIGNIFICANT CHANGE UP (ref 39–50)
HGB BLD-MCNC: 16.1 G/DL — SIGNIFICANT CHANGE UP (ref 13–17)
MAGNESIUM SERPL-MCNC: 1.9 MG/DL — SIGNIFICANT CHANGE UP (ref 1.6–2.6)
MCHC RBC-ENTMCNC: 31.8 PG — SIGNIFICANT CHANGE UP (ref 27–34)
MCHC RBC-ENTMCNC: 33.8 GM/DL — SIGNIFICANT CHANGE UP (ref 32–36)
MCV RBC AUTO: 94.3 FL — SIGNIFICANT CHANGE UP (ref 80–100)
PHOSPHATE SERPL-MCNC: 2.2 MG/DL — LOW (ref 2.5–4.5)
PLATELET # BLD AUTO: 195 K/UL — SIGNIFICANT CHANGE UP (ref 150–400)
POTASSIUM SERPL-MCNC: 3.6 MMOL/L — SIGNIFICANT CHANGE UP (ref 3.5–5.3)
POTASSIUM SERPL-SCNC: 3.6 MMOL/L — SIGNIFICANT CHANGE UP (ref 3.5–5.3)
RBC # BLD: 5.06 M/UL — SIGNIFICANT CHANGE UP (ref 4.2–5.8)
RBC # FLD: 13.7 % — SIGNIFICANT CHANGE UP (ref 10.3–14.5)
SODIUM SERPL-SCNC: 139 MMOL/L — SIGNIFICANT CHANGE UP (ref 135–145)
WBC # BLD: 9.83 K/UL — SIGNIFICANT CHANGE UP (ref 3.8–10.5)
WBC # FLD AUTO: 9.83 K/UL — SIGNIFICANT CHANGE UP (ref 3.8–10.5)

## 2021-06-12 PROCEDURE — 93010 ELECTROCARDIOGRAM REPORT: CPT

## 2021-06-12 RX ORDER — METOPROLOL TARTRATE 50 MG
5 TABLET ORAL EVERY 6 HOURS
Refills: 0 | Status: DISCONTINUED | OUTPATIENT
Start: 2021-06-12 | End: 2021-06-23

## 2021-06-12 RX ORDER — QUETIAPINE FUMARATE 200 MG/1
25 TABLET, FILM COATED ORAL ONCE
Refills: 0 | Status: COMPLETED | OUTPATIENT
Start: 2021-06-12 | End: 2021-06-12

## 2021-06-12 RX ORDER — SODIUM,POTASSIUM PHOSPHATES 278-250MG
2 POWDER IN PACKET (EA) ORAL ONCE
Refills: 0 | Status: COMPLETED | OUTPATIENT
Start: 2021-06-12 | End: 2021-06-12

## 2021-06-12 RX ORDER — METOPROLOL TARTRATE 50 MG
5 TABLET ORAL ONCE
Refills: 0 | Status: COMPLETED | OUTPATIENT
Start: 2021-06-12 | End: 2021-06-12

## 2021-06-12 RX ORDER — METOPROLOL TARTRATE 50 MG
100 TABLET ORAL EVERY 12 HOURS
Refills: 0 | Status: DISCONTINUED | OUTPATIENT
Start: 2021-06-12 | End: 2021-06-23

## 2021-06-12 RX ADMIN — PIPERACILLIN AND TAZOBACTAM 25 GRAM(S): 4; .5 INJECTION, POWDER, LYOPHILIZED, FOR SOLUTION INTRAVENOUS at 05:21

## 2021-06-12 RX ADMIN — Medication 100 MILLIGRAM(S): at 21:56

## 2021-06-12 RX ADMIN — ATORVASTATIN CALCIUM 10 MILLIGRAM(S): 80 TABLET, FILM COATED ORAL at 21:56

## 2021-06-12 RX ADMIN — LISINOPRIL 20 MILLIGRAM(S): 2.5 TABLET ORAL at 10:36

## 2021-06-12 RX ADMIN — Medication 5 MILLIGRAM(S): at 21:56

## 2021-06-12 RX ADMIN — Medication 5 MILLIGRAM(S): at 19:26

## 2021-06-12 RX ADMIN — Medication 1 MILLIGRAM(S): at 10:36

## 2021-06-12 RX ADMIN — Medication 1 MILLIGRAM(S): at 02:02

## 2021-06-12 RX ADMIN — Medication 81 MILLIGRAM(S): at 10:36

## 2021-06-12 RX ADMIN — ENOXAPARIN SODIUM 40 MILLIGRAM(S): 100 INJECTION SUBCUTANEOUS at 10:36

## 2021-06-12 RX ADMIN — PIPERACILLIN AND TAZOBACTAM 25 GRAM(S): 4; .5 INJECTION, POWDER, LYOPHILIZED, FOR SOLUTION INTRAVENOUS at 15:07

## 2021-06-12 RX ADMIN — PIPERACILLIN AND TAZOBACTAM 25 GRAM(S): 4; .5 INJECTION, POWDER, LYOPHILIZED, FOR SOLUTION INTRAVENOUS at 21:56

## 2021-06-12 RX ADMIN — Medication 50 MILLIGRAM(S): at 10:36

## 2021-06-12 RX ADMIN — Medication 5 MILLIGRAM(S): at 15:15

## 2021-06-12 RX ADMIN — Medication 1 APPLICATION(S): at 05:21

## 2021-06-12 RX ADMIN — Medication 1 TABLET(S): at 10:36

## 2021-06-12 RX ADMIN — PANTOPRAZOLE SODIUM 40 MILLIGRAM(S): 20 TABLET, DELAYED RELEASE ORAL at 10:36

## 2021-06-12 RX ADMIN — Medication 2 PACKET(S): at 11:50

## 2021-06-12 RX ADMIN — QUETIAPINE FUMARATE 25 MILLIGRAM(S): 200 TABLET, FILM COATED ORAL at 21:56

## 2021-06-12 RX ADMIN — Medication 1 APPLICATION(S): at 02:05

## 2021-06-12 RX ADMIN — Medication 1 APPLICATION(S): at 11:49

## 2021-06-12 RX ADMIN — Medication 1 APPLICATION(S): at 18:45

## 2021-06-12 NOTE — PROGRESS NOTE ADULT - SUBJECTIVE AND OBJECTIVE BOX
85 y/o M w/ PMH of HTN, systolic CHF, COPD, CAD s/p CABG / PCI, PVD s/p stent, CKD III, dyslipidemia, lung cancer s/p lobectomy, cardiac arrest s/p AICD, CEA, p/w syncope. Patient states that the last thing he remembers is walking from kitchen to his living room, and then he woke up on the floor. Denies having any symptoms prior to syncope. Denies CP, SOB, palpitations, LH, dizziness. Patient states when he woke up he noticed blood dripping on his chest, so he pressed his life alert button and was brought to ED. Patient c/o mild pain on R side of nose. Also has injury to his head. Denies weakness in arms / legs, facial droop, sensory deficits, pain, cough, runny nose, sore thoroat, nausea, vomiting, abdominal pain, urinary incontinence, tongue bite.     Subjective:  Spoke to CCU PA in regards to patient updates as patient is too lethargic at the moment. Pt now able say name place and daughter but continues to be lethargic. Overnight patient with tachycardia, hypertension and hallucination suspicious fro delirium tremens. Given a total of 1.5 mg of Ativan. Pt to be downgraded to med-surg once stable.     REVIEW OF SYSTEMS:  unable to obtain given clinical condition    PHYSICAL EXAM:  Vital Signs Last 24 Hrs  T(C): 37.1 (12 Jun 2021 08:49), Max: 37.3 (11 Jun 2021 21:09)  T(F): 98.8 (12 Jun 2021 08:49), Max: 99.1 (11 Jun 2021 21:09)  HR: 89 (12 Jun 2021 09:00) (70 - 106)  BP: 101/64 (12 Jun 2021 09:00) (98/59 - 191/81)  BP(mean): 72 (12 Jun 2021 09:00) (68 - 119)  RR: 20 (12 Jun 2021 09:00) (6 - 24)  SpO2: 97% (12 Jun 2021 09:00) (91% - 100%)    General: elderly male NAD, lethrgic,  NEURO: A&O X 3  HEENT: Pupils equal, reactive to light.  Symmetric.  PULM: CTA , mild ralses at bases , no significant sputum production,   CVS: Regular rate and rhythm, no murmurs, rubs, or gallops  ABD: Soft, nondistended, nontender, normoactive bowel sounds, no masses  EXT: No edema, nontender  SKIN: Warm and well perfused, no rashes noted  5/5 strength b/l upper and lower extremities  Skin: No rashes    MEDICATIONS:  MEDICATIONS  (STANDING):  aspirin enteric coated 81 milliGRAM(s) Oral daily  atorvastatin 10 milliGRAM(s) Oral at bedtime  lisinopril 20 milliGRAM(s) Oral two times a day  melatonin 5 milliGRAM(s) Oral at bedtime  metoprolol tartrate 100 milliGRAM(s) Oral two times a day  pantoprazole    Tablet 40 milliGRAM(s) Oral before breakfast      LABS: All Labs Reviewed:                                   16.1   9.83  )-----------( 195      ( 12 Jun 2021 07:41 )             47.7     06-12    139  |  103  |  12  ----------------------------<  144<H>  3.6   |  26  |  0.96    Ca    9.3      12 Jun 2021 07:41  Phos  2.2     06-12  Mg     1.9     06-12  RADIOLOGY/EKG:    rad< from: CT Head No Cont (06.07.21 @ 21:22) >  CT HEAD: No acute intracranial hemorrhage, mass effect, or osseous fracture.    CT MAXILLOFACIAL BONES: Comminuted nasal bone fractures. There is no other acute maxillofacial bone fracture.    CT CERVICAL SPINE: No acute cervical spine fracture or traumatic malalignment.    < end of copied text >  < from: Xray Chest 1 View AP/PA. (06.07.21 @ 21:33) >  Chest is similar to March 13 of this year.    IMPRESSION: Stable findings as above.    < end of copied text >

## 2021-06-12 NOTE — PROGRESS NOTE ADULT - ASSESSMENT
Assessment:  85 y/o M w/ PMH of HTN, systolic CHF, COPD, CAD s/p CABG / PCI, PVD s/p stent, CKD III, dyslipidemia, lung cancer s/p lobectomy, cardiac arrest s/p AICD, CEA, p/w syncope. Patient states that the last thing he remembers is walking from kitchen to his living room, and then he woke up on the floor. Denies having any symptoms prior to syncope. Denies CP, SOB, palpitations, LH, dizziness. Patient states when he woke up he noticed blood dripping on his chest, so he pressed his life alert button and was brought to ED. Patient c/o mild pain on R side of nose. Also has injury to his head.     Problem List:  1. Delirium Tremens   2. ETOH withdrawal  3. Aspiration PNA  4. Afib     Plan:  #Delirium Tremens & ETOH withdrawal  - Patient was intubated earlier in hospital course from oversedation from benzo therapy for alcohol withdrawal. Tonight patient started exhibiting signs of DTs. Patient hypertensive, tachycardic, and hallucinating. I gave 0.5mg Ativan w/no response and gave another 1mg Ativan. Will keep close eye on patient as being elderly and benzo use can increase oversedation and worsen delirium. Earlier it was thought patient was delirious from being in hospital. Possible patient has hyperactive delirium, but given history of drink 2 glass fulls of bacardi and splash of coke daily, DTs is likely     #Aspiration PNA  - Awaiting blood, sputum, and urine cultures   - Empiric Zosyn for presumed aspiration PNA  - Follow wbc, procal, and temps   - Narrow therapeutics as cultures result     #Afib   - Usually gets metoprolol 100mg BID at home  - Was given 5mg metoprolol IVP today for NSVT  - Increased metoprolol to 50mg BID from 25mg BID, titrate as needed     #DVT prophylaxis  - Lovenox     #GI prophylaxis  - PPI     Assessment:  87 y/o M w/ PMH of HTN, systolic CHF, COPD, CAD s/p CABG / PCI, PVD s/p stent, CKD III, dyslipidemia, lung cancer s/p lobectomy, cardiac arrest s/p AICD, CEA, p/w syncope. Patient states that the last thing he remembers is walking from kitchen to his living room, and then he woke up on the floor. Denies having any symptoms prior to syncope. Denies CP, SOB, palpitations, LH, dizziness. Patient states when he woke up he noticed blood dripping on his chest, so he pressed his life alert button and was brought to ED. Patient c/o mild pain on R side of nose. Also has injury to his head.     Problem List:  1. Delirium Tremens   2. ETOH withdrawal  3. Aspiration PNA  4. Afib     Plan:  #Delirium Tremens & ETOH withdrawal  - Patient was intubated earlier in hospital course from oversedation from benzo therapy for alcohol withdrawal. Tonight patient started exhibiting signs of DTs. Patient hypertensive, tachycardic, and hallucinating. I gave 0.5mg Ativan w/no response and gave another 1mg Ativan. Will keep close eye on patient as being elderly and benzo use can increase oversedation and worsen delirium. Earlier it was thought patient was delirious from being in hospital. Possible patient has hyperactive delirium, but given history of drink 2 glass fulls of bacardi and splash of coke daily, DTs is likely     #Aspiration PNA  - Awaiting blood, sputum, and urine cultures   - Empiric Zosyn for presumed aspiration PNA  - Follow wbc, procal, and temps   - Narrow therapeutics as cultures result     #Afib & HTN  - Usually gets metoprolol 100mg BID at home  - Was given 5mg metoprolol IVP today for NSVT  - Increased metoprolol to 50mg BID from 25mg BID, titrate as needed   - Restarted home Lisinopril, does not have OLIVIA anymore, monitor BP and renal function     #DVT prophylaxis  - Lovenox     #GI prophylaxis  - PPI

## 2021-06-12 NOTE — PROGRESS NOTE ADULT - ASSESSMENT
85 y/o M w/ PMH of HTN, systolic CHF, COPD, CAD s/p CABG / PCI, PVD s/p stent, CKD III, dyslipidemia, lung cancer s/p lobectomy, cardiac arrest s/p AICD, CEA, p/w syncope    *Delirium tremens  - Overnight hypertensive, tachycardic and hallucinating  - given total of 1.5 ativan overnight  - will continue to monitor    *Acute hypoxic RF: now extubated  - on 3L NC, continues to be lethargic  - Continue  monitoring renal function  - Hold ativan as patient is obtunded  - Patient now experiencing delirium.    - protonix QD for GI ppx  - on zosyn for possible aspiration PNA pending bcx, ucx, sputumcx    *Syncope   -Check orthostatics: negative so far  -Troponin negative x 1  -Echo: f/u  -CTH- nasal fracture   - EP interrogated device  -Cardio recs appreciated  -Neuro recs appreciated  -EP consult for AICD interrogation   -Alcohol level - 186  -Wound care for facial injury     *Nasal bone fracture  -OMFS consult     *Afib & htn  - lisinopril restarted  -monitor renal function and BP  - Metoprolol increased from 25 BID to 50 BID    *Hypokalemia  -Replete and recheck     *H/o systolic CHF / COPD / CAD / PVD / CKD III / dyslipidemia / lung cancer   -C/w home meds and f/u outpatient for further management if conditions remain stable during hospitalization     *DVT ppx  Lovenox    d/w Dr. Adame and Dr. Colunga

## 2021-06-12 NOTE — PROVIDER CONTACT NOTE (OTHER) - ASSESSMENT
Pt agitated. Attempting to punch and kick nurses. Pt swung cane as weapon towards nursing staff.
Pt neurologically unchanged A&Ox2, tachycardic 120's, 's, and restless in bed. Denies CP, palpitations or SOB.

## 2021-06-12 NOTE — PROGRESS NOTE ADULT - SUBJECTIVE AND OBJECTIVE BOX
Patient is a 86y old  Male who presents with a chief complaint of Syncope (11 Jun 2021 18:56)    BRIEF HOSPITAL COURSE:   85 y/o M w/ PMH of HTN, systolic CHF, COPD, CAD s/p CABG / PCI, PVD s/p stent, CKD III, dyslipidemia, lung cancer s/p lobectomy, cardiac arrest s/p AICD, CEA, p/w syncope. Patient states that the last thing he remembers is walking from kitchen to his living room, and then he woke up on the floor. Denies having any symptoms prior to syncope. Denies CP, SOB, palpitations, LH, dizziness. Patient states when he woke up he noticed blood dripping on his chest, so he pressed his life alert button and was brought to ED. Patient c/o mild pain on R side of nose. Also has injury to his head.     Events last 24 hours:   - Patient extubated today   - On NC, intermittently removing oxygen, increasingly confused and agitated  - Patient was stopped from CIWA taper w/Ativan because patient became oversedated and intubated  - Tonight, patient is exhibiting signs of DTs  - Tachycardic to 130s, hypertensive to 170s/90s  - Hallucinating, asking "Tom" to get him out of here   - Gave 0.5mg Ativan w/no response  - Gave another 1mg of Ativan, will reassess how patient reacts, cautious on age and benzo use, want to prevent oversedation and worsening of delirium     Review of Systems:  Unable to assess given clinical condition     PAST MEDICAL & SURGICAL HISTORY:  Hypertension  CAD (coronary artery disease)  Cardiac arrest with successful resuscitation  CKD (chronic kidney disease)  PAD (peripheral artery disease)  Lung cancer  Heart failure  AICD (automatic cardioverter/defibrillator) present  S/P carotid endarterectomy  History of lobectomy of lung  History of appendectomy  H/O left knee surgery  History of tonsillectomy and adenoidectomy    Medications:  piperacillin/tazobactam IVPB.. 3.375 Gram(s) IV Intermittent every 8 hours  lisinopril 20 milliGRAM(s) Oral daily  metoprolol tartrate 50 milliGRAM(s) Oral two times a day  acetaminophen   Tablet .. 650 milliGRAM(s) Oral every 6 hours PRN  meclizine 25 milliGRAM(s) Oral every 12 hours PRN  melatonin 5 milliGRAM(s) Oral at bedtime  aspirin  chewable 81 milliGRAM(s) Oral daily  enoxaparin Injectable 40 milliGRAM(s) SubCutaneous daily  pantoprazole    Tablet 40 milliGRAM(s) Oral before breakfast  atorvastatin 10 milliGRAM(s) Oral at bedtime  folic acid 1 milliGRAM(s) Oral daily  multivitamin 1 Tablet(s) Oral daily  erythromycin   Ointment 1 Application(s) Right EYE four times a day    ICU Vital Signs Last 24 Hrs  T(C): 36.3 (12 Jun 2021 01:00), Max: 37.3 (11 Jun 2021 03:45)  T(F): 97.4 (12 Jun 2021 01:00), Max: 99.2 (11 Jun 2021 03:45)  HR: 77 (12 Jun 2021 00:00) (63 - 97)  BP: 142/84 (11 Jun 2021 23:00) (98/59 - 196/106)  BP(mean): 99 (11 Jun 2021 23:00) (63 - 129)  RR: 18 (12 Jun 2021 00:00) (6 - 26)  SpO2: 91% (12 Jun 2021 00:00) (91% - 100%)    I&O's Detail    10 Celso 2021 07:01  -  11 Jun 2021 07:00  --------------------------------------------------------  IN:    IV PiggyBack: 800 mL  Total IN: 800 mL    OUT:    Indwelling Catheter - Urethral (mL): 300 mL  Total OUT: 300 mL    Total NET: 500 mL    11 Jun 2021 07:01  -  12 Jun 2021 02:04  --------------------------------------------------------  IN:  Total IN: 0 mL    OUT:    Voided (mL): 400 mL  Total OUT: 400 mL    Total NET: -400 mL    LABS:                        15.4   8.28  )-----------( 153      ( 11 Jun 2021 01:49 )             46.7     06-11    139  |  103  |  13  ----------------------------<  153<H>  3.2<L>   |  29  |  1.09    Ca    8.5      11 Jun 2021 01:49  Phos  2.0     06-11  Mg     2.0     06-11    POCT Blood Glucose.: 110 mg/dL (10 Celso 2021 12:19)    CULTURES:  Culture Results:   Normal Respiratory Celina present (06-09 @ 14:00)  Culture Results:   <10,000 CFU/mL Normal Urogenital Celina (06-09 @ 10:45)    Physical Examination:    General: Hallucinating, agitated    HEENT: Pupils equal, reactive to light.  Symmetric.    PULM: Clear to auscultation bilaterally, no significant sputum production    NECK: Supple, no lymphadenopathy, trachea midline    CVS: Tachycardia, no murmurs, rubs, or gallops    ABD: Soft, nondistended, nontender, normoactive bowel sounds, no masses    EXT: No edema, nontender    SKIN: Warm and well perfused, no rashes noted.    NEURO: Agitated and unable to be redirected     RADIOLOGY:   < from: Xray Chest 1 View- PORTABLE-Urgent (Xray Chest 1 View- PORTABLE-Urgent .) (06.09.21 @ 15:55) >    EXAM:  XR CHEST PORTABLE URGENT 1V                            PROCEDURE DATE:  06/09/2021      INTERPRETATION:  Portable chest radiograph    CLINICAL INFORMATION: Intubation. Follow-up    TECHNIQUE:  Portable  AP view of the chest was obtained.    COMPARISON: 6/7/2021 chest available for review.    FINDINGS:  ET tube tip above tracheal bifurcation.  NG tube tip beyond GE junction.    The lungs show bibasilar mild diffuse airspace disease and/or effusions layering along posterior thoracic wall... No pneumothorax.    The  heart is enlarged in transverse diameter. No hilar mass.    Right atrium and biventricular cardiac wire leads in place.     Visualized osseous structures are intact.    IMPRESSION:   ET tube tip above tracheal bifurcation.  NG tube tip beyond GE junction.  .    Mild bibasilar  diffuse airspace disease and/or pleural effusions.    PRACHI EARLY MD; Attending Radiologist  This document has been electronically signed. Celso 10 2021  8:45AM    < end of copied text >    CRITICAL CARE TIME SPENT: ** minutes assessing presenting problems of acute illness, which pose high probability of life threatening deterioration or end organ damage/dysfunction, as well as medical decision making including initiating plan of care, reviewing data, reviewing radiologic exams, discussing with multidisciplinary team,  discussing goals of care with patient/family, and writing this note.  Non-inclusive of procedures performed,   Patient is a 86y old  Male who presents with a chief complaint of Syncope (11 Jun 2021 18:56)    BRIEF HOSPITAL COURSE:   87 y/o M w/ PMH of HTN, systolic CHF, COPD, CAD s/p CABG / PCI, PVD s/p stent, CKD III, dyslipidemia, lung cancer s/p lobectomy, cardiac arrest s/p AICD, CEA, p/w syncope. Patient states that the last thing he remembers is walking from kitchen to his living room, and then he woke up on the floor. Denies having any symptoms prior to syncope. Denies CP, SOB, palpitations, LH, dizziness. Patient states when he woke up he noticed blood dripping on his chest, so he pressed his life alert button and was brought to ED. Patient c/o mild pain on R side of nose. Also has injury to his head.     Events last 24 hours:   - Patient extubated today   - On NC, intermittently removing oxygen, increasingly confused and agitated  - Patient was stopped from CIWA taper w/Ativan because patient became oversedated and intubated  - Tonight, patient is exhibiting signs of DTs  - Tachycardic to 130s, hypertensive to 170s/90s  - Hallucinating, asking "Tom" to get him out of here   - Gave 0.5mg Ativan w/no response  - Gave another 1mg of Ativan, will reassess how patient reacts, cautious on age and benzo use, want to prevent oversedation and worsening of delirium     Review of Systems:  Unable to assess given clinical condition     PAST MEDICAL & SURGICAL HISTORY:  Hypertension  CAD (coronary artery disease)  Cardiac arrest with successful resuscitation  CKD (chronic kidney disease)  PAD (peripheral artery disease)  Lung cancer  Heart failure  AICD (automatic cardioverter/defibrillator) present  S/P carotid endarterectomy  History of lobectomy of lung  History of appendectomy  H/O left knee surgery  History of tonsillectomy and adenoidectomy    Medications:  piperacillin/tazobactam IVPB.. 3.375 Gram(s) IV Intermittent every 8 hours  lisinopril 20 milliGRAM(s) Oral daily  metoprolol tartrate 50 milliGRAM(s) Oral two times a day  acetaminophen   Tablet .. 650 milliGRAM(s) Oral every 6 hours PRN  meclizine 25 milliGRAM(s) Oral every 12 hours PRN  melatonin 5 milliGRAM(s) Oral at bedtime  aspirin  chewable 81 milliGRAM(s) Oral daily  enoxaparin Injectable 40 milliGRAM(s) SubCutaneous daily  pantoprazole    Tablet 40 milliGRAM(s) Oral before breakfast  atorvastatin 10 milliGRAM(s) Oral at bedtime  folic acid 1 milliGRAM(s) Oral daily  multivitamin 1 Tablet(s) Oral daily  erythromycin   Ointment 1 Application(s) Right EYE four times a day    ICU Vital Signs Last 24 Hrs  T(C): 36.3 (12 Jun 2021 01:00), Max: 37.3 (11 Jun 2021 03:45)  T(F): 97.4 (12 Jun 2021 01:00), Max: 99.2 (11 Jun 2021 03:45)  HR: 77 (12 Jun 2021 00:00) (63 - 97)  BP: 142/84 (11 Jun 2021 23:00) (98/59 - 196/106)  BP(mean): 99 (11 Jun 2021 23:00) (63 - 129)  RR: 18 (12 Jun 2021 00:00) (6 - 26)  SpO2: 91% (12 Jun 2021 00:00) (91% - 100%)    I&O's Detail    10 Celso 2021 07:01  -  11 Jun 2021 07:00  --------------------------------------------------------  IN:    IV PiggyBack: 800 mL  Total IN: 800 mL    OUT:    Indwelling Catheter - Urethral (mL): 300 mL  Total OUT: 300 mL    Total NET: 500 mL    11 Jun 2021 07:01  -  12 Jun 2021 02:04  --------------------------------------------------------  IN:  Total IN: 0 mL    OUT:    Voided (mL): 400 mL  Total OUT: 400 mL    Total NET: -400 mL    LABS:                        15.4   8.28  )-----------( 153      ( 11 Jun 2021 01:49 )             46.7     06-11    139  |  103  |  13  ----------------------------<  153<H>  3.2<L>   |  29  |  1.09    Ca    8.5      11 Jun 2021 01:49  Phos  2.0     06-11  Mg     2.0     06-11    POCT Blood Glucose.: 110 mg/dL (10 Celso 2021 12:19)    CULTURES:  Culture Results:   Normal Respiratory Celina present (06-09 @ 14:00)  Culture Results:   <10,000 CFU/mL Normal Urogenital Celina (06-09 @ 10:45)    Physical Examination:    General: Hallucinating, agitated    HEENT: Pupils equal, reactive to light.  Symmetric.    PULM: Clear to auscultation bilaterally, no significant sputum production    NECK: Supple, no lymphadenopathy, trachea midline    CVS: Tachycardia, irregularly irregular, no murmurs, rubs, or gallops    ABD: Soft, nondistended, nontender, normoactive bowel sounds, no masses    EXT: No edema, nontender    SKIN: Warm and well perfused, no rashes noted.    NEURO: Agitated and unable to be redirected     RADIOLOGY:   < from: Xray Chest 1 View- PORTABLE-Urgent (Xray Chest 1 View- PORTABLE-Urgent .) (06.09.21 @ 15:55) >    EXAM:  XR CHEST PORTABLE URGENT 1V                            PROCEDURE DATE:  06/09/2021      INTERPRETATION:  Portable chest radiograph    CLINICAL INFORMATION: Intubation. Follow-up    TECHNIQUE:  Portable  AP view of the chest was obtained.    COMPARISON: 6/7/2021 chest available for review.    FINDINGS:  ET tube tip above tracheal bifurcation.  NG tube tip beyond GE junction.    The lungs show bibasilar mild diffuse airspace disease and/or effusions layering along posterior thoracic wall... No pneumothorax.    The  heart is enlarged in transverse diameter. No hilar mass.    Right atrium and biventricular cardiac wire leads in place.     Visualized osseous structures are intact.    IMPRESSION:   ET tube tip above tracheal bifurcation.  NG tube tip beyond GE junction.  .    Mild bibasilar  diffuse airspace disease and/or pleural effusions.    PRACHI EARLY MD; Attending Radiologist  This document has been electronically signed. Celso 10 2021  8:45AM    < end of copied text >

## 2021-06-13 LAB
ALBUMIN SERPL ELPH-MCNC: 2.6 G/DL — LOW (ref 3.3–5)
ALP SERPL-CCNC: 54 U/L — SIGNIFICANT CHANGE UP (ref 40–120)
ALT FLD-CCNC: 18 U/L — SIGNIFICANT CHANGE UP (ref 12–78)
ANION GAP SERPL CALC-SCNC: 7 MMOL/L — SIGNIFICANT CHANGE UP (ref 5–17)
AST SERPL-CCNC: 27 U/L — SIGNIFICANT CHANGE UP (ref 15–37)
BILIRUB SERPL-MCNC: 1.3 MG/DL — HIGH (ref 0.2–1.2)
BUN SERPL-MCNC: 12 MG/DL — SIGNIFICANT CHANGE UP (ref 7–23)
CALCIUM SERPL-MCNC: 8.6 MG/DL — SIGNIFICANT CHANGE UP (ref 8.5–10.1)
CHLORIDE SERPL-SCNC: 104 MMOL/L — SIGNIFICANT CHANGE UP (ref 96–108)
CO2 SERPL-SCNC: 28 MMOL/L — SIGNIFICANT CHANGE UP (ref 22–31)
CREAT SERPL-MCNC: 0.98 MG/DL — SIGNIFICANT CHANGE UP (ref 0.5–1.3)
GLUCOSE SERPL-MCNC: 128 MG/DL — HIGH (ref 70–99)
HCT VFR BLD CALC: 43.8 % — SIGNIFICANT CHANGE UP (ref 39–50)
HGB BLD-MCNC: 14.3 G/DL — SIGNIFICANT CHANGE UP (ref 13–17)
MAGNESIUM SERPL-MCNC: 1.7 MG/DL — SIGNIFICANT CHANGE UP (ref 1.6–2.6)
MCHC RBC-ENTMCNC: 31.1 PG — SIGNIFICANT CHANGE UP (ref 27–34)
MCHC RBC-ENTMCNC: 32.6 GM/DL — SIGNIFICANT CHANGE UP (ref 32–36)
MCV RBC AUTO: 95.2 FL — SIGNIFICANT CHANGE UP (ref 80–100)
PHOSPHATE SERPL-MCNC: 2.1 MG/DL — LOW (ref 2.5–4.5)
PLATELET # BLD AUTO: 187 K/UL — SIGNIFICANT CHANGE UP (ref 150–400)
POTASSIUM SERPL-MCNC: 3.4 MMOL/L — LOW (ref 3.5–5.3)
POTASSIUM SERPL-SCNC: 3.4 MMOL/L — LOW (ref 3.5–5.3)
PROT SERPL-MCNC: 6.8 GM/DL — SIGNIFICANT CHANGE UP (ref 6–8.3)
RBC # BLD: 4.6 M/UL — SIGNIFICANT CHANGE UP (ref 4.2–5.8)
RBC # FLD: 13.5 % — SIGNIFICANT CHANGE UP (ref 10.3–14.5)
SODIUM SERPL-SCNC: 139 MMOL/L — SIGNIFICANT CHANGE UP (ref 135–145)
WBC # BLD: 7.51 K/UL — SIGNIFICANT CHANGE UP (ref 3.8–10.5)
WBC # FLD AUTO: 7.51 K/UL — SIGNIFICANT CHANGE UP (ref 3.8–10.5)

## 2021-06-13 PROCEDURE — 93971 EXTREMITY STUDY: CPT | Mod: 26,LT

## 2021-06-13 PROCEDURE — 99232 SBSQ HOSP IP/OBS MODERATE 35: CPT | Mod: GC

## 2021-06-13 RX ORDER — QUETIAPINE FUMARATE 200 MG/1
25 TABLET, FILM COATED ORAL ONCE
Refills: 0 | Status: COMPLETED | OUTPATIENT
Start: 2021-06-13 | End: 2021-06-13

## 2021-06-13 RX ORDER — POTASSIUM PHOSPHATE, MONOBASIC POTASSIUM PHOSPHATE, DIBASIC 236; 224 MG/ML; MG/ML
15 INJECTION, SOLUTION INTRAVENOUS ONCE
Refills: 0 | Status: COMPLETED | OUTPATIENT
Start: 2021-06-13 | End: 2021-06-13

## 2021-06-13 RX ADMIN — Medication 1 TABLET(S): at 09:56

## 2021-06-13 RX ADMIN — Medication 1 APPLICATION(S): at 17:06

## 2021-06-13 RX ADMIN — Medication 5 MILLIGRAM(S): at 22:01

## 2021-06-13 RX ADMIN — Medication 1 APPLICATION(S): at 00:16

## 2021-06-13 RX ADMIN — PIPERACILLIN AND TAZOBACTAM 25 GRAM(S): 4; .5 INJECTION, POWDER, LYOPHILIZED, FOR SOLUTION INTRAVENOUS at 22:01

## 2021-06-13 RX ADMIN — Medication 1 MILLIGRAM(S): at 09:56

## 2021-06-13 RX ADMIN — PIPERACILLIN AND TAZOBACTAM 25 GRAM(S): 4; .5 INJECTION, POWDER, LYOPHILIZED, FOR SOLUTION INTRAVENOUS at 06:02

## 2021-06-13 RX ADMIN — Medication 100 MILLIGRAM(S): at 09:56

## 2021-06-13 RX ADMIN — ENOXAPARIN SODIUM 40 MILLIGRAM(S): 100 INJECTION SUBCUTANEOUS at 09:56

## 2021-06-13 RX ADMIN — Medication 1 APPLICATION(S): at 11:43

## 2021-06-13 RX ADMIN — Medication 81 MILLIGRAM(S): at 09:56

## 2021-06-13 RX ADMIN — Medication 100 MILLIGRAM(S): at 22:01

## 2021-06-13 RX ADMIN — Medication 1 APPLICATION(S): at 23:33

## 2021-06-13 RX ADMIN — LISINOPRIL 20 MILLIGRAM(S): 2.5 TABLET ORAL at 09:56

## 2021-06-13 RX ADMIN — POTASSIUM PHOSPHATE, MONOBASIC POTASSIUM PHOSPHATE, DIBASIC 62.5 MILLIMOLE(S): 236; 224 INJECTION, SOLUTION INTRAVENOUS at 09:56

## 2021-06-13 RX ADMIN — Medication 1 DROP(S): at 22:01

## 2021-06-13 RX ADMIN — Medication 1 APPLICATION(S): at 06:03

## 2021-06-13 RX ADMIN — PIPERACILLIN AND TAZOBACTAM 25 GRAM(S): 4; .5 INJECTION, POWDER, LYOPHILIZED, FOR SOLUTION INTRAVENOUS at 13:50

## 2021-06-13 RX ADMIN — QUETIAPINE FUMARATE 25 MILLIGRAM(S): 200 TABLET, FILM COATED ORAL at 23:34

## 2021-06-13 RX ADMIN — ATORVASTATIN CALCIUM 10 MILLIGRAM(S): 80 TABLET, FILM COATED ORAL at 22:01

## 2021-06-13 NOTE — PHYSICAL THERAPY INITIAL EVALUATION ADULT - IMPAIRMENTS FOUND, PT EVAL
aerobic capacity/endurance/cognitive impairment/decreased midline orientation/gait, locomotion, and balance/gross motor/integumentary integrity/muscle strength/neuromotor development and sensory integration/poor safety awareness/ROM

## 2021-06-13 NOTE — PROGRESS NOTE ADULT - ASSESSMENT
87 y/o M w/ PMH of HTN, systolic CHF, COPD, CAD s/p CABG / PCI, PVD s/p stent, CKD III, dyslipidemia, lung cancer s/p lobectomy, cardiac arrest s/p AICD, CEA, p/w syncope    #Delirium tremens  -Agitation resolved  -s/p intubation and Precedex gtt  -Monitor off Ativan  -Downgraded from ICU  -On nasal cannula 3 L, continue to monitor VS closely    #Acute hypoxic respiratory failure   -Hx of COPD and Lung cancer s/p lobectomy   -COVID19 negative   -s/p levophed (concern for septic shock)  -Respiratory failure resolving on 3 L NC   -Now extubated  - On 3L NC, continues to be lethargic  - Hold ativan as patient is obtunded   - Protonix QD for GI ppx  -Continue zosyn for possible aspiration PNA  (6/8-)  -Ucx- negative   -Sputum cx- Rare gram pos cocci in pairs and chains, rare gram neg rods    #Syncope   -Check orthostatics: negative so far  -Troponin negative x 1  -CTH- nasal fracture   -EEG- no epileptiform activity   - EP interrogated device  -Cardio recs appreciated  -Neuro recs appreciated  -Alcohol level - 186  -Wound care for facial injury     #Nasal bone fracture  -OMFS consult     #Afib   - Metoprolol increased from 50 BID to 100 mg PO q12h    #HTN  -Continue Lisinopril 20 mg   -Continue metoprolol tartrate 100 mg q12h  -Monitor BP    #Hypokalemia  -Replete   -Continue to monitor     #H/o systolic CHF   -hx of AICD placement   -Continue metoprolol 100 mg PO q12h  -TTE- EF 45-50%, mildly reduced LV systolic function, left atrium moderately dilated, mild aortic stenosis , mild 1+ mitral regurgitation     #CAD  -Continue atorvastatin 10 mg qhs   -Continue aspirin 81 mg QD   -Continue metoprolol 100 mg PO q12h    #PVD s/p stent  -Continue atorvastatin 10 mg qhs   -Continue aspirin 81 mg QD     #CKD III  -Cr stable     #HLD  -Continue atorvastatin 10 mg qhs     #DVT ppx  -Lovenox 40 mg SQ QD    #DISPO  -PT recs appeciated- d/c to rehab when stable      87 y/o M w/ PMH of HTN, systolic CHF, COPD, CAD s/p CABG / PCI, PVD s/p stent, CKD III, dyslipidemia, lung cancer s/p lobectomy, cardiac arrest s/p AICD, CEA, p/w syncope    #Delirium tremens  -Agitation resolved  -s/p intubation and Precedex gtt  -Monitor off Ativan  -Downgraded from ICU  -On nasal cannula 3 L, continue to monitor VS closely    #Acute hypoxic respiratory failure 2/2 aspiration PNA  -Hx of COPD and Lung cancer s/p lobectomy   -COVID19 negative   -s/p levophed (concern for septic shock)  -Respiratory failure resolving on 3 L NC   -Now extubated  -On 3L NC, continues to be lethargic  -Hold ativan as patient is obtunded   -Protonix QD for GI ppx  -Continue zosyn for possible aspiration PNA (6/8-)  -Ucx- negative   -Sputum cx- Rare gram pos cocci in pairs and chains, rare gram neg rods    #Syncope   -Check orthostatics: negative so far  -Troponin negative x 1  -CTH- nasal fracture   -EEG- no epileptiform activity   - EP interrogated device  -Cardio recs appreciated  -Neuro recs appreciated  -Alcohol level - 186  -Wound care for facial injury     #Nasal bone fracture  -OMFS consult   -Pain control prn    #Left upper extremity erythema  -Differential includes superficial thrombophlebitis  -Will obtain LUE US to r/o thrombosis     #Afib   - Metoprolol increased from 50 BID to 100 mg PO q12h    #HTN  -Continue Lisinopril 20 mg   -Continue metoprolol tartrate 100 mg q12h  -Monitor BP    #Hypokalemia  -Replete   -Continue to monitor     #H/o systolic CHF   -hx of AICD placement   -Continue metoprolol 100 mg PO q12h  -TTE- EF 45-50%, mildly reduced LV systolic function, left atrium moderately dilated, mild aortic stenosis , mild 1+ mitral regurgitation     #CAD  -Continue atorvastatin 10 mg qhs   -Continue aspirin 81 mg QD   -Continue metoprolol 100 mg PO q12h    #PVD s/p stent  -Continue atorvastatin 10 mg qhs   -Continue aspirin 81 mg QD     #CKD III  -Cr stable   -Weber in place    #HLD  -Continue atorvastatin 10 mg qhs     #DVT ppx  -Lovenox 40 mg SQ QD    #DISPO  -PT recs appreciated- d/c to rehab when stable

## 2021-06-13 NOTE — PROGRESS NOTE ADULT - SUBJECTIVE AND OBJECTIVE BOX
HPI:  85 y/o M w/ PMH of HTN, systolic CHF, COPD, CAD s/p CABG / PCI, PVD s/p stent, CKD III, dyslipidemia, lung cancer s/p lobectomy, cardiac arrest s/p AICD, CEA, p/w syncope. Patient states that the last thing he remembers is walking from kitchen to his living room, and then he woke up on the floor. Denies having any symptoms prior to syncope. Denies CP, SOB, palpitations, LH, dizziness. Patient states when he woke up he noticed blood dripping on his chest, so he pressed his life alert button and was brought to ED. Patient c/o mild pain on R side of nose. Also has injury to his head. Denies weakness in arms / legs, facial droop, sensory deficits, pain, cough, runny nose, sore throat, nausea, vomiting, abdominal pain, urinary incontinence and tongue bite.     SUBJECTIVE:   Patient seen and evaluated at bedside. Patient making eye contact, not grimacing in pain.    REVIEW OF SYSTEMS:    Unable to assess due to altered mental status    Vital Signs Last 24 Hrs  T(C): 35.7 (13 Jun 2021 14:15), Max: 37.3 (12 Jun 2021 21:05)  T(F): 96.2 (13 Jun 2021 14:15), Max: 99.2 (12 Jun 2021 21:05)  HR: 75 (13 Jun 2021 15:00) (69 - 110)  BP: 151/71 (13 Jun 2021 15:00) (94/34 - 186/94)  BP(mean): 87 (13 Jun 2021 15:00) (51 - 111)  RR: 25 (13 Jun 2021 06:00) (13 - 27)  SpO2: 95% (13 Jun 2021 11:00) (94% - 96%)    I&O's Summary    12 Jun 2021 07:01  -  13 Jun 2021 07:00  --------------------------------------------------------  IN: 0 mL / OUT: 400 mL / NET: -400 mL      CAPILLARY BLOOD GLUCOSE      PHYSICAL EXAM:    Constitutional: NAD, awake and alert, well-developed  HEENT: PERR, EOMI, Normal Hearing, MMM  Neck: Soft and supple, No LAD, No JVD  Respiratory: Breath sounds are clear bilaterally, No wheezing, rales or rhonchi  Cardiovascular: S1 and S2, regular rate and rhythm, no Murmurs, gallops or rubs  Gastrointestinal: Bowel Sounds present, soft, nontender, nondistended, no guarding, no rebound  Extremities: No peripheral edema  Vascular: 2+ peripheral pulses  Neurological: A/O x 3, no focal deficits  Musculoskeletal: 5/5 strength b/l upper and lower extremities  Skin: No rashes    MEDICATIONS:  MEDICATIONS  (STANDING):  artificial  tears Solution 1 Drop(s) Both EYES two times a day  aspirin  chewable 81 milliGRAM(s) Oral daily  atorvastatin 10 milliGRAM(s) Oral at bedtime  enoxaparin Injectable 40 milliGRAM(s) SubCutaneous daily  erythromycin   Ointment 1 Application(s) Right EYE four times a day  folic acid 1 milliGRAM(s) Oral daily  lisinopril 20 milliGRAM(s) Oral daily  melatonin 5 milliGRAM(s) Oral at bedtime  metoprolol tartrate 100 milliGRAM(s) Oral every 12 hours  multivitamin 1 Tablet(s) Oral daily  pantoprazole    Tablet 40 milliGRAM(s) Oral before breakfast  piperacillin/tazobactam IVPB.. 3.375 Gram(s) IV Intermittent every 8 hours      LABS: All Labs Reviewed:                        14.3   7.51  )-----------( 187      ( 13 Jun 2021 06:28 )             43.8     06-13    139  |  104  |  12  ----------------------------<  128<H>  3.4<L>   |  28  |  0.98    Ca    8.6      13 Jun 2021 06:28  Phos  2.1     06-13  Mg     1.7     06-13    TPro  6.8  /  Alb  2.6<L>  /  TBili  1.3<H>  /  DBili  x   /  AST  27  /  ALT  18  /  AlkPhos  54  06-13          Blood Culture: 06-09 @ 14:00  Organism --  Gram Stain Blood -- Gram Stain   Few polymorphonuclear leukocytes per low power field  No Squamous epithelial cells per low power field  Rare Gram Positive Cocci in Pairs and Chains seen per oil power field  Rare Gram Negative Rods seen per oil power field  Specimen Source .Sputum Sputum  Culture-Blood --    06-09 @ 10:45  Organism --  Gram Stain Blood -- Gram Stain --  Specimen Source .Urine Catheterized  Culture-Blood --        RADIOLOGY/EKG:    EXAM:  XR CHEST PORTABLE URGENT 1V                            PROCEDURE DATE:  06/09/2021        IMPRESSION:   ET tube tip above tracheal bifurcation.  NG tube tip beyond GE junction.  .    Mild bibasilar  diffuse airspace disease and/or pleural effusions.          EXAM:  CT MAXILLOFACIAL                          EXAM:  CT 3D RECONSTRUCT [facility]                          EXAM:  CT BRAIN                          EXAM:  CT CERVICAL SPINE                            PROCEDURE DATE:  06/07/2021        IMPRESSION:    CT HEAD: No acute intracranial hemorrhage, mass effect, or osseous fracture.    CT MAXILLOFACIAL BONES: Comminuted nasal bone fractures. There is no other acute maxillofacial bone fracture.    CT CERVICAL SPINE: No acute cervical spine fracture or traumatic malalignment.        EXAM:  XR CHEST PORTABLE IMMED 1V                            PROCEDURE DATE:  06/08/2021          INTERPRETATION:  DATE OF STUDY: 6/8/21    PRIOR:6/7/21    CLINICAL INDICATION: SOB. Congestion.    TECHNIQUE: portable chest.    FINDINGS:  Left-sided defibrillator in situ.  Increasing pulmonic congestive changes present.  Small bilateral pleural effusions are present - left more than right - with associated bibasilar atelectasis.  No pneumothorax. No acute bony finding.    IMPRESSION:  Increasing pulmonic congestive changes with small bilateral pleural effusions.               HPI:  87 y/o M w/ PMH of HTN, systolic CHF, COPD, CAD s/p CABG / PCI, PVD s/p stent, CKD III, dyslipidemia, lung cancer s/p lobectomy, cardiac arrest s/p AICD, CEA, p/w syncope. Patient states that the last thing he remembers is walking from kitchen to his living room, and then he woke up on the floor. Denies having any symptoms prior to syncope. Denies CP, SOB, palpitations, LH, dizziness. Patient states when he woke up he noticed blood dripping on his chest, so he pressed his life alert button and was brought to ED. Patient c/o mild pain on R side of nose. Also has injury to his head. Denies weakness in arms / legs, facial droop, sensory deficits, pain, cough, runny nose, sore throat, nausea, vomiting, abdominal pain, urinary incontinence and tongue bite.     SUBJECTIVE:   Patient seen and evaluated at bedside. Patient making eye contact, not grimacing in pain.    REVIEW OF SYSTEMS:    Unable to assess due to altered mental status    Vital Signs Last 24 Hrs  T(C): 35.7 (13 Jun 2021 14:15), Max: 37.3 (12 Jun 2021 21:05)  T(F): 96.2 (13 Jun 2021 14:15), Max: 99.2 (12 Jun 2021 21:05)  HR: 75 (13 Jun 2021 15:00) (69 - 110)  BP: 151/71 (13 Jun 2021 15:00) (94/34 - 186/94)  BP(mean): 87 (13 Jun 2021 15:00) (51 - 111)  RR: 25 (13 Jun 2021 06:00) (13 - 27)  SpO2: 95% (13 Jun 2021 11:00) (94% - 96%)    I&O's Summary    12 Jun 2021 07:01  -  13 Jun 2021 07:00  --------------------------------------------------------  IN: 0 mL / OUT: 400 mL / NET: -400 mL      CAPILLARY BLOOD GLUCOSE      PHYSICAL EXAM:    Constitutional: NAD, awake and alert, well-developed  HEENT: PERR, EOMI, Normal Hearing, MMM  Neck: Soft and supple, No LAD, No JVD  Respiratory: Breath sounds are clear bilaterally, No wheezing, rales or rhonchi  Cardiovascular: S1 and S2, regular rate and rhythm, + systolic murmur present  Gastrointestinal: Bowel Sounds present, soft, nontender, nondistended, no guarding, no rebound  Extremities: No peripheral edema  Vascular: 2+ peripheral pulses  Neurological: A/O x 3, no focal deficits  Musculoskeletal: 5/5 strength b/l upper and lower extremities  Skin: No rashes, erythema and warmth of left forearm, no tender or cord palpated    MEDICATIONS:  MEDICATIONS  (STANDING):  artificial  tears Solution 1 Drop(s) Both EYES two times a day  aspirin  chewable 81 milliGRAM(s) Oral daily  atorvastatin 10 milliGRAM(s) Oral at bedtime  enoxaparin Injectable 40 milliGRAM(s) SubCutaneous daily  erythromycin   Ointment 1 Application(s) Right EYE four times a day  folic acid 1 milliGRAM(s) Oral daily  lisinopril 20 milliGRAM(s) Oral daily  melatonin 5 milliGRAM(s) Oral at bedtime  metoprolol tartrate 100 milliGRAM(s) Oral every 12 hours  multivitamin 1 Tablet(s) Oral daily  pantoprazole    Tablet 40 milliGRAM(s) Oral before breakfast  piperacillin/tazobactam IVPB.. 3.375 Gram(s) IV Intermittent every 8 hours      LABS: All Labs Reviewed:                        14.3   7.51  )-----------( 187      ( 13 Jun 2021 06:28 )             43.8     06-13    139  |  104  |  12  ----------------------------<  128<H>  3.4<L>   |  28  |  0.98    Ca    8.6      13 Jun 2021 06:28  Phos  2.1     06-13  Mg     1.7     06-13    TPro  6.8  /  Alb  2.6<L>  /  TBili  1.3<H>  /  DBili  x   /  AST  27  /  ALT  18  /  AlkPhos  54  06-13          Blood Culture: 06-09 @ 14:00  Organism --  Gram Stain Blood -- Gram Stain   Few polymorphonuclear leukocytes per low power field  No Squamous epithelial cells per low power field  Rare Gram Positive Cocci in Pairs and Chains seen per oil power field  Rare Gram Negative Rods seen per oil power field  Specimen Source .Sputum Sputum  Culture-Blood --    06-09 @ 10:45  Organism --  Gram Stain Blood -- Gram Stain --  Specimen Source .Urine Catheterized  Culture-Blood --        RADIOLOGY/EKG:    EXAM:  XR CHEST PORTABLE URGENT 1V                            PROCEDURE DATE:  06/09/2021        IMPRESSION:   ET tube tip above tracheal bifurcation.  NG tube tip beyond GE junction.  .    Mild bibasilar  diffuse airspace disease and/or pleural effusions.          EXAM:  CT MAXILLOFACIAL                          EX[facility]  [facility]                          EXAM:  CT BRAIN                          EXAM:  CT CERVICAL SPINE                            PROCEDURE DATE:  06/07/2021        IMPRESSION:    CT HEAD: No acute intracranial hemorrhage, mass effect, or osseous fracture.    CT MAXILLOFACIAL BONES: Comminuted nasal bone fractures. There is no other acute maxillofacial bone fracture.    CT CERVICAL SPINE: No acute cervical spine fracture or traumatic malalignment.        EXAM:  XR CHEST PORTABLE IMMED 1V                            PROCEDURE DATE:  06/08/2021          INTERPRETATION:  DATE OF STUDY: 6/8/21    PRIOR:6/7/21    CLINICAL INDICATION: SOB. Congestion.    TECHNIQUE: portable chest.    FINDINGS:  Left-sided defibrillator in situ.  Increasing pulmonic congestive changes present.  Small bilateral pleural effusions are present - left more than right - with associated bibasilar atelectasis.  No pneumothorax. No acute bony finding.    IMPRESSION:  Increasing pulmonic congestive changes with small bilateral pleural effusions.

## 2021-06-13 NOTE — PHYSICAL THERAPY INITIAL EVALUATION ADULT - PLANNED THERAPY INTERVENTIONS, PT EVAL
balance training/bed mobility training/gait training/manual therapy techniques/neuromuscular re-education/ROM/strengthening/stretching/transfer training/wheelchair management/propulsion training

## 2021-06-13 NOTE — PHYSICAL THERAPY INITIAL EVALUATION ADULT - GENERAL OBSERVATIONS, REHAB EVAL
Pt. received supine in bed 1:1 observation + bed alarm + tele +IV + o2 2l/min via nc + marin. Pt. presented confused but agreeable to PT to get OOB.

## 2021-06-13 NOTE — PHYSICAL THERAPY INITIAL EVALUATION ADULT - ADDITIONAL COMMENTS
Pt. is poor historian, reported lives with his wife in a privet home with one step to enter and amb with RW in house.

## 2021-06-13 NOTE — PHYSICAL THERAPY INITIAL EVALUATION ADULT - PERTINENT HX OF CURRENT PROBLEM, REHAB EVAL
Pt. is an 85 y/o M s/p fall at home syncope. Pt. with no recall of falling. CT HEAD: No acute intracranial hemorrhage, mass effect, or osseous fracture., CT MAXILLOFACIAL BONES and CS with no fx. Pt. with overnight hypertensive, tachycardic and hallucinating. Pt. with Delirium Tremens & ETOH withdrawal. Patient was intubated earlier in hospital course from oversedation from benzo therapy for alcohol withdrawal. Pt. given history of drink 2 glass fulls of bacardi and splash of coke daily,

## 2021-06-14 LAB
ALBUMIN SERPL ELPH-MCNC: 2.7 G/DL — LOW (ref 3.3–5)
ALP SERPL-CCNC: 53 U/L — SIGNIFICANT CHANGE UP (ref 40–120)
ALT FLD-CCNC: 20 U/L — SIGNIFICANT CHANGE UP (ref 12–78)
ANION GAP SERPL CALC-SCNC: 3 MMOL/L — LOW (ref 5–17)
AST SERPL-CCNC: 26 U/L — SIGNIFICANT CHANGE UP (ref 15–37)
BILIRUB SERPL-MCNC: 1.1 MG/DL — SIGNIFICANT CHANGE UP (ref 0.2–1.2)
BUN SERPL-MCNC: 12 MG/DL — SIGNIFICANT CHANGE UP (ref 7–23)
CALCIUM SERPL-MCNC: 9.1 MG/DL — SIGNIFICANT CHANGE UP (ref 8.5–10.1)
CHLORIDE SERPL-SCNC: 103 MMOL/L — SIGNIFICANT CHANGE UP (ref 96–108)
CO2 SERPL-SCNC: 32 MMOL/L — HIGH (ref 22–31)
CREAT SERPL-MCNC: 0.95 MG/DL — SIGNIFICANT CHANGE UP (ref 0.5–1.3)
GLUCOSE SERPL-MCNC: 144 MG/DL — HIGH (ref 70–99)
MAGNESIUM SERPL-MCNC: 1.8 MG/DL — SIGNIFICANT CHANGE UP (ref 1.6–2.6)
PHOSPHATE SERPL-MCNC: 2.3 MG/DL — LOW (ref 2.5–4.5)
POTASSIUM SERPL-MCNC: 3.4 MMOL/L — LOW (ref 3.5–5.3)
POTASSIUM SERPL-SCNC: 3.4 MMOL/L — LOW (ref 3.5–5.3)
PROT SERPL-MCNC: 6.8 GM/DL — SIGNIFICANT CHANGE UP (ref 6–8.3)
SODIUM SERPL-SCNC: 138 MMOL/L — SIGNIFICANT CHANGE UP (ref 135–145)

## 2021-06-14 PROCEDURE — 93010 ELECTROCARDIOGRAM REPORT: CPT

## 2021-06-14 PROCEDURE — 99232 SBSQ HOSP IP/OBS MODERATE 35: CPT | Mod: GC

## 2021-06-14 RX ORDER — POTASSIUM CHLORIDE 20 MEQ
40 PACKET (EA) ORAL ONCE
Refills: 0 | Status: COMPLETED | OUTPATIENT
Start: 2021-06-14 | End: 2021-06-14

## 2021-06-14 RX ORDER — SODIUM,POTASSIUM PHOSPHATES 278-250MG
2 POWDER IN PACKET (EA) ORAL ONCE
Refills: 0 | Status: COMPLETED | OUTPATIENT
Start: 2021-06-14 | End: 2021-06-14

## 2021-06-14 RX ORDER — QUETIAPINE FUMARATE 200 MG/1
25 TABLET, FILM COATED ORAL ONCE
Refills: 0 | Status: COMPLETED | OUTPATIENT
Start: 2021-06-14 | End: 2021-06-14

## 2021-06-14 RX ADMIN — QUETIAPINE FUMARATE 25 MILLIGRAM(S): 200 TABLET, FILM COATED ORAL at 21:16

## 2021-06-14 RX ADMIN — PANTOPRAZOLE SODIUM 40 MILLIGRAM(S): 20 TABLET, DELAYED RELEASE ORAL at 06:35

## 2021-06-14 RX ADMIN — Medication 1 DROP(S): at 21:16

## 2021-06-14 RX ADMIN — PIPERACILLIN AND TAZOBACTAM 25 GRAM(S): 4; .5 INJECTION, POWDER, LYOPHILIZED, FOR SOLUTION INTRAVENOUS at 13:42

## 2021-06-14 RX ADMIN — LISINOPRIL 20 MILLIGRAM(S): 2.5 TABLET ORAL at 08:32

## 2021-06-14 RX ADMIN — Medication 5 MILLIGRAM(S): at 21:16

## 2021-06-14 RX ADMIN — ENOXAPARIN SODIUM 40 MILLIGRAM(S): 100 INJECTION SUBCUTANEOUS at 08:32

## 2021-06-14 RX ADMIN — Medication 1 TABLET(S): at 08:31

## 2021-06-14 RX ADMIN — Medication 1 MILLIGRAM(S): at 08:32

## 2021-06-14 RX ADMIN — Medication 1 APPLICATION(S): at 17:09

## 2021-06-14 RX ADMIN — Medication 1 DROP(S): at 08:32

## 2021-06-14 RX ADMIN — Medication 40 MILLIEQUIVALENT(S): at 12:22

## 2021-06-14 RX ADMIN — Medication 1 APPLICATION(S): at 12:22

## 2021-06-14 RX ADMIN — Medication 100 MILLIGRAM(S): at 08:31

## 2021-06-14 RX ADMIN — Medication 100 MILLIGRAM(S): at 21:16

## 2021-06-14 RX ADMIN — Medication 1 APPLICATION(S): at 06:35

## 2021-06-14 RX ADMIN — ATORVASTATIN CALCIUM 10 MILLIGRAM(S): 80 TABLET, FILM COATED ORAL at 21:16

## 2021-06-14 RX ADMIN — PIPERACILLIN AND TAZOBACTAM 25 GRAM(S): 4; .5 INJECTION, POWDER, LYOPHILIZED, FOR SOLUTION INTRAVENOUS at 21:16

## 2021-06-14 RX ADMIN — Medication 2 PACKET(S): at 19:32

## 2021-06-14 RX ADMIN — PIPERACILLIN AND TAZOBACTAM 25 GRAM(S): 4; .5 INJECTION, POWDER, LYOPHILIZED, FOR SOLUTION INTRAVENOUS at 06:35

## 2021-06-14 RX ADMIN — Medication 81 MILLIGRAM(S): at 08:32

## 2021-06-14 NOTE — PROGRESS NOTE ADULT - SUBJECTIVE AND OBJECTIVE BOX
HPI:  87 y/o M w/ PMH of HTN, systolic CHF, COPD, CAD s/p CABG / PCI, PVD s/p stent, CKD III, dyslipidemia, lung cancer s/p lobectomy, cardiac arrest s/p AICD, CEA, p/w syncope. Patient states that the last thing he remembers is walking from kitchen to his living room, and then he woke up on the floor. Denies having any symptoms prior to syncope. Denies CP, SOB, palpitations, LH, dizziness. Patient states when he woke up he noticed blood dripping on his chest, so he pressed his life alert button and was brought to ED. Patient c/o mild pain on R side of nose. Also has injury to his head. Denies weakness in arms / legs, facial droop, sensory deficits, pain, cough, runny nose, sore throat, nausea, vomiting, abdominal pain, urinary incontinence and tongue bite.     SUBJECTIVE:   Patient seen and evaluated at bedside. Patient alert and oriented x3 with episodes of confusion. No acute distress.     REVIEW OF SYSTEMS:    Unable to assess due to altered mental status    Vital Signs Last 24 Hrs  T(C): 36.6 (14 Jun 2021 16:01), Max: 37.3 (14 Jun 2021 04:39)  T(F): 97.8 (14 Jun 2021 16:01), Max: 99.2 (14 Jun 2021 04:39)  HR: 65 (14 Jun 2021 17:00) (46 - 86)  BP: 124/61 (14 Jun 2021 17:00) (100/55 - 179/88)  BP(mean): 77 (14 Jun 2021 17:00) (64 - 112)  SpO2: 94% (14 Jun 2021 17:00) (89% - 98%)    I&O's Summary    12 Jun 2021 07:01  -  13 Jun 2021 07:00  --------------------------------------------------------  IN: 0 mL / OUT: 400 mL / NET: -400 mL      PHYSICAL EXAM:    Constitutional: NAD, awake and alert, well-developed  HEENT: PERR, EOMI, Normal Hearing, MMM  Neck: Soft and supple, No LAD, No JVD  Respiratory: Breath sounds are clear bilaterally, No wheezing, rales or rhonchi  Cardiovascular: S1 and S2, regular rate and rhythm, + systolic murmur present  Gastrointestinal: Bowel Sounds present, soft, nontender, nondistended, no guarding, no rebound  Extremities: No peripheral edema  Vascular: 2+ peripheral pulses  Musculoskeletal: 5/5 strength b/l upper and lower extremities  Skin: No rashes, erythema and warmth of left forearm, no tender or cord palpated    MEDICATIONS:  MEDICATIONS  (STANDING):  artificial  tears Solution 1 Drop(s) Both EYES two times a day  aspirin  chewable 81 milliGRAM(s) Oral daily  atorvastatin 10 milliGRAM(s) Oral at bedtime  enoxaparin Injectable 40 milliGRAM(s) SubCutaneous daily  erythromycin   Ointment 1 Application(s) Right EYE four times a day  folic acid 1 milliGRAM(s) Oral daily  lisinopril 20 milliGRAM(s) Oral daily  melatonin 5 milliGRAM(s) Oral at bedtime  metoprolol tartrate 100 milliGRAM(s) Oral every 12 hours  multivitamin 1 Tablet(s) Oral daily  pantoprazole    Tablet 40 milliGRAM(s) Oral before breakfast  piperacillin/tazobactam IVPB.. 3.375 Gram(s) IV Intermittent every 8 hours      LABS: All Labs Reviewed:                                   14.3   7.51  )-----------( 187      ( 13 Jun 2021 06:28 )             43.8     06-14    138  |  103  |  12  ----------------------------<  144<H>  3.4<L>   |  32<H>  |  0.95    Ca    9.1      14 Jun 2021 07:03  Phos  2.3     06-14  Mg     1.8     06-14    TPro  6.8  /  Alb  2.7<L>  /  TBili  1.1  /  DBili  x   /  AST  26  /  ALT  20  /  AlkPhos  53  06-14          Blood Culture: 06-09 @ 14:00  Organism --  Gram Stain Blood -- Gram Stain   Few polymorphonuclear leukocytes per low power field  No Squamous epithelial cells per low power field  Rare Gram Positive Cocci in Pairs and Chains seen per oil power field  Rare Gram Negative Rods seen per oil power field  Specimen Source .Sputum Sputum  Culture-Blood --    06-09 @ 10:45  Organism --  Gram Stain Blood -- Gram Stain --  Specimen Source .Urine Catheterized  Culture-Blood --        RADIOLOGY/EKG:    EXAM:  XR CHEST PORTABLE URGENT 1V                            PROCEDURE DATE:  06/09/2021        IMPRESSION:   ET tube tip above tracheal bifurcation.  NG tube tip beyond GE junction.  .    Mild bibasilar  diffuse airspace disease and/or pleural effusions.      EXAM:  CT MAXILLOFACIAL                          EXAM:  [facility]                          EXAM:  CT BRAIN                          EXAM:  CT CERVICAL SPINE                            PROCEDURE DATE:  06/07/2021        IMPRESSION:    CT HEAD: No acute intracranial hemorrhage, mass effect, or osseous fracture.    CT MAXILLOFACIAL BONES: Comminuted nasal bone fractures. There is no other acute maxillofacial bone fracture.    CT CERVICAL SPINE: No acute cervical spine fracture or traumatic malalignment.        EXAM:  XR CHEST PORTABLE IMMED 1V                            PROCEDURE DATE:  06/08/2021          INTERPRETATION:  DATE OF STUDY: 6/8/21    PRIOR:6/7/21    CLINICAL INDICATION: SOB. Congestion.    TECHNIQUE: portable chest.    FINDINGS:  Left-sided defibrillator in situ.  Increasing pulmonic congestive changes present.  Small bilateral pleural effusions are present - left more than right - with associated bibasilar atelectasis.  No pneumothorax. No acute bony finding.    IMPRESSION:  Increasing pulmonic congestive changes with small bilateral pleural effusions.

## 2021-06-14 NOTE — PROGRESS NOTE ADULT - ASSESSMENT
85 y/o M w/ PMH of HTN, systolic CHF, COPD, CAD s/p CABG / PCI, PVD s/p stent, CKD III, dyslipidemia, lung cancer s/p lobectomy, cardiac arrest s/p AICD, CEA, p/w syncope    #Delirium tremens  -Agitation resolved  -s/p intubation and Precedex gtt  -Monitor off Ativan  -Downgraded from ICU  -On nasal cannula 3 L, continue to monitor VS closely    #Acute hypoxic respiratory failure 2/2 aspiration PNA  -Hx of COPD and Lung cancer s/p lobectomy   -COVID19 negative   -s/p levophed (concern for septic shock)  -Respiratory failure resolving on 3 L NC   -Now extubated  -On 3L NC, continues to be lethargic  -Hold ativan as patient is obtunded   -Protonix QD for GI ppx  -Continue zosyn for possible aspiration PNA (6/8-)  -Ucx- negative   -Sputum cx- Rare gram pos cocci in pairs and chains, rare gram neg rods    #Syncope   -Check orthostatics: negative so far  -Troponin negative x 1  -CTH- nasal fracture   -EEG- no epileptiform activity   - EP interrogated device  -Cardio recs appreciated  -Neuro re consult  -Alcohol level - 186  -Wound care for facial injury     #Nasal bone fracture  -OMFS consult   -Pain control prn    #Left upper extremity erythema  -Differential includes superficial thrombophlebitis  -Will obtain LUE US to r/o thrombosis     #Afib   - Metoprolol increased from 50 BID to 100 mg PO q12h    #HTN  -Continue Lisinopril 20 mg   -Continue metoprolol tartrate 100 mg q12h  -Monitor BP    #Hypokalemia  -Replete   -Continue to monitor     #H/o systolic CHF   -hx of AICD placement   -Continue metoprolol 100 mg PO q12h  -TTE- EF 45-50%, mildly reduced LV systolic function, left atrium moderately dilated, mild aortic stenosis , mild 1+ mitral regurgitation     #CAD  -Continue atorvastatin 10 mg qhs   -Continue aspirin 81 mg QD   -Continue metoprolol 100 mg PO q12h    #PVD s/p stent  -Continue atorvastatin 10 mg qhs   -Continue aspirin 81 mg QD     #CKD III  -Cr stable   -Weber in place    #HLD  -Continue atorvastatin 10 mg qhs     #DVT ppx  -Lovenox 40 mg SQ QD    #DISPO  -PT recs appreciated- d/c to rehab when stable     d/w Dr. Adame

## 2021-06-14 NOTE — PROGRESS NOTE ADULT - ASSESSMENT
Pt has been seen and examined with FP resident, resident supervised agree with a/p       Patient is a 86y old  Male who presents with a chief complaint of Syncope (13 Jun 2021 16:02)      HPI:  87 y/o M w/ PMH of HTN, systolic CHF, COPD, CAD s/p CABG / PCI, PVD s/p stent, CKD III, dyslipidemia, lung cancer s/p lobectomy, cardiac arrest s/p AICD, CEA, p/w syncope.       PHYSICAL EXAM:  Vital Signs Last 24 Hrs  T(C): 36.6 (14 Jun 2021 16:01), Max: 37.3 (14 Jun 2021 04:39)  T(F): 97.8 (14 Jun 2021 16:01), Max: 99.2 (14 Jun 2021 04:39)  HR: 66 (14 Jun 2021 11:00) (46 - 86)  BP: 110/52 (14 Jun 2021 11:00) (106/52 - 179/88)  BP(mean): 64 (14 Jun 2021 11:00) (64 - 112)  RR: --  SpO2: 95% (14 Jun 2021 11:00) (89% - 98%)  -rs-aeeb, cta  -cvs-s1s2 normal   -p/a-soft,bs+      A/P    #ct supportive care, discharge plan

## 2021-06-15 LAB
ANION GAP SERPL CALC-SCNC: 5 MMOL/L — SIGNIFICANT CHANGE UP (ref 5–17)
BASOPHILS # BLD AUTO: 0.05 K/UL — SIGNIFICANT CHANGE UP (ref 0–0.2)
BASOPHILS NFR BLD AUTO: 0.7 % — SIGNIFICANT CHANGE UP (ref 0–2)
BUN SERPL-MCNC: 13 MG/DL — SIGNIFICANT CHANGE UP (ref 7–23)
CALCIUM SERPL-MCNC: 9.1 MG/DL — SIGNIFICANT CHANGE UP (ref 8.5–10.1)
CHLORIDE SERPL-SCNC: 103 MMOL/L — SIGNIFICANT CHANGE UP (ref 96–108)
CO2 SERPL-SCNC: 29 MMOL/L — SIGNIFICANT CHANGE UP (ref 22–31)
CREAT SERPL-MCNC: 1.01 MG/DL — SIGNIFICANT CHANGE UP (ref 0.5–1.3)
EOSINOPHIL # BLD AUTO: 0.19 K/UL — SIGNIFICANT CHANGE UP (ref 0–0.5)
EOSINOPHIL NFR BLD AUTO: 2.5 % — SIGNIFICANT CHANGE UP (ref 0–6)
GLUCOSE SERPL-MCNC: 152 MG/DL — HIGH (ref 70–99)
HCT VFR BLD CALC: 42.3 % — SIGNIFICANT CHANGE UP (ref 39–50)
HGB BLD-MCNC: 14.4 G/DL — SIGNIFICANT CHANGE UP (ref 13–17)
IMM GRANULOCYTES NFR BLD AUTO: 0.5 % — SIGNIFICANT CHANGE UP (ref 0–1.5)
LYMPHOCYTES # BLD AUTO: 1.1 K/UL — SIGNIFICANT CHANGE UP (ref 1–3.3)
LYMPHOCYTES # BLD AUTO: 14.4 % — SIGNIFICANT CHANGE UP (ref 13–44)
MCHC RBC-ENTMCNC: 32 PG — SIGNIFICANT CHANGE UP (ref 27–34)
MCHC RBC-ENTMCNC: 34 GM/DL — SIGNIFICANT CHANGE UP (ref 32–36)
MCV RBC AUTO: 94 FL — SIGNIFICANT CHANGE UP (ref 80–100)
MONOCYTES # BLD AUTO: 0.88 K/UL — SIGNIFICANT CHANGE UP (ref 0–0.9)
MONOCYTES NFR BLD AUTO: 11.5 % — SIGNIFICANT CHANGE UP (ref 2–14)
NEUTROPHILS # BLD AUTO: 5.4 K/UL — SIGNIFICANT CHANGE UP (ref 1.8–7.4)
NEUTROPHILS NFR BLD AUTO: 70.4 % — SIGNIFICANT CHANGE UP (ref 43–77)
PLATELET # BLD AUTO: 213 K/UL — SIGNIFICANT CHANGE UP (ref 150–400)
POTASSIUM SERPL-MCNC: 3.5 MMOL/L — SIGNIFICANT CHANGE UP (ref 3.5–5.3)
POTASSIUM SERPL-SCNC: 3.5 MMOL/L — SIGNIFICANT CHANGE UP (ref 3.5–5.3)
RBC # BLD: 4.5 M/UL — SIGNIFICANT CHANGE UP (ref 4.2–5.8)
RBC # FLD: 13.2 % — SIGNIFICANT CHANGE UP (ref 10.3–14.5)
SODIUM SERPL-SCNC: 137 MMOL/L — SIGNIFICANT CHANGE UP (ref 135–145)
WBC # BLD: 7.66 K/UL — SIGNIFICANT CHANGE UP (ref 3.8–10.5)
WBC # FLD AUTO: 7.66 K/UL — SIGNIFICANT CHANGE UP (ref 3.8–10.5)

## 2021-06-15 PROCEDURE — 99233 SBSQ HOSP IP/OBS HIGH 50: CPT

## 2021-06-15 PROCEDURE — 95816 EEG AWAKE AND DROWSY: CPT | Mod: 26

## 2021-06-15 PROCEDURE — 99233 SBSQ HOSP IP/OBS HIGH 50: CPT | Mod: GC

## 2021-06-15 RX ORDER — BENZOCAINE AND MENTHOL 5; 1 G/100ML; G/100ML
1 LIQUID ORAL DAILY
Refills: 0 | Status: DISCONTINUED | OUTPATIENT
Start: 2021-06-15 | End: 2021-06-23

## 2021-06-15 RX ORDER — QUETIAPINE FUMARATE 200 MG/1
25 TABLET, FILM COATED ORAL AT BEDTIME
Refills: 0 | Status: DISCONTINUED | OUTPATIENT
Start: 2021-06-15 | End: 2021-06-23

## 2021-06-15 RX ORDER — SODIUM,POTASSIUM PHOSPHATES 278-250MG
1 POWDER IN PACKET (EA) ORAL ONCE
Refills: 0 | Status: COMPLETED | OUTPATIENT
Start: 2021-06-15 | End: 2021-06-15

## 2021-06-15 RX ORDER — POTASSIUM CHLORIDE 20 MEQ
40 PACKET (EA) ORAL
Refills: 0 | Status: COMPLETED | OUTPATIENT
Start: 2021-06-15 | End: 2021-06-15

## 2021-06-15 RX ORDER — THIAMINE MONONITRATE (VIT B1) 100 MG
100 TABLET ORAL DAILY
Refills: 0 | Status: DISCONTINUED | OUTPATIENT
Start: 2021-06-15 | End: 2021-06-21

## 2021-06-15 RX ADMIN — Medication 81 MILLIGRAM(S): at 11:17

## 2021-06-15 RX ADMIN — ENOXAPARIN SODIUM 40 MILLIGRAM(S): 100 INJECTION SUBCUTANEOUS at 11:17

## 2021-06-15 RX ADMIN — Medication 1 DROP(S): at 22:03

## 2021-06-15 RX ADMIN — Medication 1 APPLICATION(S): at 11:17

## 2021-06-15 RX ADMIN — Medication 100 MILLIGRAM(S): at 11:18

## 2021-06-15 RX ADMIN — LISINOPRIL 20 MILLIGRAM(S): 2.5 TABLET ORAL at 11:17

## 2021-06-15 RX ADMIN — PIPERACILLIN AND TAZOBACTAM 25 GRAM(S): 4; .5 INJECTION, POWDER, LYOPHILIZED, FOR SOLUTION INTRAVENOUS at 14:14

## 2021-06-15 RX ADMIN — PIPERACILLIN AND TAZOBACTAM 25 GRAM(S): 4; .5 INJECTION, POWDER, LYOPHILIZED, FOR SOLUTION INTRAVENOUS at 06:16

## 2021-06-15 RX ADMIN — QUETIAPINE FUMARATE 25 MILLIGRAM(S): 200 TABLET, FILM COATED ORAL at 18:57

## 2021-06-15 RX ADMIN — Medication 650 MILLIGRAM(S): at 11:58

## 2021-06-15 RX ADMIN — Medication 1 TABLET(S): at 11:16

## 2021-06-15 RX ADMIN — PIPERACILLIN AND TAZOBACTAM 25 GRAM(S): 4; .5 INJECTION, POWDER, LYOPHILIZED, FOR SOLUTION INTRAVENOUS at 22:04

## 2021-06-15 RX ADMIN — Medication 100 MILLIGRAM(S): at 14:14

## 2021-06-15 RX ADMIN — PANTOPRAZOLE SODIUM 40 MILLIGRAM(S): 20 TABLET, DELAYED RELEASE ORAL at 06:16

## 2021-06-15 RX ADMIN — Medication 1 DROP(S): at 11:17

## 2021-06-15 RX ADMIN — Medication 650 MILLIGRAM(S): at 11:18

## 2021-06-15 RX ADMIN — Medication 1 APPLICATION(S): at 06:16

## 2021-06-15 RX ADMIN — Medication 1 MILLIGRAM(S): at 11:18

## 2021-06-15 NOTE — CONSULT NOTE ADULT - CONSULT REASON
altered mental status
hypercapnic respiratory failure
syncope
Syncope / fall
Hypoxia/Respiratory Distress

## 2021-06-15 NOTE — CONSULT NOTE ADULT - SUBJECTIVE AND OBJECTIVE BOX
Patient is a 86y old  Male who presents with a chief complaint of Syncope (14 Jun 2021 18:26)      HPI:  85 y/o M w/ PMH of HTN, systolic CHF, COPD, CAD s/p CABG / PCI, PVD s/p stent, CKD III, dyslipidemia, lung cancer s/p lobectomy, cardiac arrest s/p AICD, CEA, who was admitted on 6/8/21 with syncope.   During the course of his admission he was intubated for respiratory failure secondary to aspiration pneumonia and also had alcohol withdrawal.  Neurology is now consulted for altered mental status.    PSH: CEA, CABG, AICD placement, knee surgery, appendectomy, lung lobectomy, tonsillectomy / adenoidectomy      PAST MEDICAL & SURGICAL HISTORY:  Hypertension    CAD (coronary artery disease)    Cardiac arrest with successful resuscitation    CKD (chronic kidney disease)    PAD (peripheral artery disease)    Lung cancer    Heart failure    AICD (automatic cardioverter/defibrillator) present    S/P carotid endarterectomy    History of lobectomy of lung    History of appendectomy    H/O left knee surgery    History of tonsillectomy and adenoidectomy      FAMILY HISTORY: noncontributory      Social Hx: Reports to me that he drinks, "as much as he can".    MEDICATIONS  (STANDING):  artificial  tears Solution 1 Drop(s) Both EYES two times a day  aspirin  chewable 81 milliGRAM(s) Oral daily  atorvastatin 10 milliGRAM(s) Oral at bedtime  enoxaparin Injectable 40 milliGRAM(s) SubCutaneous daily  erythromycin   Ointment 1 Application(s) Right EYE four times a day  folic acid 1 milliGRAM(s) Oral daily  lisinopril 20 milliGRAM(s) Oral daily  melatonin 5 milliGRAM(s) Oral at bedtime  metoprolol tartrate 100 milliGRAM(s) Oral every 12 hours  multivitamin 1 Tablet(s) Oral daily  pantoprazole    Tablet 40 milliGRAM(s) Oral before breakfast  piperacillin/tazobactam IVPB.. 3.375 Gram(s) IV Intermittent every 8 hours  thiamine 100 milliGRAM(s) Oral daily       Allergies    No Known Allergies    Intolerances        ROS: Pertinent positives in HPI, all other ROS were reviewed and are negative.      Vital Signs Last 24 Hrs  T(C): 36.6 (15 Celso 2021 10:30), Max: 36.6 (14 Jun 2021 16:01)  T(F): 97.8 (15 Celso 2021 10:30), Max: 97.8 (14 Jun 2021 16:01)  HR: 75 (15 Celso 2021 11:00) (65 - 85)  BP: 138/53 (15 Celso 2021 11:00) (103/52 - 157/66)  BP(mean): 71 (15 Celso 2021 11:00) (60 - 100)  RR: --  SpO2: 96% (14 Jun 2021 22:00) (94% - 98%)        Constitutional: awake and alert.  HEENT: PERRLA, EOMI,   Neck: Supple.  Respiratory: Breath sounds are clear bilaterally  Cardiovascular: S1 and S2, regular / irregular rhythm  Gastrointestinal: soft, nontender  Extremities:  no edema  Vascular: Carotid Bruit - no  Musculoskeletal: no joint swelling/tenderness, no abnormal movements  Skin: No rashes    Neurological exam:  HF: Awake and alert. Oriented to person. Knows he is in hospital but not name of hospital. Does not know month or year. Not able to name President but was able to chose from multiple choice.   Naming intact. No aphasia.   CN: PRISCILLA, EOMI, VFF, facial sensation normal, no NLFD, tongue midline, Palate moves equally, SCM equal bilaterally  Motor: No pronator drift, Strength 5/5 in all 4 ext, normal bulk and tone, no tremor, rigidity or bradykinesia.    Sens: Intact to light touch  Reflexes: Symmetric and normal . BJ 2+, BR 2+, KJ 2+, AJ 2+, downgoing toes b/l  Coord:  No FNFA, dysmetria, SO intact   Gait/Balance: Not tested    NIHSS:          Labs:   06-15    137  |  103  |  13  ----------------------------<  152<H>  3.5   |  29  |  1.01    Ca    9.1      15 Celso 2021 07:58  Phos  2.3     06-14  Mg     1.8     06-14    TPro  6.8  /  Alb  2.7<L>  /  TBili  1.1  /  DBili  x   /  AST  26  /  ALT  20  /  AlkPhos  53  06-14    06-08 Chol 175 LDL -- HDL 80 Trig 110                          14.4   7.66  )-----------( 213      ( 15 Celso 2021 07:58 )             42.3       Radiology:  CT head/maxillofacial bones/cervical spine 6/7/21:    CT HEAD: No acute intracranial hemorrhage, mass effect, or osseous fracture.    CT MAXILLOFACIAL BONES: Comminuted nasal bone fractures. There is no other acute maxillofacial bone fracture.    CT CERVICAL SPINE: No acute cervical spine fracture or traumatic malalignment.    EEG 6/8/21: normal

## 2021-06-15 NOTE — PROGRESS NOTE ADULT - SUBJECTIVE AND OBJECTIVE BOX
HPI:  87 y/o M w/ PMH of HTN, systolic CHF, COPD, CAD s/p CABG / PCI, PVD s/p stent, CKD III, dyslipidemia, lung cancer s/p lobectomy, cardiac arrest s/p AICD, CEA, p/w syncope. Patient states that the last thing he remembers is walking from kitchen to his living room, and then he woke up on the floor. Denies having any symptoms prior to syncope. Denies CP, SOB, palpitations, LH, dizziness. Patient states when he woke up he noticed blood dripping on his chest, so he pressed his life alert button and was brought to ED. Patient c/o mild pain on R side of nose. Also has injury to his head. Denies weakness in arms / legs, facial droop, sensory deficits, pain, cough, runny nose, sore throat, nausea, vomiting, abdominal pain, urinary incontinence and tongue bite.     SUBJECTIVE:   Patient seen and evaluated at bedside. Patient alert and oriented x3 with episodes of confusion. No acute distress. Unable to say months backwards. D/C to [facility] pending neuro evaluation.    REVIEW OF SYSTEMS:    Unable to assess due to altered mental status    Vital Signs Last 24 Hrs  T(C): 36.2 (15 Celso 2021 20:42), Max: 36.6 (15 Celso 2021 10:30)  T(F): 97.2 (15 Celso 2021 20:42), Max: 97.8 (15 Celso 2021 10:30)  HR: 87 (15 Celso 2021 20:00) (60 - 94)  BP: 104/75 (15 Celso 2021 20:00) (103/52 - 153/62)  BP(mean): 82 (15 Celso 2021 20:00) (60 - 100)  SpO2: 95% (15 Celso 2021 19:00) (91% - 97%)    I&O's Summary    12 Jun 2021 07:01  -  13 Jun 2021 07:00  --------------------------------------------------------  IN: 0 mL / OUT: 400 mL / NET: -400 mL      PHYSICAL EXAM:    Constitutional: NAD, awake and alert, well-developed  HEENT: PERR, EOMI, Normal Hearing, MMM  Neck: Soft and supple, No LAD, No JVD  Respiratory: Breath sounds are clear bilaterally, No wheezing, rales or rhonchi  Cardiovascular: S1 and S2, regular rate and rhythm, + systolic murmur present  Gastrointestinal: Bowel Sounds present, soft, nontender, nondistended, no guarding, no rebound  Extremities: No peripheral edema  Vascular: 2+ peripheral pulses  Musculoskeletal: 5/5 strength b/l upper and lower extremities  Skin: No rashes, erythema and warmth of left forearm, no tender or cord palpated    MEDICATIONS:  MEDICATIONS  (STANDING):  artificial  tears Solution 1 Drop(s) Both EYES two times a day  aspirin  chewable 81 milliGRAM(s) Oral daily  atorvastatin 10 milliGRAM(s) Oral at bedtime  enoxaparin Injectable 40 milliGRAM(s) SubCutaneous daily  erythromycin   Ointment 1 Application(s) Right EYE four times a day  folic acid 1 milliGRAM(s) Oral daily  lisinopril 20 milliGRAM(s) Oral daily  melatonin 5 milliGRAM(s) Oral at bedtime  metoprolol tartrate 100 milliGRAM(s) Oral every 12 hours  multivitamin 1 Tablet(s) Oral daily  pantoprazole    Tablet 40 milliGRAM(s) Oral before breakfast  piperacillin/tazobactam IVPB.. 3.375 Gram(s) IV Intermittent every 8 hours      LABS: All Labs Reviewed:                                             14.4   7.66  )-----------( 213      ( 15 Celso 2021 07:58 )             42.3     06-15    137  |  103  |  13  ----------------------------<  152<H>  3.5   |  29  |  1.01    Ca    9.1      15 Celso 2021 07:58  Phos  2.3     06-14  Mg     1.8     06-14    TPro  6.8  /  Alb  2.7<L>  /  TBili  1.1  /  DBili  x   /  AST  26  /  ALT  20  /  AlkPhos  53  06-14                    Blood Culture: 06-09 @ 14:00  Organism --  Gram Stain Blood -- Gram Stain   Few polymorphonuclear leukocytes per low power field  No Squamous epithelial cells per low power field  Rare Gram Positive Cocci in Pairs and Chains seen per oil power field  Rare Gram Negative Rods seen per oil power field  Specimen Source .Sputum Sputum  Culture-Blood --    06-09 @ 10:45  Organism --  Gram Stain Blood -- Gram Stain --  Specimen Source .Urine Catheterized  Culture-Blood --        RADIOLOGY/EKG:    EXAM:  XR CHEST PORTABLE URGENT 1V                            PROCEDURE DATE:  06/09/2021        IMPRESSION:   ET tube tip above tracheal bifurcation.  NG tube tip beyond GE junction.  .    Mild bibasilar  diffuse airspace disease and/or pleural effusions.      EXAM:  CT MAXILLOFACIAL                          EXAM:  CT 3D RECONSTRUCT [facility]                          EXAM:  CT BRAIN                          EXAM:  CT CERVICAL SPINE                            PROCEDURE DATE:  06/07/2021        IMPRESSION:    CT HEAD: No acute intracranial hemorrhage, mass effect, or osseous fracture.    CT MAXILLOFACIAL BONES: Comminuted nasal bone fractures. There is no other acute maxillofacial bone fracture.    CT CERVICAL SPINE: No acute cervical spine fracture or traumatic malalignment.        EXAM:  XR CHEST PORTABLE IMMED 1V                            PROCEDURE DATE:  06/08/2021          INTERPRETATION:  DATE OF STUDY: 6/8/21    PRIOR:6/7/21    CLINICAL INDICATION: SOB. Congestion.    TECHNIQUE: portable chest.    FINDINGS:  Left-sided defibrillator in situ.  Increasing pulmonic congestive changes present.  Small bilateral pleural effusions are present - left more than right - with associated bibasilar atelectasis.  No pneumothorax. No acute bony finding.    IMPRESSION:  Increasing pulmonic congestive changes with small bilateral pleural effusions.

## 2021-06-15 NOTE — CONSULT NOTE ADULT - ASSESSMENT
85 y/o M w/ PMH of HTN, CHF, COPD, CAD s/p CABG, PVD, CKD, HLD, lung CA s/p lobectomy, cardiac arrest s/p AICD, CEA, presents s/p syncope and fall, the last thing he remembers is walking from kitchen to his living room, woke up on the floor with bruise/ bleed on his forehead, was not confused but may have had urinary incontinence, denies preceding premonition, aura or diaphoresis. Pt admits to having had a brief episode like this a week ago, sustained trauma to his left knee.    # Syncope/ fall with head trauma, rule out possibility of seizure    - Obtain EEG  - rest of w/u as per cardio    # Tremors of hands; R > L, likely benign    - Obtain TSH   - patient advised to f/u with me as OP IN 3-4 weeks for further w/u    Above D/W pt and FP resident  
87 y/o M w/ PMH of HTN, systolic CHF, COPD, CAD s/p CABG / PCI, PVD s/p stent, CKD III, dyslipidemia, lung cancer s/p lobectomy, cardiac arrest s/p AICD, CEA, who was admitted on 6/8/21 with syncope.   During the course of his admission he was intubated for respiratory failure secondary to aspiration pneumonia and also had alcohol withdrawal.  Neurology is now consulted for altered mental status.    -On examination he has confusion.  -EEG performed on 6/8 for syncope was normal. He was not confused at that time.  -Possible prolonged encephalopathy after alcohol withdrawal.  -Will repeat EEG.  -Ammonia last checked on 6/10 and was elevated at 110. Would trend levels  -Will also check vitamin B12 level.    Will follow.
Syncope- noted to have high alcohol level at presentation.  Pt to be placed on DT protocol.   Recommend ICD interrogation.  orthostatic BP readings negative so far.    Chronic HfREF- s/p ICD  continue current GDMT to lisinopril.  Metoprolol to be switched to toprol.  appears euvolemic.    Aortic stenosis- check 2 D echo.    Other medical issues- Management per primary team.   Thank you for allowing me to participate in the care of this patient. Please feel free to contact me with any questions. 
87 yo m pmhx HTN, HFrEF 40%, COPD, CAD s/p CABG/PCI, PVD s/p stent, CKD3, DLD, lung cancer s/p lobectomy, cardiac arrest s/p AICD, CEA biba from home s/p mechanical fall 2/2 etoh intoxication.    NEURO: Sedated on propofol.    CV: Shock state requiring vasopressor therapy, actively titrating levoped for MAP >65, weaning as tolerated.    RESP: Hypoxic respiratory failure 2/2 pulmonary edema/pneumonia requiring intubation, ac/vc 6cc/kg tv lung protective strategies, actively titrating fio2 and peep for spo2 >92%, wean as tolerated.    RENAL: Monitor lytes, replace as needed. Diuresing as tolerated, marin in place.   GI: NPO, OGT in place.   ENDO: POCT q6hrs while NPO  ID: Aspiration pneumonia, started on zosyn for empiric coverage.    HEME: Lovenox for vte ppx   DISPO: Full code.      Critical Care time: 60 mins assessing presenting problems of acute illness that poses high probability of life threatening deterioration or end organ damage/dysfunction.  Medical decision making including Initiating plan of care, reviewing data, reviewing radiology, discussing with multidisciplinary team, non inclusive of procedures, discussing goals of care with patient/family

## 2021-06-15 NOTE — PROGRESS NOTE ADULT - ASSESSMENT
87 y/o M w/ PMH of HTN, systolic CHF, COPD, CAD s/p CABG / PCI, PVD s/p stent, CKD III, dyslipidemia, lung cancer s/p lobectomy, cardiac arrest s/p AICD, CEA, p/w syncope    #Delirium s/p extubation  -Agitation resolved  -s/p intubation and Precedex gtt  -Monitor off Ativan  -Downgraded from ICU  -On nasal cannula 3 L, continue to monitor VS closely  - pt continues to have confusion, neuro consult placed.    #Acute hypoxic respiratory failure 2/2 aspiration PNA  -Hx of COPD and Lung cancer s/p lobectomy   -COVID19 negative   -s/p levophed (concern for septic shock)  -Respiratory failure resolving on 3 L NC   -Now extubated  -On 3L NC, continues to be lethargic  -Hold ativan as patient is obtunded   -Protonix QD for GI ppx  -Continue zosyn for possible aspiration PNA (6/8-)  -Ucx- negative   -Sputum cx- Rare gram pos cocci in pairs and chains, rare gram neg rods    #Syncope   -Check orthostatics: negative so far  -Troponin negative x 1  -CTH- nasal fracture   -EEG- no epileptiform activity   - EP interrogated device  -Cardio recs appreciated  -Neuro re consult  -Alcohol level - 186  -Wound care for facial injury     #Nasal bone fracture  -OMFS consult   -Pain control prn    #Left upper extremity erythema  -Differential includes superficial thrombophlebitis  -Will obtain LUE US to r/o thrombosis : NO evidence of  DVT. Short segment thrombosis of left cephalic vein in the left median cubital fossa.    #Afib   - Metoprolol 100 mg PO q12h    #HTN  -Continue Lisinopril 20 mg   -Continue metoprolol tartrate 100 mg q12h  -Monitor BP    #Hypokalemia  -Replete   -Continue to monitor     #H/o systolic CHF   -hx of AICD placement   -Continue metoprolol 100 mg PO q12h  -TTE- EF 45-50%, mildly reduced LV systolic function, left atrium moderately dilated, mild aortic stenosis , mild 1+ mitral regurgitation     #CAD  -Continue atorvastatin 10 mg qhs   -Continue aspirin 81 mg QD   -Continue metoprolol 100 mg PO q12h    #PVD s/p stent  -Continue atorvastatin 10 mg qhs   -Continue aspirin 81 mg QD     #CKD III  -Cr stable   -Weber in place    #HLD  -Continue atorvastatin 10 mg qhs     #DVT ppx  -Lovenox 40 mg SQ QD    #DISPO  -PT recs appreciated- d/c to rehab when stable     d/w Dr. Massey

## 2021-06-16 LAB
AMMONIA BLD-MCNC: <10 UMOL/L — LOW (ref 11–32)
ANION GAP SERPL CALC-SCNC: 6 MMOL/L — SIGNIFICANT CHANGE UP (ref 5–17)
BASOPHILS # BLD AUTO: 0.05 K/UL — SIGNIFICANT CHANGE UP (ref 0–0.2)
BASOPHILS NFR BLD AUTO: 0.7 % — SIGNIFICANT CHANGE UP (ref 0–2)
BUN SERPL-MCNC: 13 MG/DL — SIGNIFICANT CHANGE UP (ref 7–23)
CALCIUM SERPL-MCNC: 9 MG/DL — SIGNIFICANT CHANGE UP (ref 8.5–10.1)
CHLORIDE SERPL-SCNC: 101 MMOL/L — SIGNIFICANT CHANGE UP (ref 96–108)
CO2 SERPL-SCNC: 28 MMOL/L — SIGNIFICANT CHANGE UP (ref 22–31)
CREAT SERPL-MCNC: 1.04 MG/DL — SIGNIFICANT CHANGE UP (ref 0.5–1.3)
EOSINOPHIL # BLD AUTO: 0.27 K/UL — SIGNIFICANT CHANGE UP (ref 0–0.5)
EOSINOPHIL NFR BLD AUTO: 3.6 % — SIGNIFICANT CHANGE UP (ref 0–6)
GLUCOSE SERPL-MCNC: 122 MG/DL — HIGH (ref 70–99)
HCT VFR BLD CALC: 46.5 % — SIGNIFICANT CHANGE UP (ref 39–50)
HGB BLD-MCNC: 15.3 G/DL — SIGNIFICANT CHANGE UP (ref 13–17)
IMM GRANULOCYTES NFR BLD AUTO: 0.9 % — SIGNIFICANT CHANGE UP (ref 0–1.5)
LYMPHOCYTES # BLD AUTO: 1.35 K/UL — SIGNIFICANT CHANGE UP (ref 1–3.3)
LYMPHOCYTES # BLD AUTO: 18 % — SIGNIFICANT CHANGE UP (ref 13–44)
MAGNESIUM SERPL-MCNC: 1.6 MG/DL — SIGNIFICANT CHANGE UP (ref 1.6–2.6)
MCHC RBC-ENTMCNC: 31 PG — SIGNIFICANT CHANGE UP (ref 27–34)
MCHC RBC-ENTMCNC: 32.9 GM/DL — SIGNIFICANT CHANGE UP (ref 32–36)
MCV RBC AUTO: 94.1 FL — SIGNIFICANT CHANGE UP (ref 80–100)
MONOCYTES # BLD AUTO: 0.91 K/UL — HIGH (ref 0–0.9)
MONOCYTES NFR BLD AUTO: 12.1 % — SIGNIFICANT CHANGE UP (ref 2–14)
NEUTROPHILS # BLD AUTO: 4.85 K/UL — SIGNIFICANT CHANGE UP (ref 1.8–7.4)
NEUTROPHILS NFR BLD AUTO: 64.7 % — SIGNIFICANT CHANGE UP (ref 43–77)
PHOSPHATE SERPL-MCNC: 2.6 MG/DL — SIGNIFICANT CHANGE UP (ref 2.5–4.5)
PLATELET # BLD AUTO: 216 K/UL — SIGNIFICANT CHANGE UP (ref 150–400)
POTASSIUM SERPL-MCNC: 3.5 MMOL/L — SIGNIFICANT CHANGE UP (ref 3.5–5.3)
POTASSIUM SERPL-SCNC: 3.5 MMOL/L — SIGNIFICANT CHANGE UP (ref 3.5–5.3)
RBC # BLD: 4.94 M/UL — SIGNIFICANT CHANGE UP (ref 4.2–5.8)
RBC # FLD: 13.2 % — SIGNIFICANT CHANGE UP (ref 10.3–14.5)
SODIUM SERPL-SCNC: 135 MMOL/L — SIGNIFICANT CHANGE UP (ref 135–145)
TSH SERPL-MCNC: 1.25 UU/ML — SIGNIFICANT CHANGE UP (ref 0.34–4.82)
VIT B12 SERPL-MCNC: 313 PG/ML — SIGNIFICANT CHANGE UP (ref 232–1245)
WBC # BLD: 7.5 K/UL — SIGNIFICANT CHANGE UP (ref 3.8–10.5)
WBC # FLD AUTO: 7.5 K/UL — SIGNIFICANT CHANGE UP (ref 3.8–10.5)

## 2021-06-16 PROCEDURE — 99233 SBSQ HOSP IP/OBS HIGH 50: CPT | Mod: GC

## 2021-06-16 PROCEDURE — 99232 SBSQ HOSP IP/OBS MODERATE 35: CPT

## 2021-06-16 RX ADMIN — Medication 1 APPLICATION(S): at 05:48

## 2021-06-16 RX ADMIN — Medication 650 MILLIGRAM(S): at 21:40

## 2021-06-16 RX ADMIN — Medication 1 DROP(S): at 20:44

## 2021-06-16 RX ADMIN — Medication 650 MILLIGRAM(S): at 20:42

## 2021-06-16 RX ADMIN — ATORVASTATIN CALCIUM 10 MILLIGRAM(S): 80 TABLET, FILM COATED ORAL at 00:03

## 2021-06-16 RX ADMIN — Medication 600 MILLIGRAM(S): at 00:04

## 2021-06-16 RX ADMIN — BENZOCAINE AND MENTHOL 1 LOZENGE: 5; 1 LIQUID ORAL at 11:34

## 2021-06-16 RX ADMIN — QUETIAPINE FUMARATE 25 MILLIGRAM(S): 200 TABLET, FILM COATED ORAL at 00:05

## 2021-06-16 RX ADMIN — Medication 5 MILLIGRAM(S): at 00:04

## 2021-06-16 RX ADMIN — Medication 1 TABLET(S): at 10:20

## 2021-06-16 RX ADMIN — Medication 81 MILLIGRAM(S): at 10:20

## 2021-06-16 RX ADMIN — Medication 100 MILLIGRAM(S): at 20:44

## 2021-06-16 RX ADMIN — Medication 600 MILLIGRAM(S): at 10:20

## 2021-06-16 RX ADMIN — ENOXAPARIN SODIUM 40 MILLIGRAM(S): 100 INJECTION SUBCUTANEOUS at 10:20

## 2021-06-16 RX ADMIN — Medication 100 MILLIGRAM(S): at 00:04

## 2021-06-16 RX ADMIN — ATORVASTATIN CALCIUM 10 MILLIGRAM(S): 80 TABLET, FILM COATED ORAL at 20:45

## 2021-06-16 RX ADMIN — Medication 1 DROP(S): at 10:20

## 2021-06-16 RX ADMIN — Medication 1 APPLICATION(S): at 18:37

## 2021-06-16 RX ADMIN — Medication 100 MILLIGRAM(S): at 10:20

## 2021-06-16 RX ADMIN — Medication 5 MILLIGRAM(S): at 20:45

## 2021-06-16 RX ADMIN — Medication 1 APPLICATION(S): at 00:05

## 2021-06-16 RX ADMIN — LISINOPRIL 20 MILLIGRAM(S): 2.5 TABLET ORAL at 10:20

## 2021-06-16 RX ADMIN — Medication 1 MILLIGRAM(S): at 10:20

## 2021-06-16 RX ADMIN — Medication 1 APPLICATION(S): at 11:34

## 2021-06-16 NOTE — PROGRESS NOTE ADULT - ASSESSMENT
85 y/o M w/ PMH of HTN, systolic CHF, COPD, CAD s/p CABG / PCI, PVD s/p stent, CKD III, dyslipidemia, lung cancer s/p lobectomy, cardiac arrest s/p AICD, CEA, who was admitted on 6/8/21 with syncope.   During the course of his admission he was intubated for respiratory failure secondary to aspiration pneumonia and also had alcohol withdrawal.  Neurology is now consulted for altered mental status.    -He remains confused  -EEG performed on 6/8 for syncope was normal. He was not confused at that time.  -Repeat EEG shows mild generalized slowing - nonspecific for encephalopathy.   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  -No fever, leukocytosis or nuchal rigidity to suggest CNS infection.  -Ammonia level not elevated.  -Vitamin B12 level pending.  -continue supportive care      Will follow.

## 2021-06-16 NOTE — PROGRESS NOTE ADULT - ASSESSMENT
85 y/o M w/ PMH of HTN, systolic CHF, COPD, CAD s/p CABG / PCI, PVD s/p stent, CKD III, dyslipidemia, lung cancer s/p lobectomy, cardiac arrest s/p AICD, CEA, p/w syncope    #Delirium s/p extubation  -Agitation resolved  -s/p intubation and Precedex gtt  -Monitor off Ativan  -Downgraded from ICU  -On nasal cannula 3 L, continue to monitor VS closely  - pt continues to have confusion, neuro consult placed.    #Acute hypoxic respiratory failure 2/2 aspiration PNA  -Hx of COPD and Lung cancer s/p lobectomy   -COVID19 negative   -s/p levophed (concern for septic shock)  -Respiratory failure resolving on 3 L NC   -Now extubated  -On 3L NC, continues to be lethargic  -Hold ativan as patient is obtunded   -Protonix QD for GI ppx  -Continue zosyn for possible aspiration PNA (6/8-)  -Ucx- negative   -Sputum cx- Rare gram pos cocci in pairs and chains, rare gram neg rods    #Syncope   -Check orthostatics: negative so far  -Troponin negative x 1  -CTH- nasal fracture   -EEG- no epileptiform activity   - EP interrogated device  -Cardio recs appreciated  -Neuro re consult: EEG with no seizure activity  -Ammonia level normal  -Alcohol level - 186  -Wound care for facial injury     #Nasal bone fracture  -OMFS consult   -Pain control prn    #Left upper extremity erythema  -Differential includes superficial thrombophlebitis  -Will obtain LUE US to r/o thrombosis : NO evidence of  DVT. Short segment thrombosis of left cephalic vein in the left median cubital fossa.    #Afib   - Metoprolol 100 mg PO q12h    #HTN  -Continue Lisinopril 20 mg   -Continue metoprolol tartrate 100 mg q12h  -Monitor BP    #Hypokalemia  -Replete   -Continue to monitor     #H/o systolic CHF   -hx of AICD placement   -Continue metoprolol 100 mg PO q12h  -TTE- EF 45-50%, mildly reduced LV systolic function, left atrium moderately dilated, mild aortic stenosis , mild 1+ mitral regurgitation     #CAD  -Continue atorvastatin 10 mg qhs   -Continue aspirin 81 mg QD   -Continue metoprolol 100 mg PO q12h    #PVD s/p stent  -Continue atorvastatin 10 mg qhs   -Continue aspirin 81 mg QD     #CKD III  -Cr stable   -Weber in place    #HLD  -Continue atorvastatin 10 mg qhs     #DVT ppx  -Lovenox 40 mg SQ QD    #DISPO  -PT recs appreciated- d/c to rehab when stable     d/w Dr. Massey

## 2021-06-16 NOTE — PROGRESS NOTE ADULT - SUBJECTIVE AND OBJECTIVE BOX
Interval History:  6/16/21: No new complaints from patient. Remains confused.      MEDICATIONS  (STANDING):  artificial  tears Solution 1 Drop(s) Both EYES two times a day  aspirin  chewable 81 milliGRAM(s) Oral daily  atorvastatin 10 milliGRAM(s) Oral at bedtime  benzocaine 15 mG/menthol 3.6 mG (Sugar-Free) Lozenge 1 Lozenge Oral daily  enoxaparin Injectable 40 milliGRAM(s) SubCutaneous daily  erythromycin   Ointment 1 Application(s) Right EYE four times a day  folic acid 1 milliGRAM(s) Oral daily  lisinopril 20 milliGRAM(s) Oral daily  melatonin 5 milliGRAM(s) Oral at bedtime  metoprolol tartrate 100 milliGRAM(s) Oral every 12 hours  multivitamin 1 Tablet(s) Oral daily  pantoprazole    Tablet 40 milliGRAM(s) Oral before breakfast  thiamine 100 milliGRAM(s) Oral daily    MEDICATIONS  (PRN):  acetaminophen   Tablet .. 650 milliGRAM(s) Oral every 6 hours PRN Mild Pain (1 - 3)  metoprolol tartrate Injectable 5 milliGRAM(s) IV Push every 6 hours PRN sustained HR > 120  QUEtiapine 25 milliGRAM(s) Oral at bedtime PRN agitation      Allergies    No Known Allergies    Intolerances        PHYSICAL EXAM:  Vital Signs Last 24 Hrs  T(F): 98 (06-16-21 @ 08:43)  HR: 73 (06-16-21 @ 10:00)  BP: 135/54 (06-16-21 @ 10:00)  RR: --    GENERAL: NAD, well-groomed, well-developed  HEAD:  Atraumatic, Normocephalic  Neuro:  Awake, alert. Oriented to person. Knows he is in hospital but not which one. Unable to state month. Knows year. Can pick name of President from multiple choice.  CN: PERRL, EOMI, no nystagmus, no facial weakness, tongue protrudes in the midline  motor: normal tone, no pronator drift, full strength in all four extremitie  sensory: intact to light touch  coordination: finger to nose intact bilaterally  DTRs: symmetric, plantar responses flexor bilaterally    LABS:                        15.3   7.50  )-----------( 216      ( 16 Jun 2021 08:07 )             46.5     06-16    135  |  101  |  13  ----------------------------<  122<H>  3.5   |  28  |  1.04    Ca    9.0      16 Jun 2021 08:07  Phos  2.6     06-16  Mg     1.6     06-16            RADIOLOGY & ADDITIONAL STUDIES:  CT head/maxillofacial bones/cervical spine 6/7/21:    CT HEAD: No acute intracranial hemorrhage, mass effect, or osseous fracture.    CT MAXILLOFACIAL BONES: Comminuted nasal bone fractures. There is no other acute maxillofacial bone fracture.    CT CERVICAL SPINE: No acute cervical spine fracture or traumatic malalignment.    EEG 6/8/21: normal    EEG 6/15/21: mild generalized slowing

## 2021-06-16 NOTE — PROGRESS NOTE ADULT - SUBJECTIVE AND OBJECTIVE BOX
HPI:  85 y/o M w/ PMH of HTN, systolic CHF, COPD, CAD s/p CABG / PCI, PVD s/p stent, CKD III, dyslipidemia, lung cancer s/p lobectomy, cardiac arrest s/p AICD, CEA, p/w syncope. Patient states that the last thing he remembers is walking from kitchen to his living room, and then he woke up on the floor. Denies having any symptoms prior to syncope. Denies CP, SOB, palpitations, LH, dizziness. Patient states when he woke up he noticed blood dripping on his chest, so he pressed his life alert button and was brought to ED. Patient c/o mild pain on R side of nose. Also has injury to his head. Denies weakness in arms / legs, facial droop, sensory deficits, pain, cough, runny nose, sore throat, nausea, vomiting, abdominal pain, urinary incontinence and tongue bite.     SUBJECTIVE:   Patient seen and evaluated at bedside. Patient alert and oriented x3 with episodes of confusion. No acute distress. Unable to say months backwards. D/C to [facility] pending neuro evaluation.    REVIEW OF SYSTEMS:    Unable to assess due to altered mental status    Vital Signs Last 24 Hrs  T(C): 36.7 (16 Jun 2021 08:43), Max: 36.7 (16 Jun 2021 08:43)  T(F): 98 (16 Jun 2021 08:43), Max: 98 (16 Jun 2021 08:43)  HR: 64 (16 Jun 2021 13:00) (61 - 83)  BP: 153/59 (16 Jun 2021 13:00) (114/45 - 158/106)  BP(mean): 79 (16 Jun 2021 13:00) (62 - 118)  SpO2: 98% (16 Jun 2021 13:00) (88% - 98%)    I&O's Summary    12 Jun 2021 07:01  -  13 Jun 2021 07:00  --------------------------------------------------------  IN: 0 mL / OUT: 400 mL / NET: -400 mL      PHYSICAL EXAM:    Constitutional: NAD, awake and alert, well-developed  HEENT: PERR, EOMI, Normal Hearing, MMM  Neck: Soft and supple, No LAD, No JVD  Respiratory: Breath sounds are clear bilaterally, No wheezing, rales or rhonchi  Cardiovascular: S1 and S2, regular rate and rhythm, + systolic murmur present  Gastrointestinal: Bowel Sounds present, soft, nontender, nondistended, no guarding, no rebound  Extremities: No peripheral edema  Vascular: 2+ peripheral pulses  Musculoskeletal: 5/5 strength b/l upper and lower extremities  Skin: No rashes, erythema and warmth of left forearm, no tender or cord palpated    MEDICATIONS:  MEDICATIONS  (STANDING):  artificial  tears Solution 1 Drop(s) Both EYES two times a day  aspirin  chewable 81 milliGRAM(s) Oral daily  atorvastatin 10 milliGRAM(s) Oral at bedtime  enoxaparin Injectable 40 milliGRAM(s) SubCutaneous daily  erythromycin   Ointment 1 Application(s) Right EYE four times a day  folic acid 1 milliGRAM(s) Oral daily  lisinopril 20 milliGRAM(s) Oral daily  melatonin 5 milliGRAM(s) Oral at bedtime  metoprolol tartrate 100 milliGRAM(s) Oral every 12 hours  multivitamin 1 Tablet(s) Oral daily  pantoprazole    Tablet 40 milliGRAM(s) Oral before breakfast  piperacillin/tazobactam IVPB.. 3.375 Gram(s) IV Intermittent every 8 hours      LABS: All Labs Reviewed:                       LABS:  cret                        15.3   7.50  )-----------( 216      ( 16 Jun 2021 08:07 )             46.5     06-16    135  |  101  |  13  ----------------------------<  122<H>  3.5   |  28  |  1.04    Ca    9.0      16 Jun 2021 08:07  Phos  2.6     06-16  Mg     1.6     06-16                              Blood Culture: 06-09 @ 14:00  Organism --  Gram Stain Blood -- Gram Stain   Few polymorphonuclear leukocytes per low power field  No Squamous epithelial cells per low power field  Rare Gram Positive Cocci in Pairs and Chains seen per oil power field  Rare Gram Negative Rods seen per oil power field  Specimen Source .Sputum Sputum  Culture-Blood --    06-09 @ 10:45  Organism --  Gram Stain Blood -- Gram Stain --  Specimen Source .Urine Catheterized  Culture-Blood --        RADIOLOGY/EKG:    EXAM:  XR CHEST PORTABLE URGENT 1V                            PROCEDURE DATE:  06/09/2021        IMPRESSION:   ET tube tip above tracheal bifurcation.  NG tube tip beyond GE junction.  .    Mild bibasilar  diffuse airspace disease and/or pleural effusions.      EXAM:  CT MAXILLOFACIAL                          EXAM:  CT 3D RECONSTRUCT LOI KATZ                          EXAM:  CT BRAIN                          EXAM:  CT CERVICAL SPINE                            PROCEDURE DATE:  06/07/2021        IMPRESSION:    CT HEAD: No acute intracranial hemorrhage, mass effect, or osseous fracture.    CT MAXILLOFACIAL BONES: Comminuted nasal bone fractures. There is no other acute maxillofacial bone fracture.    CT CERVICAL SPINE: No acute cervical spine fracture or traumatic malalignment.        EXAM:  XR CHEST PORTABLE IMMED 1V                            PROCEDURE DATE:  06/08/2021          INTERPRETATION:  DATE OF STUDY: 6/8/21    PRIOR:6/7/21    CLINICAL INDICATION: SOB. Congestion.    TECHNIQUE: portable chest.    FINDINGS:  Left-sided defibrillator in situ.  Increasing pulmonic congestive changes present.  Small bilateral pleural effusions are present - left more than right - with associated bibasilar atelectasis.  No pneumothorax. No acute bony finding.    IMPRESSION:  Increasing pulmonic congestive changes with small bilateral pleural effusions.

## 2021-06-17 LAB
ANION GAP SERPL CALC-SCNC: 12 MMOL/L — SIGNIFICANT CHANGE UP (ref 5–17)
BASOPHILS # BLD AUTO: 0.07 K/UL — SIGNIFICANT CHANGE UP (ref 0–0.2)
BASOPHILS NFR BLD AUTO: 0.7 % — SIGNIFICANT CHANGE UP (ref 0–2)
BUN SERPL-MCNC: 14 MG/DL — SIGNIFICANT CHANGE UP (ref 7–23)
CALCIUM SERPL-MCNC: 10.1 MG/DL — SIGNIFICANT CHANGE UP (ref 8.5–10.1)
CHLORIDE SERPL-SCNC: 101 MMOL/L — SIGNIFICANT CHANGE UP (ref 96–108)
CO2 SERPL-SCNC: 24 MMOL/L — SIGNIFICANT CHANGE UP (ref 22–31)
CREAT SERPL-MCNC: 1 MG/DL — SIGNIFICANT CHANGE UP (ref 0.5–1.3)
EOSINOPHIL # BLD AUTO: 0.27 K/UL — SIGNIFICANT CHANGE UP (ref 0–0.5)
EOSINOPHIL NFR BLD AUTO: 2.6 % — SIGNIFICANT CHANGE UP (ref 0–6)
GLUCOSE SERPL-MCNC: 132 MG/DL — HIGH (ref 70–99)
HCT VFR BLD CALC: 51.8 % — HIGH (ref 39–50)
HGB BLD-MCNC: 17.5 G/DL — HIGH (ref 13–17)
IMM GRANULOCYTES NFR BLD AUTO: 0.5 % — SIGNIFICANT CHANGE UP (ref 0–1.5)
LYMPHOCYTES # BLD AUTO: 1.55 K/UL — SIGNIFICANT CHANGE UP (ref 1–3.3)
LYMPHOCYTES # BLD AUTO: 15 % — SIGNIFICANT CHANGE UP (ref 13–44)
MAGNESIUM SERPL-MCNC: 1.7 MG/DL — SIGNIFICANT CHANGE UP (ref 1.6–2.6)
MCHC RBC-ENTMCNC: 31.8 PG — SIGNIFICANT CHANGE UP (ref 27–34)
MCHC RBC-ENTMCNC: 33.8 GM/DL — SIGNIFICANT CHANGE UP (ref 32–36)
MCV RBC AUTO: 94 FL — SIGNIFICANT CHANGE UP (ref 80–100)
MONOCYTES # BLD AUTO: 0.84 K/UL — SIGNIFICANT CHANGE UP (ref 0–0.9)
MONOCYTES NFR BLD AUTO: 8.1 % — SIGNIFICANT CHANGE UP (ref 2–14)
NEUTROPHILS # BLD AUTO: 7.53 K/UL — HIGH (ref 1.8–7.4)
NEUTROPHILS NFR BLD AUTO: 73.1 % — SIGNIFICANT CHANGE UP (ref 43–77)
PHOSPHATE SERPL-MCNC: 2.4 MG/DL — LOW (ref 2.5–4.5)
PLATELET # BLD AUTO: 286 K/UL — SIGNIFICANT CHANGE UP (ref 150–400)
POTASSIUM SERPL-MCNC: 3.5 MMOL/L — SIGNIFICANT CHANGE UP (ref 3.5–5.3)
POTASSIUM SERPL-SCNC: 3.5 MMOL/L — SIGNIFICANT CHANGE UP (ref 3.5–5.3)
RBC # BLD: 5.51 M/UL — SIGNIFICANT CHANGE UP (ref 4.2–5.8)
RBC # FLD: 13.2 % — SIGNIFICANT CHANGE UP (ref 10.3–14.5)
SODIUM SERPL-SCNC: 137 MMOL/L — SIGNIFICANT CHANGE UP (ref 135–145)
WBC # BLD: 10.31 K/UL — SIGNIFICANT CHANGE UP (ref 3.8–10.5)
WBC # FLD AUTO: 10.31 K/UL — SIGNIFICANT CHANGE UP (ref 3.8–10.5)

## 2021-06-17 PROCEDURE — 99232 SBSQ HOSP IP/OBS MODERATE 35: CPT

## 2021-06-17 PROCEDURE — 99232 SBSQ HOSP IP/OBS MODERATE 35: CPT | Mod: GC

## 2021-06-17 RX ORDER — SODIUM,POTASSIUM PHOSPHATES 278-250MG
1 POWDER IN PACKET (EA) ORAL ONCE
Refills: 0 | Status: COMPLETED | OUTPATIENT
Start: 2021-06-17 | End: 2021-06-17

## 2021-06-17 RX ORDER — PREGABALIN 225 MG/1
1000 CAPSULE ORAL DAILY
Refills: 0 | Status: DISCONTINUED | OUTPATIENT
Start: 2021-06-17 | End: 2021-06-23

## 2021-06-17 RX ADMIN — Medication 1 APPLICATION(S): at 18:47

## 2021-06-17 RX ADMIN — Medication 1 MILLIGRAM(S): at 05:41

## 2021-06-17 RX ADMIN — Medication 1 MILLIGRAM(S): at 13:59

## 2021-06-17 RX ADMIN — Medication 1 TABLET(S): at 15:13

## 2021-06-17 RX ADMIN — Medication 1 DROP(S): at 21:23

## 2021-06-17 RX ADMIN — Medication 100 MILLIGRAM(S): at 13:58

## 2021-06-17 RX ADMIN — Medication 5 MILLIGRAM(S): at 21:23

## 2021-06-17 RX ADMIN — Medication 1 PACKET(S): at 18:47

## 2021-06-17 RX ADMIN — Medication 81 MILLIGRAM(S): at 13:59

## 2021-06-17 RX ADMIN — Medication 1 APPLICATION(S): at 13:59

## 2021-06-17 RX ADMIN — LISINOPRIL 20 MILLIGRAM(S): 2.5 TABLET ORAL at 13:59

## 2021-06-17 RX ADMIN — Medication 1 APPLICATION(S): at 06:09

## 2021-06-17 RX ADMIN — ENOXAPARIN SODIUM 40 MILLIGRAM(S): 100 INJECTION SUBCUTANEOUS at 13:59

## 2021-06-17 RX ADMIN — QUETIAPINE FUMARATE 25 MILLIGRAM(S): 200 TABLET, FILM COATED ORAL at 21:23

## 2021-06-17 RX ADMIN — Medication 1 APPLICATION(S): at 00:39

## 2021-06-17 RX ADMIN — QUETIAPINE FUMARATE 25 MILLIGRAM(S): 200 TABLET, FILM COATED ORAL at 00:00

## 2021-06-17 RX ADMIN — ATORVASTATIN CALCIUM 10 MILLIGRAM(S): 80 TABLET, FILM COATED ORAL at 21:23

## 2021-06-17 RX ADMIN — Medication 100 MILLIGRAM(S): at 21:23

## 2021-06-17 RX ADMIN — Medication 100 MILLIGRAM(S): at 13:59

## 2021-06-17 NOTE — PROGRESS NOTE ADULT - ASSESSMENT
85 y/o M w/ PMH of HTN, systolic CHF, COPD, CAD s/p CABG / PCI, PVD s/p stent, CKD III, dyslipidemia, lung cancer s/p lobectomy, cardiac arrest s/p AICD, CEA, who was admitted on 6/8/21 with syncope.   During the course of his admission he was intubated for respiratory failure secondary to aspiration pneumonia and also had alcohol withdrawal.  Neurology is now consulted for altered mental status.    -He remains confused  -EEG performed on 6/8 for syncope was normal. He was not confused at that time.  -Repeat EEG shows mild generalized slowing - nonspecific for encephalopathy.   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  -No fever, leukocytosis or nuchal rigidity to suggest CNS infection.  -Ammonia level not elevated.  -Vitamin B12 level is on low side. Will start vitamin B12 1000 mcg/day  -No improvement over several days. Will order MRI brain. Family will need to fill out safety form.  -continue supportive care     87 y/o M w/ PMH of HTN, systolic CHF, COPD, CAD s/p CABG / PCI, PVD s/p stent, CKD III, dyslipidemia, lung cancer s/p lobectomy, cardiac arrest s/p AICD, CEA, who was admitted on 6/8/21 with syncope.   During the course of his admission he was intubated for respiratory failure secondary to aspiration pneumonia and also had alcohol withdrawal.  Neurology is now consulted for altered mental status.    -He remains confused  -EEG performed on 6/8 for syncope was normal. He was not confused at that time.  -Repeat EEG shows mild generalized slowing - nonspecific for encephalopathy.   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  -No fever, leukocytosis or nuchal rigidity to suggest CNS infection.  -Ammonia level not elevated.  -Vitamin B12 level is on low side. Will start vitamin B12 1000 mcg/day  -No improvement over several days. Patient has AICD. Would determine if it is compatible with MRi.  -continue supportive care

## 2021-06-17 NOTE — PROGRESS NOTE ADULT - ASSESSMENT
87 y/o M w/ PMH of HTN, systolic CHF, COPD, CAD s/p CABG / PCI, PVD s/p stent, CKD III, dyslipidemia, lung cancer s/p lobectomy, cardiac arrest s/p AICD, CEA, p/w syncope    #Delirium s/p extubation  -patient agitated in the morning, received 1 mg ativan. Now somnolent.   -s/p intubation and Precedex gtt  -On nasal cannula 3 L, continue to monitor VS closely  - pt continues to have confusion, neuro consult placed.    #Acute hypoxic respiratory failure 2/2 aspiration PNA  -Hx of COPD and Lung cancer s/p lobectomy   -COVID19 negative   -s/p levophed (concern for septic shock)  -Respiratory failure resolving on 3 L NC   -Now extubated  -On 3L NC, continues to be lethargic  -Hold ativan as patient is obtunded   -Protonix QD for GI ppx  -Continue zosyn for possible aspiration PNA (6/8-)  -Ucx- negative   -Sputum cx- Rare gram pos cocci in pairs and chains, rare gram neg rods    #Syncope   -Check orthostatics: negative so far  -Troponin negative x 1  -CTH- nasal fracture   -EEG- no epileptiform activity   - EP interrogated device  -Cardio recs appreciated  -Neuro re consult: EEG with no seizure activity  -Ammonia level normal  -Alcohol level - 186  -Wound care for facial injury     #Nasal bone fracture  -OMFS consult   -Pain control prn    #Left upper extremity erythema  -Differential includes superficial thrombophlebitis  -Will obtain LUE US to r/o thrombosis : NO evidence of  DVT. Short segment thrombosis of left cephalic vein in the left median cubital fossa.    #Afib   - Metoprolol 100 mg PO q12h    #HTN  -Continue Lisinopril 20 mg   -Continue metoprolol tartrate 100 mg q12h  -Monitor BP    #Hypokalemia  -Replete   -Continue to monitor     #H/o systolic CHF   -hx of AICD placement   -Continue metoprolol 100 mg PO q12h  -TTE- EF 45-50%, mildly reduced LV systolic function, left atrium moderately dilated, mild aortic stenosis , mild 1+ mitral regurgitation     #CAD  -Continue atorvastatin 10 mg qhs   -Continue aspirin 81 mg QD   -Continue metoprolol 100 mg PO q12h    #PVD s/p stent  -Continue atorvastatin 10 mg qhs   -Continue aspirin 81 mg QD     #CKD III  -Cr stable   -Weber in place    #HLD  -Continue atorvastatin 10 mg qhs     #DVT ppx  -Lovenox 40 mg SQ QD    #DISPO  -PT recs appreciated- d/c to rehab when stable     d/w Dr. Massey

## 2021-06-17 NOTE — PROGRESS NOTE ADULT - SUBJECTIVE AND OBJECTIVE BOX
HPI:  85 y/o M w/ PMH of HTN, systolic CHF, COPD, CAD s/p CABG / PCI, PVD s/p stent, CKD III, dyslipidemia, lung cancer s/p lobectomy, cardiac arrest s/p AICD, CEA, p/w syncope. Patient states that the last thing he remembers is walking from kitchen to his living room, and then he woke up on the floor. Denies having any symptoms prior to syncope. Denies CP, SOB, palpitations, LH, dizziness. Patient states when he woke up he noticed blood dripping on his chest, so he pressed his life alert button and was brought to ED. Patient c/o mild pain on R side of nose. Also has injury to his head. Denies weakness in arms / legs, facial droop, sensory deficits, pain, cough, runny nose, sore throat, nausea, vomiting, abdominal pain, urinary incontinence and tongue bite.     SUBJECTIVE:   Patient seen and evaluated at bedside. Patient somnolent 2/2 morning dose of Ativan 1mg. Patient awaiting placement to [facility].    REVIEW OF SYSTEMS:    Unable to assess due to altered mental status    Vital Signs Last 24 Hrs  T(C): 36.4 (17 Jun 2021 08:27), Max: 37 (16 Jun 2021 21:05)  T(F): 97.6 (17 Jun 2021 08:27), Max: 98.6 (16 Jun 2021 21:05)  HR: 79 (17 Jun 2021 08:27) (79 - 79)  BP: 145/75 (17 Jun 2021 08:27) (145/75 - 149/65)  SpO2: 93% (17 Jun 2021 08:27) (93% - 95%)    I&O's Summary    12 Jun 2021 07:01  -  13 Jun 2021 07:00  --------------------------------------------------------  IN: 0 mL / OUT: 400 mL / NET: -400 mL      PHYSICAL EXAM:    Constitutional: NAD, awake and alert, well-developed  HEENT: PERR, EOMI, Normal Hearing, MMM  Neck: Soft and supple, No LAD, No JVD  Respiratory: Breath sounds are clear bilaterally, No wheezing, rales or rhonchi  Cardiovascular: S1 and S2, regular rate and rhythm, + systolic murmur present  Gastrointestinal: Bowel Sounds present, soft, nontender, nondistended, no guarding, no rebound  Extremities: No peripheral edema  Vascular: 2+ peripheral pulses  Musculoskeletal: 5/5 strength b/l upper and lower extremities  Skin: No rashes, erythema and warmth of left forearm, no tender or cord palpated    MEDICATIONS:  MEDICATIONS  (STANDING):  artificial  tears Solution 1 Drop(s) Both EYES two times a day  aspirin  chewable 81 milliGRAM(s) Oral daily  atorvastatin 10 milliGRAM(s) Oral at bedtime  enoxaparin Injectable 40 milliGRAM(s) SubCutaneous daily  erythromycin   Ointment 1 Application(s) Right EYE four times a day  folic acid 1 milliGRAM(s) Oral daily  lisinopril 20 milliGRAM(s) Oral daily  melatonin 5 milliGRAM(s) Oral at bedtime  metoprolol tartrate 100 milliGRAM(s) Oral every 12 hours  multivitamin 1 Tablet(s) Oral daily  pantoprazole    Tablet 40 milliGRAM(s) Oral before breakfast  piperacillin/tazobactam IVPB.. 3.375 Gram(s) IV Intermittent every 8 hours      LABS: All Labs Reviewed:                       LABS:  cret                        15.3   7.50  )-----------( 216      ( 16 Jun 2021 08:07 )             46.5     06-16    135  |  101  |  13  ----------------------------<  122<H>  3.5   |  28  |  1.04    Ca    9.0      16 Jun 2021 08:07  Phos  2.6     06-16  Mg     1.6     06-16        Blood Culture: 06-09 @ 14:00  Organism --  Gram Stain Blood -- Gram Stain   Few polymorphonuclear leukocytes per low power field  No Squamous epithelial cells per low power field  Rare Gram Positive Cocci in Pairs and Chains seen per oil power field  Rare Gram Negative Rods seen per oil power field  Specimen Source .Sputum Sputum  Culture-Blood --    06-09 @ 10:45  Organism --  Gram Stain Blood -- Gram Stain --  Specimen Source .Urine Catheterized  Culture-Blood --        RADIOLOGY/EKG:    EXAM:  XR CHEST PORTABLE URGENT 1V                            PROCEDURE DATE:  06/09/2021        IMPRESSION:   ET tube tip above tracheal bifurcation.  NG tube tip beyond GE junction.  .    Mild bibasilar  diffuse airspace disease and/or pleural effusions.      EXAM:  CT MAXILLOFACIAL                          EXAM:  CT 3D RECONSTRUCT LOI STERLING                          EXAM:  CT BRAIN                          EXAM:  CT CERVICAL SPINE                            PROCEDURE DATE:  06/07/2021        IMPRESSION:    CT HEAD: No acute intracranial hemorrhage, mass effect, or osseous fracture.    CT MAXILLOFACIAL BONES: Comminuted nasal bone fractures. There is no other acute maxillofacial bone fracture.    CT CERVICAL SPINE: No acute cervical spine fracture or traumatic malalignment.        EXAM:  XR CHEST PORTABLE IMMED 1V                            PROCEDURE DATE:  06/08/2021          INTERPRETATION:  DATE OF STUDY: 6/8/21    PRIOR:6/7/21    CLINICAL INDICATION: SOB. Congestion.    TECHNIQUE: portable chest.    FINDINGS:  Left-sided defibrillator in situ.  Increasing pulmonic congestive changes present.  Small bilateral pleural effusions are present - left more than right - with associated bibasilar atelectasis.  No pneumothorax. No acute bony finding.    IMPRESSION:  Increasing pulmonic congestive changes with small bilateral pleural effusions.

## 2021-06-17 NOTE — PROGRESS NOTE ADULT - SUBJECTIVE AND OBJECTIVE BOX
Interval History:  6/17/21: Continues to have waxing and waning mental status.    MEDICATIONS  (STANDING):  artificial  tears Solution 1 Drop(s) Both EYES two times a day  aspirin  chewable 81 milliGRAM(s) Oral daily  atorvastatin 10 milliGRAM(s) Oral at bedtime  benzocaine 15 mG/menthol 3.6 mG (Sugar-Free) Lozenge 1 Lozenge Oral daily  enoxaparin Injectable 40 milliGRAM(s) SubCutaneous daily  erythromycin   Ointment 1 Application(s) Right EYE four times a day  folic acid 1 milliGRAM(s) Oral daily  lisinopril 20 milliGRAM(s) Oral daily  melatonin 5 milliGRAM(s) Oral at bedtime  metoprolol tartrate 100 milliGRAM(s) Oral every 12 hours  multivitamin 1 Tablet(s) Oral daily  pantoprazole    Tablet 40 milliGRAM(s) Oral before breakfast  QUEtiapine 25 milliGRAM(s) Oral at bedtime  thiamine 100 milliGRAM(s) Oral daily    MEDICATIONS  (PRN):  acetaminophen   Tablet .. 650 milliGRAM(s) Oral every 6 hours PRN Mild Pain (1 - 3)  metoprolol tartrate Injectable 5 milliGRAM(s) IV Push every 6 hours PRN sustained HR > 120      Allergies    No Known Allergies    Intolerances        PHYSICAL EXAM:  Vital Signs Last 24 Hrs  T(F): 97.6 (06-17-21 @ 08:27)  HR: 79 (06-17-21 @ 08:27)  BP: 145/75 (06-17-21 @ 08:27)  RR: --    GENERAL: NAD, well-groomed, well-developed.   HEAD:  Atraumatic, Normocephalic  Neuro:  Somnolent but arousable. Confused, disoriented  CN: PERRL, EOMI, no nystagmus, no facial weakness, tongue protrudes in the midline  motor: No focal weakness  sensory: intact to light touch  coordination: no involuntary movements    LABS:                        17.5   10.31 )-----------( 286      ( 17 Jun 2021 08:01 )             51.8     06-17    137  |  101  |  14  ----------------------------<  132<H>  3.5   |  24  |  1.00    Ca    10.1      17 Jun 2021 08:01  Phos  2.4     06-17  Mg     1.7     06-17            RADIOLOGY & ADDITIONAL STUDIES:  CT head/maxillofacial bones/cervical spine 6/7/21:    CT HEAD: No acute intracranial hemorrhage, mass effect, or osseous fracture.    CT MAXILLOFACIAL BONES: Comminuted nasal bone fractures. There is no other acute maxillofacial bone fracture.    CT CERVICAL SPINE: No acute cervical spine fracture or traumatic malalignment.    EEG 6/8/21: normal    EEG 6/15/21: mild generalized slowing

## 2021-06-18 LAB
ANION GAP SERPL CALC-SCNC: 7 MMOL/L — SIGNIFICANT CHANGE UP (ref 5–17)
BUN SERPL-MCNC: 15 MG/DL — SIGNIFICANT CHANGE UP (ref 7–23)
CALCIUM SERPL-MCNC: 9.6 MG/DL — SIGNIFICANT CHANGE UP (ref 8.5–10.1)
CHLORIDE SERPL-SCNC: 103 MMOL/L — SIGNIFICANT CHANGE UP (ref 96–108)
CO2 SERPL-SCNC: 28 MMOL/L — SIGNIFICANT CHANGE UP (ref 22–31)
CREAT SERPL-MCNC: 0.91 MG/DL — SIGNIFICANT CHANGE UP (ref 0.5–1.3)
GLUCOSE SERPL-MCNC: 145 MG/DL — HIGH (ref 70–99)
POTASSIUM SERPL-MCNC: 3.5 MMOL/L — SIGNIFICANT CHANGE UP (ref 3.5–5.3)
POTASSIUM SERPL-SCNC: 3.5 MMOL/L — SIGNIFICANT CHANGE UP (ref 3.5–5.3)
SODIUM SERPL-SCNC: 138 MMOL/L — SIGNIFICANT CHANGE UP (ref 135–145)

## 2021-06-18 PROCEDURE — 99232 SBSQ HOSP IP/OBS MODERATE 35: CPT | Mod: GC

## 2021-06-18 PROCEDURE — 99232 SBSQ HOSP IP/OBS MODERATE 35: CPT

## 2021-06-18 RX ADMIN — ATORVASTATIN CALCIUM 10 MILLIGRAM(S): 80 TABLET, FILM COATED ORAL at 21:01

## 2021-06-18 RX ADMIN — Medication 100 MILLIGRAM(S): at 09:24

## 2021-06-18 RX ADMIN — ENOXAPARIN SODIUM 40 MILLIGRAM(S): 100 INJECTION SUBCUTANEOUS at 09:23

## 2021-06-18 RX ADMIN — Medication 1 MILLIGRAM(S): at 09:23

## 2021-06-18 RX ADMIN — Medication 1 DROP(S): at 09:25

## 2021-06-18 RX ADMIN — Medication 100 MILLIGRAM(S): at 21:01

## 2021-06-18 RX ADMIN — PREGABALIN 1000 MICROGRAM(S): 225 CAPSULE ORAL at 09:25

## 2021-06-18 RX ADMIN — QUETIAPINE FUMARATE 25 MILLIGRAM(S): 200 TABLET, FILM COATED ORAL at 21:01

## 2021-06-18 RX ADMIN — Medication 81 MILLIGRAM(S): at 09:22

## 2021-06-18 RX ADMIN — PANTOPRAZOLE SODIUM 40 MILLIGRAM(S): 20 TABLET, DELAYED RELEASE ORAL at 05:24

## 2021-06-18 RX ADMIN — Medication 1 TABLET(S): at 09:23

## 2021-06-18 RX ADMIN — LISINOPRIL 20 MILLIGRAM(S): 2.5 TABLET ORAL at 09:22

## 2021-06-18 RX ADMIN — Medication 5 MILLIGRAM(S): at 21:01

## 2021-06-18 RX ADMIN — Medication 1 DROP(S): at 21:01

## 2021-06-18 RX ADMIN — Medication 100 MILLIGRAM(S): at 09:22

## 2021-06-18 NOTE — PROGRESS NOTE ADULT - SUBJECTIVE AND OBJECTIVE BOX
HPI:  85 y/o M w/ PMH of HTN, systolic CHF, COPD, CAD s/p CABG / PCI, PVD s/p stent, CKD III, dyslipidemia, lung cancer s/p lobectomy, cardiac arrest s/p AICD, CEA, p/w syncope. Patient states that the last thing he remembers is walking from kitchen to his living room, and then he woke up on the floor. Denies having any symptoms prior to syncope. Denies CP, SOB, palpitations, LH, dizziness. Patient states when he woke up he noticed blood dripping on his chest, so he pressed his life alert button and was brought to ED. Patient c/o mild pain on R side of nose. Also has injury to his head. Denies weakness in arms / legs, facial droop, sensory deficits, pain, cough, runny nose, sore throat, nausea, vomiting, abdominal pain, urinary incontinence and tongue bite.     SUBJECTIVE:   Patient seen and evaluated at bedside. Patient alert and oriented except for time. Patient with new complaint of left ear pain.  Patient awaiting placement to [facility].    REVIEW OF SYSTEMS:    Unable to assess due to altered mental status    Vital Signs Last 24 Hrs  T(C): 36.2 (18 Jun 2021 20:38), Max: 36.6 (18 Jun 2021 20:20)  T(F): 97.1 (18 Jun 2021 20:38), Max: 97.9 (18 Jun 2021 20:20)  HR: 72 (18 Jun 2021 20:38) (72 - 81)  BP: 138/72 (18 Jun 2021 20:38) (118/70 - 138/72)  RR: 18 (18 Jun 2021 20:20) (18 - 19)  SpO2: 96% (18 Jun 2021 20:38) (93% - 96%)        PHYSICAL EXAM:    Constitutional: NAD, awake and alert, well-developed  HEENT: PERR, EOMI, Normal Hearing, MMM  Neck: Soft and supple, No LAD, No JVD  Respiratory: Breath sounds are clear bilaterally, No wheezing, rales or rhonchi  Cardiovascular: S1 and S2, regular rate and rhythm, + systolic murmur present  Gastrointestinal: Bowel Sounds present, soft, nontender, nondistended, no guarding, no rebound  Extremities: No peripheral edema  Vascular: 2+ peripheral pulses  Musculoskeletal: 5/5 strength b/l upper and lower extremities  Skin: No rashes, erythema and warmth of left forearm, no tender or cord palpated    MEDICATIONS:  MEDICATIONS  (STANDING):  artificial  tears Solution 1 Drop(s) Both EYES two times a day  aspirin  chewable 81 milliGRAM(s) Oral daily  atorvastatin 10 milliGRAM(s) Oral at bedtime  enoxaparin Injectable 40 milliGRAM(s) SubCutaneous daily  erythromycin   Ointment 1 Application(s) Right EYE four times a day  folic acid 1 milliGRAM(s) Oral daily  lisinopril 20 milliGRAM(s) Oral daily  melatonin 5 milliGRAM(s) Oral at bedtime  metoprolol tartrate 100 milliGRAM(s) Oral every 12 hours  multivitamin 1 Tablet(s) Oral daily  pantoprazole    Tablet 40 milliGRAM(s) Oral before breakfast  piperacillin/tazobactam IVPB.. 3.375 Gram(s) IV Intermittent every 8 hours      LABS: All Labs Reviewed:                       LABS:                17.5   10.31 )-----------( 286      ( 17 Jun 2021 08:01 )             51.8     06-18    138  |  103  |  15  ----------------------------<  145<H>  3.5   |  28  |  0.91    Ca    9.6      18 Jun 2021 08:45  Phos  2.4     06-17  Mg     1.7     06-17                  Blood Culture: 06-09 @ 14:00  Organism --  Gram Stain Blood -- Gram Stain   Few polymorphonuclear leukocytes per low power field  No Squamous epithelial cells per low power field  Rare Gram Positive Cocci in Pairs and Chains seen per oil power field  Rare Gram Negative Rods seen per oil power field  Specimen Source .Sputum Sputum  Culture-Blood --    06-09 @ 10:45  Organism --  Gram Stain Blood -- Gram Stain --  Specimen Source .Urine Catheterized  Culture-Blood --        RADIOLOGY/EKG:    EXAM:  XR CHEST PORTABLE URGENT 1V                            PROCEDURE DATE:  06/09/2021        IMPRESSION:   ET tube tip above tracheal bifurcation.  NG tube tip beyond GE junction.  .    Mild bibasilar  diffuse airspace disease and/or pleural effusions.      EXAM:  CT MAXILLOFACIAL                          EXAM:  CT 3D RECONSTRUCT LOI KATZ                          EXAM:  CT BRAIN                          EXAM:  CT CERVICAL SPINE                            PROCEDURE DATE:  06/07/2021        IMPRESSION:    CT HEAD: No acute intracranial hemorrhage, mass effect, or osseous fracture.    CT MAXILLOFACIAL BONES: Comminuted nasal bone fractures. There is no other acute maxillofacial bone fracture.    CT CERVICAL SPINE: No acute cervical spine fracture or traumatic malalignment.        EXAM:  XR CHEST PORTABLE IMMED 1V                            PROCEDURE DATE:  06/08/2021          INTERPRETATION:  DATE OF STUDY: 6/8/21    PRIOR:6/7/21    CLINICAL INDICATION: SOB. Congestion.    TECHNIQUE: portable chest.    FINDINGS:  Left-sided defibrillator in situ.  Increasing pulmonic congestive changes present.  Small bilateral pleural effusions are present - left more than right - with associated bibasilar atelectasis.  No pneumothorax. No acute bony finding.    IMPRESSION:  Increasing pulmonic congestive changes with small bilateral pleural effusions.

## 2021-06-18 NOTE — PROGRESS NOTE ADULT - SUBJECTIVE AND OBJECTIVE BOX
Interval History:  6/18/21:Patient remains confused    MEDICATIONS  (STANDING):  artificial  tears Solution 1 Drop(s) Both EYES two times a day  aspirin  chewable 81 milliGRAM(s) Oral daily  atorvastatin 10 milliGRAM(s) Oral at bedtime  benzocaine 15 mG/menthol 3.6 mG (Sugar-Free) Lozenge 1 Lozenge Oral daily  cyanocobalamin 1000 MICROGram(s) Oral daily  enoxaparin Injectable 40 milliGRAM(s) SubCutaneous daily  folic acid 1 milliGRAM(s) Oral daily  lisinopril 20 milliGRAM(s) Oral daily  melatonin 5 milliGRAM(s) Oral at bedtime  metoprolol tartrate 100 milliGRAM(s) Oral every 12 hours  multivitamin 1 Tablet(s) Oral daily  pantoprazole    Tablet 40 milliGRAM(s) Oral before breakfast  QUEtiapine 25 milliGRAM(s) Oral at bedtime  thiamine 100 milliGRAM(s) Oral daily    MEDICATIONS  (PRN):  acetaminophen   Tablet .. 650 milliGRAM(s) Oral every 6 hours PRN Mild Pain (1 - 3)  metoprolol tartrate Injectable 5 milliGRAM(s) IV Push every 6 hours PRN sustained HR > 120      Allergies    No Known Allergies    Intolerances        PHYSICAL EXAM:  Vital Signs Last 24 Hrs  T(F): 97.5 (06-18-21 @ 08:07)  HR: 81 (06-18-21 @ 08:07)  BP: 137/67 (06-18-21 @ 08:07)  RR: 19 (06-18-21 @ 08:07)    GENERAL: NAD, well-groomed, well-developed  HEAD:  Atraumatic, Normocephalic  Neuro:  Awake, alert, not oriented to place or time but is able to name President  CN: EOMI, no nystagmus, no facial weakness, tongue protrudes in the midline  motor: normal tone, no pronator drift, full strength in all four extremities  sensory: intact to light touch      LABS:                        17.5   10.31 )-----------( 286      ( 17 Jun 2021 08:01 )             51.8     06-18    138  |  103  |  15  ----------------------------<  145<H>  3.5   |  28  |  0.91    Ca    9.6      18 Jun 2021 08:45  Phos  2.4     06-17  Mg     1.7     06-17            RADIOLOGY & ADDITIONAL STUDIES:    CT head/maxillofacial bones/cervical spine 6/7/21:    CT HEAD: No acute intracranial hemorrhage, mass effect, or osseous fracture.    CT MAXILLOFACIAL BONES: Comminuted nasal bone fractures. There is no other acute maxillofacial bone fracture.    CT CERVICAL SPINE: No acute cervical spine fracture or traumatic malalignment.    EEG 6/8/21: normal    EEG 6/15/21: mild generalized slowing

## 2021-06-18 NOTE — PROGRESS NOTE ADULT - ASSESSMENT
85 y/o M w/ PMH of HTN, systolic CHF, COPD, CAD s/p CABG / PCI, PVD s/p stent, CKD III, dyslipidemia, lung cancer s/p lobectomy, cardiac arrest s/p AICD, CEA, p/w syncope    #Delirium s/p extubation  -Patient less restless now   -s/p intubation and Precedex gtt  -On nasal cannula 3 L, continue to monitor VS closely  - pt continues to have confusion  - Neuro recs appreciated  - Per EP AICD not compatible for MRI.    #Acute hypoxic respiratory failure 2/2 aspiration PNA  -Hx of COPD and Lung cancer s/p lobectomy   -COVID19 negative   -s/p levophed (concern for septic shock)  -Respiratory failure resolving on 3 L NC   -Now extubated  -On 3L NC, continues to be lethargic  -Hold ativan as patient is obtunded   -Protonix QD for GI ppx  -Continue zosyn for possible aspiration PNA (6/8-)  -Ucx- negative   -Sputum cx- Rare gram pos cocci in pairs and chains, rare gram neg rods    #Syncope   -Check orthostatics: negative so far  -Troponin negative x 1  -CTH- nasal fracture   -EEG- no epileptiform activity   - EP interrogated device  -Cardio recs appreciated  -Neuro re consult: EEG with no seizure activity  -Ammonia level normal  -Alcohol level - 186  -Wound care for facial injury     #Nasal bone fracture  -OMFS consult   -Pain control prn    #Left upper extremity erythema  -Differential includes superficial thrombophlebitis  -Will obtain LUE US to r/o thrombosis : NO evidence of  DVT. Short segment thrombosis of left cephalic vein in the left median cubital fossa.    #Afib   - Metoprolol 100 mg PO q12h    #HTN  -Continue Lisinopril 20 mg   -Continue metoprolol tartrate 100 mg q12h  -Monitor BP    #Hypokalemia  -Replete   -Continue to monitor     #H/o systolic CHF   -hx of AICD placement   -Continue metoprolol 100 mg PO q12h  -TTE- EF 45-50%, mildly reduced LV systolic function, left atrium moderately dilated, mild aortic stenosis , mild 1+ mitral regurgitation     #CAD  -Continue atorvastatin 10 mg qhs   -Continue aspirin 81 mg QD   -Continue metoprolol 100 mg PO q12h    #PVD s/p stent  -Continue atorvastatin 10 mg qhs   -Continue aspirin 81 mg QD     #CKD III  -Cr stable   -Weber in place    #HLD  -Continue atorvastatin 10 mg qhs     #DVT ppx  -Lovenox 40 mg SQ QD    #DISPO  -PT recs appreciated- d/c to rehab when stable     d/w Dr. Massey

## 2021-06-19 LAB
ANION GAP SERPL CALC-SCNC: 7 MMOL/L — SIGNIFICANT CHANGE UP (ref 5–17)
BASOPHILS # BLD AUTO: 0.06 K/UL — SIGNIFICANT CHANGE UP (ref 0–0.2)
BASOPHILS NFR BLD AUTO: 0.7 % — SIGNIFICANT CHANGE UP (ref 0–2)
BUN SERPL-MCNC: 20 MG/DL — SIGNIFICANT CHANGE UP (ref 7–23)
CALCIUM SERPL-MCNC: 9.2 MG/DL — SIGNIFICANT CHANGE UP (ref 8.5–10.1)
CHLORIDE SERPL-SCNC: 102 MMOL/L — SIGNIFICANT CHANGE UP (ref 96–108)
CO2 SERPL-SCNC: 29 MMOL/L — SIGNIFICANT CHANGE UP (ref 22–31)
CREAT SERPL-MCNC: 0.99 MG/DL — SIGNIFICANT CHANGE UP (ref 0.5–1.3)
EOSINOPHIL # BLD AUTO: 0.32 K/UL — SIGNIFICANT CHANGE UP (ref 0–0.5)
EOSINOPHIL NFR BLD AUTO: 3.8 % — SIGNIFICANT CHANGE UP (ref 0–6)
GLUCOSE SERPL-MCNC: 145 MG/DL — HIGH (ref 70–99)
HCT VFR BLD CALC: 46.4 % — SIGNIFICANT CHANGE UP (ref 39–50)
HGB BLD-MCNC: 15.3 G/DL — SIGNIFICANT CHANGE UP (ref 13–17)
IMM GRANULOCYTES NFR BLD AUTO: 0.5 % — SIGNIFICANT CHANGE UP (ref 0–1.5)
LYMPHOCYTES # BLD AUTO: 1.58 K/UL — SIGNIFICANT CHANGE UP (ref 1–3.3)
LYMPHOCYTES # BLD AUTO: 18.8 % — SIGNIFICANT CHANGE UP (ref 13–44)
MAGNESIUM SERPL-MCNC: 1.7 MG/DL — SIGNIFICANT CHANGE UP (ref 1.6–2.6)
MCHC RBC-ENTMCNC: 30.7 PG — SIGNIFICANT CHANGE UP (ref 27–34)
MCHC RBC-ENTMCNC: 33 GM/DL — SIGNIFICANT CHANGE UP (ref 32–36)
MCV RBC AUTO: 93.2 FL — SIGNIFICANT CHANGE UP (ref 80–100)
MONOCYTES # BLD AUTO: 0.76 K/UL — SIGNIFICANT CHANGE UP (ref 0–0.9)
MONOCYTES NFR BLD AUTO: 9.1 % — SIGNIFICANT CHANGE UP (ref 2–14)
NEUTROPHILS # BLD AUTO: 5.63 K/UL — SIGNIFICANT CHANGE UP (ref 1.8–7.4)
NEUTROPHILS NFR BLD AUTO: 67.1 % — SIGNIFICANT CHANGE UP (ref 43–77)
PHOSPHATE SERPL-MCNC: 2.3 MG/DL — LOW (ref 2.5–4.5)
PLATELET # BLD AUTO: 315 K/UL — SIGNIFICANT CHANGE UP (ref 150–400)
POTASSIUM SERPL-MCNC: 3.5 MMOL/L — SIGNIFICANT CHANGE UP (ref 3.5–5.3)
POTASSIUM SERPL-SCNC: 3.5 MMOL/L — SIGNIFICANT CHANGE UP (ref 3.5–5.3)
RBC # BLD: 4.98 M/UL — SIGNIFICANT CHANGE UP (ref 4.2–5.8)
RBC # FLD: 13.1 % — SIGNIFICANT CHANGE UP (ref 10.3–14.5)
SODIUM SERPL-SCNC: 138 MMOL/L — SIGNIFICANT CHANGE UP (ref 135–145)
WBC # BLD: 8.39 K/UL — SIGNIFICANT CHANGE UP (ref 3.8–10.5)
WBC # FLD AUTO: 8.39 K/UL — SIGNIFICANT CHANGE UP (ref 3.8–10.5)

## 2021-06-19 PROCEDURE — 99231 SBSQ HOSP IP/OBS SF/LOW 25: CPT | Mod: GC

## 2021-06-19 RX ORDER — SODIUM,POTASSIUM PHOSPHATES 278-250MG
1 POWDER IN PACKET (EA) ORAL ONCE
Refills: 0 | Status: COMPLETED | OUTPATIENT
Start: 2021-06-19 | End: 2021-06-19

## 2021-06-19 RX ORDER — FLUTICASONE PROPIONATE 50 MCG
1 SPRAY, SUSPENSION NASAL ONCE
Refills: 0 | Status: COMPLETED | OUTPATIENT
Start: 2021-06-19 | End: 2021-06-19

## 2021-06-19 RX ADMIN — Medication 1 DROP(S): at 21:04

## 2021-06-19 RX ADMIN — Medication 1 DROP(S): at 10:02

## 2021-06-19 RX ADMIN — Medication 1 TABLET(S): at 10:07

## 2021-06-19 RX ADMIN — Medication 1 MILLIGRAM(S): at 10:03

## 2021-06-19 RX ADMIN — PREGABALIN 1000 MICROGRAM(S): 225 CAPSULE ORAL at 10:02

## 2021-06-19 RX ADMIN — Medication 650 MILLIGRAM(S): at 21:04

## 2021-06-19 RX ADMIN — Medication 100 MILLIGRAM(S): at 21:06

## 2021-06-19 RX ADMIN — Medication 650 MILLIGRAM(S): at 21:59

## 2021-06-19 RX ADMIN — Medication 100 MILLIGRAM(S): at 10:04

## 2021-06-19 RX ADMIN — QUETIAPINE FUMARATE 25 MILLIGRAM(S): 200 TABLET, FILM COATED ORAL at 21:05

## 2021-06-19 RX ADMIN — Medication 1 PACKET(S): at 12:47

## 2021-06-19 RX ADMIN — ENOXAPARIN SODIUM 40 MILLIGRAM(S): 100 INJECTION SUBCUTANEOUS at 10:02

## 2021-06-19 RX ADMIN — Medication 5 MILLIGRAM(S): at 21:05

## 2021-06-19 RX ADMIN — Medication 81 MILLIGRAM(S): at 10:02

## 2021-06-19 RX ADMIN — PANTOPRAZOLE SODIUM 40 MILLIGRAM(S): 20 TABLET, DELAYED RELEASE ORAL at 05:32

## 2021-06-19 RX ADMIN — Medication 1 SPRAY(S): at 14:22

## 2021-06-19 RX ADMIN — ATORVASTATIN CALCIUM 10 MILLIGRAM(S): 80 TABLET, FILM COATED ORAL at 21:05

## 2021-06-19 RX ADMIN — Medication 100 MILLIGRAM(S): at 10:05

## 2021-06-19 RX ADMIN — LISINOPRIL 20 MILLIGRAM(S): 2.5 TABLET ORAL at 10:03

## 2021-06-19 NOTE — PROGRESS NOTE ADULT - ASSESSMENT
Pt has been seen and examined with FP resident, resident supervised agree with a/p       Patient is a 86y old  Male who presents with a chief complaint of Syncope (18 Jun 2021 21:18)      HPI:  87 y/o M w/ PMH of HTN, systolic CHF, COPD, CAD s/p CABG / PCI, PVD s/p stent, CKD III, dyslipidemia, lung cancer s/p lobectomy, cardiac arrest s/p AICD, CEA, p/w syncope.          PHYSICAL EXAM:  Vital Signs Last 24 Hrs  T(C): 37.1 (19 Jun 2021 09:38), Max: 37.1 (19 Jun 2021 09:38)  T(F): 98.8 (19 Jun 2021 09:38), Max: 98.8 (19 Jun 2021 09:38)  HR: 74 (19 Jun 2021 09:38) (72 - 77)  BP: 155/56 (19 Jun 2021 09:38) (118/70 - 155/56)  BP(mean): --  RR: 18 (19 Jun 2021 09:38) (18 - 18)  SpO2: 94% (19 Jun 2021 09:38) (94% - 96%)  -rs-aeeb, cta  -cvs-s1s2 normal   -p/a-soft,bs+      A/P    #ct supportive care     #dvt pr     #discharge plan

## 2021-06-19 NOTE — PROGRESS NOTE ADULT - ASSESSMENT
85 y/o M w/ PMH of HTN, systolic CHF, COPD, CAD s/p CABG / PCI, PVD s/p stent, CKD III, dyslipidemia, lung cancer s/p lobectomy, cardiac arrest s/p AICD, CEA, p/w syncope    #Delirium s/p extubation  -Patient less restless now   -s/p intubation and Precedex gtt  -On nasal cannula 3 L, continue to monitor VS closely  - pt continues to have confusion  - Neuro recs appreciated  - Per EP AICD not compatible for MRI.    #Acute hypoxic respiratory failure 2/2 aspiration PNA  -Hx of COPD and Lung cancer s/p lobectomy   -COVID19 negative   -s/p levophed (concern for septic shock)  -Respiratory failure resolving on 3 L NC   -Now extubated  -On 3L NC, continues to be lethargic  -Hold ativan as patient is obtunded   -Protonix QD for GI ppx  -Continue zosyn for possible aspiration PNA (6/8-)  -Ucx- negative   -Sputum cx- Rare gram pos cocci in pairs and chains, rare gram neg rods    #Syncope   -Check orthostatics: negative so far  -Troponin negative x 1  -CTH- nasal fracture   -EEG- no epileptiform activity   - EP interrogated device  -Cardio recs appreciated  -Neuro re consult: EEG with no seizure activity  -Ammonia level normal  -Alcohol level - 186  -Wound care for facial injury     #Nasal bone fracture  -OMFS consult   -Pain control prn    #Left upper extremity erythema  -Differential includes superficial thrombophlebitis  -Will obtain LUE US to r/o thrombosis : NO evidence of  DVT. Short segment thrombosis of left cephalic vein in the left median cubital fossa.    #Afib   - Metoprolol 100 mg PO q12h    #HTN  -Continue Lisinopril 20 mg   -Continue metoprolol tartrate 100 mg q12h  -Monitor BP    #Hypokalemia  -Replete   -Continue to monitor     #H/o systolic CHF   -hx of AICD placement   -Continue metoprolol 100 mg PO q12h  -TTE- EF 45-50%, mildly reduced LV systolic function, left atrium moderately dilated, mild aortic stenosis , mild 1+ mitral regurgitation     #CAD  -Continue atorvastatin 10 mg qhs   -Continue aspirin 81 mg QD   -Continue metoprolol 100 mg PO q12h    #PVD s/p stent  -Continue atorvastatin 10 mg qhs   -Continue aspirin 81 mg QD     #CKD III  -Cr stable   -Weber in place    #HLD  -Continue atorvastatin 10 mg qhs     #DVT ppx  -Lovenox 40 mg SQ QD    #DISPO  -PT recs appreciated- d/c to rehab when stable     d/w Dr. Adame

## 2021-06-19 NOTE — PROGRESS NOTE ADULT - SUBJECTIVE AND OBJECTIVE BOX
HPI:  87 y/o M w/ PMH of HTN, systolic CHF, COPD, CAD s/p CABG / PCI, PVD s/p stent, CKD III, dyslipidemia, lung cancer s/p lobectomy, cardiac arrest s/p AICD, CEA, p/w syncope. Patient states that the last thing he remembers is walking from kitchen to his living room, and then he woke up on the floor. Denies having any symptoms prior to syncope. Denies CP, SOB, palpitations, LH, dizziness. Patient states when he woke up he noticed blood dripping on his chest, so he pressed his life alert button and was brought to ED. Patient c/o mild pain on R side of nose. Also has injury to his head. Denies weakness in arms / legs, facial droop, sensory deficits, pain, cough, runny nose, sore throat, nausea, vomiting, abdominal pain, urinary incontinence and tongue bite.     SUBJECTIVE:   Patient seen and evaluated at bedside. Patients left ear pain resolved .  Patient awaiting placement to [facility].    REVIEW OF SYSTEMS:    CONSTITUTIONAL: No weakness, fevers or chills  EYES/ENT: No visual changes;  No vertigo or throat pain   NECK: No pain or stiffness  RESPIRATORY: No cough, wheezing, hemoptysis; No shortness of breath  CARDIOVASCULAR: No chest pain or palpitations  GASTROINTESTINAL: No abdominal or epigastric pain. No nausea, vomiting, or hematemesis; No diarrhea or constipation. No melena or hematochezia.  GENITOURINARY: No dysuria, frequency or hematuria  NEUROLOGICAL: No numbness or weakness  SKIN: No itching, rashes      Vitl Signs Last 24 Hrs  T(C): 37.1 (19 Jun 2021 09:38), Max: 37.1 (19 Jun 2021 09:38)  T(F): 98.8 (19 Jun 2021 09:38), Max: 98.8 (19 Jun 2021 09:38)  HR: 74 (19 Jun 2021 09:38) (72 - 77)  BP: 155/56 (19 Jun 2021 09:38) (118/70 - 155/56)  RR: 18 (19 Jun 2021 09:38) (18 - 18)  SpO2: 94% (19 Jun 2021 09:38) (94% - 96%))    PHYSICAL EXAM:    Constitutional: NAD, awake and alert, well-developed  HEENT: PERR, EOMI, Normal Hearing, MMM  Neck: Soft and supple, No LAD, No JVD  Respiratory: Breath sounds are clear bilaterally, No wheezing, rales or rhonchi  Cardiovascular: S1 and S2, regular rate and rhythm, + systolic murmur present  Gastrointestinal: Bowel Sounds present, soft, nontender, nondistended, no guarding, no rebound  Extremities: No peripheral edema  Vascular: 2+ peripheral pulses  Musculoskeletal: 5/5 strength b/l upper and lower extremities  Skin: No rashes, erythema and warmth of left forearm, no tender or cord palpated    MEDICATIONS:  MEDICATIONS  (STANDING):  artificial  tears Solution 1 Drop(s) Both EYES two times a day  aspirin  chewable 81 milliGRAM(s) Oral daily  atorvastatin 10 milliGRAM(s) Oral at bedtime  enoxaparin Injectable 40 milliGRAM(s) SubCutaneous daily  erythromycin   Ointment 1 Application(s) Right EYE four times a day  folic acid 1 milliGRAM(s) Oral daily  lisinopril 20 milliGRAM(s) Oral daily  melatonin 5 milliGRAM(s) Oral at bedtime  metoprolol tartrate 100 milliGRAM(s) Oral every 12 hours  multivitamin 1 Tablet(s) Oral daily  pantoprazole    Tablet 40 milliGRAM(s) Oral before breakfast  piperacillin/tazobactam IVPB.. 3.375 Gram(s) IV Intermittent every 8 hours      LABS: All Labs Reviewed:                       LABS:                17.5   10.31 )-----------( 286      ( 17 Jun 2021 08:01 )             51.8     06-18    138  |  103  |  15  ----------------------------<  145<H>  3.5   |  28  |  0.91    Ca    9.6      18 Jun 2021 08:45  Phos  2.4     06-17  Mg     1.7     06-17                  Blood Culture: 06-09 @ 14:00  Organism --  Gram Stain Blood -- Gram Stain   Few polymorphonuclear leukocytes per low power field  No Squamous epithelial cells per low power field  Rare Gram Positive Cocci in Pairs and Chains seen per oil power field  Rare Gram Negative Rods seen per oil power field  Specimen Source .Sputum Sputum  Culture-Blood --    06-09 @ 10:45  Organism --  Gram Stain Blood -- Gram Stain --  Specimen Source .Urine Catheterized  Culture-Blood --        RADIOLOGY/EKG:    EXAM:  XR CHEST PORTABLE URGENT 1V                            PROCEDURE DATE:  06/09/2021        IMPRESSION:   ET tube tip above tracheal bifurcation.  NG tube tip beyond GE junction.  .    Mild bibasilar  diffuse airspace disease and/or pleural effusions.      EXAM:  CT MAXILLOFACIAL                          EXAM:  CT 3D RECONSTRUCT LOI KATZ                          EXAM:  CT BRAIN                          EXAM:  CT CERVICAL SPINE                            PROCEDURE DATE:  06/07/2021        IMPRESSION:    CT HEAD: No acute intracranial hemorrhage, mass effect, or osseous fracture.    CT MAXILLOFACIAL BONES: Comminuted nasal bone fractures. There is no other acute maxillofacial bone fracture.    CT CERVICAL SPINE: No acute cervical spine fracture or traumatic malalignment.        EXAM:  XR CHEST PORTABLE IMMED 1V                            PROCEDURE DATE:  06/08/2021          INTERPRETATION:  DATE OF STUDY: 6/8/21    PRIOR:6/7/21    CLINICAL INDICATION: SOB. Congestion.    TECHNIQUE: portable chest.    FINDINGS:  Left-sided defibrillator in situ.  Increasing pulmonic congestive changes present.  Small bilateral pleural effusions are present - left more than right - with associated bibasilar atelectasis.  No pneumothorax. No acute bony finding.    IMPRESSION:  Increasing pulmonic congestive changes with small bilateral pleural effusions.

## 2021-06-20 LAB
ANION GAP SERPL CALC-SCNC: 5 MMOL/L — SIGNIFICANT CHANGE UP (ref 5–17)
BUN SERPL-MCNC: 20 MG/DL — SIGNIFICANT CHANGE UP (ref 7–23)
CALCIUM SERPL-MCNC: 9.7 MG/DL — SIGNIFICANT CHANGE UP (ref 8.5–10.1)
CHLORIDE SERPL-SCNC: 102 MMOL/L — SIGNIFICANT CHANGE UP (ref 96–108)
CO2 SERPL-SCNC: 30 MMOL/L — SIGNIFICANT CHANGE UP (ref 22–31)
CREAT SERPL-MCNC: 1.12 MG/DL — SIGNIFICANT CHANGE UP (ref 0.5–1.3)
GLUCOSE SERPL-MCNC: 145 MG/DL — HIGH (ref 70–99)
HCT VFR BLD CALC: 46.6 % — SIGNIFICANT CHANGE UP (ref 39–50)
HGB BLD-MCNC: 15.4 G/DL — SIGNIFICANT CHANGE UP (ref 13–17)
MCHC RBC-ENTMCNC: 31.1 PG — SIGNIFICANT CHANGE UP (ref 27–34)
MCHC RBC-ENTMCNC: 33 GM/DL — SIGNIFICANT CHANGE UP (ref 32–36)
MCV RBC AUTO: 94.1 FL — SIGNIFICANT CHANGE UP (ref 80–100)
PLATELET # BLD AUTO: 330 K/UL — SIGNIFICANT CHANGE UP (ref 150–400)
POTASSIUM SERPL-MCNC: 4.2 MMOL/L — SIGNIFICANT CHANGE UP (ref 3.5–5.3)
POTASSIUM SERPL-SCNC: 4.2 MMOL/L — SIGNIFICANT CHANGE UP (ref 3.5–5.3)
RBC # BLD: 4.95 M/UL — SIGNIFICANT CHANGE UP (ref 4.2–5.8)
RBC # FLD: 13 % — SIGNIFICANT CHANGE UP (ref 10.3–14.5)
SODIUM SERPL-SCNC: 137 MMOL/L — SIGNIFICANT CHANGE UP (ref 135–145)
WBC # BLD: 9.24 K/UL — SIGNIFICANT CHANGE UP (ref 3.8–10.5)
WBC # FLD AUTO: 9.24 K/UL — SIGNIFICANT CHANGE UP (ref 3.8–10.5)

## 2021-06-20 PROCEDURE — 99232 SBSQ HOSP IP/OBS MODERATE 35: CPT | Mod: GC

## 2021-06-20 RX ADMIN — Medication 100 MILLIGRAM(S): at 10:11

## 2021-06-20 RX ADMIN — Medication 1 TABLET(S): at 10:11

## 2021-06-20 RX ADMIN — QUETIAPINE FUMARATE 25 MILLIGRAM(S): 200 TABLET, FILM COATED ORAL at 21:42

## 2021-06-20 RX ADMIN — Medication 1 DROP(S): at 10:10

## 2021-06-20 RX ADMIN — PANTOPRAZOLE SODIUM 40 MILLIGRAM(S): 20 TABLET, DELAYED RELEASE ORAL at 06:54

## 2021-06-20 RX ADMIN — Medication 100 MILLIGRAM(S): at 21:43

## 2021-06-20 RX ADMIN — Medication 1 DROP(S): at 21:43

## 2021-06-20 RX ADMIN — Medication 650 MILLIGRAM(S): at 21:43

## 2021-06-20 RX ADMIN — Medication 650 MILLIGRAM(S): at 22:19

## 2021-06-20 RX ADMIN — Medication 5 MILLIGRAM(S): at 21:42

## 2021-06-20 RX ADMIN — LISINOPRIL 20 MILLIGRAM(S): 2.5 TABLET ORAL at 10:11

## 2021-06-20 RX ADMIN — Medication 81 MILLIGRAM(S): at 10:11

## 2021-06-20 RX ADMIN — PREGABALIN 1000 MICROGRAM(S): 225 CAPSULE ORAL at 10:11

## 2021-06-20 RX ADMIN — ATORVASTATIN CALCIUM 10 MILLIGRAM(S): 80 TABLET, FILM COATED ORAL at 21:42

## 2021-06-20 RX ADMIN — Medication 1 MILLIGRAM(S): at 10:11

## 2021-06-20 RX ADMIN — ENOXAPARIN SODIUM 40 MILLIGRAM(S): 100 INJECTION SUBCUTANEOUS at 10:10

## 2021-06-20 NOTE — PROGRESS NOTE ADULT - ASSESSMENT
85 y/o M w/ PMH of HTN, systolic CHF, COPD, CAD s/p CABG / PCI, PVD s/p stent, CKD III, dyslipidemia, lung cancer s/p lobectomy, cardiac arrest s/p AICD, CEA, p/w syncope    #Delirium s/p extubation  - Currently improved  - Constant observation discontinued  - Per EP AICD not compatible for MRI.  - Continue to monitor clinically    #Acute hypoxic respiratory failure 2/2 aspiration PNA  - Required intubation. Now extubated  - Hx of COPD and Lung cancer s/p lobectomy   - Currently improved on room air  - Completed antibiotics therapy  -Sputum cx- Rare gram pos cocci in pairs and chains, rare gram neg rods  - Transfer off tele to 2S    #Syncope   -Check orthostatics: negative so far  -Troponin negative x 1  -CTH- nasal fracture   -EEG- no epileptiform activity   - EP interrogated device  -Cardio recs appreciated  -Neuro re consult: EEG with no seizure activity  -Ammonia level normal  -Alcohol level - 186  -Wound care for facial injury     #Nasal bone fracture  -OMFS consult   -Pain control prn    #Left upper extremity erythema  -Differential includes superficial thrombophlebitis  -LUE US showed NO evidence of  DVT. Short segment thrombosis of left cephalic vein in the left median cubital fossa.    #Afib   - Metoprolol 100 mg PO q12h    #HTN  -Continue Lisinopril 20 mg   -Continue metoprolol tartrate 100 mg q12h  -Monitor BP     #H/o systolic CHF   -hx of AICD placement   -Continue metoprolol 100 mg PO q12h  -TTE- EF 45-50%, mildly reduced LV systolic function, left atrium moderately dilated, mild aortic stenosis , mild 1+ mitral regurgitation     #CAD  -Continue atorvastatin 10 mg qhs   -Continue aspirin 81 mg QD   -Continue metoprolol 100 mg PO q12h    #PVD s/p stent  -Continue atorvastatin 10 mg qhs   -Continue aspirin 81 mg QD     #CKD III  -Cr stable   -Weber in place    #HLD  -Continue atorvastatin 10 mg qhs     #DVT ppx  -Lovenox 40 mg SQ QD    #DISPO  -PT recs appreciated- d/c planning to rehab

## 2021-06-20 NOTE — PROGRESS NOTE ADULT - SUBJECTIVE AND OBJECTIVE BOX
HPI: 85 y/o M w/ PMH of HTN, systolic CHF, COPD, CAD s/p CABG / PCI, PVD s/p stent, CKD III, dyslipidemia, lung cancer s/p lobectomy, cardiac arrest s/p AICD, CEA, p/w syncope. Patient states that the last thing he remembers is walking from kitchen to his living room, and then he woke up on the floor. Denies having any symptoms prior to syncope. Denies CP, SOB, palpitations, LH, dizziness. Patient states when he woke up he noticed blood dripping on his chest, so he pressed his life alert button and was brought to ED. Patient c/o mild pain on R side of nose. Also has injury to his head. Denies weakness in arms / legs, facial droop, sensory deficits, pain, cough, runny nose, sore throat, nausea, vomiting, abdominal pain, urinary incontinence and tongue bite.     SUBJECTIVE: Pt seen at bedside. Tolerating diet and answering all questions. As per aide, pt has not demonstrated any agitation since yesterday Constant observation discontinued Refusing hospital surveillance covid swabs.    REVIEW OF SYSTEMS:  CONSTITUTIONAL: No weakness, fevers or chills  EYES/ENT: No visual changes;  No vertigo or throat pain   NECK: No pain or stiffness  RESPIRATORY: No cough, wheezing, hemoptysis; No shortness of breath  CARDIOVASCULAR: No chest pain or palpitations  GASTROINTESTINAL: No abdominal or epigastric pain. No nausea, vomiting, or hematemesis; No diarrhea or constipation. No melena or hematochezia.  GENITOURINARY: No dysuria, frequency or hematuria  NEUROLOGICAL: No numbness or weakness  SKIN: No itching, burning, rashes, or lesions   All other review of systems is negative unless indicated above    Vital Signs Last 24 Hrs  T(C): 36.3 (20 Jun 2021 08:05), Max: 36.7 (19 Jun 2021 20:10)  T(F): 97.3 (20 Jun 2021 08:05), Max: 98.1 (19 Jun 2021 20:10)  HR: 62 (20 Jun 2021 08:05) (62 - 74)  BP: 144/52 (20 Jun 2021 08:05) (144/52 - 150/64)  BP(mean): --  RR: 18 (20 Jun 2021 08:05) (18 - 18)  SpO2: 93% (20 Jun 2021 08:05) (93% - 94%)    I&O's Summary      CAPILLARY BLOOD GLUCOSE          PHYSICAL EXAM:  Constitutional: NAD, awake and alert, well-developed  HEENT: PERR, EOMI, Normal Hearing, MMM  Neck: Soft and supple, No LAD, No JVD  Respiratory: Breath sounds are clear bilaterally, No wheezing, rales or rhonchi  Cardiovascular: S1 and S2, regular rate and rhythm, + systolic murmur.  Gastrointestinal: Bowel Sounds present, soft, nontender, nondistended, no guarding, no rebound  Extremities: No peripheral edema  Vascular: 2+ peripheral pulses  Neurological: A/O x 3, no focal deficits  Musculoskeletal: 5/5 strength b/l upper and lower extremities  Skin: No rashes    MEDICATIONS:  MEDICATIONS  (STANDING):  artificial  tears Solution 1 Drop(s) Both EYES two times a day  aspirin  chewable 81 milliGRAM(s) Oral daily  atorvastatin 10 milliGRAM(s) Oral at bedtime  benzocaine 15 mG/menthol 3.6 mG (Sugar-Free) Lozenge 1 Lozenge Oral daily  cyanocobalamin 1000 MICROGram(s) Oral daily  enoxaparin Injectable 40 milliGRAM(s) SubCutaneous daily  folic acid 1 milliGRAM(s) Oral daily  lisinopril 20 milliGRAM(s) Oral daily  melatonin 5 milliGRAM(s) Oral at bedtime  metoprolol tartrate 100 milliGRAM(s) Oral every 12 hours  multivitamin 1 Tablet(s) Oral daily  pantoprazole    Tablet 40 milliGRAM(s) Oral before breakfast  QUEtiapine 25 milliGRAM(s) Oral at bedtime  thiamine 100 milliGRAM(s) Oral daily      LABS: All Labs Reviewed:                        15.4   9.24  )-----------( 330      ( 20 Jun 2021 07:18 )             46.6     06-20    137  |  102  |  20  ----------------------------<  145<H>  4.2   |  30  |  1.12    Ca    9.7      20 Jun 2021 07:18  Phos  2.3     06-19  Mg     1.7     06-19

## 2021-06-21 ENCOUNTER — NON-APPOINTMENT (OUTPATIENT)
Age: 86
End: 2021-06-21

## 2021-06-21 LAB — SARS-COV-2 RNA SPEC QL NAA+PROBE: SIGNIFICANT CHANGE UP

## 2021-06-21 PROCEDURE — 99232 SBSQ HOSP IP/OBS MODERATE 35: CPT | Mod: GC

## 2021-06-21 PROCEDURE — 70450 CT HEAD/BRAIN W/O DYE: CPT | Mod: 26

## 2021-06-21 RX ORDER — THIAMINE MONONITRATE (VIT B1) 100 MG
500 TABLET ORAL DAILY
Refills: 0 | Status: COMPLETED | OUTPATIENT
Start: 2021-06-21 | End: 2021-06-23

## 2021-06-21 RX ORDER — QUETIAPINE FUMARATE 200 MG/1
25 TABLET, FILM COATED ORAL ONCE
Refills: 0 | Status: COMPLETED | OUTPATIENT
Start: 2021-06-21 | End: 2021-06-21

## 2021-06-21 RX ADMIN — Medication 100 MILLIGRAM(S): at 21:06

## 2021-06-21 RX ADMIN — Medication 100 MILLIGRAM(S): at 11:34

## 2021-06-21 RX ADMIN — PREGABALIN 1000 MICROGRAM(S): 225 CAPSULE ORAL at 11:34

## 2021-06-21 RX ADMIN — Medication 105 MILLIGRAM(S): at 18:24

## 2021-06-21 RX ADMIN — Medication 1 DROP(S): at 21:06

## 2021-06-21 RX ADMIN — Medication 5 MILLIGRAM(S): at 21:06

## 2021-06-21 RX ADMIN — ATORVASTATIN CALCIUM 10 MILLIGRAM(S): 80 TABLET, FILM COATED ORAL at 21:06

## 2021-06-21 RX ADMIN — Medication 1 TABLET(S): at 11:34

## 2021-06-21 RX ADMIN — Medication 81 MILLIGRAM(S): at 11:34

## 2021-06-21 RX ADMIN — Medication 1 MILLIGRAM(S): at 11:34

## 2021-06-21 RX ADMIN — LISINOPRIL 20 MILLIGRAM(S): 2.5 TABLET ORAL at 11:34

## 2021-06-21 RX ADMIN — QUETIAPINE FUMARATE 25 MILLIGRAM(S): 200 TABLET, FILM COATED ORAL at 21:06

## 2021-06-21 RX ADMIN — Medication 1 DROP(S): at 11:33

## 2021-06-21 RX ADMIN — PANTOPRAZOLE SODIUM 40 MILLIGRAM(S): 20 TABLET, DELAYED RELEASE ORAL at 06:44

## 2021-06-21 RX ADMIN — ENOXAPARIN SODIUM 40 MILLIGRAM(S): 100 INJECTION SUBCUTANEOUS at 11:33

## 2021-06-21 RX ADMIN — QUETIAPINE FUMARATE 25 MILLIGRAM(S): 200 TABLET, FILM COATED ORAL at 15:05

## 2021-06-21 NOTE — PROGRESS NOTE ADULT - SUBJECTIVE AND OBJECTIVE BOX
HPI: 87 y/o M w/ PMH of HTN, systolic CHF, COPD, CAD s/p CABG / PCI, PVD s/p stent, CKD III, dyslipidemia, lung cancer s/p lobectomy, cardiac arrest s/p AICD, CEA, p/w syncope. Patient states that the last thing he remembers is walking from kitchen to his living room, and then he woke up on the floor. Denies having any symptoms prior to syncope. Denies CP, SOB, palpitations, LH, dizziness. Patient states when he woke up he noticed blood dripping on his chest, so he pressed his life alert button and was brought to ED. Patient c/o mild pain on R side of nose. Also has injury to his head. Denies weakness in arms / legs, facial droop, sensory deficits, pain, cough, runny nose, sore throat, nausea, vomiting, abdominal pain, urinary incontinence and tongue bite.     SUBJECTIVE: Pt seen at bedside. Quiet, solmnolent. Placed on Constant Observation overnight once more, as patient pulled lines. Refusing COVID Swab for DC    REVIEW OF SYSTEMS:  Unable to obtain.     PHYSICAL EXAM:  Vital Signs Last 24 Hrs  T(C): 36.4 (21 Jun 2021 09:46), Max: 36.8 (20 Jun 2021 20:47)  T(F): 97.5 (21 Jun 2021 09:46), Max: 98.2 (20 Jun 2021 20:47)  HR: 63 (21 Jun 2021 09:46) (63 - 75)  BP: 140/50 (21 Jun 2021 09:46) (110/58 - 140/50)  BP(mean): --  RR: 18 (21 Jun 2021 09:46) (18 - 18)  SpO2: 97% (21 Jun 2021 09:46) (93% - 97%)    Constitutional: Pt lying in bed, NAD  HEENT: EOMI, normal hearing, moist mucous membranes  Extremities: No peripheral edema  Vascular: 2+ peripheral pulses  Neurological: AAOx1  Skin: No rashes    MEDICATIONS  (STANDING):  artificial  tears Solution 1 Drop(s) Both EYES two times a day  aspirin  chewable 81 milliGRAM(s) Oral daily  atorvastatin 10 milliGRAM(s) Oral at bedtime  benzocaine 15 mG/menthol 3.6 mG (Sugar-Free) Lozenge 1 Lozenge Oral daily  cyanocobalamin 1000 MICROGram(s) Oral daily  enoxaparin Injectable 40 milliGRAM(s) SubCutaneous daily  folic acid 1 milliGRAM(s) Oral daily  lisinopril 20 milliGRAM(s) Oral daily  melatonin 5 milliGRAM(s) Oral at bedtime  metoprolol tartrate 100 milliGRAM(s) Oral every 12 hours  multivitamin 1 Tablet(s) Oral daily  pantoprazole    Tablet 40 milliGRAM(s) Oral before breakfast  QUEtiapine 25 milliGRAM(s) Oral at bedtime  thiamine 100 milliGRAM(s) Oral daily    MEDICATIONS  (PRN):  acetaminophen   Tablet .. 650 milliGRAM(s) Oral every 6 hours PRN Mild Pain (1 - 3)  metoprolol tartrate Injectable 5 milliGRAM(s) IV Push every 6 hours PRN sustained HR > 120      LABS: All Labs Reviewed:                        15.4   9.24  )-----------( 330      ( 20 Jun 2021 07:18 )             46.6   06-20    137  |  102  |  20  ----------------------------<  145<H>  4.2   |  30  |  1.12    Ca    9.7      20 Jun 2021 07:18    < from: CT Head No Cont (06.21.21 @ 12:30) >  IMPRESSION:  Mild chronic microvascular changes without evidence of an acute transcortical infarction or hemorrhage.            DISHA SANTANA MD; Attending Radiologist  This document has been electronically signed. Jun 21 2021 12:46PM    < end of copied text >  < from: US Duplex Venous Upper Ext Ltd, Left (06.13.21 @ 18:36) >  IMPRESSION:  No evidence of left upper extremity deep venous thrombosis.    Short segment thrombosis of the left cephalic vein in the left median cubital fossa.            RENETTA MELGAR MD; Attending Radiologist  This document has been electronically signed. Jun 14 2021  9:51AM    < end of copied text >  < from: Xray Chest 1 View- PORTABLE-Urgent (Xray Chest 1 View- PORTABLE-Urgent .) (06.09.21 @ 15:55) >  IMPRESSION:   ET tube tip above tracheal bifurcation.  NG tube tip beyond GE junction.  .    Mild bibasilar  diffuse airspace disease and/or pleural effusions.              PRACHI EARLY MD; Attending Radiologist  This document has been electronically signed. Celso 10 2021  8:45AM    < end of copied text >

## 2021-06-21 NOTE — PROGRESS NOTE ADULT - ASSESSMENT
Pt has been seen and examined with FP resident, resident supervised agree with a/p       Patient is a 86y old  Male who presents with a chief complaint of Syncope (20 Jun 2021 12:09)      HPI:  85 y/o M w/ PMH of HTN, systolic CHF, COPD, CAD s/p CABG / PCI, PVD s/p stent, CKD III, dyslipidemia, lung cancer s/p lobectomy, cardiac arrest s/p AICD, CEA, p/w syncope.       PHYSICAL EXAM:  Vital Signs Last 24 Hrs  T(C): 36.4 (21 Jun 2021 09:46), Max: 36.8 (20 Jun 2021 20:47)  T(F): 97.5 (21 Jun 2021 09:46), Max: 98.2 (20 Jun 2021 20:47)  HR: 63 (21 Jun 2021 09:46) (63 - 75)  BP: 140/50 (21 Jun 2021 09:46) (110/58 - 140/50)  BP(mean): --  RR: 18 (21 Jun 2021 09:46) (18 - 18)  SpO2: 97% (21 Jun 2021 09:46) (93% - 97%)  -rs-aeeb, cta  -cvs-s1s2 normal   -p/a-soft,bs+      A/P    #d/c plan

## 2021-06-21 NOTE — PROGRESS NOTE ADULT - ASSESSMENT
87 y/o M w/ PMH of HTN, systolic CHF, COPD, CAD s/p CABG / PCI, PVD s/p stent, CKD III, dyslipidemia, lung cancer s/p lobectomy, cardiac arrest s/p AICD, CEA, p/w syncope    #Delirium s/p extubation  - Constant observation discontinued, reinstated overnight.   - Per EP AICD not compatible for MRI.  - Continue to monitor clinically  - Seroquel 25 PRN and QHS    #Acute hypoxic respiratory failure 2/2 aspiration PNA  - Required intubation. Now extubated  - Hx of COPD and Lung cancer s/p lobectomy   - Currently improved on room air  - Completed antibiotics therapy  - Sputum cx- Rare gram pos cocci in pairs and chains, rare gram neg rods  - Transfer off tele to 2S    #Syncope   - Troponin negative  - CT head, no acute changes today.   - EEG- no epileptiform activity   - EP interrogated device  - Cardiology Consult Appreciated  - Neurology Consult Appreciated EEG with no seizure activity  - Ammonia level normal  - Alcohol level - 186  - Wound care for facial injury     #Nasal bone fracture  - OMFS consult   - Pain control prn    #Left upper extremity erythema  - Differential includes superficial thrombophlebitis  - LUE US showed NO evidence of  DVT. Short segment thrombosis of left cephalic vein in the left median cubital fossa.    #Afib   - Metoprolol 100 mg PO q12h    #HTN  -Continue Lisinopril 20 mg   -Continue metoprolol tartrate 100 mg q12h  -Monitor BP     #systolic CHF   - AICD placement, interrogated by EP  - Continue metoprolol 100 mg PO q12h  - TTE- EF 45-50%, mildly reduced LV systolic function, left atrium moderately dilated, mild aortic stenosis , mild 1+ mitral regurgitation     #CAD  - Continue atorvastatin 10 mg qhs   - Continue aspirin 81 mg QD   - Continue metoprolol 100 mg PO q12h    #PVD s/p stent  - Continue atorvastatin 10 mg qhs   - Continue aspirin 81 mg QD     #CKD III  - Cr stable   - Weber in place    #HLD  -Continue atorvastatin 10 mg qhs     #DVT ppx  -Lovenox 40 mg SQ QD    #DISPO  - DC to [facility] once off Constant observation for 24hrs, and COVID Swab resulted.

## 2021-06-22 ENCOUNTER — TRANSCRIPTION ENCOUNTER (OUTPATIENT)
Age: 86
End: 2021-06-22

## 2021-06-22 LAB
ANION GAP SERPL CALC-SCNC: 4 MMOL/L — LOW (ref 5–17)
BUN SERPL-MCNC: 18 MG/DL — SIGNIFICANT CHANGE UP (ref 7–23)
CALCIUM SERPL-MCNC: 9 MG/DL — SIGNIFICANT CHANGE UP (ref 8.5–10.1)
CHLORIDE SERPL-SCNC: 102 MMOL/L — SIGNIFICANT CHANGE UP (ref 96–108)
CO2 SERPL-SCNC: 28 MMOL/L — SIGNIFICANT CHANGE UP (ref 22–31)
CREAT SERPL-MCNC: 1.06 MG/DL — SIGNIFICANT CHANGE UP (ref 0.5–1.3)
GLUCOSE SERPL-MCNC: 147 MG/DL — HIGH (ref 70–99)
MAGNESIUM SERPL-MCNC: 1.7 MG/DL — SIGNIFICANT CHANGE UP (ref 1.6–2.6)
PHOSPHATE SERPL-MCNC: 2.5 MG/DL — SIGNIFICANT CHANGE UP (ref 2.5–4.5)
POTASSIUM SERPL-MCNC: 3.9 MMOL/L — SIGNIFICANT CHANGE UP (ref 3.5–5.3)
POTASSIUM SERPL-SCNC: 3.9 MMOL/L — SIGNIFICANT CHANGE UP (ref 3.5–5.3)
SODIUM SERPL-SCNC: 134 MMOL/L — LOW (ref 135–145)

## 2021-06-22 PROCEDURE — 99232 SBSQ HOSP IP/OBS MODERATE 35: CPT | Mod: GC

## 2021-06-22 RX ORDER — FOLIC ACID 0.8 MG
1 TABLET ORAL
Qty: 0 | Refills: 0 | DISCHARGE
Start: 2021-06-22

## 2021-06-22 RX ORDER — THIAMINE MONONITRATE (VIT B1) 100 MG
1 TABLET ORAL
Qty: 30 | Refills: 0
Start: 2021-06-22 | End: 2021-07-21

## 2021-06-22 RX ADMIN — PREGABALIN 1000 MICROGRAM(S): 225 CAPSULE ORAL at 09:46

## 2021-06-22 RX ADMIN — Medication 1 TABLET(S): at 09:45

## 2021-06-22 RX ADMIN — Medication 1 DROP(S): at 09:45

## 2021-06-22 RX ADMIN — Medication 81 MILLIGRAM(S): at 09:45

## 2021-06-22 RX ADMIN — ATORVASTATIN CALCIUM 10 MILLIGRAM(S): 80 TABLET, FILM COATED ORAL at 20:53

## 2021-06-22 RX ADMIN — Medication 255 MILLIGRAM(S): at 10:36

## 2021-06-22 RX ADMIN — QUETIAPINE FUMARATE 25 MILLIGRAM(S): 200 TABLET, FILM COATED ORAL at 20:53

## 2021-06-22 RX ADMIN — Medication 1 DROP(S): at 20:53

## 2021-06-22 RX ADMIN — ENOXAPARIN SODIUM 40 MILLIGRAM(S): 100 INJECTION SUBCUTANEOUS at 09:45

## 2021-06-22 RX ADMIN — Medication 5 MILLIGRAM(S): at 20:53

## 2021-06-22 RX ADMIN — Medication 100 MILLIGRAM(S): at 20:53

## 2021-06-22 RX ADMIN — Medication 1 MILLIGRAM(S): at 09:45

## 2021-06-22 RX ADMIN — Medication 100 MILLIGRAM(S): at 09:46

## 2021-06-22 RX ADMIN — PANTOPRAZOLE SODIUM 40 MILLIGRAM(S): 20 TABLET, DELAYED RELEASE ORAL at 05:33

## 2021-06-22 RX ADMIN — LISINOPRIL 20 MILLIGRAM(S): 2.5 TABLET ORAL at 09:45

## 2021-06-22 NOTE — DISCHARGE NOTE PROVIDER - NSDCMRMEDTOKEN_GEN_ALL_CORE_FT
amLODIPine 5 mg oral tablet: 1 tab(s) orally once a day (in the evening) - per daughter- unsure if pt still taking***  Ecotrin Adult Low Strength 81 mg oral delayed release tablet: 1 tab(s) orally once a day  furosemide 20 mg oral tablet: 1 tab(s) orally once a day  lisinopril 20 mg oral tablet: 1 tab(s) orally 2 times a day  meclizine 25 mg oral tablet: 1 tab(s) orally every 6 hours, As needed, Dizziness  melatonin 5 mg oral tablet: 1 tab(s) orally once a day (at bedtime)  Metoprolol Tartrate 100 mg oral tablet: 1 tab(s) orally 2 times a day  pantoprazole 40 mg oral delayed release tablet: 1 tab(s) orally once a day  pravastatin 40 mg oral tablet: 1 tab(s) orally once a day (at bedtime)   amLODIPine 5 mg oral tablet: 1 tab(s) orally once a day (in the evening) - per daughter- unsure if pt still taking***  Ecotrin Adult Low Strength 81 mg oral delayed release tablet: 1 tab(s) orally once a day  folic acid 1 mg oral tablet: 1 tab(s) orally once a day  furosemide 20 mg oral tablet: 1 tab(s) orally once a day  lisinopril 20 mg oral tablet: 1 tab(s) orally 2 times a day  meclizine 25 mg oral tablet: 1 tab(s) orally every 6 hours, As needed, Dizziness  melatonin 5 mg oral tablet: 1 tab(s) orally once a day (at bedtime)  Metoprolol Tartrate 100 mg oral tablet: 1 tab(s) orally 2 times a day  Multiple Vitamins oral tablet: 1 tab(s) orally once a day  pantoprazole 40 mg oral delayed release tablet: 1 tab(s) orally once a day  pravastatin 40 mg oral tablet: 1 tab(s) orally once a day (at bedtime)  thiamine 100 mg oral tablet: 1 tab(s) orally once a day

## 2021-06-22 NOTE — DISCHARGE NOTE PROVIDER - CARE PROVIDER_API CALL
Albaro Maldonado  INTERNAL MEDICINE  [address] [place] [address]  Phone: [phone]  Fax: [phone]  Follow Up Time:

## 2021-06-22 NOTE — DISCHARGE NOTE PROVIDER - NSDCFUSCHEDAPPT_GEN_ALL_CORE_FT
DANUTA BARKLEY W ; 07/09/2021 ; NPP Card[facility] [address] DANUTA Hernandez W ; 08/26/2021 ; NPP IntMed [address] [facility]  DANUTA BARKLEY W ; 08/26/2021 ; NPP IntMed [address] [facility]

## 2021-06-22 NOTE — DISCHARGE NOTE PROVIDER - HOSPITAL COURSE
87 YO M with a  PMH of HTN, systolic CHF, COPD, CAD s/p CABG / PCI, PVD s/p stent, CKD III, dyslipidemia, lung cancer s/p lobectomy, cardiac arrest s/p AICD, CEA, presents to hospital with syncope. His last memory was walking to the kitchen. His alcohol level was elevated to 186, was placed on CIWA Protocol. Neurology and Cardiology were consulted prior to a complication to his admission, which resulted in patient having respiratory distress. ABG obtained 7.17/68/174/24/-.6, patient transferred to CCU and was subsequently intubated at that time.  Patient became hypotensive with sedation, low dose levophed ordered for BP support.   He was successfully intubated on 6/8 and extubated on 6/9. Patient was downgraded to telemetry, but continued to have episodes of delirium. At times patient seemed confused, and CT scans were able to rule out any pathology. He has been on and off constant observation, and has had haldol and Seroquel to help his delirium.  He was accepted to [facility] and is medically stable for discharge.     Subjective: Patient was seen this morning and has no complaints. he has been off constant observation for 24+ hours. He has not had any episodes of delirum and will be discharged to [facility] today.     REVIEW OF SYSTEMS:  Unable to obtain from patient.     PHYSICAL EXAM:  Vital Signs Last 24 Hrs  T(C): 36.4 (22 Jun 2021 07:53), Max: 36.7 (21 Jun 2021 19:31)  T(F): 97.5 (22 Jun 2021 07:53), Max: 98.1 (21 Jun 2021 19:31)  HR: 75 (22 Jun 2021 09:44) (60 - 75)  BP: 133/65 (22 Jun 2021 09:44) (121/68 - 152/57)  BP(mean): --  RR: 18 (22 Jun 2021 07:53) (18 - 18)  SpO2: 98% (22 Jun 2021 07:53) (98% - 99%)    Constitutional: Pt lying in bed, awake and alert, NAD  HEENT: EOMI, normal hearing, moist mucous membranes  Neck: Soft and supple, no JVD  Respiratory: CTABL, No wheezing, rales or rhonchi  Cardiovascular: S1S2+, RRR, no M/G/R  Gastrointestinal: BS+, soft, NT/ND, no guarding, no rebound  Extremities: No peripheral edema  Vascular: 2+ peripheral pulses  Neurological: AAOx3, no focal deficits  Musculoskeletal: 5/5 strength b/l upper and lower extremities  Skin: No rashes         85 YO M with a  PMH of HTN, systolic CHF, COPD, CAD s/p CABG / PCI, PVD s/p stent, CKD III, dyslipidemia, lung cancer s/p lobectomy, cardiac arrest s/p AICD, CEA, presented to hospital with syncope. His alcohol level was elevated to 186, was placed on CIWA Protocol. Pt hospital course remarkable for acute respiratory distress. ABG obtained 7.17/68/174/24/-.6, patient transferred to CCU and was subsequently intubated on 6/8 with pressors for BP support.   He was successfully extubated on 6/9. Pt developed delirium tremens and was managed in ICU until stable. Pt continued to have episodes of confusion and occasional delirium. CT scans were obtained to rule out any acute pathology. Pt is currently medically stable for discharge to [facility] rehab.    Subjective: Patient was seen this morning and has no complaints. he has been off constant observation for 24+ hours. He has not had any episodes of delirum and will be discharged to [facility] today.     REVIEW OF SYSTEMS:  Unable to obtain from patient.     PHYSICAL EXAM:  Vital Signs Last 24 Hrs  T(C): 36.4 (22 Jun 2021 07:53), Max: 36.7 (21 Jun 2021 19:31)  T(F): 97.5 (22 Jun 2021 07:53), Max: 98.1 (21 Jun 2021 19:31)  HR: 75 (22 Jun 2021 09:44) (60 - 75)  BP: 133/65 (22 Jun 2021 09:44) (121/68 - 152/57)  RR: 18 (22 Jun 2021 07:53) (18 - 18)  SpO2: 98% (22 Jun 2021 07:53) (98% - 99%)    Constitutional: Pt lying in bed, awake and alert, NAD  HEENT: EOMI, normal hearing, moist mucous membranes  Neck: Soft and supple, no JVD  Respiratory: CTABL, No wheezing, rales or rhonchi  Cardiovascular: S1S2+, RRR, no M/G/R  Gastrointestinal: BS+, soft, NT/ND, no guarding, no rebound  Extremities: No peripheral edema  Vascular: 2+ peripheral pulses  Neurological: AAOx3, no focal deficits  Musculoskeletal: 5/5 strength b/l upper and lower extremities  Skin: No rashes         87 YO M with a  PMH of HTN, systolic CHF, COPD, CAD s/p CABG / PCI, PVD s/p stent, CKD III, dyslipidemia, lung cancer s/p lobectomy, cardiac arrest s/p AICD, CEA, presented to hospital with syncope. His alcohol level was elevated to 186, was placed on CIWA Protocol. Pt hospital course remarkable for acute respiratory distress. ABG obtained 7.17/68/174/24/-.6, patient transferred to CCU and was subsequently intubated on 6/8 with pressors for BP support.   He was successfully extubated on 6/9. Pt developed delirium tremens and was managed in ICU until stable. Pt continued to have episodes of confusion and occasional delirium. CT scans were obtained to rule out any acute pathology. Pt is currently medically stable for discharge to [facility] rehab.    Subjective: Patient was seen this morning and has no complaints. he has been off constant observation for 24+ hours. He has not had any episodes of delirum and will be discharged to [facility] today.     REVIEW OF SYSTEMS:  Unable to obtain from patient.     PHYSICAL EXAM:  Vital Signs Last 24 Hrs  T(C): 36.6 (23 Jun 2021 08:20), Max: 36.6 (23 Jun 2021 08:20)  T(F): 97.9 (23 Jun 2021 08:20), Max: 97.9 (23 Jun 2021 08:20)  HR: 71 (23 Jun 2021 08:20) (62 - 71)  BP: 116/54 (23 Jun 2021 08:20) (116/54 - 145/88)  BP(mean): --  RR: 18 (23 Jun 2021 08:20) (18 - 18)  SpO2: 95% (23 Jun 2021 08:20) (95% - 99%)    Constitutional: Pt lying in bed, solmnolent, NAD  Extremities: No peripheral edema  Vascular: 2+ peripheral pulses  Neurological: unable to obtain  Musculoskeletal: unable to obtain  Skin: No rashes           85 YO M with a  PMH of HTN, systolic CHF, COPD, CAD s/p CABG / PCI, PVD s/p stent, CKD III, dyslipidemia, lung cancer s/p lobectomy, cardiac arrest s/p AICD, CEA, presented to hospital with syncope. His alcohol level was elevated to 186, was placed on CIWA Protocol. Pt hospital course remarkable for acute respiratory distress. ABG obtained 7.17/68/174/24/-.6, patient transferred to CCU and was subsequently intubated on 6/8 with pressors for BP support.   He was successfully extubated on 6/9. Pt developed delirium tremens and was managed in ICU until stable. Pt continued to have episodes of confusion and occasional delirium. CT scans were obtained to rule out any acute pathology. Pt is currently medically stable for discharge to [facility] rehab.    Subjective: Patient was seen this morning and has no complaints. he has been off constant observation for 24+ hours. He has not had any episodes of delirium and will be discharged to [facility] Today. Patient's family requesting Lyme testing, which is not necessary at this time. Patient can follow up outpatient for any subsequent lyme testing.     REVIEW OF SYSTEMS:    CONSTITUTIONAL: No weakness, fevers or chills  EYES/ENT: No visual changes;  No vertigo or throat pain   NECK: No pain or stiffness  RESPIRATORY: No cough, wheezing, hemoptysis; No shortness of breath  CARDIOVASCULAR: No chest pain or palpitations  GASTROINTESTINAL: No abdominal or epigastric pain. No nausea, vomiting; No diarrhea or constipation.   GENITOURINARY: No dysuria, frequency or hematuria  NEUROLOGICAL: No numbness or weakness  SKIN: No itching, rashes      PHYSICAL EXAM:  Vital Signs Last 24 Hrs  T(C): 36.6 (23 Jun 2021 08:20), Max: 36.6 (23 Jun 2021 08:20)  T(F): 97.9 (23 Jun 2021 08:20), Max: 97.9 (23 Jun 2021 08:20)  HR: 71 (23 Jun 2021 08:20) (62 - 71)  BP: 116/54 (23 Jun 2021 08:20) (116/54 - 145/88)  BP(mean): --  RR: 18 (23 Jun 2021 08:20) (18 - 18)  SpO2: 95% (23 Jun 2021 08:20) (95% - 99%)    Constitutional: Pt lying in bed, solmnolent, NAD  Extremities: No peripheral edema  Vascular: 2+ peripheral pulses  Neurological: unable to obtain  Musculoskeletal: Pain in Right Knee   Skin: No rashes           85 YO M with a  PMH of HTN, systolic CHF, COPD, CAD s/p CABG / PCI, PVD s/p stent, CKD III, dyslipidemia, lung cancer s/p lobectomy, cardiac arrest s/p AICD, CEA, presented to hospital with syncope. His alcohol level was elevated to 186, was placed on CIWA Protocol. Pt hospital course remarkable for acute respiratory distress. ABG obtained 7.17/68/174/24/-.6, patient transferred to CCU and was subsequently intubated on 6/8 with pressors for BP support.   He was successfully extubated on 6/9. Pt developed delirium tremens and was managed in ICU until stable. Pt continued to have episodes of confusion and occasional delirium. CT scans were obtained to rule out any acute pathology. Pt is currently medically stable for discharge today.    Subjective: Patient was seen this morning and has no complaints. he has been off constant observation for 24+ hours. He has not had any episodes of delirium and will be discharged to [facility] Today. Patient's family requesting Lyme testing, which is not necessary at this time. Patient can follow up outpatient for any subsequent lyme testing.     REVIEW OF SYSTEMS:    CONSTITUTIONAL: No weakness, fevers or chills  EYES/ENT: No visual changes;  No vertigo or throat pain   NECK: No pain or stiffness  RESPIRATORY: No cough, wheezing, hemoptysis; No shortness of breath  CARDIOVASCULAR: No chest pain or palpitations  GASTROINTESTINAL: No abdominal or epigastric pain. No nausea, vomiting; No diarrhea or constipation.   GENITOURINARY: No dysuria, frequency or hematuria  NEUROLOGICAL: No numbness or weakness  SKIN: No itching, rashes      PHYSICAL EXAM:  Vital Signs Last 24 Hrs  T(C): 36.6 (23 Jun 2021 08:20), Max: 36.6 (23 Jun 2021 08:20)  T(F): 97.9 (23 Jun 2021 08:20), Max: 97.9 (23 Jun 2021 08:20)  HR: 71 (23 Jun 2021 08:20) (62 - 71)  BP: 116/54 (23 Jun 2021 08:20) (116/54 - 145/88)  BP(mean): --  RR: 18 (23 Jun 2021 08:20) (18 - 18)  SpO2: 95% (23 Jun 2021 08:20) (95% - 99%)    Constitutional: Pt lying in bed, solmnolent, NAD  Extremities: No peripheral edema  Vascular: 2+ peripheral pulses  Neurological: unable to obtain  Musculoskeletal: Pain in Right Knee   Skin: No rashes           87 YO M with a  PMH of HTN, systolic CHF, COPD, CAD s/p CABG / PCI, PVD s/p stent, CKD III, dyslipidemia, lung cancer s/p lobectomy, cardiac arrest s/p AICD, CEA, presented to hospital with syncope. His alcohol level was elevated to 186, was placed on CIWA Protocol. Pt hospital course remarkable for acute respiratory distress. ABG obtained 7.17/68/174/24/-.6, patient transferred to CCU and was subsequently intubated on 6/8 with pressors for BP support.   He was successfully extubated on 6/9. Pt developed delirium tremens and was managed in ICU until stable. Pt continued to have episodes of confusion and occasional delirium. CT scans were obtained to rule out any acute pathology. Pt is currently medically stable for discharge today.    Subjective: Patient was seen this morning and has no complaints. he has been off constant observation for 24+ hours. He has not had any episodes of delirium and will be discharged to [facility] Today. Patient's family requesting Lyme testing, which was not required at this time. Patient can follow up outpatient for any subsequent lyme testing. Lulú Rojas (Daughter-in-law) has been notified and agrees to outpatient f/u.    REVIEW OF SYSTEMS:    CONSTITUTIONAL: No weakness, fevers or chills  EYES/ENT: No visual changes;  No vertigo or throat pain   NECK: No pain or stiffness  RESPIRATORY: No cough, wheezing, hemoptysis; No shortness of breath  CARDIOVASCULAR: No chest pain or palpitations  GASTROINTESTINAL: No abdominal or epigastric pain. No nausea, vomiting; No diarrhea or constipation.   GENITOURINARY: No dysuria, frequency or hematuria  NEUROLOGICAL: No numbness or weakness  SKIN: No itching, rashes      PHYSICAL EXAM:  Vital Signs Last 24 Hrs  T(C): 36.6 (23 Jun 2021 08:20), Max: 36.6 (23 Jun 2021 08:20)  T(F): 97.9 (23 Jun 2021 08:20), Max: 97.9 (23 Jun 2021 08:20)  HR: 71 (23 Jun 2021 08:20) (62 - 71)  BP: 116/54 (23 Jun 2021 08:20) (116/54 - 145/88)  BP(mean): --  RR: 18 (23 Jun 2021 08:20) (18 - 18)  SpO2: 95% (23 Jun 2021 08:20) (95% - 99%)    Constitutional: Pt lying in bed, solmnolent, NAD  Extremities: No peripheral edema  Vascular: 2+ peripheral pulses  Neurological: unable to obtain  Musculoskeletal: Pain in Right Knee   Skin: No rashes

## 2021-06-22 NOTE — PROGRESS NOTE ADULT - ASSESSMENT
87 y/o M w/ PMH of HTN, systolic CHF, COPD, CAD s/p CABG / PCI, PVD s/p stent, CKD III, dyslipidemia, lung cancer s/p lobectomy, cardiac arrest s/p AICD, CEA, p/w syncope    #Delirium s/p extubation  - Constant observation discontinued, over 24 hours without.   - Per EP AICD not compatible for MRI.  - Continue to monitor clinically  - Seroquel 25 PRN and QHS    #Acute hypoxic respiratory failure 2/2 aspiration PNA  - Required intubation. Now extubated  - Hx of COPD and Lung cancer s/p lobectomy   - Currently improved on room air  - Completed antibiotics therapy  - Sputum cx- Rare gram pos cocci in pairs and chains, rare gram neg rods    #Syncope   - Troponin negative  - CT head, no acute changes   - EEG- no epileptiform activity   - EP interrogated device  - Cardiology Consult Appreciated  - Neurology Consult Appreciated EEG with no seizure activity  - Ammonia level normal  - Alcohol level - 186 on admission.   - Wound care for facial injury     #Nasal bone fracture  - OMFS consult   - Pain control prn    #Left upper extremity erythema  - Differential includes superficial thrombophlebitis  - LUE US showed NO evidence of  DVT. Short segment thrombosis of left cephalic vein in the left median cubital fossa.    #Afib   - Metoprolol 100 mg PO q12h    #HTN  -Continue Lisinopril 20 mg   -Continue metoprolol tartrate 100 mg q12h  -Monitor BP     #systolic CHF   - AICD placement, interrogated by EP  - Continue metoprolol 100 mg PO q12h  - TTE- EF 45-50%, mildly reduced LV systolic function, left atrium moderately dilated, mild aortic stenosis , mild 1+ mitral regurgitation     #CAD  - Continue atorvastatin 10 mg qhs   - Continue aspirin 81 mg QD   - Continue metoprolol 100 mg PO q12h    #PVD s/p stent  - Continue atorvastatin 10 mg qhs   - Continue aspirin 81 mg QD     #CKD III  - Cr stable   - Weber in place    #HLD  -Continue atorvastatin 10 mg qhs     #DVT ppx  -Lovenox 40 mg SQ QD    #DISPO  - Medically stable for discharge. Pending approval at rehabilitation facility, in contact with Case management.

## 2021-06-22 NOTE — DISCHARGE NOTE PROVIDER - NSDCCPCAREPLAN_GEN_ALL_CORE_FT
PRINCIPAL DISCHARGE DIAGNOSIS  Diagnosis: Syncope  Assessment and Plan of Treatment: During this admission you were evaluated for fainting spells. You were seen by cardiology and neurology and this admission was complicated by you having to be intubated for hypoxia resultant to alcohol intoxication and benzodiazapine use. You were monitored in the Intesive care unit and extubated, as your status was monitored.      SECONDARY DISCHARGE DIAGNOSES  Diagnosis: Significant use of alcohol  Assessment and Plan of Treatment: Due to your alcohol use, a taper was put on to prevent you from needing more alcohol or going into delerium tremens. You needed to be upgraded to the Intensive care unit as you were intubated due to the medication use. Please refrain from alochol use. Please follow up with your primary care provider    Diagnosis: Delirium  Assessment and Plan of Treatment: During your admission, you were given medications and were  placed on constant observation to ensure that you were not a danger to yourself or anyone else. This resulted with the addition of seroquel and you will need continued follow up. with your primary care provider or behavioral health provider.

## 2021-06-22 NOTE — PROGRESS NOTE ADULT - SUBJECTIVE AND OBJECTIVE BOX
HPI: 85 y/o M w/ PMH of HTN, systolic CHF, COPD, CAD s/p CABG / PCI, PVD s/p stent, CKD III, dyslipidemia, lung cancer s/p lobectomy, cardiac arrest s/p AICD, CEA, p/w syncope. Patient states that the last thing he remembers is walking from kitchen to his living room, and then he woke up on the floor. Denies having any symptoms prior to syncope. Denies CP, SOB, palpitations, LH, dizziness. Patient states when he woke up he noticed blood dripping on his chest, so he pressed his life alert button and was brought to ED. Patient c/o mild pain on R side of nose. Also has injury to his head. Denies weakness in arms / legs, facial droop, sensory deficits, pain, cough, runny nose, sore throat, nausea, vomiting, abdominal pain, urinary incontinence and tongue bite.     SUBJECTIVE: Pt seen at bedside. Quiet, somnolent Has been without Constant observation for over 24 hours. COVID Swab resulted negative.     REVIEW OF SYSTEMS:  Unable to obtain.     PHYSICAL EXAM:  Vital Signs Last 24 Hrs  T(C): 36.4 (22 Jun 2021 15:00), Max: 36.7 (21 Jun 2021 19:31)  T(F): 97.5 (22 Jun 2021 15:00), Max: 98.1 (21 Jun 2021 19:31)  HR: 62 (22 Jun 2021 15:00) (60 - 75)  BP: 145/88 (22 Jun 2021 15:00) (121/68 - 152/57)  BP(mean): --  RR: 18 (22 Jun 2021 15:00) (18 - 18)  SpO2: 99% (22 Jun 2021 15:00) (98% - 99%)    Constitutional: Pt lying in bed, asleep, NAD  Respiratory: CTABL, No wheezing, rales or rhonchi  Cardiovascular: S1S2+, RRR, no M/G/R  Extremities: No peripheral edema  Neurological: unable to obtain  Skin: No rashes    MEDICATIONS  (STANDING):  artificial  tears Solution 1 Drop(s) Both EYES two times a day  aspirin  chewable 81 milliGRAM(s) Oral daily  atorvastatin 10 milliGRAM(s) Oral at bedtime  benzocaine 15 mG/menthol 3.6 mG (Sugar-Free) Lozenge 1 Lozenge Oral daily  cyanocobalamin 1000 MICROGram(s) Oral daily  enoxaparin Injectable 40 milliGRAM(s) SubCutaneous daily  folic acid 1 milliGRAM(s) Oral daily  lisinopril 20 milliGRAM(s) Oral daily  melatonin 5 milliGRAM(s) Oral at bedtime  metoprolol tartrate 100 milliGRAM(s) Oral every 12 hours  multivitamin 1 Tablet(s) Oral daily  pantoprazole    Tablet 40 milliGRAM(s) Oral before breakfast  QUEtiapine 25 milliGRAM(s) Oral at bedtime  thiamine IVPB 500 milliGRAM(s) IV Intermittent daily    MEDICATIONS  (PRN):  acetaminophen   Tablet .. 650 milliGRAM(s) Oral every 6 hours PRN Mild Pain (1 - 3)  metoprolol tartrate Injectable 5 milliGRAM(s) IV Push every 6 hours PRN sustained HR > 120      LABS: All Labs Reviewed:                        15.4   9.24  )-----------( 330      ( 20 Jun 2021 07:18 )             46.6   06-20    137  |  102  |  20  ----------------------------<  145<H>  4.2   |  30  |  1.12    Ca    9.7      20 Jun 2021 07:18    < from: CT Head No Cont (06.21.21 @ 12:30) >  IMPRESSION:  Mild chronic microvascular changes without evidence of an acute transcortical infarction or hemorrhage.            DISHA SANTANA MD; Attending Radiologist  This document has been electronically signed. Jun 21 2021 12:46PM    < end of copied text >  < from: US Duplex Venous Upper Ext Ltd, Left (06.13.21 @ 18:36) >  IMPRESSION:  No evidence of left upper extremity deep venous thrombosis.    Short segment thrombosis of the left cephalic vein in the left median cubital fossa.            RENETTA MELGAR MD; Attending Radiologist  This document has been electronically signed. Jun 14 2021  9:51AM    < end of copied text >  < from: Xray Chest 1 View- PORTABLE-Urgent (Xray Chest 1 View- PORTABLE-Urgent .) (06.09.21 @ 15:55) >  IMPRESSION:   ET tube tip above tracheal bifurcation.  NG tube tip beyond GE junction.  .    Mild bibasilar  diffuse airspace disease and/or pleural effusions.              PRACHI EARLY MD; Attending Radiologist  This document has been electronically signed. Celso 10 2021  8:45AM    < end of copied text >

## 2021-06-23 ENCOUNTER — NON-APPOINTMENT (OUTPATIENT)
Age: 86
End: 2021-06-23

## 2021-06-23 VITALS
DIASTOLIC BLOOD PRESSURE: 54 MMHG | TEMPERATURE: 98 F | HEART RATE: 71 BPM | RESPIRATION RATE: 18 BRPM | OXYGEN SATURATION: 95 % | SYSTOLIC BLOOD PRESSURE: 116 MMHG

## 2021-06-23 LAB
ANION GAP SERPL CALC-SCNC: 5 MMOL/L — SIGNIFICANT CHANGE UP (ref 5–17)
BUN SERPL-MCNC: 22 MG/DL — SIGNIFICANT CHANGE UP (ref 7–23)
CALCIUM SERPL-MCNC: 9.5 MG/DL — SIGNIFICANT CHANGE UP (ref 8.5–10.1)
CHLORIDE SERPL-SCNC: 101 MMOL/L — SIGNIFICANT CHANGE UP (ref 96–108)
CO2 SERPL-SCNC: 28 MMOL/L — SIGNIFICANT CHANGE UP (ref 22–31)
CREAT SERPL-MCNC: 1.31 MG/DL — HIGH (ref 0.5–1.3)
GLUCOSE SERPL-MCNC: 131 MG/DL — HIGH (ref 70–99)
HCT VFR BLD CALC: 44.7 % — SIGNIFICANT CHANGE UP (ref 39–50)
HGB BLD-MCNC: 15.1 G/DL — SIGNIFICANT CHANGE UP (ref 13–17)
MAGNESIUM SERPL-MCNC: 1.9 MG/DL — SIGNIFICANT CHANGE UP (ref 1.6–2.6)
MCHC RBC-ENTMCNC: 31.6 PG — SIGNIFICANT CHANGE UP (ref 27–34)
MCHC RBC-ENTMCNC: 33.8 GM/DL — SIGNIFICANT CHANGE UP (ref 32–36)
MCV RBC AUTO: 93.5 FL — SIGNIFICANT CHANGE UP (ref 80–100)
PHOSPHATE SERPL-MCNC: 2.6 MG/DL — SIGNIFICANT CHANGE UP (ref 2.5–4.5)
PLATELET # BLD AUTO: 378 K/UL — SIGNIFICANT CHANGE UP (ref 150–400)
POTASSIUM SERPL-MCNC: 4.2 MMOL/L — SIGNIFICANT CHANGE UP (ref 3.5–5.3)
POTASSIUM SERPL-SCNC: 4.2 MMOL/L — SIGNIFICANT CHANGE UP (ref 3.5–5.3)
RBC # BLD: 4.78 M/UL — SIGNIFICANT CHANGE UP (ref 4.2–5.8)
RBC # FLD: 13.1 % — SIGNIFICANT CHANGE UP (ref 10.3–14.5)
SODIUM SERPL-SCNC: 134 MMOL/L — LOW (ref 135–145)
WBC # BLD: 8.3 K/UL — SIGNIFICANT CHANGE UP (ref 3.8–10.5)
WBC # FLD AUTO: 8.3 K/UL — SIGNIFICANT CHANGE UP (ref 3.8–10.5)

## 2021-06-23 PROCEDURE — 99239 HOSP IP/OBS DSCHRG MGMT >30: CPT

## 2021-06-23 RX ORDER — SODIUM CHLORIDE 9 MG/ML
500 INJECTION INTRAMUSCULAR; INTRAVENOUS; SUBCUTANEOUS ONCE
Refills: 0 | Status: COMPLETED | OUTPATIENT
Start: 2021-06-23 | End: 2021-06-23

## 2021-06-23 RX ADMIN — Medication 100 MILLIGRAM(S): at 09:12

## 2021-06-23 RX ADMIN — Medication 1 TABLET(S): at 09:12

## 2021-06-23 RX ADMIN — Medication 1 MILLIGRAM(S): at 09:12

## 2021-06-23 RX ADMIN — Medication 81 MILLIGRAM(S): at 09:12

## 2021-06-23 RX ADMIN — ENOXAPARIN SODIUM 40 MILLIGRAM(S): 100 INJECTION SUBCUTANEOUS at 09:12

## 2021-06-23 RX ADMIN — LISINOPRIL 20 MILLIGRAM(S): 2.5 TABLET ORAL at 09:12

## 2021-06-23 RX ADMIN — Medication 1 DROP(S): at 09:13

## 2021-06-23 RX ADMIN — PANTOPRAZOLE SODIUM 40 MILLIGRAM(S): 20 TABLET, DELAYED RELEASE ORAL at 09:12

## 2021-06-23 RX ADMIN — PREGABALIN 1000 MICROGRAM(S): 225 CAPSULE ORAL at 09:12

## 2021-06-23 NOTE — PROGRESS NOTE ADULT - REASON FOR ADMISSION
Syncope

## 2021-06-23 NOTE — PROGRESS NOTE ADULT - ATTENDING COMMENTS
-rs-aeeb, cta  -p/a-soft, bs+  -cvs-s1s2 normal     A/P    #ct supportive care     #d/c one to one     #discharge plan
85 y/o M w/ PMH of HTN, systolic CHF, COPD, CAD s/p CABG / PCI, PVD s/p stent, CKD III, dyslipidemia, lung cancer s/p lobectomy, cardiac arrest s/p AICD, CEA, p/w syncope    #Delirium s/p extubation  - Constant observation discontinued, over 487hours without.   - Per EP AICD not compatible for MRI.  - Continue to monitor clinically  - Seroquel 25 PRN and QHS    #Acute hypoxic respiratory failure 2/2 aspiration PNA  - Required intubation. Now extubated  - Hx of COPD and Lung cancer s/p lobectomy   - Currently improved on room air  - Completed antibiotics therapy  - Sputum cx- Rare gram pos cocci in pairs and chains, rare gram neg rods
85 y/o M w/ PMH of HTN, systolic CHF, COPD, CAD s/p CABG / PCI, PVD s/p stent, CKD III, dyslipidemia, lung cancer s/p lobectomy, cardiac arrest s/p AICD, CEA, p/w syncope    #Delirium s/p extubation  -Patient less restless now   -s/p intubation and Precedex gtt  -On nasal cannula 3 L, continue to monitor VS closely  - pt continues to have confusion  - Neuro recs appreciated  - Per EP AICD not compatible for MRI.    #Acute hypoxic respiratory failure 2/2 aspiration PNA  -Hx of COPD and Lung cancer s/p lobectomy   -COVID19 negative   -s/p levophed (concern for septic shock)  -Respiratory failure resolving on 3 L NC   -Now extubated  -On 3L NC, continues to be lethargic  -Hold ativan as patient is obtunded   -Protonix QD for GI ppx  -Continue zosyn for possible aspiration PNA (6/8-)  -Ucx- negative   -Sputum cx- Rare gram pos cocci in pairs and chains, rare gram neg rods
87 y/o M w/ PMH of HTN, systolic CHF, COPD, CAD s/p CABG / PCI, PVD s/p stent, CKD III, dyslipidemia, lung cancer s/p lobectomy, cardiac arrest s/p AICD, CEA, p/w syncope    #Delirium s/p extubation  -Agitation resolved  -s/p intubation and Precedex gtt  -Monitor off Ativan  -Downgraded from ICU  -On nasal cannula 3 L, continue to monitor VS closely  - pt continues to have confusion, neuro consult placed.    #Acute hypoxic respiratory failure 2/2 aspiration PNA  -Hx of COPD and Lung cancer s/p lobectomy   -COVID19 negative   -s/p levophed (concern for septic shock)  -Respiratory failure resolving on 3 L NC   -Now extubated  -On 3L NC, continues to be lethargic  -Hold ativan as patient is obtunded   -Protonix QD for GI ppx  -Continue zosyn for possible aspiration PNA (6/8-)  -Ucx- negative   -Sputum cx- Rare gram pos cocci in pairs and chains, rare gram neg rods
See Attending note
87 y/o M w/ PMH of HTN, systolic CHF, COPD, CAD s/p CABG / PCI, PVD s/p stent, CKD III, dyslipidemia, lung cancer s/p lobectomy, cardiac arrest s/p AICD, CEA, p/w syncope    #Delirium s/p extubation  -Agitation resolved  -s/p intubation and Precedex gtt  -Monitor off Ativan  -Downgraded from ICU  -On nasal cannula 3 L, continue to monitor VS closely  - pt continues to have confusion, neuro consult placed.    #Acute hypoxic respiratory failure 2/2 aspiration PNA  -Hx of COPD and Lung cancer s/p lobectomy   -COVID19 negative   -s/p levophed (concern for septic shock)  -Respiratory failure resolving on 3 L NC   -Now extubated  -On 3L NC, continues to be lethargic  -Hold ativan as patient is obtunded   -Protonix QD for GI ppx  -Continue zosyn for possible aspiration PNA (6/8-)  -Ucx- negative   -Sputum cx- Rare gram pos cocci in pairs and chains, rare gram neg rods    #Syncope   -Check orthostatics: negative so far  -Troponin negative x 1  -CTH- nasal fracture   -EEG- no epileptiform activity   - EP interrogated device  -Cardio recs appreciated  -Neuro re consult  -Alcohol level - 186  -Wound care for facial injury
87 y/o M w/ PMH of HTN, systolic CHF, COPD, CAD s/p CABG / PCI, PVD s/p stent, CKD III, dyslipidemia, lung cancer s/p lobectomy, cardiac arrest s/p AICD, CEA, p/w syncope    #Delirium s/p extubation  -patient agitated in the morning, received 1 mg ativan. Now somnolent.   -s/p intubation and Precedex gtt  -On nasal cannula 3 L, continue to monitor VS closely  - pt continues to have confusion, neuro consult placed.
See Attending Note
-rs-aeeb, cta  -p/a-soft, bs+  -cvs-s1s2 normal     A/P    #ct supportive care     #discharge plan

## 2021-06-23 NOTE — PROGRESS NOTE ADULT - ASSESSMENT
85 y/o M w/ PMH of HTN, systolic CHF, COPD, CAD s/p CABG / PCI, PVD s/p stent, CKD III, dyslipidemia, lung cancer s/p lobectomy, cardiac arrest s/p AICD, CEA, p/w syncope    #Delirium s/p extubation  - Constant observation discontinued, over 487hours without.   - Per EP AICD not compatible for MRI.  - Continue to monitor clinically  - Seroquel 25 PRN and QHS    #Acute hypoxic respiratory failure 2/2 aspiration PNA  - Required intubation. Now extubated  - Hx of COPD and Lung cancer s/p lobectomy   - Currently improved on room air  - Completed antibiotics therapy  - Sputum cx- Rare gram pos cocci in pairs and chains, rare gram neg rods    #Syncope   - Troponin negative  - CT head, no acute changes   - EEG- no epileptiform activity   - EP interrogated device  - Cardiology Consult Appreciated  - Neurology Consult Appreciated EEG with no seizure activity  - Ammonia level normal  - Alcohol level - 186 on admission.   - Wound care for facial injury     #Nasal bone fracture  - OMFS consult   - Pain control prn    #Left upper extremity erythema  - Differential includes superficial thrombophlebitis  - LUE US showed NO evidence of  DVT. Short segment thrombosis of left cephalic vein in the left median cubital fossa.    #Afib   - Metoprolol 100 mg PO q12h    #HTN  -Continue Lisinopril 20 mg   -Continue metoprolol tartrate 100 mg q12h  -Monitor BP     #systolic CHF   - AICD placement, interrogated by EP  - Continue metoprolol 100 mg PO q12h  - TTE- EF 45-50%, mildly reduced LV systolic function, left atrium moderately dilated, mild aortic stenosis , mild 1+ mitral regurgitation     #CAD  - Continue atorvastatin 10 mg qhs   - Continue aspirin 81 mg QD   - Continue metoprolol 100 mg PO q12h    #PVD s/p stent  - Continue atorvastatin 10 mg qhs   - Continue aspirin 81 mg QD     #CKD III  - Cr stable   - Weber in place    #HLD  -Continue atorvastatin 10 mg qhs     #DVT ppx  -Lovenox 40 mg SQ QD    #DISPO  - Medically stable for discharge. Pending approval at rehabilitation facility, in contact with Case management and family.

## 2021-06-23 NOTE — PROGRESS NOTE ADULT - SUBJECTIVE AND OBJECTIVE BOX
HPI: 85 y/o M w/ PMH of HTN, systolic CHF, COPD, CAD s/p CABG / PCI, PVD s/p stent, CKD III, dyslipidemia, lung cancer s/p lobectomy, cardiac arrest s/p AICD, CEA, p/w syncope. Patient states that the last thing he remembers is walking from kitchen to his living room, and then he woke up on the floor. Denies having any symptoms prior to syncope. Denies CP, SOB, palpitations, LH, dizziness. Patient states when he woke up he noticed blood dripping on his chest, so he pressed his life alert button and was brought to ED. Patient c/o mild pain on R side of nose. Also has injury to his head. Denies weakness in arms / legs, facial droop, sensory deficits, pain, cough, runny nose, sore throat, nausea, vomiting, abdominal pain, urinary incontinence and tongue bite.     SUBJECTIVE: Pt seen at bedside. Quiet, somnolent. Has cloth over his face, given fluids today.     REVIEW OF SYSTEMS:  Unable to obtain.     PHYSICAL EXAM:  Vital Signs Last 24 Hrs  T(C): 36.4 (22 Jun 2021 15:00), Max: 36.7 (21 Jun 2021 19:31)  T(F): 97.5 (22 Jun 2021 15:00), Max: 98.1 (21 Jun 2021 19:31)  HR: 62 (22 Jun 2021 15:00) (60 - 75)  BP: 145/88 (22 Jun 2021 15:00) (121/68 - 152/57)  BP(mean): --  RR: 18 (22 Jun 2021 15:00) (18 - 18)  SpO2: 99% (22 Jun 2021 15:00) (98% - 99%)    Constitutional: Pt lying in bed, asleep, NAD  Extremities: No peripheral edema  Neurological: unable to obtain  Skin: No rashes    MEDICATIONS  (STANDING):  artificial  tears Solution 1 Drop(s) Both EYES two times a day  aspirin  chewable 81 milliGRAM(s) Oral daily  atorvastatin 10 milliGRAM(s) Oral at bedtime  benzocaine 15 mG/menthol 3.6 mG (Sugar-Free) Lozenge 1 Lozenge Oral daily  cyanocobalamin 1000 MICROGram(s) Oral daily  enoxaparin Injectable 40 milliGRAM(s) SubCutaneous daily  folic acid 1 milliGRAM(s) Oral daily  lisinopril 20 milliGRAM(s) Oral daily  melatonin 5 milliGRAM(s) Oral at bedtime  metoprolol tartrate 100 milliGRAM(s) Oral every 12 hours  multivitamin 1 Tablet(s) Oral daily  pantoprazole    Tablet 40 milliGRAM(s) Oral before breakfast  QUEtiapine 25 milliGRAM(s) Oral at bedtime    MEDICATIONS  (PRN):  acetaminophen   Tablet .. 650 milliGRAM(s) Oral every 6 hours PRN Mild Pain (1 - 3)  metoprolol tartrate Injectable 5 milliGRAM(s) IV Push every 6 hours PRN sustained HR > 120      LABS: All Labs Reviewed:                        15.1   8.30  )-----------( 378      ( 23 Jun 2021 07:21 )             44.7   06-23    134<L>  |  101  |  22  ----------------------------<  131<H>  4.2   |  28  |  1.31<H>    Ca    9.5      23 Jun 2021 07:21  Phos  2.6     06-23  Mg     1.9     06-23    < from: CT Head No Cont (06.21.21 @ 12:30) >  IMPRESSION:  Mild chronic microvascular changes without evidence of an acute transcortical infarction or hemorrhage.            DISHA SANTANA MD; Attending Radiologist  This document has been electronically signed. Jun 21 2021 12:46PM    < end of copied text >  < from: US Duplex Venous Upper Ext Ltd, Left (06.13.21 @ 18:36) >  IMPRESSION:  No evidence of left upper extremity deep venous thrombosis.    Short segment thrombosis of the left cephalic vein in the left median cubital fossa.            RENETTA MELGAR MD; Attending Radiologist  This document has been electronically signed. Jun 14 2021  9:51AM    < end of copied text >  < from: Xray Chest 1 View- PORTABLE-Urgent (Xray Chest 1 View- PORTABLE-Urgent .) (06.09.21 @ 15:55) >  IMPRESSION:   ET tube tip above tracheal bifurcation.  NG tube tip beyond GE junction.  .    Mild bibasilar  diffuse airspace disease and/or pleural effusions.              PRACHI EARLY MD; Attending Radiologist  This document has been electronically signed. Celso 10 2021  8:45AM    < end of copied text >

## 2021-06-23 NOTE — PROVIDER CONTACT NOTE (OTHER) - DATE AND TIME:
08-Jun-2021 01:29
08-Jun-2021 01:39
14-Jun-2021 22:52
23-Jun-2021 09:19
08-Jun-2021 02:30
08-Jun-2021 20:30
08-Jun-2021 01:37
12-Jun-2021 19:15

## 2021-06-23 NOTE — PROVIDER CONTACT NOTE (OTHER) - REASON
PT tachycardic and hypertensive
cardiology consult
neurology consult
omfs consult
Neurology Consult
ep consult
no iv access
Code Gray X2

## 2021-06-23 NOTE — PROVIDER CONTACT NOTE (OTHER) - SITUATION
During shift change at [facility] from [facility] to [facility], pt in bed with 1:1 at bedside. Pt restless, BP hypertensive, and tachycardic, rhythm irregular on monitor. MD Adler notified.
left consult with answering service
notified service; spoke to Lindsay
pt has no iv access
Pt agitated. Attempting to punch and kick nurses. Pt swung cane as weapon towards nursing staff. Code Jalen called

## 2021-06-23 NOTE — PROVIDER CONTACT NOTE (OTHER) - ACTION/TREATMENT ORDERED:
md aware. encourage pt to drink fluids
Orders placed for IV metoprolol 5mg, stat EKG, and PO seroquel.   EKG-NSR/sinus arrythmia with PVC's, BP and HR improved (see flowsheet)
Security & MD responded. As per MD low risk CIWA discontinued and high risk CIWA initiated. Constant observation ordered. STAT 2 mg IV Ativan

## 2021-06-23 NOTE — PROGRESS NOTE ADULT - PROVIDER SPECIALTY LIST ADULT
Critical Care
Family Medicine
Hospitalist
Hospitalist
Cardiology
Family Medicine
Family Medicine
Critical Care
Family Medicine
Hospitalist
Critical Care
Family Medicine
Neurology
Neurology
Family Medicine
Neurology

## 2021-06-23 NOTE — PROGRESS NOTE ADULT - NSICDXPILOT_GEN_ALL_CORE
[place]

## 2021-06-30 NOTE — ED PROVIDER NOTE - DISCUSSED CLINICAL AND RADIOLOGICAL FINDINGS WITH, MDM
Shruthi Candelaria., MD  You; Em Rn Triage 11 minutes ago (10:34 AM)     Please review I am not on-call please send to the doctor on-call if they are urgent messages the provider is checking the old in basket.  I do know this patient is not my patient.     Me family/patient

## 2021-07-09 ENCOUNTER — APPOINTMENT (OUTPATIENT)
Dept: ELECTROPHYSIOLOGY | Facility: CLINIC | Age: 86
End: 2021-07-09

## 2021-07-29 ENCOUNTER — NON-APPOINTMENT (OUTPATIENT)
Age: 86
End: 2021-07-29

## 2021-07-29 ENCOUNTER — APPOINTMENT (OUTPATIENT)
Dept: ELECTROPHYSIOLOGY | Facility: CLINIC | Age: 86
End: 2021-07-29

## 2021-08-20 ENCOUNTER — APPOINTMENT (OUTPATIENT)
Dept: ELECTROPHYSIOLOGY | Facility: CLINIC | Age: 86
End: 2021-08-20

## 2021-08-24 LAB — SARS-COV-2 N GENE NPH QL NAA+PROBE: NOT DETECTED

## 2021-08-26 ENCOUNTER — APPOINTMENT (OUTPATIENT)
Dept: INTERNAL MEDICINE | Facility: CLINIC | Age: 86
End: 2021-08-26
Payer: MEDICARE

## 2021-08-26 VITALS
RESPIRATION RATE: 18 BRPM | TEMPERATURE: 98.8 F | DIASTOLIC BLOOD PRESSURE: 60 MMHG | HEIGHT: 70 IN | OXYGEN SATURATION: 99 % | HEART RATE: 82 BPM | WEIGHT: 190 LBS | SYSTOLIC BLOOD PRESSURE: 114 MMHG | BODY MASS INDEX: 27.2 KG/M2

## 2021-08-26 PROCEDURE — 94727 GAS DIL/WSHOT DETER LNG VOL: CPT

## 2021-08-26 PROCEDURE — 94010 BREATHING CAPACITY TEST: CPT

## 2021-08-26 PROCEDURE — ZZZZZ: CPT

## 2021-08-26 PROCEDURE — 99215 OFFICE O/P EST HI 40 MIN: CPT | Mod: 25

## 2021-08-26 PROCEDURE — 94729 DIFFUSING CAPACITY: CPT

## 2021-08-26 RX ORDER — FLUTICASONE PROPIONATE 50 UG/1
50 SPRAY, METERED NASAL TWICE DAILY
Qty: 1 | Refills: 11 | Status: DISCONTINUED | COMMUNITY
Start: 2020-07-28 | End: 2021-08-26

## 2021-08-26 RX ORDER — IPRATROPIUM BROMIDE 42 UG/1
0.06 SPRAY NASAL 3 TIMES DAILY
Qty: 1 | Refills: 5 | Status: DISCONTINUED | COMMUNITY
Start: 2021-04-05 | End: 2021-08-26

## 2021-08-26 NOTE — PLAN
[FreeTextEntry1] : 1. Continue with medication as outlined above.\par \par 2. At this time, the patient is now undergoing followup surveillance CAT scan of the chest as it has been almost 2-1/2 years since his prior study.\par \par 3. Continue to observe pulmonary status without bronchodilators\par \par 4. Flu shot in the fall.\par \par 5. Routine medical follow up with his primary care physician.\par \par 6. Follow up with myself in 6 months with office visit and spirometry.

## 2021-08-26 NOTE — HISTORY OF PRESENT ILLNESS
[FreeTextEntry1] : The patient comes in for a yearly pulmonary evaluation\par  [de-identified] : The patient states, from a pulmonary standpoint, he remains relatively stable. He denies any cough, sputum production, chest pain or hemoptysis. He is not using any inhaled bronchodilators. He denies having any of her breathlessness. There is no significant sputum production. His appetite is good, and his weight has been stable.\par \par The patient does still have issues with chronic postnasal drip. He was seen by an ENT physician. He has been treated with several nasal inhalers including Flonase, and Atrovent. He has not had much of a positive response to these medications.\par \par The patient recently had a syncopal episode while in his home. He was taken to [facility] where he was evaluated. He was eventually sent to a rehabilitation facility. He is still somewhat unsteady on his feet, and he is walking with the assistance of a cane. He now comes in for this assessment.

## 2021-08-26 NOTE — DATA REVIEWED
[FreeTextEntry1] : A pulmonary function test is performed. Lung volumes revealed a moderate decrease in the total lung capacity, residual volume and FRC. The vital capacity is mildly reduced. Lung mechanics reveal a mild decrease in flow rates. Bronchodilator reactivity is not assessed. The DLCO is moderately to significantly reduced at 46%, and however when correct for alveolar volume is normal. His saturation is maintained. This represents a moderate degree of restriction. The restriction may be due to the patient's attributes of obesity as well as his prior lung resection.

## 2021-09-06 ENCOUNTER — APPOINTMENT (OUTPATIENT)
Dept: ELECTROPHYSIOLOGY | Facility: CLINIC | Age: 86
End: 2021-09-06
Payer: MEDICARE

## 2021-09-07 ENCOUNTER — NON-APPOINTMENT (OUTPATIENT)
Age: 86
End: 2021-09-07

## 2021-09-07 PROCEDURE — 93296 REM INTERROG EVL PM/IDS: CPT

## 2021-09-07 PROCEDURE — 93295 DEV INTERROG REMOTE 1/2/MLT: CPT

## 2021-09-08 ENCOUNTER — NON-APPOINTMENT (OUTPATIENT)
Age: 86
End: 2021-09-08

## 2021-09-28 ENCOUNTER — NON-APPOINTMENT (OUTPATIENT)
Age: 86
End: 2021-09-28

## 2021-09-28 ENCOUNTER — APPOINTMENT (OUTPATIENT)
Dept: NEUROLOGY | Facility: CLINIC | Age: 86
End: 2021-09-28
Payer: MEDICARE

## 2021-09-28 VITALS
HEART RATE: 67 BPM | TEMPERATURE: 97.8 F | BODY MASS INDEX: 27.16 KG/M2 | DIASTOLIC BLOOD PRESSURE: 73 MMHG | HEIGHT: 71 IN | SYSTOLIC BLOOD PRESSURE: 127 MMHG | WEIGHT: 194 LBS

## 2021-09-28 DIAGNOSIS — R53.1 WEAKNESS: ICD-10-CM

## 2021-09-28 DIAGNOSIS — R20.0 ANESTHESIA OF SKIN: ICD-10-CM

## 2021-09-28 PROCEDURE — 99214 OFFICE O/P EST MOD 30 MIN: CPT

## 2021-09-28 NOTE — DATA REVIEWED
[de-identified] :  EXAM:  CT BRAIN                      \par \par \par PROCEDURE DATE:  06/21/2021  \par \par \par \par INTERPRETATION:  CLINICAL Indications:  AMS\par \par COMPARISON: CT head dated 6/7/2021\par \par TECHNIQUE: Noncontrast CT of the head. Multiplanar reformations are submitted.\par \par FINDINGS:\par There is periventricular and subcortical white matter hypodensity without mass effect, nonspecific punctate benign calcification right occipital lobe in image 17 of series 2., likely representing mild chronic microvascular ischemic changes. There is no compelling evidence for an acute transcortical infarction. There is no evidence of mass, mass effect, midline shift or extra-axial fluid collection. The lateral ventricles and cortical sulci are age-appropriate in size and configuration.\par \par The patient is status post bilateral ocular lens replacement surgery. Benign 5 mm left ocular globe scleral calcification is redemonstrated image 11 of series 2. Moderate mucosal thickening in the right maxillary sinus. The orbits, mastoid air cells and remaining visualized paranasal sinuses are unremarkable. The calvarium is intact.\par \par Consider follow-up CT or MRI of the brain as clinically warranted.\par \par IMPRESSION:  Mild chronic microvascular changes without evidence of an acute transcortical infarction or hemorrhage.\par  [de-identified] : 21454\par \par FINDINGS:  The background was continuous, spontaneously variable and reactive.  During wakefulness, the posteriorly dominant alpha rhythm consisted of symmetric, well-modulated 8 Hz activity, with amplitude to 40 uV, that attenuated to eye opening.  Low amplitude frontal beta was noted in wakefulness.\par \par Background Slowing:\par Mild generalized background slowing was present.\par \par Focal Slowing:\par None was present.\par \par Sleep Background:\par Drowsiness was characterized by fragmentation, attenuation, and slowing of the background activity.\par Stage 2 Sleep was not captured.\par \par Other Non-Epileptiform Findings:\par None were present.\par \par Interictal Epileptiform Activity:\par None were present.\par \par Activation Procedures:\par Hyperventilation was not performed.\par Photic stimulation was performed and did not induce any abnormalities.\par \par Artifacts:\par Intermittent myogenic and movement artifacts were noted.\par \par ECG:\par The single channel ECG recording did not show any significant arrhythmias.\par \par \par EEG Summary/Classification:\par This was an abnormal EEG study in the awake and drowsy states due to the presence of mild generalized slowing.\par \par EEG Clinical Correlate/\par Impression:\par The findings are suggestive of mild diffuse cerebral dysfunction/encephalopathy.\par No epileptiform activity was seen and no clinical events or seizures were recorded. Consider a repeat study if clinically indicated.\par \par \par ________________________________________\par Beatrice Mar MD\par Attending Physician\par Director of Epilepsy at [facility]\par

## 2021-09-28 NOTE — HISTORY OF PRESENT ILLNESS
[FreeTextEntry1] : 86-year-old man history of diabetes, was seen at [facility], in June of this year, since then on discharge was referred to physical therapy, has been complaining of weakness of the left foot, left leg especially walks, tend to drag the foot. There is numbness involving the toes.Denies similar symptoms on the right. History of recurrent back pain, but no sciatica. No change in bowel habits.\par

## 2021-09-28 NOTE — REVIEW OF SYSTEMS
[As Noted in HPI] : as noted in HPI [Leg Weakness] : leg weakness [Numbness] : numbness [Tingling] : tingling [Frequent Falls] : frequent falls [Arthralgias] : arthralgias [Joint Swelling] : joint swelling [Skin Lesions] : skin lesion [Negative] : Genitourinary

## 2021-09-28 NOTE — PHYSICAL EXAM
[General Appearance - Alert] : alert [General Appearance - In No Acute Distress] : in no acute distress [Cranial Nerves Optic (II)] : visual acuity intact bilaterally,  visual fields full to confrontation, pupils equal round and reactive to light [Cranial Nerves Oculomotor (III)] : extraocular motion intact [Cranial Nerves Trigeminal (V)] : facial sensation intact symmetrically [Cranial Nerves Facial (VII)] : face symmetrical [Cranial Nerves Vestibulocochlear (VIII)] : hearing was intact bilaterally [Cranial Nerves Glossopharyngeal (IX)] : tongue and palate midline [Cranial Nerves Accessory (XI - Cranial And Spinal)] : head turning and shoulder shrug symmetric [Cranial Nerves Hypoglossal (XII)] : there was no tongue deviation with protrusion [Motor Handedness Right-Handed] : the patient is right hand dominant [Paresis Pronator Drift Right-Sided] : no pronator drift on the right [Paresis Pronator Drift Left-Sided] : no pronator drift on the left [Motor Strength Hips Right Weakness] : hip weakness was present [Motor Strength Knee Right Weakness] : normal knee strength [Motor Strength Ankle Right Weakness] : normal ankle strength [Motor Strength Toes Right Foot Weakness] : normal toe strength [Motor Strength Hips Left Weakness] : hip weakness was present [4] : hip extension 4/5 [Motor Strength Knee Left Weakness] : normal knee strength [Motor Strength Ankle Left Weakness] : ankle weakness was present [Motor Strength Toes Left Foot Weakness] : toe weakness present [Dysdiadochokinesia Bilaterally] : not present [1+] : Patella right 1+ [0] : Ankle jerk left 0 [___] : absent on the left [FreeTextEntry8] : wals with cane [FreeTextEntry7] : diminished distally

## 2021-09-28 NOTE — DISCUSSION/SUMMARY
[FreeTextEntry1] : 86-year-old man complaining of weakness, numbness of the left foot and leg, examination demonstrates a left foot drop, rule out r versus left lumbosacral radiculopathy.Possible underlying peripheral neuropathy due to diabetes.\par Plan: CT scan of the lumbar spine, unable to obtain MRI do to permanent pacemaker.\par EMG/NCV of the left leg.\par I advised to wear an L-shaped foot ankle brace\par \par

## 2021-10-06 ENCOUNTER — FORM ENCOUNTER (OUTPATIENT)
Age: 86
End: 2021-10-06

## 2021-10-06 PROBLEM — I10 ESSENTIAL HYPERTENSION: Status: ACTIVE | Noted: 2017-05-16

## 2021-10-18 ENCOUNTER — INPATIENT (INPATIENT)
Facility: HOSPITAL | Age: 86
LOS: 4 days | Discharge: HOME | DRG: 603 | End: 2021-10-23
Attending: HOSPITALIST | Admitting: HOSPITALIST
Payer: MEDICARE

## 2021-10-18 VITALS — HEIGHT: 72 IN | WEIGHT: 194.01 LBS

## 2021-10-18 DIAGNOSIS — Z90.2 ACQUIRED ABSENCE OF LUNG [PART OF]: Chronic | ICD-10-CM

## 2021-10-18 DIAGNOSIS — Z98.890 OTHER SPECIFIED POSTPROCEDURAL STATES: Chronic | ICD-10-CM

## 2021-10-18 DIAGNOSIS — L03.116 CELLULITIS OF LEFT LOWER LIMB: ICD-10-CM

## 2021-10-18 DIAGNOSIS — Z90.49 ACQUIRED ABSENCE OF OTHER SPECIFIED PARTS OF DIGESTIVE TRACT: Chronic | ICD-10-CM

## 2021-10-18 DIAGNOSIS — Z95.810 PRESENCE OF AUTOMATIC (IMPLANTABLE) CARDIAC DEFIBRILLATOR: Chronic | ICD-10-CM

## 2021-10-18 LAB
ALBUMIN SERPL ELPH-MCNC: 4.3 G/DL — SIGNIFICANT CHANGE UP (ref 3.3–5)
ALP SERPL-CCNC: 88 U/L — SIGNIFICANT CHANGE UP (ref 40–120)
ALT FLD-CCNC: 24 U/L — SIGNIFICANT CHANGE UP (ref 12–78)
ANION GAP SERPL CALC-SCNC: 3 MMOL/L — LOW (ref 5–17)
APPEARANCE UR: CLEAR — SIGNIFICANT CHANGE UP
APTT BLD: 33.2 SEC — SIGNIFICANT CHANGE UP (ref 27.5–35.5)
AST SERPL-CCNC: 23 U/L — SIGNIFICANT CHANGE UP (ref 15–37)
BASOPHILS # BLD AUTO: 0.07 K/UL — SIGNIFICANT CHANGE UP (ref 0–0.2)
BASOPHILS NFR BLD AUTO: 0.7 % — SIGNIFICANT CHANGE UP (ref 0–2)
BILIRUB SERPL-MCNC: 0.7 MG/DL — SIGNIFICANT CHANGE UP (ref 0.2–1.2)
BILIRUB UR-MCNC: NEGATIVE — SIGNIFICANT CHANGE UP
BUN SERPL-MCNC: 27 MG/DL — HIGH (ref 7–23)
CALCIUM SERPL-MCNC: 9.7 MG/DL — SIGNIFICANT CHANGE UP (ref 8.5–10.1)
CHLORIDE SERPL-SCNC: 101 MMOL/L — SIGNIFICANT CHANGE UP (ref 96–108)
CO2 SERPL-SCNC: 33 MMOL/L — HIGH (ref 22–31)
COLOR SPEC: YELLOW — SIGNIFICANT CHANGE UP
CREAT SERPL-MCNC: 1.33 MG/DL — HIGH (ref 0.5–1.3)
DIFF PNL FLD: NEGATIVE — SIGNIFICANT CHANGE UP
EOSINOPHIL # BLD AUTO: 1.23 K/UL — HIGH (ref 0–0.5)
EOSINOPHIL NFR BLD AUTO: 12.9 % — HIGH (ref 0–6)
ERYTHROCYTE [SEDIMENTATION RATE] IN BLOOD: 31 MM/HR — HIGH (ref 0–20)
GLUCOSE SERPL-MCNC: 110 MG/DL — HIGH (ref 70–99)
GLUCOSE UR QL: NEGATIVE MG/DL — SIGNIFICANT CHANGE UP
HCT VFR BLD CALC: 43 % — SIGNIFICANT CHANGE UP (ref 39–50)
HGB BLD-MCNC: 14.2 G/DL — SIGNIFICANT CHANGE UP (ref 13–17)
IMM GRANULOCYTES NFR BLD AUTO: 0.4 % — SIGNIFICANT CHANGE UP (ref 0–1.5)
INR BLD: 1.05 RATIO — SIGNIFICANT CHANGE UP (ref 0.88–1.16)
KETONES UR-MCNC: NEGATIVE — SIGNIFICANT CHANGE UP
LACTATE SERPL-SCNC: 0.9 MMOL/L — SIGNIFICANT CHANGE UP (ref 0.7–2)
LEUKOCYTE ESTERASE UR-ACNC: NEGATIVE — SIGNIFICANT CHANGE UP
LYMPHOCYTES # BLD AUTO: 2.59 K/UL — SIGNIFICANT CHANGE UP (ref 1–3.3)
LYMPHOCYTES # BLD AUTO: 27.2 % — SIGNIFICANT CHANGE UP (ref 13–44)
MCHC RBC-ENTMCNC: 30.9 PG — SIGNIFICANT CHANGE UP (ref 27–34)
MCHC RBC-ENTMCNC: 33 GM/DL — SIGNIFICANT CHANGE UP (ref 32–36)
MCV RBC AUTO: 93.7 FL — SIGNIFICANT CHANGE UP (ref 80–100)
MONOCYTES # BLD AUTO: 0.76 K/UL — SIGNIFICANT CHANGE UP (ref 0–0.9)
MONOCYTES NFR BLD AUTO: 8 % — SIGNIFICANT CHANGE UP (ref 2–14)
NEUTROPHILS # BLD AUTO: 4.83 K/UL — SIGNIFICANT CHANGE UP (ref 1.8–7.4)
NEUTROPHILS NFR BLD AUTO: 50.8 % — SIGNIFICANT CHANGE UP (ref 43–77)
NITRITE UR-MCNC: NEGATIVE — SIGNIFICANT CHANGE UP
PH UR: 7 — SIGNIFICANT CHANGE UP (ref 5–8)
PLATELET # BLD AUTO: 199 K/UL — SIGNIFICANT CHANGE UP (ref 150–400)
POTASSIUM SERPL-MCNC: 4.2 MMOL/L — SIGNIFICANT CHANGE UP (ref 3.5–5.3)
POTASSIUM SERPL-SCNC: 4.2 MMOL/L — SIGNIFICANT CHANGE UP (ref 3.5–5.3)
PROT SERPL-MCNC: 8.3 GM/DL — SIGNIFICANT CHANGE UP (ref 6–8.3)
PROT UR-MCNC: NEGATIVE MG/DL — SIGNIFICANT CHANGE UP
PROTHROM AB SERPL-ACNC: 12.1 SEC — SIGNIFICANT CHANGE UP (ref 10.6–13.6)
RBC # BLD: 4.59 M/UL — SIGNIFICANT CHANGE UP (ref 4.2–5.8)
RBC # FLD: 13.7 % — SIGNIFICANT CHANGE UP (ref 10.3–14.5)
SARS-COV-2 RNA SPEC QL NAA+PROBE: SIGNIFICANT CHANGE UP
SODIUM SERPL-SCNC: 137 MMOL/L — SIGNIFICANT CHANGE UP (ref 135–145)
SP GR SPEC: 1 — LOW (ref 1.01–1.02)
UROBILINOGEN FLD QL: NEGATIVE MG/DL — SIGNIFICANT CHANGE UP
WBC # BLD: 9.52 K/UL — SIGNIFICANT CHANGE UP (ref 3.8–10.5)
WBC # FLD AUTO: 9.52 K/UL — SIGNIFICANT CHANGE UP (ref 3.8–10.5)

## 2021-10-18 PROCEDURE — 80202 ASSAY OF VANCOMYCIN: CPT

## 2021-10-18 PROCEDURE — 93925 LOWER EXTREMITY STUDY: CPT | Mod: 26

## 2021-10-18 PROCEDURE — 93925 LOWER EXTREMITY STUDY: CPT

## 2021-10-18 PROCEDURE — 93970 EXTREMITY STUDY: CPT | Mod: 26

## 2021-10-18 PROCEDURE — 86769 SARS-COV-2 COVID-19 ANTIBODY: CPT

## 2021-10-18 PROCEDURE — 71045 X-RAY EXAM CHEST 1 VIEW: CPT | Mod: 26

## 2021-10-18 PROCEDURE — 87086 URINE CULTURE/COLONY COUNT: CPT

## 2021-10-18 PROCEDURE — 73630 X-RAY EXAM OF FOOT: CPT | Mod: 26,LT

## 2021-10-18 PROCEDURE — 93970 EXTREMITY STUDY: CPT

## 2021-10-18 PROCEDURE — 81003 URINALYSIS AUTO W/O SCOPE: CPT

## 2021-10-18 PROCEDURE — 36415 COLL VENOUS BLD VENIPUNCTURE: CPT

## 2021-10-18 PROCEDURE — 97116 GAIT TRAINING THERAPY: CPT | Mod: GP

## 2021-10-18 PROCEDURE — 85025 COMPLETE CBC W/AUTO DIFF WBC: CPT

## 2021-10-18 PROCEDURE — 99223 1ST HOSP IP/OBS HIGH 75: CPT

## 2021-10-18 PROCEDURE — 97163 PT EVAL HIGH COMPLEX 45 MIN: CPT | Mod: GP

## 2021-10-18 PROCEDURE — 99285 EMERGENCY DEPT VISIT HI MDM: CPT

## 2021-10-18 PROCEDURE — 93010 ELECTROCARDIOGRAM REPORT: CPT

## 2021-10-18 PROCEDURE — 83735 ASSAY OF MAGNESIUM: CPT

## 2021-10-18 PROCEDURE — 80048 BASIC METABOLIC PNL TOTAL CA: CPT

## 2021-10-18 RX ORDER — THIAMINE MONONITRATE (VIT B1) 100 MG
100 TABLET ORAL DAILY
Refills: 0 | Status: DISCONTINUED | OUTPATIENT
Start: 2021-10-18 | End: 2021-10-23

## 2021-10-18 RX ORDER — PIPERACILLIN AND TAZOBACTAM 4; .5 G/20ML; G/20ML
3.38 INJECTION, POWDER, LYOPHILIZED, FOR SOLUTION INTRAVENOUS ONCE
Refills: 0 | Status: COMPLETED | OUTPATIENT
Start: 2021-10-18 | End: 2021-10-18

## 2021-10-18 RX ORDER — VANCOMYCIN HCL 1 G
1250 VIAL (EA) INTRAVENOUS EVERY 24 HOURS
Refills: 0 | Status: DISCONTINUED | OUTPATIENT
Start: 2021-10-18 | End: 2021-10-23

## 2021-10-18 RX ORDER — VANCOMYCIN HCL 1 G
1250 VIAL (EA) INTRAVENOUS ONCE
Refills: 0 | Status: COMPLETED | OUTPATIENT
Start: 2021-10-18 | End: 2021-10-18

## 2021-10-18 RX ORDER — LANOLIN ALCOHOL/MO/W.PET/CERES
3 CREAM (GRAM) TOPICAL AT BEDTIME
Refills: 0 | Status: DISCONTINUED | OUTPATIENT
Start: 2021-10-18 | End: 2021-10-23

## 2021-10-18 RX ORDER — CEFTRIAXONE 500 MG/1
1000 INJECTION, POWDER, FOR SOLUTION INTRAMUSCULAR; INTRAVENOUS EVERY 24 HOURS
Refills: 0 | Status: DISCONTINUED | OUTPATIENT
Start: 2021-10-19 | End: 2021-10-23

## 2021-10-18 RX ORDER — PANTOPRAZOLE SODIUM 20 MG/1
40 TABLET, DELAYED RELEASE ORAL
Refills: 0 | Status: DISCONTINUED | OUTPATIENT
Start: 2021-10-18 | End: 2021-10-23

## 2021-10-18 RX ORDER — MECLIZINE HCL 12.5 MG
25 TABLET ORAL EVERY 6 HOURS
Refills: 0 | Status: DISCONTINUED | OUTPATIENT
Start: 2021-10-18 | End: 2021-10-23

## 2021-10-18 RX ORDER — CEFTRIAXONE 500 MG/1
INJECTION, POWDER, FOR SOLUTION INTRAMUSCULAR; INTRAVENOUS
Refills: 0 | Status: DISCONTINUED | OUTPATIENT
Start: 2021-10-18 | End: 2021-10-23

## 2021-10-18 RX ORDER — AMLODIPINE BESYLATE 2.5 MG/1
5 TABLET ORAL AT BEDTIME
Refills: 0 | Status: DISCONTINUED | OUTPATIENT
Start: 2021-10-18 | End: 2021-10-23

## 2021-10-18 RX ORDER — CEFTRIAXONE 500 MG/1
1000 INJECTION, POWDER, FOR SOLUTION INTRAMUSCULAR; INTRAVENOUS ONCE
Refills: 0 | Status: DISCONTINUED | OUTPATIENT
Start: 2021-10-18 | End: 2021-10-18

## 2021-10-18 RX ORDER — ASPIRIN/CALCIUM CARB/MAGNESIUM 324 MG
81 TABLET ORAL DAILY
Refills: 0 | Status: DISCONTINUED | OUTPATIENT
Start: 2021-10-18 | End: 2021-10-23

## 2021-10-18 RX ORDER — CEFTRIAXONE 500 MG/1
1000 INJECTION, POWDER, FOR SOLUTION INTRAMUSCULAR; INTRAVENOUS ONCE
Refills: 0 | Status: COMPLETED | OUTPATIENT
Start: 2021-10-18 | End: 2021-10-18

## 2021-10-18 RX ORDER — FUROSEMIDE 40 MG
20 TABLET ORAL DAILY
Refills: 0 | Status: DISCONTINUED | OUTPATIENT
Start: 2021-10-18 | End: 2021-10-23

## 2021-10-18 RX ORDER — SODIUM CHLORIDE 9 MG/ML
3 INJECTION INTRAMUSCULAR; INTRAVENOUS; SUBCUTANEOUS ONCE
Refills: 0 | Status: COMPLETED | OUTPATIENT
Start: 2021-10-18 | End: 2021-10-18

## 2021-10-18 RX ORDER — METOPROLOL TARTRATE 50 MG
100 TABLET ORAL EVERY 12 HOURS
Refills: 0 | Status: DISCONTINUED | OUTPATIENT
Start: 2021-10-18 | End: 2021-10-23

## 2021-10-18 RX ORDER — ONDANSETRON 8 MG/1
4 TABLET, FILM COATED ORAL EVERY 8 HOURS
Refills: 0 | Status: DISCONTINUED | OUTPATIENT
Start: 2021-10-18 | End: 2021-10-23

## 2021-10-18 RX ORDER — LISINOPRIL 2.5 MG/1
20 TABLET ORAL EVERY 12 HOURS
Refills: 0 | Status: DISCONTINUED | OUTPATIENT
Start: 2021-10-18 | End: 2021-10-23

## 2021-10-18 RX ORDER — ACETAMINOPHEN 500 MG
650 TABLET ORAL EVERY 6 HOURS
Refills: 0 | Status: DISCONTINUED | OUTPATIENT
Start: 2021-10-18 | End: 2021-10-23

## 2021-10-18 RX ORDER — ATORVASTATIN CALCIUM 80 MG/1
20 TABLET, FILM COATED ORAL AT BEDTIME
Refills: 0 | Status: DISCONTINUED | OUTPATIENT
Start: 2021-10-18 | End: 2021-10-23

## 2021-10-18 RX ORDER — CEFTRIAXONE 500 MG/1
INJECTION, POWDER, FOR SOLUTION INTRAMUSCULAR; INTRAVENOUS
Refills: 0 | Status: DISCONTINUED | OUTPATIENT
Start: 2021-10-18 | End: 2021-10-18

## 2021-10-18 RX ADMIN — Medication 166.67 MILLIGRAM(S): at 14:52

## 2021-10-18 RX ADMIN — PIPERACILLIN AND TAZOBACTAM 200 GRAM(S): 4; .5 INJECTION, POWDER, LYOPHILIZED, FOR SOLUTION INTRAVENOUS at 14:33

## 2021-10-18 RX ADMIN — CEFTRIAXONE 1000 MILLIGRAM(S): 500 INJECTION, POWDER, FOR SOLUTION INTRAMUSCULAR; INTRAVENOUS at 19:21

## 2021-10-18 RX ADMIN — SODIUM CHLORIDE 3 MILLILITER(S): 9 INJECTION INTRAMUSCULAR; INTRAVENOUS; SUBCUTANEOUS at 15:24

## 2021-10-18 NOTE — ED STATDOCS - NSICDXPASTMEDICALHX_GEN_ALL_CORE_FT
PAST MEDICAL HISTORY:  CAD (coronary artery disease)     Cardiac arrest with successful resuscitation     CKD (chronic kidney disease)     Heart failure     Hypertension     Lung cancer     PAD (peripheral artery disease)

## 2021-10-18 NOTE — CONSULT NOTE ADULT - ASSESSMENT
Assessment: 87 y/o M Pt has been seen by podiatry team for the evaluation of the following;  - LLE cellulitis   - Lt first interdigital maceration  - Partial thickness ulcerative lesions, Lt hallux and 2nd toes  - Pain to the Lt foot, and difficulty to ambulate    Plan:  - Pt is evaluated and chart reviewed.  - Reviewed the imaging and discussed the case with the patient  - Discussed the potential causes and plan of management with the Pt.  - Explained to the Pt the importance of being admitted, and to start a course of IV Abx, and to keep monitoring the cellulitis where in house.     - Pt demonstrated verbal understanding, and agreed to the plan of treatment,  - Applied betadine paint to the Lt 1st IDM space, then wrapped in DSD.  - Recommended ID and vascular consults.  - Care per medicine, appreciated.  - Podiatry will follow the case in house.

## 2021-10-18 NOTE — H&P ADULT - NSHPLABSRESULTS_GEN_ALL_CORE
14.2   9.52  )-----------( 199      ( 18 Oct 2021 14:00 )             43.0     10-18    137  |  101  |  27<H>  ----------------------------<  110<H>  4.2   |  33<H>  |  1.33<H>    Ca    9.7      18 Oct 2021 14:00    TPro  8.3  /  Alb  4.3  /  TBili  0.7  /  DBili  x   /  AST  23  /  ALT  24  /  AlkPhos  88  10-18    COVID-19 PCR: NotDetec (18 Oct 2021 14:00)  COVID-19 PCR: NotDetec (20 Jun 2021 12:15)  COVID-19 PCR: NotDetec (07 Jun 2021 21:32)    CAPILLARY BLOOD GLUCOSE      Sedimentation Rate, Erythrocyte: 31 mm/hr (10.18.21 @ 14:00) Lactate, Blood: 0.9 mmol/L (10.18.21 @ 14:00) < from: US Duplex Arterial Lower Ext Compl, Bilateral (10.18.21 @ 20:28) >    IMPRESSION:    Findings suggestive of a hemodynamically significant stenosis in the right external iliac artery with blunted monophasic waveforms throughout the remainder of the visualized right lower artery arteries, suspicious for a high-grade stenosis.    Moderate plaque in the left external iliac artery with an associated elevated peak systolic velocity consistent with a stenosis.    Elevated peak systolic velocity in the left popliteal artery with associated plaque, likely a hemodynamically significant stenosis.    A CTA of the aorta with a lower extremity runoff could be obtained for further evaluation if warranted.    < end of copied text >    < from: US Duplex Venous Lower Ext Complete, Bilateral (10.18.21 @ 20:22) >      IMPRESSION:  No evidence of deep venous thrombosis in either lower extremity.    < end of copied text >    < from: Xray Chest 1 View- PORTABLE-Urgent (10.18.21 @ 13:43) >    INTERPRETATION:  AP chest on October 18, 2021 at 1:27 PM. Patient is scheduled for admission.    Heart magnified by technique. Sternotomy and left-sided defibrillator again noted.    On June 9 of this year there is scattered mild infiltrates.    Presently lungs are clear.    IMPRESSION: Clear lungs at this time    < end of copied text >    I personally reviewed labs, imaging, orders and vitals

## 2021-10-18 NOTE — ED STATDOCS - OBJECTIVE STATEMENT
87 y/o male with a PMHx of CAD, cardiac arrest, CKD, heart failure, HTN, lung cancer, PAD presents to the ED c/o left foot redness and swelling x4 days. Not currently on abx. Pt sent to the ED by Dr. Maldonado for admission for IV abx. NKDA. Nonsmoker. Vaccinated for COVID. No other complaints at this time.

## 2021-10-18 NOTE — ED ADULT NURSE NOTE - OBJECTIVE STATEMENT
pt arrives to ED complaining of b/l LE pain/swelling. pt sent in by MD for lower extremity cellulitis. B/L legs swollen, warm, red. afebrile. alert and oriented x 4.

## 2021-10-18 NOTE — ED STATDOCS - ATTENDING CONTRIBUTION TO CARE
I, Lopez Ruiz MD,  performed the initial face to face bedside interview with this patient regarding history of present illness, review of symptoms and relevant past medical, social and family history.  I completed an independent physical examination.  I was the initial provider who evaluated this patient. I have signed out the follow up of any pending tests (i.e. labs, radiological studies) to the ACP.  I have communicated the patient’s plan of care and disposition with the ACP.  The history, relevant review of systems, past medical and surgical history, medical decision making, and physical examination was documented by the scribe in my presence and I attest to the accuracy of the documentation.

## 2021-10-18 NOTE — ED STATDOCS - CLINICAL SUMMARY MEDICAL DECISION MAKING FREE TEXT BOX
87 y/o male with left foot cellulitis. Plan: labs, XR. Pt sent by Dr. Maldonado for admission for cellulitis.

## 2021-10-18 NOTE — ED STATDOCS - MUSCULOSKELETAL, MLM
range of motion is not limited. Left foot with redness and swelling first 2 digits with some redness distal mid foot. Pain with palpation toes. Pain with passive ROM toes.

## 2021-10-18 NOTE — ED STATDOCS - NSICDXPASTSURGICALHX_GEN_ALL_CORE_FT
PAST SURGICAL HISTORY:  AICD (automatic cardioverter/defibrillator) present     H/O left knee surgery     History of appendectomy     History of lobectomy of lung     History of tonsillectomy and adenoidectomy     S/P carotid endarterectomy

## 2021-10-18 NOTE — ED STATDOCS - PROGRESS NOTE DETAILS
86 yr. old male presents to ED with swelling of both feet for the past 4 days left foot has cellulitis. Sent to ED by Dr. Maldonado for admission sand IV antibiotics. Seen and examined by attending in intake. Plan: IV,Labs, Blood cultures, IV antibiotics,  and admission. Will F/U with results and re evaluate. Podiatry Resident called for consult. Will evaluate patient in ED. Lorena MANNING

## 2021-10-18 NOTE — H&P ADULT - NSHPPHYSICALEXAM_GEN_ALL_CORE
PHYSICAL EXAM:    T(C): 36.7 (10-18-21 @ 21:59), Max: 36.7 (10-18-21 @ 21:59)  HR: 62 (10-18-21 @ 21:59) (59 - 62)  BP: 128/52 (10-18-21 @ 21:59) (112/58 - 130/64)  RR: 15 (10-18-21 @ 21:59) (14 - 18)  SpO2: 99% (10-18-21 @ 21:59) (98% - 100%)    General: AAOx3; NAD  Head: AT/NC  ENT: Moist Mucous Membranes; No Injury  Eyes: EOMI; PERRL  Neck: Non-tender; No JVD  CVS: RRR, S1&S2, FIOR,, LE Edema L>R  Respiratory: Lungs CTA B/L; Normal Respiratory Effort  Abdomen/GI: Soft, non-tender, non-distended, no guarding, no rebound, normal bowel sounds  : No bladder distention, No Weber  Extremites: No cyanosis, No clubbing, LE edema (L>R): Left foot great toe and 2nd toe with erythema extendign to the distal metatarsals clean and dry without discharge. Plantar surface with some compromise to skin but no bone or soft tissue exposure and no bleeding or drainage. Diminished pulses bilaterally.    MSK: No CVA tenderness, Normal ROM, No injury  Neuro: AAOx3, CNII-XII grossly intact, non-focal  Psych: Appropriate, Cooperative,   Skin: As described in extremities

## 2021-10-18 NOTE — H&P ADULT - ASSESSMENT
MEDICATIONS  (STANDING):  amLODIPine   Tablet 5 milliGRAM(s) Oral at bedtime  aspirin enteric coated 81 milliGRAM(s) Oral daily  atorvastatin 20 milliGRAM(s) Oral at bedtime  cefTRIAXone Injectable.      furosemide    Tablet 20 milliGRAM(s) Oral daily  lisinopril 20 milliGRAM(s) Oral every 12 hours  metoprolol tartrate 100 milliGRAM(s) Oral every 12 hours  multivitamin 1 Tablet(s) Oral daily  pantoprazole    Tablet 40 milliGRAM(s) Oral before breakfast  thiamine 100 milliGRAM(s) Oral daily  vancomycin  IVPB 1250 milliGRAM(s) IV Intermittent every 24 hours    MEDICATIONS  (PRN):  acetaminophen   Tablet .. 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 ID consulted  Podiatry consulted  ESR mildly elevated.   XRAY findigns noted  LE US negative for DVT but positive for PAD. Vascular consulted  Cr 1.33. Baseline 1-1.1. Continue to monitor. IVF as needed for fluid balance. Appears Euvolemic  Continue Home Cardiac Meds  Local Wound care as per podiatry  PT evaluation    DVT Prophylaxis: Lovenox subq

## 2021-10-18 NOTE — CONSULT NOTE ADULT - SUBJECTIVE AND OBJECTIVE BOX
Date of service: 10/18/2021  CC: painful swollen LLE    HPI: 85 y/o M Pt has been seen by podiatry team at the ED, for the evaluation of LLE swelling, and pain of couple of days duration. Pt states that he started having pain and burning to the foot about 4 days ago, and he had a home visit by his private podiatrist who recommended him to keep an eye on his Lt foot abrasion, then when he got the swelling and redness to the LLE, he visited his PCP early today, who referred him to the ED for further evaluation. Pt denies any pedal trauma, and denies any other pedal complaint at this time. Pt denies F/N/V/SOB     REVIEW OF SYSTEMS:  CONSTITUTIONAL: No fever, chills,  weight loss, or fatigue  EYES: No eye pain, visual disturbances, or discharge  ENMT:  No difficulty hearing, tinnitus, vertigo; No sinus or throat pain  NECK: No pain or stiffness  RESPIRATORY: No cough, wheezing, or hemoptysis; No shortness of breath  CARDIOVASCULAR: No chest pain, palpitations, dizziness, or leg swelling  GASTROINTESTINAL: No abdominal or epigastric pain. No nausea, vomiting, or hematemesis; No diarrhea or constipation. No melena or hematochezia.  GENITOURINARY: No dysuria, frequency, hematuria, or incontinence  NEUROLOGICAL: No headaches, memory loss, loss of strength, numbness, or tremors  SKIN: Abrasion to the Lt 1st and 2nd toes.  No itching, burning, or rashes. Swollen Lt leg   LYMPH NODES: No enlarged glands  ENDOCRINE: No heat or cold intolerance; No hair loss  MUSCULOSKELETAL: No joint pain or swelling; No muscle, back, or extremity pain  HEME/LYMPH: No easy bruising, or bleeding gums    PAST MEDICAL & SURGICAL HISTORY:  Hypertension  CAD (coronary artery disease)  Cardiac arrest with successful resuscitation  CKD (chronic kidney disease)  PAD (peripheral artery disease)  Lung cancer  Heart failure  AICD (automatic cardioverter/defibrillator) present    S/P carotid endarterectomy  History of lobectomy of lung  History of appendectomy  H/O left knee surgery  History of tonsillectomy and adenoidectomy    Allergies:  No Known Allergies    VITALS:  Vital Signs Last 24 Hrs  T(C): 36.2 (10-18-21 @ 13:03), Max: 36.2 (10-18-21 @ 13:03)  T(F): 97.2 (10-18-21 @ 13:03), Max: 97.2 (10-18-21 @ 13:03)  HR: 59 (10-18-21 @ 13:03) (59 - 59)  BP: 112/58 (10-18-21 @ 13:03) (112/58 - 112/58)  BP(mean): 73 (10-18-21 @ 13:03) (73 - 73)  RR: 18 (10-18-21 @ 13:03) (18 - 18)  SpO2: 98% (10-18-21 @ 13:03) (98% - 98%)    Physical Examination:  Constitutional: AAOx3, non-focal; NAD, comfortable resting   Focused LE exam:  Vascular: Temperature gradient is warm to cool to the RLE, warm to warm to the LLE, non-palpable pulses b/l, capillary re-fill time is about 5sec to digits 1-5 b/l. +3 pitting edema to the dorsum of the Lt foot, extending to the Lt upper leg.  Marked superficial varicosities to both LEs.  Neuro: Intact protective sensation to the foot and digits 1-5 b/l.  Derm: Skin is warmer to the LLE, noted + IDM to the Lt 1st interdigital space, with swelling, and streaking erythema to the Lt foot, extending proximally to the Lt leg. Mild superficial abrasions to the plantar aspect of the Lt hallux and the Lt 2nd toes, without any drainage, fluctuance, crepitous.  + signs of acute infection to the LLE.   Musculoskeletal: Manual muscle testing is 5/5 in all muscle groups of the foot/ankle b/l. ROM to all the foot/ankle joints is WNL bilaterally. TTP over the dorsum of the Lt foot.    LABS:             14.2   9.52  )-----------( 199      ( 18 Oct 2021 14:00 )             43.0     10-18  137  |  101  |  27<H>  ----------------------------<  110<H>  4.2   |  33<H>  |  1.33<H>    Ca    9.7      18 Oct 2021 14:00  TPro  8.3  /  Alb  4.3  /  TBili  0.7  /  DBili  x   /  AST  23  /  ALT  24  /  AlkPhos  88  10-18    PT/INR - ( 18 Oct 2021 14:00 )   PT: 12.1 sec;   INR: 1.05 ratio    PTT - ( 18 Oct 2021 14:00 )  PTT:33.2 sec    RADIOLOGY:    X-rays Lt foot, wet read per podiatry shown non specific soft tissue swelling, with no subcutaneous emphysema.

## 2021-10-18 NOTE — H&P ADULT - HISTORY OF PRESENT ILLNESS
86M 86M with PMH of CAD s/p CABG, Cardiac Arrest, CHF, HTN, AICD, 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 2 days ago patient was walking and the toe left toes curled inward when he stumbled resulting in pain in the bone and abrasion of both great and second toe on the plantar surface. Pain and erythema worsened and went to PMD for further evaluation. Advised to report to the ED. Evaluated by podiatry and wound cleaned and dressed with subsequent improvement in symptoms. Denies fever, chills, syncope, cough, chest pain, SOB, nausea, vomiting. Pins and needles sensation more on the right and the left. Left leg also got more swollen after the event. But also has numbness and pins and needles sensation in the Right LE intermittently over the last several months.     PMH:  CAD s/p CABG, Cardiac Arrest, CHF, HTN, AICD, Lung Ca s/p Resection (reported to be in remission)  Surgical History: Left TKA, CABG, Lung mass/nodular resection  Family History; Mother  in 40s from breast ca. Father  age 80s from HD  Social History: Former TObacco/smoking. Quit 20 years ago. Former 40 PPD smoker. Daily 1-2 beers. Denies illicit drug use.

## 2021-10-19 LAB
ANION GAP SERPL CALC-SCNC: 6 MMOL/L — SIGNIFICANT CHANGE UP (ref 5–17)
BASOPHILS # BLD AUTO: 0.06 K/UL — SIGNIFICANT CHANGE UP (ref 0–0.2)
BASOPHILS NFR BLD AUTO: 0.6 % — SIGNIFICANT CHANGE UP (ref 0–2)
BUN SERPL-MCNC: 23 MG/DL — SIGNIFICANT CHANGE UP (ref 7–23)
CALCIUM SERPL-MCNC: 9.8 MG/DL — SIGNIFICANT CHANGE UP (ref 8.5–10.1)
CHLORIDE SERPL-SCNC: 101 MMOL/L — SIGNIFICANT CHANGE UP (ref 96–108)
CO2 SERPL-SCNC: 29 MMOL/L — SIGNIFICANT CHANGE UP (ref 22–31)
COVID-19 SPIKE DOMAIN AB INTERP: POSITIVE
COVID-19 SPIKE DOMAIN ANTIBODY RESULT: 188 U/ML — HIGH
CREAT SERPL-MCNC: 1.28 MG/DL — SIGNIFICANT CHANGE UP (ref 0.5–1.3)
CULTURE RESULTS: NO GROWTH — SIGNIFICANT CHANGE UP
EOSINOPHIL # BLD AUTO: 1.11 K/UL — HIGH (ref 0–0.5)
EOSINOPHIL NFR BLD AUTO: 10.6 % — HIGH (ref 0–6)
GLUCOSE SERPL-MCNC: 139 MG/DL — HIGH (ref 70–99)
HCT VFR BLD CALC: 41.1 % — SIGNIFICANT CHANGE UP (ref 39–50)
HGB BLD-MCNC: 13.6 G/DL — SIGNIFICANT CHANGE UP (ref 13–17)
IMM GRANULOCYTES NFR BLD AUTO: 0.4 % — SIGNIFICANT CHANGE UP (ref 0–1.5)
LYMPHOCYTES # BLD AUTO: 2.81 K/UL — SIGNIFICANT CHANGE UP (ref 1–3.3)
LYMPHOCYTES # BLD AUTO: 26.8 % — SIGNIFICANT CHANGE UP (ref 13–44)
MAGNESIUM SERPL-MCNC: 2 MG/DL — SIGNIFICANT CHANGE UP (ref 1.6–2.6)
MCHC RBC-ENTMCNC: 31.1 PG — SIGNIFICANT CHANGE UP (ref 27–34)
MCHC RBC-ENTMCNC: 33.1 GM/DL — SIGNIFICANT CHANGE UP (ref 32–36)
MCV RBC AUTO: 93.8 FL — SIGNIFICANT CHANGE UP (ref 80–100)
MONOCYTES # BLD AUTO: 0.92 K/UL — HIGH (ref 0–0.9)
MONOCYTES NFR BLD AUTO: 8.8 % — SIGNIFICANT CHANGE UP (ref 2–14)
NEUTROPHILS # BLD AUTO: 5.55 K/UL — SIGNIFICANT CHANGE UP (ref 1.8–7.4)
NEUTROPHILS NFR BLD AUTO: 52.8 % — SIGNIFICANT CHANGE UP (ref 43–77)
PLATELET # BLD AUTO: 194 K/UL — SIGNIFICANT CHANGE UP (ref 150–400)
POTASSIUM SERPL-MCNC: 4 MMOL/L — SIGNIFICANT CHANGE UP (ref 3.5–5.3)
POTASSIUM SERPL-SCNC: 4 MMOL/L — SIGNIFICANT CHANGE UP (ref 3.5–5.3)
RBC # BLD: 4.38 M/UL — SIGNIFICANT CHANGE UP (ref 4.2–5.8)
RBC # FLD: 13.6 % — SIGNIFICANT CHANGE UP (ref 10.3–14.5)
SARS-COV-2 IGG+IGM SERPL QL IA: 188 U/ML — HIGH
SARS-COV-2 IGG+IGM SERPL QL IA: POSITIVE
SODIUM SERPL-SCNC: 136 MMOL/L — SIGNIFICANT CHANGE UP (ref 135–145)
SPECIMEN SOURCE: SIGNIFICANT CHANGE UP
WBC # BLD: 10.49 K/UL — SIGNIFICANT CHANGE UP (ref 3.8–10.5)
WBC # FLD AUTO: 10.49 K/UL — SIGNIFICANT CHANGE UP (ref 3.8–10.5)

## 2021-10-19 PROCEDURE — 99233 SBSQ HOSP IP/OBS HIGH 50: CPT

## 2021-10-19 RX ORDER — ENOXAPARIN SODIUM 100 MG/ML
40 INJECTION SUBCUTANEOUS DAILY
Refills: 0 | Status: DISCONTINUED | OUTPATIENT
Start: 2021-10-19 | End: 2021-10-23

## 2021-10-19 RX ADMIN — Medication 1 TABLET(S): at 09:11

## 2021-10-19 RX ADMIN — ATORVASTATIN CALCIUM 20 MILLIGRAM(S): 80 TABLET, FILM COATED ORAL at 22:08

## 2021-10-19 RX ADMIN — Medication 100 MILLIGRAM(S): at 10:14

## 2021-10-19 RX ADMIN — CEFTRIAXONE 1000 MILLIGRAM(S): 500 INJECTION, POWDER, FOR SOLUTION INTRAMUSCULAR; INTRAVENOUS at 17:19

## 2021-10-19 RX ADMIN — LISINOPRIL 20 MILLIGRAM(S): 2.5 TABLET ORAL at 09:11

## 2021-10-19 RX ADMIN — ATORVASTATIN CALCIUM 20 MILLIGRAM(S): 80 TABLET, FILM COATED ORAL at 01:43

## 2021-10-19 RX ADMIN — Medication 81 MILLIGRAM(S): at 09:10

## 2021-10-19 RX ADMIN — LISINOPRIL 20 MILLIGRAM(S): 2.5 TABLET ORAL at 22:08

## 2021-10-19 RX ADMIN — AMLODIPINE BESYLATE 5 MILLIGRAM(S): 2.5 TABLET ORAL at 01:43

## 2021-10-19 RX ADMIN — PANTOPRAZOLE SODIUM 40 MILLIGRAM(S): 20 TABLET, DELAYED RELEASE ORAL at 09:11

## 2021-10-19 RX ADMIN — Medication 3 MILLIGRAM(S): at 22:08

## 2021-10-19 RX ADMIN — Medication 100 MILLIGRAM(S): at 09:11

## 2021-10-19 RX ADMIN — ENOXAPARIN SODIUM 40 MILLIGRAM(S): 100 INJECTION SUBCUTANEOUS at 15:54

## 2021-10-19 RX ADMIN — Medication 166.67 MILLIGRAM(S): at 14:54

## 2021-10-19 RX ADMIN — Medication 20 MILLIGRAM(S): at 09:11

## 2021-10-19 RX ADMIN — LISINOPRIL 20 MILLIGRAM(S): 2.5 TABLET ORAL at 01:43

## 2021-10-19 RX ADMIN — Medication 100 MILLIGRAM(S): at 01:42

## 2021-10-19 NOTE — CONSULT NOTE ADULT - SUBJECTIVE AND OBJECTIVE BOX
HPI: "86M with PMH of CAD s/p CABG, Cardiac Arrest, CHF, HTN, AICD, 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 2 days ago patient was walking and the toe left toes curled inward when he stumbled resulting in pain in the bone and abrasion of both great and second toe on the plantar surface. Pain and erythema worsened and went to PMD for further evaluation. Advised to report to the ED. Evaluated by podiatry and wound cleaned and dressed with subsequent improvement in symptoms. Denies fever, chills, syncope, cough, chest pain, SOB, nausea, vomiting. Pins and needles sensation more on the right and the left. Left leg also got more swollen after the event. But also has numbness and pins and needles sensation in the Right LE intermittently over the last several months."    Vascular surgery consulted for infected/non-healing left foot wound with history of PAD.    PAST MEDICAL & SURGICAL HISTORY:  Hypertension    CAD (coronary artery disease)    Cardiac arrest with successful resuscitation    CKD (chronic kidney disease)    PAD (peripheral artery disease)    Lung cancer    Heart failure    AICD (automatic cardioverter/defibrillator) present    S/P carotid endarterectomy    History of lobectomy of lung    History of appendectomy    H/O left knee surgery    History of tonsillectomy and adenoidectomy        MEDICATIONS  (STANDING):  amLODIPine   Tablet 5 milliGRAM(s) Oral at bedtime  aspirin enteric coated 81 milliGRAM(s) Oral daily  atorvastatin 20 milliGRAM(s) Oral at bedtime  cefTRIAXone Injectable. 1000 milliGRAM(s) IV Push every 24 hours  cefTRIAXone Injectable.      furosemide    Tablet 20 milliGRAM(s) Oral daily  lisinopril 20 milliGRAM(s) Oral every 12 hours  metoprolol tartrate 100 milliGRAM(s) Oral every 12 hours  multivitamin 1 Tablet(s) Oral daily  pantoprazole    Tablet 40 milliGRAM(s) Oral before breakfast  thiamine 100 milliGRAM(s) Oral daily  vancomycin  IVPB 1250 milliGRAM(s) IV Intermittent every 24 hours    MEDICATIONS  (PRN):  acetaminophen   Tablet .. 650 milliGRAM(s) Oral every 6 hours PRN Temp greater or equal to 38C (100.4F), Mild Pain (1 - 3)  aluminum hydroxide/magnesium hydroxide/simethicone Suspension 30 milliLiter(s) Oral every 4 hours PRN Dyspepsia  meclizine 25 milliGRAM(s) Oral every 6 hours PRN Dizziness  melatonin 3 milliGRAM(s) Oral at bedtime PRN Insomnia  ondansetron Injectable 4 milliGRAM(s) IV Push every 8 hours PRN Nausea and/or Vomiting      Allergies    No Known Allergies    Intolerances        SOCIAL HISTORY:    FAMILY HISTORY:          Physical Exam:  General: NAD, resting comfortably  HEENT: NC/AT, EOMI, normal hearing, no oral lesions, no LAD, neck supple  Pulmonary: normal resp effort, CTA-B  Cardiovascular: NSR, no murmurs  Abdominal: soft, ND/NT, no organomegaly  Extremities: WWP, normal strength, no clubbing/cyanosis/edema  Neuro: A/O x 3, CNs II-XII grossly intact, normal sensation, no focal deficits  Pulses: palpable distal pulses    Vital Signs Last 24 Hrs  T(C): 36.6 (19 Oct 2021 07:21), Max: 37.2 (18 Oct 2021 23:40)  T(F): 97.8 (19 Oct 2021 07:21), Max: 98.9 (18 Oct 2021 23:40)  HR: 61 (19 Oct 2021 07:21) (59 - 83)  BP: 122/54 (19 Oct 2021 07:21) (112/58 - 147/53)  BP(mean): 85 (18 Oct 2021 15:35) (73 - 85)  RR: 18 (19 Oct 2021 07:21) (14 - 18)  SpO2: 98% (19 Oct 2021 07:21) (96% - 100%)    I&O's Summary    18 Oct 2021 07:01  -  19 Oct 2021 07:00  --------------------------------------------------------  IN: 0 mL / OUT: 325 mL / NET: -325 mL            LABS:                        13.6   10.49 )-----------( 194      ( 19 Oct 2021 08:47 )             41.1     10    136  |  101  |  23  ----------------------------<  139<H>  4.0   |  29  |  1.28    Ca    9.8      19 Oct 2021 08:47  Mg     2.0     10-    TPro  8.3  /  Alb  4.3  /  TBili  0.7  /  DBili  x   /  AST  23  /  ALT  24  /  AlkPhos  88  10-18    PT/INR - ( 18 Oct 2021 14:00 )   PT: 12.1 sec;   INR: 1.05 ratio         PTT - ( 18 Oct 2021 14:00 )  PTT:33.2 sec  Urinalysis Basic - ( 18 Oct 2021 15:30 )    Color: Yellow / Appearance: Clear / S.005 / pH: x  Gluc: x / Ketone: Negative  / Bili: Negative / Urobili: Negative mg/dL   Blood: x / Protein: Negative mg/dL / Nitrite: Negative   Leuk Esterase: Negative / RBC: x / WBC x   Sq Epi: x / Non Sq Epi: x / Bacteria: x      CAPILLARY BLOOD GLUCOSE        LIVER FUNCTIONS - ( 18 Oct 2021 14:00 )  Alb: 4.3 g/dL / Pro: 8.3 gm/dL / ALK PHOS: 88 U/L / ALT: 24 U/L / AST: 23 U/L / GGT: x                 RADIOLOGY & ADDITIONAL STUDIES:      EXAM:  US DPLX LWR EXT ARTS COMPL BI                            PROCEDURE DATE:  10/18/2021          INTERPRETATION:  US LOWER EXTREMITY ARTERIAL DUPLEX COMPLETE BILATERAL    HISTORY:  Peripheral vascular disease. Left lower extremity infection.    Real-time sonography of the bilateral lower extremity arterial system was performed using a high-resolution linear array transducer and including color and spectral Doppler.    Extensive plaque in the right external iliac artery with associated narrowing. Elevated peak systolic velocities measuring up to 214 cm/s. Blunted monophasic waveforms within the remainder of the right lower extreme arteries. Findings likely represent a hemodynamically significant stenosis in the right external iliac artery.    Moderate plaque in the left external iliac artery with elevated peak systolic velocities measuring up to 219 cm/s.    No soft tissue abnormalities are demonstrated in either leg. Flow phase patterns and peak systolic velocity measurements (in cm/s) were observed as follows:    RIGHT:  CFA:  Monophasic; 44; blunted waveform  Proximal SFA: Monophasic; 45; blunted waveform  Mid SFA:Monophasic; 44; blunted waveform  Distal SFA:  Monophasic;  35; blunted waveform  Popliteal:  Monophasic;  50; blunted waveform  Anterior tibial :  Monophasic; 17; blunted waveform  Posterior tibial: Monophasic; 16; blunted waveform  Peroneal:  Monophasic;  31; blunted waveform  Dorsalis pedis: Monophasic;  11    LEFT:  CFA:  Biphasic; 116  Proximal SFA: Monophasic; 81  Mid SFA:Monophasic; 137  Distal SFA:  Monophasic; 107  Popliteal:  Monophasic;  266; extensive plaque  Anterior tibial :  Monophasic; 62  Posterior tibial: Monophasic; 111  Peroneal: Not seen  Dorsalis pedis:  Monophasic;  72    IMPRESSION:    Findings suggestive of a hemodynamically significant stenosis in the right external iliac artery with blunted monophasic waveforms throughout the remainder of the visualized right lower artery arteries, suspicious for a high-grade stenosis.    Moderate plaque in the left external iliac artery with an associated elevated peak systolic velocity consistent with a stenosis.    Elevated peak systolic velocity in the left popliteal artery with associated plaque, likely a hemodynamically significant stenosis.    A CTA of the aorta with a lower extremity runoff could be obtained for further evaluation if warranted.    --- End of Report ---            EULALIA HALLMAN MD; Attending Radiologist  This document has been electronically signed. Oct 18 95675:19PM   HPI: "86M with PMH of CAD s/p CABG, Cardiac Arrest, CHF, HTN, AICD, 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 2 days ago patient was walking and the toe left toes curled inward when he stumbled resulting in pain in the bone and abrasion of both great and second toe on the plantar surface. Pain and erythema worsened and went to PMD for further evaluation. Advised to report to the ED. Evaluated by podiatry and wound cleaned and dressed with subsequent improvement in symptoms. Denies fever, chills, syncope, cough, chest pain, SOB, nausea, vomiting. Pins and needles sensation more on the right and the left. Left leg also got more swollen after the event. But also has numbness and pins and needles sensation in the Right LE intermittently over the last several months."    Vascular surgery consulted for infected left foot wound with history of PAD.    PAST MEDICAL & SURGICAL HISTORY:  Hypertension    CAD (coronary artery disease)    Cardiac arrest with successful resuscitation    CKD (chronic kidney disease)    PAD (peripheral artery disease)    Lung cancer    Heart failure    AICD (automatic cardioverter/defibrillator) present    S/P carotid endarterectomy    History of lobectomy of lung    History of appendectomy    H/O left knee surgery    History of tonsillectomy and adenoidectomy        MEDICATIONS  (STANDING):  amLODIPine   Tablet 5 milliGRAM(s) Oral at bedtime  aspirin enteric coated 81 milliGRAM(s) Oral daily  atorvastatin 20 milliGRAM(s) Oral at bedtime  cefTRIAXone Injectable. 1000 milliGRAM(s) IV Push every 24 hours  cefTRIAXone Injectable.      furosemide    Tablet 20 milliGRAM(s) Oral daily  lisinopril 20 milliGRAM(s) Oral every 12 hours  metoprolol tartrate 100 milliGRAM(s) Oral every 12 hours  multivitamin 1 Tablet(s) Oral daily  pantoprazole    Tablet 40 milliGRAM(s) Oral before breakfast  thiamine 100 milliGRAM(s) Oral daily  vancomycin  IVPB 1250 milliGRAM(s) IV Intermittent every 24 hours    MEDICATIONS  (PRN):  acetaminophen   Tablet .. 650 milliGRAM(s) Oral every 6 hours PRN Temp greater or equal to 38C (100.4F), Mild Pain (1 - 3)  aluminum hydroxide/magnesium hydroxide/simethicone Suspension 30 milliLiter(s) Oral every 4 hours PRN Dyspepsia  meclizine 25 milliGRAM(s) Oral every 6 hours PRN Dizziness  melatonin 3 milliGRAM(s) Oral at bedtime PRN Insomnia  ondansetron Injectable 4 milliGRAM(s) IV Push every 8 hours PRN Nausea and/or Vomiting      Allergies    No Known Allergies    Intolerances        SOCIAL HISTORY:    FAMILY HISTORY:          Physical Exam:  General: NAD, resting comfortably  Pulmonary: normal resp effort, CTA-B  Cardiovascular: NSR, no murmurs  Abdominal: soft, ND/NT, no organomegaly  Extremities: skin changes consistent with venous insufficiency b/l, left leg edema up to knee with redness in dorsal foot, plantar 1st and 2nd toe linear open wound, normal LE strength, normal sensation, no clubbing/cyanosis  Pulses: faint palpable dorsalis pedis bilaterally    Vital Signs Last 24 Hrs  T(C): 36.6 (19 Oct 2021 07:21), Max: 37.2 (18 Oct 2021 23:40)  T(F): 97.8 (19 Oct 2021 07:21), Max: 98.9 (18 Oct 2021 23:40)  HR: 61 (19 Oct 2021 07:21) (59 - 83)  BP: 122/54 (19 Oct 2021 07:21) (112/58 - 147/53)  BP(mean): 85 (18 Oct 2021 15:35) (73 - 85)  RR: 18 (19 Oct 2021 07:21) (14 - 18)  SpO2: 98% (19 Oct 2021 07:21) (96% - 100%)    I&O's Summary    18 Oct 2021 07:01  -  19 Oct 2021 07:00  --------------------------------------------------------  IN: 0 mL / OUT: 325 mL / NET: -325 mL            LABS:                        13.6   10.49 )-----------( 194      ( 19 Oct 2021 08:47 )             41.1     10    136  |  101  |  23  ----------------------------<  139<H>  4.0   |  29  |  1.28    Ca    9.8      19 Oct 2021 08:47  Mg     2.0     10-    TPro  8.3  /  Alb  4.3  /  TBili  0.7  /  DBili  x   /  AST  23  /  ALT  24  /  AlkPhos  88  10-18    PT/INR - ( 18 Oct 2021 14:00 )   PT: 12.1 sec;   INR: 1.05 ratio         PTT - ( 18 Oct 2021 14:00 )  PTT:33.2 sec  Urinalysis Basic - ( 18 Oct 2021 15:30 )    Color: Yellow / Appearance: Clear / S.005 / pH: x  Gluc: x / Ketone: Negative  / Bili: Negative / Urobili: Negative mg/dL   Blood: x / Protein: Negative mg/dL / Nitrite: Negative   Leuk Esterase: Negative / RBC: x / WBC x   Sq Epi: x / Non Sq Epi: x / Bacteria: x      CAPILLARY BLOOD GLUCOSE        LIVER FUNCTIONS - ( 18 Oct 2021 14:00 )  Alb: 4.3 g/dL / Pro: 8.3 gm/dL / ALK PHOS: 88 U/L / ALT: 24 U/L / AST: 23 U/L / GGT: x                 RADIOLOGY & ADDITIONAL STUDIES:      EXAM:  US DPLX LWR EXT ARTS COMPL BI                            PROCEDURE DATE:  10/18/2021          INTERPRETATION:  US LOWER EXTREMITY ARTERIAL DUPLEX COMPLETE BILATERAL    HISTORY:  Peripheral vascular disease. Left lower extremity infection.    Real-time sonography of the bilateral lower extremity arterial system was performed using a high-resolution linear array transducer and including color and spectral Doppler.    Extensive plaque in the right external iliac artery with associated narrowing. Elevated peak systolic velocities measuring up to 214 cm/s. Blunted monophasic waveforms within the remainder of the right lower extreme arteries. Findings likely represent a hemodynamically significant stenosis in the right external iliac artery.    Moderate plaque in the left external iliac artery with elevated peak systolic velocities measuring up to 219 cm/s.    No soft tissue abnormalities are demonstrated in either leg. Flow phase patterns and peak systolic velocity measurements (in cm/s) were observed as follows:    RIGHT:  CFA:  Monophasic; 44; blunted waveform  Proximal SFA: Monophasic; 45; blunted waveform  Mid SFA:Monophasic; 44; blunted waveform  Distal SFA:  Monophasic;  35; blunted waveform  Popliteal:  Monophasic;  50; blunted waveform  Anterior tibial :  Monophasic; 17; blunted waveform  Posterior tibial: Monophasic; 16; blunted waveform  Peroneal:  Monophasic;  31; blunted waveform  Dorsalis pedis: Monophasic;  11    LEFT:  CFA:  Biphasic; 116  Proximal SFA: Monophasic; 81  Mid SFA:Monophasic; 137  Distal SFA:  Monophasic; 107  Popliteal:  Monophasic;  266; extensive plaque  Anterior tibial :  Monophasic; 62  Posterior tibial: Monophasic; 111  Peroneal: Not seen  Dorsalis pedis:  Monophasic;  72    IMPRESSION:    Findings suggestive of a hemodynamically significant stenosis in the right external iliac artery with blunted monophasic waveforms throughout the remainder of the visualized right lower artery arteries, suspicious for a high-grade stenosis.    Moderate plaque in the left external iliac artery with an associated elevated peak systolic velocity consistent with a stenosis.    Elevated peak systolic velocity in the left popliteal artery with associated plaque, likely a hemodynamically significant stenosis.    A CTA of the aorta with a lower extremity runoff could be obtained for further evaluation if warranted.    --- End of Report ---            EULALIA HALLMAN MD; Attending Radiologist  This document has been electronically signed. Oct 18 [id number]:19PM

## 2021-10-19 NOTE — PHYSICAL THERAPY INITIAL EVALUATION ADULT - ADDITIONAL COMMENTS
Lives in house with wife. Wife has aides to assist with her ADl's. Amb with [facility]. 1STE. Has a flight of stairs to bedroom and bathroom. Has stairlift.

## 2021-10-19 NOTE — PHYSICAL THERAPY INITIAL EVALUATION ADULT - DIAGNOSIS, PT EVAL
Skin and Soft Tissue Infection of the Left Foot: PVD with suspected Right EIA stenosis and Left EIA and Popliteal Stenosis

## 2021-10-19 NOTE — CONSULT NOTE ADULT - SUBJECTIVE AND OBJECTIVE BOX
Patient is a 86y old  Male who presents with a chief complaint of Left Foot Infection    HPI:  85 y/o Male with h/o CAD s/p CABG, prior cardiac arrest, CHF, HTN, AICD, Lung Ca s/p resection (reported to be in remission) was admitted on 10/18 with worsening erythema and pain to the first two toes and plantar surface of the foot x 2 days. Two days ago patient was walking and the toe left toes curled inward when he stumbled resulting in pain in the bone and abrasion of both great and second toe on the plantar surface. Pain and erythema worsened and went to PMD for further evaluation. Advised to report to the ED. No fever at home. In ER she received vancomycin IV and ceftriaxone.     PMH:  CAD s/p CABG, Cardiac Arrest, CHF, HTN, AICD, Lung Ca s/p Resection (reported to be in remission)  Surgical History: Left TKA, CABG, Lung mass/nodular resection    Meds: per reconciliation sheet, noted below  MEDICATIONS  (STANDING):  amLODIPine   Tablet 5 milliGRAM(s) Oral at bedtime  aspirin enteric coated 81 milliGRAM(s) Oral daily  atorvastatin 20 milliGRAM(s) Oral at bedtime  cefTRIAXone Injectable. 1000 milliGRAM(s) IV Push every 24 hours  cefTRIAXone Injectable.      furosemide    Tablet 20 milliGRAM(s) Oral daily  lisinopril 20 milliGRAM(s) Oral every 12 hours  metoprolol tartrate 100 milliGRAM(s) Oral every 12 hours  multivitamin 1 Tablet(s) Oral daily  pantoprazole    Tablet 40 milliGRAM(s) Oral before breakfast  thiamine 100 milliGRAM(s) Oral daily  vancomycin  IVPB 1250 milliGRAM(s) IV Intermittent every 24 hours    MEDICATIONS  (PRN):  acetaminophen   Tablet .. 650 milliGRAM(s) Oral every 6 hours PRN Temp greater or equal to 38C (100.4F), Mild Pain (1 - 3)  aluminum hydroxide/magnesium hydroxide/simethicone Suspension 30 milliLiter(s) Oral every 4 hours PRN Dyspepsia  meclizine 25 milliGRAM(s) Oral every 6 hours PRN Dizziness  melatonin 3 milliGRAM(s) Oral at bedtime PRN Insomnia  ondansetron Injectable 4 milliGRAM(s) IV Push every 8 hours PRN Nausea and/or Vomiting    Allergies    No Known Allergies    Intolerances      Social: Former Tobacco/smoking. Quit 20 years ago. Former 40 PPD smoker. Daily 1-2 beers. no illegal drugs; no recent travel, no exposure to TB  FAMILY HISTORY:    no history of premature cardiovascular disease in first degree relatives    ROS: the patient denies fever, no chills, no HA, no seizures, no dizziness, no sore throat, no nasal congestion, no blurry vision, no CP, no palpitations, no SOB, no cough, no abdominal pain, no diarrhea, no N/V, no dysuria, no leg pain, no claudication, has left foot pain and rash, no joint aches, no rectal pain or bleeding, no night sweats  All other systems reviewed and are negative    Vital Signs Last 24 Hrs  T(C): 36.6 (19 Oct 2021 07:21), Max: 37.2 (18 Oct 2021 23:40)  T(F): 97.8 (19 Oct 2021 07:21), Max: 98.9 (18 Oct 2021 23:40)  HR: 61 (19 Oct 2021 07:21) (59 - 83)  BP: 122/54 (19 Oct 2021 07:21) (112/58 - 147/53)  BP(mean): 85 (18 Oct 2021 15:35) (73 - 85)  RR: 18 (19 Oct 2021 07:21) (14 - 18)  SpO2: 98% (19 Oct 2021 07:21) (96% - 100%)  Daily Height in cm: 182.88 (18 Oct 2021 12:24)    Daily     PE:    Constitutional:  No acute distress  HEENT: NC/AT, EOMI, PERRLA, conjunctivae clear; ears and nose atraumatic; pharynx benign  Neck: supple; thyroid not palpable  Back: no tenderness  Respiratory: respiratory effort normal; clear to auscultation  Cardiovascular: S1S2 regular, no murmurs  Abdomen: soft, not tender, not distended, positive BS; no liver or spleen organomegaly  Genitourinary: no suprapubic tenderness  Lymphatic: no LN palpable  Musculoskeletal: no muscle tenderness, no joint swelling or tenderness  Extremities: no pedal edema  Marked superficial varicosities to both LEs.  Left foot erythema and pain to the first two toes and plantar surface of the foot   Lt 1st interdigital space, with swelling, and streaking erythema to the Lt foot extending proximally to the Lt leg   Mild superficial abrasions to the plantar aspect of the Lt hallux and the Lt 2nd toes, no drainage  Neurological/ Psychiatric: AxOx3, judgement and insight normal; moving all extremities  Skin: no rashes; no palpable lesions    Labs: all available labs reviewed                        13.6   10.49 )-----------( 194      ( 19 Oct 2021 08:47 )             41.1     10    136  |  101  |  23  ----------------------------<  139<H>  4.0   |  29  |  1.28    Ca    9.8      19 Oct 2021 08:47  Mg     2.0     10-19    TPro  8.3  /  Alb  4.3  /  TBili  0.7  /  DBili  x   /  AST  23  /  ALT  24  /  AlkPhos  88  10-18     LIVER FUNCTIONS - ( 18 Oct 2021 14:00 )  Alb: 4.3 g/dL / Pro: 8.3 gm/dL / ALK PHOS: 88 U/L / ALT: 24 U/L / AST: 23 U/L / GGT: x           Urinalysis Basic - ( 18 Oct 2021 15:30 )    Color: Yellow / Appearance: Clear / S.005 / pH: x  Gluc: x / Ketone: Negative  / Bili: Negative / Urobili: Negative mg/dL   Blood: x / Protein: Negative mg/dL / Nitrite: Negative   Leuk Esterase: Negative / RBC: x / WBC x   Sq Epi: x / Non Sq Epi: x / Bacteria: x    COVID-19 PCR: NotDetec (10-18-21 @ 14:00)    Radiology: all available radiological tests reviewed    < from: US Duplex Venous Lower Ext Complete, Bilateral (10.18.21 @ 20:22) >  No evidence of deep venous thrombosis in either lower extremity.  < end of copied text >    < from: US Duplex Arterial Lower Ext Compl, Bilateral (10.18.21 @ 20:28) >  Findings suggestive of a hemodynamically significant stenosis in the right external iliac artery with blunted monophasic waveforms throughout the remainder of the visualized right lower artery arteries, suspicious for a high-grade stenosis.  Moderate plaque in the left external iliac artery with an associated elevated peak systolic velocity consistent with a stenosis.  Elevated peak systolic velocity in the left popliteal artery with associated plaque, likely a hemodynamically significant stenosis.  A CTA of the aorta with a lower extremity runoff could be obtained for further evaluation if warranted.  < end of copied text >    Advanced directives addressed: full resuscitation Patient is a 86y old  Male who presents with a chief complaint of Left Foot Infection    HPI:  87 y/o Male with h/o CAD s/p CABG, prior cardiac arrest, CHF, HTN, AICD, Lung Ca s/p resection (reported to be in remission) was admitted on 10/18 with worsening erythema and pain to the first two toes and plantar surface of the foot x 2 days. Two days ago patient was walking and the toe left toes curled inward when he stumbled resulting in pain in the bone and abrasion of both great and second toe on the plantar surface. Pain and erythema worsened and went to PMD for further evaluation. Advised to report to the ED. No fever at home. In ER she received vancomycin IV and ceftriaxone.     PMH:  CAD s/p CABG, Cardiac Arrest, CHF, HTN, AICD, Lung Ca s/p Resection (reported to be in remission)  Surgical History: Left TKA, CABG, Lung mass/nodular resection    Meds: per reconciliation sheet, noted below  MEDICATIONS  (STANDING):  amLODIPine   Tablet 5 milliGRAM(s) Oral at bedtime  aspirin enteric coated 81 milliGRAM(s) Oral daily  atorvastatin 20 milliGRAM(s) Oral at bedtime  cefTRIAXone Injectable. 1000 milliGRAM(s) IV Push every 24 hours  cefTRIAXone Injectable.      furosemide    Tablet 20 milliGRAM(s) Oral daily  lisinopril 20 milliGRAM(s) Oral every 12 hours  metoprolol tartrate 100 milliGRAM(s) Oral every 12 hours  multivitamin 1 Tablet(s) Oral daily  pantoprazole    Tablet 40 milliGRAM(s) Oral before breakfast  thiamine 100 milliGRAM(s) Oral daily  vancomycin  IVPB 1250 milliGRAM(s) IV Intermittent every 24 hours    MEDICATIONS  (PRN):  acetaminophen   Tablet .. 650 milliGRAM(s) Oral every 6 hours PRN Temp greater or equal to 38C (100.4F), Mild Pain (1 - 3)  aluminum hydroxide/magnesium hydroxide/simethicone Suspension 30 milliLiter(s) Oral every 4 hours PRN Dyspepsia  meclizine 25 milliGRAM(s) Oral every 6 hours PRN Dizziness  melatonin 3 milliGRAM(s) Oral at bedtime PRN Insomnia  ondansetron Injectable 4 milliGRAM(s) IV Push every 8 hours PRN Nausea and/or Vomiting    Allergies    No Known Allergies    Intolerances      Social: Former Tobacco/smoking. Quit 20 years ago. Former 40 PPD smoker. Daily 1-2 beers. no illegal drugs; no recent travel, no exposure to TB  FAMILY HISTORY:    no history of premature cardiovascular disease in first degree relatives    ROS: the patient denies fever, no chills, no HA, no seizures, no dizziness, no sore throat, no nasal congestion, no blurry vision, no CP, no palpitations, no SOB, no cough, no abdominal pain, no diarrhea, no N/V, no dysuria, no leg pain, no claudication, has left foot pain and rash, no joint aches, no rectal pain or bleeding, no night sweats  All other systems reviewed and are negative    Vital Signs Last 24 Hrs  T(C): 36.6 (19 Oct 2021 07:21), Max: 37.2 (18 Oct 2021 23:40)  T(F): 97.8 (19 Oct 2021 07:21), Max: 98.9 (18 Oct 2021 23:40)  HR: 61 (19 Oct 2021 07:21) (59 - 83)  BP: 122/54 (19 Oct 2021 07:21) (112/58 - 147/53)  BP(mean): 85 (18 Oct 2021 15:35) (73 - 85)  RR: 18 (19 Oct 2021 07:21) (14 - 18)  SpO2: 98% (19 Oct 2021 07:21) (96% - 100%)  Daily Height in cm: 182.88 (18 Oct 2021 12:24)    Daily     PE:    Constitutional:  No acute distress  HEENT: NC/AT, EOMI, PERRLA, conjunctivae clear; ears and nose atraumatic; pharynx benign  Neck: supple; thyroid not palpable  Back: no tenderness  Respiratory: respiratory effort normal; clear to auscultation  Cardiovascular: S1S2 regular, no murmurs  Abdomen: soft, not tender, not distended, positive BS; no liver or spleen organomegaly  Genitourinary: no suprapubic tenderness  Lymphatic: no LN palpable  Musculoskeletal: no muscle tenderness, no joint swelling or tenderness  Extremities: no pedal edema  Marked superficial varicosities to both LEs.  Left foot erythema and pain to the first two toes and plantar surface of the foot   Lt 1st interdigital space, with swelling, and erythema to the Lt foot   Hasl left ankle and left lower extremity erythema and edema   Mild superficial abrasions to the plantar aspect of the Lt hallux and the Lt 2nd toes, no drainage  Neurological/ Psychiatric: AxOx3, judgement and insight normal; moving all extremities  Skin: no rashes; no palpable lesions    Labs: all available labs reviewed                        13.6   10.49 )-----------( 194      ( 19 Oct 2021 08:47 )             41.1     10    136  |  101  |  23  ----------------------------<  139<H>  4.0   |  29  |  1.28    Ca    9.8      19 Oct 2021 08:47  Mg     2.0     10-19    TPro  8.3  /  Alb  4.3  /  TBili  0.7  /  DBili  x   /  AST  23  /  ALT  24  /  AlkPhos  88  10-18     LIVER FUNCTIONS - ( 18 Oct 2021 14:00 )  Alb: 4.3 g/dL / Pro: 8.3 gm/dL / ALK PHOS: 88 U/L / ALT: 24 U/L / AST: 23 U/L / GGT: x           Urinalysis Basic - ( 18 Oct 2021 15:30 )    Color: Yellow / Appearance: Clear / S.005 / pH: x  Gluc: x / Ketone: Negative  / Bili: Negative / Urobili: Negative mg/dL   Blood: x / Protein: Negative mg/dL / Nitrite: Negative   Leuk Esterase: Negative / RBC: x / WBC x   Sq Epi: x / Non Sq Epi: x / Bacteria: x    COVID-19 PCR: NotDetec (10-18-21 @ 14:00)    Radiology: all available radiological tests reviewed    < from: US Duplex Venous Lower Ext Complete, Bilateral (10.18.21 @ 20:22) >  No evidence of deep venous thrombosis in either lower extremity.  < end of copied text >    < from: US Duplex Arterial Lower Ext Compl, Bilateral (10.18.21 @ 20:28) >  Findings suggestive of a hemodynamically significant stenosis in the right external iliac artery with blunted monophasic waveforms throughout the remainder of the visualized right lower artery arteries, suspicious for a high-grade stenosis.  Moderate plaque in the left external iliac artery with an associated elevated peak systolic velocity consistent with a stenosis.  Elevated peak systolic velocity in the left popliteal artery with associated plaque, likely a hemodynamically significant stenosis.  A CTA of the aorta with a lower extremity runoff could be obtained for further evaluation if warranted.  < end of copied text >    Advanced directives addressed: full resuscitation

## 2021-10-19 NOTE — CONSULT NOTE ADULT - ASSESSMENT
86M with with multiple vascular comorbidities is admitted with an infected/non-healing left foot wound with history of PAD.    Arterial duplex: Left popliteal artery hemodynamically significant stenosis. Suspicion for high grade stenosis in Rt Ileac artery, moderate stenosis in Lt Ileac artery.    Plan:  -   -   -   -   -     Discussed with Dr. Lennon 86M with with multiple vascular comorbidities is admitted with an infected/non-healing left foot wound with history of PAD.    Arterial duplex: Left popliteal artery hemodynamically significant stenosis. Suspicion for high grade stenosis in Rt Ileac artery, moderate stenosis in Lt Ileac artery.    Plan:  - obtain CTA abdomen with LE runoff to r/o embolic source  - continue ASA, Atorvastatin  - wound care per podiatry  - monitor for signs of ischemia  - vascular surgery will follow    Discussed with Dr. Lennon 86M with with multiple vascular comorbidities is admitted with an infected/non-healing left foot wound with history of PAD.    Arterial duplex: Left popliteal artery hemodynamically significant stenosis. Suspicion for high grade stenosis in Rt Ileac artery, moderate stenosis in Lt Ileac artery.    Plan:  - when OLIVIA resolves obtain CTA abdomen with LE runoff to r/o embolic source  - continue ASA, Atorvastatin  - wound care per podiatry  - monitor for signs of ischemia  - vascular surgery will follow    Discussed with Dr. Lennon

## 2021-10-19 NOTE — PHYSICAL THERAPY INITIAL EVALUATION ADULT - GENERAL OBSERVATIONS, REHAB EVAL
Pre and post session: supine in bed with bed alarm on. Call bell and phone in reach. L foot/toes bandaged. No c/o pain. Requested stay in bed after session due to wanting to elevate LE for comfort. Tolerated well and would benefit from further skilled PT for functional mobility training.

## 2021-10-19 NOTE — PHYSICAL THERAPY INITIAL EVALUATION ADULT - PERTINENT HX OF CURRENT PROBLEM, REHAB EVAL
presents with worsening erythema and pain to the first two toes and plantar surface of the foot x 2 days. 2 days ago patient was walking and the toe left toes curled inward when he stumbled resulting in pain in the bone and abrasion of both great and second toe on the plantar surface. Pain and erythema worsened and went to PMD for further evaluation. Advised to report to the ED. Evaluated by podiatry and wound cleaned and dressed with subsequent improvement in symptoms.

## 2021-10-19 NOTE — CONSULT NOTE ADULT - ASSESSMENT
85 y/o Male with h/o CAD s/p CABG, prior cardiac arrest, CHF, HTN, AICD, Lung Ca s/p resection (reported to be in remission) was admitted on 10/18 with worsening erythema and pain to the first two toes and plantar surface of the foot x 2 days. Two days ago patient was walking and the toe left toes curled inward when he stumbled resulting in pain in the bone and abrasion of both great and second toe on the plantar surface. Pain and erythema worsened and went to PMD for further evaluation. Advised to report to the ED. No fever at home. In ER she received vancomycin IV and ceftriaxone.     1. Left foot  and left leg cellulitis with lymphangitis. PVD. CRF stage 3.   -obtain BC x 2  -agree with ceftriaxone 1 gm IV qd and vancomycin 1250 mg IV qd  -reason for abx use and side effects reviewed with patient; monitor BMP and vancomycin trough levels   -podiatry evaluation appreciated  -old chart reviewed to assess prior cultures  -monitor temps  -f/u CBC  -supportive care  2. Other issues:   -care per medicine       85 y/o Male with h/o CAD s/p CABG, prior cardiac arrest, CHF, HTN, AICD, Lung Ca s/p resection (reported to be in remission) was admitted on 10/18 with worsening erythema and pain to the first two toes and plantar surface of the foot x 2 days. Two days ago patient was walking and the toe left toes curled inward when he stumbled resulting in pain in the bone and abrasion of both great and second toe on the plantar surface. Pain and erythema worsened and went to PMD for further evaluation. Advised to report to the ED. No fever at home. In ER she received vancomycin IV and ceftriaxone.     1. Left foot  and left leg cellulitis with lymphangitis. PVD. CRF stage 3.   -obtain BC x 2  -agree with ceftriaxone 1 gm IV qd and vancomycin 1250 mg IV qd  -reason for abx use and side effects reviewed with patient; monitor BMP and vancomycin trough levels   -podiatry evaluation appreciated  -old chart reviewed to assess prior cultures  -vascular evaluation  -monitor temps  -f/u CBC  -supportive care  2. Other issues:   -care per medicine

## 2021-10-20 LAB
ANION GAP SERPL CALC-SCNC: 5 MMOL/L — SIGNIFICANT CHANGE UP (ref 5–17)
BUN SERPL-MCNC: 27 MG/DL — HIGH (ref 7–23)
CALCIUM SERPL-MCNC: 9.7 MG/DL — SIGNIFICANT CHANGE UP (ref 8.5–10.1)
CHLORIDE SERPL-SCNC: 101 MMOL/L — SIGNIFICANT CHANGE UP (ref 96–108)
CO2 SERPL-SCNC: 29 MMOL/L — SIGNIFICANT CHANGE UP (ref 22–31)
CREAT SERPL-MCNC: 1.31 MG/DL — HIGH (ref 0.5–1.3)
GLUCOSE SERPL-MCNC: 133 MG/DL — HIGH (ref 70–99)
POTASSIUM SERPL-MCNC: 3.9 MMOL/L — SIGNIFICANT CHANGE UP (ref 3.5–5.3)
POTASSIUM SERPL-SCNC: 3.9 MMOL/L — SIGNIFICANT CHANGE UP (ref 3.5–5.3)
SODIUM SERPL-SCNC: 135 MMOL/L — SIGNIFICANT CHANGE UP (ref 135–145)
VANCOMYCIN TROUGH SERPL-MCNC: 11 UG/ML — SIGNIFICANT CHANGE UP (ref 10–20)
VANCOMYCIN TROUGH SERPL-MCNC: 12.5 UG/ML — SIGNIFICANT CHANGE UP (ref 10–20)

## 2021-10-20 PROCEDURE — 99232 SBSQ HOSP IP/OBS MODERATE 35: CPT

## 2021-10-20 RX ORDER — SODIUM CHLORIDE 9 MG/ML
1000 INJECTION INTRAMUSCULAR; INTRAVENOUS; SUBCUTANEOUS
Refills: 0 | Status: DISCONTINUED | OUTPATIENT
Start: 2021-10-20 | End: 2021-10-23

## 2021-10-20 RX ADMIN — ATORVASTATIN CALCIUM 20 MILLIGRAM(S): 80 TABLET, FILM COATED ORAL at 22:26

## 2021-10-20 RX ADMIN — PANTOPRAZOLE SODIUM 40 MILLIGRAM(S): 20 TABLET, DELAYED RELEASE ORAL at 05:51

## 2021-10-20 RX ADMIN — Medication 166.67 MILLIGRAM(S): at 15:22

## 2021-10-20 RX ADMIN — Medication 100 MILLIGRAM(S): at 22:26

## 2021-10-20 RX ADMIN — ENOXAPARIN SODIUM 40 MILLIGRAM(S): 100 INJECTION SUBCUTANEOUS at 10:37

## 2021-10-20 RX ADMIN — SODIUM CHLORIDE 83 MILLILITER(S): 9 INJECTION INTRAMUSCULAR; INTRAVENOUS; SUBCUTANEOUS at 17:14

## 2021-10-20 RX ADMIN — Medication 1 TABLET(S): at 10:37

## 2021-10-20 RX ADMIN — AMLODIPINE BESYLATE 5 MILLIGRAM(S): 2.5 TABLET ORAL at 22:26

## 2021-10-20 RX ADMIN — CEFTRIAXONE 1000 MILLIGRAM(S): 500 INJECTION, POWDER, FOR SOLUTION INTRAMUSCULAR; INTRAVENOUS at 17:17

## 2021-10-20 RX ADMIN — Medication 100 MILLIGRAM(S): at 10:37

## 2021-10-20 RX ADMIN — Medication 81 MILLIGRAM(S): at 10:37

## 2021-10-20 RX ADMIN — LISINOPRIL 20 MILLIGRAM(S): 2.5 TABLET ORAL at 22:26

## 2021-10-20 NOTE — PROGRESS NOTE ADULT - NUTRITIONAL ASSESSMENT
86M with with multiple vascular comorbidities is admitted with an infected/non-healing left foot wound with history of PAD.    Arterial duplex: Left popliteal artery hemodynamically significant stenosis. Suspicion for high grade stenosis in Rt Ileac artery, moderate stenosis in Lt Ileac artery.    Plan:  - when OLIVIA resolves obtain CTA abdomen with LE runoff to r/o embolic source  - continue ASA, Atorvastatin  - wound care per podiatry  - monitor for signs of ischemia  - vascular surgery will follow    Discussed with Dr. Lennon

## 2021-10-21 LAB
ANION GAP SERPL CALC-SCNC: 5 MMOL/L — SIGNIFICANT CHANGE UP (ref 5–17)
BUN SERPL-MCNC: 29 MG/DL — HIGH (ref 7–23)
CALCIUM SERPL-MCNC: 9.3 MG/DL — SIGNIFICANT CHANGE UP (ref 8.5–10.1)
CHLORIDE SERPL-SCNC: 105 MMOL/L — SIGNIFICANT CHANGE UP (ref 96–108)
CO2 SERPL-SCNC: 25 MMOL/L — SIGNIFICANT CHANGE UP (ref 22–31)
CREAT SERPL-MCNC: 1.35 MG/DL — HIGH (ref 0.5–1.3)
GLUCOSE SERPL-MCNC: 125 MG/DL — HIGH (ref 70–99)
POTASSIUM SERPL-MCNC: 4.1 MMOL/L — SIGNIFICANT CHANGE UP (ref 3.5–5.3)
POTASSIUM SERPL-SCNC: 4.1 MMOL/L — SIGNIFICANT CHANGE UP (ref 3.5–5.3)
SODIUM SERPL-SCNC: 135 MMOL/L — SIGNIFICANT CHANGE UP (ref 135–145)

## 2021-10-21 PROCEDURE — 99232 SBSQ HOSP IP/OBS MODERATE 35: CPT

## 2021-10-21 RX ADMIN — Medication 100 MILLIGRAM(S): at 09:37

## 2021-10-21 RX ADMIN — PANTOPRAZOLE SODIUM 40 MILLIGRAM(S): 20 TABLET, DELAYED RELEASE ORAL at 06:00

## 2021-10-21 RX ADMIN — Medication 166.67 MILLIGRAM(S): at 15:02

## 2021-10-21 RX ADMIN — ENOXAPARIN SODIUM 40 MILLIGRAM(S): 100 INJECTION SUBCUTANEOUS at 09:37

## 2021-10-21 RX ADMIN — CEFTRIAXONE 1000 MILLIGRAM(S): 500 INJECTION, POWDER, FOR SOLUTION INTRAMUSCULAR; INTRAVENOUS at 19:00

## 2021-10-21 RX ADMIN — SODIUM CHLORIDE 83 MILLILITER(S): 9 INJECTION INTRAMUSCULAR; INTRAVENOUS; SUBCUTANEOUS at 05:58

## 2021-10-21 RX ADMIN — Medication 100 MILLIGRAM(S): at 09:40

## 2021-10-21 RX ADMIN — Medication 3 MILLIGRAM(S): at 21:57

## 2021-10-21 RX ADMIN — Medication 81 MILLIGRAM(S): at 09:39

## 2021-10-21 RX ADMIN — Medication 20 MILLIGRAM(S): at 09:38

## 2021-10-21 RX ADMIN — Medication 1 TABLET(S): at 09:39

## 2021-10-21 RX ADMIN — LISINOPRIL 20 MILLIGRAM(S): 2.5 TABLET ORAL at 09:39

## 2021-10-21 RX ADMIN — ATORVASTATIN CALCIUM 20 MILLIGRAM(S): 80 TABLET, FILM COATED ORAL at 21:56

## 2021-10-21 RX ADMIN — Medication 3 MILLIGRAM(S): at 01:58

## 2021-10-22 PROCEDURE — 99232 SBSQ HOSP IP/OBS MODERATE 35: CPT

## 2021-10-22 RX ADMIN — Medication 166.67 MILLIGRAM(S): at 14:00

## 2021-10-22 RX ADMIN — Medication 3 MILLIGRAM(S): at 21:14

## 2021-10-22 RX ADMIN — Medication 20 MILLIGRAM(S): at 09:20

## 2021-10-22 RX ADMIN — AMLODIPINE BESYLATE 5 MILLIGRAM(S): 2.5 TABLET ORAL at 21:14

## 2021-10-22 RX ADMIN — Medication 100 MILLIGRAM(S): at 09:20

## 2021-10-22 RX ADMIN — LISINOPRIL 20 MILLIGRAM(S): 2.5 TABLET ORAL at 21:14

## 2021-10-22 RX ADMIN — Medication 81 MILLIGRAM(S): at 09:21

## 2021-10-22 RX ADMIN — ENOXAPARIN SODIUM 40 MILLIGRAM(S): 100 INJECTION SUBCUTANEOUS at 09:20

## 2021-10-22 RX ADMIN — PANTOPRAZOLE SODIUM 40 MILLIGRAM(S): 20 TABLET, DELAYED RELEASE ORAL at 09:20

## 2021-10-22 RX ADMIN — Medication 100 MILLIGRAM(S): at 21:14

## 2021-10-22 RX ADMIN — LISINOPRIL 20 MILLIGRAM(S): 2.5 TABLET ORAL at 09:21

## 2021-10-22 RX ADMIN — SODIUM CHLORIDE 83 MILLILITER(S): 9 INJECTION INTRAMUSCULAR; INTRAVENOUS; SUBCUTANEOUS at 19:20

## 2021-10-22 RX ADMIN — CEFTRIAXONE 1000 MILLIGRAM(S): 500 INJECTION, POWDER, FOR SOLUTION INTRAMUSCULAR; INTRAVENOUS at 17:15

## 2021-10-22 RX ADMIN — Medication 1 TABLET(S): at 09:20

## 2021-10-22 RX ADMIN — ATORVASTATIN CALCIUM 20 MILLIGRAM(S): 80 TABLET, FILM COATED ORAL at 21:14

## 2021-10-23 ENCOUNTER — TRANSCRIPTION ENCOUNTER (OUTPATIENT)
Age: 86
End: 2021-10-23

## 2021-10-23 VITALS
SYSTOLIC BLOOD PRESSURE: 134 MMHG | OXYGEN SATURATION: 99 % | DIASTOLIC BLOOD PRESSURE: 59 MMHG | RESPIRATION RATE: 17 BRPM | TEMPERATURE: 98 F | HEART RATE: 61 BPM

## 2021-10-23 LAB
CULTURE RESULTS: SIGNIFICANT CHANGE UP
CULTURE RESULTS: SIGNIFICANT CHANGE UP
SPECIMEN SOURCE: SIGNIFICANT CHANGE UP
SPECIMEN SOURCE: SIGNIFICANT CHANGE UP

## 2021-10-23 PROCEDURE — 99239 HOSP IP/OBS DSCHRG MGMT >30: CPT

## 2021-10-23 RX ADMIN — ENOXAPARIN SODIUM 40 MILLIGRAM(S): 100 INJECTION SUBCUTANEOUS at 09:35

## 2021-10-23 RX ADMIN — Medication 1 TABLET(S): at 09:35

## 2021-10-23 RX ADMIN — Medication 100 MILLIGRAM(S): at 09:36

## 2021-10-23 RX ADMIN — PANTOPRAZOLE SODIUM 40 MILLIGRAM(S): 20 TABLET, DELAYED RELEASE ORAL at 09:36

## 2021-10-23 RX ADMIN — SODIUM CHLORIDE 83 MILLILITER(S): 9 INJECTION INTRAMUSCULAR; INTRAVENOUS; SUBCUTANEOUS at 09:34

## 2021-10-23 RX ADMIN — Medication 20 MILLIGRAM(S): at 09:35

## 2021-10-23 RX ADMIN — LISINOPRIL 20 MILLIGRAM(S): 2.5 TABLET ORAL at 09:35

## 2021-10-23 RX ADMIN — Medication 81 MILLIGRAM(S): at 09:35

## 2021-10-23 RX ADMIN — Medication 100 MILLIGRAM(S): at 09:35

## 2021-10-23 NOTE — DISCHARGE NOTE PROVIDER - NSDCCPCAREPLAN_GEN_ALL_CORE_FT
PRINCIPAL DISCHARGE DIAGNOSIS  Diagnosis: Cellulitis of left foot  Assessment and Plan of Treatment:

## 2021-10-23 NOTE — PROGRESS NOTE ADULT - SUBJECTIVE AND OBJECTIVE BOX
Date of service: 10-21-21 @ 09:26    Lying in bed in NAD  Has left ankle swelling and warmth  Has local tenderness    ROS: no fever or chills; denies dizziness, no HA, no SOB or cough, no abdominal pain, no diarrhea or constipation; no dysuria    MEDICATIONS  (STANDING):  amLODIPine   Tablet 5 milliGRAM(s) Oral at bedtime  aspirin enteric coated 81 milliGRAM(s) Oral daily  atorvastatin 20 milliGRAM(s) Oral at bedtime  cefTRIAXone Injectable. 1000 milliGRAM(s) IV Push every 24 hours  cefTRIAXone Injectable.      enoxaparin Injectable 40 milliGRAM(s) SubCutaneous daily  furosemide    Tablet 20 milliGRAM(s) Oral daily  lisinopril 20 milliGRAM(s) Oral every 12 hours  metoprolol tartrate 100 milliGRAM(s) Oral every 12 hours  multivitamin 1 Tablet(s) Oral daily  pantoprazole    Tablet 40 milliGRAM(s) Oral before breakfast  sodium chloride 0.9%. 1000 milliLiter(s) (83 mL/Hr) IV Continuous <Continuous>  thiamine 100 milliGRAM(s) Oral daily  vancomycin  IVPB 1250 milliGRAM(s) IV Intermittent every 24 hours    Vital Signs Last 24 Hrs  T(C): 36.7 (21 Oct 2021 07:43), Max: 37 (20 Oct 2021 22:15)  T(F): 98 (21 Oct 2021 07:43), Max: 98.6 (20 Oct 2021 22:15)  HR: 61 (21 Oct 2021 07:43) (61 - 66)  BP: 113/56 (21 Oct 2021 07:43) (101/54 - 131/56)  BP(mean): --  RR: 17 (21 Oct 2021 07:43) (17 - 18)  SpO2: 98% (21 Oct 2021 07:43) (94% - 98%)     Physical exam:    Constitutional:  No acute distress  HEENT: NC/AT, EOMI, PERRLA, conjunctivae clear; ears and nose atraumatic  Neck: supple; thyroid not palpable  Back: no tenderness  Respiratory: respiratory effort normal; clear to auscultation  Cardiovascular: S1S2 regular, no murmurs  Abdomen: soft, not tender, not distended, positive BS  Genitourinary: no suprapubic tenderness  Lymphatic: no LN palpable  Musculoskeletal: no muscle tenderness, no joint swelling or tenderness  Extremities: no pedal edema  Marked superficial varicosities to both LEs.  Left foot erythema and pain to the first two toes and plantar surface of the foot   Lt 1st interdigital space, with swelling, and erythema to the Lt foot   Has left ankle and left lower extremity erythema and edema - improving slowly  Mild superficial abrasions to the plantar aspect of the Lt hallux and the Lt 2nd toes, no drainage  Neurological/ Psychiatric: AxOx3, judgement and insight normal; moving all extremities  Skin: no rashes; no palpable lesions    Labs: reviewed    10-21    135  |  105  |  29<H>  ----------------------------<  125<H>  4.1   |  25  |  1.35<H>    Ca    9.3      21 Oct 2021 08:20    Vancomycin Level, Trough: 11.0 ug/mL (10-20 @ 14:09)  Vancomycin Level, Trough: 12.5 ug/mL (10-20 @ 08:54)                        13.6   10.49 )-----------( 194      ( 19 Oct 2021 08:47 )             41.1     10-19    136  |  101  |  23  ----------------------------<  139<H>  4.0   |  29  |  1.28    Ca    9.8      19 Oct 2021 08:47  Mg     2.0     10-19    TPro  8.3  /  Alb  4.3  /  TBili  0.7  /  DBili  x   /  AST  23  /  ALT  24  /  AlkPhos  88  10     LIVER FUNCTIONS - ( 18 Oct 2021 14:00 )  Alb: 4.3 g/dL / Pro: 8.3 gm/dL / ALK PHOS: 88 U/L / ALT: 24 U/L / AST: 23 U/L / GGT: x           Urinalysis Basic - ( 18 Oct 2021 15:30 )    Color: Yellow / Appearance: Clear / S.005 / pH: x  Gluc: x / Ketone: Negative  / Bili: Negative / Urobili: Negative mg/dL   Blood: x / Protein: Negative mg/dL / Nitrite: Negative   Leuk Esterase: Negative / RBC: x / WBC x   Sq Epi: x / Non Sq Epi: x / Bacteria: x    COVID-19 PCR: NotDetec (10-18-21 @ 14:00)      Culture - Urine (collected 18 Oct 2021 15:30)  Source: Clean Catch None  Final Report (19 Oct 2021 20:03):    No growth    Culture - Blood (collected 18 Oct 2021 13:55)  Source: .Blood Blood-Peripheral  Preliminary Report (19 Oct 2021 19:01):    No growth to date.    Culture - Blood (collected 18 Oct 2021 13:55)  Source: .Blood Blood-Peripheral  Preliminary Report (19 Oct 2021 19:01):    No growth to date.    Radiology: all available radiological tests reviewed    < from: US Duplex Venous Lower Ext Complete, Bilateral (10.18.21 @ 20:22) >  No evidence of deep venous thrombosis in either lower extremity.  < end of copied text >    < from: US Duplex Arterial Lower Ext Compl, Bilateral (10.18.21 @ 20:28) >  Findings suggestive of a hemodynamically significant stenosis in the right external iliac artery with blunted monophasic waveforms throughout the remainder of the visualized right lower artery arteries, suspicious for a high-grade stenosis.  Moderate plaque in the left external iliac artery with an associated elevated peak systolic velocity consistent with a stenosis.  Elevated peak systolic velocity in the left popliteal artery with associated plaque, likely a hemodynamically significant stenosis.  A CTA of the aorta with a lower extremity runoff could be obtained for further evaluation if warranted.  < end of copied text >    Advanced directives addressed: full resuscitation
Date of service: 10-22-21 @ 08:18    Lying in bed in NAD  Has left foot tenderness  Can move his toes better since swelling is down  Has mild erythema    ROS: no fever or chills; denies dizziness, no HA, no SOB or cough, no abdominal pain, no diarrhea or constipation; no dysuria    MEDICATIONS  (STANDING):  amLODIPine   Tablet 5 milliGRAM(s) Oral at bedtime  aspirin enteric coated 81 milliGRAM(s) Oral daily  atorvastatin 20 milliGRAM(s) Oral at bedtime  cefTRIAXone Injectable. 1000 milliGRAM(s) IV Push every 24 hours  cefTRIAXone Injectable.      enoxaparin Injectable 40 milliGRAM(s) SubCutaneous daily  furosemide    Tablet 20 milliGRAM(s) Oral daily  lisinopril 20 milliGRAM(s) Oral every 12 hours  metoprolol tartrate 100 milliGRAM(s) Oral every 12 hours  multivitamin 1 Tablet(s) Oral daily  pantoprazole    Tablet 40 milliGRAM(s) Oral before breakfast  sodium chloride 0.9%. 1000 milliLiter(s) (83 mL/Hr) IV Continuous <Continuous>  thiamine 100 milliGRAM(s) Oral daily  vancomycin  IVPB 1250 milliGRAM(s) IV Intermittent every 24 hours    Vital Signs Last 24 Hrs  T(C): 36.4 (22 Oct 2021 08:15), Max: 36.8 (21 Oct 2021 22:01)  T(F): 97.5 (22 Oct 2021 08:15), Max: 98.2 (21 Oct 2021 22:01)  HR: 72 (22 Oct 2021 08:15) (59 - 72)  BP: 127/57 (22 Oct 2021 08:15) (112/46 - 127/57)  BP(mean): --  RR: 17 (22 Oct 2021 08:15) (16 - 18)  SpO2: 97% (22 Oct 2021 08:15) (97% - 98%)     Physical exam:    Constitutional:  No acute distress  HEENT: NC/AT, EOMI, PERRLA, conjunctivae clear; ears and nose atraumatic  Neck: supple; thyroid not palpable  Back: no tenderness  Respiratory: respiratory effort normal; clear to auscultation  Cardiovascular: S1S2 regular, no murmurs  Abdomen: soft, not tender, not distended, positive BS  Genitourinary: no suprapubic tenderness  Lymphatic: no LN palpable  Musculoskeletal: no muscle tenderness, no joint swelling or tenderness  Extremities: no pedal edema  Marked superficial varicosities to both LEs.  Left foot erythema and pain to the first two toes and plantar surface of the foot   Lt 1st interdigital space, with swelling, and erythema to the Lt foot   Has left ankle and left lower extremity erythema and edema - improving slowly  Superficial abrasions to the plantar aspect of the Lt hallux and the Lt 2nd toes, no drainage  Neurological/ Psychiatric: AxOx3, judgement and insight normal; moving all extremities  Skin: no rashes; no palpable lesions    Labs: reviewed    10-21    135  |  105  |  29<H>  ----------------------------<  125<H>  4.1   |  25  |  1.35<H>    Ca    9.3      21 Oct 2021 08:20    Vancomycin Level, Trough: 11.0 ug/mL (10-20 @ 14:09)  Vancomycin Level, Trough: 12.5 ug/mL (10-20 @ 08:54)    10-21    135  |  105  |  29<H>  ----------------------------<  125<H>  4.1   |  25  |  1.35<H>    Ca    9.3      21 Oct 2021 08:20    Vancomycin Level, Trough: 11.0 ug/mL (10-20 @ 14:09)  Vancomycin Level, Trough: 12.5 ug/mL (10-20 @ 08:54)                        13.6   10.49 )-----------( 194      ( 19 Oct 2021 08:47 )             41.1     10-19    136  |  101  |  23  ----------------------------<  139<H>  4.0   |  29  |  1.28    Ca    9.8      19 Oct 2021 08:47  Mg     2.0     10-19    TPro  8.3  /  Alb  4.3  /  TBili  0.7  /  DBili  x   /  AST  23  /  ALT  24  /  AlkPhos  88  10-18     LIVER FUNCTIONS - ( 18 Oct 2021 14:00 )  Alb: 4.3 g/dL / Pro: 8.3 gm/dL / ALK PHOS: 88 U/L / ALT: 24 U/L / AST: 23 U/L / GGT: x           Urinalysis Basic - ( 18 Oct 2021 15:30 )    Color: Yellow / Appearance: Clear / S.005 / pH: x  Gluc: x / Ketone: Negative  / Bili: Negative / Urobili: Negative mg/dL   Blood: x / Protein: Negative mg/dL / Nitrite: Negative   Leuk Esterase: Negative / RBC: x / WBC x   Sq Epi: x / Non Sq Epi: x / Bacteria: x    COVID-19 PCR: NotDetec (10-18-21 @ 14:00)      Culture - Urine (collected 18 Oct 2021 15:30)  Source: Clean Catch None  Final Report (19 Oct 2021 20:03):    No growth    Culture - Blood (collected 18 Oct 2021 13:55)  Source: .Blood Blood-Peripheral  Preliminary Report (19 Oct 2021 19:01):    No growth to date.    Culture - Blood (collected 18 Oct 2021 13:55)  Source: .Blood Blood-Peripheral  Preliminary Report (19 Oct 2021 19:01):    No growth to date.    Radiology: all available radiological tests reviewed    < from: US Duplex Venous Lower Ext Complete, Bilateral (10.18.21 @ 20:22) >  No evidence of deep venous thrombosis in either lower extremity.  < end of copied text >    < from: US Duplex Arterial Lower Ext Compl, Bilateral (10.18.21 @ 20:28) >  Findings suggestive of a hemodynamically significant stenosis in the right external iliac artery with blunted monophasic waveforms throughout the remainder of the visualized right lower artery arteries, suspicious for a high-grade stenosis.  Moderate plaque in the left external iliac artery with an associated elevated peak systolic velocity consistent with a stenosis.  Elevated peak systolic velocity in the left popliteal artery with associated plaque, likely a hemodynamically significant stenosis.  A CTA of the aorta with a lower extremity runoff could be obtained for further evaluation if warranted.  < end of copied text >    Advanced directives addressed: full resuscitation
Date of service: 10/22/2021  CC: painful swollen LLE  HPI: 87 y/o M Pt has been seen by podiatry team at the ED, for the evaluation of LLE swelling, and pain of couple of days duration. Pt states that he started having pain and burning to the foot about 4 days ago, and he had a home visit by his private podiatrist who recommended him to keep an eye on his Lt foot abrasion, then when he got the swelling and redness to the LLE, he visited his PCP early today, who referred him to the ED for further evaluation. Pt denies any pedal trauma, and denies any other pedal complaint at this time. Pt denies F/N/V/SOB     10/23: Pt seen by podiatry for follow up of LLE swelling and cellulitis of Lt foot. Noted Pt is resting comfortably in bed, and in NAD. Pt states that pain to foot continues to improve with minimal burning pain to forefoot. Pt reports that he can wiggle all the toes at this time. Pt denies any additional pedal complaints, and denies any overnight events or F/N/V/SOB at this time.     REVIEW OF SYSTEMS:  CONSTITUTIONAL: No fever, chills,  weight loss, or fatigue  EYES: No eye pain, visual disturbances, or discharge  ENMT:  No difficulty hearing, tinnitus, vertigo; No sinus or throat pain  NECK: No pain or stiffness  RESPIRATORY: No cough, wheezing, or hemoptysis; No shortness of breath  CARDIOVASCULAR: No chest pain, palpitations, dizziness, or leg swelling  GASTROINTESTINAL: No abdominal or epigastric pain. No nausea, vomiting, or hematemesis; No diarrhea or constipation. No melena or hematochezia.  GENITOURINARY: No dysuria, frequency, hematuria, or incontinence  NEUROLOGICAL: No headaches, memory loss, loss of strength, numbness, or tremors  SKIN: Abrasion to the Lt 1st and 2nd toes.  No itching, burning, or rashes. Swollen Lt leg   LYMPH NODES: No enlarged glands  ENDOCRINE: No heat or cold intolerance; No hair loss  MUSCULOSKELETAL: No joint pain or swelling; No muscle, back, or extremity pain  HEME/LYMPH: No easy bruising, or bleeding gums    PAST MEDICAL & SURGICAL HISTORY:  Hypertension  CAD (coronary artery disease)  Cardiac arrest with successful resuscitation  CKD (chronic kidney disease)  PAD (peripheral artery disease)  Lung cancer  Heart failure  AICD (automatic cardioverter/defibrillator) present    S/P carotid endarterectomy  History of lobectomy of lung  History of appendectomy  H/O left knee surgery  History of tonsillectomy and adenoidectomy    Allergies:  No Known Allergies    MEDICATIONS  (STANDING):  MEDICATIONS  (STANDING):  amLODIPine   Tablet 5 milliGRAM(s) Oral at bedtime  aspirin enteric coated 81 milliGRAM(s) Oral daily  atorvastatin 20 milliGRAM(s) Oral at bedtime  cefTRIAXone Injectable. 1000 milliGRAM(s) IV Push every 24 hours  cefTRIAXone Injectable.      enoxaparin Injectable 40 milliGRAM(s) SubCutaneous daily  furosemide    Tablet 20 milliGRAM(s) Oral daily  lisinopril 20 milliGRAM(s) Oral every 12 hours  metoprolol tartrate 100 milliGRAM(s) Oral every 12 hours  multivitamin 1 Tablet(s) Oral daily  pantoprazole    Tablet 40 milliGRAM(s) Oral before breakfast  sodium chloride 0.9%. 1000 milliLiter(s) (83 mL/Hr) IV Continuous <Continuous>  thiamine 100 milliGRAM(s) Oral daily  vancomycin  IVPB 1250 milliGRAM(s) IV Intermittent every 24 hours      MEDICATIONS  (PRN):  MEDICATIONS  (PRN):  acetaminophen   Tablet .. 650 milliGRAM(s) Oral every 6 hours PRN Temp greater or equal to 38C (100.4F), Mild Pain (1 - 3)  aluminum hydroxide/magnesium hydroxide/simethicone Suspension 30 milliLiter(s) Oral every 4 hours PRN Dyspepsia  meclizine 25 milliGRAM(s) Oral every 6 hours PRN Dizziness  melatonin 3 milliGRAM(s) Oral at bedtime PRN Insomnia  ondansetron Injectable 4 milliGRAM(s) IV Push every 8 hours PRN Nausea and/or Vomiting      VITALS:  Vital Signs Last 24 Hrs  T(C): 36.4 (23 Oct 2021 08:59), Max: 36.8 (22 Oct 2021 23:52)  T(F): 97.5 (23 Oct 2021 08:59), Max: 98.3 (22 Oct 2021 23:52)  HR: 61 (23 Oct 2021 08:59) (61 - 88)  BP: 134/59 (23 Oct 2021 08:59) (120/63 - 134/59)  BP(mean): --  RR: 17 (23 Oct 2021 08:59) (17 - 18)  SpO2: 99% (23 Oct 2021 08:59) (99% - 100%)      Physical Examination:  Constitutional: AAOx3, non-focal; NAD, comfortable resting   Focused LE exam:  Vascular: Temperature gradient is warm to cool to the RLE, warm to warm to the LLE, non-palpable pulses b/l, capillary re-fill time is about 5sec to digits 1-5 b/l. +1 pitting edema to the dorsum of the Lt foot, extending to the Lt lower leg.  Marked superficial varicosities to both LEs.  Neuro: Intact protective sensation to the foot and digits 1-5 b/l.  Derm: Skin is warmer to the LLE, noted + IDM to the Lt 1st interdigital space- resolved, with swelling, and streaking erythema to the Lt foot - resolving. Stable superficial abrasions to the plantar aspect of the Lt hallux and the Lt 2nd toes, without any drainage, fluctuance, crepitous.   Musculoskeletal: Manual muscle testing is 5/5 in all muscle groups of the foot/ankle b/l. ROM to all the foot/ankle joints is WNL bilaterally. Mild TTP over the dorsum of the Lt foot.    LABS:                     13.6   10.49 )-----------( 194      ( 19 Oct 2021 08:47 )             41.1     10-19  136  |  101  |  23  ----------------------------<  139<H>  4.0   |  29  |  1.28    Ca    9.8      19 Oct 2021 08:47  Mg     2.0     10-19    TPro  8.3  /  Alb  4.3  /  TBili  0.7  /  DBili  x   /  AST  23  /  ALT  24  /  AlkPhos  88  10-18      RADIOLOGY:    < from: Xray Foot AP + Lateral + Oblique, Left (10.18.21 @ 13:44) >  IMPRESSION:  First metatarsal head medial periarticular erosions. Adjacent first toe soft tissue swelling.  Overall Diffuse soft tissue swelling.  No fracture.  No radiographic evidence of osteomyelitis.  < end of copied text >    < from: US Duplex Arterial Lower Ext Compl, Bilateral (10.18.21 @ 20:28) >  IMPRESSION:  Findings suggestive of a hemodynamically significant stenosis in the right external iliac artery with blunted monophasic waveforms throughout the remainder of the visualized right lower artery arteries, suspicious for a high-grade stenosis.  Moderate plaque in the left external iliac artery with an associated elevated peak systolic velocity consistent with a stenosis.  Elevated peak systolic velocity in the left popliteal artery with associated plaque, likely a hemodynamically significant stenosis.  A CTA of the aorta with a lower extremity runoff could be obtained for further evaluation if warranted.  < end of copied text >    < from: US Duplex Venous Lower Ext Complete, Bilateral (10.18.21 @ 20:22) >  IMPRESSION:  No evidence of deep venous thrombosis in either lower extremity.  < end of copied text >  
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 2 days ago patient was walking and the toe left toes curled inward when he stumbled resulting in pain in the bone and abrasion of both great and second toe on the plantar surface. Pain and erythema worsened and went to PMD for further evaluation. Advised to report to the ED. Evaluated by podiatry and wound cleaned and dressed with subsequent improvement in symptoms.  Left leg also got more swollen after the event.  Admitted with cellulitis left LE.      10/19:  Pt seen.  No complaints.  Dressing changed by podiatry this am.  Awaiting consultant input.    10.20: less foot edema and less pain, no cp, no sob, no n/v/d     Review of system- All 10 systems reviewed and is as per HPI otherwise negative.     Vital Signs Last 24 Hrs  T(C): 36.7 (20 Oct 2021 08:01), Max: 36.7 (20 Oct 2021 08:01)  T(F): 98.1 (20 Oct 2021 08:01), Max: 98.1 (20 Oct 2021 08:01)  HR: 64 (20 Oct 2021 08:01) (60 - 65)  BP: 101/54 (20 Oct 2021 10:15) (101/54 - 124/59)  RR: 18 (20 Oct 2021 08:01) (18 - 18)  SpO2: 98% (20 Oct 2021 08:01) (95% - 98%)    PHYSICAL EXAM:  GENERAL: Comfortable, no acute distress  HEAD:  Atraumatic, Normocephalic  EYES: EOMI, PERRLA  HEENT: Moist mucous membranes  NECK: Supple, No JVD  NERVOUS SYSTEM:  Alert & Oriented X3, Motor Strength 5/5 B/L upper and lower extremities  CHEST/LUNG: Clear to auscultation bilaterally  HEART: Regular rate and rhythm; No murmurs, rubs, or gallops  ABDOMEN: Soft, Nontender, Nondistended; Bowel sounds present  GENITOURINARY- Voiding, no palpable bladder  EXTREMITIES:  mild left foot edema; no erythema, +superficial ulceration on plantar area of halux  MUSCULOSKELTAL- No muscle tenderness, No joint tenderness  SKIN-no rash    Labs:                        13.6   10.49 )-----------( 194      ( 19 Oct 2021 08:47 )             41.1     10-20    135  |  101  |  27<H>  ----------------------------<  133<H>  3.9   |  29  |  1.31<H>    Ca    9.7      20 Oct 2021 08:54  Mg     2.0     10-19         Vancomycin Level, Trough: 11.0 ug/mL (10-20 @ 14:09)  Vancomycin Level, Trough: 12.5 ug/mL (10-20 @ 08:54)      Cultures:       MEDICATIONS  (STANDING):  amLODIPine   Tablet 5 milliGRAM(s) Oral at bedtime  aspirin enteric coated 81 milliGRAM(s) Oral daily  atorvastatin 20 milliGRAM(s) Oral at bedtime  cefTRIAXone Injectable. 1000 milliGRAM(s) IV Push every 24 hours  furosemide    Tablet 20 milliGRAM(s) Oral daily  lisinopril 20 milliGRAM(s) Oral every 12 hours  metoprolol tartrate 100 milliGRAM(s) Oral every 12 hours  multivitamin 1 Tablet(s) Oral daily  pantoprazole    Tablet 40 milliGRAM(s) Oral before breakfast  thiamine 100 milliGRAM(s) Oral daily  vancomycin  IVPB 1250 milliGRAM(s) IV Intermittent every 24 hours    MEDICATIONS  (PRN):  acetaminophen   Tablet .. 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  Admitted with cellulitis left foot.  Also concern of PVD.      #Left foot cellulitis with lymphangitis:    Cont IV ABX-- vanco and rocephin. day#3   Appreciate ID input.    Trend labs/ temps/ clinical exam.    Podiatry f/u.      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    Plan CT ANGIOGRAM when renal function improved.    Cont ASA.      #OLIVIA on CKD:  improving   Baseline appears around 0.9-1.0.    Cr 1.3 on admission.    Trend labs.  Improved to 1.2 today.      #CAD s/p CABG:  stable.      #Chronic systolic CHF:  stable.  Cont home meds.      #DVT Proph:  Lovenox.    
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 2 days ago patient was walking and the toe left toes curled inward when he stumbled resulting in pain in the bone and abrasion of both great and second toe on the plantar surface. Pain and erythema worsened and went to PMD for further evaluation. Advised to report to the ED. Evaluated by podiatry and wound cleaned and dressed with subsequent improvement in symptoms.  Left leg also got more swollen after the event.  Admitted with cellulitis left LE.      10/19:  Pt seen.  No complaints.  Dressing changed by podiatry this am.  Awaiting consultant input.    10.20: less foot edema and less pain, no cp, no sob, no n/v/d   10.21: improving pain and edema  10.22: no distress, amble to ambulate with cane, no cp, no sob    Review of system- All 10 systems reviewed and is as per HPI otherwise negative.     Vital Signs Last 24 Hrs  T(C): 36.7 (21 Oct 2021 07:43), Max: 37 (20 Oct 2021 22:15)  T(F): 98 (21 Oct 2021 07:43), Max: 98.6 (20 Oct 2021 22:15)  HR: 65 (21 Oct 2021 09:26) (61 - 66)  BP: 121/60 (21 Oct 2021 09:26) (111/47 - 131/56)  RR: 17 (21 Oct 2021 07:43) (17 - 18)  SpO2: 98% (21 Oct 2021 07:43) (94% - 98%)    PHYSICAL EXAM:  GENERAL: Comfortable, no acute distress  HEAD:  Atraumatic, Normocephalic  EYES: EOMI, PERRLA  HEENT: Moist mucous membranes  NECK: Supple, No JVD  NERVOUS SYSTEM:  Alert & Oriented X3, Motor Strength 5/5 B/L upper and lower extremities  CHEST/LUNG: Clear to auscultation bilaterally  HEART: Regular rate and rhythm; No murmurs, rubs, or gallops  ABDOMEN: Soft, Nontender, Nondistended; Bowel sounds present  GENITOURINARY- Voiding, no palpable bladder  EXTREMITIES:  mild left foot edema; no erythema, +superficial ulceration on plantar area of halux and 2nd toe, with granulation tissue   MUSCULOSKELTAL- No muscle tenderness, No joint tenderness  SKIN-no rash    Labs:                        13.6   10.49 )-----------( 194      ( 19 Oct 2021 08:47 )             41.1     10-20    135  |  101  |  27<H>  ----------------------------<  133<H>  3.9   |  29  |  1.31<H>    Ca    9.7      20 Oct 2021 08:54  Mg     2.0     10-19         Vancomycin Level, Trough: 11.0 ug/mL (10-20 @ 14:09)  Vancomycin Level, Trough: 12.5 ug/mL (10-20 @ 08:54)      Cultures:       MEDICATIONS  (STANDING):  amLODIPine   Tablet 5 milliGRAM(s) Oral at bedtime  aspirin enteric coated 81 milliGRAM(s) Oral daily  atorvastatin 20 milliGRAM(s) Oral at bedtime  cefTRIAXone Injectable. 1000 milliGRAM(s) IV Push every 24 hours  furosemide    Tablet 20 milliGRAM(s) Oral daily  lisinopril 20 milliGRAM(s) Oral every 12 hours  metoprolol tartrate 100 milliGRAM(s) Oral every 12 hours  multivitamin 1 Tablet(s) Oral daily  pantoprazole    Tablet 40 milliGRAM(s) Oral before breakfast  thiamine 100 milliGRAM(s) Oral daily  vancomycin  IVPB 1250 milliGRAM(s) IV Intermittent every 24 hours    MEDICATIONS  (PRN):  acetaminophen   Tablet .. 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  Admitted with cellulitis left foot.  Also concern of PVD.      #Left foot cellulitis with lymphangitis:    Cont IV ABX-- vanco and rocephin. day#4/5; considering d/c home on oral Abx per ID (? Doxy) for another 5-7days  Appreciate ID input.    Trend labs/ temps/ clinical exam.    Podiatry f/u.      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    Wound healing well; no need for CT angio of LE    #OLIVIA on CKD:  improving , Cr at baseline   Baseline appears around 0.9-1.0.    Cr 1.3 on admission.    Trend labs.  Improved to 1.2 today.      #CAD s/p CABG:  stable.      #Chronic systolic CHF:  stable.  Cont home meds.      #DVT Proph:  Lovenox.      Dispo: d/c home tomorrow on oral Abx , needs home care 
Date of service: 10/20/2021  CC: painful swollen LLE    HPI: 85 y/o M Pt has been seen by podiatry team at the ED, for the evaluation of LLE swelling, and pain of couple of days duration. Pt states that he started having pain and burning to the foot about 4 days ago, and he had a home visit by his private podiatrist who recommended him to keep an eye on his Lt foot abrasion, then when he got the swelling and redness to the LLE, he visited his PCP early today, who referred him to the ED for further evaluation. Pt denies any pedal trauma, and denies any other pedal complaint at this time. Pt denies F/N/V/SOB     10/20: Pt seen at bedside by podiatry team, for follow up of LLE swelling and cellulitis of Lt foot. Noted Pt to be resting comfortable on chair, eating breakfast, and in NAD. Pt states that pain to foot has improved, with of the burning pain, and pins/needles sensation have disappeared. Pt denies any new pedal complaint. Pt denies any overnight events or F/N/V/SOB at this time.     REVIEW OF SYSTEMS:  CONSTITUTIONAL: No fever, chills,  weight loss, or fatigue  EYES: No eye pain, visual disturbances, or discharge  ENMT:  No difficulty hearing, tinnitus, vertigo; No sinus or throat pain  NECK: No pain or stiffness  RESPIRATORY: No cough, wheezing, or hemoptysis; No shortness of breath  CARDIOVASCULAR: No chest pain, palpitations, dizziness, or leg swelling  GASTROINTESTINAL: No abdominal or epigastric pain. No nausea, vomiting, or hematemesis; No diarrhea or constipation. No melena or hematochezia.  GENITOURINARY: No dysuria, frequency, hematuria, or incontinence  NEUROLOGICAL: No headaches, memory loss, loss of strength, numbness, or tremors  SKIN: Abrasion to the Lt 1st and 2nd toes.  No itching, burning, or rashes. Swollen Lt leg   LYMPH NODES: No enlarged glands  ENDOCRINE: No heat or cold intolerance; No hair loss  MUSCULOSKELETAL: No joint pain or swelling; No muscle, back, or extremity pain  HEME/LYMPH: No easy bruising, or bleeding gums    PAST MEDICAL & SURGICAL HISTORY:  Hypertension  CAD (coronary artery disease)  Cardiac arrest with successful resuscitation  CKD (chronic kidney disease)  PAD (peripheral artery disease)  Lung cancer  Heart failure  AICD (automatic cardioverter/defibrillator) present    S/P carotid endarterectomy  History of lobectomy of lung  History of appendectomy  H/O left knee surgery  History of tonsillectomy and adenoidectomy    Allergies:  No Known Allergies    MEDICATIONS  (STANDING):  amLODIPine   Tablet 5 milliGRAM(s) Oral at bedtime  aspirin enteric coated 81 milliGRAM(s) Oral daily  atorvastatin 20 milliGRAM(s) Oral at bedtime  cefTRIAXone Injectable. 1000 milliGRAM(s) IV Push every 24 hours  cefTRIAXone Injectable.      enoxaparin Injectable 40 milliGRAM(s) SubCutaneous daily  furosemide    Tablet 20 milliGRAM(s) Oral daily  lisinopril 20 milliGRAM(s) Oral every 12 hours  metoprolol tartrate 100 milliGRAM(s) Oral every 12 hours  multivitamin 1 Tablet(s) Oral daily  pantoprazole    Tablet 40 milliGRAM(s) Oral before breakfast  thiamine 100 milliGRAM(s) Oral daily  vancomycin  IVPB 1250 milliGRAM(s) IV Intermittent every 24 hours    MEDICATIONS  (PRN):  acetaminophen   Tablet .. 650 milliGRAM(s) Oral every 6 hours PRN Temp greater or equal to 38C (100.4F), Mild Pain (1 - 3)  aluminum hydroxide/magnesium hydroxide/simethicone Suspension 30 milliLiter(s) Oral every 4 hours PRN Dyspepsia  meclizine 25 milliGRAM(s) Oral every 6 hours PRN Dizziness  melatonin 3 milliGRAM(s) Oral at bedtime PRN Insomnia  ondansetron Injectable 4 milliGRAM(s) IV Push every 8 hours PRN Nausea and/or Vomiting    VITALS:  Vital Signs Last 24 Hrs  T(C): 36.7 (20 Oct 2021 08:01), Max: 36.8 (19 Oct 2021 15:09)  T(F): 98.1 (20 Oct 2021 08:01), Max: 98.3 (19 Oct 2021 15:09)  HR: 64 (20 Oct 2021 08:01) (59 - 65)  BP: 124/59 (20 Oct 2021 08:01) (109/49 - 124/59)  RR: 18 (20 Oct 2021 08:01) (18 - 18)  SpO2: 98% (20 Oct 2021 08:01) (95% - 98%)    Physical Examination:  Constitutional: AAOx3, non-focal; NAD, comfortable resting   Focused LE exam:  Vascular: Temperature gradient is warm to cool to the RLE, warm to warm to the LLE, non-palpable pulses b/l, capillary re-fill time is about 5sec to digits 1-5 b/l. +1 pitting edema to the dorsum of the Lt foot, extending to the Lt lower leg.  Marked superficial varicosities to both LEs.  Neuro: Intact protective sensation to the foot and digits 1-5 b/l.  Derm: Skin is warmer to the LLE, noted + IDM to the Lt 1st interdigital space- improving, with swelling, and streaking erythema to the Lt foot - resolving. Mild superficial abrasions to the plantar aspect of the Lt hallux and the Lt 2nd toes, without any drainage, fluctuance, crepitous.   Musculoskeletal: Manual muscle testing is 5/5 in all muscle groups of the foot/ankle b/l. ROM to all the foot/ankle joints is WNL bilaterally. Mild TTP over the dorsum of the Lt foot.    LABS:                     13.6   10.49 )-----------( 194      ( 19 Oct 2021 08:47 )             41.1   10-19    136  |  101  |  23  ----------------------------<  139<H>  4.0   |  29  |  1.28    Ca    9.8      19 Oct 2021 08:47  Mg     2.0     10-19    TPro  8.3  /  Alb  4.3  /  TBili  0.7  /  DBili  x   /  AST  23  /  ALT  24  /  AlkPhos  88  10-18      RADIOLOGY:    < from: Xray Foot AP + Lateral + Oblique, Left (10.18.21 @ 13:44) >  IMPRESSION:  First metatarsal head medial periarticular erosions. Adjacent first toe soft tissue swelling.  Overall Diffuse soft tissue swelling.  No fracture.  No radiographic evidence of osteomyelitis.  < end of copied text >    < from: US Duplex Arterial Lower Ext Compl, Bilateral (10.18.21 @ 20:28) >  IMPRESSION:  Findings suggestive of a hemodynamically significant stenosis in the right external iliac artery with blunted monophasic waveforms throughout the remainder of the visualized right lower artery arteries, suspicious for a high-grade stenosis.  Moderate plaque in the left external iliac artery with an associated elevated peak systolic velocity consistent with a stenosis.  Elevated peak systolic velocity in the left popliteal artery with associated plaque, likely a hemodynamically significant stenosis.  A CTA of the aorta with a lower extremity runoff could be obtained for further evaluation if warranted.  < end of copied text >    < from: US Duplex Venous Lower Ext Complete, Bilateral (10.18.21 @ 20:22) >  IMPRESSION:  No evidence of deep venous thrombosis in either lower extremity.  < end of copied text >  
Date of service: 10/22/2021  CC: painful swollen LLE  HPI: 87 y/o M Pt has been seen by podiatry team at the ED, for the evaluation of LLE swelling, and pain of couple of days duration. Pt states that he started having pain and burning to the foot about 4 days ago, and he had a home visit by his private podiatrist who recommended him to keep an eye on his Lt foot abrasion, then when he got the swelling and redness to the LLE, he visited his PCP early today, who referred him to the ED for further evaluation. Pt denies any pedal trauma, and denies any other pedal complaint at this time. Pt denies F/N/V/SOB     10/22: Pt seen by podiatry for follow up of LLE swelling and cellulitis of Lt foot. Noted Pt is resting comfortably in bed, and in NAD. Pt states that pain to foot continues to improve with minimal burning pain to forefoot. Pt reports that he can wiggle all the toes at this time. Pt denies any additional pedal complaints, and denies any overnight events or F/N/V/SOB at this time.     REVIEW OF SYSTEMS:  CONSTITUTIONAL: No fever, chills,  weight loss, or fatigue  EYES: No eye pain, visual disturbances, or discharge  ENMT:  No difficulty hearing, tinnitus, vertigo; No sinus or throat pain  NECK: No pain or stiffness  RESPIRATORY: No cough, wheezing, or hemoptysis; No shortness of breath  CARDIOVASCULAR: No chest pain, palpitations, dizziness, or leg swelling  GASTROINTESTINAL: No abdominal or epigastric pain. No nausea, vomiting, or hematemesis; No diarrhea or constipation. No melena or hematochezia.  GENITOURINARY: No dysuria, frequency, hematuria, or incontinence  NEUROLOGICAL: No headaches, memory loss, loss of strength, numbness, or tremors  SKIN: Abrasion to the Lt 1st and 2nd toes.  No itching, burning, or rashes. Swollen Lt leg   LYMPH NODES: No enlarged glands  ENDOCRINE: No heat or cold intolerance; No hair loss  MUSCULOSKELETAL: No joint pain or swelling; No muscle, back, or extremity pain  HEME/LYMPH: No easy bruising, or bleeding gums    PAST MEDICAL & SURGICAL HISTORY:  Hypertension  CAD (coronary artery disease)  Cardiac arrest with successful resuscitation  CKD (chronic kidney disease)  PAD (peripheral artery disease)  Lung cancer  Heart failure  AICD (automatic cardioverter/defibrillator) present    S/P carotid endarterectomy  History of lobectomy of lung  History of appendectomy  H/O left knee surgery  History of tonsillectomy and adenoidectomy    Allergies:  No Known Allergies    MEDICATIONS  (STANDING):  amLODIPine   Tablet 5 milliGRAM(s) Oral at bedtime  aspirin enteric coated 81 milliGRAM(s) Oral daily  atorvastatin 20 milliGRAM(s) Oral at bedtime  cefTRIAXone Injectable. 1000 milliGRAM(s) IV Push every 24 hours  cefTRIAXone Injectable.      enoxaparin Injectable 40 milliGRAM(s) SubCutaneous daily  furosemide    Tablet 20 milliGRAM(s) Oral daily  lisinopril 20 milliGRAM(s) Oral every 12 hours  metoprolol tartrate 100 milliGRAM(s) Oral every 12 hours  multivitamin 1 Tablet(s) Oral daily  pantoprazole    Tablet 40 milliGRAM(s) Oral before breakfast  sodium chloride 0.9%. 1000 milliLiter(s) (83 mL/Hr) IV Continuous <Continuous>  thiamine 100 milliGRAM(s) Oral daily  vancomycin  IVPB 1250 milliGRAM(s) IV Intermittent every 24 hours    MEDICATIONS  (PRN):  acetaminophen   Tablet .. 650 milliGRAM(s) Oral every 6 hours PRN Temp greater or equal to 38C (100.4F), Mild Pain (1 - 3)  aluminum hydroxide/magnesium hydroxide/simethicone Suspension 30 milliLiter(s) Oral every 4 hours PRN Dyspepsia  meclizine 25 milliGRAM(s) Oral every 6 hours PRN Dizziness  melatonin 3 milliGRAM(s) Oral at bedtime PRN Insomnia  ondansetron Injectable 4 milliGRAM(s) IV Push every 8 hours PRN Nausea and/or Vomiting    VITALS:  Vital Signs Last 24 Hrs  T(C): 36.4 (10-22-21 @ 08:15), Max: 36.8 (10-21-21 @ 22:01)  T(F): 97.5 (10-22-21 @ 08:15), Max: 98.2 (10-21-21 @ 22:01)  HR: 72 (10-22-21 @ 08:15) (59 - 72)  BP: 127/57 (10-22-21 @ 08:15) (112/46 - 127/57)  RR: 17 (10-22-21 @ 08:15) (16 - 18)  SpO2: 97% (10-22-21 @ 08:15) (97% - 98%)      Physical Examination:  Constitutional: AAOx3, non-focal; NAD, comfortable resting   Focused LE exam:  Vascular: Temperature gradient is warm to cool to the RLE, warm to warm to the LLE, non-palpable pulses b/l, capillary re-fill time is about 5sec to digits 1-5 b/l. +1 pitting edema to the dorsum of the Lt foot, extending to the Lt lower leg.  Marked superficial varicosities to both LEs.  Neuro: Intact protective sensation to the foot and digits 1-5 b/l.  Derm: Skin is warmer to the LLE, noted + IDM to the Lt 1st interdigital space- resolved, with swelling, and streaking erythema to the Lt foot - resolving. Mild superficial abrasions to the plantar aspect of the Lt hallux and the Lt 2nd toes, without any drainage, fluctuance, crepitous.   Musculoskeletal: Manual muscle testing is 5/5 in all muscle groups of the foot/ankle b/l. ROM to all the foot/ankle joints is WNL bilaterally. Mild TTP over the dorsum of the Lt foot.    LABS:                     13.6   10.49 )-----------( 194      ( 19 Oct 2021 08:47 )             41.1     10-19  136  |  101  |  23  ----------------------------<  139<H>  4.0   |  29  |  1.28    Ca    9.8      19 Oct 2021 08:47  Mg     2.0     10-19    TPro  8.3  /  Alb  4.3  /  TBili  0.7  /  DBili  x   /  AST  23  /  ALT  24  /  AlkPhos  88  10-18      RADIOLOGY:    < from: Xray Foot AP + Lateral + Oblique, Left (10.18.21 @ 13:44) >  IMPRESSION:  First metatarsal head medial periarticular erosions. Adjacent first toe soft tissue swelling.  Overall Diffuse soft tissue swelling.  No fracture.  No radiographic evidence of osteomyelitis.  < end of copied text >    < from: US Duplex Arterial Lower Ext Compl, Bilateral (10.18.21 @ 20:28) >  IMPRESSION:  Findings suggestive of a hemodynamically significant stenosis in the right external iliac artery with blunted monophasic waveforms throughout the remainder of the visualized right lower artery arteries, suspicious for a high-grade stenosis.  Moderate plaque in the left external iliac artery with an associated elevated peak systolic velocity consistent with a stenosis.  Elevated peak systolic velocity in the left popliteal artery with associated plaque, likely a hemodynamically significant stenosis.  A CTA of the aorta with a lower extremity runoff could be obtained for further evaluation if warranted.  < end of copied text >    < from: US Duplex Venous Lower Ext Complete, Bilateral (10.18.21 @ 20:22) >  IMPRESSION:  No evidence of deep venous thrombosis in either lower extremity.  < end of copied text >  
SURGERY DAILY PROGRESS NOTE:     Subjective:  Patient seen and examined at bedside during morning rounds. No acute events overnight. Pt reports feeling well but reports Pins and needles sensation more on the right and the left. Left leg also got more swollen after the event. But also has numbness and pins and needles sensation in the Right LE intermittently over the last several months.    Objective:    MEDICATIONS  (STANDING):  amLODIPine   Tablet 5 milliGRAM(s) Oral at bedtime  aspirin enteric coated 81 milliGRAM(s) Oral daily  atorvastatin 20 milliGRAM(s) Oral at bedtime  cefTRIAXone Injectable. 1000 milliGRAM(s) IV Push every 24 hours  cefTRIAXone Injectable.      enoxaparin Injectable 40 milliGRAM(s) SubCutaneous daily  furosemide    Tablet 20 milliGRAM(s) Oral daily  lisinopril 20 milliGRAM(s) Oral every 12 hours  metoprolol tartrate 100 milliGRAM(s) Oral every 12 hours  multivitamin 1 Tablet(s) Oral daily  pantoprazole    Tablet 40 milliGRAM(s) Oral before breakfast  thiamine 100 milliGRAM(s) Oral daily  vancomycin  IVPB 1250 milliGRAM(s) IV Intermittent every 24 hours    MEDICATIONS  (PRN):  acetaminophen   Tablet .. 650 milliGRAM(s) Oral every 6 hours PRN Temp greater or equal to 38C (100.4F), Mild Pain (1 - 3)  aluminum hydroxide/magnesium hydroxide/simethicone Suspension 30 milliLiter(s) Oral every 4 hours PRN Dyspepsia  meclizine 25 milliGRAM(s) Oral every 6 hours PRN Dizziness  melatonin 3 milliGRAM(s) Oral at bedtime PRN Insomnia  ondansetron Injectable 4 milliGRAM(s) IV Push every 8 hours PRN Nausea and/or Vomiting      Vital Signs Last 24 Hrs  T(C): 36.7 (20 Oct 2021 08:01), Max: 36.8 (19 Oct 2021 15:09)  T(F): 98.1 (20 Oct 2021 08:01), Max: 98.3 (19 Oct 2021 15:09)  HR: 64 (20 Oct 2021 08:01) (59 - 65)  BP: 124/59 (20 Oct 2021 08:01) (109/49 - 124/59)  BP(mean): --  RR: 18 (20 Oct 2021 08:01) (18 - 18)  SpO2: 98% (20 Oct 2021 08:01) (95% - 98%)      PHYSICAL EXAM   General: NAD, resting comfortably  Pulmonary: normal resp effort, CTA-B  Cardiovascular: NSR, no murmurs  Abdominal: soft, ND/NT, no organomegaly  Extremities: skin changes consistent with venous insufficiency b/l, left leg edema up to knee with redness in dorsal foot, plantar 1st and 2nd toe linear open wound, normal LE strength, normal sensation, no clubbing/cyanosis  Pulses: faint palpable dorsalis pedis bilaterally      I&O's Detail    19 Oct 2021 07:01  -  20 Oct 2021 07:00  --------------------------------------------------------  IN:  Total IN: 0 mL    OUT:    Voided (mL): 400 mL  Total OUT: 400 mL    Total NET: -400 mL          Daily     Daily     LABS:                        13.6   10.49 )-----------( 194      ( 19 Oct 2021 08:47 )             41.1     10-19    136  |  101  |  23  ----------------------------<  139<H>  4.0   |  29  |  1.28    Ca    9.8      19 Oct 2021 08:47  Mg     2.0     10    TPro  8.3  /  Alb  4.3  /  TBili  0.7  /  DBili  x   /  AST  23  /  ALT  24  /  AlkPhos  88  10-18    PT/INR - ( 18 Oct 2021 14:00 )   PT: 12.1 sec;   INR: 1.05 ratio         PTT - ( 18 Oct 2021 14:00 )  PTT:33.2 sec  Urinalysis Basic - ( 18 Oct 2021 15:30 )    Color: Yellow / Appearance: Clear / S.005 / pH: x  Gluc: x / Ketone: Negative  / Bili: Negative / Urobili: Negative mg/dL   Blood: x / Protein: Negative mg/dL / Nitrite: Negative   Leuk Esterase: Negative / RBC: x / WBC x   Sq Epi: x / Non Sq Epi: x / Bacteria: x        RADIOLOGY & ADDITIONAL STUDIES:    ASSESSMENT/PLAN:        
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 2 days ago patient was walking and the toe left toes curled inward when he stumbled resulting in pain in the bone and abrasion of both great and second toe on the plantar surface. Pain and erythema worsened and went to PMD for further evaluation. Advised to report to the ED. Evaluated by podiatry and wound cleaned and dressed with subsequent improvement in symptoms.  Left leg also got more swollen after the event.  Admitted with cellulitis left LE.      10/19:  Pt seen.  No complaints.  Dressing changed by podiatry this am.  Awaiting consultant input.    10.20: less foot edema and less pain, no cp, no sob, no n/v/d   10.21: improving pain and edema    Review of system- All 10 systems reviewed and is as per HPI otherwise negative.     Vital Signs Last 24 Hrs  T(C): 36.7 (21 Oct 2021 07:43), Max: 37 (20 Oct 2021 22:15)  T(F): 98 (21 Oct 2021 07:43), Max: 98.6 (20 Oct 2021 22:15)  HR: 65 (21 Oct 2021 09:26) (61 - 66)  BP: 121/60 (21 Oct 2021 09:26) (111/47 - 131/56)  RR: 17 (21 Oct 2021 07:43) (17 - 18)  SpO2: 98% (21 Oct 2021 07:43) (94% - 98%)    PHYSICAL EXAM:  GENERAL: Comfortable, no acute distress  HEAD:  Atraumatic, Normocephalic  EYES: EOMI, PERRLA  HEENT: Moist mucous membranes  NECK: Supple, No JVD  NERVOUS SYSTEM:  Alert & Oriented X3, Motor Strength 5/5 B/L upper and lower extremities  CHEST/LUNG: Clear to auscultation bilaterally  HEART: Regular rate and rhythm; No murmurs, rubs, or gallops  ABDOMEN: Soft, Nontender, Nondistended; Bowel sounds present  GENITOURINARY- Voiding, no palpable bladder  EXTREMITIES:  mild left foot edema; no erythema, +superficial ulceration on plantar area of halux  MUSCULOSKELTAL- No muscle tenderness, No joint tenderness  SKIN-no rash    Labs:                        13.6   10.49 )-----------( 194      ( 19 Oct 2021 08:47 )             41.1     10-20    135  |  101  |  27<H>  ----------------------------<  133<H>  3.9   |  29  |  1.31<H>    Ca    9.7      20 Oct 2021 08:54  Mg     2.0     10-19         Vancomycin Level, Trough: 11.0 ug/mL (10-20 @ 14:09)  Vancomycin Level, Trough: 12.5 ug/mL (10-20 @ 08:54)      Cultures:       MEDICATIONS  (STANDING):  amLODIPine   Tablet 5 milliGRAM(s) Oral at bedtime  aspirin enteric coated 81 milliGRAM(s) Oral daily  atorvastatin 20 milliGRAM(s) Oral at bedtime  cefTRIAXone Injectable. 1000 milliGRAM(s) IV Push every 24 hours  furosemide    Tablet 20 milliGRAM(s) Oral daily  lisinopril 20 milliGRAM(s) Oral every 12 hours  metoprolol tartrate 100 milliGRAM(s) Oral every 12 hours  multivitamin 1 Tablet(s) Oral daily  pantoprazole    Tablet 40 milliGRAM(s) Oral before breakfast  thiamine 100 milliGRAM(s) Oral daily  vancomycin  IVPB 1250 milliGRAM(s) IV Intermittent every 24 hours    MEDICATIONS  (PRN):  acetaminophen   Tablet .. 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  Admitted with cellulitis left foot.  Also concern of PVD.      #Left foot cellulitis with lymphangitis:    Cont IV ABX-- vanco and rocephin. day#4  Appreciate ID input.    Trend labs/ temps/ clinical exam.    Podiatry f/u.      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    Plan CT ANGIOGRAM when renal function improved.    Cont ASA.      #OLIVIA on CKD:  improving   Baseline appears around 0.9-1.0.    Cr 1.3 on admission.    Trend labs.  Improved to 1.2 today.      #CAD s/p CABG:  stable.      #Chronic systolic CHF:  stable.  Cont home meds.      #DVT Proph:  Lovenox.    
Date of service: 10-20-21 @ 09:30    Lying in bed in NAD  Has left foot tenderness  LLE feels less swollen    ROS: no fever or chills; denies dizziness, no HA, no SOB or cough, no abdominal pain, no diarrhea or constipation; no dysuria    MEDICATIONS  (STANDING):  amLODIPine   Tablet 5 milliGRAM(s) Oral at bedtime  aspirin enteric coated 81 milliGRAM(s) Oral daily  atorvastatin 20 milliGRAM(s) Oral at bedtime  cefTRIAXone Injectable. 1000 milliGRAM(s) IV Push every 24 hours  cefTRIAXone Injectable.      enoxaparin Injectable 40 milliGRAM(s) SubCutaneous daily  furosemide    Tablet 20 milliGRAM(s) Oral daily  lisinopril 20 milliGRAM(s) Oral every 12 hours  metoprolol tartrate 100 milliGRAM(s) Oral every 12 hours  multivitamin 1 Tablet(s) Oral daily  pantoprazole    Tablet 40 milliGRAM(s) Oral before breakfast  thiamine 100 milliGRAM(s) Oral daily  vancomycin  IVPB 1250 milliGRAM(s) IV Intermittent every 24 hours    Vital Signs Last 24 Hrs  T(C): 36.7 (20 Oct 2021 08:01), Max: 36.8 (19 Oct 2021 15:09)  T(F): 98.1 (20 Oct 2021 08:01), Max: 98.3 (19 Oct 2021 15:09)  HR: 64 (20 Oct 2021 08:01) (59 - 65)  BP: 124/59 (20 Oct 2021 08:01) (109/49 - 124/59)  BP(mean): --  RR: 18 (20 Oct 2021 08:01) (18 - 18)  SpO2: 98% (20 Oct 2021 08:01) (95% - 98%)     Physical exam:    Constitutional:  No acute distress  HEENT: NC/AT, EOMI, PERRLA, conjunctivae clear; ears and nose atraumatic  Neck: supple; thyroid not palpable  Back: no tenderness  Respiratory: respiratory effort normal; clear to auscultation  Cardiovascular: S1S2 regular, no murmurs  Abdomen: soft, not tender, not distended, positive BS  Genitourinary: no suprapubic tenderness  Lymphatic: no LN palpable  Musculoskeletal: no muscle tenderness, no joint swelling or tenderness  Extremities: no pedal edema  Marked superficial varicosities to both LEs.  Left foot erythema and pain to the first two toes and plantar surface of the foot   Lt 1st interdigital space, with swelling, and erythema to the Lt foot   Has left ankle and left lower extremity erythema and edema - improving  Mild superficial abrasions to the plantar aspect of the Lt hallux and the Lt 2nd toes, no drainage  Neurological/ Psychiatric: AxOx3, judgement and insight normal; moving all extremities  Skin: no rashes; no palpable lesions    Labs: all available labs reviewed                        13.6   10.49 )-----------( 194      ( 19 Oct 2021 08:47 )             41.1     10    136  |  101  |  23  ----------------------------<  139<H>  4.0   |  29  |  1.28    Ca    9.8      19 Oct 2021 08:47  Mg     2.0     10-19    TPro  8.3  /  Alb  4.3  /  TBili  0.7  /  DBili  x   /  AST  23  /  ALT  24  /  AlkPhos  88  10-18     LIVER FUNCTIONS - ( 18 Oct 2021 14:00 )  Alb: 4.3 g/dL / Pro: 8.3 gm/dL / ALK PHOS: 88 U/L / ALT: 24 U/L / AST: 23 U/L / GGT: x           Urinalysis Basic - ( 18 Oct 2021 15:30 )    Color: Yellow / Appearance: Clear / S.005 / pH: x  Gluc: x / Ketone: Negative  / Bili: Negative / Urobili: Negative mg/dL   Blood: x / Protein: Negative mg/dL / Nitrite: Negative   Leuk Esterase: Negative / RBC: x / WBC x   Sq Epi: x / Non Sq Epi: x / Bacteria: x    COVID-19 PCR: NotDetec (10-18-21 @ 14:00)      Culture - Urine (collected 18 Oct 2021 15:30)  Source: Clean Catch None  Final Report (19 Oct 2021 20:03):    No growth    Culture - Blood (collected 18 Oct 2021 13:55)  Source: .Blood Blood-Peripheral  Preliminary Report (19 Oct 2021 19:01):    No growth to date.    Culture - Blood (collected 18 Oct 2021 13:55)  Source: .Blood Blood-Peripheral  Preliminary Report (19 Oct 2021 19:01):    No growth to date.    Radiology: all available radiological tests reviewed    < from: US Duplex Venous Lower Ext Complete, Bilateral (10.18.21 @ 20:22) >  No evidence of deep venous thrombosis in either lower extremity.  < end of copied text >    < from: US Duplex Arterial Lower Ext Compl, Bilateral (10.18.21 @ 20:28) >  Findings suggestive of a hemodynamically significant stenosis in the right external iliac artery with blunted monophasic waveforms throughout the remainder of the visualized right lower artery arteries, suspicious for a high-grade stenosis.  Moderate plaque in the left external iliac artery with an associated elevated peak systolic velocity consistent with a stenosis.  Elevated peak systolic velocity in the left popliteal artery with associated plaque, likely a hemodynamically significant stenosis.  A CTA of the aorta with a lower extremity runoff could be obtained for further evaluation if warranted.  < end of copied text >    Advanced directives addressed: full resuscitation
Date of service: 10-23-21 @ 10:40    Sitting in bed in NAD  Left foot feels tingly  Less swollen    ROS: no fever or chills; denies dizziness, no HA, no SOB or cough, no abdominal pain, no diarrhea or constipation; no dysuria, no legs pain    MEDICATIONS  (STANDING):  amLODIPine   Tablet 5 milliGRAM(s) Oral at bedtime  aspirin enteric coated 81 milliGRAM(s) Oral daily  atorvastatin 20 milliGRAM(s) Oral at bedtime  cefTRIAXone Injectable. 1000 milliGRAM(s) IV Push every 24 hours  cefTRIAXone Injectable.      enoxaparin Injectable 40 milliGRAM(s) SubCutaneous daily  furosemide    Tablet 20 milliGRAM(s) Oral daily  lisinopril 20 milliGRAM(s) Oral every 12 hours  metoprolol tartrate 100 milliGRAM(s) Oral every 12 hours  multivitamin 1 Tablet(s) Oral daily  pantoprazole    Tablet 40 milliGRAM(s) Oral before breakfast  sodium chloride 0.9%. 1000 milliLiter(s) (83 mL/Hr) IV Continuous <Continuous>  thiamine 100 milliGRAM(s) Oral daily  vancomycin  IVPB 1250 milliGRAM(s) IV Intermittent every 24 hours    Vital Signs Last 24 Hrs  T(C): 36.4 (23 Oct 2021 08:59), Max: 36.8 (22 Oct 2021 23:52)  T(F): 97.5 (23 Oct 2021 08:59), Max: 98.3 (22 Oct 2021 23:52)  HR: 61 (23 Oct 2021 08:59) (61 - 88)  BP: 134/59 (23 Oct 2021 08:59) (120/63 - 134/59)  BP(mean): --  RR: 17 (23 Oct 2021 08:59) (17 - 18)  SpO2: 99% (23 Oct 2021 08:59) (99% - 100%)     Physical exam:    Constitutional:  No acute distress  HEENT: NC/AT, EOMI, PERRLA, conjunctivae clear; ears and nose atraumatic  Neck: supple; thyroid not palpable  Back: no tenderness  Respiratory: respiratory effort normal; clear to auscultation  Cardiovascular: S1S2 regular, no murmurs  Abdomen: soft, not tender, not distended, positive BS  Genitourinary: no suprapubic tenderness  Lymphatic: no LN palpable  Musculoskeletal: no muscle tenderness, no joint swelling or tenderness  Extremities: no pedal edema  Marked superficial varicosities to both LEs.  Left foot erythema and pain to the first two toes and plantar surface of the foot   Lt 1st interdigital space, with swelling, and erythema to the Lt foot   Has left ankle and left lower extremity erythema and edema - much improved  Mild superficial abrasions to the plantar aspect of the Lt hallux and the Lt 2nd toes, no drainage  Neurological/ Psychiatric: AxOx3, judgement and insight normal; moving all extremities  Skin: no rashes; no palpable lesions    Labs: reviewed:                        13.6   10.49 )-----------( 194      ( 19 Oct 2021 08:47 )             41.1     10-19    136  |  101  |  23  ----------------------------<  139<H>  4.0   |  29  |  1.28    Ca    9.8      19 Oct 2021 08:47  Mg     2.0     10-19    TPro  8.3  /  Alb  4.3  /  TBili  0.7  /  DBili  x   /  AST  23  /  ALT  24  /  AlkPhos  88  10     LIVER FUNCTIONS - ( 18 Oct 2021 14:00 )  Alb: 4.3 g/dL / Pro: 8.3 gm/dL / ALK PHOS: 88 U/L / ALT: 24 U/L / AST: 23 U/L / GGT: x           Urinalysis Basic - ( 18 Oct 2021 15:30 )    Color: Yellow / Appearance: Clear / S.005 / pH: x  Gluc: x / Ketone: Negative  / Bili: Negative / Urobili: Negative mg/dL   Blood: x / Protein: Negative mg/dL / Nitrite: Negative   Leuk Esterase: Negative / RBC: x / WBC x   Sq Epi: x / Non Sq Epi: x / Bacteria: x    COVID-19 PCR: NotDetec (10-18-21 @ 14:00)    Radiology: all available radiological tests reviewed    < from: US Duplex Venous Lower Ext Complete, Bilateral (10.18.21 @ 20:22) >  No evidence of deep venous thrombosis in either lower extremity.  < end of copied text >    < from: US Duplex Arterial Lower Ext Compl, Bilateral (10.18.21 @ 20:28) >  Findings suggestive of a hemodynamically significant stenosis in the right external iliac artery with blunted monophasic waveforms throughout the remainder of the visualized right lower artery arteries, suspicious for a high-grade stenosis.  Moderate plaque in the left external iliac artery with an associated elevated peak systolic velocity consistent with a stenosis.  Elevated peak systolic velocity in the left popliteal artery with associated plaque, likely a hemodynamically significant stenosis.  A CTA of the aorta with a lower extremity runoff could be obtained for further evaluation if warranted.  < end of copied text >    Advanced directives addressed: full resuscitation
Date of service: 10/19/2021  CC: painful swollen LLE    HPI: 87 y/o M Pt has been seen by podiatry team at the ED, for the evaluation of LLE swelling, and pain of couple of days duration. Pt states that he started having pain and burning to the foot about 4 days ago, and he had a home visit by his private podiatrist who recommended him to keep an eye on his Lt foot abrasion, then when he got the swelling and redness to the LLE, he visited his PCP early today, who referred him to the ED for further evaluation. Pt denies any pedal trauma, and denies any other pedal complaint at this time. Pt denies F/N/V/SOB     10/19: Pt seen for follow up of LLE swelling and cellulitis of foot. Pt states that pain to foot has improved since starting IV abx. He denies any overnight events or new pedal complaints when seen.     REVIEW OF SYSTEMS:  CONSTITUTIONAL: No fever, chills,  weight loss, or fatigue  EYES: No eye pain, visual disturbances, or discharge  ENMT:  No difficulty hearing, tinnitus, vertigo; No sinus or throat pain  NECK: No pain or stiffness  RESPIRATORY: No cough, wheezing, or hemoptysis; No shortness of breath  CARDIOVASCULAR: No chest pain, palpitations, dizziness, or leg swelling  GASTROINTESTINAL: No abdominal or epigastric pain. No nausea, vomiting, or hematemesis; No diarrhea or constipation. No melena or hematochezia.  GENITOURINARY: No dysuria, frequency, hematuria, or incontinence  NEUROLOGICAL: No headaches, memory loss, loss of strength, numbness, or tremors  SKIN: Abrasion to the Lt 1st and 2nd toes.  No itching, burning, or rashes. Swollen Lt leg   LYMPH NODES: No enlarged glands  ENDOCRINE: No heat or cold intolerance; No hair loss  MUSCULOSKELETAL: No joint pain or swelling; No muscle, back, or extremity pain  HEME/LYMPH: No easy bruising, or bleeding gums    PAST MEDICAL & SURGICAL HISTORY:  Hypertension  CAD (coronary artery disease)  Cardiac arrest with successful resuscitation  CKD (chronic kidney disease)  PAD (peripheral artery disease)  Lung cancer  Heart failure  AICD (automatic cardioverter/defibrillator) present    S/P carotid endarterectomy  History of lobectomy of lung  History of appendectomy  H/O left knee surgery  History of tonsillectomy and adenoidectomy    Allergies:  No Known Allergies    VITALS:  Vital Signs Last 24 Hrs  T(C): 36.6 (19 Oct 2021 07:21), Max: 37.2 (18 Oct 2021 23:40)  T(F): 97.8 (19 Oct 2021 07:21), Max: 98.9 (18 Oct 2021 23:40)  HR: 61 (19 Oct 2021 07:21) (59 - 83)  BP: 122/54 (19 Oct 2021 07:21) (112/58 - 147/53)  BP(mean): 85 (18 Oct 2021 15:35) (73 - 85)  RR: 18 (19 Oct 2021 07:21) (14 - 18)  SpO2: 98% (19 Oct 2021 07:21) (96% - 100%)    Physical Examination:  Constitutional: AAOx3, non-focal; NAD, comfortable resting   Focused LE exam:  Vascular: Temperature gradient is warm to cool to the RLE, warm to warm to the LLE, non-palpable pulses b/l, capillary re-fill time is about 5sec to digits 1-5 b/l. +3 pitting edema to the dorsum of the Lt foot, extending to the Lt upper leg.  Marked superficial varicosities to both LEs.  Neuro: Intact protective sensation to the foot and digits 1-5 b/l.  Derm: Skin is warmer to the LLE, noted + IDM to the Lt 1st interdigital space, with swelling, and streaking erythema to the Lt foot extending proximally to the Lt leg resolving. Mild superficial abrasions to the plantar aspect of the Lt hallux and the Lt 2nd toes, without any drainage, fluctuance, crepitous. Musculoskeletal: Manual muscle testing is 5/5 in all muscle groups of the foot/ankle b/l. ROM to all the foot/ankle joints is WNL bilaterally. TTP over the dorsum of the Lt foot.    LABS:                                   13.6   10.49 )-----------( 194      ( 19 Oct 2021 08:47 )             41.1   10-19    136  |  101  |  23  ----------------------------<  139<H>  4.0   |  29  |  1.28    Ca    9.8      19 Oct 2021 08:47  Mg     2.0     10-19    TPro  8.3  /  Alb  4.3  /  TBili  0.7  /  DBili  x   /  AST  23  /  ALT  24  /  AlkPhos  88  10-18      RADIOLOGY:    X-rays Lt foot, wet read per podiatry shown non specific soft tissue swelling, with no subcutaneous emphysema.     < from: US Duplex Arterial Lower Ext Compl, Bilateral (10.18.21 @ 20:28) >  IMPRESSION:    Findings suggestive of a hemodynamically significant stenosis in the right external iliac artery with blunted monophasic waveforms throughout the remainder of the visualized right lower artery arteries, suspicious for a high-grade stenosis.    Moderate plaque in the left external iliac artery with an associated elevated peak systolic velocity consistent with a stenosis.    Elevated peak systolic velocity in the left popliteal artery with associated plaque, likely a hemodynamically significant stenosis.    A CTA of the aorta with a lower extremity runoff could be obtained for further evaluation if warranted.    < end of copied text >    < from: US Duplex Venous Lower Ext Complete, Bilateral (10.18.21 @ 20:22) >  IMPRESSION:  No evidence of deep venous thrombosis in either lower extremity.    < end of copied text >    
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 2 days ago patient was walking and the toe left toes curled inward when he stumbled resulting in pain in the bone and abrasion of both great and second toe on the plantar surface. Pain and erythema worsened and went to PMD for further evaluation. Advised to report to the ED. Evaluated by podiatry and wound cleaned and dressed with subsequent improvement in symptoms.  Left leg also got more swollen after the event.  Admitted with cellulitis left LE.      10/19:  Pt seen.  No complaints.  Dressing changed by podiatry this am.  Awaiting consultant input.      Review of system- All 10 systems reviewed and is as per HPI otherwise negative.     T(C): 36.8 (10-19-21 @ 15:09), Max: 37.2 (10-18-21 @ 23:40)  HR: 59 (10-19-21 @ 15:09) (59 - 83)  BP: 109/49 (10-19-21 @ 15:09) (109/49 - 147/53)  RR: 18 (10-19-21 @ 15:09) (14 - 18)  SpO2: 96% (10-19-21 @ 15:09) (96% - 100%)  Wt(kg): --    PHYSICAL EXAM:  GENERAL: Comfortable, no acute distress  HEAD:  Atraumatic, Normocephalic  EYES: EOMI, PERRLA  HEENT: Moist mucous membranes  NECK: Supple, No JVD  NERVOUS SYSTEM:  Alert & Oriented X3, Motor Strength 5/5 B/L upper and lower extremities  CHEST/LUNG: Clear to auscultation bilaterally  HEART: Regular rate and rhythm; No murmurs, rubs, or gallops  ABDOMEN: Soft, Nontender, Nondistended; Bowel sounds present  GENITOURINARY- Voiding, no palpable bladder  EXTREMITIES:  No clubbing, cyanosis, or edema  MUSCULOSKELTAL- No muscle tenderness, No joint tenderness  SKIN-no rash    LABS:                        13.6   10.49 )-----------( 194      ( 19 Oct 2021 08:47 )             41.1     10-19    136  |  101  |  23  ----------------------------<  139<H>  4.0   |  29  |  1.28    Ca    9.8      19 Oct 2021 08:47  Mg     2.0     10-19    TPro  8.3  /  Alb  4.3  /  TBili  0.7  /  DBili  x   /  AST  23  /  ALT  24  /  AlkPhos  88  10-18    PT/INR - ( 18 Oct 2021 14:00 )   PT: 12.1 sec;   INR: 1.05 ratio      PTT - ( 18 Oct 2021 14:00 )  PTT:33.2 sec    Urinalysis Basic - ( 18 Oct 2021 15:30 )  Color: Yellow / Appearance: Clear / S.005 / pH: x  Gluc: x / Ketone: Negative  / Bili: Negative / Urobili: Negative mg/dL   Blood: x / Protein: Negative mg/dL / Nitrite: Negative   Leuk Esterase: Negative / RBC: x / WBC x   Sq Epi: x / Non Sq Epi: x / Bacteria: x      MEDICATIONS  (STANDING):  amLODIPine   Tablet 5 milliGRAM(s) Oral at bedtime  aspirin enteric coated 81 milliGRAM(s) Oral daily  atorvastatin 20 milliGRAM(s) Oral at bedtime  cefTRIAXone Injectable. 1000 milliGRAM(s) IV Push every 24 hours  cefTRIAXone Injectable.      furosemide    Tablet 20 milliGRAM(s) Oral daily  lisinopril 20 milliGRAM(s) Oral every 12 hours  metoprolol tartrate 100 milliGRAM(s) Oral every 12 hours  multivitamin 1 Tablet(s) Oral daily  pantoprazole    Tablet 40 milliGRAM(s) Oral before breakfast  thiamine 100 milliGRAM(s) Oral daily  vancomycin  IVPB 1250 milliGRAM(s) IV Intermittent every 24 hours    MEDICATIONS  (PRN):  acetaminophen   Tablet .. 650 milliGRAM(s) Oral every 6 hours PRN Temp greater or equal to 38C (100.4F), Mild Pain (1 - 3)  aluminum hydroxide/magnesium hydroxide/simethicone Suspension 30 milliLiter(s) Oral every 4 hours PRN Dyspepsia  meclizine 25 milliGRAM(s) Oral every 6 hours PRN Dizziness  melatonin 3 milliGRAM(s) Oral at bedtime PRN Insomnia  ondansetron Injectable 4 milliGRAM(s) IV Push every 8 hours PRN Nausea and/or Vomiting        
Date of service: 10/21/2021  CC: painful swollen LLE  HPI: 85 y/o M Pt has been seen by podiatry team at the ED, for the evaluation of LLE swelling, and pain of couple of days duration. Pt states that he started having pain and burning to the foot about 4 days ago, and he had a home visit by his private podiatrist who recommended him to keep an eye on his Lt foot abrasion, then when he got the swelling and redness to the LLE, he visited his PCP early today, who referred him to the ED for further evaluation. Pt denies any pedal trauma, and denies any other pedal complaint at this time. Pt denies F/N/V/SOB     10/21: Pt seen by podiatry for follow up of LLE swelling and cellulitis of Lt foot with attending present. Noted  resting comfortably in chair, NAD. Pt states that pain to foot continues to improve with minimal burning pain to forefoot. Denies any additional pedal complaints. Denies any overnight events or F/N/V/SOB at this time.     REVIEW OF SYSTEMS:  CONSTITUTIONAL: No fever, chills,  weight loss, or fatigue  EYES: No eye pain, visual disturbances, or discharge  ENMT:  No difficulty hearing, tinnitus, vertigo; No sinus or throat pain  NECK: No pain or stiffness  RESPIRATORY: No cough, wheezing, or hemoptysis; No shortness of breath  CARDIOVASCULAR: No chest pain, palpitations, dizziness, or leg swelling  GASTROINTESTINAL: No abdominal or epigastric pain. No nausea, vomiting, or hematemesis; No diarrhea or constipation. No melena or hematochezia.  GENITOURINARY: No dysuria, frequency, hematuria, or incontinence  NEUROLOGICAL: No headaches, memory loss, loss of strength, numbness, or tremors  SKIN: Abrasion to the Lt 1st and 2nd toes.  No itching, burning, or rashes. Swollen Lt leg   LYMPH NODES: No enlarged glands  ENDOCRINE: No heat or cold intolerance; No hair loss  MUSCULOSKELETAL: No joint pain or swelling; No muscle, back, or extremity pain  HEME/LYMPH: No easy bruising, or bleeding gums    PAST MEDICAL & SURGICAL HISTORY:  Hypertension  CAD (coronary artery disease)  Cardiac arrest with successful resuscitation  CKD (chronic kidney disease)  PAD (peripheral artery disease)  Lung cancer  Heart failure  AICD (automatic cardioverter/defibrillator) present    S/P carotid endarterectomy  History of lobectomy of lung  History of appendectomy  H/O left knee surgery  History of tonsillectomy and adenoidectomy    Allergies:  No Known Allergies    MEDICATIONS  (STANDING):  amLODIPine   Tablet 5 milliGRAM(s) Oral at bedtime  aspirin enteric coated 81 milliGRAM(s) Oral daily  atorvastatin 20 milliGRAM(s) Oral at bedtime  cefTRIAXone Injectable. 1000 milliGRAM(s) IV Push every 24 hours  cefTRIAXone Injectable.      enoxaparin Injectable 40 milliGRAM(s) SubCutaneous daily  furosemide    Tablet 20 milliGRAM(s) Oral daily  lisinopril 20 milliGRAM(s) Oral every 12 hours  metoprolol tartrate 100 milliGRAM(s) Oral every 12 hours  multivitamin 1 Tablet(s) Oral daily  pantoprazole    Tablet 40 milliGRAM(s) Oral before breakfast  thiamine 100 milliGRAM(s) Oral daily  vancomycin  IVPB 1250 milliGRAM(s) IV Intermittent every 24 hours    MEDICATIONS  (PRN):  acetaminophen   Tablet .. 650 milliGRAM(s) Oral every 6 hours PRN Temp greater or equal to 38C (100.4F), Mild Pain (1 - 3)  aluminum hydroxide/magnesium hydroxide/simethicone Suspension 30 milliLiter(s) Oral every 4 hours PRN Dyspepsia  meclizine 25 milliGRAM(s) Oral every 6 hours PRN Dizziness  melatonin 3 milliGRAM(s) Oral at bedtime PRN Insomnia  ondansetron Injectable 4 milliGRAM(s) IV Push every 8 hours PRN Nausea and/or Vomiting    VITALS:  Vital Signs Last 24 Hrs  T(C): 36.7 (21 Oct 2021 07:43), Max: 37 (20 Oct 2021 22:15)  T(F): 98 (21 Oct 2021 07:43), Max: 98.6 (20 Oct 2021 22:15)  HR: 65 (21 Oct 2021 09:26) (61 - 66)  BP: 121/60 (21 Oct 2021 09:26) (101/54 - 131/56)  RR: 17 (21 Oct 2021 07:43) (17 - 18)  SpO2: 98% (21 Oct 2021 07:43) (94% - 98%)    Physical Examination:  Constitutional: AAOx3, non-focal; NAD, comfortable resting   Focused LE exam:  Vascular: Temperature gradient is warm to cool to the RLE, warm to warm to the LLE, non-palpable pulses b/l, capillary re-fill time is about 5sec to digits 1-5 b/l. +1 pitting edema to the dorsum of the Lt foot, extending to the Lt lower leg.  Marked superficial varicosities to both LEs.  Neuro: Intact protective sensation to the foot and digits 1-5 b/l.  Derm: Skin is warmer to the LLE, noted + IDM to the Lt 1st interdigital space- improving, with swelling, and streaking erythema to the Lt foot - resolving. Mild superficial abrasions to the plantar aspect of the Lt hallux and the Lt 2nd toes, without any drainage, fluctuance, crepitous.   Musculoskeletal: Manual muscle testing is 5/5 in all muscle groups of the foot/ankle b/l. ROM to all the foot/ankle joints is WNL bilaterally. Mild TTP over the dorsum of the Lt foot.    LABS:                     13.6   10.49 )-----------( 194      ( 19 Oct 2021 08:47 )             41.1   10-19    136  |  101  |  23  ----------------------------<  139<H>  4.0   |  29  |  1.28    Ca    9.8      19 Oct 2021 08:47  Mg     2.0     10-19    TPro  8.3  /  Alb  4.3  /  TBili  0.7  /  DBili  x   /  AST  23  /  ALT  24  /  AlkPhos  88  10-18      RADIOLOGY:    < from: Xray Foot AP + Lateral + Oblique, Left (10.18.21 @ 13:44) >  IMPRESSION:  First metatarsal head medial periarticular erosions. Adjacent first toe soft tissue swelling.  Overall Diffuse soft tissue swelling.  No fracture.  No radiographic evidence of osteomyelitis.  < end of copied text >    < from: US Duplex Arterial Lower Ext Compl, Bilateral (10.18.21 @ 20:28) >  IMPRESSION:  Findings suggestive of a hemodynamically significant stenosis in the right external iliac artery with blunted monophasic waveforms throughout the remainder of the visualized right lower artery arteries, suspicious for a high-grade stenosis.  Moderate plaque in the left external iliac artery with an associated elevated peak systolic velocity consistent with a stenosis.  Elevated peak systolic velocity in the left popliteal artery with associated plaque, likely a hemodynamically significant stenosis.  A CTA of the aorta with a lower extremity runoff could be obtained for further evaluation if warranted.  < end of copied text >    < from: US Duplex Venous Lower Ext Complete, Bilateral (10.18.21 @ 20:22) >  IMPRESSION:  No evidence of deep venous thrombosis in either lower extremity.  < end of copied text >

## 2021-10-23 NOTE — DISCHARGE NOTE PROVIDER - HOSPITAL COURSE
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 2 days ago patient was walking and the toe left toes curled inward when he stumbled resulting in pain in the bone and abrasion of both great and second toe on the plantar surface. Pain and erythema worsened and went to PMD for further evaluation. Advised to report to the ED. Evaluated by podiatry and wound cleaned and dressed with subsequent improvement in symptoms.  Left leg also got more swollen after the event.  Admitted with cellulitis left LE.    PHYSICAL EXAM:  ICU Vital Signs Last 24 Hrs  T(C): 36.8 (22 Oct 2021 23:52), Max: 36.8 (22 Oct 2021 23:52)  T(F): 98.3 (22 Oct 2021 23:52), Max: 98.3 (22 Oct 2021 23:52)  HR: 63 (22 Oct 2021 23:52) (63 - 88)  BP: 129/56 (22 Oct 2021 23:52) (120/63 - 129/56)  BP(mean): --  ABP: --  ABP(mean): --  RR: 17 (22 Oct 2021 23:52) (17 - 18)  SpO2: 99% (22 Oct 2021 23:52) (99% - 100%)    GENERAL: Comfortable, no acute distress  HEAD:  Atraumatic, Normocephalic  EYES: EOMI, PERRLA  HEENT: Moist mucous membranes  NECK: Supple, No JVD  NERVOUS SYSTEM:  Alert & Oriented X3, Motor Strength 5/5 B/L upper and lower extremities  CHEST/LUNG: Clear to auscultation bilaterally  HEART: Regular rate and rhythm; No murmurs, rubs, or gallops  ABDOMEN: Soft, Nontender, Nondistended; Bowel sounds present  GENITOURINARY- Voiding, no palpable bladder  EXTREMITIES:  mild left foot edema; no erythema, +superficial ulceration on plantar area of halux and 2nd toe, with granulation tissue   MUSCULOSKELTAL- No muscle tenderness, No joint tenderness  SKIN-no rash    Labs:  85 y/o male with PMH of CAD s/p CABG, Cardiac Arrest, CHF, HTN, AICD, 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  Admitted with cellulitis left foot.  Also concern of PVD.      #Left foot cellulitis with lymphangitis:    Cont IV ABX-- vanco and rocephin. day#4/5; considering d/c home on oral Abx per ID for another 5-7days  Appreciate ID input.    Trend labs/ temps/ clinical exam.    Podiatry f/u.      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    Wound healing well; no need for CT angio of LE    #OLIVIA on CKD:  improving , Cr at baseline   Baseline appears around 0.9-1.0.  Cr 1.3 on admission.    Trend labs.  Improved to 1.2 today.      #CAD s/p CABG:  stable.      #Chronic systolic CHF:  stable.  Cont home meds.      #DVT Proph:  Lovenox.      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 2 days ago patient was walking and the toe left toes curled inward when he stumbled resulting in pain in the bone and abrasion of both great and second toe on the plantar surface. Pain and erythema worsened and went to PMD for further evaluation. Advised to report to the ED. Evaluated by podiatry and wound cleaned and dressed with subsequent improvement in symptoms.  Left leg also got more swollen after the event.  Admitted with cellulitis left LE.    PHYSICAL EXAM:  ICU Vital Signs Last 24 Hrs  T(C): 36.8 (22 Oct 2021 23:52), Max: 36.8 (22 Oct 2021 23:52)  T(F): 98.3 (22 Oct 2021 23:52), Max: 98.3 (22 Oct 2021 23:52)  HR: 63 (22 Oct 2021 23:52) (63 - 88)  BP: 129/56 (22 Oct 2021 23:52) (120/63 - 129/56)  BP(mean): --  ABP: --  ABP(mean): --  RR: 17 (22 Oct 2021 23:52) (17 - 18)  SpO2: 99% (22 Oct 2021 23:52) (99% - 100%)    GENERAL: Comfortable, no acute distress  HEAD:  Atraumatic, Normocephalic  EYES: EOMI, PERRLA  HEENT: Moist mucous membranes  NECK: Supple, No JVD  NERVOUS SYSTEM:  Alert & Oriented X3, Motor Strength 5/5 B/L upper and lower extremities  CHEST/LUNG: Clear to auscultation bilaterally  HEART: Regular rate and rhythm; No murmurs, rubs, or gallops  ABDOMEN: Soft, Nontender, Nondistended; Bowel sounds present  GENITOURINARY- Voiding, no palpable bladder  EXTREMITIES:  mild left foot edema; no erythema, +superficial ulceration on plantar area of halux and 2nd toe, with granulation tissue   MUSCULOSKELTAL- No muscle tenderness, No joint tenderness  SKIN-no rash    Labs:  87 y/o male with PMH of CAD s/p CABG, Cardiac Arrest, CHF, HTN, AICD, 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  Admitted with cellulitis left foot.  Also concern of PVD.      #Left foot cellulitis with lymphangitis:    Cont IV ABX-- vanco and rocephin. day#4/5; considering d/c home on oral Abx per ID for another 5-7days  Appreciate ID input.  D/W Dr Echols and podiatry. No CI for discharge-> cont doxy x 7 more days with f/u CT as o/p  Trend labs/ temps/ clinical exam.    Podiatry f/u.    Eventual CTA with runoff once Scr improves/reaches baseline    #PVD with suspected Right EIA stenosis and Left EIA and Popliteal Stenosis:    Appreciate vascular input.    Wound healing well; no need for CT angio of LE    #OLIVIA on CKD:  improving , Cr at baseline   Baseline appears around 0.9-1.0.  Cr 1.3 on admission.    Trend labs.  Improved to 1.2 today.      #CAD s/p CABG:  stable.      #Chronic systolic CHF:  stable.  Cont home meds.      #DVT Proph:  Lovenox.      Dispo: d/c home timeL 49 min

## 2021-10-23 NOTE — PROGRESS NOTE ADULT - REASON FOR ADMISSION
Left Foot Infection

## 2021-10-23 NOTE — PROGRESS NOTE ADULT - ASSESSMENT
85 y/o Male with h/o CAD s/p CABG, prior cardiac arrest, CHF, HTN, AICD, Lung Ca s/p resection (reported to be in remission) was admitted on 10/18 with worsening erythema and pain to the first two toes and plantar surface of the foot x 2 days. Two days ago patient was walking and the toe left toes curled inward when he stumbled resulting in pain in the bone and abrasion of both great and second toe on the plantar surface. Pain and erythema worsened and went to PMD for further evaluation. Advised to report to the ED. No fever at home. In ER she received vancomycin IV and ceftriaxone.     1. Left foot  and left leg cellulitis with lymphangitis. PVD. CRF stage 3.   -BC x 2 noted  -foot is less edema  -on ceftriaxone 1 gm IV qd and vancomycin 1250 mg IV qd # 2  -tolerating abx well so far; no side effects noted  -obtain vancomycin trough level   -podiatry evaluation appreciated  -continue abx coverage   -vascular evaluation  -monitor temps  -f/u CBC  -supportive care  2. Other issues:   -care per medicine      
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  Admitted with cellulitis left foot.  Also concern of PVD.      #Left foot cellulitis with lymphangitis:    Cont IV ABX-- vanco and rocephin.    Appreciate ID input.    Trend labs/ temps/ clinical exam.    Podiatry f/u.      #PVD with suspected Right EIA stenosis and Left EIA and Popliteal Stenosis:    Appreciate vascluar input.    Plan CT ANGIOGRAM when renal function improved.    Cont ASA.      #OLIVIA on CKD:    Baseline appears around 0.9-1.0.    Cr 1.3 on admission.    Trend labs.  Improved to 1.2 today.      #CAD s/p CABG:  stable.      #Chronic systolic CHF:  stable.  Cont home meds.      #DVT Proph:  Lovenox.      
Assessment: 85 y/o M Pt has been seen by podiatry team for the evaluation of the following;  - LLE cellulitis   - Lt first interdigital maceration  - Partial thickness ulcerative lesions, Lt hallux and 2nd toes  - Pain to the Lt foot, and difficulty to ambulate    Plan:  - Pt is evaluated and chart reviewed.  - Reviewed the imaging and discussed the case with the patient  - Discussed the potential causes and plan of management with the Pt.  - Explained to the Pt the importance IV Abx, and to keep monitoring the clinical picture of cellulitis.   - Pt demonstrated verbal understanding, and agreed to the plan of treatment.   - Reapplied betadine paint to the Lt 1st IDM space, then wrapped in DSD.  - Discussed case with Dr. Echols, ID recommendations, appreciated.  - Vascular consults, recommendations noted and appreciated,  - Care per medicine, appreciated.  - Case D/W Dr. Blackburn will advise if plan changes   - Pt can follow up in office of Dr. Blackburn one week from d/c   - Podiatry will follow
Assessment: 85 y/o M Pt has been seen by podiatry team for the evaluation of the following;  - LLE cellulitis   - Lt first interdigital maceration  - Partial thickness ulcerative lesions, Lt hallux and 2nd toes  - Pain to the Lt foot, and difficulty to ambulate    Plan:  - Pt is evaluated and chart reviewed.  - Reviewed the imaging and discussed the case with the patient  - Discussed the potential causes and plan of management with the Pt.  - Explained to the Pt the importance of being admitted, and to start a course of IV Abx, and to keep monitoring the cellulitis where in house.     - Pt demonstrated verbal understanding  - Reapplied betadine paint to the Lt 1st IDM space, then wrapped in DSD.  - US Duplex arterial and venous findings noted above   - Recommended ID and vascular consults.  - Care per medicine, appreciated.  - Case D/W Dr. Blackburn will advise if plan changes   - Podiatry will follow the case in house.  
85 y/o Male with h/o CAD s/p CABG, prior cardiac arrest, CHF, HTN, AICD, Lung Ca s/p resection (reported to be in remission) was admitted on 10/18 with worsening erythema and pain to the first two toes and plantar surface of the foot x 2 days. Two days ago patient was walking and the toe left toes curled inward when he stumbled resulting in pain in the bone and abrasion of both great and second toe on the plantar surface. Pain and erythema worsened and went to PMD for further evaluation. Advised to report to the ED. No fever at home. In ER she received vancomycin IV and ceftriaxone.     1. Left foot  and left leg cellulitis with lymphangitis. PVD. CRF stage 3.   -BC x 2 noted  -foot with less edema and mild tenderness  -on ceftriaxone 1 gm IV qd and vancomycin 1250 mg IV qd # 3  -tolerating abx well so far; no side effects noted  -vancomycin trough level is theraeputic  -podiatry evaluation appreciated  -continue abx coverage   -vascular evaluation  -monitor temps  -f/u CBC  -supportive care  2. Other issues:   -care per medicine      
85 y/o Male with h/o CAD s/p CABG, prior cardiac arrest, CHF, HTN, AICD, Lung Ca s/p resection (reported to be in remission) was admitted on 10/18 with worsening erythema and pain to the first two toes and plantar surface of the foot x 2 days. Two days ago patient was walking and the toe left toes curled inward when he stumbled resulting in pain in the bone and abrasion of both great and second toe on the plantar surface. Pain and erythema worsened and went to PMD for further evaluation. Advised to report to the ED. No fever at home. In ER she received vancomycin IV and ceftriaxone.     1. Left foot  and left leg cellulitis with lymphangitis. PVD. CRF stage 3.   -BC x 2 noted  -foot with less edema and mild tenderness; still swelling  -on ceftriaxone 1 gm IV qd and vancomycin 1250 mg IV qd # 4  -tolerating abx well so far; no side effects noted  -vancomycin trough level is therapeutic  -podiatry evaluation appreciated  -continue abx coverage   -monitor temps  -f/u CBC  -supportive care  2. Other issues:   -care per medicine      
85 y/o Male with h/o CAD s/p CABG, prior cardiac arrest, CHF, HTN, AICD, Lung Ca s/p resection (reported to be in remission) was admitted on 10/18 with worsening erythema and pain to the first two toes and plantar surface of the foot x 2 days. Two days ago patient was walking and the toe left toes curled inward when he stumbled resulting in pain in the bone and abrasion of both great and second toe on the plantar surface. Pain and erythema worsened and went to PMD for further evaluation. Advised to report to the ED. No fever at home. In ER she received vancomycin IV and ceftriaxone.     1. Left foot  and left leg cellulitis with lymphangitis. PVD. CRF stage 3.   -BC x 2 noted  -foot with less edema and mild tenderness; still swelling  -on ceftriaxone 1 gm IV qd and vancomycin 1250 mg IV qd # 5  -tolerating abx well so far; no side effects noted  -vancomycin trough level is therapeutic  -podiatry evaluation appreciated  -change abx to doxycycline 100 mg PO q12h for 7 more days  -monitor temps  -f/u CBC  -supportive care  2. Other issues:   -care per medicine      
Assessment: 85 y/o M Pt has been seen by podiatry team for the evaluation of the following;  - LLE cellulitis   - Lt first interdigital maceration  - Partial thickness ulcerative lesions, Lt hallux and 2nd toes  - Pain to the Lt foot, and difficulty to ambulate    Plan:  - Pt is evaluated and chart reviewed.  - Reviewed the imaging and discussed the case with the patient  - Discussed the potential causes and plan of management with the Pt.  - Explained to the Pt the importance IV Abx, and to keep monitoring the clinical picture of cellulitis.   - Pt demonstrated verbal understanding, and agreed to the plan of treatment.   - Reapplied betadine paint to the Lt 1st IDM space, then wrapped in DSD.  - Discussed case with Dr. Echols, ID recommendations, appreciated.  - Vascular consults, recommendations noted and appreciated,  - Care per medicine, appreciated.  - Case D/W Dr. Blackburn will advise if plan changes   - Podiatry will follow the case in house.
Assessment: 85 y/o M Pt has been seen by podiatry team for the evaluation of the following;  - LLE cellulitis   - Lt first interdigital maceration  - Partial thickness ulcerative lesions, Lt hallux and 2nd toes  - Pain to the Lt foot, and difficulty to ambulate    Plan:  - Pt is evaluated and chart reviewed.  - Reviewed the imaging and discussed the case with the patient  - Discussed the potential causes and plan of management with the Pt.  - Explained to the Pt the importance IV Abx, and to keep monitoring the clinical picture of cellulitis.   - Pt demonstrated verbal understanding, and agreed to the plan of treatment.   - Reapplied betadine paint to the Lt 1st IDM space,   - Discussed case with Dr. Echols, ID recommendations, appreciated.  - Vascular consults, recommendations noted and appreciated,  - Care per medicine, appreciated.  - Case D/W Dr. Blackburn will advise if plan changes   - Pt is deemed stable from the podiatry standpoint, and ready to be discharged once deemed stable by all other medical specialities,  - Pt may follow up in office of Dr. Blackburn in one week upon hospital discharge.  - Podiatry will follow while pt is in house. 
Assessment: 85 y/o M Pt has been seen by podiatry team for the evaluation of the following;  - LLE cellulitis   - Lt first interdigital maceration  - Partial thickness ulcerative lesions, Lt hallux and 2nd toes  - Pain to the Lt foot, and difficulty to ambulate    Plan:  - Pt is evaluated and chart reviewed.  - Reviewed the imaging and discussed the case with the patient  - Discussed the potential causes and plan of management with the Pt.  - Explained to the Pt the importance IV Abx, and to keep monitoring the clinical picture of cellulitis.   - Pt demonstrated verbal understanding, and agreed to the plan of treatment.   - Ulcers to the left hallux and 2nd toes are stable. Cellulitis to the left foot has resolved.   - Discussed case with Dr. Echols, ID recommendations, appreciated.  - Vascular consults, recommendations noted and appreciated,  - Care per medicine, appreciated.  - Case D/W Dr. Blackburn will advise if plan changes   - Pt is deemed stable from the podiatry standpoint, and ready to be discharged once deemed stable by all other medical specialities,  - Pt may follow up in office of Dr. Blackburn in one week upon hospital discharge.  - Podiatry will follow while pt is in house.

## 2021-10-23 NOTE — DISCHARGE NOTE PROVIDER - CARE PROVIDER_API CALL
DEL METZ  Plastic Surgery  DIANE MATHIS, Phys,    Phone: [phone]  Fax: [phone]  Follow Up Time:     Albaro Maldonado  INTERNAL MEDICINE  [address] [place] [address]  Phone: [phone]  Fax: [phone]  Follow Up Time:    Albaro Maldonado  INTERNAL MEDICINE  [address] [place] [address]  Phone: [phone]  Fax: [phone]  Follow Up Time:     lou hoang  podiatry  Phone: (   )    -  Fax: (   )    -  Follow Up Time:

## 2021-10-23 NOTE — PROGRESS NOTE ADULT - PROVIDER SPECIALTY LIST ADULT
Infectious Disease
Hospitalist
Hospitalist
Vascular Surgery
Hospitalist
Hospitalist
Infectious Disease
Podiatry
Infectious Disease
Infectious Disease
Podiatry

## 2021-10-23 NOTE — DISCHARGE NOTE NURSING/CASE MANAGEMENT/SOCIAL WORK - PATIENT PORTAL LINK FT
You can access the FollowMyHealth Patient Portal offered by [facility] by registering at the following website: [email or web address]. By joining [employer]’s FollowMyHealth portal, you will also be able to view your health information using other applications (apps) compatible with our system.

## 2021-10-23 NOTE — DISCHARGE NOTE PROVIDER - NSDCFUSCHEDAPPT_GEN_ALL_CORE_FT
DANUTA BARKLEY W ; 11/01/2021 ; NPP Otolaryng [address] [facility]  DANUTA BARKLEY W ; 11/17/2021 ; NPP Neurology [address] DANUT[address] Hernandez W ; 12/20/2021 ; NPP CardioElectro [address]

## 2021-10-23 NOTE — DISCHARGE NOTE NURSING/CASE MANAGEMENT/SOCIAL WORK - NSDCVIVACCINE_GEN_ALL_CORE_FT
Tdap; 07-Jun-2021 21:51; Dylan Monahan (WOO); [employer]; [id number] (Exp. Date: 18-Nov-2022); IntraMuscular; Deltoid Right.; 0.5 milliLiter(s); VIS (VIS Published: 09-May-2013, VIS Presented: 07-Jun-2021);

## 2021-10-23 NOTE — PROGRESS NOTE ADULT - ATTENDING COMMENTS
I agree with assessment and plan as stated above by resident
I agree with assessment and plan as stated above by resident
I agree with the resdient's assesment and plan.

## 2021-10-23 NOTE — DISCHARGE NOTE PROVIDER - NSDCMRMEDTOKEN_GEN_ALL_CORE_FT
amLODIPine 5 mg oral tablet: 1 tab(s) orally once a day (in the evening)  Artificial Tears ophthalmic solution: 1 drop(s) to each affected eye 4 times a day, As Needed  Ecotrin Adult Low Strength 81 mg oral delayed release tablet: 1 tab(s) orally once a day  furosemide 20 mg oral tablet: 1 tab(s) orally once a day  lisinopril 20 mg oral tablet: 1 tab(s) orally 2 times a day  meclizine 25 mg oral tablet: 1 tab(s) orally every 6 hours, As needed, Dizziness  melatonin 5 mg oral tablet: 1 tab(s) orally once a day (at bedtime)  Metoprolol Tartrate 100 mg oral tablet: 1 tab(s) orally 2 times a day  Multiple Vitamins oral tablet: 1 tab(s) orally once a day  pantoprazole 40 mg oral delayed release tablet: 1 tab(s) orally once a day  pravastatin 40 mg oral tablet: 1 tab(s) orally once a day (at bedtime)  thiamine 100 mg oral tablet: 1 tab(s) orally once a day   amLODIPine 5 mg oral tablet: 1 tab(s) orally once a day (in the evening)  Artificial Tears ophthalmic solution: 1 drop(s) to each affected eye 4 times a day, As Needed  doxycycline monohydrate 100 mg oral capsule: 1 cap(s) orally 2 times a day   Ecotrin Adult Low Strength 81 mg oral delayed release tablet: 1 tab(s) orally once a day  furosemide 20 mg oral tablet: 1 tab(s) orally once a day  lisinopril 20 mg oral tablet: 1 tab(s) orally 2 times a day  meclizine 25 mg oral tablet: 1 tab(s) orally every 6 hours, As needed, Dizziness  melatonin 5 mg oral tablet: 1 tab(s) orally once a day (at bedtime)  Metoprolol Tartrate 100 mg oral tablet: 1 tab(s) orally 2 times a day  Multiple Vitamins oral tablet: 1 tab(s) orally once a day  pantoprazole 40 mg oral delayed release tablet: 1 tab(s) orally once a day  pravastatin 40 mg oral tablet: 1 tab(s) orally once a day (at bedtime)  thiamine 100 mg oral tablet: 1 tab(s) orally once a day

## 2021-10-23 NOTE — DISCHARGE NOTE PROVIDER - PROVIDER TOKENS
PROVIDER:[TOKEN:[[id number]]],PROVIDER:[TOKEN:[[id number]]] PROVIDER:[TOKEN:[[id number]]],FREE:[LAST:[viktor],FIRST:[lou],PHONE:[(   )    -],FAX:[(   )    -],ADDRESS:[podiatry]]

## 2021-10-25 ENCOUNTER — NON-APPOINTMENT (OUTPATIENT)
Age: 86
End: 2021-10-25

## 2021-10-29 DIAGNOSIS — I25.10 ATHEROSCLEROTIC HEART DISEASE OF NATIVE CORONARY ARTERY WITHOUT ANGINA PECTORIS: ICD-10-CM

## 2021-10-29 DIAGNOSIS — Z86.74 PERSONAL HISTORY OF SUDDEN CARDIAC ARREST: ICD-10-CM

## 2021-10-29 DIAGNOSIS — N17.9 ACUTE KIDNEY FAILURE, UNSPECIFIED: ICD-10-CM

## 2021-10-29 DIAGNOSIS — Z85.118 PERSONAL HISTORY OF OTHER MALIGNANT NEOPLASM OF BRONCHUS AND LUNG: ICD-10-CM

## 2021-10-29 DIAGNOSIS — Z90.2 ACQUIRED ABSENCE OF LUNG [PART OF]: ICD-10-CM

## 2021-10-29 DIAGNOSIS — I73.9 PERIPHERAL VASCULAR DISEASE, UNSPECIFIED: ICD-10-CM

## 2021-10-29 DIAGNOSIS — Z95.1 PRESENCE OF AORTOCORONARY BYPASS GRAFT: ICD-10-CM

## 2021-10-29 DIAGNOSIS — I50.22 CHRONIC SYSTOLIC (CONGESTIVE) HEART FAILURE: ICD-10-CM

## 2021-10-29 DIAGNOSIS — N18.30 CHRONIC KIDNEY DISEASE, STAGE 3 UNSPECIFIED: ICD-10-CM

## 2021-10-29 DIAGNOSIS — Z95.810 PRESENCE OF AUTOMATIC (IMPLANTABLE) CARDIAC DEFIBRILLATOR: ICD-10-CM

## 2021-10-29 DIAGNOSIS — Z96.652 PRESENCE OF LEFT ARTIFICIAL KNEE JOINT: ICD-10-CM

## 2021-10-29 DIAGNOSIS — L03.116 CELLULITIS OF LEFT LOWER LIMB: ICD-10-CM

## 2021-10-29 DIAGNOSIS — Z87.891 PERSONAL HISTORY OF NICOTINE DEPENDENCE: ICD-10-CM

## 2021-10-29 DIAGNOSIS — I13.0 HYPERTENSIVE HEART AND CHRONIC KIDNEY DISEASE WITH HEART FAILURE AND STAGE 1 THROUGH STAGE 4 CHRONIC KIDNEY DISEASE, OR UNSPECIFIED CHRONIC KIDNEY DISEASE: ICD-10-CM

## 2021-11-01 ENCOUNTER — APPOINTMENT (OUTPATIENT)
Dept: OTOLARYNGOLOGY | Facility: CLINIC | Age: 86
End: 2021-11-01
Payer: MEDICARE

## 2021-11-11 ENCOUNTER — APPOINTMENT (OUTPATIENT)
Dept: OTOLARYNGOLOGY | Facility: CLINIC | Age: 86
End: 2021-11-11
Payer: MEDICARE

## 2021-11-11 VITALS — BODY MASS INDEX: 27.02 KG/M2 | TEMPERATURE: 97.3 F | WEIGHT: 193 LBS | HEIGHT: 71 IN

## 2021-11-11 PROCEDURE — 99213 OFFICE O/P EST LOW 20 MIN: CPT | Mod: 25

## 2021-11-11 PROCEDURE — 69210 REMOVE IMPACTED EAR WAX UNI: CPT

## 2021-11-11 NOTE — ASSESSMENT
[FreeTextEntry1] : Cerumen cleared each ear. Symptoms relieved\par Sensorineural hearing loss stable\par fu exam:\par 4 mo

## 2021-11-17 ENCOUNTER — APPOINTMENT (OUTPATIENT)
Dept: NEUROLOGY | Facility: CLINIC | Age: 86
End: 2021-11-17
Payer: MEDICARE

## 2021-11-17 PROCEDURE — 95886 MUSC TEST DONE W/N TEST COMP: CPT

## 2021-11-17 PROCEDURE — 95910 NRV CNDJ TEST 7-8 STUDIES: CPT

## 2021-11-26 NOTE — ED STATDOCS - CPE ED MUSC NORM
normal... GOAL: Patient will transfer between sitting/standing independently with use of rolling walker within 2 weeks.

## 2021-12-02 ENCOUNTER — EMERGENCY (EMERGENCY)
Facility: HOSPITAL | Age: 86
LOS: 0 days | Discharge: HOME | End: 2021-12-02
Attending: EMERGENCY MEDICINE
Payer: MEDICARE

## 2021-12-02 VITALS
DIASTOLIC BLOOD PRESSURE: 67 MMHG | RESPIRATION RATE: 14 BRPM | SYSTOLIC BLOOD PRESSURE: 122 MMHG | HEART RATE: 59 BPM | OXYGEN SATURATION: 100 %

## 2021-12-02 VITALS
DIASTOLIC BLOOD PRESSURE: 51 MMHG | WEIGHT: 203.93 LBS | HEIGHT: 72 IN | HEART RATE: 62 BPM | TEMPERATURE: 98 F | SYSTOLIC BLOOD PRESSURE: 135 MMHG | OXYGEN SATURATION: 95 % | RESPIRATION RATE: 16 BRPM

## 2021-12-02 DIAGNOSIS — Z00.00 ENCOUNTER FOR GENERAL ADULT MEDICAL EXAMINATION WITHOUT ABNORMAL FINDINGS: ICD-10-CM

## 2021-12-02 DIAGNOSIS — I50.9 HEART FAILURE, UNSPECIFIED: ICD-10-CM

## 2021-12-02 DIAGNOSIS — Z95.810 PRESENCE OF AUTOMATIC (IMPLANTABLE) CARDIAC DEFIBRILLATOR: Chronic | ICD-10-CM

## 2021-12-02 DIAGNOSIS — N18.9 CHRONIC KIDNEY DISEASE, UNSPECIFIED: ICD-10-CM

## 2021-12-02 DIAGNOSIS — I95.89 OTHER HYPOTENSION: ICD-10-CM

## 2021-12-02 DIAGNOSIS — Z98.890 OTHER SPECIFIED POSTPROCEDURAL STATES: Chronic | ICD-10-CM

## 2021-12-02 DIAGNOSIS — I13.0 HYPERTENSIVE HEART AND CHRONIC KIDNEY DISEASE WITH HEART FAILURE AND STAGE 1 THROUGH STAGE 4 CHRONIC KIDNEY DISEASE, OR UNSPECIFIED CHRONIC KIDNEY DISEASE: ICD-10-CM

## 2021-12-02 DIAGNOSIS — Z90.2 ACQUIRED ABSENCE OF LUNG [PART OF]: Chronic | ICD-10-CM

## 2021-12-02 DIAGNOSIS — Z90.49 ACQUIRED ABSENCE OF OTHER SPECIFIED PARTS OF DIGESTIVE TRACT: Chronic | ICD-10-CM

## 2021-12-02 DIAGNOSIS — I95.9 HYPOTENSION, UNSPECIFIED: ICD-10-CM

## 2021-12-02 LAB
ALBUMIN SERPL ELPH-MCNC: 3.7 G/DL — SIGNIFICANT CHANGE UP (ref 3.3–5)
ALP SERPL-CCNC: 77 U/L — SIGNIFICANT CHANGE UP (ref 40–120)
ALT FLD-CCNC: 23 U/L — SIGNIFICANT CHANGE UP (ref 12–78)
ANION GAP SERPL CALC-SCNC: 8 MMOL/L — SIGNIFICANT CHANGE UP (ref 5–17)
APPEARANCE UR: CLEAR — SIGNIFICANT CHANGE UP
APTT BLD: 28.7 SEC — SIGNIFICANT CHANGE UP (ref 27.5–35.5)
AST SERPL-CCNC: 26 U/L — SIGNIFICANT CHANGE UP (ref 15–37)
BASOPHILS # BLD AUTO: 0.05 K/UL — SIGNIFICANT CHANGE UP (ref 0–0.2)
BASOPHILS NFR BLD AUTO: 0.5 % — SIGNIFICANT CHANGE UP (ref 0–2)
BILIRUB SERPL-MCNC: 0.6 MG/DL — SIGNIFICANT CHANGE UP (ref 0.2–1.2)
BILIRUB UR-MCNC: NEGATIVE — SIGNIFICANT CHANGE UP
BUN SERPL-MCNC: 50 MG/DL — HIGH (ref 7–23)
CALCIUM SERPL-MCNC: 9.4 MG/DL — SIGNIFICANT CHANGE UP (ref 8.5–10.1)
CHLORIDE SERPL-SCNC: 104 MMOL/L — SIGNIFICANT CHANGE UP (ref 96–108)
CO2 SERPL-SCNC: 24 MMOL/L — SIGNIFICANT CHANGE UP (ref 22–31)
COLOR SPEC: YELLOW — SIGNIFICANT CHANGE UP
CREAT SERPL-MCNC: 1.66 MG/DL — HIGH (ref 0.5–1.3)
DIFF PNL FLD: NEGATIVE — SIGNIFICANT CHANGE UP
EOSINOPHIL # BLD AUTO: 1.24 K/UL — HIGH (ref 0–0.5)
EOSINOPHIL NFR BLD AUTO: 12.8 % — HIGH (ref 0–6)
GLUCOSE SERPL-MCNC: 109 MG/DL — HIGH (ref 70–99)
GLUCOSE UR QL: NEGATIVE MG/DL — SIGNIFICANT CHANGE UP
HCT VFR BLD CALC: 41.1 % — SIGNIFICANT CHANGE UP (ref 39–50)
HGB BLD-MCNC: 13.9 G/DL — SIGNIFICANT CHANGE UP (ref 13–17)
IMM GRANULOCYTES NFR BLD AUTO: 0.5 % — SIGNIFICANT CHANGE UP (ref 0–1.5)
INR BLD: 1.06 RATIO — SIGNIFICANT CHANGE UP (ref 0.88–1.16)
KETONES UR-MCNC: NEGATIVE — SIGNIFICANT CHANGE UP
LACTATE SERPL-SCNC: 1.3 MMOL/L — SIGNIFICANT CHANGE UP (ref 0.7–2)
LEUKOCYTE ESTERASE UR-ACNC: NEGATIVE — SIGNIFICANT CHANGE UP
LYMPHOCYTES # BLD AUTO: 2.41 K/UL — SIGNIFICANT CHANGE UP (ref 1–3.3)
LYMPHOCYTES # BLD AUTO: 24.9 % — SIGNIFICANT CHANGE UP (ref 13–44)
MCHC RBC-ENTMCNC: 31.2 PG — SIGNIFICANT CHANGE UP (ref 27–34)
MCHC RBC-ENTMCNC: 33.8 GM/DL — SIGNIFICANT CHANGE UP (ref 32–36)
MCV RBC AUTO: 92.2 FL — SIGNIFICANT CHANGE UP (ref 80–100)
MONOCYTES # BLD AUTO: 0.66 K/UL — SIGNIFICANT CHANGE UP (ref 0–0.9)
MONOCYTES NFR BLD AUTO: 6.8 % — SIGNIFICANT CHANGE UP (ref 2–14)
NEUTROPHILS # BLD AUTO: 5.27 K/UL — SIGNIFICANT CHANGE UP (ref 1.8–7.4)
NEUTROPHILS NFR BLD AUTO: 54.5 % — SIGNIFICANT CHANGE UP (ref 43–77)
NITRITE UR-MCNC: NEGATIVE — SIGNIFICANT CHANGE UP
PH UR: 5 — SIGNIFICANT CHANGE UP (ref 5–8)
PLATELET # BLD AUTO: 196 K/UL — SIGNIFICANT CHANGE UP (ref 150–400)
POTASSIUM SERPL-MCNC: 4.7 MMOL/L — SIGNIFICANT CHANGE UP (ref 3.5–5.3)
POTASSIUM SERPL-SCNC: 4.7 MMOL/L — SIGNIFICANT CHANGE UP (ref 3.5–5.3)
PROT SERPL-MCNC: 7.9 GM/DL — SIGNIFICANT CHANGE UP (ref 6–8.3)
PROT UR-MCNC: NEGATIVE MG/DL — SIGNIFICANT CHANGE UP
PROTHROM AB SERPL-ACNC: 12.4 SEC — SIGNIFICANT CHANGE UP (ref 10.6–13.6)
RBC # BLD: 4.46 M/UL — SIGNIFICANT CHANGE UP (ref 4.2–5.8)
RBC # FLD: 13.4 % — SIGNIFICANT CHANGE UP (ref 10.3–14.5)
SARS-COV-2 RNA SPEC QL NAA+PROBE: SIGNIFICANT CHANGE UP
SODIUM SERPL-SCNC: 136 MMOL/L — SIGNIFICANT CHANGE UP (ref 135–145)
SP GR SPEC: 1.01 — SIGNIFICANT CHANGE UP (ref 1.01–1.02)
TROPONIN I, HIGH SENSITIVITY RESULT: 13.35 NG/L — SIGNIFICANT CHANGE UP
UROBILINOGEN FLD QL: NEGATIVE MG/DL — SIGNIFICANT CHANGE UP
WBC # BLD: 9.68 K/UL — SIGNIFICANT CHANGE UP (ref 3.8–10.5)
WBC # FLD AUTO: 9.68 K/UL — SIGNIFICANT CHANGE UP (ref 3.8–10.5)

## 2021-12-02 PROCEDURE — 71045 X-RAY EXAM CHEST 1 VIEW: CPT | Mod: 26

## 2021-12-02 PROCEDURE — 87086 URINE CULTURE/COLONY COUNT: CPT

## 2021-12-02 PROCEDURE — 36415 COLL VENOUS BLD VENIPUNCTURE: CPT

## 2021-12-02 PROCEDURE — 99285 EMERGENCY DEPT VISIT HI MDM: CPT

## 2021-12-02 PROCEDURE — 85025 COMPLETE CBC W/AUTO DIFF WBC: CPT

## 2021-12-02 PROCEDURE — 83605 ASSAY OF LACTIC ACID: CPT

## 2021-12-02 PROCEDURE — 85730 THROMBOPLASTIN TIME PARTIAL: CPT

## 2021-12-02 PROCEDURE — U0003: CPT

## 2021-12-02 PROCEDURE — 87040 BLOOD CULTURE FOR BACTERIA: CPT

## 2021-12-02 PROCEDURE — 93010 ELECTROCARDIOGRAM REPORT: CPT

## 2021-12-02 PROCEDURE — 93005 ELECTROCARDIOGRAM TRACING: CPT

## 2021-12-02 PROCEDURE — 71045 X-RAY EXAM CHEST 1 VIEW: CPT

## 2021-12-02 PROCEDURE — 85610 PROTHROMBIN TIME: CPT

## 2021-12-02 PROCEDURE — 81003 URINALYSIS AUTO W/O SCOPE: CPT

## 2021-12-02 PROCEDURE — U0005: CPT

## 2021-12-02 PROCEDURE — 99284 EMERGENCY DEPT VISIT MOD MDM: CPT | Mod: 25

## 2021-12-02 PROCEDURE — 84484 ASSAY OF TROPONIN QUANT: CPT

## 2021-12-02 PROCEDURE — 80053 COMPREHEN METABOLIC PANEL: CPT

## 2021-12-02 RX ORDER — SODIUM CHLORIDE 9 MG/ML
500 INJECTION INTRAMUSCULAR; INTRAVENOUS; SUBCUTANEOUS ONCE
Refills: 0 | Status: COMPLETED | OUTPATIENT
Start: 2021-12-02 | End: 2021-12-02

## 2021-12-02 RX ADMIN — SODIUM CHLORIDE 500 MILLILITER(S): 9 INJECTION INTRAMUSCULAR; INTRAVENOUS; SUBCUTANEOUS at 13:47

## 2021-12-02 NOTE — ED ADULT NURSE NOTE - CHIEF COMPLAINT QUOTE
Pt BIBEMS from home, a/ox3. pt reports "I felt weak and tired when I woke up this morning, I checked my blood pressure and it was extremely low".EMS reports "arriving to scene pt a/ox3, denied complaints, BP WNL upon arrival. pt family reports pt may have taken a dose of BP medication that was not needed". pt asymptomatic at this time, denies complaints.

## 2021-12-02 NOTE — ED PROVIDER NOTE - CARE PLAN
1 Principal Discharge DX:	Encounter for medical screening examination  Secondary Diagnosis:	Transient hypotension

## 2021-12-02 NOTE — ED PROVIDER NOTE - NSFOLLOWUPINSTRUCTIONS_ED_ALL_ED_FT
1. return for worsening symptoms or anything concerning to you  2. take all home meds as prescribed  3. follow up with your pmd call to make an appointment  4. follow up with your cardiologist tomorrow  5. hold the metoprolol, lisinopril and coreg tonight until you check you bp tomorrow    Hypotension    WHAT YOU NEED TO KNOW:    Hypotension is a condition that causes your blood pressure (BP) to drop lower than it should be. Hypotension may be mild, serious, or life-threatening.    DISCHARGE INSTRUCTIONS:    Medicines:   •Alpha-adrenoreceptor agonists: These medicines may increase your BP and decrease your symptoms. Take these medicines as directed.       •Steroids: This medicine helps prevent salt loss from your body. Steroids may also help increase the amount of fluid in your body and raise your BP.      •Vasopressors: These medicines help constrict (make smaller) your blood vessels and increase your BP. Vasopressor medicines may increase the blood flow to your brain and help decrease your symptoms.      •Antidiuretic hormone: This medicine helps control your BP and helps decrease your need to urinate during the night.       •Antiparkinson medicine: This medicine may help increase your standing BP and decrease your symptoms.      •Take your medicine as directed. Contact your healthcare provider if you think your medicine is not helping or if you have side effects. Tell him of her if you are allergic to any medicine. Keep a list of the medicines, vitamins, and herbs you take. Include the amounts, and when and why you take them. Bring the list or the pill bottles to follow-up visits. Carry your medicine list with you in case of an emergency.      Follow up with your healthcare provider or specialist as directed: Write down your questions so you remember to ask them during your visits.    Check your blood pressure: You may need to check your BP at home. Record the results and bring them with you to follow-up visits. Ask when and how often to check your BP. You may need to wear a BP monitor for up to 24 hours. This will record your blood pressure during your normal daily activities. The monitor will take your BP every 15 to 30 minutes. Try to keep still while your BP is taken. Avoid heavy activity, such as exercise, while you are wearing the monitor.    How to take a Blood Pressure         Manage your symptoms:   •Change positions slowly: When you get out of bed, sit up first, then slowly move your legs to the side of the bed. If you are not having any symptoms, slowly stand up. If you have symptoms, sit down right away.      •Exercise and do physical counter maneuvers: Ask your healthcare provider or specialist about the best exercise plan for you. Physical counter maneuvers may help to increase your BP and increase blood flow to your heart. They include crossing your legs, squatting, and bending at the waist. You can also rise up on your toes while you are standing, and tighten your thigh muscles.      •Drink liquids as directed: Ask your healthcare provider or specialist how much liquid to drink each day and which liquids are best for you. Drink 500 milliliters (½ liter) of liquid quickly in the morning or before meals to help increase your BP. Your healthcare provider may tell you to drink 2 cups of coffee with, or after, breakfast and lunch. The caffeine in the coffee can help prevent a drop in your BP. Your healthcare provider may also give you caffeine pills.       •Change how you eat meals: If your BP drops after eating large meals, try to eat smaller meals more often. Eat foods low in carbohydrates and cholesterol to help prevent BP drops after you eat. Ask if you need to increase the amount of sodium (salt) you eat each day.      •Raise the head of your bed: Raise the head of your bed 4 to 8 inches. This may help prevent morning BP drops and decrease the need to urinate during the night.      Avoid things that make your hypotension worse:   •Do not drink alcohol: Alcohol can make your symptoms worse. Ask for information if you need help quitting.      •Avoid straining: Activities and movements that cause you to strain can cause a drop in your BP. Activities to avoid include lifting, coughing, and other movements that increase the feeling of pressure in your chest.      •Avoid the heat: This can cause a decrease in your BP. Stay inside during very hot days, or limit the amount of time you are outside. Do not take hot baths.      Contact your healthcare provider or specialist if:   •You vomit several times or have diarrhea, and you cannot drink liquid.      •You have a fever.       •You have new or increased symptoms, such as dizziness, weakness, or fainting.      •Your legs, ankles, and feet are swollen, or you gain weight for no known reason.      •You have questions or concerns about your condition or care.      Return to the emergency department if:   •You become confused or cannot speak.      •You urinate very little or not at all.      •You have a seizure.      •You have chest pain or trouble breathing.      •You have changes in vision or cannot see.

## 2021-12-02 NOTE — ED PROVIDER NOTE - PROGRESS NOTE DETAILS
spoke with daughter. states pt hasn't been drinking much water and agrees could have some dehydration. cr slightly elevated compared to baseline - will follow up with pmd/cardiologist. states he has had low bp in the past as well. states they will follow up with Dr. Garcia tomorrow. discussed the rest of labs and xray. agrees with dc. pt remains asymptomatic. will dc with strict return precautions. Balta Cruz M.D., Attending Physician

## 2021-12-02 NOTE — ED PROVIDER NOTE - OBJECTIVE STATEMENT
85 y/o M w/ PMHx of HTN, CAD, CKD, lung cancer, heart failure presents to ED c/o hypotension today. Pt reports BP was 80/60s PTA. Pt states he took his BP medication late today and after taking medication BP did not improve. Pt states he is asymptomatic. Pt states he recently changed heart medication. Denies dizziness, chest pain, SOB, n/v, weakness, fever, cough, dysuria.

## 2021-12-02 NOTE — ED PROVIDER NOTE - PATIENT PORTAL LINK FT
You can access the FollowMyHealth Patient Portal offered by [facility] by registering at the following website: [email or web address]. By joining [employer]’s FollowMyHealth portal, you will also be able to view your health information using other applications (apps) compatible with our system.

## 2021-12-02 NOTE — ED ADULT TRIAGE NOTE - NS ED TRIAGE AVPU SCALE
Alert-The patient is alert, awake and responds to voice. The patient is oriented to time, place, and person. The triage nurse is able to obtain subjective information.
Diabetes/Influenza Vaccination

## 2021-12-02 NOTE — ED PROVIDER NOTE - PHYSICAL EXAMINATION
Constitutional: NAD AAOx3  Eyes: PERRLA EOMI  Head: Normocephalic atraumatic  Mouth: MMM  Cardiac: regular rate   Resp: Lungs CTAB  GI: Abd s/nt/nd  Neuro: CN2-12 intact normal strength, coordination intact, NIH=0  Skin: No rashes

## 2021-12-02 NOTE — ED PROVIDER NOTE - NS ED ROS FT
Constitutional: No fever or chills  Eyes: No visual changes  HEENT: No throat pain  CV: No chest pain  Resp: No SOB no cough  GI: No abd pain, nausea or vomiting  : No dysuria  MSK: No musculoskeletal pain  Skin: No rash  Neuro: No headache +hypotension

## 2021-12-02 NOTE — ED PROVIDER NOTE - CLINICAL SUMMARY MEDICAL DECISION MAKING FREE TEXT BOX
85 y/o M w/ PMHx of HTN, CAD, CKD, lung cancer, heart failure presents to ED w/ hypotension took routine BP this morning and noticed it was low but was asymptomatic. Pt w/ nonfocal exam well appearing. Will obtain labs, EKG and reassess.

## 2021-12-02 NOTE — ED PROVIDER NOTE - NS_ ATTENDINGSCRIBEDETAILS _ED_A_ED_FT
I, Balta Cruz MD,  performed the initial face to face bedside interview with this patient regarding history of present illness, review of symptoms and relevant past medical, social and family history.  I completed an independent physical examination.  I was the initial provider who evaluated this patient.  The history, relevant review of systems, past medical and surgical history, medical decision making, and physical examination was documented by the scribe in my presence and I attest to the accuracy of the documentation.

## 2021-12-03 LAB
CULTURE RESULTS: SIGNIFICANT CHANGE UP
SPECIMEN SOURCE: SIGNIFICANT CHANGE UP

## 2021-12-07 LAB
CULTURE RESULTS: SIGNIFICANT CHANGE UP
SPECIMEN SOURCE: SIGNIFICANT CHANGE UP

## 2022-01-13 ENCOUNTER — APPOINTMENT (OUTPATIENT)
Dept: ELECTROPHYSIOLOGY | Facility: CLINIC | Age: 87
End: 2022-01-13
Payer: MEDICARE

## 2022-01-13 ENCOUNTER — NON-APPOINTMENT (OUTPATIENT)
Age: 87
End: 2022-01-13

## 2022-01-13 VITALS
SYSTOLIC BLOOD PRESSURE: 150 MMHG | HEART RATE: 67 BPM | BODY MASS INDEX: 29.12 KG/M2 | DIASTOLIC BLOOD PRESSURE: 81 MMHG | WEIGHT: 208 LBS | RESPIRATION RATE: 16 BRPM | OXYGEN SATURATION: 96 % | HEIGHT: 71 IN

## 2022-01-13 PROCEDURE — 93284 PRGRMG EVAL IMPLANTABLE DFB: CPT

## 2022-01-13 PROCEDURE — 93290 INTERROG DEV EVAL ICPMS IP: CPT | Mod: 26

## 2022-01-13 RX ORDER — AMLODIPINE BESYLATE 5 MG/1
5 TABLET ORAL
Qty: 90 | Refills: 3 | Status: DISCONTINUED | COMMUNITY
Start: 2020-01-13 | End: 2022-01-13

## 2022-01-13 RX ORDER — LISINOPRIL 20 MG/1
20 TABLET ORAL TWICE DAILY
Refills: 0 | Status: DISCONTINUED | COMMUNITY
End: 2022-01-13

## 2022-01-13 RX ORDER — METOPROLOL TARTRATE 100 MG/1
100 TABLET, FILM COATED ORAL
Refills: 0 | Status: DISCONTINUED | COMMUNITY
End: 2022-01-13

## 2022-01-13 RX ORDER — FUROSEMIDE 40 MG/1
40 TABLET ORAL
Qty: 90 | Refills: 0 | Status: DISCONTINUED | COMMUNITY
Start: 2018-04-03 | End: 2022-01-13

## 2022-01-20 NOTE — PROCEDURE NOTE - NSINDICATIONS_GEN_A_CORE
airway protection/mental status change/respiratory distress/respiratory failure
medications/feeds
Chronic systolic heart failure

## 2022-03-01 ENCOUNTER — APPOINTMENT (OUTPATIENT)
Dept: INTERNAL MEDICINE | Facility: CLINIC | Age: 87
End: 2022-03-01
Payer: MEDICARE

## 2022-03-01 ENCOUNTER — APPOINTMENT (OUTPATIENT)
Dept: OTOLARYNGOLOGY | Facility: CLINIC | Age: 87
End: 2022-03-01

## 2022-03-01 ENCOUNTER — NON-APPOINTMENT (OUTPATIENT)
Age: 87
End: 2022-03-01

## 2022-03-01 VITALS
RESPIRATION RATE: 16 BRPM | HEART RATE: 65 BPM | TEMPERATURE: 98.2 F | WEIGHT: 207.98 LBS | HEIGHT: 71 IN | DIASTOLIC BLOOD PRESSURE: 62 MMHG | BODY MASS INDEX: 29.12 KG/M2 | OXYGEN SATURATION: 95 % | SYSTOLIC BLOOD PRESSURE: 134 MMHG

## 2022-03-01 PROCEDURE — 94010 BREATHING CAPACITY TEST: CPT

## 2022-03-01 PROCEDURE — 99214 OFFICE O/P EST MOD 30 MIN: CPT | Mod: 25

## 2022-03-01 NOTE — HISTORY OF PRESENT ILLNESS
[FreeTextEntry1] : The patient comes in today for a routine pulmonary followup evaluation.\par  [de-identified] : The patient states, from a pulmonary standpoint, he is doing relatively well at this time. He is not being maintained at present, on any bronchodilator medications. He denies any cough, dyspnea, or sputum production. He is not having any chest pain.\par \par The patient did recently undergo an evaluation after he was noted to have hypotension. His medical regimen was changed by his cardiologist, Dr. Garcia. He is now stable in this regard.\par \par The patient has had issues with chronic postnasal drip. He is not using any nasal sprays at this time. He denies any purulent nasal secretions. He is ambulating with the assistance of a cane. There are no other acute pulmonary issues. He did undergo a followup surveillance CAT scan of the chest back in August which did not reveal any evidence for any recurrence of tumor. He now comes in for this assessment.

## 2022-03-01 NOTE — PLAN
[FreeTextEntry1] : Remeron. Continue to observe pulmonary status without bronchodilators.\par \par 2. Follow up with myself in 6 months with full pulmonary function testing.\par \par 3. Routine medical followup with his primary care physician, Dr. Maldonado.

## 2022-03-01 NOTE — DATA REVIEWED
[FreeTextEntry1] : Spirometric analysis reveals a mild restrictive process. An underlying obstructive process cannot be entirely ruled out.

## 2022-03-08 NOTE — ED ADULT NURSE NOTE - PRIMARY CARE PROVIDER
Erica Niacinamide Pregnancy And Lactation Text: These medications are considered safe during pregnancy.

## 2022-04-17 ENCOUNTER — APPOINTMENT (OUTPATIENT)
Dept: ELECTROPHYSIOLOGY | Facility: CLINIC | Age: 87
End: 2022-04-17

## 2022-04-21 ENCOUNTER — APPOINTMENT (OUTPATIENT)
Dept: ELECTROPHYSIOLOGY | Facility: CLINIC | Age: 87
End: 2022-04-21
Payer: MEDICARE

## 2022-04-21 ENCOUNTER — NON-APPOINTMENT (OUTPATIENT)
Age: 87
End: 2022-04-21

## 2022-04-21 PROCEDURE — 93295 DEV INTERROG REMOTE 1/2/MLT: CPT

## 2022-04-21 PROCEDURE — 93296 REM INTERROG EVL PM/IDS: CPT

## 2022-06-21 NOTE — ED ADULT TRIAGE NOTE - ACCOMPANIED BY
Physician Pre-Sedation Assessment    Pre-Sedation Assessment:    Sedation History: Previous Sedation with No Complications and Airway Assessed    Cardiac: normal S1, S2  Respiratory: breath sounds clear bilaterally   Abdomen: soft, BS (+), non-tender    ASA Classification: 2.  Patient with mild systemic disease    Plan: IV Sedation
Immediate family member

## 2022-07-26 ENCOUNTER — APPOINTMENT (OUTPATIENT)
Dept: ELECTROPHYSIOLOGY | Facility: CLINIC | Age: 87
End: 2022-07-26

## 2022-07-26 ENCOUNTER — NON-APPOINTMENT (OUTPATIENT)
Age: 87
End: 2022-07-26

## 2022-07-26 PROCEDURE — 93296 REM INTERROG EVL PM/IDS: CPT

## 2022-07-26 PROCEDURE — 93295 DEV INTERROG REMOTE 1/2/MLT: CPT

## 2022-07-28 ENCOUNTER — APPOINTMENT (OUTPATIENT)
Dept: OTOLARYNGOLOGY | Facility: CLINIC | Age: 87
End: 2022-07-28

## 2022-07-28 VITALS — WEIGHT: 207 LBS | BODY MASS INDEX: 28.98 KG/M2 | HEIGHT: 71 IN | TEMPERATURE: 96.8 F

## 2022-07-28 PROCEDURE — 69210 REMOVE IMPACTED EAR WAX UNI: CPT

## 2022-07-28 PROCEDURE — 99212 OFFICE O/P EST SF 10 MIN: CPT | Mod: 25

## 2022-07-28 RX ORDER — LATANOPROST/PF 0.005 %
0.01 DROPS OPHTHALMIC (EYE)
Qty: 5 | Refills: 0 | Status: ACTIVE | COMMUNITY
Start: 2021-10-12

## 2022-07-28 RX ORDER — LISINOPRIL 2.5 MG/1
2.5 TABLET ORAL
Qty: 90 | Refills: 0 | Status: ACTIVE | COMMUNITY
Start: 2022-05-17

## 2022-07-28 NOTE — ASSESSMENT
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 F/u  for cerumen treatment  .\par 4 mo

## 2022-07-28 NOTE — REVIEW OF SYSTEMS
[Negative] : Heme/Lymph [Patient Intake Form Reviewed] : Patient intake form was reviewed [FreeTextEntry1] : ear wax removal

## 2022-08-29 ENCOUNTER — APPOINTMENT (OUTPATIENT)
Dept: OTOLARYNGOLOGY | Facility: CLINIC | Age: 87
End: 2022-08-29

## 2022-08-29 VITALS — HEIGHT: 71 IN | BODY MASS INDEX: 28.98 KG/M2 | TEMPERATURE: 97.1 F | WEIGHT: 207 LBS

## 2022-08-29 DIAGNOSIS — J34.2 DEVIATED NASAL SEPTUM: ICD-10-CM

## 2022-08-29 DIAGNOSIS — J30.0 VASOMOTOR RHINITIS: ICD-10-CM

## 2022-08-29 PROCEDURE — 31231 NASAL ENDOSCOPY DX: CPT

## 2022-08-29 PROCEDURE — 69210 REMOVE IMPACTED EAR WAX UNI: CPT

## 2022-08-29 PROCEDURE — 99214 OFFICE O/P EST MOD 30 MIN: CPT | Mod: 25

## 2022-08-29 NOTE — REVIEW OF SYSTEMS
[Negative] : Heme/Lymph [de-identified] : left side of nostril is runny [FreeTextEntry1] : left side of face is slightly swollen

## 2022-08-29 NOTE — REASON FOR VISIT
[Subsequent Evaluation] : a subsequent evaluation for [Other: _____] : [unfilled] [FreeTextEntry2] : left nostril swollen

## 2022-08-29 NOTE — HISTORY OF PRESENT ILLNESS
[de-identified] : C/o swelling of left side of nose.  Problem started about one month ago.  Did see primary care - not checked for this.  Noted to be using a lot of cleanex and saline nasal spray.  Feels mucous in throat.  Problems mostly left side - occ sneezing.  No fevers and no bleeding.  No nasal congestion.  \par Patient also  wears hearing aides and has cerumen in past

## 2022-08-29 NOTE — ASSESSMENT
[FreeTextEntry1] : Patient with nasal congestion and occ rhinorheal - worse on left.  Has findings of vasomotor rhinitis and recommended Ochoa Med rinses and atrovent spray.  Also can continue saline spray.\par Patient also noted to xs cerumen bilat - removed.\par Followup as necessary

## 2022-08-29 NOTE — PHYSICAL EXAM
[Nasal Endoscopy Performed] : nasal endoscopy was performed, see procedure section for findings [] : septum deviated to the left [Midline] : trachea located in midline position [Normal] : no rashes [de-identified] : chanel leal  [de-identified] : mod inferior hypertrophy

## 2022-09-08 ENCOUNTER — APPOINTMENT (OUTPATIENT)
Dept: INTERNAL MEDICINE | Facility: CLINIC | Age: 87
End: 2022-09-08

## 2022-09-08 ENCOUNTER — NON-APPOINTMENT (OUTPATIENT)
Age: 87
End: 2022-09-08

## 2022-09-08 VITALS
WEIGHT: 212 LBS | OXYGEN SATURATION: 98 % | DIASTOLIC BLOOD PRESSURE: 82 MMHG | SYSTOLIC BLOOD PRESSURE: 130 MMHG | BODY MASS INDEX: 29.68 KG/M2 | TEMPERATURE: 98.5 F | RESPIRATION RATE: 16 BRPM | HEIGHT: 71 IN | HEART RATE: 72 BPM

## 2022-09-08 DIAGNOSIS — Z00.00 ENCOUNTER FOR GENERAL ADULT MEDICAL EXAMINATION W/OUT ABNORMAL FINDINGS: ICD-10-CM

## 2022-09-08 PROCEDURE — 99215 OFFICE O/P EST HI 40 MIN: CPT | Mod: 25

## 2022-09-08 PROCEDURE — 94060 EVALUATION OF WHEEZING: CPT

## 2022-09-08 NOTE — HISTORY OF PRESENT ILLNESS
[FreeTextEntry1] : The patient presents for an annual pulmonary assessment.  [de-identified] : The patient is doing well from a pulmonary standpoint. Reports that he has been seeing an otolaryngologist, Dr. Macias, for swelling in his nose and post nasal drip. He was prescribed Ipratropium bromide 2 spray in each nostril as needed. Reports that his sxs are significantly better.  He is also using nasal washes which have helped as well.\par \par His COPD has been stable. Denies SOB or wheezing.  He is not on any maintenance medications at this time.  He does not use a rescue inhaler.  He is somewhat limited with regards to his exercise capacity, due to a vascular issue in his leg, as well as osteoarthritis of the right knee.\par \par The patient has a history of non small cell lung cancer. He was diagnosed and completed a lung resection in 1997. He has repeated CT chests for numerous years to watch for reoccurrence.  He has been stable in this regard.\par \par He has an appointment with his cardiologist for an ECHO. \par \par He had the COVID-19 vaccine and 1x booster. \par \par No other complaints at this time.

## 2022-09-08 NOTE — PLAN
[FreeTextEntry1] : 1. I recommended he have his next COVID-19 booster in October. \par \par 2. Continue exercising regularly. \par \par 3. I advised him that he can continue using Ipratropium bromide 2 spray in each nostril as needed. \par \par 4. Follow up in 6 months for full pulmonary function testing.\par \par 5. Follow up with your PCP, Dr Maldonado, for continued routine care.

## 2022-09-08 NOTE — DATA REVIEWED
[FreeTextEntry1] : Spirometric analysis reveals a mild degree of restriction. An underlying obstructive process cannot be entirely ruled out.  Minimal bronchodilator reactivity is demonstrated.

## 2022-10-26 ENCOUNTER — APPOINTMENT (OUTPATIENT)
Dept: ELECTROPHYSIOLOGY | Facility: CLINIC | Age: 87
End: 2022-10-26

## 2022-10-26 ENCOUNTER — NON-APPOINTMENT (OUTPATIENT)
Age: 87
End: 2022-10-26

## 2022-10-26 VITALS
BODY MASS INDEX: 28.71 KG/M2 | HEIGHT: 72 IN | RESPIRATION RATE: 16 BRPM | WEIGHT: 212 LBS | SYSTOLIC BLOOD PRESSURE: 126 MMHG | HEART RATE: 73 BPM | OXYGEN SATURATION: 97 % | DIASTOLIC BLOOD PRESSURE: 61 MMHG

## 2022-10-26 PROCEDURE — 93284 PRGRMG EVAL IMPLANTABLE DFB: CPT

## 2022-10-26 PROCEDURE — 93290 INTERROG DEV EVAL ICPMS IP: CPT | Mod: 26

## 2023-01-08 ENCOUNTER — EMERGENCY (EMERGENCY)
Facility: HOSPITAL | Age: 88
LOS: 0 days | Discharge: HOME | End: 2023-01-08
Attending: STUDENT IN AN ORGANIZED HEALTH CARE EDUCATION/TRAINING PROGRAM
Payer: MEDICARE

## 2023-01-08 VITALS
TEMPERATURE: 99 F | RESPIRATION RATE: 18 BRPM | HEART RATE: 73 BPM | DIASTOLIC BLOOD PRESSURE: 57 MMHG | SYSTOLIC BLOOD PRESSURE: 131 MMHG | OXYGEN SATURATION: 98 %

## 2023-01-08 VITALS — WEIGHT: 212.08 LBS | HEIGHT: 72 IN

## 2023-01-08 DIAGNOSIS — Z20.822 CONTACT WITH AND (SUSPECTED) EXPOSURE TO COVID-19: ICD-10-CM

## 2023-01-08 DIAGNOSIS — M10.9 GOUT, UNSPECIFIED: ICD-10-CM

## 2023-01-08 DIAGNOSIS — E11.621 TYPE 2 DIABETES MELLITUS WITH FOOT ULCER: ICD-10-CM

## 2023-01-08 DIAGNOSIS — E11.51 TYPE 2 DIABETES MELLITUS WITH DIABETIC PERIPHERAL ANGIOPATHY WITHOUT GANGRENE: ICD-10-CM

## 2023-01-08 DIAGNOSIS — Z86.74 PERSONAL HISTORY OF SUDDEN CARDIAC ARREST: ICD-10-CM

## 2023-01-08 DIAGNOSIS — N18.9 CHRONIC KIDNEY DISEASE, UNSPECIFIED: ICD-10-CM

## 2023-01-08 DIAGNOSIS — Z98.890 OTHER SPECIFIED POSTPROCEDURAL STATES: Chronic | ICD-10-CM

## 2023-01-08 DIAGNOSIS — I25.10 ATHEROSCLEROTIC HEART DISEASE OF NATIVE CORONARY ARTERY WITHOUT ANGINA PECTORIS: ICD-10-CM

## 2023-01-08 DIAGNOSIS — Z90.49 ACQUIRED ABSENCE OF OTHER SPECIFIED PARTS OF DIGESTIVE TRACT: Chronic | ICD-10-CM

## 2023-01-08 DIAGNOSIS — Z90.2 ACQUIRED ABSENCE OF LUNG [PART OF]: Chronic | ICD-10-CM

## 2023-01-08 DIAGNOSIS — Z90.89 ACQUIRED ABSENCE OF OTHER ORGANS: ICD-10-CM

## 2023-01-08 DIAGNOSIS — L97.529 NON-PRESSURE CHRONIC ULCER OF OTHER PART OF LEFT FOOT WITH UNSPECIFIED SEVERITY: ICD-10-CM

## 2023-01-08 DIAGNOSIS — Z95.810 PRESENCE OF AUTOMATIC (IMPLANTABLE) CARDIAC DEFIBRILLATOR: ICD-10-CM

## 2023-01-08 DIAGNOSIS — I13.0 HYPERTENSIVE HEART AND CHRONIC KIDNEY DISEASE WITH HEART FAILURE AND STAGE 1 THROUGH STAGE 4 CHRONIC KIDNEY DISEASE, OR UNSPECIFIED CHRONIC KIDNEY DISEASE: ICD-10-CM

## 2023-01-08 DIAGNOSIS — I50.9 HEART FAILURE, UNSPECIFIED: ICD-10-CM

## 2023-01-08 DIAGNOSIS — E11.22 TYPE 2 DIABETES MELLITUS WITH DIABETIC CHRONIC KIDNEY DISEASE: ICD-10-CM

## 2023-01-08 DIAGNOSIS — Z90.2 ACQUIRED ABSENCE OF LUNG [PART OF]: ICD-10-CM

## 2023-01-08 DIAGNOSIS — Z90.49 ACQUIRED ABSENCE OF OTHER SPECIFIED PARTS OF DIGESTIVE TRACT: ICD-10-CM

## 2023-01-08 DIAGNOSIS — Z95.810 PRESENCE OF AUTOMATIC (IMPLANTABLE) CARDIAC DEFIBRILLATOR: Chronic | ICD-10-CM

## 2023-01-08 DIAGNOSIS — Z85.118 PERSONAL HISTORY OF OTHER MALIGNANT NEOPLASM OF BRONCHUS AND LUNG: ICD-10-CM

## 2023-01-08 LAB
ADD ON TEST-SPECIMEN IN LAB: SIGNIFICANT CHANGE UP
ALBUMIN SERPL ELPH-MCNC: 3.6 G/DL — SIGNIFICANT CHANGE UP (ref 3.3–5)
ALP SERPL-CCNC: 73 U/L — SIGNIFICANT CHANGE UP (ref 40–120)
ALT FLD-CCNC: 23 U/L — SIGNIFICANT CHANGE UP (ref 12–78)
ANION GAP SERPL CALC-SCNC: 8 MMOL/L — SIGNIFICANT CHANGE UP (ref 5–17)
AST SERPL-CCNC: 25 U/L — SIGNIFICANT CHANGE UP (ref 15–37)
BASOPHILS # BLD AUTO: 0.05 K/UL — SIGNIFICANT CHANGE UP (ref 0–0.2)
BASOPHILS NFR BLD AUTO: 0.6 % — SIGNIFICANT CHANGE UP (ref 0–2)
BILIRUB SERPL-MCNC: 0.4 MG/DL — SIGNIFICANT CHANGE UP (ref 0.2–1.2)
BUN SERPL-MCNC: 24 MG/DL — HIGH (ref 7–23)
CALCIUM SERPL-MCNC: 9.3 MG/DL — SIGNIFICANT CHANGE UP (ref 8.5–10.1)
CHLORIDE SERPL-SCNC: 104 MMOL/L — SIGNIFICANT CHANGE UP (ref 96–108)
CO2 SERPL-SCNC: 23 MMOL/L — SIGNIFICANT CHANGE UP (ref 22–31)
CREAT SERPL-MCNC: 1.22 MG/DL — SIGNIFICANT CHANGE UP (ref 0.5–1.3)
EGFR: 57 ML/MIN/1.73M2 — LOW
EOSINOPHIL # BLD AUTO: 0.24 K/UL — SIGNIFICANT CHANGE UP (ref 0–0.5)
EOSINOPHIL NFR BLD AUTO: 3 % — SIGNIFICANT CHANGE UP (ref 0–6)
FLUAV AG NPH QL: SIGNIFICANT CHANGE UP
FLUBV AG NPH QL: SIGNIFICANT CHANGE UP
GLUCOSE SERPL-MCNC: 125 MG/DL — HIGH (ref 70–99)
HCT VFR BLD CALC: 44 % — SIGNIFICANT CHANGE UP (ref 39–50)
HGB BLD-MCNC: 14.8 G/DL — SIGNIFICANT CHANGE UP (ref 13–17)
IMM GRANULOCYTES NFR BLD AUTO: 0.5 % — SIGNIFICANT CHANGE UP (ref 0–0.9)
LYMPHOCYTES # BLD AUTO: 2.09 K/UL — SIGNIFICANT CHANGE UP (ref 1–3.3)
LYMPHOCYTES # BLD AUTO: 26.2 % — SIGNIFICANT CHANGE UP (ref 13–44)
MCHC RBC-ENTMCNC: 30.6 PG — SIGNIFICANT CHANGE UP (ref 27–34)
MCHC RBC-ENTMCNC: 33.6 GM/DL — SIGNIFICANT CHANGE UP (ref 32–36)
MCV RBC AUTO: 90.9 FL — SIGNIFICANT CHANGE UP (ref 80–100)
MONOCYTES # BLD AUTO: 0.79 K/UL — SIGNIFICANT CHANGE UP (ref 0–0.9)
MONOCYTES NFR BLD AUTO: 9.9 % — SIGNIFICANT CHANGE UP (ref 2–14)
NEUTROPHILS # BLD AUTO: 4.76 K/UL — SIGNIFICANT CHANGE UP (ref 1.8–7.4)
NEUTROPHILS NFR BLD AUTO: 59.8 % — SIGNIFICANT CHANGE UP (ref 43–77)
PLATELET # BLD AUTO: 193 K/UL — SIGNIFICANT CHANGE UP (ref 150–400)
POTASSIUM SERPL-MCNC: 4.3 MMOL/L — SIGNIFICANT CHANGE UP (ref 3.5–5.3)
POTASSIUM SERPL-SCNC: 4.3 MMOL/L — SIGNIFICANT CHANGE UP (ref 3.5–5.3)
PROT SERPL-MCNC: 7.4 GM/DL — SIGNIFICANT CHANGE UP (ref 6–8.3)
RBC # BLD: 4.84 M/UL — SIGNIFICANT CHANGE UP (ref 4.2–5.8)
RBC # FLD: 14.2 % — SIGNIFICANT CHANGE UP (ref 10.3–14.5)
RSV RNA NPH QL NAA+NON-PROBE: SIGNIFICANT CHANGE UP
SARS-COV-2 RNA SPEC QL NAA+PROBE: SIGNIFICANT CHANGE UP
SODIUM SERPL-SCNC: 135 MMOL/L — SIGNIFICANT CHANGE UP (ref 135–145)
URATE SERPL-MCNC: 8.8 MG/DL — SIGNIFICANT CHANGE UP (ref 3.4–8.8)
WBC # BLD: 7.97 K/UL — SIGNIFICANT CHANGE UP (ref 3.8–10.5)
WBC # FLD AUTO: 7.97 K/UL — SIGNIFICANT CHANGE UP (ref 3.8–10.5)

## 2023-01-08 PROCEDURE — 93971 EXTREMITY STUDY: CPT | Mod: LT

## 2023-01-08 PROCEDURE — 96374 THER/PROPH/DIAG INJ IV PUSH: CPT

## 2023-01-08 PROCEDURE — 85025 COMPLETE CBC W/AUTO DIFF WBC: CPT

## 2023-01-08 PROCEDURE — 99285 EMERGENCY DEPT VISIT HI MDM: CPT

## 2023-01-08 PROCEDURE — 93971 EXTREMITY STUDY: CPT | Mod: 26,LT

## 2023-01-08 PROCEDURE — 99285 EMERGENCY DEPT VISIT HI MDM: CPT | Mod: 25

## 2023-01-08 PROCEDURE — 87040 BLOOD CULTURE FOR BACTERIA: CPT | Mod: 59

## 2023-01-08 PROCEDURE — 73630 X-RAY EXAM OF FOOT: CPT | Mod: LT

## 2023-01-08 PROCEDURE — 73630 X-RAY EXAM OF FOOT: CPT | Mod: 26,LT

## 2023-01-08 PROCEDURE — 0241U: CPT

## 2023-01-08 PROCEDURE — 84550 ASSAY OF BLOOD/URIC ACID: CPT

## 2023-01-08 PROCEDURE — 36415 COLL VENOUS BLD VENIPUNCTURE: CPT

## 2023-01-08 PROCEDURE — 80053 COMPREHEN METABOLIC PANEL: CPT

## 2023-01-08 RX ORDER — CEFPODOXIME PROXETIL 100 MG
1 TABLET ORAL
Qty: 14 | Refills: 0
Start: 2023-01-08 | End: 2023-01-14

## 2023-01-08 RX ORDER — PIPERACILLIN AND TAZOBACTAM 4; .5 G/20ML; G/20ML
3.38 INJECTION, POWDER, LYOPHILIZED, FOR SOLUTION INTRAVENOUS ONCE
Refills: 0 | Status: COMPLETED | OUTPATIENT
Start: 2023-01-08 | End: 2023-01-08

## 2023-01-08 RX ORDER — SODIUM CHLORIDE 9 MG/ML
1000 INJECTION INTRAMUSCULAR; INTRAVENOUS; SUBCUTANEOUS ONCE
Refills: 0 | Status: COMPLETED | OUTPATIENT
Start: 2023-01-08 | End: 2023-01-08

## 2023-01-08 RX ORDER — MUPIROCIN 20 MG/G
1 OINTMENT TOPICAL ONCE
Refills: 0 | Status: DISCONTINUED | OUTPATIENT
Start: 2023-01-08 | End: 2023-01-08

## 2023-01-08 RX ADMIN — PIPERACILLIN AND TAZOBACTAM 200 GRAM(S): 4; .5 INJECTION, POWDER, LYOPHILIZED, FOR SOLUTION INTRAVENOUS at 19:31

## 2023-01-08 RX ADMIN — SODIUM CHLORIDE 1000 MILLILITER(S): 9 INJECTION INTRAMUSCULAR; INTRAVENOUS; SUBCUTANEOUS at 19:31

## 2023-01-08 NOTE — ED ADULT TRIAGE NOTE - CHIEF COMPLAINT QUOTE
Pt presented to the ER with c/o left great toe sore. Pt stated that he noticed the sore on the bottom of his toe yesterday. Pt is unsure how it happened. Pt denies any injury to the area. Pt denies any fevers.

## 2023-01-08 NOTE — ED STATDOCS - CLINICAL SUMMARY MEDICAL DECISION MAKING FREE TEXT BOX
Elderly male presents with shallow foot ulcer.  Foul-smelling.  Small area of cellulitis around the ulcer itself.  No pus or purulence.  Vitals are normal no fever.  Will check blood work give 1 dose of antibiotics and consult podiatry   For evaluation of diabetic foot ulcer.

## 2023-01-08 NOTE — ED ADULT NURSE NOTE - OBJECTIVE STATEMENT
Pt presented to the ER with c/o left great toe sore. Pt stated that he noticed the sore on the bottom of his toe yesterday. Pt is unsure how it happened. Pt denies any injury to the area. Pt denies any fevers. Labs sent pt taken for US and xrays.

## 2023-01-08 NOTE — ED STATDOCS - RESPIRATORY, MLM
Monitored freq.throughout the shift. Cont.with contact isolation. Report oncoming shift.   breath sounds clear and equal bilaterally.

## 2023-01-08 NOTE — ED STATDOCS - CARE PROVIDER_API CALL
Balta Blackburn (DPM)  Orthopaedic Surgery Surgery  [address] [facility], [address] [place] [address]  Phone: [phone]  Fax: [phone]  Follow Up Time: Urgent

## 2023-01-08 NOTE — ED STATDOCS - PROGRESS NOTE DETAILS
pt seen by podiatry team recommend steriods for gout flare and fu with his podiatrist, Uric acid added to labs. pt and daughter aware of plan and will fu with Handy Eaton. pt and daughter agrees with plan. -Oma Burgess PA-C

## 2023-01-08 NOTE — ED STATDOCS - ATTENDING APP SHARED VISIT CONTRIBUTION OF CARE
I, Balta Hardin, DO personally saw the patient with DILIA.  I have personally performed a face to face diagnostic evaluation on this patient.  I have reviewed the DILIA note and agree with the history, exam, and plan of care, except as noted.  I personally saw the patient and performed a substantive portion of the visit including all aspects of the medical decision making.

## 2023-01-08 NOTE — ED STATDOCS - OBJECTIVE STATEMENT
86 y/o male PMHx of HTN, CAD, Cardiac arrest with successful resuscitatoin, AICD, CKR, heart failure, Lung CA, PVD, s/p carotic endarterectomy, appendectomy, and lobectomy of lung presents to the ED c/o left great toe sore. Pt stated that he noticed the sore on the bottom of his toe yesterday. Pt is unsure how it happened. Pt denies any injury to the area. Pt denies any fevers. 88 y/o male PMHx of HTN, CAD, Cardiac arrest with successful resuscitation, AICD, CKR, heart failure, Lung CA, PVD, s/p carotic endarterectomy, appendectomy, and lobectomy of lung presents to the ED c/o left great toe sore. Pt stated that he noticed the sore on the bottom of his toe yesterday. Pt is unsure how it happened. Pt denies any injury to the area. Pt denies any fevers. 88 y/o male PMHx of HTN, CAD, Cardiac arrest with successful resuscitation, AICD, CKR, heart failure, Lung CA, PVD, s/p carotic endarterectomy, appendectomy, and lobectomy of lung presents to the ED c/o left great toe sore. Pt stated that he noticed the sore on the bottom of his toe yesterday. Pt is unsure how it happened. Pt denies any injury to the area. Pt denies any fevers. history of peripheral vascular disease type 2 diabetes. denies pain. States that last night he felt his foot was very cold and went tingling.  Those symptoms have since resolved. Sept 2022 had angiogram of leg showed moderate stenosis in right leg but Daughter says left leg was ok.

## 2023-01-08 NOTE — ED STATDOCS - MUSCULOSKELETAL, MLM
range of motion is not limited and there is no muscle tenderness. range of motion is not limited and there is no muscle tenderness. There is a small oval-shaped ulcer on the bottom of the left big toe with surrounding red skin.

## 2023-01-08 NOTE — ED STATDOCS - NSFOLLOWUPINSTRUCTIONS_ED_ALL_ED_FT
Pressure Injury       A pressure injury is damage to the skin and underlying tissue that results from pressure being applied to an area of the body. It often affects people who must spend a long time in a bed or chair because of a medical condition.    Pressure injuries usually occur:  •Over bony parts of the body, such as the tailbone, shoulders, elbows, hips, heels, spine, ankles, and back of the head.      •Under medical devices that make contact with the body, such as respiratory equipment, stockings, tubes, and splints.      Pressure injuries start as reddened areas on the skin and can lead to pain and an open wound.      What are the causes?    This condition is caused by frequent or constant pressure to an area of the body. Decreased blood flow to the skin can eventually cause the skin tissue to die and break down, causing a wound.      What increases the risk?    You are more likely to develop this condition if you:  •Are in the hospital or an extended care facility.      •Are bedridden or in a wheelchair.    •Have an injury or disease that keeps you from:  •Moving normally.      •Feeling pain or pressure.      •Have a condition that:  •Makes you sleepy or less alert.      •Causes poor blood flow.        •Need to wear a medical device.      •Have poor control of your bladder or bowel functions (incontinence).      •Have poor nutrition (malnutrition).      If you are at risk for pressure injuries, your health care provider may recommend certain types of mattresses, mattress covers, pillows, cushions, or boots to help prevent them. These may include products filled with air, foam, gel, or sand.      What are the signs or symptoms?    Symptoms of this condition depend on the severity of the injury. Symptoms may include:  •Red or dark areas of the skin.      •Pain, warmth, or a change of skin texture.      •Blisters.      •An open wound.        How is this diagnosed?    This condition is diagnosed with a medical history and physical exam. You may also have tests, such as:  •Blood tests.      •Imaging tests.      •Blood flow tests.      Your pressure injury will be staged based on its severity. Staging is based on:  •The depth of the tissue injury, including whether there is exposure of muscle, bone, or tendon.      •The cause of the pressure injury.        How is this treated?    This condition may be treated by:•Relieving or redistributing pressure on your skin. This includes:  •Frequently changing your position.      •Avoiding positions that caused the wound or that can make the wound worse.      •Using specific bed mattresses, chair cushions, or protective boots.      •Moving medical devices from an area of pressure, or placing padding between the skin and the device.      •Using foams, creams, or powders to prevent rubbing (friction) on the skin.        •Keeping your skin clean and dry. This may include using a skin cleanser or skin barrier as told by your health care provider.      •Cleaning your injury and removing any dead tissue from the wound (debridement).      •Placing a bandage (dressing) over your injury.      •Using medicines for pain or to prevent or treat infection.      Surgery may be needed if other treatments are not working or if your injury is very deep.      Follow these instructions at home:    Wound care   •Follow instructions from your health care provider about how to take care of your wound. Make sure you:  •Wash your hands with soap and water before and after you change your bandage (dressing). If soap and water are not available, use hand .      •Change your dressing as told by your health care provider.       •Check your wound every day for signs of infection. Have a caregiver do this for you if you are not able. Check for:  •Redness, swelling, or increased pain.      •More fluid or blood.      •Warmth.      •Pus or a bad smell.        Skin care     •Keep your skin clean and dry. Gently pat your skin dry.      • Do not rub or massage your skin.      •You or a caregiver should check your skin every day for any changes in color or any new blisters or sores (ulcers).      Medicines     •Take over-the-counter and prescription medicines only as told by your health care provider.      •If you were prescribed an antibiotic medicine, take or apply it as told by your health care provider. Do not stop using the antibiotic even if your condition improves.      Reducing and redistributing pressure     • Do not lie or sit in one position for a long time. Move or change position every 1–2 hours, or as told by your health care provider.      •Use pillows or cushions to reduce pressure. Ask your health care provider to recommend cushions or pads for you.        General instructions      •Eat a healthy diet that includes lots of protein.      •Drink enough fluid to keep your urine pale yellow.      •Be as active as you can every day. Ask your health care provider to suggest safe exercises or activities.      • Do not abuse drugs or alcohol.      • Do not use any products that contain nicotine or tobacco, such as cigarettes, e-cigarettes, and chewing tobacco. If you need help quitting, ask your health care provider.      •Keep all follow-up visits as told by your health care provider. This is important.        Contact a health care provider if:  •You have:  •A fever or chills.      •Pain that is not helped by medicine.      •Any changes in skin color.      •New blisters or sores.      •Pus or a bad smell coming from your wound.      •Redness, swelling, or pain around your wound.      •More fluid or blood coming from your wound.        •Your wound does not improve after 1–2 weeks of treatment.        Summary    •A pressure injury is damage to the skin and underlying tissue that results from pressure being applied to an area of the body.      • Do not lie or sit in one position for a long time. Your health care provider may advise you to move or change position every 1–2 hours.      •Follow instructions from your health care provider about how to take care of your wound.      •Keep all follow-up visits as told by your health care provider. This is important.      This information is not intended to replace advice given to you by your health care provider. Make sure you discuss any questions you have with your health care provider.

## 2023-01-08 NOTE — ED STATDOCS - PATIENT PORTAL LINK FT
You can access the FollowMyHealth Patient Portal offered by [facility] by registering at the following website: [email or web address]. By joining [employer]’s FollowMyHealth portal, you will also be able to view your health information using other applications (apps) compatible with our system.

## 2023-01-08 NOTE — ED STATDOCS - NS ED ATTENDING STATEMENT MOD
This was a shared visit with the DILIA. I reviewed and verified the documentation and independently performed the documented:

## 2023-01-16 ENCOUNTER — APPOINTMENT (OUTPATIENT)
Dept: ELECTROPHYSIOLOGY | Facility: CLINIC | Age: 88
End: 2023-01-16
Payer: MEDICARE

## 2023-01-17 ENCOUNTER — NON-APPOINTMENT (OUTPATIENT)
Age: 88
End: 2023-01-17

## 2023-01-17 PROCEDURE — 93295 DEV INTERROG REMOTE 1/2/MLT: CPT

## 2023-01-17 PROCEDURE — 93296 REM INTERROG EVL PM/IDS: CPT

## 2023-01-23 ENCOUNTER — APPOINTMENT (OUTPATIENT)
Dept: DERMATOLOGY | Facility: CLINIC | Age: 88
End: 2023-01-23

## 2023-02-01 ENCOUNTER — APPOINTMENT (OUTPATIENT)
Dept: VASCULAR SURGERY | Facility: CLINIC | Age: 88
End: 2023-02-01
Payer: MEDICARE

## 2023-02-01 VITALS — DIASTOLIC BLOOD PRESSURE: 75 MMHG | SYSTOLIC BLOOD PRESSURE: 178 MMHG | HEART RATE: 69 BPM

## 2023-02-01 PROCEDURE — 99212 OFFICE O/P EST SF 10 MIN: CPT

## 2023-02-01 NOTE — ASSESSMENT
[FreeTextEntry1] : 87-year-old with out ischemic rest pain or tissue loss manifested as ulcers or gangrenous changes.  I noted my findings to the patient and his daughter but suggested we obtain repeat noninvasive lower extremity physiologic arterial studies.\par \par All questions were answered.

## 2023-02-01 NOTE — HISTORY OF PRESENT ILLNESS
[FreeTextEntry1] : And daughter present for routine reevaluation of his lower extremity status.  He notes a several month history of altered sensation of the left lower leg especially the foot but denies ischemic rest pain in either lower extremity or foot.  They note approximately 2 to 3 weeks ago he was at the [facility] emergency department for evaluation of a small ?  ulcer/callus of the left first toe.  This was attributed to his gait and irritation from his slipper and ultimately healed.

## 2023-02-01 NOTE — PHYSICAL EXAM
[FreeTextEntry1] : Both feet adequately perfused appearing without ulcers, cyanosis, or gangrenous changes; no palpable pedal pulse bilaterally; venous pigmentation changes of both lower extremities; neurosensory and motor function essentially normal bilaterally

## 2023-03-27 ENCOUNTER — APPOINTMENT (OUTPATIENT)
Dept: VASCULAR SURGERY | Facility: CLINIC | Age: 88
End: 2023-03-27
Payer: MEDICARE

## 2023-03-27 ENCOUNTER — APPOINTMENT (OUTPATIENT)
Dept: OTOLARYNGOLOGY | Facility: CLINIC | Age: 88
End: 2023-03-27
Payer: MEDICARE

## 2023-03-27 VITALS — BODY MASS INDEX: 28.71 KG/M2 | TEMPERATURE: 97.2 F | WEIGHT: 212 LBS | HEIGHT: 72 IN

## 2023-03-27 DIAGNOSIS — H93.293 OTHER ABNORMAL AUDITORY PERCEPTIONS, BILATERAL: ICD-10-CM

## 2023-03-27 DIAGNOSIS — H61.23 IMPACTED CERUMEN, BILATERAL: ICD-10-CM

## 2023-03-27 PROCEDURE — 93923 UPR/LXTR ART STDY 3+ LVLS: CPT

## 2023-03-27 PROCEDURE — 69210 REMOVE IMPACTED EAR WAX UNI: CPT

## 2023-03-27 PROCEDURE — 99213 OFFICE O/P EST LOW 20 MIN: CPT | Mod: 25

## 2023-03-27 NOTE — ASSESSMENT
[FreeTextEntry1] : cerumen cleared au\par sn loss to have hearing aid reval\par fu 4 mo\par  20 minutes spent with patient with exam and counseling excluding time for any procedure.\par

## 2023-03-27 NOTE — PROCEDURE
[Cerumen Impaction] : Cerumen Impaction [FreeTextEntry6] : Indication: ear plugging hx cerumen\par Large amount cerumen cleared left and right ear instrumentation with curettes, forceps and suction.\par Ear canals and tympanic membranes  unremarkable.\par

## 2023-03-27 NOTE — REVIEW OF SYSTEMS
[Negative] : Heme/Lymph [Patient Intake Form Reviewed] : Patient intake form was reviewed [de-identified] : wears hearing aids

## 2023-03-28 ENCOUNTER — APPOINTMENT (OUTPATIENT)
Dept: INTERNAL MEDICINE | Facility: CLINIC | Age: 88
End: 2023-03-28
Payer: MEDICARE

## 2023-03-28 VITALS
BODY MASS INDEX: 30.78 KG/M2 | OXYGEN SATURATION: 96 % | WEIGHT: 215 LBS | HEIGHT: 70 IN | HEART RATE: 72 BPM | SYSTOLIC BLOOD PRESSURE: 159 MMHG | RESPIRATION RATE: 18 BRPM | DIASTOLIC BLOOD PRESSURE: 87 MMHG | TEMPERATURE: 97.9 F

## 2023-03-28 VITALS
WEIGHT: 215 LBS | HEART RATE: 72 BPM | OXYGEN SATURATION: 96 % | DIASTOLIC BLOOD PRESSURE: 87 MMHG | SYSTOLIC BLOOD PRESSURE: 159 MMHG | TEMPERATURE: 97.9 F | HEIGHT: 70 IN | BODY MASS INDEX: 30.78 KG/M2 | RESPIRATION RATE: 18 BRPM

## 2023-03-28 DIAGNOSIS — R06.00 DYSPNEA, UNSPECIFIED: ICD-10-CM

## 2023-03-28 DIAGNOSIS — J44.9 CHRONIC OBSTRUCTIVE PULMONARY DISEASE, UNSPECIFIED: ICD-10-CM

## 2023-03-28 DIAGNOSIS — J98.4 OTHER DISORDERS OF LUNG: ICD-10-CM

## 2023-03-28 DIAGNOSIS — C34.90 MALIGNANT NEOPLASM OF UNSPECIFIED PART OF UNSPECIFIED BRONCHUS OR LUNG: ICD-10-CM

## 2023-03-28 DIAGNOSIS — R09.82 POSTNASAL DRIP: ICD-10-CM

## 2023-03-28 DIAGNOSIS — Z87.891 PERSONAL HISTORY OF NICOTINE DEPENDENCE: ICD-10-CM

## 2023-03-28 PROCEDURE — 94060 EVALUATION OF WHEEZING: CPT

## 2023-03-28 PROCEDURE — 94727 GAS DIL/WSHOT DETER LNG VOL: CPT

## 2023-03-28 PROCEDURE — ZZZZZ: CPT

## 2023-03-28 PROCEDURE — 94729 DIFFUSING CAPACITY: CPT

## 2023-03-28 PROCEDURE — 99215 OFFICE O/P EST HI 40 MIN: CPT | Mod: 25

## 2023-03-28 RX ORDER — IPRATROPIUM BROMIDE 42 UG/1
0.06 SPRAY NASAL 3 TIMES DAILY
Qty: 180 | Refills: 11 | Status: ACTIVE | COMMUNITY
Start: 2022-08-29 | End: 1900-01-01

## 2023-03-28 RX ORDER — DAPAGLIFLOZIN 10 MG/1
10 TABLET, FILM COATED ORAL DAILY
Refills: 0 | Status: ACTIVE | COMMUNITY

## 2023-03-28 NOTE — DATA REVIEWED
[FreeTextEntry1] : A pulmonary function test is performed.  Lung volumes reveal a moderate decrease in the total lung capacity and FRC.  The residual volume is significantly reduced.  The vital capacity is mildly reduced.  Lung mechanics reveal a mild decrease in flow rates with slight bronchodilator reactivity.  The DLCO is moderately reduced, however when corrected for alveolar volume it is normal.  The saturation is 96%.  This represents a moderate degree of restriction.  The restriction may be due to his centripetal obesity, and prior lung resection.  Obstruction is noted with slight airway reactivity.  When compared to the prior study, the lung volumes are fairly stable, but the flow rates have diminished slightly.

## 2023-03-28 NOTE — HISTORY OF PRESENT ILLNESS
[Family Member] : family member [FreeTextEntry1] : The patient comes in for a yearly routine pulmonary evaluation.  [de-identified] : The patient is feeling relatively well at this time. He has a history of HLD and CAD of which he is maintained on pravastatin 40 mg and metoprolol 25 mg, respectively. He saw cardiology yesterday and he was felt to be clinically stable.  Apparently, he was started on Farxiga 10 mg once per day, several months ago, and he has been tolerating this medication.\par \par He has a history of lung cancer which is currently stable, and in remission.  He is no longer undergoing follow-up CAT scans.\par \par He also has COPD and reports that his breathing is well. He notes that he does not have any mucus or sputum production, although he does suffer from post nasal drip. He does have some SOB on exertion.  With regards to the postnasal drip, he was started on Atrovent nasal spray by Dr. Oneal, an ENT physician, last August.  He discontinued the medication after approximately 1 month, when the medicine ran out.  He did not get it renewed.  His symptoms of postnasal drip have now increased recently.  The patient denies any other constitutional symptoms at this time. He now comes in for this assessment.

## 2023-03-28 NOTE — ADDENDUM
[FreeTextEntry1] : I, Cyrus Lam, documented this note as a scribe on behalf of Dr. Paul Garza MD on 03/28/2023.

## 2023-03-28 NOTE — PLAN
[FreeTextEntry1] : 1. Continue current medications as previously outlined. \par \par 2. Renewed Ipratropium bromide nasal spray, which he will do two squirts in each nostril 2-3 times per day for post nasal drip.\par \par 3.  Will monitor clinically to see if SOB on exertion improves with Farxiga.\par \par 4. Follow-up in 6 months with prepost spirometry. I will determine if an inhaler is needed at that visit if SOB on exertion has worsened.\par \par 5.  The patient will continue to follow-up with his primary care physician, Dr. Maldonado, as well as his cardiologist, Dr. Garcia.

## 2023-04-12 ENCOUNTER — APPOINTMENT (OUTPATIENT)
Dept: VASCULAR SURGERY | Facility: CLINIC | Age: 88
End: 2023-04-12
Payer: MEDICARE

## 2023-04-12 VITALS
SYSTOLIC BLOOD PRESSURE: 115 MMHG | DIASTOLIC BLOOD PRESSURE: 72 MMHG | HEIGHT: 70 IN | HEART RATE: 69 BPM | BODY MASS INDEX: 30.78 KG/M2 | WEIGHT: 215 LBS

## 2023-04-12 DIAGNOSIS — I73.9 PERIPHERAL VASCULAR DISEASE, UNSPECIFIED: ICD-10-CM

## 2023-04-12 PROCEDURE — 99212 OFFICE O/P EST SF 10 MIN: CPT

## 2023-04-12 NOTE — ASSESSMENT
[FreeTextEntry1] : I reviewed the results of his most recent noninvasive studies with the patient but also that his right leg noninvasive ABIs were worse than his more recent one.\par \par Despite this, we have decided on continued observation as he is not experiencing ischemic nocturnal pain and has not formed ulcers or gangrenous changes.\par \par The patient and his daughter know to contact me immediately should he develop any of these more significant symptoms.  In the absence of this, he will follow-up with me in 3 months time.\par \par All questions were answered.

## 2023-04-12 NOTE — HISTORY OF PRESENT ILLNESS
[FreeTextEntry1] : Patient and daughter present for routine reevaluation and discussion of the results of lower extremity peripheral physiologic studies performed March 27, 2023.  Right leg demonstrated severely blunted monophasic waveforms through multiple levels.  His left demonstrated pulsatile waveforms throughout the extremity and normal ABIs.\par \par The patient denies ischemic nocturnal rest pain in either lower extremity, has not formed ulcers or gangrenous changes on either foot, and is ambulating approximately 250 feet without much difficulty.

## 2023-04-17 ENCOUNTER — APPOINTMENT (OUTPATIENT)
Dept: ELECTROPHYSIOLOGY | Facility: CLINIC | Age: 88
End: 2023-04-17
Payer: MEDICARE

## 2023-04-18 ENCOUNTER — NON-APPOINTMENT (OUTPATIENT)
Age: 88
End: 2023-04-18

## 2023-04-18 PROCEDURE — 93296 REM INTERROG EVL PM/IDS: CPT

## 2023-04-18 PROCEDURE — 93295 DEV INTERROG REMOTE 1/2/MLT: CPT

## 2023-04-25 ENCOUNTER — APPOINTMENT (OUTPATIENT)
Dept: OTOLARYNGOLOGY | Facility: CLINIC | Age: 88
End: 2023-04-25
Payer: MEDICARE

## 2023-04-25 VITALS — BODY MASS INDEX: 29.12 KG/M2 | HEIGHT: 72 IN | WEIGHT: 215 LBS | TEMPERATURE: 96.9 F

## 2023-04-25 DIAGNOSIS — H61.22 IMPACTED CERUMEN, LEFT EAR: ICD-10-CM

## 2023-04-25 DIAGNOSIS — H61.23 IMPACTED CERUMEN, BILATERAL: ICD-10-CM

## 2023-04-25 DIAGNOSIS — H92.02 OTALGIA, LEFT EAR: ICD-10-CM

## 2023-04-25 DIAGNOSIS — H60.312 DIFFUSE OTITIS EXTERNA, LEFT EAR: ICD-10-CM

## 2023-04-25 DIAGNOSIS — H90.3 SENSORINEURAL HEARING LOSS, BILATERAL: ICD-10-CM

## 2023-04-25 PROCEDURE — 99213 OFFICE O/P EST LOW 20 MIN: CPT | Mod: 25

## 2023-04-25 PROCEDURE — 69210 REMOVE IMPACTED EAR WAX UNI: CPT

## 2023-04-25 NOTE — PROCEDURE
[Cerumen Impaction] : Cerumen Impaction [FreeTextEntry6] : Indication: ear discomfort and plugging\par Large amount cerumen cleared left ear instrumentation with curettes, forceps and suction.\par Ear canals and tympanic membranes  unremarkable.\par as canal mild erythema

## 2023-04-25 NOTE — PHYSICAL EXAM
[Midline] : trachea located in midline position [Normal] : no rashes [de-identified] : cerumen as

## 2023-04-25 NOTE — ASSESSMENT
[FreeTextEntry1] : cerumen cleared\par as ot externa\par ciprodex gtts\par fu 4 m re cerumen\par  20 minutes spent with patient with exam and counseling excluding time for any procedure.\par

## 2023-06-12 ENCOUNTER — EMERGENCY (EMERGENCY)
Facility: HOSPITAL | Age: 88
LOS: 0 days | Discharge: HOME | End: 2023-06-12
Attending: EMERGENCY MEDICINE
Payer: MEDICARE

## 2023-06-12 VITALS
HEART RATE: 67 BPM | DIASTOLIC BLOOD PRESSURE: 60 MMHG | SYSTOLIC BLOOD PRESSURE: 152 MMHG | TEMPERATURE: 98 F | RESPIRATION RATE: 18 BRPM | OXYGEN SATURATION: 95 %

## 2023-06-12 VITALS — HEIGHT: 72 IN | WEIGHT: 214.95 LBS

## 2023-06-12 DIAGNOSIS — I13.0 HYPERTENSIVE HEART AND CHRONIC KIDNEY DISEASE WITH HEART FAILURE AND STAGE 1 THROUGH STAGE 4 CHRONIC KIDNEY DISEASE, OR UNSPECIFIED CHRONIC KIDNEY DISEASE: ICD-10-CM

## 2023-06-12 DIAGNOSIS — E11.22 TYPE 2 DIABETES MELLITUS WITH DIABETIC CHRONIC KIDNEY DISEASE: ICD-10-CM

## 2023-06-12 DIAGNOSIS — M79.675 PAIN IN LEFT TOE(S): ICD-10-CM

## 2023-06-12 DIAGNOSIS — Z86.74 PERSONAL HISTORY OF SUDDEN CARDIAC ARREST: ICD-10-CM

## 2023-06-12 DIAGNOSIS — Z98.890 OTHER SPECIFIED POSTPROCEDURAL STATES: Chronic | ICD-10-CM

## 2023-06-12 DIAGNOSIS — I50.9 HEART FAILURE, UNSPECIFIED: ICD-10-CM

## 2023-06-12 DIAGNOSIS — I25.10 ATHEROSCLEROTIC HEART DISEASE OF NATIVE CORONARY ARTERY WITHOUT ANGINA PECTORIS: ICD-10-CM

## 2023-06-12 DIAGNOSIS — Z95.810 PRESENCE OF AUTOMATIC (IMPLANTABLE) CARDIAC DEFIBRILLATOR: ICD-10-CM

## 2023-06-12 DIAGNOSIS — Z90.49 ACQUIRED ABSENCE OF OTHER SPECIFIED PARTS OF DIGESTIVE TRACT: ICD-10-CM

## 2023-06-12 DIAGNOSIS — L03.116 CELLULITIS OF LEFT LOWER LIMB: ICD-10-CM

## 2023-06-12 DIAGNOSIS — Z90.49 ACQUIRED ABSENCE OF OTHER SPECIFIED PARTS OF DIGESTIVE TRACT: Chronic | ICD-10-CM

## 2023-06-12 DIAGNOSIS — N18.9 CHRONIC KIDNEY DISEASE, UNSPECIFIED: ICD-10-CM

## 2023-06-12 DIAGNOSIS — R22.42 LOCALIZED SWELLING, MASS AND LUMP, LEFT LOWER LIMB: ICD-10-CM

## 2023-06-12 DIAGNOSIS — Z85.118 PERSONAL HISTORY OF OTHER MALIGNANT NEOPLASM OF BRONCHUS AND LUNG: ICD-10-CM

## 2023-06-12 DIAGNOSIS — Z95.810 PRESENCE OF AUTOMATIC (IMPLANTABLE) CARDIAC DEFIBRILLATOR: Chronic | ICD-10-CM

## 2023-06-12 DIAGNOSIS — Z90.2 ACQUIRED ABSENCE OF LUNG [PART OF]: ICD-10-CM

## 2023-06-12 DIAGNOSIS — Z90.89 ACQUIRED ABSENCE OF OTHER ORGANS: ICD-10-CM

## 2023-06-12 DIAGNOSIS — E11.42 TYPE 2 DIABETES MELLITUS WITH DIABETIC POLYNEUROPATHY: ICD-10-CM

## 2023-06-12 DIAGNOSIS — Z90.2 ACQUIRED ABSENCE OF LUNG [PART OF]: Chronic | ICD-10-CM

## 2023-06-12 LAB
ALBUMIN SERPL ELPH-MCNC: 4.2 G/DL — SIGNIFICANT CHANGE UP (ref 3.3–5)
ALP SERPL-CCNC: 75 U/L — SIGNIFICANT CHANGE UP (ref 40–120)
ALT FLD-CCNC: 26 U/L — SIGNIFICANT CHANGE UP (ref 12–78)
ANION GAP SERPL CALC-SCNC: 7 MMOL/L — SIGNIFICANT CHANGE UP (ref 5–17)
APPEARANCE UR: CLEAR — SIGNIFICANT CHANGE UP
APTT BLD: 30.8 SEC — SIGNIFICANT CHANGE UP (ref 27.5–35.5)
AST SERPL-CCNC: 28 U/L — SIGNIFICANT CHANGE UP (ref 15–37)
BACTERIA # UR AUTO: ABNORMAL
BASOPHILS # BLD AUTO: 0.06 K/UL — SIGNIFICANT CHANGE UP (ref 0–0.2)
BASOPHILS NFR BLD AUTO: 0.6 % — SIGNIFICANT CHANGE UP (ref 0–2)
BILIRUB SERPL-MCNC: 0.9 MG/DL — SIGNIFICANT CHANGE UP (ref 0.2–1.2)
BILIRUB UR-MCNC: NEGATIVE — SIGNIFICANT CHANGE UP
BUN SERPL-MCNC: 23 MG/DL — SIGNIFICANT CHANGE UP (ref 7–23)
CALCIUM SERPL-MCNC: 9.5 MG/DL — SIGNIFICANT CHANGE UP (ref 8.5–10.1)
CHLORIDE SERPL-SCNC: 104 MMOL/L — SIGNIFICANT CHANGE UP (ref 96–108)
CO2 SERPL-SCNC: 28 MMOL/L — SIGNIFICANT CHANGE UP (ref 22–31)
COLOR SPEC: YELLOW — SIGNIFICANT CHANGE UP
CREAT SERPL-MCNC: 1.49 MG/DL — HIGH (ref 0.5–1.3)
DIFF PNL FLD: NEGATIVE — SIGNIFICANT CHANGE UP
EGFR: 45 ML/MIN/1.73M2 — LOW
EOSINOPHIL # BLD AUTO: 0.3 K/UL — SIGNIFICANT CHANGE UP (ref 0–0.5)
EOSINOPHIL NFR BLD AUTO: 3.2 % — SIGNIFICANT CHANGE UP (ref 0–6)
EPI CELLS # UR: SIGNIFICANT CHANGE UP
ERYTHROCYTE [SEDIMENTATION RATE] IN BLOOD: 7 MM/HR — SIGNIFICANT CHANGE UP (ref 0–20)
GLUCOSE SERPL-MCNC: 129 MG/DL — HIGH (ref 70–99)
GLUCOSE UR QL: 1000 MG/DL
HCT VFR BLD CALC: 48.5 % — SIGNIFICANT CHANGE UP (ref 39–50)
HGB BLD-MCNC: 16.5 G/DL — SIGNIFICANT CHANGE UP (ref 13–17)
IMM GRANULOCYTES NFR BLD AUTO: 0.5 % — SIGNIFICANT CHANGE UP (ref 0–0.9)
INR BLD: 1.04 RATIO — SIGNIFICANT CHANGE UP (ref 0.88–1.16)
KETONES UR-MCNC: ABNORMAL
LACTATE SERPL-SCNC: 2 MMOL/L — SIGNIFICANT CHANGE UP (ref 0.7–2)
LEUKOCYTE ESTERASE UR-ACNC: ABNORMAL
LYMPHOCYTES # BLD AUTO: 1.93 K/UL — SIGNIFICANT CHANGE UP (ref 1–3.3)
LYMPHOCYTES # BLD AUTO: 20.9 % — SIGNIFICANT CHANGE UP (ref 13–44)
MCHC RBC-ENTMCNC: 31.2 PG — SIGNIFICANT CHANGE UP (ref 27–34)
MCHC RBC-ENTMCNC: 34 GM/DL — SIGNIFICANT CHANGE UP (ref 32–36)
MCV RBC AUTO: 91.7 FL — SIGNIFICANT CHANGE UP (ref 80–100)
MONOCYTES # BLD AUTO: 0.84 K/UL — SIGNIFICANT CHANGE UP (ref 0–0.9)
MONOCYTES NFR BLD AUTO: 9.1 % — SIGNIFICANT CHANGE UP (ref 2–14)
NEUTROPHILS # BLD AUTO: 6.07 K/UL — SIGNIFICANT CHANGE UP (ref 1.8–7.4)
NEUTROPHILS NFR BLD AUTO: 65.7 % — SIGNIFICANT CHANGE UP (ref 43–77)
NITRITE UR-MCNC: NEGATIVE — SIGNIFICANT CHANGE UP
PH UR: 5 — SIGNIFICANT CHANGE UP (ref 5–8)
PLATELET # BLD AUTO: 189 K/UL — SIGNIFICANT CHANGE UP (ref 150–400)
POTASSIUM SERPL-MCNC: 4.4 MMOL/L — SIGNIFICANT CHANGE UP (ref 3.5–5.3)
POTASSIUM SERPL-SCNC: 4.4 MMOL/L — SIGNIFICANT CHANGE UP (ref 3.5–5.3)
PROT SERPL-MCNC: 8.2 GM/DL — SIGNIFICANT CHANGE UP (ref 6–8.3)
PROT UR-MCNC: 15
PROTHROM AB SERPL-ACNC: 12.1 SEC — SIGNIFICANT CHANGE UP (ref 10.5–13.4)
RBC # BLD: 5.29 M/UL — SIGNIFICANT CHANGE UP (ref 4.2–5.8)
RBC # FLD: 13.9 % — SIGNIFICANT CHANGE UP (ref 10.3–14.5)
RBC CASTS # UR COMP ASSIST: ABNORMAL /HPF (ref 0–4)
SODIUM SERPL-SCNC: 139 MMOL/L — SIGNIFICANT CHANGE UP (ref 135–145)
SP GR SPEC: 1.02 — SIGNIFICANT CHANGE UP (ref 1.01–1.02)
URATE SERPL-MCNC: 7.8 MG/DL — SIGNIFICANT CHANGE UP (ref 3.4–8.8)
UROBILINOGEN FLD QL: NEGATIVE — SIGNIFICANT CHANGE UP
WBC # BLD: 9.25 K/UL — SIGNIFICANT CHANGE UP (ref 3.8–10.5)
WBC # FLD AUTO: 9.25 K/UL — SIGNIFICANT CHANGE UP (ref 3.8–10.5)
WBC UR QL: ABNORMAL /HPF (ref 0–5)

## 2023-06-12 PROCEDURE — 84550 ASSAY OF BLOOD/URIC ACID: CPT

## 2023-06-12 PROCEDURE — 85025 COMPLETE CBC W/AUTO DIFF WBC: CPT

## 2023-06-12 PROCEDURE — 96374 THER/PROPH/DIAG INJ IV PUSH: CPT

## 2023-06-12 PROCEDURE — 73630 X-RAY EXAM OF FOOT: CPT | Mod: RT

## 2023-06-12 PROCEDURE — 73630 X-RAY EXAM OF FOOT: CPT | Mod: 26,RT

## 2023-06-12 PROCEDURE — 85730 THROMBOPLASTIN TIME PARTIAL: CPT

## 2023-06-12 PROCEDURE — 85610 PROTHROMBIN TIME: CPT

## 2023-06-12 PROCEDURE — 80053 COMPREHEN METABOLIC PANEL: CPT

## 2023-06-12 PROCEDURE — 85652 RBC SED RATE AUTOMATED: CPT

## 2023-06-12 PROCEDURE — 36415 COLL VENOUS BLD VENIPUNCTURE: CPT

## 2023-06-12 PROCEDURE — 83605 ASSAY OF LACTIC ACID: CPT

## 2023-06-12 PROCEDURE — 87040 BLOOD CULTURE FOR BACTERIA: CPT | Mod: 59

## 2023-06-12 PROCEDURE — 99284 EMERGENCY DEPT VISIT MOD MDM: CPT | Mod: 25

## 2023-06-12 PROCEDURE — 81001 URINALYSIS AUTO W/SCOPE: CPT

## 2023-06-12 PROCEDURE — 99285 EMERGENCY DEPT VISIT HI MDM: CPT

## 2023-06-12 PROCEDURE — 86140 C-REACTIVE PROTEIN: CPT

## 2023-06-12 RX ORDER — CEFAZOLIN SODIUM 1 G
VIAL (EA) INJECTION
Refills: 0 | Status: COMPLETED | OUTPATIENT
Start: 2023-06-12 | End: 2023-06-13

## 2023-06-12 RX ORDER — CEFAZOLIN SODIUM 1 G
2000 VIAL (EA) INJECTION EVERY 8 HOURS
Refills: 0 | Status: COMPLETED | OUTPATIENT
Start: 2023-06-13 | End: 2023-06-13

## 2023-06-12 RX ORDER — CEFAZOLIN SODIUM 1 G
2000 VIAL (EA) INJECTION ONCE
Refills: 0 | Status: COMPLETED | OUTPATIENT
Start: 2023-06-12 | End: 2023-06-12

## 2023-06-12 RX ADMIN — Medication 100 MILLIGRAM(S): at 21:39

## 2023-06-12 NOTE — ED ADULT NURSE NOTE - NS ED NURSE LEVEL OF CONSCIOUSNESS ORIENTATION
Patient accepted and admission order received from:: IRIS EVANS [[id number]]   Patient Class: Inpatient [1]   Patient on Telemetry: No   Transferring Patient to? Only adjust for transfers between [facility], [facility]: [facility] [[id number]]   Oriented - self; Oriented - place; Oriented - time

## 2023-06-12 NOTE — ED ADULT NURSE NOTE - NSFALLRISKINTERV_ED_ALL_ED

## 2023-06-12 NOTE — ED STATDOCS - PATIENT PORTAL LINK FT
You can access the FollowMyHealth Patient Portal offered by [facility] by registering at the following website: [email or web address]. By joining [employer]’s FollowMyHealth portal, you will also be able to view your health information using other applications (apps) compatible with our system.

## 2023-06-12 NOTE — ED STATDOCS - NSFOLLOWUPINSTRUCTIONS_ED_ALL_ED_FT
elevate foot  take antibiotics  follow up with podiatrist this week  return to the ER for worsening redness, pain or if you develop fever

## 2023-06-12 NOTE — ED ADULT NURSE NOTE - CAS TRG GENERAL NORM CIRC DET
82y Female A&OX3 came to ED c/o agitation. PMH of Afib on Warfarin, Dementia, HTN, Kidney Stones. As per daughter, Pt has been in [place] and 2 weeks ago put on Zoloft by PCP for depression, Pt increased in agitation and aggressiveness, Pt was taken home with daughter.  Symptoms worsened and Pt came to ED to be evaluated.  Pt presents with A&OX2 (person and place) agitation, uncooperative with questions, as per Daughter, Pt not like herself.  Denies chest pain, sob, ha, n/v/d, abdominal pain, f/c, urinary symptoms, hematuria. Upon examination, full facial symmetry, no JVD or trach deviation, lung sounds clear and adequate chest rise, S1 and S2 heart sounds present, +2 pulses in all extremities with full ROM and equal strength, cap refill <2 seconds, ABD soft, non distended and non tender, pelvis intact. Pt also has scabbed over wounds on R knee and Strong peripheral pulses/Capillary refill less/equal to 2 seconds 82y Female A&OX3 came to ED c/o agitation. PMH of Afib on Warfarin, Dementia, HTN, Kidney Stones. As per daughter, Pt has been in [place] and 2 weeks ago put on Zoloft by PCP for depression, Pt increased in agitation and aggressiveness, Pt was taken home with daughter.  Symptoms worsened and Pt came to ED to be evaluated.  Pt presents with A&OX2 (person and place) agitation, uncooperative with questions, as per Daughter, Pt not like herself.  Denies chest pain, sob, ha, n/v/d, abdominal pain, f/c, urinary symptoms, hematuria. Upon examination, full facial symmetry, no JVD or trach deviation, lung sounds clear and adequate chest rise, S1 and S2 heart sounds present, +2 pulses in all extremities with full ROM and equal strength, cap refill <2 seconds, ABD soft, non distended and non tender, pelvis intact. Pt also has scabbed over wounds on R knee and L forehead, Pt states she fell once in last 2 weeks.

## 2023-06-12 NOTE — ED ADULT NURSE NOTE - CHIEF COMPLAINT QUOTE
c/o right foot and toes redness and swelling. Pt had ingrown toe nails removed by podiatrist 2 weeks ago. Denies fever/chills.

## 2023-06-12 NOTE — ED ADULT NURSE NOTE - OBJECTIVE STATEMENT
Pt comes to the ED complaining of right second toe pain. Pt was seen approx 1 week ago by podiatry for ingrown toe nails. Pt called after to notify of redness and pain. Pt was told to soak In epson salt. Pt has been following directions but it has not been getting any better.  pt with hx of poor vascular circulation bilaterally to lower extremity and follows with Dr. Carlin.

## 2023-06-12 NOTE — ED STATDOCS - PHYSICAL EXAMINATION
Constitutional: NAD AOx3  Eyes: PERRL EOMI  Head: Normocephalic atraumatic  Mouth: MMM  Cardiac: regular rate and rhythm  Resp: Lungs CTAB  GI: Abd s/nd/nt  Neuro: CN2-12 grossly intact, TORRES x 4  Skin: No visible rashes   Msk: R foot all 5 toes with erythema, edema TTP. No warmth

## 2023-06-12 NOTE — ED STATDOCS - OBJECTIVE STATEMENT
88 year old male with PMHx of DM, neuropathy, HTN, CAD, Cardiac arrest with successful resuscitation, AICD, CKR, heart failure, Lung CA, PVD, s/p carotic endarterectomy, appendectomy, and lobectomy of lung  presents to the ED c/o pain and swelling to all 5 R toes. Patient states he had ingrown toenail in R big toe and saw a podiatrist who removed them on 5/25. Now pt c/o pain and swelling to R toes. Wife concerned for possible cellulitis or gout, so she brought patient to ED. Denies fever, chills, nausea.

## 2023-06-13 LAB — CRP SERPL-MCNC: 3 MG/L — SIGNIFICANT CHANGE UP

## 2023-06-13 NOTE — ED POST DISCHARGE NOTE - RESULT SUMMARY
Pt called and left message that doxy rx not at [facility]. In chart, rx was sent to [facility]. I re-sent rx to [place] [facility] pharmacy. signed Ashely Quintanilla PA-C

## 2023-07-17 ENCOUNTER — APPOINTMENT (OUTPATIENT)
Dept: OTOLARYNGOLOGY | Facility: CLINIC | Age: 88
End: 2023-07-17

## 2023-07-17 ENCOUNTER — APPOINTMENT (OUTPATIENT)
Dept: VASCULAR SURGERY | Facility: CLINIC | Age: 88
End: 2023-07-17

## 2023-08-21 ENCOUNTER — INPATIENT (INPATIENT)
Facility: HOSPITAL | Age: 88
LOS: 9 days | Discharge: NURSING, REHAB OR LONG-TERM CARE | DRG: 868 | End: 2023-08-31
Attending: FAMILY MEDICINE | Admitting: INTERNAL MEDICINE
Payer: MEDICARE

## 2023-08-21 VITALS
DIASTOLIC BLOOD PRESSURE: 71 MMHG | HEIGHT: 71 IN | TEMPERATURE: 99 F | OXYGEN SATURATION: 94 % | WEIGHT: 212.08 LBS | RESPIRATION RATE: 18 BRPM | SYSTOLIC BLOOD PRESSURE: 112 MMHG | HEART RATE: 51 BPM

## 2023-08-21 DIAGNOSIS — Z98.890 OTHER SPECIFIED POSTPROCEDURAL STATES: Chronic | ICD-10-CM

## 2023-08-21 DIAGNOSIS — Z95.810 PRESENCE OF AUTOMATIC (IMPLANTABLE) CARDIAC DEFIBRILLATOR: Chronic | ICD-10-CM

## 2023-08-21 DIAGNOSIS — Z90.49 ACQUIRED ABSENCE OF OTHER SPECIFIED PARTS OF DIGESTIVE TRACT: Chronic | ICD-10-CM

## 2023-08-21 DIAGNOSIS — Z90.2 ACQUIRED ABSENCE OF LUNG [PART OF]: Chronic | ICD-10-CM

## 2023-08-21 LAB
ALBUMIN SERPL ELPH-MCNC: 3.1 G/DL — LOW (ref 3.3–5)
ALP SERPL-CCNC: 64 U/L — SIGNIFICANT CHANGE UP (ref 40–120)
ALT FLD-CCNC: 24 U/L — SIGNIFICANT CHANGE UP (ref 12–78)
ANION GAP SERPL CALC-SCNC: 10 MMOL/L — SIGNIFICANT CHANGE UP (ref 5–17)
ANISOCYTOSIS BLD QL: SLIGHT — SIGNIFICANT CHANGE UP
APTT BLD: 30.8 SEC — SIGNIFICANT CHANGE UP (ref 24.5–35.6)
AST SERPL-CCNC: 34 U/L — SIGNIFICANT CHANGE UP (ref 15–37)
BASOPHILS # BLD AUTO: 0.02 K/UL — SIGNIFICANT CHANGE UP (ref 0–0.2)
BASOPHILS NFR BLD AUTO: 0.4 % — SIGNIFICANT CHANGE UP (ref 0–2)
BILIRUB SERPL-MCNC: 1.7 MG/DL — HIGH (ref 0.2–1.2)
BUN SERPL-MCNC: 21 MG/DL — SIGNIFICANT CHANGE UP (ref 7–23)
CALCIUM SERPL-MCNC: 8.6 MG/DL — SIGNIFICANT CHANGE UP (ref 8.5–10.1)
CHLORIDE SERPL-SCNC: 94 MMOL/L — LOW (ref 96–108)
CK SERPL-CCNC: 95 U/L — SIGNIFICANT CHANGE UP (ref 26–308)
CO2 SERPL-SCNC: 23 MMOL/L — SIGNIFICANT CHANGE UP (ref 22–31)
CREAT SERPL-MCNC: 1.35 MG/DL — HIGH (ref 0.5–1.3)
EGFR: 50 ML/MIN/1.73M2 — LOW
EOSINOPHIL # BLD AUTO: 0.01 K/UL — SIGNIFICANT CHANGE UP (ref 0–0.5)
EOSINOPHIL NFR BLD AUTO: 0.2 % — SIGNIFICANT CHANGE UP (ref 0–6)
ETHANOL SERPL-MCNC: <10 MG/DL — SIGNIFICANT CHANGE UP (ref 0–10)
GLUCOSE SERPL-MCNC: 173 MG/DL — HIGH (ref 70–99)
HCT VFR BLD CALC: 38.9 % — LOW (ref 39–50)
HGB BLD-MCNC: 13.7 G/DL — SIGNIFICANT CHANGE UP (ref 13–17)
IMM GRANULOCYTES NFR BLD AUTO: 0.5 % — SIGNIFICANT CHANGE UP (ref 0–0.9)
INR BLD: 1.34 RATIO — HIGH (ref 0.85–1.18)
LYMPHOCYTES # BLD AUTO: 1.18 K/UL — SIGNIFICANT CHANGE UP (ref 1–3.3)
LYMPHOCYTES # BLD AUTO: 21.5 % — SIGNIFICANT CHANGE UP (ref 13–44)
MANUAL SMEAR VERIFICATION: SIGNIFICANT CHANGE UP
MCHC RBC-ENTMCNC: 30.6 PG — SIGNIFICANT CHANGE UP (ref 27–34)
MCHC RBC-ENTMCNC: 35.2 GM/DL — SIGNIFICANT CHANGE UP (ref 32–36)
MCV RBC AUTO: 87 FL — SIGNIFICANT CHANGE UP (ref 80–100)
MONOCYTES # BLD AUTO: 0.92 K/UL — HIGH (ref 0–0.9)
MONOCYTES NFR BLD AUTO: 16.7 % — HIGH (ref 2–14)
NEUTROPHILS # BLD AUTO: 3.34 K/UL — SIGNIFICANT CHANGE UP (ref 1.8–7.4)
NEUTROPHILS NFR BLD AUTO: 60.7 % — SIGNIFICANT CHANGE UP (ref 43–77)
PARASITE BLD: SIGNIFICANT CHANGE UP
PLAT MORPH BLD: NORMAL — SIGNIFICANT CHANGE UP
PLATELET # BLD AUTO: 73 K/UL — LOW (ref 150–400)
POLYCHROMASIA BLD QL SMEAR: SLIGHT — SIGNIFICANT CHANGE UP
POTASSIUM SERPL-MCNC: 4.3 MMOL/L — SIGNIFICANT CHANGE UP (ref 3.5–5.3)
POTASSIUM SERPL-SCNC: 4.3 MMOL/L — SIGNIFICANT CHANGE UP (ref 3.5–5.3)
PROT SERPL-MCNC: 7.1 GM/DL — SIGNIFICANT CHANGE UP (ref 6–8.3)
PROTHROM AB SERPL-ACNC: 15 SEC — HIGH (ref 9.5–13)
RBC # BLD: 4.47 M/UL — SIGNIFICANT CHANGE UP (ref 4.2–5.8)
RBC # FLD: 16.7 % — HIGH (ref 10.3–14.5)
RBC BLD AUTO: ABNORMAL
SODIUM SERPL-SCNC: 127 MMOL/L — LOW (ref 135–145)
TROPONIN I, HIGH SENSITIVITY RESULT: 34.48 NG/L — SIGNIFICANT CHANGE UP
WBC # BLD: 5.5 K/UL — SIGNIFICANT CHANGE UP (ref 3.8–10.5)
WBC # FLD AUTO: 5.5 K/UL — SIGNIFICANT CHANGE UP (ref 3.8–10.5)

## 2023-08-21 PROCEDURE — 74177 CT ABD & PELVIS W/CONTRAST: CPT | Mod: 26,MA

## 2023-08-21 PROCEDURE — 71260 CT THORAX DX C+: CPT | Mod: 26,MA

## 2023-08-21 PROCEDURE — 99285 EMERGENCY DEPT VISIT HI MDM: CPT

## 2023-08-21 PROCEDURE — 70450 CT HEAD/BRAIN W/O DYE: CPT | Mod: 26,MA

## 2023-08-21 PROCEDURE — 72125 CT NECK SPINE W/O DYE: CPT | Mod: 26,MA

## 2023-08-21 RX ORDER — ACETAMINOPHEN 500 MG
650 TABLET ORAL ONCE
Refills: 0 | Status: COMPLETED | OUTPATIENT
Start: 2023-08-21 | End: 2023-08-21

## 2023-08-21 RX ORDER — SODIUM CHLORIDE 9 MG/ML
3 INJECTION INTRAMUSCULAR; INTRAVENOUS; SUBCUTANEOUS ONCE
Refills: 0 | Status: COMPLETED | OUTPATIENT
Start: 2023-08-21 | End: 2023-08-21

## 2023-08-21 RX ADMIN — SODIUM CHLORIDE 3 MILLILITER(S): 9 INJECTION INTRAMUSCULAR; INTRAVENOUS; SUBCUTANEOUS at 21:33

## 2023-08-21 RX ADMIN — Medication 260 MILLIGRAM(S): at 21:34

## 2023-08-21 NOTE — ED PROVIDER NOTE - CLINICAL SUMMARY MEDICAL DECISION MAKING FREE TEXT BOX
Patient is a 89 y/o male with a PMHx of heart failure, lung cancer, peripheral artery disease, CKD, MI, CAD, HTN, AICD present, s/p lobectomy of lung, s/p carotid endarterectomy; presenting to the ED s/p unwitnessed mechanical fall in bathroom and dizziness, noted -LOC, -head strike, -blood thinner use. Patient consumed 1 beer, c/o lower back pain. Plan labs, EKG, CT.

## 2023-08-21 NOTE — ED ADULT TRIAGE NOTE - NS ED TRIAGE AVPU SCALE
Use your advair twice daily  Continue with Eliquis    Alert-The patient is alert, awake and responds to voice. The patient is oriented to time, place, and person. The triage nurse is able to obtain subjective information.

## 2023-08-21 NOTE — ED ADULT TRIAGE NOTE - CHIEF COMPLAINT QUOTE
Pt BIBEMS s/p unwitnessed mechanical fall in bathroom. (-) LOC (-) head strike (-) blood thinners. Admits to drinking 3 beers tonight prior to fall. Pt c/o lower back pain. PMHx CHF and pacemaker.

## 2023-08-21 NOTE — ED PROVIDER NOTE - NS_EDPROVIDERDISPOUSERTYPE_ED_A_ED
Your symptoms are suggestive that you may have a urinary tract infection.  The urinalysis is indicative that you have an infection.  A urine culture and sensitivity is ordered and results are pending. You will be notified if there are any microbial resistance issues.  We will treat you with appropriate antibiotic.   · Nitrofurantoin, 100 mg, one tab twice daily x 7 days  · Depending on your symptoms we may add phenazopyridine to relieve your irritative urinary symptoms.   · Be sure that you drink plenty of fluids throughout the day, at least half gallon water during your waking hours.  Please contact your PCP or return or call here if your symptoms worsen or do not improve in a timely, expected manner.         Urinary Tract Infections in Women  Urinary tract infections (UTIs) are most often caused by bacteria (germs). These bacteria enter the urinary tract. The bacteria may come from outside the body. Or they may travel from the skin outside the rectum or vagina into the urethra. Female anatomy makes it easier for bacteria from the bowel to enter a woman’s urinary tract, which is the most common source of UTI. This means women develop UTIs more often than men. Pain in or around the urinary tract is a common UTI symptom. But the only way to know for sure if you have a UTI for the health care provider to test your urine. The two tests that may be done are the urinalysis and urine culture.  Types of UTIs  · Cystitis: A bladder infection (cystitis) is the most common UTI in women. You may have urgent or frequent urination. You may also have pain, burning when you urinate, and bloody urine.  · Urethritis: This is an inflamed urethra, which is the tube that carries urine from the bladder to outside the body. You may have lower stomach or back pain. You may also have urgent or frequent urination.  · Pyelonephritis: This is a kidney infection. If not treated, it can be serious and damage your kidneys. In severe cases, you  may be hospitalized. You may have a fever and lower back pain.  Medications to treat a UTI  Most UTIs are treated with antibiotics. These kill the bacteria. The length of time you need to take them depends on the type of infection. It may be as short as 3 days. If you have repeated UTIs, a low-dose antibiotic may be needed for several months. Take antibiotics exactly as directed. Don’t stop taking them until all of the medication is gone. If you stop taking the antibiotic too soon, the infection may not go away, and you may develop a resistance to the antibiotic. This can make it much harder to treat.  Lifestyle changes to treat and prevent UTIs  The lifestyle changes below will help get rid of your UTI. They may also help prevent future UTIs.  · Drink plenty of fluids. This includes water, juice, or other caffeine-free drinks. Fluids help flush bacteria out of your body.  · Empty your bladder. Always empty your bladder when you feel the urge to urinate. And always urinate before going to sleep. Urine that stays in your bladder can lead to infection. Try to urinate before and after sex as well.  · Practice good personal hygiene. Wipe yourself from front to back after using the toilet. This helps keep bacteria from getting into the urethra.  · Use condoms during sex. These help prevent UTIs caused by sexually transmitted bacteria. Also, avoid using spermicides during sex. These can increase the risk of UTIs. Choose other forms of birth control instead. For women who tend to get UTIs after sex, a low-dose of a preventive antibiotic may be used. Be sure to discuss this option with your health care provider.  · Follow up with your health care provider as directed. He or she may test to make sure the infection has cleared. If necessary, additional treatment may be started.  © 3026-5199 [employer]. [address] [place] [address], [place] [address]. All rights reserved. This information is not intended as a  substitute for professional medical care. Always follow your healthcare professional's instructions.      Thank you for visiting the [facility] in [place].    It is difficult to recognize all elements of any illness or injury in a single visit. The examination, treatment, and x-rays received are on a preliminary basis only. A radiologist will also review your x-rays for final reading. Call your primary care provider if you have questions or problems before your next appointment. If you are unable to  reach your Primary Care Provider (PCP), please call or return to the Urgent Care Clinic.  If symptoms worsen or do not resolve please follow-up with your Primary Care Provider (PCP), Walk-In or the nearest  Emergency Room for emergency symptoms.  If you are unsure of whom to follow up with, call [phone] and ask to speak with either your PCP, the Urgent Care nurse or the on-call Provider if you are calling after hours.      If you are referred to a specialist or scheduled for a test, our Referrals department will call you with your appointment date and time within 3 business days. If you have not heard from them in this time frame, please call [phone] and ask for the Referrals department.     Test results: Unless otherwise instructed, you should be notified of test results within one week. Please call our office if you do not hear from us within this time frame at [phone].     Hours:  Monday through Friday: 7:00 am to 7:00 pm  Saturday: 8:00 am to 2:00 pm  Holiday hours may vary, please call [phone] on Holidays.  Urgent Care is closed on Thanksgiving Day, Rossy Day and  New Year's Day.      Thank you again for visiting [facility]  [facility] [place], [place].    Help us to grow our quality of service!  We want to improve - and you can help!  You may receive a survey in the mail.  This is your opportunity to tell us what excellent service you received, and where we  could use improvement.  We value your input!     AMANDA Lehman PA-C             Scribe Attestation (For Scribes USE Only)... Attending Attestation (For Attendings USE Only).../Scribe Attestation (For Scribes USE Only)...

## 2023-08-21 NOTE — ED PROVIDER NOTE - OBJECTIVE STATEMENT
Patient is a 87 y/o male with a PMHx of heart failure, lung cancer, peripheral artery disease, CKD, MI, CAD, HTN, AICD present, s/p lobectomy of lung, s/p carotid endarterectomy; presenting to the ED s/p unwitnessed mechanical fall in bathroom, noted -LOC, -head strike, -blood thinner use. Patient states he suddenly felt dizzy while he was urinating, he lost his balance and fell; he called EMS through a medical emergency device around his neck and was wedged in between the toilet for about two hours. Patient c/o of lower back pain. Patient able to bear weight but feels unsteady. Denies weakness or numbness. Patient admits to EtOH consumption tonight, approximately 1 beer prior to fall. Also endorses knee pain, neck pain.

## 2023-08-21 NOTE — ED PROVIDER NOTE - CARDIAC, MLM
Normal rate, regular rhythm.  Heart sounds S1, S2.  No murmurs, rubs or gallops. +Defibrillator on left side

## 2023-08-21 NOTE — ED PROVIDER NOTE - FAMILY DETAILS FREE TEXT FOR MDM ADDL HISTORY OBTAINED FROM QUESTION
son states no fever rash or chills. no travel to [place]. Just decreased appetite for a few months

## 2023-08-21 NOTE — ED PROVIDER NOTE - CARE PLAN
1 Principal Discharge DX:	Near syncope  Secondary Diagnosis:	Hyponatremia  Secondary Diagnosis:	Babesiosis   Principal Discharge DX:	Near syncope  Secondary Diagnosis:	Hyponatremia  Secondary Diagnosis:	Babesiosis  Secondary Diagnosis:	Thrombocytopenia

## 2023-08-22 DIAGNOSIS — R55 SYNCOPE AND COLLAPSE: ICD-10-CM

## 2023-08-22 LAB
ALBUMIN SERPL ELPH-MCNC: 2.8 G/DL — LOW (ref 3.3–5)
ALP SERPL-CCNC: 61 U/L — SIGNIFICANT CHANGE UP (ref 40–120)
ALT FLD-CCNC: 21 U/L — SIGNIFICANT CHANGE UP (ref 12–78)
ANION GAP SERPL CALC-SCNC: 7 MMOL/L — SIGNIFICANT CHANGE UP (ref 5–17)
APPEARANCE UR: CLEAR — SIGNIFICANT CHANGE UP
APTT BLD: 31.3 SEC — SIGNIFICANT CHANGE UP (ref 24.5–35.6)
AST SERPL-CCNC: 28 U/L — SIGNIFICANT CHANGE UP (ref 15–37)
BABESIA MICROTI PCR, BLD RESULT: DETECTED
BACTERIA # UR AUTO: ABNORMAL
BASOPHILS # BLD AUTO: 0 K/UL — SIGNIFICANT CHANGE UP (ref 0–0.2)
BASOPHILS NFR BLD AUTO: 0 % — SIGNIFICANT CHANGE UP (ref 0–2)
BILIRUB DIRECT SERPL-MCNC: 0.8 MG/DL — HIGH (ref 0–0.3)
BILIRUB INDIRECT FLD-MCNC: 0.9 MG/DL — SIGNIFICANT CHANGE UP (ref 0.2–1)
BILIRUB SERPL-MCNC: 1.7 MG/DL — HIGH (ref 0.2–1.2)
BILIRUB SERPL-MCNC: 1.7 MG/DL — HIGH (ref 0.2–1.2)
BILIRUB UR-MCNC: NEGATIVE — SIGNIFICANT CHANGE UP
BUN SERPL-MCNC: 21 MG/DL — SIGNIFICANT CHANGE UP (ref 7–23)
CALCIUM SERPL-MCNC: 8.8 MG/DL — SIGNIFICANT CHANGE UP (ref 8.5–10.1)
CHLORIDE SERPL-SCNC: 97 MMOL/L — SIGNIFICANT CHANGE UP (ref 96–108)
CO2 SERPL-SCNC: 24 MMOL/L — SIGNIFICANT CHANGE UP (ref 22–31)
COLOR SPEC: YELLOW — SIGNIFICANT CHANGE UP
CREAT SERPL-MCNC: 1.22 MG/DL — SIGNIFICANT CHANGE UP (ref 0.5–1.3)
CULTURE RESULTS: SIGNIFICANT CHANGE UP
CULTURE RESULTS: SIGNIFICANT CHANGE UP
DIFF PNL FLD: ABNORMAL
EGFR: 57 ML/MIN/1.73M2 — LOW
EOSINOPHIL # BLD AUTO: 0 K/UL — SIGNIFICANT CHANGE UP (ref 0–0.5)
EOSINOPHIL NFR BLD AUTO: 0 % — SIGNIFICANT CHANGE UP (ref 0–6)
EPI CELLS # UR: SIGNIFICANT CHANGE UP
GLUCOSE SERPL-MCNC: 170 MG/DL — HIGH (ref 70–99)
GLUCOSE UR QL: 250
HAPTOGLOB SERPL-MCNC: <20 MG/DL — LOW (ref 34–200)
HAV IGM SER-ACNC: SIGNIFICANT CHANGE UP
HBV CORE IGM SER-ACNC: SIGNIFICANT CHANGE UP
HBV SURFACE AG SER-ACNC: SIGNIFICANT CHANGE UP
HCT VFR BLD CALC: 39 % — SIGNIFICANT CHANGE UP (ref 39–50)
HCV AB S/CO SERPL IA: 0.45 S/CO — SIGNIFICANT CHANGE UP (ref 0–0.99)
HCV AB SERPL-IMP: SIGNIFICANT CHANGE UP
HGB BLD-MCNC: 13.8 G/DL — SIGNIFICANT CHANGE UP (ref 13–17)
HYALINE CASTS # UR AUTO: ABNORMAL /LPF
INR BLD: 1.34 RATIO — HIGH (ref 0.85–1.18)
KETONES UR-MCNC: NEGATIVE — SIGNIFICANT CHANGE UP
LEUKOCYTE ESTERASE UR-ACNC: ABNORMAL
LG PLATELETS BLD QL AUTO: SLIGHT — SIGNIFICANT CHANGE UP
LYMPHOCYTES # BLD AUTO: 0.91 K/UL — LOW (ref 1–3.3)
LYMPHOCYTES # BLD AUTO: 17 % — SIGNIFICANT CHANGE UP (ref 13–44)
MANUAL SMEAR VERIFICATION: SIGNIFICANT CHANGE UP
MCHC RBC-ENTMCNC: 30.9 PG — SIGNIFICANT CHANGE UP (ref 27–34)
MCHC RBC-ENTMCNC: 35.4 GM/DL — SIGNIFICANT CHANGE UP (ref 32–36)
MCV RBC AUTO: 87.2 FL — SIGNIFICANT CHANGE UP (ref 80–100)
MONOCYTES # BLD AUTO: 0.91 K/UL — HIGH (ref 0–0.9)
MONOCYTES NFR BLD AUTO: 17 % — HIGH (ref 2–14)
NEUTROPHILS # BLD AUTO: 3.47 K/UL — SIGNIFICANT CHANGE UP (ref 1.8–7.4)
NEUTROPHILS NFR BLD AUTO: 61 % — SIGNIFICANT CHANGE UP (ref 43–77)
NEUTS BAND # BLD: 4 % — SIGNIFICANT CHANGE UP (ref 0–8)
NITRITE UR-MCNC: NEGATIVE — SIGNIFICANT CHANGE UP
NRBC # BLD: 0 /100 — SIGNIFICANT CHANGE UP (ref 0–0)
NRBC # BLD: SIGNIFICANT CHANGE UP /100 WBCS (ref 0–0)
PARASITE BLD: PRESENT — SIGNIFICANT CHANGE UP
PH UR: 5 — SIGNIFICANT CHANGE UP (ref 5–8)
PLAT MORPH BLD: NORMAL — SIGNIFICANT CHANGE UP
PLATELET # BLD AUTO: 64 K/UL — LOW (ref 150–400)
PLATELET COUNT - ESTIMATE: ABNORMAL
POTASSIUM SERPL-MCNC: 4 MMOL/L — SIGNIFICANT CHANGE UP (ref 3.5–5.3)
POTASSIUM SERPL-SCNC: 4 MMOL/L — SIGNIFICANT CHANGE UP (ref 3.5–5.3)
PROT SERPL-MCNC: 6.6 GM/DL — SIGNIFICANT CHANGE UP (ref 6–8.3)
PROT UR-MCNC: NEGATIVE — SIGNIFICANT CHANGE UP
PROTHROM AB SERPL-ACNC: 15 SEC — HIGH (ref 9.5–13)
RBC # BLD: 4.47 M/UL — SIGNIFICANT CHANGE UP (ref 4.2–5.8)
RBC # FLD: 16.7 % — HIGH (ref 10.3–14.5)
RBC BLD AUTO: ABNORMAL
RBC CASTS # UR COMP ASSIST: SIGNIFICANT CHANGE UP /HPF (ref 0–4)
RETICS #: 126.7 K/UL — HIGH (ref 25–125)
RETICS/RBC NFR: 2.8 % — HIGH (ref 0.5–2.5)
SODIUM SERPL-SCNC: 128 MMOL/L — LOW (ref 135–145)
SP GR SPEC: 1.01 — SIGNIFICANT CHANGE UP (ref 1.01–1.02)
SPECIMEN SOURCE: SIGNIFICANT CHANGE UP
SPECIMEN SOURCE: SIGNIFICANT CHANGE UP
TROPONIN I, HIGH SENSITIVITY RESULT: 52.41 NG/L — SIGNIFICANT CHANGE UP
UROBILINOGEN FLD QL: 4
VARIANT LYMPHS # BLD: 1 % — SIGNIFICANT CHANGE UP (ref 0–6)
WBC # BLD: 5.34 K/UL — SIGNIFICANT CHANGE UP (ref 3.8–10.5)
WBC # FLD AUTO: 5.34 K/UL — SIGNIFICANT CHANGE UP (ref 3.8–10.5)
WBC UR QL: ABNORMAL /HPF (ref 0–5)

## 2023-08-22 PROCEDURE — 86901 BLOOD TYPING SEROLOGIC RH(D): CPT

## 2023-08-22 PROCEDURE — 87468 ANAPLSMA PHGCYTOPHLM AMP PRB: CPT

## 2023-08-22 PROCEDURE — 80048 BASIC METABOLIC PNL TOTAL CA: CPT

## 2023-08-22 PROCEDURE — 80074 ACUTE HEPATITIS PANEL: CPT

## 2023-08-22 PROCEDURE — 99223 1ST HOSP IP/OBS HIGH 75: CPT

## 2023-08-22 PROCEDURE — 81001 URINALYSIS AUTO W/SCOPE: CPT

## 2023-08-22 PROCEDURE — 80061 LIPID PANEL: CPT

## 2023-08-22 PROCEDURE — 83935 ASSAY OF URINE OSMOLALITY: CPT

## 2023-08-22 PROCEDURE — 86618 LYME DISEASE ANTIBODY: CPT

## 2023-08-22 PROCEDURE — 85610 PROTHROMBIN TIME: CPT

## 2023-08-22 PROCEDURE — 97116 GAIT TRAINING THERAPY: CPT | Mod: GP

## 2023-08-22 PROCEDURE — 75635 CT ANGIO ABDOMINAL ARTERIES: CPT

## 2023-08-22 PROCEDURE — 85027 COMPLETE CBC AUTOMATED: CPT

## 2023-08-22 PROCEDURE — 82247 BILIRUBIN TOTAL: CPT

## 2023-08-22 PROCEDURE — 85045 AUTOMATED RETICULOCYTE COUNT: CPT

## 2023-08-22 PROCEDURE — 86900 BLOOD TYPING SEROLOGIC ABO: CPT

## 2023-08-22 PROCEDURE — 83735 ASSAY OF MAGNESIUM: CPT

## 2023-08-22 PROCEDURE — 84100 ASSAY OF PHOSPHORUS: CPT

## 2023-08-22 PROCEDURE — 84300 ASSAY OF URINE SODIUM: CPT

## 2023-08-22 PROCEDURE — 87798 DETECT AGENT NOS DNA AMP: CPT

## 2023-08-22 PROCEDURE — 36415 COLL VENOUS BLD VENIPUNCTURE: CPT

## 2023-08-22 PROCEDURE — 93005 ELECTROCARDIOGRAM TRACING: CPT

## 2023-08-22 PROCEDURE — 86850 RBC ANTIBODY SCREEN: CPT

## 2023-08-22 PROCEDURE — 85025 COMPLETE CBC W/AUTO DIFF WBC: CPT

## 2023-08-22 PROCEDURE — 97530 THERAPEUTIC ACTIVITIES: CPT | Mod: GP

## 2023-08-22 PROCEDURE — 82248 BILIRUBIN DIRECT: CPT

## 2023-08-22 PROCEDURE — 80053 COMPREHEN METABOLIC PANEL: CPT

## 2023-08-22 PROCEDURE — 84484 ASSAY OF TROPONIN QUANT: CPT

## 2023-08-22 PROCEDURE — 83010 ASSAY OF HAPTOGLOBIN QUANT: CPT

## 2023-08-22 PROCEDURE — 93926 LOWER EXTREMITY STUDY: CPT | Mod: RT

## 2023-08-22 PROCEDURE — 93306 TTE W/DOPPLER COMPLETE: CPT

## 2023-08-22 PROCEDURE — 85730 THROMBOPLASTIN TIME PARTIAL: CPT

## 2023-08-22 PROCEDURE — 87484 EHRLICHA CHAFFEENSIS AMP PRB: CPT

## 2023-08-22 PROCEDURE — 87207 SMEAR SPECIAL STAIN: CPT

## 2023-08-22 PROCEDURE — 97163 PT EVAL HIGH COMPLEX 45 MIN: CPT | Mod: GP

## 2023-08-22 PROCEDURE — 99497 ADVNCD CARE PLAN 30 MIN: CPT | Mod: 25

## 2023-08-22 PROCEDURE — 99222 1ST HOSP IP/OBS MODERATE 55: CPT

## 2023-08-22 PROCEDURE — 87635 SARS-COV-2 COVID-19 AMP PRB: CPT

## 2023-08-22 RX ORDER — FUROSEMIDE 40 MG
1 TABLET ORAL
Qty: 0 | Refills: 0 | DISCHARGE

## 2023-08-22 RX ORDER — AZITHROMYCIN 500 MG/1
500 TABLET, FILM COATED ORAL EVERY 24 HOURS
Refills: 0 | Status: DISCONTINUED | OUTPATIENT
Start: 2023-08-23 | End: 2023-08-31

## 2023-08-22 RX ORDER — LANOLIN ALCOHOL/MO/W.PET/CERES
5 CREAM (GRAM) TOPICAL AT BEDTIME
Refills: 0 | Status: DISCONTINUED | OUTPATIENT
Start: 2023-08-22 | End: 2023-08-31

## 2023-08-22 RX ORDER — AMLODIPINE BESYLATE 2.5 MG/1
1 TABLET ORAL
Qty: 0 | Refills: 0 | DISCHARGE

## 2023-08-22 RX ORDER — METOPROLOL TARTRATE 50 MG
1 TABLET ORAL
Qty: 0 | Refills: 0 | DISCHARGE

## 2023-08-22 RX ORDER — METOPROLOL TARTRATE 50 MG
37.5 TABLET ORAL DAILY
Refills: 0 | Status: DISCONTINUED | OUTPATIENT
Start: 2023-08-22 | End: 2023-08-31

## 2023-08-22 RX ORDER — AZITHROMYCIN 500 MG/1
500 TABLET, FILM COATED ORAL ONCE
Refills: 0 | Status: COMPLETED | OUTPATIENT
Start: 2023-08-22 | End: 2023-08-22

## 2023-08-22 RX ORDER — PANTOPRAZOLE SODIUM 20 MG/1
40 TABLET, DELAYED RELEASE ORAL
Refills: 0 | Status: DISCONTINUED | OUTPATIENT
Start: 2023-08-22 | End: 2023-08-31

## 2023-08-22 RX ORDER — AZITHROMYCIN 500 MG/1
TABLET, FILM COATED ORAL
Refills: 0 | Status: DISCONTINUED | OUTPATIENT
Start: 2023-08-22 | End: 2023-08-31

## 2023-08-22 RX ORDER — ATORVASTATIN CALCIUM 80 MG/1
10 TABLET, FILM COATED ORAL AT BEDTIME
Refills: 0 | Status: DISCONTINUED | OUTPATIENT
Start: 2023-08-22 | End: 2023-08-31

## 2023-08-22 RX ORDER — ASPIRIN/CALCIUM CARB/MAGNESIUM 324 MG
81 TABLET ORAL DAILY
Refills: 0 | Status: DISCONTINUED | OUTPATIENT
Start: 2023-08-22 | End: 2023-08-31

## 2023-08-22 RX ORDER — SACUBITRIL AND VALSARTAN 24; 26 MG/1; MG/1
1 TABLET, FILM COATED ORAL
Refills: 0 | Status: DISCONTINUED | OUTPATIENT
Start: 2023-08-22 | End: 2023-08-31

## 2023-08-22 RX ORDER — ATOVAQUONE 750 MG/5ML
750 SUSPENSION ORAL EVERY 12 HOURS
Refills: 0 | Status: DISCONTINUED | OUTPATIENT
Start: 2023-08-22 | End: 2023-08-31

## 2023-08-22 RX ORDER — ACETAMINOPHEN 500 MG
650 TABLET ORAL EVERY 6 HOURS
Refills: 0 | Status: DISCONTINUED | OUTPATIENT
Start: 2023-08-22 | End: 2023-08-31

## 2023-08-22 RX ORDER — LISINOPRIL 2.5 MG/1
1 TABLET ORAL
Qty: 0 | Refills: 0 | DISCHARGE

## 2023-08-22 RX ORDER — ONDANSETRON 8 MG/1
4 TABLET, FILM COATED ORAL EVERY 8 HOURS
Refills: 0 | Status: DISCONTINUED | OUTPATIENT
Start: 2023-08-22 | End: 2023-08-31

## 2023-08-22 RX ADMIN — SACUBITRIL AND VALSARTAN 1 TABLET(S): 24; 26 TABLET, FILM COATED ORAL at 22:06

## 2023-08-22 RX ADMIN — PANTOPRAZOLE SODIUM 40 MILLIGRAM(S): 20 TABLET, DELAYED RELEASE ORAL at 12:47

## 2023-08-22 RX ADMIN — Medication 5 MILLIGRAM(S): at 22:06

## 2023-08-22 RX ADMIN — ATORVASTATIN CALCIUM 10 MILLIGRAM(S): 80 TABLET, FILM COATED ORAL at 22:06

## 2023-08-22 RX ADMIN — AZITHROMYCIN 255 MILLIGRAM(S): 500 TABLET, FILM COATED ORAL at 13:35

## 2023-08-22 RX ADMIN — ATOVAQUONE 750 MILLIGRAM(S): 750 SUSPENSION ORAL at 22:07

## 2023-08-22 RX ADMIN — Medication 81 MILLIGRAM(S): at 12:50

## 2023-08-22 NOTE — PATIENT PROFILE ADULT - ...
Physical Therapy   Date: 7/9/2020  Patient did not attend nor call to cancel  today's (7/9/2020) scheduled appointment for PT & OT.  This is the patient's first no show/late cancel.         Velia Vogel MD  Consulting physician: Kit    Chief Complaint   Patient presents with   • Stroke         HPI: Patient is a 78-year-old female with a history of amyloidosis.  Patient was brought in the hospital due to stroke.  Family states that she is required assistance just with pivoting from bed to chair since she is came into the hospital and feels that she is declining.  Before coming to the hospital due to family does report patient has been sleeping a lot at home.  Family also goes on to report that the patient was mostly homebound prior to stroke as well.      Past Medical History:   Diagnosis Date   • Arrhythmia    • Atrial fibrillation and flutter 8/8/2016   • Cancer     melanoma in 1971   • CHF (congestive heart failure)      Past Surgical History:   Procedure Laterality Date   • APPENDECTOMY     • CHOLECYSTECTOMY     • COLON SURGERY     • HEMORRHOIDECTOMY     • HYSTERECTOMY       Current Facility-Administered Medications   Medication Dose Route Frequency Provider Last Rate Last Dose   • acetaminophen (TYLENOL) tablet 650 mg  650 mg Oral Q4H PRN Maryuri V. Case, DO   650 mg at 08/07/17 0809    Or   • acetaminophen (TYLENOL) suppository 650 mg  650 mg Rectal Q4H PRN Maryuri V. Case, DO       • acyclovir (ZOVIRAX) capsule 400 mg  400 mg Oral BID Maryuri V. Case, DO   400 mg at 08/08/17 0822   • aluminum-magnesium hydroxide-simethicone (MAALOX MAX) 400-400-40 MG/5ML suspension 7.5 mL  7.5 mL Oral Q4H PRN Maryuri V. Case, DO   7.5 mL at 08/07/17 1152   • aspirin tablet 325 mg  325 mg Oral Daily Maryuri V. Case, DO   325 mg at 08/08/17 0822    Or   • aspirin suppository 300 mg  300 mg Rectal Daily Maryuri V. Case, DO       • atorvastatin (LIPITOR) tablet 80 mg  80 mg Oral Nightly Maryuri V. Case, DO   80 mg at 08/07/17 2004   • diltiaZEM (CARDIZEM) 125mg/125 mL infusion  5-15 mg/hr Intravenous Titrated ERIC Harris 2 mL/hr at 08/08/17 0430 2 mg/hr at 08/08/17 0430   • docusate sodium (COLACE) capsule  100 mg  100 mg Oral BID PRN Kel Oliver MD       • DULoxetine (CYMBALTA) DR capsule 60 mg  60 mg Oral Daily Kel Oliver MD   60 mg at 08/08/17 1145   • gabapentin (NEURONTIN) capsule 300 mg  300 mg Oral Nightly PRN ERIC Katz   300 mg at 08/07/17 0249   • Magnesium Sulfate 2 gram Bolus, followed by 8 gram infusion (total Mg dose 10 grams)- Mg less than or equal to 1mg/dL  2 g Intravenous PRN Kel Oliver MD        Or   • Magnesium Sulfate 6 gram Infusion (2 gm x 3) -Mg 1.1 -1.5 mg/dL  2 g Intravenous PRN Kel Oliver MD        Or   • magnesium sulfate 4 gram infusion- Mg 1.6-1.9 mg/dL  4 g Intravenous PRN Kel Oliver MD       • metoprolol tartrate (LOPRESSOR) tablet 25 mg  25 mg Oral Q12H Maryuri V. Case, DO   Stopped at 08/07/17 0900   • midodrine (PROAMATINE) tablet 5 mg  5 mg Oral TID AC Kel Oliver MD   5 mg at 08/08/17 1145   • ondansetron (ZOFRAN) injection 4 mg  4 mg Intravenous Q6H PRN Maryuri V. Case, DO   4 mg at 08/07/17 0809   • opium 10 MG/ML (1%) tincture 6 mg  6 mg Oral Q6H PRN Messi Pantoja MD   6 mg at 08/08/17 0827   • pantoprazole (PROTONIX) EC tablet 40 mg  40 mg Oral Q AM ERIC Quigley   40 mg at 08/08/17 0557   • Pharmacy to dose warfarin   Does not apply Continuous PRN Blane Peters MD       • polyethylene glycol (MIRALAX) powder 17 g  17 g Oral Daily PRN Kel Oliver MD       • potassium chloride (MICRO-K) CR capsule 40 mEq  40 mEq Oral PRN ERIC Katz        Or   • potassium chloride (KLOR-CON) packet 40 mEq  40 mEq Oral PRN ERIC Katz   40 mEq at 08/08/17 0650    Or   • potassium chloride 10 mEq in 100 mL IVPB  10 mEq Intravenous Q1H PRN ERIC Katz       • promethazine (PHENERGAN) tablet 12.5 mg  12.5 mg Oral Q6H PRN David Garcia MD        Or   • promethazine (PHENERGAN) injection 12.5 mg  12.5 mg Intravenous Q6H PRN David Garcia MD   12.5 mg at 08/08/17 0821   • QUEtiapine (SEROquel) tablet 25  22-Aug-2023 04:10:39 "mg  25 mg Oral Nightly Geetha Irwin MD   25 mg at 08/07/17 2214   • sodium chloride 0.9 % flush 1-10 mL  1-10 mL Intravenous PRN Maryuri V. Case DO       • sodium chloride 0.9 % infusion  50 mL/hr Intravenous Continuous Kel Oliver MD 50 mL/hr at 08/07/17 1837 50 mL/hr at 08/07/17 1837   • sulfamethoxazole-trimethoprim (BACTRIM DS,SEPTRA DS) 800-160 MG per tablet 160 mg  1 tablet Oral Once per day on Mon Wed Fri Maryuri VAntonia Veliz DO   160 mg at 08/07/17 0808       diltiaZEM 5-15 mg/hr Last Rate: 2 mg/hr (08/08/17 0430)   Pharmacy to dose warfarin     sodium chloride 50 mL/hr Last Rate: 50 mL/hr (08/07/17 1837)     •  acetaminophen **OR** acetaminophen  •  aluminum-magnesium hydroxide-simethicone  •  docusate sodium  •  gabapentin  •  magnesium sulfate **OR** magnesium sulfate **OR** magnesium sulfate  •  ondansetron  •  opium  •  Pharmacy to dose warfarin  •  polyethylene glycol  •  potassium chloride **OR** potassium chloride **OR** potassium chloride  •  promethazine **OR** promethazine  •  sodium chloride  Allergies   Allergen Reactions   • Ambien [Zolpidem Tartrate]    • Morphine And Related      Family History   Problem Relation Age of Onset   • Cancer Mother    • Cancer Father    • Cancer Brother      Social History     Social History   • Marital status:      Spouse name: N/A   • Number of children: N/A   • Years of education: N/A     Occupational History   • Not on file.     Social History Main Topics   • Smoking status: Former Smoker     Types: Cigarettes     Quit date: 1970   • Smokeless tobacco: Not on file   • Alcohol use 3.0 oz/week     5 Shots of liquor per week      Comment: bourbon   • Drug use: No   • Sexual activity: Defer     Other Topics Concern   • Not on file     Social History Narrative     Review of Systems - All others reviewed and found negative for the midst history of present illness      /77  Pulse (!) 147  Temp 98.8 °F (37.1 °C) (Oral)   Resp 16  Ht 62\" " (157.5 cm)  Wt 111 lb (50.3 kg)  SpO2 95%  BMI 20.3 kg/m2    Intake/Output Summary (Last 24 hours) at 08/08/17 1229  Last data filed at 08/08/17 0829   Gross per 24 hour   Intake          1656.97 ml   Output               50 ml   Net          1606.97 ml     Physical Exam:      General Appearance:    Alert, cooperative, in no acute distress   Head:    Normocephalic, without obvious abnormality, atraumatic   Eyes:            Lids and lashes normal, conjunctivae and sclerae normal, no   icterus, no pallor, corneas clear, PERRLA   Ears:    Ears appear intact with no abnormalities noted   Throat:   No oral lesions, no thrush, oral mucosa moist   Neck:   No adenopathy, supple, trachea midline, no thyromegaly, no     carotid bruit, no JVD   Back:     No kyphosis present, no scoliosis present, no skin lesions,       erythema or scars, no tenderness to percussion or                   palpation,   range of motion normal   Lungs:     Clear to auscultation,respirations regular, even and                   unlabored    Heart:    Regular rhythm and normal rate, normal S1 and S2, no            murmur, no gallop, no rub, no click   Breast Exam:    Deferred   Abdomen:     Normal bowel sounds, no masses, no organomegaly, soft        non-tender, non-distended, no guarding, no rebound                 tenderness   Genitalia:    Deferred   Extremities:   Moves all extremities well, no edema, no cyanosis, no              redness   Pulses:   Pulses palpable and equal bilaterally   Skin:   No bleeding, bruising or rash   Lymph nodes:   No palpable adenopathy   Neurologic:   Cranial nerves 2 - 12 grossly intact, sensation intact, DTR        present and equal bilaterally         Results from last 7 days  Lab Units 08/08/17  0406   WBC 10*3/mm3 4.57   HEMOGLOBIN g/dL 8.2*   HEMATOCRIT % 24.4*   PLATELETS 10*3/mm3 165       Results from last 7 days  Lab Units 08/08/17  0406   SODIUM mmol/L 138   POTASSIUM mmol/L 3.1*   CHLORIDE mmol/L 113*    CO2 mmol/L 19.0*   BUN mg/dL 14   CREATININE mg/dL 0.90   CALCIUM mg/dL 7.8*   BILIRUBIN mg/dL 1.0   ALK PHOS U/L 80   ALT (SGPT) U/L 9   AST (SGOT) U/L 20   GLUCOSE mg/dL 91       Results from last 7 days  Lab Units 08/08/17  0406   SODIUM mmol/L 138   POTASSIUM mmol/L 3.1*   CHLORIDE mmol/L 113*   CO2 mmol/L 19.0*   BUN mg/dL 14   CREATININE mg/dL 0.90   GLUCOSE mg/dL 91   CALCIUM mg/dL 7.8*     Imaging Results (last 72 hours)     Procedure Component Value Units Date/Time    XR Chest 1 View [[id number]]  (Abnormal) Collected:  08/05/17 1725     Updated:  08/05/17 1901    Narrative:       EXAM:    XR Chest, 1 View    CLINICAL HISTORY:    78 years old, female; Signs and symptoms; Other: Stroke protocol; Additional   info: Acute stroke protocol (onset < 12 hrs)    TECHNIQUE:    Frontal view of the chest.    EXAM DATE/TIME:    8/5/2017 5:25 PM    COMPARISON:    CR - XR CHEST 2 VW 3/21/2017 1:05:24 PM    FINDINGS:    No focal pulmonary consolidation is demonstrated.      No significant obscuration of the lateral costophrenic angles is demonstrated.      No significant vascular congestion is demonstrated.      There is scattered pulmonary scarring bilaterally.      Visualized cardiac silhouette size appears within normal limits.        There are atherosclerotic calcifications in the thoracic aorta.      Impression:         No acute pulmonary process is demonstrated.    THIS DOCUMENT HAS BEEN ELECTRONICALLY SIGNED BY JOIE MONROE MD    MRI Brain Without Contrast [[id number]]  (Abnormal) Collected:  08/05/17 1935     Updated:  08/05/17 8256    Narrative:       EXAM:    MR Head Without Intravenous Contrast    CLINICAL HISTORY:    78 years old, female; Other amyloidosis; Paroxysmal atrial fibrillation;   Cerebral infarction, unspecified; Disorder of kidney and ureter, unspecified;   Signs and symptoms; Hemiplegia and hemiparesis and speech disturbance; Aphasia;   Right side, dominance unknown; Patient HX: Right side  weakness, transient   aphasia woman with amyloidosis recent increased weakness, getting chemotherapy   for her amyloidosis, was in her usual health's this afternoon when about 4: 30   while standing in the kitchen she suddenly became less responsive and leaned to   the right. Family notes her speech was garbled and she could not follow   commands. She has a flaccid right arm but moved her left side well. After about   10 minutes her speech improved but she remained weak on the right and in the   emergency room here was noted to have both right upper extremity weakness and   neglect. Patient was not really aware of these problems and reports she   remembers some of what happened. She has had some falls recently. No surgeries   on head multiple myeloma    TECHNIQUE:    Magnetic resonance images of the head/brain without intravenous contrast in   multiple planes.    EXAM DATE/TIME:    8/5/2017 7:35 PM    COMPARISON:    MRI brain 12/27/16    FINDINGS:    There is prominent amount of high signal in the bilateral white matter on   FLAIR imaging which may represent chronic small vessel ischemic disease in the   appropriate clinical setting.      Probable small old infarction in LEFT cerebellum.      Evaluation of the brain demonstrates no other convincing areas of abnormal   signal intensity.      There is moderate cerebral cortical atrophy.  There is mild fullness of   posterior aspect of lateral ventricles.        Visualized paranasal sinuses and mastoid air cells demonstrate no significant   opacification.      Impression:         No definite acute intracranial process is demonstrated.  Probable prominent   chronic ischemic changes as discussed above.    THIS DOCUMENT HAS BEEN ELECTRONICALLY SIGNED BY JOIE MONROE MD    CT Head Without Contrast Stroke Protocol [[id number]] Collected:  08/05/17 1823     Updated:  08/06/17 0917    Narrative:       EXAMINATION: CT HEAD WO CONTRAST  - 08/05/2017     INDICATION: Stroke  protocol.     TECHNIQUE: CT scan of the head was performed at 5 mm intervals.     The radiation dose reduction device was turned on for each scan per the  ALARA (As Low as Reasonably Achievable) protocol.     COMPARISON: 03/21/2017.      FINDINGS: There is no intracranial mass. There is no hemorrhage. There  is no midline shift or extra-axial fluid collection. There is central or  cortical atrophy.       Impression:       Central and cortical atrophy without acute finding.     Time of the exam:  1728 hours  Report sent to ER: 1736 hours     DICTATED:     08/05/2017  EDITED:         08/05/2017     This report was finalized on 8/6/2017 9:15 AM by Dr. Johnnie Sen MD.       CT Cerebral Perfusion With & Without Contrast [[id number]] Collected:  08/05/17 1817     Updated:  08/06/17 0917    Narrative:       EXAMINATION: CT CEREBRAL PERFUSION W/WO CONTRAST - 08/05/2017     INDICATION: Evaluate for stroke.      TECHNIQUE: CT cerebral perfusion imaging was performed with data  acquisition regarding blood volume, blood flow, mean transit time, and  time to drain.      The radiation dose reduction device was turned on for each scan per the  ALARA (As Low as Reasonably Achievable) protocol.     COMPARISON: CT scan of the same day.     FINDINGS: There is no reduction in cerebral flow or asymmetry. There is  some increased blood volume in the left posterior parietal region but  there is no evidence of areas of diminished perfusion with normal blood  volume. Therefore there are no areas of penumbra.       Impression:       There is no evidence of reversible neural ischemia.     DICTATED:     08/05/2017  EDITED:         08/05/2017        This report was finalized on 8/6/2017 9:15 AM by Dr. Johnnie Sen MD.       CT Head Without Contrast [[id number]]  (Abnormal) Collected:  08/06/17 2201     Updated:  08/06/17 2248    Narrative:       EXAM:  CT Head Without Intravenous Contrast    CLINICAL HISTORY:  78 years old, female; Other  amyloidosis; Paroxysmal atrial fibrillation;   Cerebral infarction, unspecified; Disorder of kidney and ureter, unspecified;   Other reduced mobility; Other reduced mobility; Signs and symptoms; Other: See   notes; Patient HX: Most recent prior CT hasn't been finalized and cannot be   faxed at this time-images are available; Additional info: Nausea and vomiting    TECHNIQUE:  Axial computed tomography images of the head/brain without intravenous   contrast.  This CT exam was performed using one or more of the following dose   reduction techniques:  automated exposure control, adjustment of the mA and/or   kV according to patient size, and/or use of iterative reconstruction technique.    COMPARISON:  CT HEAD WO CONTRAST 8/6/2017 7:08:07 PM    FINDINGS:  Brain:  Scattered areas of hypoattenuation, likely chronic small vessel   ischemic change, demyelination, or gliosis.  There is parenchymal atrophy.  Ventricles:  Normal.  Bones/joints:  Normal.  No acute fracture.  Soft tissues:  Normal.  Vasculature:  Atherosclerotic vascular calcifications.  Sinuses:  Minimal ethmoid sinus disease.  Mastoid air cells:  Normal as visualized.  No mastoid effusion.      Impression:         1. No acute findings.    2. Non-acute findings are described above.    THIS DOCUMENT HAS BEEN ELECTRONICALLY SIGNED BY EDDIE HAZEL MD    CT Head Without Contrast [[id number]] Collected:  08/07/17 0842     Updated:  08/07/17 0903    Narrative:       EXAMINATION: CT HEAD WO CONTRAST-08/06/2017:      INDICATION: Stroke Post tPA Administration - Perform 24 Hours After TPA  Infusion; I63.9-Cerebral infarction, unspecified; I48.0-Paroxysmal  atrial fibrillation; E85.8-Other amyloidosis; N28.9-Disorder of kidney  and ureter, unspecified; Z74.09-Other reduced mobility; Z74.09-Other  reduced mobility, post TPA.     TECHNIQUE: Multiple axial CT imaging was obtained of the head from the  skull base to the skull vertex without the administration of  intravenous  contrast.     The radiation dose reduction device was turned on for each scan per the  ALARA (As Low as Reasonably Achievable) protocol.     COMPARISON: NONE.     FINDINGS: Atrophy is seen of the brain with extensive chronic small  vessel ischemic changes seen of the periventricular and subcortical  white matter. No hemorrhage or hydrocephalus. No mass, mass effect, or  midline shift. The bony structures are unremarkable. The visualized  paranasal sinuses are clear.       Impression:       Chronic changes identified within the brain with no evidence  of acute intracranial abnormality status post TPA.     D:  08/07/2017  E:  08/07/2017           This report was finalized on 8/7/2017 9:01 AM by Dr. Alena Lam MD.       XR Chest 1 View [[id number]] Collected:  08/08/17 0945     Updated:  08/08/17 0945    Narrative:       EXAMINATION: XR CHEST 1 VW-      INDICATION: I63.9-Cerebral infarction, unspecified; I48.0-Paroxysmal  atrial fibrillation; E85.8-Other amyloidosis; N28.9-Disorder of kidney  and ureter, unspecified; Z74.09-Other reduced mobility;  R41.841-Cognitive communication deficit.      COMPARISON: 08/05/2017 portable chest.     FINDINGS: There is mild new patchy disease in left lung base. Lungs  elsewhere appear clear. No edema, effusion or pneumothorax is seen.           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     D:  08/08/2017  E:  08/08/2017           Impression: CVA  Amyloidosis  Change in mental status-improved  Goals of care  Plan: I did have a discussion with the patient as well as the patient's significant other about current situation.  We will see how patient does over next 24 hours or so will have continued discussions.  Hospice has been comfortable by the patient's oncologist has been treating her amyloidosis.  The plan does seem to be to not pursue any more treatment for an amyloidosis.  We will discuss with the hospice case manager.    Time:  rupert Gustafson,   08/08/17  12:29 PM

## 2023-08-22 NOTE — PATIENT PROFILE ADULT - FALL HARM RISK - HARM RISK INTERVENTIONS
Assistance with ambulation/Assistance OOB with selected safe patient handling equipment/Communicate Risk of Fall with Harm to all staff/Discuss with provider need for PT consult/Monitor gait and stability/Reinforce activity limits and safety measures with patient and family/Tailored Fall Risk Interventions/Visual Cue: Yellow wristband and red socks/Bed in lowest position, wheels locked, appropriate side rails in place/Call bell, personal items and telephone in reach/Instruct patient to call for assistance before getting out of bed or chair/Non-slip footwear when patient is out of bed/[place] to call system/Physically safe environment - no spills, clutter or unnecessary equipment/Purposeful Proactive Rounding/Room/bathroom lighting operational, light cord in reach

## 2023-08-22 NOTE — H&P ADULT - NSHPLABSRESULTS_GEN_ALL_CORE
13.8   5.34  )-----------( 64       ( 22 Aug 2023 09:28 )             39.0       CBC Full  -  ( 22 Aug 2023 09:28 )  WBC Count : 5.34 K/uL  RBC Count : 4.47 M/uL  Hemoglobin : 13.8 g/dL  Hematocrit : 39.0 %  Platelet Count - Automated : 64 K/uL  Mean Cell Volume : 87.2 fl  Mean Cell Hemoglobin : 30.9 pg  Mean Cell Hemoglobin Concentration : 35.4 gm/dL  Auto Neutrophil # : 3.47 K/uL  Auto Lymphocyte # : 0.91 K/uL  Auto Monocyte # : 0.91 K/uL  Auto Eosinophil # : 0.00 K/uL  Auto Basophil # : 0.00 K/uL  Auto Neutrophil % : 61.0 %  Auto Lymphocyte % : 17.0 %  Auto Monocyte % : 17.0 %  Auto Eosinophil % : 0.0 %  Auto Basophil % : 0.0 %      08-22    128<L>  |  97  |  21  ----------------------------<  170<H>  4.0   |  24  |  1.22    Ca    8.8      22 Aug 2023 09:28    TPro  6.6  /  Alb  2.8<L>  /  TBili  1.7<H>  /  DBili  0.8<H>  /  AST  28  /  ALT  21  /  AlkPhos  61  08-22      LIVER FUNCTIONS - ( 22 Aug 2023 09:28 )  Alb: 2.8 g/dL / Pro: 6.6 gm/dL / ALK PHOS: 61 U/L / ALT: 21 U/L / AST: 28 U/L / GGT: x             PT/INR - ( 22 Aug 2023 09:28 )   PT: 15.0 sec;   INR: 1.34 ratio         PTT - ( 22 Aug 2023 09:28 )  PTT:31.3 sec    CARDIAC MARKERS ( 21 Aug 2023 21:09 )  x     / x     / 95 U/L / x     / x            Urinalysis Basic - ( 22 Aug 2023 09:28 )    Color: x / Appearance: x / SG: x / pH: x  Gluc: 170 mg/dL / Ketone: x  / Bili: x / Urobili: x   Blood: x / Protein: x / Nitrite: x   Leuk Esterase: x / RBC: x / WBC x   Sq Epi: x / Non Sq Epi: x / Bacteria: x            MEDICATIONS  (STANDING):  aspirin enteric coated 81 milliGRAM(s) Oral daily  atorvastatin 10 milliGRAM(s) Oral at bedtime  atovaquone  Suspension 750 milliGRAM(s) Oral every 12 hours  azithromycin  IVPB      melatonin 5 milliGRAM(s) Oral at bedtime  metoprolol succinate ER 37.5 milliGRAM(s) Oral daily  pantoprazole    Tablet 40 milliGRAM(s) Oral before breakfast

## 2023-08-22 NOTE — CONSULT NOTE ADULT - ASSESSMENT
87 y/o male with a PMHx of  MI  CAD s/p CABG, Cardiac Arrest, CHF, HTN, AICD, Lung Ca s/p Resection ,, peripheral artery disease, CKD, ,present, s/p lobectomy of lung, s/p carotid endarterectomy; presenting to the ED s/p unwitnessed  fall in bathroom, noted -LOC, -head strike, -blood thinner use. Patient states he suddenly felt dizzy while he was urinating, he lost his balance and fell; he called EMS through a medical emergency device around his neck and was wedged in between the toilet for about two hours. Patient c/o of lower back pain. Patient able to bear weight but feels unsteady. Denies weakness or numbness. Patient admits to EtOH consumption, approximately 1 beer prior to fall. Also endorses knee pain, neck pain. In ED found to be afebrile, hemodynamically stable , thrombocytopenia , hyponatremia EKG, CT Chest/abd/pelvis reviewed- no acute traumatic injury, head CT neg, Ct C spine neg for acute fx Here noted with babesia 1.6 % parasitemia.     1. Babesiosis. Tick-borne Illness. Thrombocytopenia  - imaging reviewed  - 1.6 % parasitemia c/w mild to moderate disease   - start mepron 750mg BID/azithromycin 500mg daily  - 10 day course  - r/o co infection - check lyme screen, ehrlichia, anaplasma pcr  - monitor temps  - tolerating abx well so far; no side effects noted  - reason for abx use and side effects reviewed with patient  - supportive care  - fu cbc    2. other issues - care per medicine

## 2023-08-22 NOTE — CONSULT NOTE ADULT - SUBJECTIVE AND OBJECTIVE BOX
Patient is a 88y old  Male who presents with a chief complaint of fall/syncope (22 Aug 2023 12:03)    HPI:  89 y/o male with a PMHx of  MI  CAD s/p CABG, Cardiac Arrest, CHF, HTN, AICD, Lung Ca s/p Resection ,, peripheral artery disease, CKD, ,present, s/p lobectomy of lung, s/p carotid endarterectomy; presenting to the ED s/p unwitnessed  fall in bathroom, noted -LOC, -head strike, -blood thinner use. Patient states he suddenly felt dizzy while he was urinating, he lost his balance and fell; he called EMS through a medical emergency device around his neck and was wedged in between the toilet for about two hours. Patient c/o of lower back pain. Patient able to bear weight but feels unsteady. Denies weakness or numbness. Patient admits to EtOH consumption tonight, approximately 1 beer prior to fall. Also endorses knee pain, neck pain. In ED found to be afebrile, hemodynamically stable , thrombocytopenia , hyponatremia EKG, CT Chest/abd/pelvis reviewed- no acute traumatic injury, head CT neg, Ct C spine neg for acute fx Here noted with babesia 1.6 % parasitemia.     PMH:  CAD s/p CABG, Cardiac Arrest, CHF, HTN, AICD, Lung Ca s/p Resection (reported to be in remission)  Surgical History: Left TKA, CABG, Lung mass/nodular resection      Meds: per reconciliation sheet, noted below    MEDICATIONS  (STANDING):  aspirin enteric coated 81 milliGRAM(s) Oral daily  atorvastatin 10 milliGRAM(s) Oral at bedtime  atovaquone  Suspension 750 milliGRAM(s) Oral every 12 hours  azithromycin  IVPB      melatonin 5 milliGRAM(s) Oral at bedtime  metoprolol succinate ER 37.5 milliGRAM(s) Oral daily  pantoprazole    Tablet 40 milliGRAM(s) Oral before breakfast  sacubitril 24 mG/valsartan 26 mG 1 Tablet(s) Oral two times a day    Allergies    No Known Allergies    Intolerances      Social: no smoking, no alcohol, no illegal drugs; no recent travel, no exposure to TB  FAMILY HISTORY:     no history of premature cardiovascular disease in first degree relatives    ROS: the patient denies fever, no chills, no HA, no dizziness, no sore throat, no blurry vision, no CP, no palpitations, no SOB, no cough, no abdominal pain, no diarrhea, no N/V, no dysuria, no leg pain, no claudication, no rash, no joint aches, no rectal pain or bleeding, no night sweats    All other systems reviewed and are negative    Vital Signs Last 24 Hrs  T(C): 36.7 (22 Aug 2023 15:42), Max: 37.4 (21 Aug 2023 20:08)  T(F): 98 (22 Aug 2023 15:42), Max: 99.4 (21 Aug 2023 20:08)  HR: 78 (22 Aug 2023 15:42) (51 - 89)  BP: 141/83 (22 Aug 2023 15:42) (109/58 - 141/83)  BP(mean): --  RR: 18 (22 Aug 2023 15:42) (17 - 18)  SpO2: 95% (22 Aug 2023 15:42) (91% - 97%)    Parameters below as of 22 Aug 2023 15:42  Patient On (Oxygen Delivery Method): room air      Daily Height in cm: 180.34 (21 Aug 2023 20:08)    Daily     PE:  Constitutional: frail looking  HEENT: NC/AT, EOMI, PERRLA, conjunctivae clear; ears and nose atraumatic; pharynx benign  Neck: supple; thyroid not palpable  Back: no tenderness  Respiratory: respiratory effort normal; clear to auscultation  Cardiovascular: S1S2 regular, no murmurs  Abdomen: soft, not tender, not distended, positive BS; liver and spleen WNL  Genitourinary: no suprapubic tenderness  Lymphatic: no LN palpable  Musculoskeletal: no muscle tenderness, no joint swelling or tenderness  Extremities: no pedal edema  Neurological/ Psychiatric:   moving all extremities  Skin: no rashes; no palpable lesions    Labs: all available labs reviewed                        13.8   5.34  )-----------( 64       ( 22 Aug 2023 09:28 )             39.0     08-22    128<L>  |  97  |  21  ----------------------------<  170<H>  4.0   |  24  |  1.22    Ca    8.8      22 Aug 2023 09:28    TPro  6.6  /  Alb  2.8<L>  /  TBili  1.7<H>  /  DBili  0.8<H>  /  AST  28  /  ALT  21  /  AlkPhos  61  08-22     LIVER FUNCTIONS - ( 22 Aug 2023 09:28 )  Alb: 2.8 g/dL / Pro: 6.6 gm/dL / ALK PHOS: 61 U/L / ALT: 21 U/L / AST: 28 U/L / GGT: x           Urinalysis Basic - ( 22 Aug 2023 09:28 )    Color: x / Appearance: x / SG: x / pH: x  Gluc: 170 mg/dL / Ketone: x  / Bili: x / Urobili: x   Blood: x / Protein: x / Nitrite: x   Leuk Esterase: x / RBC: x / WBC x   Sq Epi: x / Non Sq Epi: x / Bacteria: x        Culture Results:   Positive for Babesia species  by [facility] Stain  Parasitemia = 1.6 %  ************************************************************  NEGATIVE for Plasmodium antigens. Microscopy is performed for  confirmation.  The [place] Rapid antigen test does not detect the  presence of Babesia species. If Babesiosis is suspected  please order test Babesia PCR: Babesia microti PCR Bld  ************************************************************ (08-22 @ 09:14)  Culture Results:   Positive for Babesia species  by [facility] Stain  Parasitemia = 1.7 %  ************************************************************  NEGATIVE for Plasmodium antigens. Microscopy is performed for  confirmation.  The [place] Rapid antigen test does not detect the  presence of Babesia species. If Babesiosis is suspected  please order test Babesia PCR: Babesia microti PCR Bld  ************************************************************ (08-21 @ 21:09)    Radiology: all available radiological tests reviewed    Advanced directives addressed: full resuscitation

## 2023-08-22 NOTE — PATIENT PROFILE ADULT - TOBACCO USE
She underwent navigational bronchoscopy  Biopsy did not reveal any malignant cells, but did not completely rule out malignancy  Discussed with Dr Josefina Medina  She will be placed on tumor board discussion on Monday  She will follow-up with Dr Josefina Medina there after for next steps  Can consider thoracic surgery evaluation for resection  Repeat imaging will be considered after tumor board evaluation  In the meantime, she will continue with pulmonary rehab and improve her activity tolerance  Never smoker

## 2023-08-22 NOTE — PHARMACOTHERAPY INTERVENTION NOTE - COMMENTS
Medication history complete, reviewed and confirmed medication with list provided in chart- called [facility] at [phone] to verify patients metoprolol.

## 2023-08-22 NOTE — CONSULT NOTE ADULT - SUBJECTIVE AND OBJECTIVE BOX
HPI:    89 y/o male with a PMHx of MI, CAD s/p CABG, Cardiac Arrest, CHF, HTN, AICD, Lung Ca s/p Resection, peripheral artery disease, CKD, ,present, s/p lobectomy of lung, s/p carotid endarterectomy; presenting to the ED s/p unwitnessed  fall in bathroom, noted -LOC, -head strike, -blood thinner use. Patient states he suddenly felt dizzy while he was urinating, he lost his balance and fell; he called EMS through a medical emergency device around his neck and was wedged in between the toilet for about two hours. Patient c/o of lower back pain. Patient able to bear weight but feels unsteady. Denies weakness or numbness. Patient admits to EtOH consumption tonight, approximately 1 beer prior to fall. Also endorses knee pain, neck pain.    In ED found to be afebrile, hemodynamically stable, thrombocytopenia, hyponatremia EKG, CT Chest/abd/pelvis reviewed- no acute traumatic injury, head CT neg, CT- C spine neg for acute fx     PAST MEDICAL & SURGICAL HISTORY:    Hypertension  CAD (coronary artery disease)  Cardiac arrest with successful resuscitation  CKD (chronic kidney disease)  PAD (peripheral artery disease)  Lung cancer  Heart failure  AICD (automatic cardioverter/defibrillator) present  S/P carotid endarterectomy  lobectomy of lung  appendectomy  H/O left knee surgery  History of tonsillectomy and adenoidectomy      FAMILY HISTORY:      MEDICATIONS  (STANDING):    aspirin enteric coated 81 milliGRAM(s) Oral daily  atorvastatin 10 milliGRAM(s) Oral at bedtime  melatonin 5 milliGRAM(s) Oral at bedtime  metoprolol succinate ER 37.5 milliGRAM(s) Oral daily  pantoprazole    Tablet 40 milliGRAM(s) Oral before breakfast    MEDICATIONS  (PRN):    acetaminophen     Tablet .. 650 milliGRAM(s) Oral every 6 hours PRN Mild Pain (1 - 3), Moderate Pain (4 - 6), Severe Pain (7 - 10)  aluminum hydroxide/magnesium hydroxide/simethicone Suspension 30 milliLiter(s) Oral every 4 hours PRN Dyspepsia  ondansetron Injectable 4 milliGRAM(s) IV Push every 8 hours PRN Nausea and/or Vomiting    Allergies    No Known Allergies    Review of Systems    Constitutional, Eyes, ENT, Cardiovascular, Respiratory, Gastrointestinal, Genitourinary, Musculoskeletal, Integumentary, Neurological, Psychiatric, Endocrine, Heme/Lymph and Allergic/Immunologic review of systems are otherwise negative except as noted in HPI.     Vital Signs Last 24 Hrs    T(C): 36.8 (22 Aug 2023 07:43), Max: 37.4 (21 Aug 2023 20:08)  T(F): 98.2 (22 Aug 2023 07:43), Max: 99.4 (21 Aug 2023 20:08)  HR: 89 (22 Aug 2023 07:43) (51 - 89)  BP: 120/62 (22 Aug 2023 07:43) (109/58 - 120/62)  BP(mean): --  RR: 18 (22 Aug 2023 07:43) (17 - 18)  SpO2: 91% (22 Aug 2023 07:43) (91% - 97%)    Parameters below as of 22 Aug 2023 07:43  Patient On (Oxygen Delivery Method): room air    Physical Exam    Eyes: no conjunctival infection, anicteric.   ENT: pharynx is unremarkable, moist mucus membrane, no oral lesions.   Neck: supple without JVD, no thyromegaly or masses appreciated.   Pulmonary: clear to auscultation bilaterally, no dullness, no wheezing.   Cardiac: RRR, normal S1S2, no murmurs, rubs, gallops.   Vascular: no JVD, no calf tenderness, venous stasis changes, varices.   Abdomen: normoactive bowel sounds, soft and nontender, no hepatosplenomegaly or masses appreciated.   Lymphatic: no peripheral adenopathy appreciated.   Musculoskeletal: full range of motion and no deformities appreciated.   Skin: normal appearance, no rash, nodules, vesicles, ulcers, erythema.   Neurology: grossly intact.   Psychiatric: affect appropriate.     LABS:    CBC Full  -  ( 22 Aug 2023 09:28 )  WBC Count : 5.34 K/uL  RBC Count : 4.47 M/uL  Hemoglobin : 13.8 g/dL  Hematocrit : 39.0 %  Platelet Count - Automated : 64 K/uL  Mean Cell Volume : 87.2 fl  Mean Cell Hemoglobin : 30.9 pg  Mean Cell Hemoglobin Concentration : 35.4 gm/dL  Auto Neutrophil # : 3.47 K/uL  Auto Lymphocyte # : 0.91 K/uL  Auto Monocyte # : 0.91 K/uL  Auto Eosinophil # : 0.00 K/uL  Auto Basophil # : 0.00 K/uL  Auto Neutrophil % : 61.0 %  Auto Lymphocyte % : 17.0 %  Auto Monocyte % : 17.0 %  Auto Eosinophil % : 0.0 %  Auto Basophil % : 0.0 %    08-22    128<L>  |  97  |  21  ----------------------------<  170<H>  4.0   |  24  |  1.22    Ca    8.8      22 Aug 2023 09:28    TPro  6.6  /  Alb  2.8<L>  /  TBili  1.7<H>  /  DBili  0.8<H>  /  AST  28  /  ALT  21  /  AlkPhos  61  08-22    PT/INR - ( 22 Aug 2023 09:28 )   PT: 15.0 sec;   INR: 1.34 ratio     PTT - ( 22 Aug 2023 09:28 )  PTT:31.3 sec    Urinalysis Basic - ( 22 Aug 2023 09:28 )    Color: x / Appearance: x / SG: x / pH: x  Gluc: 170 mg/dL / Ketone: x  / Bili: x / Urobili: x   Blood: x / Protein: x / Nitrite: x   Leuk Esterase: x / RBC: x / WBC x   Sq Epi: x / Non Sq Epi: x / Bacteria: x    RADIOLOGY & ADDITIONAL STUDIES:    EXAM:  CT CERVICAL SPINE    ACC: [id number] EXAM:  CT BRAIN   ORDERED BY: JULIO PEÑA     PROCEDURE DATE:  08/21/2023      INTERPRETATION:  CT HEAD, CT CERVICAL SPINE    INDICATIONS: fall neuro alert, BIBEMS s/p unwitnessed mechanical fall in   bathroom.  (-) LOC (-) head strike (-) blood thinners. Admits to drinking 3 beers   tonight  prior to fall. Pt c/o lower back pain. PMHx CHF and pacemaker.    CT BRAIN:    TECHNIQUE:  Multiple contiguous axial images were obtained from the skull   base to the vertex without the use of intravenous contrast.    COMPARISON EXAMINATION: Head CT 6/21/2021    FINDINGS:  Ventricles and sulci: Parenchymal volume loss is present which is   commensurate with patient age.  Intra-axial: There are hemispheric white matter areas of low attenuation   which are nonspecific but likely related to sequelae of microvascular   disease.  No intracranial mass, acute hemorrhage, or significant midline shift is   present.  Extra-axial: There is no extra-axial collection.  Visualized sinuses: No air-fluid levels are identified. Moderate areas of   mucosal thickening.  Visualized mastoids:  Clear.  Calvarium: Unremarkable.  Miscellaneous:  Bilateral cataract surgery.    Impression: See below    CT CERVICAL SPINE:    TECHNIQUE:  Axial images were obtained through the cervical spine using   multislice helicaltechnique.  Reformatted coronal and sagittal images   were performed.    COMPARISON EXAMINATION:  C-spine CT 6/7/2021    FINDINGS:  On the sagittal reformations, there is no prevertebral soft tissue   swelling. There is no splaying of the spinous processes.  On the coronal reformations, occipital condyles are normal. Lateral   masses of C1 align normally with C2. Mild levoscoliosis.  On the axial images, no lucent fracture line is identified.    Multilevel degenerative osteoarthritis is present. Findings include   marginal osteophytes, uncovertebral spurring, and facet joint space   compartment narrowing with subchondral sclerosis and hypertrophic   osteophytes at multiple levels. There is multilevel degenerative disc   disease. Findings include loss of normal disc space height and endplate   sclerosis.    Miscellaneous:  Left-sided pacemaker.    IMPRESSIONS:    Head CT: No CT evidence of acute intracranial hemorrhage.    C-spine CT:  No acute fracture.        EXAM:  CT CHEST IC   ORDERED BY: JULIO PEÑA   EXAM Abdomen    PROCEDURE DATE:  08/21/2023      INTERPRETATION:  PROCEDURE INFORMATION:  Exam: CT Chest With Contrast; Diagnostic  Exam date and time: 8/21/2023 10:30 PM  Age: 88 years old  Clinical indication: Fall, unwitnessed fall in the bathroom, head strike,   complains about back pain, weakness, neck and knee pain, history of lung   cancer, appendectomy.    TECHNIQUE:  Imaging protocol: Diagnostic computed tomography of the chest, abdomen   and pelvis with contrast. Arterial phase imaging is obtained of the chest   and abdomen and venous phase imaging of the abdomen and pelvis. Coronal   and sagittal reformats were obtained.    Contrast material: IV: OMNIPAQUE 350; Contrast volume: 90 ml; Contrast   route: IV;    COMPARISON:  CR XR CHEST IMMEDIATE 12/2/2021 12:09 PM CT chest 11/3/2015    FINDINGS:  Tubes, catheters and devices: Cardiac device present  in the left chest   wall.    LUNGS: Subtle reticular opacities in the right and left lower lobe and   base of left upper lobe. Postsurgical changes and suture material from   probable right upper lobectomy. No consolidative opacity. Mild   atelectatic changes in dependent position. No  pulmonary contusion.  PLEURAL SPACES: No pneumothorax or hemothorax. Previously seen pleural   effusions are not visualized.  HEART: The heart is within normal limits of size. No pericardial   effusion. Aortic valve calcifications. Coronary artery calcifications,   mitral annular valve calcifications. Dual-chamber pacer leads.  MEDIASTINUM: Borderline left paratracheal lymph node measures 0.9 cm, no   lymphadenopathy. Right upper pretracheal lymph node measures 0.9 cm,   stable since 2015, image 2-8.  VESSELS: Severe atherosclerosis of the aorta. No aneurysm or acute aortic   syndrome. No traumatic aortic injury. No mediastinal hematoma,   pneumomediastinum, or hemopericardium.  BONES/JOINTS: Questionable contour irregularity at the 4th and 5th left   anterior rib which was not seen on prior study and may represent a non   displaced rib fracture or deformity from remote impact. No vertebral   fracture or compression deformity.  Sternotomy.  SOFT TISSUES: Left prepectoral pacer device.    ABDOMEN:  LIVER: No hematoma or focal lesion multiple calcified granuloma. No   laceration or contusion.  GALLBLADDER AND BILE DUCTS: Cholelithiasis. No cholecystitis or biliary   ductal dilatation.  PANCREAS: Homogeneous enhancement, within normal limits.  SPLEEN: Splenomegaly. Spleen measures 15.6 cm. Multiple calcified   granulomata visualized.  ADRENAL GLANDS: Within normal limits. No masses.  KIDNEYS AND URETERS: No hydronephrosis, no perinephric hematoma,   symmetric enhancement. Non characterized subcentimeter hypodensities.    BLADDER: Within normal limits.  REPRODUCTIVE: Prostate with large coarse calcifications, mildly enlarged.    STOMACH AND BOWEL: Colonic diverticulosis. No diverticulitis. No bowel   wall thickening or intestinal obstruction.  APPENDIX: Normal appendix.    PERITONEUM: No hemoperitoneum, no pneumoperitoneum, no retroperitoneal   hematoma.  VESSELS: Severe atherosclerosis, no abdominal aortic aneurysm. Large   calcific plaques at the origin of the SMA and celiac axis.  LYMPH NODES: No lymphadenopathy  ABDOMINAL WALL: No obvious soft tissue hematoma.  BONES/JOINTS: No acute fractures of the bony pelvis or proximal femora.   No acute fractures of the spine. Multilevel discogenic degenerative   disease.    IMPRESSION:  No acute posttraumatic injury in the chest. Postsurgical changes from   lobectomy. Reticular opacities in bilateral lower lobes may be scarring.   Interstitial lung disease can have similar appearance. No pneumothorax.    No acute posttraumatic organ injury in the abdomen orpelvis. No   retroperitoneal or intraperitoneal hematoma. No acute fractures of the   spine, bony pelvis or proximal femora.    Questionable contour irregularity of the fourth and fifth left anterior   rib which may relate to nondisplaced rib fracture versus sequela from   remote impact, correlate clinically.         HPI:    87 y/o male with a PMHx of MI, CAD s/p CABG, Cardiac Arrest, CHF, HTN, AICD, Lung Ca s/p Resection, peripheral artery disease, CKD, ,present, s/p lobectomy of lung, s/p carotid endarterectomy; presenting to the ED s/p unwitnessed  fall in bathroom, noted -LOC, -head strike, -blood thinner use. Patient states he suddenly felt dizzy while he was urinating, he lost his balance and fell; he called EMS through a medical emergency device around his neck and was wedged in between the toilet for about two hours. Patient c/o of lower back pain. Patient able to bear weight but feels unsteady. Denies weakness or numbness. Patient admits to EtOH consumption tonight, approximately 1 beer prior to fall. Also endorses knee pain, neck pain.    In ED found to be afebrile, hemodynamically stable, thrombocytopenia, hyponatremia EKG, CT Chest/abd/pelvis reviewed- no acute traumatic injury, head CT neg, CT- C spine neg for acute fx     PAST MEDICAL & SURGICAL HISTORY:    Hypertension  CAD (coronary artery disease)  Cardiac arrest with successful resuscitation  CKD (chronic kidney disease)  PAD (peripheral artery disease)  Lung cancer  Heart failure  AICD (automatic cardioverter/defibrillator) present  S/P carotid endarterectomy  lobectomy of lung  appendectomy  H/O left knee surgery  History of tonsillectomy and adenoidectomy      FAMILY HISTORY:      MEDICATIONS  (STANDING):    aspirin enteric coated 81 milliGRAM(s) Oral daily  atorvastatin 10 milliGRAM(s) Oral at bedtime  melatonin 5 milliGRAM(s) Oral at bedtime  metoprolol succinate ER 37.5 milliGRAM(s) Oral daily  pantoprazole    Tablet 40 milliGRAM(s) Oral before breakfast    MEDICATIONS  (PRN):    acetaminophen     Tablet .. 650 milliGRAM(s) Oral every 6 hours PRN Mild Pain (1 - 3), Moderate Pain (4 - 6), Severe Pain (7 - 10)  aluminum hydroxide/magnesium hydroxide/simethicone Suspension 30 milliLiter(s) Oral every 4 hours PRN Dyspepsia  ondansetron Injectable 4 milliGRAM(s) IV Push every 8 hours PRN Nausea and/or Vomiting    Allergies    No Known Allergies    Review of Systems    Constitutional, Eyes, ENT, Cardiovascular, Respiratory, Gastrointestinal, Genitourinary, Musculoskeletal, Integumentary, Neurological, Psychiatric, Endocrine, Heme/Lymph and Allergic/Immunologic review of systems are otherwise negative except as noted in HPI.     Vital Signs Last 24 Hrs    T(C): 36.8 (22 Aug 2023 07:43), Max: 37.4 (21 Aug 2023 20:08)  T(F): 98.2 (22 Aug 2023 07:43), Max: 99.4 (21 Aug 2023 20:08)  HR: 89 (22 Aug 2023 07:43) (51 - 89)  BP: 120/62 (22 Aug 2023 07:43) (109/58 - 120/62)  BP(mean): --  RR: 18 (22 Aug 2023 07:43) (17 - 18)  SpO2: 91% (22 Aug 2023 07:43) (91% - 97%)    Parameters below as of 22 Aug 2023 07:43  Patient On (Oxygen Delivery Method): room air    Physical Exam    Constitutional: Fragile elderly  Eyes: no conjunctival infection, anicteric.   ENT: pharynx is unremarkable, moist mucus membrane, no oral lesions.   Neck: supple without JVD, no thyromegaly or masses appreciated.   Pulmonary: clear to auscultation bilaterally, no dullness, no wheezing.   Cardiac: RRR, normal S1S2, no murmurs, rubs, gallops.   Vascular: no JVD, no calf tenderness,  Abdomen: normoactive bowel sounds, soft and nontender, no hepatosplenomegaly or masses appreciated.   Lymphatic: no peripheral adenopathy appreciated.   Musculoskeletal: full range of motion and no deformities appreciated.   Skin: normal appearance, no rash, nodules, vesicles, ulcers, erythema.   Neurology: grossly intact.   Psychiatric: affect appropriate.     LABS:    CBC Full  -  ( 22 Aug 2023 09:28 )  WBC Count : 5.34 K/uL  RBC Count : 4.47 M/uL  Hemoglobin : 13.8 g/dL  Hematocrit : 39.0 %  Platelet Count - Automated : 64 K/uL  Mean Cell Volume : 87.2 fl  Mean Cell Hemoglobin : 30.9 pg  Mean Cell Hemoglobin Concentration : 35.4 gm/dL  Auto Neutrophil # : 3.47 K/uL  Auto Lymphocyte # : 0.91 K/uL  Auto Monocyte # : 0.91 K/uL  Auto Eosinophil # : 0.00 K/uL  Auto Basophil # : 0.00 K/uL  Auto Neutrophil % : 61.0 %  Auto Lymphocyte % : 17.0 %  Auto Monocyte % : 17.0 %  Auto Eosinophil % : 0.0 %  Auto Basophil % : 0.0 %    08-22    128<L>  |  97  |  21  ----------------------------<  170<H>  4.0   |  24  |  1.22    Ca    8.8      22 Aug 2023 09:28    TPro  6.6  /  Alb  2.8<L>  /  TBili  1.7<H>  /  DBili  0.8<H>  /  AST  28  /  ALT  21  /  AlkPhos  61  08-22    PT/INR - ( 22 Aug 2023 09:28 )   PT: 15.0 sec;   INR: 1.34 ratio     PTT - ( 22 Aug 2023 09:28 )  PTT:31.3 sec    Urinalysis Basic - ( 22 Aug 2023 09:28 )    Color: x / Appearance: x / SG: x / pH: x  Gluc: 170 mg/dL / Ketone: x  / Bili: x / Urobili: x   Blood: x / Protein: x / Nitrite: x   Leuk Esterase: x / RBC: x / WBC x   Sq Epi: x / Non Sq Epi: x / Bacteria: x    RADIOLOGY & ADDITIONAL STUDIES:    EXAM:  CT CERVICAL SPINE    ACC: [id number] EXAM:  CT BRAIN   ORDERED BY: JULIO PEÑA     PROCEDURE DATE:  08/21/2023      INTERPRETATION:  CT HEAD, CT CERVICAL SPINE    INDICATIONS: fall neuro alert, BIBEMS s/p unwitnessed mechanical fall in   bathroom.  (-) LOC (-) head strike (-) blood thinners. Admits to drinking 3 beers   tonight  prior to fall. Pt c/o lower back pain. PMHx CHF and pacemaker.    CT BRAIN:    TECHNIQUE:  Multiple contiguous axial images were obtained from the skull   base to the vertex without the use of intravenous contrast.    COMPARISON EXAMINATION: Head CT 6/21/2021    FINDINGS:  Ventricles and sulci: Parenchymal volume loss is present which is   commensurate with patient age.  Intra-axial: There are hemispheric white matter areas of low attenuation   which are nonspecific but likely related to sequelae of microvascular   disease.  No intracranial mass, acute hemorrhage, or significant midline shift is   present.  Extra-axial: There is no extra-axial collection.  Visualized sinuses: No air-fluid levels are identified. Moderate areas of   mucosal thickening.  Visualized mastoids:  Clear.  Calvarium: Unremarkable.  Miscellaneous:  Bilateral cataract surgery.    Impression: See below    CT CERVICAL SPINE:    TECHNIQUE:  Axial images were obtained through the cervical spine using   multislice helicaltechnique.  Reformatted coronal and sagittal images   were performed.    COMPARISON EXAMINATION:  C-spine CT 6/7/2021    FINDINGS:  On the sagittal reformations, there is no prevertebral soft tissue   swelling. There is no splaying of the spinous processes.  On the coronal reformations, occipital condyles are normal. Lateral   masses of C1 align normally with C2. Mild levoscoliosis.  On the axial images, no lucent fracture line is identified.    Multilevel degenerative osteoarthritis is present. Findings include   marginal osteophytes, uncovertebral spurring, and facet joint space   compartment narrowing with subchondral sclerosis and hypertrophic   osteophytes at multiple levels. There is multilevel degenerative disc   disease. Findings include loss of normal disc space height and endplate   sclerosis.    Miscellaneous:  Left-sided pacemaker.    IMPRESSIONS:    Head CT: No CT evidence of acute intracranial hemorrhage.    C-spine CT:  No acute fracture.        EXAM:  CT CHEST IC   ORDERED BY: JULIO PEÑA   EXAM Abdomen    PROCEDURE DATE:  08/21/2023      INTERPRETATION:  PROCEDURE INFORMATION:  Exam: CT Chest With Contrast; Diagnostic  Exam date and time: 8/21/2023 10:30 PM  Age: 88 years old  Clinical indication: Fall, unwitnessed fall in the bathroom, head strike,   complains about back pain, weakness, neck and knee pain, history of lung   cancer, appendectomy.    TECHNIQUE:  Imaging protocol: Diagnostic computed tomography of the chest, abdomen   and pelvis with contrast. Arterial phase imaging is obtained of the chest   and abdomen and venous phase imaging of the abdomen and pelvis. Coronal   and sagittal reformats were obtained.    Contrast material: IV: OMNIPAQUE 350; Contrast volume: 90 ml; Contrast   route: IV;    COMPARISON:  CR XR CHEST IMMEDIATE 12/2/2021 12:09 PM CT chest 11/3/2015    FINDINGS:  Tubes, catheters and devices: Cardiac device present  in the left chest   wall.    LUNGS: Subtle reticular opacities in the right and left lower lobe and   base of left upper lobe. Postsurgical changes and suture material from   probable right upper lobectomy. No consolidative opacity. Mild   atelectatic changes in dependent position. No  pulmonary contusion.  PLEURAL SPACES: No pneumothorax or hemothorax. Previously seen pleural   effusions are not visualized.  HEART: The heart is within normal limits of size. No pericardial   effusion. Aortic valve calcifications. Coronary artery calcifications,   mitral annular valve calcifications. Dual-chamber pacer leads.  MEDIASTINUM: Borderline left paratracheal lymph node measures 0.9 cm, no   lymphadenopathy. Right upper pretracheal lymph node measures 0.9 cm,   stable since 2015, image 2-8.  VESSELS: Severe atherosclerosis of the aorta. No aneurysm or acute aortic   syndrome. No traumatic aortic injury. No mediastinal hematoma,   pneumomediastinum, or hemopericardium.  BONES/JOINTS: Questionable contour irregularity at the 4th and 5th left   anterior rib which was not seen on prior study and may represent a non   displaced rib fracture or deformity from remote impact. No vertebral   fracture or compression deformity.  Sternotomy.  SOFT TISSUES: Left prepectoral pacer device.    ABDOMEN:  LIVER: No hematoma or focal lesion multiple calcified granuloma. No   laceration or contusion.  GALLBLADDER AND BILE DUCTS: Cholelithiasis. No cholecystitis or biliary   ductal dilatation.  PANCREAS: Homogeneous enhancement, within normal limits.  SPLEEN: Splenomegaly. Spleen measures 15.6 cm. Multiple calcified   granulomata visualized.  ADRENAL GLANDS: Within normal limits. No masses.  KIDNEYS AND URETERS: No hydronephrosis, no perinephric hematoma,   symmetric enhancement. Non characterized subcentimeter hypo densities.    BLADDER: Within normal limits.  REPRODUCTIVE: Prostate with large coarse calcifications, mildly enlarged.    STOMACH AND BOWEL: Colonic diverticulosis. No diverticulitis. No bowel   wall thickening or intestinal obstruction.  APPENDIX: Normal appendix.    PERITONEUM: No hemoperitoneum, no pneumoperitoneum, no retroperitoneal   hematoma.  VESSELS: Severe atherosclerosis, no abdominal aortic aneurysm. Large   calcific plaques at the origin of the SMA and celiac axis.  LYMPH NODES: No lymphadenopathy  ABDOMINAL WALL: No obvious soft tissue hematoma.  BONES/JOINTS: No acute fractures of the bony pelvis or proximal femora.   No acute fractures of the spine. Multilevel discogenic degenerative   disease.    IMPRESSION:  No acute posttraumatic injury in the chest. Postsurgical changes from   lobectomy. Reticular opacities in bilateral lower lobes may be scarring.   Interstitial lung disease can have similar appearance. No pneumothorax.    No acute posttraumatic organ injury in the abdomen or pelvis. No   retroperitoneal or intraperitoneal hematoma. No acute fractures of the   spine, bony pelvis or proximal femora.    Questionable contour irregularity of the fourth and fifth left anterior   rib which may relate to nondisplaced rib fracture versus sequela from   remote impact, correlate clinically.

## 2023-08-22 NOTE — H&P ADULT - WHAT MATTERS MOST
Patient's goal is to be discharged back home and has home health aide mon through friday and wants to feel better

## 2023-08-22 NOTE — ED ADULT NURSE NOTE - NSFALLHARMRISKINTERV_ED_ALL_ED

## 2023-08-22 NOTE — H&P ADULT - HISTORY OF PRESENT ILLNESS
87 y/o male with a PMHx of  MI  CAD s/p CABG, Cardiac Arrest, CHF, HTN, AICD, Lung Ca s/p Resection ,, peripheral artery disease, CKD, ,present, s/p lobectomy of lung, s/p carotid endarterectomy; presenting to the ED s/p unwitnessed  fall in bathroom, noted -LOC, -head strike, -blood thinner use. Patient states he suddenly felt dizzy while he was urinating, he lost his balance and fell; he called EMS through a medical emergency device around his neck and was wedged in between the toilet for about two hours. Patient c/o of lower back pain. Patient able to bear weight but feels unsteady. Denies weakness or numbness. Patient admits to EtOH consumption tonight, approximately 1 beer prior to fall. Also endorses knee pain, neck pain.    In ED found to be afebrile, hemodynamically stable , thrombocytopenia , hyponatremia EKG, CT Chest/abd/pelvis reviewed- no acute traumatic injury, head CT neg, Ct C spine neg for acute fx         PMH:  CAD s/p CABG, Cardiac Arrest, CHF, HTN, AICD, Lung Ca s/p Resection (reported to be in remission)  Surgical History: Left TKA, CABG, Lung mass/nodular resection  Family History; Mother  in 40s from breast ca. Father  age 80s from HD  Social History: Former TObacco/smoking. Quit 20 years ago. Former 40 PPD smoker. Daily 1-2 beers. Denies illicit drug use.

## 2023-08-22 NOTE — ED ADULT NURSE NOTE - OBJECTIVE STATEMENT
resenting to the ED s/p unwitnessed mechanical fall in bathroom, noted -LOC, -head strike, -blood thinner use. Patient states he suddenly felt dizzy while he was urinating, he lost his balance and fell; he called EMS through a medical emergency device around his neck and was wedged in between the toilet for about two hours.

## 2023-08-22 NOTE — CONSULT NOTE ADULT - ASSESSMENT
89 y/o male with a PMHx of MI, CAD s/p CABG, Cardiac Arrest, CHF, HTN, AICD, Lung Ca s/p Resection, PAD, CKD, present, s/p lobectomy of lung, s/p carotid endarterectomy; presenting to the ED s/p unwitnessed  fall in bathroom, noted -LOC, noted to have thrombocytopenia.      # Thrombocytopenia  # s/p Fall 89 y/o male with a MH significant for MI, CAD s/p CABG, Cardiac Arrest, CHF, HTN, AICD, Lung Ca s/p lobectomy of lung, s/p carotid endarterectomy; presented to ED, s/p unwitnessed fall in bathroom, noted -LOC, noted to have thrombocytopenia.      # Thrombocytopenia    -Etiology unclear at this time, however all other cell lines normal, suspect consumption, 2/2 due to acute event-unwitnessed fall wedged in between the toilet for about two hours. Possible infection--per RN staff received call from lab notifying parasite in CBC. Official results pending.  -Platelets-64 K/ul, came in with 73 K/ul, last normal in 6/2023 at 189K/ul  -Follow up CBC  -Supportive care  -ID consult      # s/p Fall    -Unwitnessed fall in the bathroom, LOC- called EMS through a medical emergency device around his neck and was wedged in between the toilet for about two hours.  -CT-Head/Cervical- Neg for fracture and hemorrhage.  -CT-Chest/Abdomen-Questionable contour irregularity of the fourth and fifth left anterior rib which may relate to nondisplaced rib fracture versus sequela from remote impact    * Spoke with daughter and son, patient with remote HX of Lung CA >25 years ago, s/p lobectomy, follows Dr. Garza and has remained under surveillance* 89 y/o male with a MH significant for MI, CAD s/p CABG, Cardiac Arrest, CHF, HTN, AICD, Lung Ca s/p lobectomy of lung, s/p carotid endarterectomy; presented to ED, s/p unwitnessed fall in bathroom, noted -LOC, noted to have thrombocytopenia.      # Thrombocytopenia    -Etiology unclear at this time, however all other cell lines normal, suspect consumption, 2/2 due to acute event-unwitnessed fall wedged in between the toilet for about two hours. Possible infection--per RN staff received call from lab notifying parasite in CBC. Babesiosis can cause thrombocytopenia. Official results pending.  -Platelets-64 K/ul, came in with 73 K/ul, last normal in 6/2023 at 189K/ul  -Follow up CBC  -Supportive care  -ID consult      # s/p Fall    -Unwitnessed fall in the bathroom, LOC- called EMS through a medical emergency device around his neck and was wedged in between the toilet for about two hours.  -CT-Head/Cervical- Neg for fracture and hemorrhage.  -CT-Chest/Abdomen-Questionable contour irregularity of the fourth and fifth left anterior rib which may relate to nondisplaced rib fracture versus sequela from remote impact    * Spoke with daughter and son, patient with remote HX of Lung CA >25 years ago, s/p lobectomy, follows Dr. Garza and has remained under surveillance* 89 y/o male with a MH significant for MI, CAD s/p CABG, Cardiac Arrest, CHF, HTN, AICD, Lung Ca s/p lobectomy of lung, s/p carotid endarterectomy; presented to ED, s/p unwitnessed fall in bathroom, noted -LOC, noted to have thrombocytopenia.      # Thrombocytopenia    -Etiology unclear at this time, however all other cell lines normal.Possible infection--per RN staff received call from lab notifying parasite in CBC. Babesiosis can cause thrombocytopenia. Official results pending.  -Platelets-64 K/ul, came in with 73 K/ul, last normal in 6/2023 at 189K/ul  -Follow up CBC  -Supportive care  -ID consult      # remote hx of lung cancer   * Spoke with daughter and son, patient with remote HX of Lung CA >25 years ago, s/p lobectomy, follows Dr. Garza and has remained under surveillance*    Addendum Hem Onc attending.   89 y/o male with extensive PMHx admitted s/p fall . Thrombocytopenia noted - new  as compared to June 2023. Blood for parasites showed Babesiosis. Thrombocytopenia due to acute infection is common in Babesiosis 87 y/o male with a MH significant for MI, CAD s/p CABG, Cardiac Arrest, CHF, HTN, AICD, Lung Ca s/p lobectomy of lung, s/p carotid endarterectomy; presented to ED, s/p unwitnessed fall in bathroom, noted -LOC, noted to have thrombocytopenia.      # Thrombocytopenia    -Etiology unclear at this time, however all other cell lines normal. Most likely infection--per RN staff received call from lab notifying parasite in CBC. Babesiosis can cause thrombocytopenia. Official results pending.  -Platelets-64 K/ul, came in with 73 K/ul, last normal in 6/2023 at 189K/ul  -Follow up CBC  -Supportive care  -ID consult      # remote hx of lung cancer   * Spoke with daughter and son, patient with remote HX of Lung CA >25 years ago, s/p lobectomy, follows Dr. Garza and has remained under surveillance*    Addendum Hem Onc attending.   87 y/o male with extensive PMHx admitted s/p fall . Thrombocytopenia noted - new  as compared to June 2023. Blood for parasites showed Babesiosis. Thrombocytopenia due to acute infection is common in Babesiosis. MIld degree of  hemolysis also can be seen with BAbesia infection as  it is intracellular parasite.  Management per ID  Supportive care

## 2023-08-22 NOTE — H&P ADULT - ASSESSMENT
87 y/o male with a PMHx of  MI  CAD s/p CABG, Cardiac Arrest, CHF, HTN, AICD, Lung Ca s/p Resection ,, peripheral artery disease, CKD, ,present, s/p lobectomy of lung, s/p carotid endarterectomy; presenting to the ED s/p unwitnessed  fall in bathroom, noted -LOC, -head strike, -blood thinner use. Patient states he suddenly felt dizzy while he was urinating, he lost his balance and fell; he called EMS through a medical emergency device around his neck and was wedged in between the toilet for about two hours. Patient c/o of lower back pain. Patient able to bear weight but feels unsteady. Denies weakness or numbness. Patient admits to EtOH consumption tonight, approximately 1 beer prior to fall. Also endorses knee pain, neck pain.    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 Patient states he suddenly felt dizzy while he was urinating, he lost his balance and fell; he called EMS through a medical emergency device around his neck and was wedged in between the toilet for about two hours. Patient c/o of lower back pain. Patient able to bear weight but feels unsteady. Denies weakness or numbness. Patient admits to EtOH consumption tonight, approximately 1 beer prior to fall. Also endorses knee pain, neck pain.    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

## 2023-08-22 NOTE — CONSULT NOTE ADULT - ASSESSMENT
87 y/o male with a PMHx of  MI  CAD s/p CABG, Cardiac Arrest, CHF, HTN, AICD, Lung Ca s/p Resection, peripheral artery disease, CKD, ,present, s/p lobectomy of lung, s/p carotid endarterectomy; presenting to the ED s/p unwitnessed  fall in bathroom, noted -LOC, -head strike, -blood thinner use. Patient states he suddenly felt dizzy while he was urinating, he lost his balance and fell; he called EMS through a medical emergency device around his neck and was wedged in between the toilet for about two hours. Patient c/o of lower back pain. Patient able to bear weight but feels unsteady. Denies weakness or numbness. Patient admits to EtOH consumption tonight, approximately 1 beer prior to fall. Also endorses knee pain, neck pain.    #Syncope  DDX: Mechanical fall v vasovagal vs ETOH vs orthostatic vs r/o arrhythmia  - Monitor on remote tele  - Troponin negative x2, check EKG  - Check echocardiogram, orthostatic vital signs  - Interrogate device    #Ischemic Cardiomyopathy. EF 35-40%. s/p ICD. Device Check. Entresto and Farxiga held.    #CAD. CABG. Continue aspirin and statin.     #Moderate AS. TTE pending.     #HTN. Chronic and stable. Continue Metoprolol. Entresto held.    #HLD. Continue statin. Check lipids.    #Carotid stenosis s/p endarterectomy. Continue aspirin and statin.    #Thrombocytopenia. Workup in progress. Hematology consulted. Monitor PLTs on ASA.      Case d/w Dr. Moore 87 y/o male with a PMHx of  MI  CAD s/p CABG, Cardiac Arrest, CHF, HTN, AICD, Lung Ca s/p Resection, peripheral artery disease, CKD, ,present, s/p lobectomy of lung, s/p carotid endarterectomy; presenting to the ED s/p unwitnessed  fall in bathroom, noted -LOC, -head strike, -blood thinner use. Patient states he suddenly felt dizzy while he was urinating, he lost his balance and fell; he called EMS through a medical emergency device around his neck and was wedged in between the toilet for about two hours. Patient c/o of lower back pain. Patient able to bear weight but feels unsteady. Denies weakness or numbness. Patient admits to EtOH consumption tonight, approximately 1 beer prior to fall. Also endorses knee pain, neck pain.    #Syncope  DDX: Mechanical fall v vasovagal vs ETOH vs orthostatic vs r/o arrhythmia  - Monitor on remote tele  - Troponin negative x2, check EKG  - Check echocardiogram, orthostatic vital signs  - Interrogate device    #Ischemic Cardiomyopathy. EF 35-40%. s/p ICD. Device Check. Entresto and Farxiga held.    #CAD. CABG. Continue aspirin and statin.     #Moderate AS. TTE pending.     #HTN. Chronic and stable. Continue Metoprolol. Entresto held.    #HLD. Continue statin. Check lipids.    #Carotid stenosis s/p endarterectomy. Continue aspirin and statin.    #Thrombocytopenia. Workup in progress. Hematology consulted. Monitor PLTs on ASA. ID r/o viral/infectious etiology.       Case d/w Dr. Moore

## 2023-08-22 NOTE — H&P ADULT - CONVERSATION DETAILS
patient sets no limitations to his care  And wishes to be fully resuscitated should the need arise with chest compressions and intubation/mechanical ventilation    Full Code

## 2023-08-22 NOTE — H&P ADULT - NSHPPHYSICALEXAM_GEN_ALL_CORE
PHYSICAL EXAM:    Daily Height in cm: 180.34 (21 Aug 2023 20:08)    Daily     ICU Vital Signs Last 24 Hrs  T(C): 36.7 (22 Aug 2023 15:42), Max: 37.4 (21 Aug 2023 20:08)  T(F): 98 (22 Aug 2023 15:42), Max: 99.4 (21 Aug 2023 20:08)  HR: 78 (22 Aug 2023 15:42) (51 - 89)  BP: 141/83 (22 Aug 2023 15:42) (109/58 - 141/83)  BP(mean): --  ABP: --  ABP(mean): --  RR: 18 (22 Aug 2023 15:42) (17 - 18)  SpO2: 95% (22 Aug 2023 15:42) (91% - 97%)    O2 Parameters below as of 22 Aug 2023 15:42  Patient On (Oxygen Delivery Method): room air            Constitutional:NAD  HEENT: Atraumatic, JF, Normal, No congestion  Respiratory: Breath Sounds normal, no rhonchi/wheeze  Cardiovascular: N S1S2;  Gastrointestinal: Abdomen soft, non tender, Bowel Ssounds present  Extremities: No edema, peripheral pulses present  Neurological: awake, alert, follows commands  no gross focal motor deficits      Genitourinary: deferred  Rectal: Deferred

## 2023-08-22 NOTE — CONSULT NOTE ADULT - SUBJECTIVE AND OBJECTIVE BOX
CHIEF COMPLAINT: Patient is a 88y old  Male who presents with a chief complaint of fall/syncope (22 Aug 2023 09:24)    FROM H&P: 89 y/o male with a PMHx of  MI  CAD s/p CABG, Cardiac Arrest, CHF, HTN, AICD, Lung Ca s/p Resection ,, peripheral artery disease, CKD, ,present, s/p lobectomy of lung, s/p carotid endarterectomy; presenting to the ED s/p unwitnessed  fall in bathroom, noted -LOC, -head strike, -blood thinner use. Patient states he suddenly felt dizzy while he was urinating, he lost his balance and fell; he called EMS through a medical emergency device around his neck and was wedged in between the toilet for about two hours. Patient c/o of lower back pain. Patient able to bear weight but feels unsteady. Denies weakness or numbness. Patient admits to EtOH consumption tonight, approximately 1 beer prior to fall. Also endorses knee pain, neck pain.    In ED found to be afebrile, hemodynamically stable , thrombocytopenia , hyponatremia EKG, CT Chest/abd/pelvis reviewed- no acute traumatic injury, head CT neg, Ct C spine neg for acute fx     PMH:  CAD s/p CABG, Cardiac Arrest, CHF, HTN, AICD, Lung Ca s/p Resection (reported to be in remission)  Surgical History: Left TKA, CABG, Lung mass/nodular resection  Family History; Mother  in 40s from breast ca. Father  age 80s from HD  Social History: Former TObacco/smoking. Quit 20 years ago. Former 40 PPD smoker. Daily 1-2 beers. Denies illicit drug use.     .      MEDICATIONS:  aspirin enteric coated 81 milliGRAM(s) Oral daily  atorvastatin 10 milliGRAM(s) Oral at bedtime  melatonin 5 milliGRAM(s) Oral at bedtime  metoprolol succinate ER 37.5 milliGRAM(s) Oral daily  pantoprazole    Tablet 40 milliGRAM(s) Oral before breakfast    MEDICATIONS  (PRN):  acetaminophen     Tablet .. 650 milliGRAM(s) Oral every 6 hours PRN Mild Pain (1 - 3), Moderate Pain (4 - 6), Severe Pain (7 - 10)  aluminum hydroxide/magnesium hydroxide/simethicone Suspension 30 milliLiter(s) Oral every 4 hours PRN Dyspepsia  ondansetron Injectable 4 milliGRAM(s) IV Push every 8 hours PRN Nausea and/or Vomiting    HOME MEDICATIONS:  Ecotrin Adult Low Strength 81 mg oral delayed release tablet: 1 tab(s) orally once a day (22 Aug 2023 09:00)  Entresto 24 mg-26 mg oral tablet: 1 tab(s) orally 2 times a day (22 Aug 2023 09:01)  Farxiga 10 mg oral tablet: 1 tab(s) orally once a day (22 Aug 2023 09:01)  melatonin 5 mg oral tablet: 1 tab(s) orally once a day (at bedtime) (22 Aug 2023 09:00)  metoprolol succinate 25 mg oral tablet, extended release: 1.5 tab(s) orally once a day (at bedtime) (22 Aug 2023 09:04)  pantoprazole 40 mg oral delayed release tablet: 1 tab(s) orally once a day (22 Aug 2023 09:00)  pravastatin 40 mg oral tablet: 1 tab(s) orally once a day (at bedtime) (22 Aug 2023 09:00)    PHYSICAL EXAM:  T(C): 36.8 (22 Aug 2023 07:43), Max: 37.4 (21 Aug 2023 20:08)  T(F): 98.2 (22 Aug 2023 07:43), Max: 99.4 (21 Aug 2023 20:08)  HR: 89 (22 Aug 2023 07:43) (51 - 89)  BP: 120/62 (22 Aug 2023 07:43) (109/58 - 120/62)  RR: 18 (22 Aug 2023 07:43) (17 - 18)  SpO2: 91% (22 Aug 2023 07:43) (91% - 97%)    Parameters below as of 22 Aug 2023 07:43  Patient On (Oxygen Delivery Method): room air    Constitutional: NAD, awake and alert  HEENT: PERR, EOMI, Normal Hearing, MMM  Neck: Soft and supple, No LAD, No JVD  Respiratory: Breath sounds are clear bilaterally, No wheezing, rales or rhonchi  Cardiovascular: S1 and S2, regular rate and rhythm, no Murmurs, gallops or rubs  Gastrointestinal: Bowel Sounds present, soft, nontender, nondistended, no guarding, no rebound  Extremities: No peripheral edema  Vascular: 2+ peripheral pulses  Neurological: A/O x 3, no focal deficits  Musculoskeletal: 5/5 strength b/l upper and lower extremities  Skin: No rashes    =======================================    INTERPRETATION OF TELEMETRY:    ECG: Ordered, pending    ========================================    LABS:                        13.8   5.34  )-----------( 64       ( 22 Aug 2023 09:28 )             39.0     08-22    128<L>  |  97  |  21  ----------------------------<  170<H>  4.0   |  24  |  1.22    Ca    8.8      22 Aug 2023 09:28    TPro  6.6  /  Alb  2.8<L>  /  TBili  1.7<H>  /  DBili  0.8<H>  /  AST  28  /  ALT  21  /  AlkPhos  61  08-22    CARDIAC MARKERS ( 21 Aug 2023 21:09 )  x     / x     / 95 U/L / x     / x        PT/INR - ( 22 Aug 2023 09:28 )   PT: 15.0 sec;   INR: 1.34 ratio       PTT - ( 22 Aug 2023 09:28 )  PTT:31.3 sec    TroponinI hsT: <-52.41, <-34.48      RADIOLOGY & ADDITIONAL STUDIES:    CT Head No Cont (23 @ 20:30) >  Head CT: No CT evidence of acute intracranial hemorrhage.    C-spine CT:  No acute fracture.    CT Chest, Abdomen and Pelvis w/ IV Cont (23 @ 22:46) >  No acute posttraumatic injury in the chest. Postsurgical changes from   lobectomy. Reticular opacities in bilateral lower lobes may be scarring.   Interstitial lung disease can have similar appearance. No pneumothorax.    No acute posttraumatic organ injury in the abdomen or pelvis. No   retroperitoneal or intraperitoneal hematoma. No acute fractures of the   spine, bony pelvis or proximal femora.    Questionable contour irregularity of the fourth and fifth left anterior   rib which may relate to nondisplaced rib fracture versus sequela from   remote impact, correlate clinically.    Other chronic findings as above.   CHIEF COMPLAINT: Patient is a 88y old  Male who presents with a chief complaint of fall/syncope (22 Aug 2023 09:24)    FROM H&P: 89 y/o male with a PMHx of  MI  CAD s/p CABG, Cardiac Arrest, CHF, HTN, AICD, Lung Ca s/p Resection ,, peripheral artery disease, CKD, ,present, s/p lobectomy of lung, s/p carotid endarterectomy; presenting to the ED s/p unwitnessed  fall in bathroom, noted -LOC, -head strike, -blood thinner use. Patient states he suddenly felt dizzy while he was urinating, he lost his balance and fell; he called EMS through a medical emergency device around his neck and was wedged in between the toilet for about two hours. Patient c/o of lower back pain. Patient able to bear weight but feels unsteady. Denies weakness or numbness. Patient admits to EtOH consumption tonight, approximately 1 beer prior to fall. Also endorses knee pain, neck pain.    In ED found to be afebrile, hemodynamically stable , thrombocytopenia , hyponatremia EKG, CT Chest/abd/pelvis reviewed- no acute traumatic injury, head CT neg, Ct C spine neg for acute fx     PMH:  CAD s/p CABG, Cardiac Arrest, CHF, HTN, AICD, Lung Ca s/p Resection (reported to be in remission)  Surgical History: Left TKA, CABG, Lung mass/nodular resection  Family History; Mother  in 40s from breast ca. Father  age 80s from HD  Social History: Former TObacco/smoking. Quit 20 years ago. Former 40 PPD smoker. Daily 1-2 beers. Denies illicit drug use.     . Patient reported dizziness prior to syncopal event  Occ with SOB.      MEDICATIONS:  aspirin enteric coated 81 milliGRAM(s) Oral daily  atorvastatin 10 milliGRAM(s) Oral at bedtime  melatonin 5 milliGRAM(s) Oral at bedtime  metoprolol succinate ER 37.5 milliGRAM(s) Oral daily  pantoprazole    Tablet 40 milliGRAM(s) Oral before breakfast    MEDICATIONS  (PRN):  acetaminophen     Tablet .. 650 milliGRAM(s) Oral every 6 hours PRN Mild Pain (1 - 3), Moderate Pain (4 - 6), Severe Pain (7 - 10)  aluminum hydroxide/magnesium hydroxide/simethicone Suspension 30 milliLiter(s) Oral every 4 hours PRN Dyspepsia  ondansetron Injectable 4 milliGRAM(s) IV Push every 8 hours PRN Nausea and/or Vomiting    HOME MEDICATIONS:  Ecotrin Adult Low Strength 81 mg oral delayed release tablet: 1 tab(s) orally once a day (22 Aug 2023 09:00)  Entresto 24 mg-26 mg oral tablet: 1 tab(s) orally 2 times a day (22 Aug 2023 09:01)  Farxiga 10 mg oral tablet: 1 tab(s) orally once a day (22 Aug 2023 09:01)  melatonin 5 mg oral tablet: 1 tab(s) orally once a day (at bedtime) (22 Aug 2023 09:00)  metoprolol succinate 25 mg oral tablet, extended release: 1.5 tab(s) orally once a day (at bedtime) (22 Aug 2023 09:04)  pantoprazole 40 mg oral delayed release tablet: 1 tab(s) orally once a day (22 Aug 2023 09:00)  pravastatin 40 mg oral tablet: 1 tab(s) orally once a day (at bedtime) (22 Aug 2023 09:00)    PHYSICAL EXAM:  T(C): 36.8 (22 Aug 2023 07:43), Max: 37.4 (21 Aug 2023 20:08)  T(F): 98.2 (22 Aug 2023 07:43), Max: 99.4 (21 Aug 2023 20:08)  HR: 89 (22 Aug 2023 07:43) (51 - 89)  BP: 120/62 (22 Aug 2023 07:43) (109/58 - 120/62)  RR: 18 (22 Aug 2023 07:43) (17 - 18)  SpO2: 91% (22 Aug 2023 07:43) (91% - 97%)    Parameters below as of 22 Aug 2023 07:43  Patient On (Oxygen Delivery Method): room air    Constitutional: NAD, awake and alert  HEENT: PERR, EOMI, Normal Hearing, MMM  Neck: Soft and supple, No LAD, No JVD  Respiratory: Breath sounds are clear bilaterally, No wheezing, rales or rhonchi  Cardiovascular: S1 and S2, regular rate and rhythm, no Murmurs, gallops or rubs  Gastrointestinal: Bowel Sounds present, soft, nontender, nondistended, no guarding, no rebound  Extremities: No peripheral edema  Vascular: 2+ peripheral pulses  Neurological: A/O x 3, no focal deficits  Musculoskeletal: 5/5 strength b/l upper and lower extremities  Skin: No rashes    =======================================    INTERPRETATION OF TELEMETRY: Paced    ECG: Ordered, pending    ========================================    LABS:                        13.8   5.34  )-----------( 64       ( 22 Aug 2023 09:28 )             39.0     08-    128<L>  |  97  |  21  ----------------------------<  170<H>  4.0   |  24  |  1.22    Ca    8.8      22 Aug 2023 09:28    TPro  6.6  /  Alb  2.8<L>  /  TBili  1.7<H>  /  DBili  0.8<H>  /  AST  28  /  ALT  21  /  AlkPhos  61  08-22    CARDIAC MARKERS ( 21 Aug 2023 21:09 )  x     / x     / 95 U/L / x     / x        PT/INR - ( 22 Aug 2023 09:28 )   PT: 15.0 sec;   INR: 1.34 ratio       PTT - ( 22 Aug 2023 09:28 )  PTT:31.3 sec    TroponinI hsT: <-52.41, <-34.48      RADIOLOGY & ADDITIONAL STUDIES:    CT Head No Cont (23 @ 20:30) >  Head CT: No CT evidence of acute intracranial hemorrhage.    C-spine CT:  No acute fracture.    CT Chest, Abdomen and Pelvis w/ IV Cont (23 @ 22:46) >  No acute posttraumatic injury in the chest. Postsurgical changes from   lobectomy. Reticular opacities in bilateral lower lobes may be scarring.   Interstitial lung disease can have similar appearance. No pneumothorax.    No acute posttraumatic organ injury in the abdomen or pelvis. No   retroperitoneal or intraperitoneal hematoma. No acute fractures of the   spine, bony pelvis or proximal femora.    Questionable contour irregularity of the fourth and fifth left anterior   rib which may relate to nondisplaced rib fracture versus sequela from   remote impact, correlate clinically.    Other chronic findings as above.

## 2023-08-23 LAB
ALBUMIN SERPL ELPH-MCNC: 2.6 G/DL — LOW (ref 3.3–5)
ALP SERPL-CCNC: 57 U/L — SIGNIFICANT CHANGE UP (ref 40–120)
ALT FLD-CCNC: 19 U/L — SIGNIFICANT CHANGE UP (ref 12–78)
ANION GAP SERPL CALC-SCNC: 6 MMOL/L — SIGNIFICANT CHANGE UP (ref 5–17)
AST SERPL-CCNC: 28 U/L — SIGNIFICANT CHANGE UP (ref 15–37)
B BURGDOR C6 AB SER-ACNC: NEGATIVE — SIGNIFICANT CHANGE UP
B BURGDOR IGG+IGM SER QL IB: SIGNIFICANT CHANGE UP
B BURGDOR IGG+IGM SER-ACNC: 0.63 INDEX — SIGNIFICANT CHANGE UP (ref 0.01–0.89)
BASOPHILS # BLD AUTO: 0 K/UL — SIGNIFICANT CHANGE UP (ref 0–0.2)
BASOPHILS NFR BLD AUTO: 0 % — SIGNIFICANT CHANGE UP (ref 0–2)
BILIRUB SERPL-MCNC: 1.4 MG/DL — HIGH (ref 0.2–1.2)
BUN SERPL-MCNC: 23 MG/DL — SIGNIFICANT CHANGE UP (ref 7–23)
CALCIUM SERPL-MCNC: 8.3 MG/DL — LOW (ref 8.5–10.1)
CHLORIDE SERPL-SCNC: 98 MMOL/L — SIGNIFICANT CHANGE UP (ref 96–108)
CO2 SERPL-SCNC: 24 MMOL/L — SIGNIFICANT CHANGE UP (ref 22–31)
CREAT SERPL-MCNC: 1.28 MG/DL — SIGNIFICANT CHANGE UP (ref 0.5–1.3)
EGFR: 54 ML/MIN/1.73M2 — LOW
EOSINOPHIL # BLD AUTO: 0 K/UL — SIGNIFICANT CHANGE UP (ref 0–0.5)
EOSINOPHIL NFR BLD AUTO: 0 % — SIGNIFICANT CHANGE UP (ref 0–6)
GLUCOSE SERPL-MCNC: 142 MG/DL — HIGH (ref 70–99)
HCT VFR BLD CALC: 35.9 % — LOW (ref 39–50)
HGB BLD-MCNC: 12.7 G/DL — LOW (ref 13–17)
LYMPHOCYTES # BLD AUTO: 0.97 K/UL — LOW (ref 1–3.3)
LYMPHOCYTES # BLD AUTO: 20 % — SIGNIFICANT CHANGE UP (ref 13–44)
MANUAL SMEAR VERIFICATION: SIGNIFICANT CHANGE UP
MCHC RBC-ENTMCNC: 30.6 PG — SIGNIFICANT CHANGE UP (ref 27–34)
MCHC RBC-ENTMCNC: 35.4 GM/DL — SIGNIFICANT CHANGE UP (ref 32–36)
MCV RBC AUTO: 86.5 FL — SIGNIFICANT CHANGE UP (ref 80–100)
MONOCYTES # BLD AUTO: 0.92 K/UL — HIGH (ref 0–0.9)
MONOCYTES NFR BLD AUTO: 19 % — HIGH (ref 2–14)
NEUTROPHILS # BLD AUTO: 2.95 K/UL — SIGNIFICANT CHANGE UP (ref 1.8–7.4)
NEUTROPHILS NFR BLD AUTO: 58 % — SIGNIFICANT CHANGE UP (ref 43–77)
NEUTS BAND # BLD: 3 % — SIGNIFICANT CHANGE UP (ref 0–8)
NRBC # BLD: 0 /100 — SIGNIFICANT CHANGE UP (ref 0–0)
NRBC # BLD: SIGNIFICANT CHANGE UP /100 WBCS (ref 0–0)
OSMOLALITY UR: 399 MOSM/KG — SIGNIFICANT CHANGE UP (ref 50–1200)
PLAT MORPH BLD: NORMAL — SIGNIFICANT CHANGE UP
PLATELET # BLD AUTO: 68 K/UL — LOW (ref 150–400)
POTASSIUM SERPL-MCNC: 3.9 MMOL/L — SIGNIFICANT CHANGE UP (ref 3.5–5.3)
POTASSIUM SERPL-SCNC: 3.9 MMOL/L — SIGNIFICANT CHANGE UP (ref 3.5–5.3)
PROT SERPL-MCNC: 6.1 GM/DL — SIGNIFICANT CHANGE UP (ref 6–8.3)
RBC # BLD: 4.15 M/UL — LOW (ref 4.2–5.8)
RBC # FLD: 16.6 % — HIGH (ref 10.3–14.5)
RBC BLD AUTO: SIGNIFICANT CHANGE UP
SODIUM SERPL-SCNC: 128 MMOL/L — LOW (ref 135–145)
SODIUM UR-SCNC: <20 MMOL/L — SIGNIFICANT CHANGE UP
WBC # BLD: 4.83 K/UL — SIGNIFICANT CHANGE UP (ref 3.8–10.5)
WBC # FLD AUTO: 4.83 K/UL — SIGNIFICANT CHANGE UP (ref 3.8–10.5)

## 2023-08-23 PROCEDURE — 99232 SBSQ HOSP IP/OBS MODERATE 35: CPT

## 2023-08-23 RX ORDER — SODIUM CHLORIDE 0.65 %
1 AEROSOL, SPRAY (ML) NASAL
Refills: 0 | Status: DISCONTINUED | OUTPATIENT
Start: 2023-08-23 | End: 2023-08-31

## 2023-08-23 RX ORDER — SODIUM CHLORIDE 9 MG/ML
1000 INJECTION INTRAMUSCULAR; INTRAVENOUS; SUBCUTANEOUS
Refills: 0 | Status: DISCONTINUED | OUTPATIENT
Start: 2023-08-23 | End: 2023-08-31

## 2023-08-23 RX ADMIN — ATOVAQUONE 750 MILLIGRAM(S): 750 SUSPENSION ORAL at 09:58

## 2023-08-23 RX ADMIN — Medication 81 MILLIGRAM(S): at 09:06

## 2023-08-23 RX ADMIN — PANTOPRAZOLE SODIUM 40 MILLIGRAM(S): 20 TABLET, DELAYED RELEASE ORAL at 06:30

## 2023-08-23 RX ADMIN — SACUBITRIL AND VALSARTAN 1 TABLET(S): 24; 26 TABLET, FILM COATED ORAL at 21:03

## 2023-08-23 RX ADMIN — ATOVAQUONE 750 MILLIGRAM(S): 750 SUSPENSION ORAL at 21:03

## 2023-08-23 RX ADMIN — Medication 5 MILLIGRAM(S): at 21:03

## 2023-08-23 RX ADMIN — ATORVASTATIN CALCIUM 10 MILLIGRAM(S): 80 TABLET, FILM COATED ORAL at 21:03

## 2023-08-23 RX ADMIN — Medication 1 SPRAY(S): at 12:59

## 2023-08-23 RX ADMIN — SODIUM CHLORIDE 75 MILLILITER(S): 9 INJECTION INTRAMUSCULAR; INTRAVENOUS; SUBCUTANEOUS at 18:24

## 2023-08-23 RX ADMIN — AZITHROMYCIN 255 MILLIGRAM(S): 500 TABLET, FILM COATED ORAL at 11:08

## 2023-08-23 NOTE — PROGRESS NOTE ADULT - ASSESSMENT
89 y/o male with a PMHx of  MI  CAD s/p CABG, Cardiac Arrest, CHF, HTN, AICD, Lung Ca s/p Resection, peripheral artery disease, CKD, ,present, s/p lobectomy of lung, s/p carotid endarterectomy; presenting to the ED s/p unwitnessed  fall in bathroom, noted -LOC, -head strike, -blood thinner use. Patient states he suddenly felt dizzy while he was urinating, he lost his balance and fell; he called EMS through a medical emergency device around his neck and was wedged in between the toilet for about two hours. Patient c/o of lower back pain. Patient able to bear weight but feels unsteady. Denies weakness or numbness. Patient admits to EtOH consumption tonight, approximately 1 beer prior to fall. Also endorses knee pain, neck pain.    #Syncope - Suspect in the setting of +Babesia /tick bourne illness.   - Monitor on remote tele, run NSVT  - Troponin negative x2, check EKG (pending)  - Check echocardiogram, orthostatic vital signs  - Interrogate device  - ID following - patient on Azithro and Mepron for Babesia     #Ischemic Cardiomyopathy. EF 35-40%. s/p ICD. Device Check.  Farxiga held. Run NSVT. Keep K4/MG2    #CAD. CABG. Continue aspirin and statin.     #Moderate AS. TTE pending.     #HTN. Chronic and stable. Continue Metoprolol. Entresto held.    #HLD. Continue statin. Check lipids.    #Carotid stenosis s/p endarterectomy. Continue aspirin and statin.    Case d/w Dr. Willard  Will follow

## 2023-08-23 NOTE — PROGRESS NOTE ADULT - ASSESSMENT
89 y/o male with a MH significant for MI, CAD s/p CABG, Cardiac Arrest, CHF, HTN, AICD, Lung Ca s/p lobectomy of lung, s/p carotid endarterectomy; presented to ED, s/p unwitnessed fall in bathroom, noted -LOC, noted to have thrombocytopenia.      # Thrombocytopenia/Mild Anemia    -Thrombocytopenia noted -new as compared to June 2023, last normal in 6/2023 at 189K/ul  -Etiology unclear at this time, however all other cell lines normal. Most likely infection  -Platelets-68 k/ul <--64 K/ul, came in with 73 K/ul, low but stable.  -Blood for parasites showed Babesiosis.  -Thrombocytopenia is a common feature of babesiosis.  -Mild degree of hemolysis-->can also be seen with Babesia infection as it is intracellular parasite.  -Follow up CBC  -Supportive care  -ID consult      # Remote hx of lung cancer     -Spoke with daughter and son, patient with remote HX of Lung CA >25 years ago, s/p lobectomy, follows Dr. Garza and has remained under surveillance     89 y/o male with a MH significant for MI, CAD s/p CABG, Cardiac Arrest, CHF, HTN, AICD, Lung Ca s/p lobectomy of lung, s/p carotid endarterectomy; presented to ED, s/p unwitnessed fall in bathroom, noted -LOC, noted to have thrombocytopenia.      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  -Blood for parasites showed Babesiosis.  -Thrombocytopenia is a common feature of babesiosis.  -Mild degree of hemolysis-->can also be seen with Babesia infection as it is intracellular parasite.  -Follow up CBC  -Supportive care  -ID following       # Remote hx of lung cancer     -Spoke with daughter and son, patient with remote HX of Lung CA >25 years ago, s/p lobectomy, follows Dr. Garza and has remained under surveillance

## 2023-08-23 NOTE — PROGRESS NOTE ADULT - ASSESSMENT
87 y/o male with a PMHx of  MI  CAD s/p CABG, Cardiac Arrest, CHF, HTN, AICD, Lung Ca s/p Resection ,, peripheral artery disease, CKD, ,present, s/p lobectomy of lung, s/p carotid endarterectomy; presenting to the ED s/p unwitnessed  fall in bathroom, noted -LOC, -head strike, -blood thinner use. Patient states he suddenly felt dizzy while he was urinating, he lost his balance and fell; he called EMS through a medical emergency device around his neck and was wedged in between the toilet for about two hours. Patient c/o of lower back pain. Patient able to bear weight but feels unsteady. Denies weakness or numbness. Patient admits to EtOH consumption, approximately 1 beer prior to fall. Also endorses knee pain, neck pain. In ED found to be afebrile, hemodynamically stable , thrombocytopenia , hyponatremia EKG, CT Chest/abd/pelvis reviewed- no acute traumatic injury, head CT neg, Ct C spine neg for acute fx Here noted with babesia 1.6 % parasitemia.     1. Babesiosis. Tick-borne Illness. Thrombocytopenia  - imaging reviewed  - 1.6 % parasitemia c/w mild to moderate disease   - on mepron 750mg BID/azithromycin 500mg daily #2  - 10 day course  - r/o co infection - lyme screen (-), f/u ehrlichia, anaplasma pcr  - monitor temps  - tolerating abx well so far; no side effects noted  - reason for abx use and side effects reviewed with patient  - supportive care  - fu cbc    2. other issues - care per medicine

## 2023-08-23 NOTE — PROGRESS NOTE ADULT - SUBJECTIVE AND OBJECTIVE BOX
HPI:    87 y/o male with a PMHx of MI, CAD s/p CABG, Cardiac Arrest, CHF, HTN, AICD, Lung Ca s/p Resection, peripheral artery disease, CKD, ,present, s/p lobectomy of lung, s/p carotid endarterectomy; presenting to the ED s/p unwitnessed  fall in bathroom, noted -LOC, -head strike, -blood thinner use. Patient states he suddenly felt dizzy while he was urinating, he lost his balance and fell; he called EMS through a medical emergency device around his neck and was wedged in between the toilet for about two hours. Patient c/o of lower back pain. Patient able to bear weight but feels unsteady. Denies weakness or numbness. Patient admits to EtOH consumption tonight, approximately 1 beer prior to fall. Also endorses knee pain, neck pain.    In ED found to be afebrile, hemodynamically stable, thrombocytopenia, hyponatremia EKG, CT Chest/abd/pelvis reviewed- no acute traumatic injury, head CT neg, CT- C spine neg for acute fx     8/23/2023- Seen at bedside along with Dr. Cade, alert, oriented in no acute.     PAST MEDICAL & SURGICAL HISTORY:    Hypertension  CAD (coronary artery disease)  Cardiac arrest with successful resuscitation  CKD (chronic kidney disease)  PAD (peripheral artery disease)  Lung cancer  Heart failure  AICD (automatic cardioverter/defibrillator) present  S/P carotid endarterectomy  lobectomy of lung  appendectomy  H/O left knee surgery  History of tonsillectomy and adenoidectomy      FAMILY HISTORY:      MEDICATIONS  (STANDING):    aspirin enteric coated 81 milliGRAM(s) Oral daily  atorvastatin 10 milliGRAM(s) Oral at bedtime  atovaquone  Suspension 750 milliGRAM(s) Oral every 12 hours  azithromycin  IVPB 500 milliGRAM(s) IV Intermittent every 24 hours  azithromycin  IVPB      melatonin 5 milliGRAM(s) Oral at bedtime  metoprolol succinate ER 37.5 milliGRAM(s) Oral daily  pantoprazole    Tablet 40 milliGRAM(s) Oral before breakfast  sacubitril 24 mG/valsartan 26 mG 1 Tablet(s) Oral two times a day    MEDICATIONS  (PRN):    acetaminophen     Tablet 650 milliGRAM(s) Oral every 6 hours PRN Mild Pain (1 - 3), Moderate Pain (4 - 6), Severe Pain (7 - 10)  aluminum hydroxide/magnesium hydroxide/simethicone Suspension 30 milliLiter(s) Oral every 4 hours PRN Dyspepsia  ondansetron Injectable 4 milliGRAM(s) IV Push every 8 hours PRN Nausea and/or Vomiting  sodium chloride 0.65% Nasal 1 Spray(s) Both Nostrils two times a day PRN Nasal Congestion    Allergies    No Known Allergies    Review of Systems    Constitutional, Eyes, ENT, Cardiovascular, Respiratory, Gastrointestinal, Genitourinary, Musculoskeletal, Integumentary, Neurological, Psychiatric, Endocrine, Heme/Lymph and Allergic/Immunologic review of systems are otherwise negative except as noted in HPI.     Vital Signs Last 24 Hrs    Vital Signs Last 24 Hrs  T(C): 36.9 (23 Aug 2023 08:00), Max: 37.4 (22 Aug 2023 22:04)  T(F): 98.4 (23 Aug 2023 08:00), Max: 99.3 (22 Aug 2023 22:04)  HR: 60 (23 Aug 2023 08:00) (60 - 92)  BP: 104/68 (23 Aug 2023 08:00) (104/68 - 141/83)  BP(mean): 80 (23 Aug 2023 08:00) (80 - 80)  RR: 18 (23 Aug 2023 08:00) (18 - 18)  SpO2: 94% (23 Aug 2023 08:00) (94% - 95%)    Parameters below as of 23 Aug 2023 08:00  Patient On (Oxygen Delivery Method): room air    Physical Exam    Constitutional: In no acute distress  Eyes: no conjunctival infection, anicteric.   ENT: pharynx is unremarkable, moist mucus membrane, no oral lesions.   Neck: supple without JVD, no thyromegaly or masses appreciated.   Pulmonary: clear to auscultation bilaterally, no dullness, no wheezing.   Cardiac: RRR, normal S1S2, no murmurs, rubs, gallops.   Vascular: no JVD, no calf tenderness,  Abdomen: normoactive bowel sounds, soft and nontender, no hepatosplenomegaly or masses appreciated.   Lymphatic: no peripheral adenopathy appreciated.   Musculoskeletal: full range of motion and no deformities appreciated.   Skin: normal appearance, no rash, nodules, vesicles, ulcers, erythema.   Neurology: grossly intact.   Psychiatric: affect appropriate.     LABS:    CBC Full  -  ( 23 Aug 2023 07:43 )  WBC Count : 4.83 K/uL  RBC Count : 4.15 M/uL  Hemoglobin : 12.7 g/dL  Hematocrit : 35.9 %  Platelet Count - Automated : 68 K/uL  Mean Cell Volume : 86.5 fl  Mean Cell Hemoglobin : 30.6 pg  Mean Cell Hemoglobin Concentration : 35.4 gm/dL  Auto Neutrophil # : 2.95 K/uL  Auto Lymphocyte # : 0.97 K/uL  Auto Monocyte # : 0.92 K/uL  Auto Eosinophil # : 0.00 K/uL  Auto Basophil # : 0.00 K/uL  Auto Neutrophil % : 58.0 %  Auto Lymphocyte % : 20.0 %  Auto Monocyte % : 19.0 %  Auto Eosinophil % : 0.0 %  Auto Basophil % : 0.0 %      08-23    128<L>  |  98  |  23  ----------------------------<  142<H>  3.9   |  24  |  1.28    Ca    8.3<L>      23 Aug 2023 07:43    TPro  6.1  /  Alb  2.6<L>  /  TBili  1.4<H>  /  DBili  x   /  AST  28  /  ALT  19  /  AlkPhos  57  08-23    PT/INR - ( 22 Aug 2023 09:28 )   PT: 15.0 sec;   INR: 1.34 ratio     PTT - ( 22 Aug 2023 09:28 )  PTT:31.3 sec    Urinalysis Basic - ( 22 Aug 2023 09:28 )    Color: x / Appearance: x / SG: x / pH: x  Gluc: 170 mg/dL / Ketone: x  / Bili: x / Urobili: x   Blood: x / Protein: x / Nitrite: x   Leuk Esterase: x / RBC: x / WBC x   Sq Epi: x / Non Sq Epi: x / Bacteria: x    RADIOLOGY & ADDITIONAL STUDIES:    EXAM:  CT CERVICAL SPINE       PROCEDURE DATE:  08/21/2023      INTERPRETATION:  CT HEAD, CT CERVICAL SPINE    INDICATIONS: fall neuro alert, BIBEMS s/p unwitnessed mechanical fall in   bathroom.  (-) LOC (-) head strike (-) blood thinners. Admits to drinking 3 beers   tonight  prior to fall. Pt c/o lower back pain. PMHx CHF and pacemaker.    CT BRAIN:    TECHNIQUE:  Multiple contiguous axial images were obtained from the skull   base to the vertex without the use of intravenous contrast.    COMPARISON EXAMINATION: Head CT 6/21/2021    FINDINGS:  Ventricles and sulci: Parenchymal volume loss is present which is   commensurate with patient age.  Intra-axial: There are hemispheric white matter areas of low attenuation   which are nonspecific but likely related to sequelae of microvascular   disease.  No intracranial mass, acute hemorrhage, or significant midline shift is   present.  Extra-axial: There is no extra-axial collection.  Visualized sinuses: No air-fluid levels are identified. Moderate areas of   mucosal thickening.  Visualized mastoids:  Clear.  Calvarium: Unremarkable.  Miscellaneous:  Bilateral cataract surgery.    Impression: See below    CT CERVICAL SPINE:    TECHNIQUE:  Axial images were obtained through the cervical spine using   multislice helical technique.  Reformatted coronal and sagittal images   were performed.    COMPARISON EXAMINATION:  C-spine CT 6/7/2021    FINDINGS:  On the sagittal reformations, there is no prevertebral soft tissue   swelling. There is no splaying of the spinous processes.  On the coronal reformations, occipital condyles are normal. Lateral   masses of C1 align normally with C2. Mild levoscoliosis.  On the axial images, no lucent fracture line is identified.    Multilevel degenerative osteoarthritis is present. Findings include   marginal osteophytes, uncovertebral spurring, and facet joint space   compartment narrowing with subchondral sclerosis and hypertrophic   osteophytes at multiple levels. There is multilevel degenerative disc   disease. Findings include loss of normal disc space height and endplate   sclerosis.    Miscellaneous:  Left-sided pacemaker.    IMPRESSIONS:    Head CT: No CT evidence of acute intracranial hemorrhage.    C-spine CT:  No acute fracture.        EXAM:  CT CHEST IC      EXAM Abdomen    PROCEDURE DATE:  08/21/2023      INTERPRETATION:  PROCEDURE INFORMATION:  Exam: CT Chest With Contrast; Diagnostic  Exam date and time: 8/21/2023 10:30 PM  Age: 88 years old  Clinical indication: Fall, unwitnessed fall in the bathroom, head strike,   complains about back pain, weakness, neck and knee pain, history of lung   cancer, appendectomy.    TECHNIQUE:  Imaging protocol: Diagnostic computed tomography of the chest, abdomen   and pelvis with contrast. Arterial phase imaging is obtained of the chest   and abdomen and venous phase imaging of the abdomen and pelvis. Coronal   and sagittal reformats were obtained.    Contrast material: IV: OMNIPAQUE 350; Contrast volume: 90 ml; Contrast   route: IV;    COMPARISON:  CR XR CHEST IMMEDIATE 12/2/2021 12:09 PM CT chest 11/3/2015    FINDINGS:  Tubes, catheters and devices: Cardiac device present  in the left chest   wall.    LUNGS: Subtle reticular opacities in the right and left lower lobe and   base of left upper lobe. Postsurgical changes and suture material from   probable right upper lobectomy. No consolidative opacity. Mild   atelectatic changes in dependent position. No  pulmonary contusion.  PLEURAL SPACES: No pneumothorax or hemothorax. Previously seen pleural   effusions are not visualized.  HEART: The heart is within normal limits of size. No pericardial   effusion. Aortic valve calcifications. Coronary artery calcifications,   mitral annular valve calcifications. Dual-chamber pacer leads.  MEDIASTINUM: Borderline left paratracheal lymph node measures 0.9 cm, no   lymphadenopathy. Right upper pretracheal lymph node measures 0.9 cm,   stable since 2015, image 2-8.  VESSELS: Severe atherosclerosis of the aorta. No aneurysm or acute aortic   syndrome. No traumatic aortic injury. No mediastinal hematoma,   pneumomediastinum, or hemopericardium.  BONES/JOINTS: Questionable contour irregularity at the 4th and 5th left   anterior rib which was not seen on prior study and may represent a non   displaced rib fracture or deformity from remote impact. No vertebral   fracture or compression deformity.  Sternotomy.  SOFT TISSUES: Left prepectoral pacer device.    ABDOMEN:  LIVER: No hematoma or focal lesion multiple calcified granuloma. No   laceration or contusion.  GALLBLADDER AND BILE DUCTS: Cholelithiasis. No cholecystitis or biliary   ductal dilatation.  PANCREAS: Homogeneous enhancement, within normal limits.  SPLEEN: Splenomegaly. Spleen measures 15.6 cm. Multiple calcified   granulomata visualized.  ADRENAL GLANDS: Within normal limits. No masses.  KIDNEYS AND URETERS: No hydronephrosis, no perinephric hematoma,   symmetric enhancement. Non characterized subcentimeter hypo densities.    BLADDER: Within normal limits.  REPRODUCTIVE: Prostate with large coarse calcifications, mildly enlarged.    STOMACH AND BOWEL: Colonic diverticulosis. No diverticulitis. No bowel   wall thickening or intestinal obstruction.  APPENDIX: Normal appendix.    PERITONEUM: No hemoperitoneum, no pneumoperitoneum, no retroperitoneal   hematoma.  VESSELS: Severe atherosclerosis, no abdominal aortic aneurysm. Large   calcific plaques at the origin of the SMA and celiac axis.  LYMPH NODES: No lymphadenopathy  ABDOMINAL WALL: No obvious soft tissue hematoma.  BONES/JOINTS: No acute fractures of the bony pelvis or proximal femora.   No acute fractures of the spine. Multilevel discogenic degenerative   disease.    IMPRESSION:  No acute posttraumatic injury in the chest. Postsurgical changes from   lobectomy. Reticular opacities in bilateral lower lobes may be scarring.   Interstitial lung disease can have similar appearance. No pneumothorax.    No acute posttraumatic organ injury in the abdomen or pelvis. No   retroperitoneal or intraperitoneal hematoma. No acute fractures of the   spine, bony pelvis or proximal femora.    Questionable contour irregularity of the fourth and fifth left anterior   rib which may relate to nondisplaced rib fracture versus sequela from   remote impact, correlate clinically.         HPI:    87 y/o male with a PMHx of MI, CAD s/p CABG, Cardiac Arrest, CHF, HTN, AICD, Lung Ca s/p Resection, peripheral artery disease, CKD, ,present, s/p lobectomy of lung, s/p carotid endarterectomy; presenting to the ED s/p unwitnessed  fall in bathroom, noted -LOC, -head strike, -blood thinner use. Patient states he suddenly felt dizzy while he was urinating, he lost his balance and fell; he called EMS through a medical emergency device around his neck and was wedged in between the toilet for about two hours. Patient c/o of lower back pain. Patient able to bear weight but feels unsteady. Denies weakness or numbness. Patient admits to EtOH consumption tonight, approximately 1 beer prior to fall. Also endorses knee pain, neck pain.    In ED found to be afebrile, hemodynamically stable, thrombocytopenia, hyponatremia EKG, CT Chest/abd/pelvis reviewed- no acute traumatic injury, head CT neg, CT- C spine neg for acute fx     8/23/2023- Seen at bedside along with Dr. Cade, alert, oriented in no acute distress.     PAST MEDICAL & SURGICAL HISTORY:    Hypertension  CAD (coronary artery disease)  Cardiac arrest with successful resuscitation  CKD (chronic kidney disease)  PAD (peripheral artery disease)  Lung cancer  Heart failure  AICD (automatic cardioverter/defibrillator) present  S/P carotid endarterectomy  lobectomy of lung  appendectomy  H/O left knee surgery  History of tonsillectomy and adenoidectomy      FAMILY HISTORY:      MEDICATIONS  (STANDING):    aspirin enteric coated 81 milliGRAM(s) Oral daily  atorvastatin 10 milliGRAM(s) Oral at bedtime  atovaquone  Suspension 750 milliGRAM(s) Oral every 12 hours  azithromycin  IVPB 500 milliGRAM(s) IV Intermittent every 24 hours  azithromycin  IVPB      melatonin 5 milliGRAM(s) Oral at bedtime  metoprolol succinate ER 37.5 milliGRAM(s) Oral daily  pantoprazole    Tablet 40 milliGRAM(s) Oral before breakfast  sacubitril 24 mG/valsartan 26 mG 1 Tablet(s) Oral two times a day    MEDICATIONS  (PRN):    acetaminophen     Tablet 650 milliGRAM(s) Oral every 6 hours PRN Mild Pain (1 - 3), Moderate Pain (4 - 6), Severe Pain (7 - 10)  aluminum hydroxide/magnesium hydroxide/simethicone Suspension 30 milliLiter(s) Oral every 4 hours PRN Dyspepsia  ondansetron Injectable 4 milliGRAM(s) IV Push every 8 hours PRN Nausea and/or Vomiting  sodium chloride 0.65% Nasal 1 Spray(s) Both Nostrils two times a day PRN Nasal Congestion    Allergies    No Known Allergies    Review of Systems    Constitutional, Eyes, ENT, Cardiovascular, Respiratory, Gastrointestinal, Genitourinary, Musculoskeletal, Integumentary, Neurological, Psychiatric, Endocrine, Heme/Lymph and Allergic/Immunologic review of systems are otherwise negative except as noted in HPI.     Vital Signs Last 24 Hrs    Vital Signs Last 24 Hrs  T(C): 36.9 (23 Aug 2023 08:00), Max: 37.4 (22 Aug 2023 22:04)  T(F): 98.4 (23 Aug 2023 08:00), Max: 99.3 (22 Aug 2023 22:04)  HR: 60 (23 Aug 2023 08:00) (60 - 92)  BP: 104/68 (23 Aug 2023 08:00) (104/68 - 141/83)  BP(mean): 80 (23 Aug 2023 08:00) (80 - 80)  RR: 18 (23 Aug 2023 08:00) (18 - 18)  SpO2: 94% (23 Aug 2023 08:00) (94% - 95%)    Parameters below as of 23 Aug 2023 08:00  Patient On (Oxygen Delivery Method): room air    Physical Exam    Constitutional: In no acute distress  Eyes: no conjunctival infection, anicteric.   ENT: pharynx is unremarkable, moist mucus membrane, no oral lesions.   Neck: supple without JVD, no thyromegaly or masses appreciated.   Pulmonary: clear to auscultation bilaterally, no dullness, no wheezing.   Cardiac: RRR, normal S1S2, no murmurs, rubs, gallops.   Vascular: no JVD, no calf tenderness,  Abdomen: normoactive bowel sounds, soft and nontender, no hepatosplenomegaly or masses appreciated.   Lymphatic: no peripheral adenopathy appreciated.   Musculoskeletal: full range of motion and no deformities appreciated.   Skin: normal appearance, no rash, nodules, vesicles, ulcers, erythema.   Neurology: grossly intact.   Psychiatric: affect appropriate.     LABS:    CBC Full  -  ( 23 Aug 2023 07:43 )  WBC Count : 4.83 K/uL  RBC Count : 4.15 M/uL  Hemoglobin : 12.7 g/dL  Hematocrit : 35.9 %  Platelet Count - Automated : 68 K/uL  Mean Cell Volume : 86.5 fl  Mean Cell Hemoglobin : 30.6 pg  Mean Cell Hemoglobin Concentration : 35.4 gm/dL  Auto Neutrophil # : 2.95 K/uL  Auto Lymphocyte # : 0.97 K/uL  Auto Monocyte # : 0.92 K/uL  Auto Eosinophil # : 0.00 K/uL  Auto Basophil # : 0.00 K/uL  Auto Neutrophil % : 58.0 %  Auto Lymphocyte % : 20.0 %  Auto Monocyte % : 19.0 %  Auto Eosinophil % : 0.0 %  Auto Basophil % : 0.0 %      08-23    128<L>  |  98  |  23  ----------------------------<  142<H>  3.9   |  24  |  1.28    Ca    8.3<L>      23 Aug 2023 07:43    TPro  6.1  /  Alb  2.6<L>  /  TBili  1.4<H>  /  DBili  x   /  AST  28  /  ALT  19  /  AlkPhos  57  08-23    PT/INR - ( 22 Aug 2023 09:28 )   PT: 15.0 sec;   INR: 1.34 ratio     PTT - ( 22 Aug 2023 09:28 )  PTT:31.3 sec    Urinalysis Basic - ( 22 Aug 2023 09:28 )    Color: x / Appearance: x / SG: x / pH: x  Gluc: 170 mg/dL / Ketone: x  / Bili: x / Urobili: x   Blood: x / Protein: x / Nitrite: x   Leuk Esterase: x / RBC: x / WBC x   Sq Epi: x / Non Sq Epi: x / Bacteria: x    RADIOLOGY & ADDITIONAL STUDIES:    EXAM:  CT CERVICAL SPINE       PROCEDURE DATE:  08/21/2023      INTERPRETATION:  CT HEAD, CT CERVICAL SPINE    INDICATIONS: fall neuro alert, BIBEMS s/p unwitnessed mechanical fall in   bathroom.  (-) LOC (-) head strike (-) blood thinners. Admits to drinking 3 beers   tonight  prior to fall. Pt c/o lower back pain. PMHx CHF and pacemaker.    CT BRAIN:    TECHNIQUE:  Multiple contiguous axial images were obtained from the skull   base to the vertex without the use of intravenous contrast.    COMPARISON EXAMINATION: Head CT 6/21/2021    FINDINGS:  Ventricles and sulci: Parenchymal volume loss is present which is   commensurate with patient age.  Intra-axial: There are hemispheric white matter areas of low attenuation   which are nonspecific but likely related to sequelae of microvascular   disease.  No intracranial mass, acute hemorrhage, or significant midline shift is   present.  Extra-axial: There is no extra-axial collection.  Visualized sinuses: No air-fluid levels are identified. Moderate areas of   mucosal thickening.  Visualized mastoids:  Clear.  Calvarium: Unremarkable.  Miscellaneous:  Bilateral cataract surgery.    Impression: See below    CT CERVICAL SPINE:    TECHNIQUE:  Axial images were obtained through the cervical spine using   multislice helical technique.  Reformatted coronal and sagittal images   were performed.    COMPARISON EXAMINATION:  C-spine CT 6/7/2021    FINDINGS:  On the sagittal reformations, there is no prevertebral soft tissue   swelling. There is no splaying of the spinous processes.  On the coronal reformations, occipital condyles are normal. Lateral   masses of C1 align normally with C2. Mild levoscoliosis.  On the axial images, no lucent fracture line is identified.    Multilevel degenerative osteoarthritis is present. Findings include   marginal osteophytes, uncovertebral spurring, and facet joint space   compartment narrowing with subchondral sclerosis and hypertrophic   osteophytes at multiple levels. There is multilevel degenerative disc   disease. Findings include loss of normal disc space height and endplate   sclerosis.    Miscellaneous:  Left-sided pacemaker.    IMPRESSIONS:    Head CT: No CT evidence of acute intracranial hemorrhage.    C-spine CT:  No acute fracture.        EXAM:  CT CHEST IC      EXAM Abdomen    PROCEDURE DATE:  08/21/2023      INTERPRETATION:  PROCEDURE INFORMATION:  Exam: CT Chest With Contrast; Diagnostic  Exam date and time: 8/21/2023 10:30 PM  Age: 88 years old  Clinical indication: Fall, unwitnessed fall in the bathroom, head strike,   complains about back pain, weakness, neck and knee pain, history of lung   cancer, appendectomy.    TECHNIQUE:  Imaging protocol: Diagnostic computed tomography of the chest, abdomen   and pelvis with contrast. Arterial phase imaging is obtained of the chest   and abdomen and venous phase imaging of the abdomen and pelvis. Coronal   and sagittal reformats were obtained.    Contrast material: IV: OMNIPAQUE 350; Contrast volume: 90 ml; Contrast   route: IV;    COMPARISON:  CR XR CHEST IMMEDIATE 12/2/2021 12:09 PM CT chest 11/3/2015    FINDINGS:  Tubes, catheters and devices: Cardiac device present  in the left chest   wall.    LUNGS: Subtle reticular opacities in the right and left lower lobe and   base of left upper lobe. Postsurgical changes and suture material from   probable right upper lobectomy. No consolidative opacity. Mild   atelectatic changes in dependent position. No  pulmonary contusion.  PLEURAL SPACES: No pneumothorax or hemothorax. Previously seen pleural   effusions are not visualized.  HEART: The heart is within normal limits of size. No pericardial   effusion. Aortic valve calcifications. Coronary artery calcifications,   mitral annular valve calcifications. Dual-chamber pacer leads.  MEDIASTINUM: Borderline left paratracheal lymph node measures 0.9 cm, no   lymphadenopathy. Right upper pretracheal lymph node measures 0.9 cm,   stable since 2015, image 2-8.  VESSELS: Severe atherosclerosis of the aorta. No aneurysm or acute aortic   syndrome. No traumatic aortic injury. No mediastinal hematoma,   pneumomediastinum, or hemopericardium.  BONES/JOINTS: Questionable contour irregularity at the 4th and 5th left   anterior rib which was not seen on prior study and may represent a non   displaced rib fracture or deformity from remote impact. No vertebral   fracture or compression deformity.  Sternotomy.  SOFT TISSUES: Left prepectoral pacer device.    ABDOMEN:  LIVER: No hematoma or focal lesion multiple calcified granuloma. No   laceration or contusion.  GALLBLADDER AND BILE DUCTS: Cholelithiasis. No cholecystitis or biliary   ductal dilatation.  PANCREAS: Homogeneous enhancement, within normal limits.  SPLEEN: Splenomegaly. Spleen measures 15.6 cm. Multiple calcified   granulomata visualized.  ADRENAL GLANDS: Within normal limits. No masses.  KIDNEYS AND URETERS: No hydronephrosis, no perinephric hematoma,   symmetric enhancement. Non characterized subcentimeter hypo densities.    BLADDER: Within normal limits.  REPRODUCTIVE: Prostate with large coarse calcifications, mildly enlarged.    STOMACH AND BOWEL: Colonic diverticulosis. No diverticulitis. No bowel   wall thickening or intestinal obstruction.  APPENDIX: Normal appendix.    PERITONEUM: No hemoperitoneum, no pneumoperitoneum, no retroperitoneal   hematoma.  VESSELS: Severe atherosclerosis, no abdominal aortic aneurysm. Large   calcific plaques at the origin of the SMA and celiac axis.  LYMPH NODES: No lymphadenopathy  ABDOMINAL WALL: No obvious soft tissue hematoma.  BONES/JOINTS: No acute fractures of the bony pelvis or proximal femora.   No acute fractures of the spine. Multilevel discogenic degenerative   disease.    IMPRESSION:  No acute posttraumatic injury in the chest. Postsurgical changes from   lobectomy. Reticular opacities in bilateral lower lobes may be scarring.   Interstitial lung disease can have similar appearance. No pneumothorax.    No acute posttraumatic organ injury in the abdomen or pelvis. No   retroperitoneal or intraperitoneal hematoma. No acute fractures of the   spine, bony pelvis or proximal femora.    Questionable contour irregularity of the fourth and fifth left anterior   rib which may relate to nondisplaced rib fracture versus sequela from   remote impact, correlate clinically.

## 2023-08-23 NOTE — PROGRESS NOTE ADULT - SUBJECTIVE AND OBJECTIVE BOX
Date of service: 08-23-23 @ 13:46    pt seen and examined  feels better  no fevers  has weakness, malaise     ROS: no fever or chills; denies dizziness, no HA, no SOB or cough, no abdominal pain, no diarrhea or constipation; no dysuria, no urinary frequency, no legs pain, no rashes    MEDICATIONS  (STANDING):  aspirin enteric coated 81 milliGRAM(s) Oral daily  atorvastatin 10 milliGRAM(s) Oral at bedtime  atovaquone  Suspension 750 milliGRAM(s) Oral every 12 hours  azithromycin  IVPB      azithromycin  IVPB 500 milliGRAM(s) IV Intermittent every 24 hours  melatonin 5 milliGRAM(s) Oral at bedtime  metoprolol succinate ER 37.5 milliGRAM(s) Oral daily  pantoprazole    Tablet 40 milliGRAM(s) Oral before breakfast  sacubitril 24 mG/valsartan 26 mG 1 Tablet(s) Oral two times a day    Vital Signs Last 24 Hrs  T(C): 36.9 (23 Aug 2023 08:00), Max: 37.4 (22 Aug 2023 22:04)  T(F): 98.4 (23 Aug 2023 08:00), Max: 99.3 (22 Aug 2023 22:04)  HR: 60 (23 Aug 2023 08:00) (60 - 92)  BP: 104/68 (23 Aug 2023 08:00) (104/68 - 141/83)  BP(mean): 80 (23 Aug 2023 08:00) (80 - 80)  RR: 18 (23 Aug 2023 08:00) (18 - 18)  SpO2: 94% (23 Aug 2023 08:00) (94% - 95%)    Parameters below as of 23 Aug 2023 08:00  Patient On (Oxygen Delivery Method): room air    PE:  Constitutional: frail looking  HEENT: NC/AT, EOMI, PERRLA, conjunctivae clear; ears and nose atraumatic; pharynx benign  Neck: supple; thyroid not palpable  Back: no tenderness  Respiratory: respiratory effort normal; clear to auscultation  Cardiovascular: S1S2 regular, no murmurs  Abdomen: soft, not tender, not distended, positive BS; liver and spleen WNL  Genitourinary: no suprapubic tenderness  Lymphatic: no LN palpable  Musculoskeletal: no muscle tenderness, no joint swelling or tenderness  Extremities: no pedal edema  Neurological/ Psychiatric:   moving all extremities  Skin: no rashes; no palpable lesions    Labs: all available labs reviewed                                   12.7   4.83  )-----------( 68       ( 23 Aug 2023 07:43 )             35.9     08-23    128<L>  |  98  |  23  ----------------------------<  142<H>  3.9   |  24  |  1.28    Ca    8.3<L>      23 Aug 2023 07:43    TPro  6.1  /  Alb  2.6<L>  /  TBili  1.4<H>  /  DBili  x   /  AST  28  /  ALT  19  /  AlkPhos  57  08-23         Urinalysis Basic - ( 22 Aug 2023 09:28 )    Color: x / Appearance: x / SG: x / pH: x  Gluc: 170 mg/dL / Ketone: x  / Bili: x / Urobili: x   Blood: x / Protein: x / Nitrite: x   Leuk Esterase: x / RBC: x / WBC x   Sq Epi: x / Non Sq Epi: x / Bacteria: x        Culture Results:   Positive for Babesia species  by Giemsa Stain  Parasitemia = 1.6 %  ************************************************************  NEGATIVE for Plasmodium antigens. Microscopy is performed for  confirmation.  The Malaria Rapid antigen test does not detect the  presence of Babesia species. If Babesiosis is suspected  please order test Babesia PCR: Babesia microti PCR Bld    ************************************************************ (08-22 @ 09:14)  Culture Results:   Positive for Babesia species  by Giemsa Stain  Parasitemia = 1.7 %  ************************************************************  NEGATIVE for Plasmodium antigens. Microscopy is performed for  confirmation.  The Malaria Rapid antigen test does not detect the  presence of Babesia species. If Babesiosis is suspected  please order test Babesia PCR: Babesia microti PCR Bld  ************************************************************ (08-21 @ 21:09)    Radiology: all available radiological tests reviewed  < from: CT Abdomen and Pelvis w/ IV Cont (08.21.23 @ 22:46) >  ACC: [id number] EXAM:  CT ABDOMEN AND PELVIS IC   ORDERED BY: JULIO PEÑA     PROCEDURE DATE:  08/21/2023          INTERPRETATION:  PROCEDURE INFORMATION:  Exam: CT Chest With Contrast; Diagnostic  Exam date and time: 8/21/2023 10:30 PM  Age: 88 years old  Clinical indication: Fall    TECHNIQUE:  Imaging protocol: Diagnostic computed tomography of the chest, abdomen   and pelvis with contrast. Arterial phase imaging is obtained of the chest   and abdomen and venous phase imaging of the abdomen and pelvis. Coronal   and sagittal reformats were obtained.    Contrast material: IV: OMNIPAQUE 350; Contrast volume: 90 ml; Contrast   route: IV;    COMPARISON:  CR XR CHEST IMMEDIATE 12/2/2021 12:09 PM CT chest 11/3/2015    FINDINGS:  Tubes, catheters and devices: Cardiac device present  in the left chest   wall.    LUNGS: Subtle reticular opacities in the right and left lower lobe and   base of left upper lobe. Postsurgical changes and suture material from   probable right upper lobectomy. No consolidative opacity. Mild   atelectatic changes in dependent position. No  pulmonary contusion.  PLEURAL SPACES: No pneumothorax or hemothorax. Previously seen pleural   effusions are not visualized.  HEART: The heart is within normal limits of size. No pericardial   effusion. Aortic valve calcifications. Coronary artery calcifications,   mitral annular valve calcifications. Dual-chamber pacer leads.  MEDIASTINUM: Borderline left paratracheal lymph node measures 0.9 cm, no   lymphadenopathy. Right upper pretracheal lymph node measures 0.9 cm,   stable since 2015, image 2-8.  VESSELS: Severe atherosclerosis of the aorta. No aneurysm or acute aortic   syndrome. No traumatic aortic injury. No mediastinal hematoma,   pneumomediastinum,or hemopericardium.  BONES/JOINTS: Questionable contour irregularity at the 4th and 5th left   anterior rib which was not seen on prior study and may represent a non   displaced rib fracture or deformity from remote impact. No vertebral   fracture orcompression deformity.  Sternotomy.  SOFT TISSUES: Left prepectoral pacer device.    ABDOMEN:  LIVER: No hematoma or focal lesion multiple calcified granuloma. No   laceration or contusion.  GALLBLADDER AND BILE DUCTS: Cholelithiasis. No cholecystitis or biliary   ductal dilatation.  PANCREAS: Homogeneous enhancement, within normal limits.  SPLEEN: Splenomegaly. Spleen measures 15.6 cm. Multiple calcified   granulomata visualized.  ADRENAL GLANDS: Within normal limits. No masses.  KIDNEYS AND URETERS: No hydronephrosis, no perinephric hematoma,   symmetric enhancement. Non characterized subcentimeter hypodensities.    BLADDER: Within normal limits.  REPRODUCTIVE: Prostate with large coarse calcifications, mildly enlarged.    STOMACH AND BOWEL: Colonic diverticulosis. No diverticulitis. No bowel   wall thickening or intestinal obstruction.  APPENDIX: Normal appendix.    PERITONEUM: No hemoperitoneum, no pneumoperitoneum, no retroperitoneal   hematoma.  VESSELS: Severe atherosclerosis, no abdominal aortic aneurysm. Large   calcific plaques at the origin of the SMA and celiac axis.  LYMPH NODES: No lymphadenopathy  ABDOMINAL WALL: No obvious soft tissue hematoma.  BONES/JOINTS: No acute fractures of the bony pelvis or proximal femora.   No acute fractures of the spine. Multilevel discogenic degenerative   disease.    IMPRESSION:  No acute posttraumatic injury in the chest. Postsurgical changes from   lobectomy. Reticular opacities in bilateral lower lobes may be scarring.   Interstitial lung disease can have similar appearance. No pneumothorax.    No acute posttraumatic organ injury in the abdomen or pelvis. No   retroperitoneal or intraperitoneal hematoma. No acute fractures of the   spine, bony pelvis or proximal femora.    Questionable contour irregularity of the fourth and fifth left anterior   rib which may relate to nondisplaced rib fracture versus sequela from   remote impact, correlate clinically.    Other chronic findings as above.    --- End of Report ---    < end of copied text >    Advanced directives addressed: full resuscitation

## 2023-08-23 NOTE — PROGRESS NOTE ADULT - SUBJECTIVE AND OBJECTIVE BOX
89 y/o male with a PMHx of  MI  CAD s/p CABG, Cardiac Arrest, CHF, HTN, AICD, Lung Ca s/p Resection ,, peripheral artery disease, CKD, ,present, s/p lobectomy of lung, s/p carotid endarterectomy; presenting to the ED s/p unwitnessed  fall in bathroom, noted -LOC, -head strike, -blood thinner use. Patient states he suddenly felt dizzy while he was urinating, he lost his balance and fell; he called EMS through a medical emergency device around his neck and was wedged in between the toilet for about two hours. Patient c/o of lower back pain. Patient able to bear weight but feels unsteady. Denies weakness or numbness. Patient admits to EtOH consumption tonight, approximately 1 beer prior to fall. Also endorses knee pain, neck pain.    In ED found to be afebrile, hemodynamically stable , thrombocytopenia , hyponatremia EKG, CT Chest/abd/pelvis reviewed- no acute traumatic injury, head CT neg, Ct C spine neg for acute fx      ROS-All other review of systems negative, except as noted in HPI  8/23 has decreased appetite, mentation at baseline, follows commands alert , denies CP, no SOB, no abd pain     PHYSICAL EXAM:    Daily     Daily     ICU Vital Signs Last 24 Hrs  T(C): 37.1 (23 Aug 2023 15:10), Max: 37.4 (22 Aug 2023 22:04)  T(F): 98.7 (23 Aug 2023 15:10), Max: 99.3 (22 Aug 2023 22:04)  HR: 91 (23 Aug 2023 15:10) (60 - 92)  BP: 98/67 (23 Aug 2023 15:10) (98/67 - 116/72)  BP(mean): 80 (23 Aug 2023 08:00) (80 - 80)  ABP: --  ABP(mean): --  RR: 18 (23 Aug 2023 15:10) (18 - 18)  SpO2: 97% (23 Aug 2023 15:10) (94% - 97%)    O2 Parameters below as of 23 Aug 2023 15:10  Patient On (Oxygen Delivery Method): room air   physical exam   Constitutional:NAD  HEENT: Atraumatic, JF, Normal, No congestion  Respiratory: Breath Sounds normal, no rhonchi/wheeze  Cardiovascular: N S1S2;  Gastrointestinal: Abdomen soft, non tender, Bowel Ssounds present  Extremities: No edema, peripheral pulses present  Neurological: awake, alert, follows commands  no gross focal motor deficits  RLedistal shin RLE 0.7cm ulcerno surrounding cellulitis                                     12.7   4.83  )-----------( 68       ( 23 Aug 2023 07:43 )             35.9       CBC Full  -  ( 23 Aug 2023 07:43 )  WBC Count : 4.83 K/uL  RBC Count : 4.15 M/uL  Hemoglobin : 12.7 g/dL  Hematocrit : 35.9 %  Platelet Count - Automated : 68 K/uL  Mean Cell Volume : 86.5 fl  Mean Cell Hemoglobin : 30.6 pg  Mean Cell Hemoglobin Concentration : 35.4 gm/dL  Auto Neutrophil # : 2.95 K/uL  Auto Lymphocyte # : 0.97 K/uL  Auto Monocyte # : 0.92 K/uL  Auto Eosinophil # : 0.00 K/uL  Auto Basophil # : 0.00 K/uL  Auto Neutrophil % : 58.0 %  Auto Lymphocyte % : 20.0 %  Auto Monocyte % : 19.0 %  Auto Eosinophil % : 0.0 %  Auto Basophil % : 0.0 %      08-23    128<L>  |  98  |  23  ----------------------------<  142<H>  3.9   |  24  |  1.28    Ca    8.3<L>      23 Aug 2023 07:43    TPro  6.1  /  Alb  2.6<L>  /  TBili  1.4<H>  /  DBili  x   /  AST  28  /  ALT  19  /  AlkPhos  57  08-23      LIVER FUNCTIONS - ( 23 Aug 2023 07:43 )  Alb: 2.6 g/dL / Pro: 6.1 gm/dL / ALK PHOS: 57 U/L / ALT: 19 U/L / AST: 28 U/L / GGT: x             PT/INR - ( 22 Aug 2023 09:28 )   PT: 15.0 sec;   INR: 1.34 ratio         PTT - ( 22 Aug 2023 09:28 )  PTT:31.3 sec    CARDIAC MARKERS ( 21 Aug 2023 21:09 )  x     / x     / 95 U/L / x     / x            Urinalysis Basic - ( 23 Aug 2023 07:43 )    Color: x / Appearance: x / SG: x / pH: x  Gluc: 142 mg/dL / Ketone: x  / Bili: x / Urobili: x   Blood: x / Protein: x / Nitrite: x   Leuk Esterase: x / RBC: x / WBC x   Sq Epi: x / Non Sq Epi: x / Bacteria: x            MEDICATIONS  (STANDING):  aspirin enteric coated 81 milliGRAM(s) Oral daily  atorvastatin 10 milliGRAM(s) Oral at bedtime  atovaquone  Suspension 750 milliGRAM(s) Oral every 12 hours  azithromycin  IVPB 500 milliGRAM(s) IV Intermittent every 24 hours  azithromycin  IVPB      melatonin 5 milliGRAM(s) Oral at bedtime  metoprolol succinate ER 37.5 milliGRAM(s) Oral daily  pantoprazole    Tablet 40 milliGRAM(s) Oral before breakfast  sacubitril 24 mG/valsartan 26 mG 1 Tablet(s) Oral two times a day

## 2023-08-23 NOTE — PROGRESS NOTE ADULT - SUBJECTIVE AND OBJECTIVE BOX
CHIEF COMPLAINT: Patient is a 88y old  Male who presents with a chief complaint of fall/syncope (22 Aug 2023 09:24)    FROM H&P: 87 y/o male with a PMHx of  MI  CAD s/p CABG, Cardiac Arrest, CHF, HTN, AICD, Lung Ca s/p Resection ,, peripheral artery disease, CKD, ,present, s/p lobectomy of lung, s/p carotid endarterectomy; presenting to the ED s/p unwitnessed  fall in bathroom, noted -LOC, -head strike, -blood thinner use. Patient states he suddenly felt dizzy while he was urinating, he lost his balance and fell; he called EMS through a medical emergency device around his neck and was wedged in between the toilet for about two hours. Patient c/o of lower back pain. Patient able to bear weight but feels unsteady. Denies weakness or numbness. Patient admits to EtOH consumption tonight, approximately 1 beer prior to fall. Also endorses knee pain, neck pain.    In ED found to be afebrile, hemodynamically stable , thrombocytopenia , hyponatremia EKG, CT Chest/abd/pelvis reviewed- no acute traumatic injury, head CT neg, Ct C spine neg for acute fx     PMH:  CAD s/p CABG, Cardiac Arrest, CHF, HTN, AICD, Lung Ca s/p Resection (reported to be in remission)  Surgical History: Left TKA, CABG, Lung mass/nodular resection  Family History; Mother  in 40s from breast ca. Father  age 80s from HD  Social History: Former TObacco/smoking. Quit 20 years ago. Former 40 PPD smoker. Daily 1-2 beers. Denies illicit drug use.     . Patient reported dizziness prior to syncopal event  Occ with SOB.    . Feeling better, +babesia PCR      MEDICATIONS:  MEDICATIONS  (STANDING):  aspirin enteric coated 81 milliGRAM(s) Oral daily  atorvastatin 10 milliGRAM(s) Oral at bedtime  atovaquone  Suspension 750 milliGRAM(s) Oral every 12 hours  azithromycin  IVPB      azithromycin  IVPB 500 milliGRAM(s) IV Intermittent every 24 hours  melatonin 5 milliGRAM(s) Oral at bedtime  metoprolol succinate ER 37.5 milliGRAM(s) Oral daily  pantoprazole    Tablet 40 milliGRAM(s) Oral before breakfast  sacubitril 24 mG/valsartan 26 mG 1 Tablet(s) Oral two times a day    MEDICATIONS  (PRN):  acetaminophen     Tablet .. 650 milliGRAM(s) Oral every 6 hours PRN Mild Pain (1 - 3), Moderate Pain (4 - 6), Severe Pain (7 - 10)  aluminum hydroxide/magnesium hydroxide/simethicone Suspension 30 milliLiter(s) Oral every 4 hours PRN Dyspepsia  ondansetron Injectable 4 milliGRAM(s) IV Push every 8 hours PRN Nausea and/or Vomiting      HOME MEDICATIONS:  Ecotrin Adult Low Strength 81 mg oral delayed release tablet: 1 tab(s) orally once a day (22 Aug 2023 09:00)  Entresto 24 mg-26 mg oral tablet: 1 tab(s) orally 2 times a day (22 Aug 2023 09:01)  Farxiga 10 mg oral tablet: 1 tab(s) orally once a day (22 Aug 2023 09:01)  melatonin 5 mg oral tablet: 1 tab(s) orally once a day (at bedtime) (22 Aug 2023 09:00)  metoprolol succinate 25 mg oral tablet, extended release: 1.5 tab(s) orally once a day (at bedtime) (22 Aug 2023 09:04)  pantoprazole 40 mg oral delayed release tablet: 1 tab(s) orally once a day (22 Aug 2023 09:00)  pravastatin 40 mg oral tablet: 1 tab(s) orally once a day (at bedtime) (22 Aug 2023 09:00)    PHYSICAL EXAM:  T(C): 36.9 (23 Aug 2023 08:00), Max: 37.4 (22 Aug 2023 22:04)  T(F): 98.4 (23 Aug 2023 08:00), Max: 99.3 (22 Aug 2023 22:04)  HR: 60 (23 Aug 2023 08:00) (60 - 92)  BP: 104/68 (23 Aug 2023 08:00) (104/68 - 141/83)  BP(mean): 80 (23 Aug 2023 08:00) (80 - 80)  RR: 18 (23 Aug 2023 08:00) (18 - 18)  SpO2: 94% (23 Aug 2023 08:00) (94% - 95%)    Parameters below as of 23 Aug 2023 08:00  Patient On (Oxygen Delivery Method): room air    Constitutional: NAD, awake and alert  HEENT: PERR, EOMI, Normal Hearing, MMM  Neck: Soft and supple, No LAD, No JVD  Respiratory: Breath sounds are clear bilaterally, No wheezing, rales or rhonchi  Cardiovascular: S1 and S2, regular rate and rhythm, no Murmurs, gallops or rubs  Gastrointestinal: Bowel Sounds present, soft, nontender, nondistended, no guarding, no rebound  Extremities: No peripheral edema  Vascular: 2+ peripheral pulses  Neurological: A/O x 3, no focal deficits  Musculoskeletal: 5/5 strength b/l upper and lower extremities  Skin: No rashes    =======================================    INTERPRETATION OF TELEMETRY: Paced, run NSVT    ECG: Ordered, pending    ========================================    LABS:  LABS: All Labs Reviewed:                        12.7   4.83  )-----------( 68       ( 23 Aug 2023 07:43 )             35.9     08-23    128<L>  |  98  |  23  ----------------------------<  142<H>  3.9   |  24  |  1.28    Ca    8.3<L>      23 Aug 2023 07:43    TPro  6.1  /  Alb  2.6<L>  /  TBili  1.4<H>  /  DBili  x   /  AST  28  /  ALT  19  /  AlkPhos  57  08-23    PT/INR - ( 22 Aug 2023 09:28 )   PT: 15.0 sec;   INR: 1.34 ratio         PTT - ( 22 Aug 2023 09:28 )  PTT:31.3 sec  CARDIAC MARKERS ( 21 Aug 2023 21:09 )  x     / x     / 95 U/L / x     / x        Troponin: 34.48 ng/L, Troponin: 52.41 ng/L    CARDIAC TESTING:    < from: TTE Echo Complete w/ Contrast w/ Doppler (23 @ 11:56) >   There is calcification of mitral valve leaflets. The leaflet opening is   restricted.   Mild (1+) mitral regurgitation is present.   EA reversal of the mitral inflow consistent with reduced compliance of   the   left ventricle.   The aortic valve appears severely calcified. Valve opening seems to be   restricted.   Transaortic gradients are underestimated due to impaired left ventricle   systolic function.   KACIE by continuity equation and dimensionless index (.29) suggest at least   moderate aortic stenosis.   Left ventricle systolic function appears impaired in the presence of a   cardiac arrhythmia. Left ventricle size and structure are within normal   limitations.   Definity was used to better visualizethe endocardial border. septal ,   apical wall hypokinesis   Estimated left ventricular ejection fraction is 40 %.   Normal appearing right ventricle structure and function.    < end of copied text >      RADIOLOGY & ADDITIONAL STUDIES:    CT Head No Cont (23 @ 20:30) >  Head CT: No CT evidence of acute intracranial hemorrhage.    C-spine CT:  No acute fracture.    CT Chest, Abdomen and Pelvis w/ IV Cont (23 @ 22:46) >  No acute posttraumatic injury in the chest. Postsurgical changes from   lobectomy. Reticular opacities in bilateral lower lobes may be scarring.   Interstitial lung disease can have similar appearance. No pneumothorax.    No acute posttraumatic organ injury in the abdomen or pelvis. No   retroperitoneal or intraperitoneal hematoma. No acute fractures of the   spine, bony pelvis or proximal femora.    Questionable contour irregularity of the fourth and fifth left anterior   rib which may relate to nondisplaced rib fracture versus sequela from   remote impact, correlate clinically.    Other chronic findings as above.

## 2023-08-23 NOTE — PROGRESS NOTE ADULT - ASSESSMENT
87 y/o male with a PMHx of  MI  CAD s/p CABG, Cardiac Arrest, CHF, HTN, AICD, Lung Ca s/p Resection ,, peripheral artery disease, CKD, ,present, s/p lobectomy of lung, s/p carotid endarterectomy; presenting to the ED s/p unwitnessed  fall in bathroom, noted -LOC, -head strike, -blood thinner use. Patient states he suddenly felt dizzy while he was urinating, he lost his balance and fell; he called EMS through a medical emergency device around his neck and was wedged in between the toilet for about two hours. Patient c/o of lower back pain. Patient able to bear weight but feels unsteady. Denies weakness or numbness. Patient admits to EtOH consumption tonight, approximately 1 beer prior to fall. Also endorses knee pain, neck pain.    In ED found to be afebrile, hemodynamically stable , thrombocytopenia , hyponatremia EKG, CT Chest/abd/pelvis reviewed- no acute traumatic injury, head CT neg, Ct C spine neg for acute fx     A/P  Syncope fall- vasovagal vs related  to beer vs  in setting of babesia infection  Ischemic Cardiomyopathy.    .  remote tele, run NSVT   Continue Metoprolol. entresto resumed .  Troponin negative x2,  - Check echocardiogram, mod AS , EF 35-40%   orthostatic vital signs   EF 35-40%.   s/p ICD.check - one episode of NSVT no shock delivereed   .  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 1.6 % parasitemia c/w mild to moderate disease   - on mepron 750mg BID/azithromycin 500mg daily #2-   10 day course  - lyme screen (-), f/u ehrlichia, anaplasma pcr  tick illness serology ordered  heme and Id eval appreciated   viral hepatitis panel  negative    liver cirrhosis /splenomegaly likely related to etoh use   does not appear decomensated   outpt GI consult     Hyponatremia due to decreased appetite   trial of IVF   Usom, ospot Na    RLE ulcer present on admission r/o PAD as etiology  wound care  vascular consult  arterial doppler     Remote hx of lung cancer  remote HX of Lung CA >25 years ago, s/p lobectomy, follows Dr. Garza and has remained under surveillance    lower back pain s/p fall  improved   PT eval    CKD stable   hx CAD/PAD/ICD/Lung CA /CHF stable  cw home meds    lovenox SC vte prophylaxis    dispo- discharge plan once hyponatremia and thrombocytopenia  improves  and cardio clears , pending vascular and wound care consult for R distal shin ulcer , Hx PAD- dw with son

## 2023-08-23 NOTE — PHYSICAL THERAPY INITIAL EVALUATION ADULT - ADDITIONAL COMMENTS
Lives in house with wife. Wife has aides to assist with her ADl's. Amb with [facility]. 1STE. Has a flight of stairs to bedroom and bathroom. Has stairlift. Lives in house has aides to assist with ADl's (M-Th) then he is alone for 3 days. Amb with [facility]. 1STE. Has a flight of stairs to bedroom and bathroom. Has stair-lift.

## 2023-08-23 NOTE — PHYSICAL THERAPY INITIAL EVALUATION ADULT - PERTINENT HX OF CURRENT PROBLEM, REHAB EVAL
89 y/o male with a PMHx of  MI  CAD s/p CABG, Cardiac Arrest, CHF, HTN, AICD, Lung Ca s/p Resection ,, peripheral artery disease, CKD, ,present, s/p lobectomy of lung, s/p carotid endarterectomy; presenting to the ED s/p unwitnessed  fall in bathroom, noted -LOC, -head strike, -blood thinner use. Patient states he suddenly felt dizzy while he was urinating, he lost his balance and fell. Patient admits to EtOH consumption tonight, approximately 1 beer prior to fall. EKG, CT Chest/abd/pelvis reviewed- no acute traumatic injury, head CT neg, Ct C spine neg for acute fx.

## 2023-08-24 ENCOUNTER — TRANSCRIPTION ENCOUNTER (OUTPATIENT)
Age: 88
End: 2023-08-24

## 2023-08-24 ENCOUNTER — NON-APPOINTMENT (OUTPATIENT)
Age: 88
End: 2023-08-24

## 2023-08-24 LAB
ALBUMIN SERPL ELPH-MCNC: 2.5 G/DL — LOW (ref 3.3–5)
ALP SERPL-CCNC: 54 U/L — SIGNIFICANT CHANGE UP (ref 40–120)
ALT FLD-CCNC: 20 U/L — SIGNIFICANT CHANGE UP (ref 12–78)
ANION GAP SERPL CALC-SCNC: 7 MMOL/L — SIGNIFICANT CHANGE UP (ref 5–17)
AST SERPL-CCNC: 28 U/L — SIGNIFICANT CHANGE UP (ref 15–37)
BASOPHILS # BLD AUTO: 0.02 K/UL — SIGNIFICANT CHANGE UP (ref 0–0.2)
BASOPHILS NFR BLD AUTO: 0.4 % — SIGNIFICANT CHANGE UP (ref 0–2)
BILIRUB SERPL-MCNC: 1.3 MG/DL — HIGH (ref 0.2–1.2)
BUN SERPL-MCNC: 21 MG/DL — SIGNIFICANT CHANGE UP (ref 7–23)
CALCIUM SERPL-MCNC: 8.1 MG/DL — LOW (ref 8.5–10.1)
CHLORIDE SERPL-SCNC: 101 MMOL/L — SIGNIFICANT CHANGE UP (ref 96–108)
CHOLEST SERPL-MCNC: 119 MG/DL — SIGNIFICANT CHANGE UP
CO2 SERPL-SCNC: 23 MMOL/L — SIGNIFICANT CHANGE UP (ref 22–31)
CREAT SERPL-MCNC: 1.12 MG/DL — SIGNIFICANT CHANGE UP (ref 0.5–1.3)
CULTURE RESULTS: SIGNIFICANT CHANGE UP
EGFR: 63 ML/MIN/1.73M2 — SIGNIFICANT CHANGE UP
EOSINOPHIL # BLD AUTO: 0.04 K/UL — SIGNIFICANT CHANGE UP (ref 0–0.5)
EOSINOPHIL NFR BLD AUTO: 0.9 % — SIGNIFICANT CHANGE UP (ref 0–6)
GLUCOSE SERPL-MCNC: 164 MG/DL — HIGH (ref 70–99)
HCT VFR BLD CALC: 36.5 % — LOW (ref 39–50)
HDLC SERPL-MCNC: 10 MG/DL — LOW
HGB BLD-MCNC: 12.7 G/DL — LOW (ref 13–17)
IMM GRANULOCYTES NFR BLD AUTO: 0.7 % — SIGNIFICANT CHANGE UP (ref 0–0.9)
LIPID PNL WITH DIRECT LDL SERPL: 73 MG/DL — SIGNIFICANT CHANGE UP
LYMPHOCYTES # BLD AUTO: 1.05 K/UL — SIGNIFICANT CHANGE UP (ref 1–3.3)
LYMPHOCYTES # BLD AUTO: 23.4 % — SIGNIFICANT CHANGE UP (ref 13–44)
MANUAL SMEAR VERIFICATION: SIGNIFICANT CHANGE UP
MCHC RBC-ENTMCNC: 30.2 PG — SIGNIFICANT CHANGE UP (ref 27–34)
MCHC RBC-ENTMCNC: 34.8 GM/DL — SIGNIFICANT CHANGE UP (ref 32–36)
MCV RBC AUTO: 86.9 FL — SIGNIFICANT CHANGE UP (ref 80–100)
MONOCYTES # BLD AUTO: 0.82 K/UL — SIGNIFICANT CHANGE UP (ref 0–0.9)
MONOCYTES NFR BLD AUTO: 18.3 % — HIGH (ref 2–14)
NEUTROPHILS # BLD AUTO: 2.53 K/UL — SIGNIFICANT CHANGE UP (ref 1.8–7.4)
NEUTROPHILS NFR BLD AUTO: 56.3 % — SIGNIFICANT CHANGE UP (ref 43–77)
NON HDL CHOLESTEROL: 110 MG/DL — SIGNIFICANT CHANGE UP
PLAT MORPH BLD: NORMAL — SIGNIFICANT CHANGE UP
PLATELET # BLD AUTO: 82 K/UL — LOW (ref 150–400)
POTASSIUM SERPL-MCNC: 4.2 MMOL/L — SIGNIFICANT CHANGE UP (ref 3.5–5.3)
POTASSIUM SERPL-SCNC: 4.2 MMOL/L — SIGNIFICANT CHANGE UP (ref 3.5–5.3)
PROT SERPL-MCNC: 5.9 GM/DL — LOW (ref 6–8.3)
RBC # BLD: 4.2 M/UL — SIGNIFICANT CHANGE UP (ref 4.2–5.8)
RBC # FLD: 17 % — HIGH (ref 10.3–14.5)
RBC BLD AUTO: SIGNIFICANT CHANGE UP
SODIUM SERPL-SCNC: 131 MMOL/L — LOW (ref 135–145)
SPECIMEN SOURCE: SIGNIFICANT CHANGE UP
TRIGL SERPL-MCNC: 220 MG/DL — HIGH
WBC # BLD: 4.49 K/UL — SIGNIFICANT CHANGE UP (ref 3.8–10.5)
WBC # FLD AUTO: 4.49 K/UL — SIGNIFICANT CHANGE UP (ref 3.8–10.5)

## 2023-08-24 PROCEDURE — 93926 LOWER EXTREMITY STUDY: CPT | Mod: 26,RT

## 2023-08-24 PROCEDURE — 99232 SBSQ HOSP IP/OBS MODERATE 35: CPT

## 2023-08-24 RX ORDER — HEPARIN SODIUM 5000 [USP'U]/ML
5000 INJECTION INTRAVENOUS; SUBCUTANEOUS EVERY 8 HOURS
Refills: 0 | Status: DISCONTINUED | OUTPATIENT
Start: 2023-08-24 | End: 2023-08-31

## 2023-08-24 RX ADMIN — ATOVAQUONE 750 MILLIGRAM(S): 750 SUSPENSION ORAL at 22:43

## 2023-08-24 RX ADMIN — AZITHROMYCIN 255 MILLIGRAM(S): 500 TABLET, FILM COATED ORAL at 11:12

## 2023-08-24 RX ADMIN — Medication 5 MILLIGRAM(S): at 22:44

## 2023-08-24 RX ADMIN — Medication 81 MILLIGRAM(S): at 11:20

## 2023-08-24 RX ADMIN — HEPARIN SODIUM 5000 UNIT(S): 5000 INJECTION INTRAVENOUS; SUBCUTANEOUS at 22:44

## 2023-08-24 RX ADMIN — ATORVASTATIN CALCIUM 10 MILLIGRAM(S): 80 TABLET, FILM COATED ORAL at 22:43

## 2023-08-24 RX ADMIN — SACUBITRIL AND VALSARTAN 1 TABLET(S): 24; 26 TABLET, FILM COATED ORAL at 11:21

## 2023-08-24 RX ADMIN — Medication 37.5 MILLIGRAM(S): at 11:21

## 2023-08-24 RX ADMIN — ATOVAQUONE 750 MILLIGRAM(S): 750 SUSPENSION ORAL at 11:21

## 2023-08-24 RX ADMIN — PANTOPRAZOLE SODIUM 40 MILLIGRAM(S): 20 TABLET, DELAYED RELEASE ORAL at 05:05

## 2023-08-24 NOTE — PROGRESS NOTE ADULT - ASSESSMENT
89 y/o male with a PMHx of  MI  CAD s/p CABG, Cardiac Arrest, CHF, HTN, AICD, Lung Ca s/p Resection, peripheral artery disease, CKD, ,present, s/p lobectomy of lung, s/p carotid endarterectomy; presenting to the ED s/p unwitnessed  fall in bathroom, noted -LOC, -head strike, -blood thinner use. Patient states he suddenly felt dizzy while he was urinating, he lost his balance and fell; he called EMS through a medical emergency device around his neck and was wedged in between the toilet for about two hours. Patient c/o of lower back pain. Patient able to bear weight but feels unsteady. Denies weakness or numbness. Patient admits to EtOH consumption tonight, approximately 1 beer prior to fall. Also endorses knee pain, neck pain.    #Syncope - Suspect in the setting of +Babesia /tick bourne illness.   - Monitor on remote tele, run NSVT  - Troponin negative x2   - 8/22: TTE: Mod AS, EF 40%   - Qrthostatic vital signs still pending  - Interrogate device - run NSVT noted - can consider uptitrating BB - check OSVS first  - ID following - patient on Azithro and Mepron for Babesia     #Ischemic Cardiomyopathy. EF 35-40%. s/p ICD. Device Check reviewed.  Farxiga held - resume on DC. Run NSVT. Keep K4/MG2    #CAD. CABG. Continue aspirin and statin.     #Moderate AS. Stable. Monitoring.    #HTN. Chronic and stable. Continue Metoprolol and Entresto    #HLD. Continue statin. Check lipids.    #Carotid stenosis s/p endarterectomy. Continue aspirin and statin.    Case d/w Dr. Romo  OUTPATIENT FOLLOW UP ON 9/8 @1pm, will sign off.

## 2023-08-24 NOTE — DISCHARGE NOTE NURSING/CASE MANAGEMENT/SOCIAL WORK - PATIENT PORTAL LINK FT
You can access the FollowMyHealth Patient Portal offered by [facility] by registering at the following website: [email or web address]. By joining [employer]’s FollowMyHealth portal, you will also be able to view your health information using other applications (apps) compatible with our system.

## 2023-08-24 NOTE — CONSULT NOTE ADULT - ASSESSMENT
89 yo M with extensive PMHX and hsitory of PAD. Vascular consulted for PAD and ulcer shin.  A dup done showed Multifocal plaque without duplex evidence of hemodynamically significant stenosis or occlusion in the arteries of the right lower extremity.    Altered hemodynamics with low velocity tardus parvus blood flow suggestive of upstream flow limiting disease, likely at the level of the  right common or external iliac artery.     P:  - Baseline CKD, Cr 1.4  - Iv hydration prior to obtain CTA of aorta with runoff of legs  - Wound care for shin wound management  - Rest of management as per primary team.    Plan discussed with Dr Pineda.

## 2023-08-24 NOTE — PROGRESS NOTE ADULT - SUBJECTIVE AND OBJECTIVE BOX
89 y/o male with a PMHx of  MI  CAD s/p CABG, Cardiac Arrest, CHF, HTN, AICD, Lung Ca s/p Resection ,, peripheral artery disease, CKD, ,present, s/p lobectomy of lung, s/p carotid endarterectomy; presenting to the ED s/p unwitnessed  fall in bathroom, noted -LOC, -head strike, -blood thinner use. Patient states he suddenly felt dizzy while he was urinating, he lost his balance and fell; he called EMS through a medical emergency device around his neck and was wedged in between the toilet for about two hours. Patient c/o of lower back pain. Patient able to bear weight but feels unsteady. Denies weakness or numbness. Patient admits to EtOH consumption tonight, approximately 1 beer prior to fall. Also endorses knee pain, neck pain.  In ED found to be afebrile, hemodynamically stable , thrombocytopenia , hyponatremia EKG, CT Chest/abd/pelvis reviewed- no acute traumatic injury, head CT neg, Ct C spine neg for acute fx     8/24 - eating. denies lightheadedness cp palps sob abdo pain    PHYSICAL EXAM:  T(F): 98.2 (24 Aug 2023 15:17), Max: 98.2 (24 Aug 2023 15:17)  HR: 77 (24 Aug 2023 15:17) (77 - 80)  BP: 104/54 (24 Aug 2023 15:17) (100/64 - 106/64)  RR: 18 (24 Aug 2023 15:17) (18 - 18)  SpO2: 98% (24 Aug 2023 15:17) (93% - 98%)/RA   GENERAL: NAD, sitting up in bed  HEAD:  Atraumatic, Normocephalic  EYES: EOMI, PERRLA, normal sclera  ENT: Moist mucous membranes  NECK: Supple, No JVD, no nuchal rigidity  CHEST/LUNG: Clear to auscultation bilaterally; No rales, rhonchi, wheezing, or rubs. Unlabored respirations  HEART: Regular rate and rhythm; No murmurs, rubs, or gallops  ABDOMEN: Bowel sounds present; Soft, Nontender, Nondistended. No hepatomegaly  EXTREMITIES:  no pitting bilaterally  NERVOUS SYSTEM:  Alert & Oriented X3, speech clear. No focal motor or sensory deficits  MSK: FROM all 4 extremities, full and equal strength  SKIN: No rashes or lesions    LABS: All Labs Reviewed:                        12.7   4.49  )-----------( 82       ( 24 Aug 2023 06:29 )             36.5     08-24    131<L>  |  101  |  21  ----------------------------<  164<H>  4.2   |  23  |  1.12    Ca    8.1<L>      24 Aug 2023 06:29    TPro  5.9<L>  /  Alb  2.5<L>  /  TBili  1.3<H>  /  DBili  x   /  AST  28  /  ALT  20  /  AlkPhos  54  08-24      Parasitemia, Blood (collected 08-24-23 @ 06:29)  Source: .Blood None  Final Report (08-24-23 @ 13:50):    Positive for Babesia species    by Giemsa Stain    Parasitemia = <1 %        RADIOLOGY/EKG:  < from: US Duplex Arterial Lower Ext Ltd, Right (08.24.23 @ 11:33) >  Multifocal plaque without duplex evidence of hemodynamically significant   stenosis or occlusion in the arteries of the right lower extremity.  Altered hemodynamicswith low velocity tardus parvus blood flow   suggestive of upstream flow limiting disease, likely at the level of the   right common or external iliac artery. Consider CTA of the aorta and   runoff for further evaluation.          MEDS  acetaminophen     Tablet .. 650 milliGRAM(s) Oral every 6 hours PRN  aluminum hydroxide/magnesium hydroxide/simethicone Suspension 30 milliLiter(s) Oral every 4 hours PRN  aspirin enteric coated 81 milliGRAM(s) Oral daily  atorvastatin 10 milliGRAM(s) Oral at bedtime  atovaquone  Suspension 750 milliGRAM(s) Oral every 12 hours  azithromycin  IVPB      azithromycin  IVPB 500 milliGRAM(s) IV Intermittent every 24 hours  melatonin 5 milliGRAM(s) Oral at bedtime  metoprolol succinate ER 37.5 milliGRAM(s) Oral daily  ondansetron Injectable 4 milliGRAM(s) IV Push every 8 hours PRN  pantoprazole    Tablet 40 milliGRAM(s) Oral before breakfast  sacubitril 24 mG/valsartan 26 mG 1 Tablet(s) Oral two times a day  sodium chloride 0.65% Nasal 1 Spray(s) Both Nostrils two times a day PRN  sodium chloride 0.9%. 1000 milliLiter(s) IV Continuous <Continuous>

## 2023-08-24 NOTE — CONSULT NOTE ADULT - SUBJECTIVE AND OBJECTIVE BOX
87 y/o male with a PMHx of MI, CAD s/p CABG, Cardiac Arrest, CHF, HTN, AICD palcement, Lung Ca s/p Resection, PAD, CKD, s/p lobectomy of lung, s/p carotid endarterectomy; initially presenting to the ED s/p unwitnessed  fall in bathroom. Patient states he suddenly felt dizzy while he was urinating, he lost his balance and fell. Vascular surgery consulted for PAD and non healing ulcer of Right shin ulcer for approximately 1 month duration. Pt mentions he has been having pain in his right leg when he is ambulating more than 10 blocks which subsequently resolves when he is resting. He describes the pain as cramping in nature.     PMH:  CAD s/p CABG, Cardiac Arrest, CHF, HTN, AICD, Lung Ca s/p Resection (reported to be in remission)  Surgical History: Left TKA, CABG, Lung mass/nodular resection    Vital Signs Last 24 Hrs  T(C): 36.8 (24 Aug 2023 15:17), Max: 36.8 (24 Aug 2023 15:17)  T(F): 98.2 (24 Aug 2023 15:17), Max: 98.2 (24 Aug 2023 15:17)  HR: 77 (24 Aug 2023 15:17) (77 - 80)  BP: 104/54 (24 Aug 2023 15:17) (100/64 - 106/64)  BP(mean): --  RR: 18 (24 Aug 2023 15:17) (18 - 18)  SpO2: 98% (24 Aug 2023 15:17) (93% - 98%)    Parameters below as of 24 Aug 2023 15:17  Patient On (Oxygen Delivery Method): room air        Labs:               12.7   4.49  )-----------( 82       ( 24 Aug 2023 06:29 )             36.5     CBC Full  -  ( 24 Aug 2023 06:29 )  WBC Count : 4.49 K/uL  RBC Count : 4.20 M/uL  Hemoglobin : 12.7 g/dL  Hematocrit : 36.5 %  Platelet Count - Automated : 82 K/uL  Mean Cell Volume : 86.9 fl  Mean Cell Hemoglobin : 30.2 pg  Mean Cell Hemoglobin Concentration : 34.8 gm/dL  Auto Neutrophil # : 2.53 K/uL  Auto Lymphocyte # : 1.05 K/uL  Auto Monocyte # : 0.82 K/uL  Auto Eosinophil # : 0.04 K/uL  Auto Basophil # : 0.02 K/uL  Auto Neutrophil % : 56.3 %  Auto Lymphocyte % : 23.4 %  Auto Monocyte % : 18.3 %  Auto Eosinophil % : 0.9 %  Auto Basophil % : 0.4 %    08-24    131<L>  |  101  |  21  ----------------------------<  164<H>  4.2   |  23  |  1.12    Ca    8.1<L>      24 Aug 2023 06:29    TPro  5.9<L>  /  Alb  2.5<L>  /  TBili  1.3<H>  /  DBili  x   /  AST  28  /  ALT  20  /  AlkPhos  54  08-24    LIVER FUNCTIONS - ( 24 Aug 2023 06:29 )  Alb: 2.5 g/dL / Pro: 5.9 gm/dL / ALK PHOS: 54 U/L / ALT: 20 U/L / AST: 28 U/L / GGT: x               Meds:  acetaminophen     Tablet .. 650 milliGRAM(s) Oral every 6 hours PRN  aluminum hydroxide/magnesium hydroxide/simethicone Suspension 30 milliLiter(s) Oral every 4 hours PRN  aspirin enteric coated 81 milliGRAM(s) Oral daily  atorvastatin 10 milliGRAM(s) Oral at bedtime  atovaquone  Suspension 750 milliGRAM(s) Oral every 12 hours  azithromycin  IVPB 500 milliGRAM(s) IV Intermittent every 24 hours  azithromycin  IVPB      melatonin 5 milliGRAM(s) Oral at bedtime  metoprolol succinate ER 37.5 milliGRAM(s) Oral daily  ondansetron Injectable 4 milliGRAM(s) IV Push every 8 hours PRN  pantoprazole    Tablet 40 milliGRAM(s) Oral before breakfast  sacubitril 24 mG/valsartan 26 mG 1 Tablet(s) Oral two times a day  sodium chloride 0.65% Nasal 1 Spray(s) Both Nostrils two times a day PRN  sodium chloride 0.9%. 1000 milliLiter(s) IV Continuous <Continuous>      Physical exam:  GCS of 15, AOx3  Airway is patent  Breathing is symmetric and unlabored  Psych: normal affect  HEENT: Normocephalic, atraumatic, EOMI  Chest: no gross rib pathology or tenderness to exam.  Respiratory: Respiratory Effort normal; no wheezes  ABD:  soft, nontender, non distended, no rebound, no guarding, no rigidity  Musculoskeletal: Pt demonstrates grossly intact sensoromotor function. Right mid shin ulcer; 1 cm diameter, mild erythema surronding. B/L dopplerable DP and PT pulses. Non palpable femoral pulse b/l.

## 2023-08-24 NOTE — INPATIENT CERTIFICATION FOR MEDICARE PATIENTS - RISKS OF ADVERSE EVENTS
Continue previous medications and care   Follow up with Ms. Cifuentes as needed  Tylenol is okay for the abdominal pain  Return for any emergency problem   Concern for delay in diagnosis and treatment

## 2023-08-24 NOTE — PROGRESS NOTE ADULT - ASSESSMENT
87 y/o male with a PMHx of  MI  CAD s/p CABG, Cardiac Arrest, CHF, HTN, AICD, Lung Ca s/p Resection ,, peripheral artery disease, CKD, ,present, s/p lobectomy of lung, s/p carotid endarterectomy; presenting to the ED s/p unwitnessed  fall in bathroom, noted -LOC, -head strike, -blood thinner use. Patient states he suddenly felt dizzy while he was urinating, he lost his balance and fell; he called EMS through a medical emergency device around his neck and was wedged in between the toilet for about two hours. Patient c/o of lower back pain. Patient able to bear weight but feels unsteady. Denies weakness or numbness. Patient admits to EtOH consumption, approximately 1 beer prior to fall. Also endorses knee pain, neck pain. In ED found to be afebrile, hemodynamically stable , thrombocytopenia , hyponatremia EKG, CT Chest/abd/pelvis reviewed- no acute traumatic injury, head CT neg, Ct C spine neg for acute fx Here noted with babesia 1.6 % parasitemia.     1. Babesiosis. Tick-borne Illness. Thrombocytopenia  - imaging reviewed, improving   - 1.6 % parasitemia c/w mild to moderate disease   - on mepron 750mg BID/azithromycin 500mg daily #3  - 10 day course  - r/o co infection - lyme screen (-), f/u ehrlichia, anaplasma pcr  - monitor temps  - tolerating abx well so far; no side effects noted  - reason for abx use and side effects reviewed with patient  - supportive care  - fu cbc    2. other issues - care per medicine

## 2023-08-24 NOTE — DISCHARGE NOTE NURSING/CASE MANAGEMENT/SOCIAL WORK - NSDCFUADDAPPT_GEN_ALL_CORE_FT
CARDIOLOGY FOLLOW UP APPOINTMENT: 9/8/23: 1PM with NIGEL Campbell at the [facility]. CARDIOLOGY FOLLOW UP APPOINTMENT: 9/20/23: 1PM with NIGEL Campbell at the [facility].

## 2023-08-24 NOTE — PROGRESS NOTE ADULT - ASSESSMENT
89 y/o male with a PMHx of  MI  CAD s/p CABG, Cardiac Arrest, CHF, HTN, AICD, Lung Ca s/p Resection ,, peripheral artery disease, CKD, ,present, s/p lobectomy of lung, s/p carotid endarterectomy; presenting to the ED s/p unwitnessed  fall in bathroom, noted -LOC, -head strike, -blood thinner use. Patient states he suddenly felt dizzy while he was urinating, he lost his balance and fell; he called EMS through a medical emergency device around his neck and was wedged in between the toilet for about two hours. Patient c/o of lower back pain. Patient able to bear weight but feels unsteady. Denies weakness or numbness. Patient admits to EtOH consumption tonight, approximately 1 beer prior to fall. Also endorses knee pain, neck pain.  In ED found to be afebrile, hemodynamically stable , thrombocytopenia , hyponatremia EKG, CT Chest/abd/pelvis reviewed- no acute traumatic injury, head CT neg, Ct C spine neg for acute fx     A/P  Syncope fall- vasovagal vs related  to beer vs  in setting of babesia infection  Ischemic Cardiomyopathy.  - On  remote tele, run of NSVT  -Continue Metoprolol. entresto resumed .  - Check echocardiogram, mod AS , EF 35-40%   - s/p ICD.check - one episode of NSVT no shock delivered   - check OSVS  -Farxiga held        Thrombocytopenia and anemia likely related to babesiosis  Blood for parasites showed Babesiosis. 1.6 % parasitemia c/w mild to moderate disease   - on mepron 750mg BID/azithromycin 500mg   10 day course  - lyme screen (-), f/u ehrlichia, anaplasma pcr  tick illness serology ordered  heme and Id eval appreciated   viral hepatitis panel  negative    liver cirrhosis /splenomegaly likely related to etoh use   does not appear decompensated    outpt GI consult     Hyponatremia due to decreased appetite   trial of IVF   Usom, FeNa    RLE ulcer present on admission r/o PAD as etiology  wound care  vascular consult  arterial doppler     Remote hx of lung cancer  remote HX of Lung CA >25 years ago, s/p lobectomy  - sees Dr. Garza and has remained under surveillance    lower back pain s/p fall  improved   PT eval    CKD stable   hx CAD/PAD/ICD/Lung CA /CHF stable  cw home meds    lovenox SC vte prophylaxis    DISPO- discharge plan once hyponatremia and if CBC stable  await Card clearance   pending vascular and wound care consult for R distal shin ulcer 87 y/o male with a PMHx of  MI  CAD s/p CABG, Cardiac Arrest, CHF, HTN, AICD, Lung Ca s/p Resection ,, peripheral artery disease, CKD, ,present, s/p lobectomy of lung, s/p carotid endarterectomy; presenting to the ED s/p unwitnessed  fall in bathroom, noted -LOC, -head strike, -blood thinner use. Patient states he suddenly felt dizzy while he was urinating, he lost his balance and fell; he called EMS through a medical emergency device around his neck and was wedged in between the toilet for about two hours. Patient c/o of lower back pain. Patient able to bear weight but feels unsteady. Denies weakness or numbness. Patient admits to EtOH consumption tonight, approximately 1 beer prior to fall. Also endorses knee pain, neck pain.  In ED found to be afebrile, hemodynamically stable , thrombocytopenia , hyponatremia EKG, CT Chest/abd/pelvis reviewed- no acute traumatic injury, head CT neg, Ct C spine neg for acute fx     A/P  Syncope fall- vasovagal vs related  to beer vs  in setting of babesia infection  Ischemic Cardiomyopathy.  - On  remote tele, run of NSVT  -Continue Metoprolol. entresto resumed .  - Check echocardiogram, mod AS , EF 35-40%   - s/p ICD.check - one episode of NSVT no shock delivered   - check OSVS  -Farxiga held      Thrombocytopenia and anemia likely related to babesiosis  Blood for parasites showed Babesiosis. 1.6 % parasitemia c/w mild to moderate disease   - on mepron 750mg BID/azithromycin 500mg   10 day course  - lyme screen (-), f/u ehrlichia, anaplasma pcr  tick illness serology ordered  heme and Id eval appreciated   viral hepatitis panel  negative    liver cirrhosis /splenomegaly likely related to etoh use   does not appear decompensated    outpt GI consult     Hyponatremia due to decreased appetite   trial of IVF   Usom, FeNa    RLE ulcer present on admission r/o PAD as etiology  wound care  vascular consult  arterial doppler     Remote hx of lung cancer  remote HX of Lung CA >25 years ago, s/p lobectomy  - sees Dr. Garza and has remained under surveillance    lower back pain s/p fall  improved   PT eval    CKD stable   hx CAD/PAD/ICD/Lung CA /CHF stable  cw home meds    VTE Px - HEP SC when platelets are greater than 50K     DISPO- discharge plan once hyponatremia and if CBC stable  await Card clearance   pending vascular and wound care consult for R distal shin ulcer

## 2023-08-24 NOTE — DISCHARGE NOTE NURSING/CASE MANAGEMENT/SOCIAL WORK - NSDCPEFALRISK_GEN_ALL_CORE
For information on Fall & Injury Prevention, visit: [email or web address] OR  [email or web address] OR  https://www.cdc.gov/steadi/patient.html

## 2023-08-24 NOTE — PROGRESS NOTE ADULT - SUBJECTIVE AND OBJECTIVE BOX
Date of service: 08-24-23 @ 10:53    pt seen and examined  feels better  no fevers  no overnight events    ROS: no fever or chills; denies dizziness, no HA, no SOB or cough, no abdominal pain, no diarrhea or constipation; no dysuria, no urinary frequency, no legs pain, no rashes      MEDICATIONS  (STANDING):  aspirin enteric coated 81 milliGRAM(s) Oral daily  atorvastatin 10 milliGRAM(s) Oral at bedtime  atovaquone  Suspension 750 milliGRAM(s) Oral every 12 hours  azithromycin  IVPB 500 milliGRAM(s) IV Intermittent every 24 hours  azithromycin  IVPB      melatonin 5 milliGRAM(s) Oral at bedtime  metoprolol succinate ER 37.5 milliGRAM(s) Oral daily  pantoprazole    Tablet 40 milliGRAM(s) Oral before breakfast  sacubitril 24 mG/valsartan 26 mG 1 Tablet(s) Oral two times a day  sodium chloride 0.9%. 1000 milliLiter(s) (75 mL/Hr) IV Continuous <Continuous>    Vital Signs Last 24 Hrs  T(C): 36.4 (24 Aug 2023 07:54), Max: 37.1 (23 Aug 2023 15:10)  T(F): 97.5 (24 Aug 2023 07:54), Max: 98.7 (23 Aug 2023 15:10)  HR: 80 (24 Aug 2023 07:54) (78 - 91)  BP: 106/64 (24 Aug 2023 07:54) (98/67 - 106/64)  BP(mean): --  RR: 18 (24 Aug 2023 07:54) (18 - 18)  SpO2: 95% (24 Aug 2023 07:54) (93% - 97%)    Parameters below as of 24 Aug 2023 07:54  Patient On (Oxygen Delivery Method): room air        PE:  Constitutional: frail looking  HEENT: NC/AT, EOMI, PERRLA, conjunctivae clear; ears and nose atraumatic; pharynx benign  Neck: supple; thyroid not palpable  Back: no tenderness  Respiratory: respiratory effort normal; clear to auscultation  Cardiovascular: S1S2 regular, no murmurs  Abdomen: soft, not tender, not distended, positive BS; liver and spleen WNL  Genitourinary: no suprapubic tenderness  Lymphatic: no LN palpable  Musculoskeletal: no muscle tenderness, no joint swelling or tenderness  Extremities: no pedal edema  Neurological/ Psychiatric:   moving all extremities  Skin: no rashes; no palpable lesions    Labs: all available labs reviewed                                              12.7   4.49  )-----------( 82       ( 24 Aug 2023 06:29 )             36.5     08-24    131<L>  |  101  |  21  ----------------------------<  164<H>  4.2   |  23  |  1.12    Ca    8.1<L>      24 Aug 2023 06:29    TPro  5.9<L>  /  Alb  2.5<L>  /  TBili  1.3<H>  /  DBili  x   /  AST  28  /  ALT  20  /  AlkPhos  54  08-24        Urinalysis Basic - ( 22 Aug 2023 09:28 )    Color: x / Appearance: x / SG: x / pH: x  Gluc: 170 mg/dL / Ketone: x  / Bili: x / Urobili: x   Blood: x / Protein: x / Nitrite: x   Leuk Esterase: x / RBC: x / WBC x   Sq Epi: x / Non Sq Epi: x / Bacteria: x        Culture Results:   Positive for Babesia species  by Giemsa Stain  Parasitemia = 1.6 %  ************************************************************  NEGATIVE for Plasmodium antigens. Microscopy is performed for  confirmation.  The Malaria Rapid antigen test does not detect the  presence of Babesia species. If Babesiosis is suspected  please order test Babesia PCR: Babesia microti PCR Bld    ************************************************************ (08-22 @ 09:14)  Culture Results:   Positive for Babesia species  by Giemsa Stain  Parasitemia = 1.7 %  ************************************************************  NEGATIVE for Plasmodium antigens. Microscopy is performed for  confirmation.  The Malaria Rapid antigen test does not detect the  presence of Babesia species. If Babesiosis is suspected  please order test Babesia PCR: Babesia microti PCR Bld  ************************************************************ (08-21 @ 21:09)    Radiology: all available radiological tests reviewed  < from: CT Abdomen and Pelvis w/ IV Cont (08.21.23 @ 22:46) >  ACC: [id number] EXAM:  CT ABDOMEN AND PELVIS IC   ORDERED BY: JULIO PEÑA     PROCEDURE DATE:  08/21/2023          INTERPRETATION:  PROCEDURE INFORMATION:  Exam: CT Chest With Contrast; Diagnostic  Exam date and time: 8/21/2023 10:30 PM  Age: 88 years old  Clinical indication: Fall    TECHNIQUE:  Imaging protocol: Diagnostic computed tomography of the chest, abdomen   and pelvis with contrast. Arterial phase imaging is obtained of the chest   and abdomen and venous phase imaging of the abdomen and pelvis. Coronal   and sagittal reformats were obtained.    Contrast material: IV: OMNIPAQUE 350; Contrast volume: 90 ml; Contrast   route: IV;    COMPARISON:  CR XR CHEST IMMEDIATE 12/2/2021 12:09 PM CT chest 11/3/2015    FINDINGS:  Tubes, catheters and devices: Cardiac device present  in the left chest   wall.    LUNGS: Subtle reticular opacities in the right and left lower lobe and   base of left upper lobe. Postsurgical changes and suture material from   probable right upper lobectomy. No consolidative opacity. Mild   atelectatic changes in dependent position. No  pulmonary contusion.  PLEURAL SPACES: No pneumothorax or hemothorax. Previously seen pleural   effusions are not visualized.  HEART: The heart is within normal limits of size. No pericardial   effusion. Aortic valve calcifications. Coronary artery calcifications,   mitral annular valve calcifications. Dual-chamber pacer leads.  MEDIASTINUM: Borderline left paratracheal lymph node measures 0.9 cm, no   lymphadenopathy. Right upper pretracheal lymph node measures 0.9 cm,   stable since 2015, image 2-8.  VESSELS: Severe atherosclerosis of the aorta. No aneurysm or acute aortic   syndrome. No traumatic aortic injury. No mediastinal hematoma,   pneumomediastinum,or hemopericardium.  BONES/JOINTS: Questionable contour irregularity at the 4th and 5th left   anterior rib which was not seen on prior study and may represent a non   displaced rib fracture or deformity from remote impact. No vertebral   fracture orcompression deformity.  Sternotomy.  SOFT TISSUES: Left prepectoral pacer device.    ABDOMEN:  LIVER: No hematoma or focal lesion multiple calcified granuloma. No   laceration or contusion.  GALLBLADDER AND BILE DUCTS: Cholelithiasis. No cholecystitis or biliary   ductal dilatation.  PANCREAS: Homogeneous enhancement, within normal limits.  SPLEEN: Splenomegaly. Spleen measures 15.6 cm. Multiple calcified   granulomata visualized.  ADRENAL GLANDS: Within normal limits. No masses.  KIDNEYS AND URETERS: No hydronephrosis, no perinephric hematoma,   symmetric enhancement. Non characterized subcentimeter hypodensities.    BLADDER: Within normal limits.  REPRODUCTIVE: Prostate with large coarse calcifications, mildly enlarged.    STOMACH AND BOWEL: Colonic diverticulosis. No diverticulitis. No bowel   wall thickening or intestinal obstruction.  APPENDIX: Normal appendix.    PERITONEUM: No hemoperitoneum, no pneumoperitoneum, no retroperitoneal   hematoma.  VESSELS: Severe atherosclerosis, no abdominal aortic aneurysm. Large   calcific plaques at the origin of the SMA and celiac axis.  LYMPH NODES: No lymphadenopathy  ABDOMINAL WALL: No obvious soft tissue hematoma.  BONES/JOINTS: No acute fractures of the bony pelvis or proximal femora.   No acute fractures of the spine. Multilevel discogenic degenerative   disease.    IMPRESSION:  No acute posttraumatic injury in the chest. Postsurgical changes from   lobectomy. Reticular opacities in bilateral lower lobes may be scarring.   Interstitial lung disease can have similar appearance. No pneumothorax.    No acute posttraumatic organ injury in the abdomen or pelvis. No   retroperitoneal or intraperitoneal hematoma. No acute fractures of the   spine, bony pelvis or proximal femora.    Questionable contour irregularity of the fourth and fifth left anterior   rib which may relate to nondisplaced rib fracture versus sequela from   remote impact, correlate clinically.    Other chronic findings as above.    --- End of Report ---    < end of copied text >    Advanced directives addressed: full resuscitation

## 2023-08-24 NOTE — DISCHARGE NOTE NURSING/CASE MANAGEMENT/SOCIAL WORK - NSDCVIVACCINE_GEN_ALL_CORE_FT
Tdap; 07-Jun-2021 21:51; Dylan Monahan (WOO); [employer]; [id number] (Exp. Date: 18-Nov-2022); IntraMuscular; Deltoid Right.; 0.5 milliLiter(s); VIS (VIS Published: 09-May-2013, VIS Presented: 07-Jun-2021);

## 2023-08-24 NOTE — PROGRESS NOTE ADULT - SUBJECTIVE AND OBJECTIVE BOX
CHIEF COMPLAINT: Patient is a 88y old  Male who presents with a chief complaint of fall/syncope (22 Aug 2023 09:24)    FROM H&P: 87 y/o male with a PMHx of  MI  CAD s/p CABG, Cardiac Arrest, CHF, HTN, AICD, Lung Ca s/p Resection ,, peripheral artery disease, CKD, ,present, s/p lobectomy of lung, s/p carotid endarterectomy; presenting to the ED s/p unwitnessed  fall in bathroom, noted -LOC, -head strike, -blood thinner use. Patient states he suddenly felt dizzy while he was urinating, he lost his balance and fell; he called EMS through a medical emergency device around his neck and was wedged in between the toilet for about two hours. Patient c/o of lower back pain. Patient able to bear weight but feels unsteady. Denies weakness or numbness. Patient admits to EtOH consumption tonight, approximately 1 beer prior to fall. Also endorses knee pain, neck pain.    In ED found to be afebrile, hemodynamically stable , thrombocytopenia , hyponatremia EKG, CT Chest/abd/pelvis reviewed- no acute traumatic injury, head CT neg, Ct C spine neg for acute fx     PMH:  CAD s/p CABG, Cardiac Arrest, CHF, HTN, AICD, Lung Ca s/p Resection (reported to be in remission)  Surgical History: Left TKA, CABG, Lung mass/nodular resection  Family History; Mother  in 40s from breast ca. Father  age 80s from HD  Social History: Former TObacco/smoking. Quit 20 years ago. Former 40 PPD smoker. Daily 1-2 beers. Denies illicit drug use.     . Patient reported dizziness prior to syncopal event  Occ with SOB.    . Feeling better, +babesia PCR    : Feeling better, dizziness improved.    MEDICATIONS:  MEDICATIONS  (STANDING):  aspirin enteric coated 81 milliGRAM(s) Oral daily  atorvastatin 10 milliGRAM(s) Oral at bedtime  atovaquone  Suspension 750 milliGRAM(s) Oral every 12 hours  azithromycin  IVPB      azithromycin  IVPB 500 milliGRAM(s) IV Intermittent every 24 hours  melatonin 5 milliGRAM(s) Oral at bedtime  metoprolol succinate ER 37.5 milliGRAM(s) Oral daily  pantoprazole    Tablet 40 milliGRAM(s) Oral before breakfast  sacubitril 24 mG/valsartan 26 mG 1 Tablet(s) Oral two times a day    MEDICATIONS  (PRN):  acetaminophen     Tablet .. 650 milliGRAM(s) Oral every 6 hours PRN Mild Pain (1 - 3), Moderate Pain (4 - 6), Severe Pain (7 - 10)  aluminum hydroxide/magnesium hydroxide/simethicone Suspension 30 milliLiter(s) Oral every 4 hours PRN Dyspepsia  ondansetron Injectable 4 milliGRAM(s) IV Push every 8 hours PRN Nausea and/or Vomiting      HOME MEDICATIONS:  Ecotrin Adult Low Strength 81 mg oral delayed release tablet: 1 tab(s) orally once a day (22 Aug 2023 09:00)  Entresto 24 mg-26 mg oral tablet: 1 tab(s) orally 2 times a day (22 Aug 2023 09:01)  Farxiga 10 mg oral tablet: 1 tab(s) orally once a day (22 Aug 2023 09:01)  melatonin 5 mg oral tablet: 1 tab(s) orally once a day (at bedtime) (22 Aug 2023 09:00)  metoprolol succinate 25 mg oral tablet, extended release: 1.5 tab(s) orally once a day (at bedtime) (22 Aug 2023 09:04)  pantoprazole 40 mg oral delayed release tablet: 1 tab(s) orally once a day (22 Aug 2023 09:00)  pravastatin 40 mg oral tablet: 1 tab(s) orally once a day (at bedtime) (22 Aug 2023 09:00)    PHYSICAL EXAM:  T(C): 36.4 (24 Aug 2023 07:54), Max: 37.1 (23 Aug 2023 15:10)  T(F): 97.5 (24 Aug 2023 07:54), Max: 98.7 (23 Aug 2023 15:10)  HR: 80 (24 Aug 2023 07:54) (78 - 91)  BP: 106/64 (24 Aug 2023 07:54) (98/67 - 106/64)  RR: 18 (24 Aug 2023 07:54) (18 - 18)  SpO2: 95% (24 Aug 2023 07:54) (93% - 97%)    Parameters below as of 24 Aug 2023 07:54  Patient On (Oxygen Delivery Method): room air    Constitutional: NAD, awake and alert  HEENT: PERR, EOMI, Normal Hearing, MMM  Neck: Soft and supple, No LAD, No JVD  Respiratory: Breath sounds are clear bilaterally, No wheezing, rales or rhonchi  Cardiovascular: S1 and S2, regular rate and rhythm, no Murmurs, gallops or rubs  Gastrointestinal: Bowel Sounds present, soft, nontender, nondistended, no guarding, no rebound  Extremities: No peripheral edema  Vascular: 2+ peripheral pulses  Neurological: A/O x 3, no focal deficits  Musculoskeletal: 5/5 strength b/l upper and lower extremities  Skin: No rashes    =======================================    INTERPRETATION OF TELEMETRY: Paced, run NSVT    ECG: Ordered, pending    ========================================    LABS:               12.7   4.49  )-----------( 82       ( 24 Aug 2023 06:29 )             36.5     08-24    131<L>  |  101  |  21  ----------------------------<  164<H>  4.2   |  23  |  1.12    Ca    8.1<L>      24 Aug 2023 06:29    TPro  5.9<L>  /  Alb  2.5<L>  /  TBili  1.3<H>  /  DBili  x   /  AST  28  /  ALT  20  /  AlkPhos  54  -    Troponin: 34.48 ng/L, Troponin: 52.41 ng/L    CARDIAC TESTING:    < from: TTE Echo Complete w/ Contrast w/ Doppler (23 @ 11:56) >   There is calcification of mitral valve leaflets. The leaflet opening is   restricted.   Mild (1+) mitral regurgitation is present.   EA reversal of the mitral inflow consistent with reduced compliance of   the   left ventricle.   The aortic valve appears severely calcified. Valve opening seems to be   restricted.   Transaortic gradients are underestimated due to impaired left ventricle   systolic function.   KACIE by continuity equation and dimensionless index (.29) suggest at least   moderate aortic stenosis.   Left ventricle systolic function appears impaired in the presence of a   cardiac arrhythmia. Left ventricle size and structure are within normal   limitations.   Definity was used to better visualizethe endocardial border. septal ,   apical wall hypokinesis   Estimated left ventricular ejection fraction is 40 %.   Normal appearing right ventricle structure and function.    < end of copied text >      RADIOLOGY & ADDITIONAL STUDIES:    CT Head No Cont (23 @ 20:30) >  Head CT: No CT evidence of acute intracranial hemorrhage.    C-spine CT:  No acute fracture.    CT Chest, Abdomen and Pelvis w/ IV Cont (23 @ 22:46) >  No acute posttraumatic injury in the chest. Postsurgical changes from   lobectomy. Reticular opacities in bilateral lower lobes may be scarring.   Interstitial lung disease can have similar appearance. No pneumothorax.    No acute posttraumatic organ injury in the abdomen or pelvis. No   retroperitoneal or intraperitoneal hematoma. No acute fractures of the   spine, bony pelvis or proximal femora.    Questionable contour irregularity of the fourth and fifth left anterior   rib which may relate to nondisplaced rib fracture versus sequela from   remote impact, correlate clinically.    Other chronic findings as above.

## 2023-08-25 LAB
A PHAGOCYTOPH DNA BLD QL NAA+PROBE: NEGATIVE — SIGNIFICANT CHANGE UP
ANION GAP SERPL CALC-SCNC: 6 MMOL/L — SIGNIFICANT CHANGE UP (ref 5–17)
BUN SERPL-MCNC: 20 MG/DL — SIGNIFICANT CHANGE UP (ref 7–23)
CALCIUM SERPL-MCNC: 8.4 MG/DL — LOW (ref 8.5–10.1)
CHLORIDE SERPL-SCNC: 100 MMOL/L — SIGNIFICANT CHANGE UP (ref 96–108)
CO2 SERPL-SCNC: 23 MMOL/L — SIGNIFICANT CHANGE UP (ref 22–31)
CREAT SERPL-MCNC: 1.09 MG/DL — SIGNIFICANT CHANGE UP (ref 0.5–1.3)
E CHAFFEENSIS DNA BLD QL NAA+PROBE: NEGATIVE — SIGNIFICANT CHANGE UP
E EWINGII DNA SPEC QL NAA+PROBE: NEGATIVE — SIGNIFICANT CHANGE UP
EGFR: 65 ML/MIN/1.73M2 — SIGNIFICANT CHANGE UP
EHRLICHIA DNA SPEC QL NAA+PROBE: NEGATIVE — SIGNIFICANT CHANGE UP
GLUCOSE SERPL-MCNC: 148 MG/DL — HIGH (ref 70–99)
HCT VFR BLD CALC: 36.2 % — LOW (ref 39–50)
HGB BLD-MCNC: 12.7 G/DL — LOW (ref 13–17)
MAGNESIUM SERPL-MCNC: 1.7 MG/DL — SIGNIFICANT CHANGE UP (ref 1.6–2.6)
MCHC RBC-ENTMCNC: 30.6 PG — SIGNIFICANT CHANGE UP (ref 27–34)
MCHC RBC-ENTMCNC: 35.1 GM/DL — SIGNIFICANT CHANGE UP (ref 32–36)
MCV RBC AUTO: 87.2 FL — SIGNIFICANT CHANGE UP (ref 80–100)
PHOSPHATE SERPL-MCNC: 2.3 MG/DL — LOW (ref 2.5–4.5)
PLATELET # BLD AUTO: 100 K/UL — LOW (ref 150–400)
POTASSIUM SERPL-MCNC: 4.5 MMOL/L — SIGNIFICANT CHANGE UP (ref 3.5–5.3)
POTASSIUM SERPL-SCNC: 4.5 MMOL/L — SIGNIFICANT CHANGE UP (ref 3.5–5.3)
RBC # BLD: 4.15 M/UL — LOW (ref 4.2–5.8)
RBC # FLD: 17.1 % — HIGH (ref 10.3–14.5)
SODIUM SERPL-SCNC: 129 MMOL/L — LOW (ref 135–145)
WBC # BLD: 5.16 K/UL — SIGNIFICANT CHANGE UP (ref 3.8–10.5)
WBC # FLD AUTO: 5.16 K/UL — SIGNIFICANT CHANGE UP (ref 3.8–10.5)

## 2023-08-25 PROCEDURE — 75635 CT ANGIO ABDOMINAL ARTERIES: CPT | Mod: 26

## 2023-08-25 PROCEDURE — 99232 SBSQ HOSP IP/OBS MODERATE 35: CPT

## 2023-08-25 RX ADMIN — ATORVASTATIN CALCIUM 10 MILLIGRAM(S): 80 TABLET, FILM COATED ORAL at 21:03

## 2023-08-25 RX ADMIN — HEPARIN SODIUM 5000 UNIT(S): 5000 INJECTION INTRAVENOUS; SUBCUTANEOUS at 16:50

## 2023-08-25 RX ADMIN — Medication 81 MILLIGRAM(S): at 10:44

## 2023-08-25 RX ADMIN — SACUBITRIL AND VALSARTAN 1 TABLET(S): 24; 26 TABLET, FILM COATED ORAL at 10:50

## 2023-08-25 RX ADMIN — ATOVAQUONE 750 MILLIGRAM(S): 750 SUSPENSION ORAL at 10:50

## 2023-08-25 RX ADMIN — PANTOPRAZOLE SODIUM 40 MILLIGRAM(S): 20 TABLET, DELAYED RELEASE ORAL at 10:45

## 2023-08-25 RX ADMIN — HEPARIN SODIUM 5000 UNIT(S): 5000 INJECTION INTRAVENOUS; SUBCUTANEOUS at 21:03

## 2023-08-25 RX ADMIN — AZITHROMYCIN 255 MILLIGRAM(S): 500 TABLET, FILM COATED ORAL at 10:44

## 2023-08-25 RX ADMIN — HEPARIN SODIUM 5000 UNIT(S): 5000 INJECTION INTRAVENOUS; SUBCUTANEOUS at 06:35

## 2023-08-25 RX ADMIN — ATOVAQUONE 750 MILLIGRAM(S): 750 SUSPENSION ORAL at 21:03

## 2023-08-25 RX ADMIN — Medication 5 MILLIGRAM(S): at 21:03

## 2023-08-25 RX ADMIN — SACUBITRIL AND VALSARTAN 1 TABLET(S): 24; 26 TABLET, FILM COATED ORAL at 21:03

## 2023-08-25 NOTE — CHART NOTE - NSCHARTNOTEFT_GEN_A_CORE
Pt CTa reviwed, planning for femoral endarterectomy 8/30/23      P:  pls provide Medical clearance  Cardiology clearance  rest as per primary team

## 2023-08-25 NOTE — PROGRESS NOTE ADULT - SUBJECTIVE AND OBJECTIVE BOX
87 y/o male with a PMHx of  MI  CAD s/p CABG, Cardiac Arrest, CHF, HTN, AICD, Lung Ca s/p Resection ,, peripheral artery disease, CKD, ,present, s/p lobectomy of lung, s/p carotid endarterectomy; presenting to the ED s/p unwitnessed  fall in bathroom, noted -LOC, -head strike, -blood thinner use. Patient states he suddenly felt dizzy while he was urinating, he lost his balance and fell; he called EMS through a medical emergency device around his neck and was wedged in between the toilet for about two hours. Patient c/o of lower back pain.    In ED found to be afebrile, hemodynamically stable , thrombocytopenia , hyponatremia EKG, CT Chest/abd/pelvis reviewed- no acute traumatic injury, head CT neg, Ct C spine neg for acute fx       PHYSICAL EXAM:    Vital Signs Last 24 Hrs  T(C): 36.5 (25 Aug 2023 17:00), Max: 37 (24 Aug 2023 22:51)  T(F): 97.7 (25 Aug 2023 17:00), Max: 98.6 (24 Aug 2023 22:51)  HR: 73 (25 Aug 2023 17:00) (73 - 75)  BP: 109/80 (25 Aug 2023 17:00) (92/71 - 109/80)  BP(mean): 91 (25 Aug 2023 17:00) (75 - 91)  RR: 18 (25 Aug 2023 17:00) (18 - 18)  SpO2: 96% (25 Aug 2023 17:00) (92% - 96%)    Parameters below as of 25 Aug 2023 17:00  Patient On (Oxygen Delivery Method): room air            GENERAL: NAD, sitting up in bed  HEAD:  Atraumatic, Normocephalic  EYES: EOMI, PERRLA, normal sclera  ENT: Moist mucous membranes  NECK: Supple, No JVD, no nuchal rigidity  CHEST/LUNG: Clear to auscultation bilaterally; No rales, rhonchi, wheezing, or rubs. Unlabored respirations  HEART: Regular rate and rhythm; No murmurs, rubs, or gallops  ABDOMEN: Bowel sounds present; Soft, Nontender, Nondistended. No hepatomegaly  EXTREMITIES:  no pitting bilaterally  NERVOUS SYSTEM:  Alert, follows command   SKIN: No rashes or lesions                           12.7   5.16  )-----------( 100      ( 25 Aug 2023 07:09 )             36.2     08-25    129<L>  |  100  |  20  ----------------------------<  148<H>  4.5   |  23  |  1.09    Ca    8.4<L>      25 Aug 2023 07:09  Phos  2.3     08-25  Mg     1.7     08-25    TPro  5.9<L>  /  Alb  2.5<L>  /  TBili  1.3<H>  /  DBili  x   /  AST  28  /  ALT  20  /  AlkPhos  54  08-24        CAPILLARY BLOOD GLUCOSE          Urinalysis Basic - ( 25 Aug 2023 07:09 )    Color: x / Appearance: x / SG: x / pH: x  Gluc: 148 mg/dL / Ketone: x  / Bili: x / Urobili: x   Blood: x / Protein: x / Nitrite: x   Leuk Esterase: x / RBC: x / WBC x   Sq Epi: x / Non Sq Epi: x / Bacteria: x        Parasitemia, Blood (collected 24 Aug 2023 06:29)  Source: .Blood None  Final Report (24 Aug 2023 13:50):    Positive for Babesia species    by Giemsa Stain    Parasitemia = <1 %

## 2023-08-25 NOTE — PROGRESS NOTE ADULT - ASSESSMENT
89 yo M with extensive PMHX and hsitory of PAD. Vascular consulted for PAD and ulcer shin.  A dup done showed Multifocal plaque without duplex evidence of hemodynamically significant stenosis or occlusion in the arteries of the right lower extremity. Altered hemodynamics with low velocity tardus parvus blood flow suggestive of upstream flow limiting disease, likely at the level of the  right common or external iliac artery.   OLIVIA    P:  - cont Iv hydration   - pls obtain CTA of aorta with runoff of legs  - Wound care for shin wound management  - Rest of management as per primary team.    Plan discussed with Dr Pineda.

## 2023-08-25 NOTE — PROGRESS NOTE ADULT - SUBJECTIVE AND OBJECTIVE BOX
SURGERY DAILY PROGRESS NOTE:     Subjective:  Patient seen and examined at bedside during am rounds. AVSS. Denies any fevers, chills, n/v/d, chest pain or shortness of breath    Objective:    MEDICATIONS  (STANDING):  aspirin enteric coated 81 milliGRAM(s) Oral daily  atorvastatin 10 milliGRAM(s) Oral at bedtime  atovaquone  Suspension 750 milliGRAM(s) Oral every 12 hours  azithromycin  IVPB      azithromycin  IVPB 500 milliGRAM(s) IV Intermittent every 24 hours  heparin   Injectable 5000 Unit(s) SubCutaneous every 8 hours  melatonin 5 milliGRAM(s) Oral at bedtime  metoprolol succinate ER 37.5 milliGRAM(s) Oral daily  pantoprazole    Tablet 40 milliGRAM(s) Oral before breakfast  sacubitril 24 mG/valsartan 26 mG 1 Tablet(s) Oral two times a day  sodium chloride 0.9%. 1000 milliLiter(s) (75 mL/Hr) IV Continuous <Continuous>    MEDICATIONS  (PRN):  acetaminophen     Tablet .. 650 milliGRAM(s) Oral every 6 hours PRN Mild Pain (1 - 3), Moderate Pain (4 - 6), Severe Pain (7 - 10)  aluminum hydroxide/magnesium hydroxide/simethicone Suspension 30 milliLiter(s) Oral every 4 hours PRN Dyspepsia  ondansetron Injectable 4 milliGRAM(s) IV Push every 8 hours PRN Nausea and/or Vomiting  sodium chloride 0.65% Nasal 1 Spray(s) Both Nostrils two times a day PRN Nasal Congestion      Vital Signs Last 24 Hrs  T(C): 37 (24 Aug 2023 22:51), Max: 37 (24 Aug 2023 22:51)  T(F): 98.6 (24 Aug 2023 22:51), Max: 98.6 (24 Aug 2023 22:51)  HR: 74 (24 Aug 2023 22:51) (74 - 80)  BP: 92/71 (24 Aug 2023 22:51) (92/71 - 106/64)  BP(mean): --  RR: 18 (24 Aug 2023 22:51) (18 - 18)  SpO2: 94% (24 Aug 2023 22:51) (94% - 98%)    Parameters below as of 24 Aug 2023 15:17  Patient On (Oxygen Delivery Method): room air          PHYSICAL EXAM   Gen: well-appearing, in no acute distress  CV: pulse regularly present   Resp: airway patent, non-labored breathing  Respiratory: Respiratory Effort normal; no wheezes  ABD:  soft, nontender, non distended, no rebound, no guarding, no rigidity  Extremity: Pt demonstrates grossly intact sensoromotor function. Right mid shin ulcer; 1 cm diameter, mild erythema surronding. B/L dopplerable DP and PT pulses. Non palpable femoral pulse b/l.        I&O's Detail    23 Aug 2023 07:01  -  24 Aug 2023 07:00  --------------------------------------------------------  IN:  Total IN: 0 mL    OUT:    Voided (mL): 700 mL  Total OUT: 700 mL    Total NET: -700 mL      24 Aug 2023 07:01  -  25 Aug 2023 06:46  --------------------------------------------------------  IN:  Total IN: 0 mL    OUT:    Voided (mL): 550 mL  Total OUT: 550 mL    Total NET: -550 mL          Daily     Daily     LABS:                        12.7   4.49  )-----------( 82       ( 24 Aug 2023 06:29 )             36.5     08-24    131<L>  |  101  |  21  ----------------------------<  164<H>  4.2   |  23  |  1.12    Ca    8.1<L>      24 Aug 2023 06:29    TPro  5.9<L>  /  Alb  2.5<L>  /  TBili  1.3<H>  /  DBili  x   /  AST  28  /  ALT  20  /  AlkPhos  54  08-24      Urinalysis Basic - ( 24 Aug 2023 06:29 )    Color: x / Appearance: x / SG: x / pH: x  Gluc: 164 mg/dL / Ketone: x  / Bili: x / Urobili: x   Blood: x / Protein: x / Nitrite: x   Leuk Esterase: x / RBC: x / WBC x   Sq Epi: x / Non Sq Epi: x / Bacteria: x

## 2023-08-25 NOTE — PROGRESS NOTE ADULT - ASSESSMENT
A/P  Syncope fall- vasovagal vs related  to beer vs  in setting of babesia infection  Ischemic Cardiomyopathy.  - On  remote tele, run of NSVT  -Continue Metoprolol. 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 1.6 % parasitemia c/w mild to moderate disease   - on mepron 750mg BID/azithromycin 500mg   10 day course  - lyme screen (-), f/u ehrlichia, anaplasma pcr  tick illness serology ordered  heme and Id eval appreciated   viral hepatitis panel  negative    liver cirrhosis /splenomegaly likely related to etoh use   does not appear decompensated    outpt GI consult     Hyponatremia due to decreased appetite   trial of IVF   Usom, FeNa    RLE ulcer present on admission r/o PAD as etiology  wound care  arterial doppler     Remote hx of lung cancer  remote HX of Lung CA >25 years ago, s/p lobectomy  - sees Dr. Garza and has remained under surveillance    lower back pain s/p fall  improved   PT eval    CKD stable   hx CAD/PAD/ICD/Lung CA /CHF stable  cw home meds    VTE Px -    DISPO- discharge plan once hyponatremia and if CBC stable  await Card clearance   pending vascular and wound care consult for R distal shin ulcer

## 2023-08-26 LAB
ANION GAP SERPL CALC-SCNC: 6 MMOL/L — SIGNIFICANT CHANGE UP (ref 5–17)
BUN SERPL-MCNC: 18 MG/DL — SIGNIFICANT CHANGE UP (ref 7–23)
CALCIUM SERPL-MCNC: 9.1 MG/DL — SIGNIFICANT CHANGE UP (ref 8.5–10.1)
CHLORIDE SERPL-SCNC: 101 MMOL/L — SIGNIFICANT CHANGE UP (ref 96–108)
CO2 SERPL-SCNC: 25 MMOL/L — SIGNIFICANT CHANGE UP (ref 22–31)
CREAT SERPL-MCNC: 1.18 MG/DL — SIGNIFICANT CHANGE UP (ref 0.5–1.3)
EGFR: 59 ML/MIN/1.73M2 — LOW
GLUCOSE SERPL-MCNC: 150 MG/DL — HIGH (ref 70–99)
HCT VFR BLD CALC: 40.8 % — SIGNIFICANT CHANGE UP (ref 39–50)
HGB BLD-MCNC: 14.1 G/DL — SIGNIFICANT CHANGE UP (ref 13–17)
MAGNESIUM SERPL-MCNC: 1.8 MG/DL — SIGNIFICANT CHANGE UP (ref 1.6–2.6)
MCHC RBC-ENTMCNC: 30.5 PG — SIGNIFICANT CHANGE UP (ref 27–34)
MCHC RBC-ENTMCNC: 34.6 GM/DL — SIGNIFICANT CHANGE UP (ref 32–36)
MCV RBC AUTO: 88.3 FL — SIGNIFICANT CHANGE UP (ref 80–100)
PHOSPHATE SERPL-MCNC: 2.6 MG/DL — SIGNIFICANT CHANGE UP (ref 2.5–4.5)
PLATELET # BLD AUTO: 135 K/UL — LOW (ref 150–400)
POTASSIUM SERPL-MCNC: 4.7 MMOL/L — SIGNIFICANT CHANGE UP (ref 3.5–5.3)
POTASSIUM SERPL-SCNC: 4.7 MMOL/L — SIGNIFICANT CHANGE UP (ref 3.5–5.3)
RBC # BLD: 4.62 M/UL — SIGNIFICANT CHANGE UP (ref 4.2–5.8)
RBC # FLD: 17.2 % — HIGH (ref 10.3–14.5)
SODIUM SERPL-SCNC: 132 MMOL/L — LOW (ref 135–145)
WBC # BLD: 5.33 K/UL — SIGNIFICANT CHANGE UP (ref 3.8–10.5)
WBC # FLD AUTO: 5.33 K/UL — SIGNIFICANT CHANGE UP (ref 3.8–10.5)

## 2023-08-26 PROCEDURE — 99232 SBSQ HOSP IP/OBS MODERATE 35: CPT | Mod: GC

## 2023-08-26 PROCEDURE — 99233 SBSQ HOSP IP/OBS HIGH 50: CPT

## 2023-08-26 RX ADMIN — PANTOPRAZOLE SODIUM 40 MILLIGRAM(S): 20 TABLET, DELAYED RELEASE ORAL at 08:57

## 2023-08-26 RX ADMIN — HEPARIN SODIUM 5000 UNIT(S): 5000 INJECTION INTRAVENOUS; SUBCUTANEOUS at 14:27

## 2023-08-26 RX ADMIN — HEPARIN SODIUM 5000 UNIT(S): 5000 INJECTION INTRAVENOUS; SUBCUTANEOUS at 05:27

## 2023-08-26 RX ADMIN — Medication 650 MILLIGRAM(S): at 21:22

## 2023-08-26 RX ADMIN — Medication 81 MILLIGRAM(S): at 08:57

## 2023-08-26 RX ADMIN — Medication 5 MILLIGRAM(S): at 21:22

## 2023-08-26 RX ADMIN — HEPARIN SODIUM 5000 UNIT(S): 5000 INJECTION INTRAVENOUS; SUBCUTANEOUS at 21:22

## 2023-08-26 RX ADMIN — Medication 1 SPRAY(S): at 21:20

## 2023-08-26 RX ADMIN — Medication 650 MILLIGRAM(S): at 09:40

## 2023-08-26 RX ADMIN — Medication 650 MILLIGRAM(S): at 21:55

## 2023-08-26 RX ADMIN — ATORVASTATIN CALCIUM 10 MILLIGRAM(S): 80 TABLET, FILM COATED ORAL at 21:24

## 2023-08-26 RX ADMIN — Medication 37.5 MILLIGRAM(S): at 08:57

## 2023-08-26 RX ADMIN — Medication 650 MILLIGRAM(S): at 08:57

## 2023-08-26 RX ADMIN — ATOVAQUONE 750 MILLIGRAM(S): 750 SUSPENSION ORAL at 21:21

## 2023-08-26 RX ADMIN — AZITHROMYCIN 255 MILLIGRAM(S): 500 TABLET, FILM COATED ORAL at 12:17

## 2023-08-26 RX ADMIN — ATOVAQUONE 750 MILLIGRAM(S): 750 SUSPENSION ORAL at 08:58

## 2023-08-26 RX ADMIN — SACUBITRIL AND VALSARTAN 1 TABLET(S): 24; 26 TABLET, FILM COATED ORAL at 21:21

## 2023-08-26 NOTE — CONSULT NOTE ADULT - SUBJECTIVE AND OBJECTIVE BOX
Patient is a 88y old  Male who presents with a chief complaint of fall/syncope (26 Aug 2023 05:22)      HPI:  87 y/o male with a PMHx of  MI  CAD s/p CABG, Cardiac Arrest, CHF, HTN, AICD, Lung Ca s/p Resection ,, peripheral artery disease, CKD, ,present, s/p lobectomy of lung, s/p carotid endarterectomy; presenting to the ED s/p unwitnessed  fall in bathroom, noted -LOC, -head strike, -blood thinner use. Patient states he suddenly felt dizzy while he was urinating, he lost his balance and fell; he called EMS through a medical emergency device around his neck and was wedged in between the toilet for about two hours. Patient c/o of lower back pain. Patient able to bear weight but feels unsteady. Denies weakness or numbness. Patient admits to EtOH consumption tonight, approximately 1 beer prior to fall. Also endorses knee pain, neck pain.    In ED found to be afebrile, hemodynamically stable , thrombocytopenia , hyponatremia EKG, CT Chest/abd/pelvis reviewed- no acute traumatic injury, head CT neg, Ct C spine neg for acute fx   Now with significant right femoral artery occlusion causing nonhealing ulcer and rest pain       PMH:  CAD s/p CABG, Cardiac Arrest, CHF, HTN, AICD, Lung Ca s/p Resection (reported to be in remission)  Surgical History: Left TKA, CABG, Lung mass/nodular resection  Family History; Mother  in 40s from breast ca. Father  age 80s from HD  Social History: Former TObacco/smoking. Quit 20 years ago. Former 40 PPD smoker. Daily 1-2 beers. Denies illicit drug use.      (22 Aug 2023 09:24)      PAST MEDICAL & SURGICAL HISTORY:  Hypertension      CAD (coronary artery disease)      Cardiac arrest with successful resuscitation      CKD (chronic kidney disease)      PAD (peripheral artery disease)      Lung cancer      Heart failure      AICD (automatic cardioverter/defibrillator) present      S/P carotid endarterectomy      History of lobectomy of lung      History of appendectomy      H/O left knee surgery      History of tonsillectomy and adenoidectomy                                        MEDICATIONS  (STANDING):  aspirin enteric coated 81 milliGRAM(s) Oral daily  atorvastatin 10 milliGRAM(s) Oral at bedtime  atovaquone  Suspension 750 milliGRAM(s) Oral every 12 hours  azithromycin  IVPB 500 milliGRAM(s) IV Intermittent every 24 hours  azithromycin  IVPB      heparin   Injectable 5000 Unit(s) SubCutaneous every 8 hours  melatonin 5 milliGRAM(s) Oral at bedtime  metoprolol succinate ER 37.5 milliGRAM(s) Oral daily  pantoprazole    Tablet 40 milliGRAM(s) Oral before breakfast  sacubitril 24 mG/valsartan 26 mG 1 Tablet(s) Oral two times a day  sodium chloride 0.9%. 1000 milliLiter(s) (75 mL/Hr) IV Continuous <Continuous>    MEDICATIONS  (PRN):  acetaminophen     Tablet .. 650 milliGRAM(s) Oral every 6 hours PRN Mild Pain (1 - 3), Moderate Pain (4 - 6), Severe Pain (7 - 10)  aluminum hydroxide/magnesium hydroxide/simethicone Suspension 30 milliLiter(s) Oral every 4 hours PRN Dyspepsia  ondansetron Injectable 4 milliGRAM(s) IV Push every 8 hours PRN Nausea and/or Vomiting  sodium chloride 0.65% Nasal 1 Spray(s) Both Nostrils two times a day PRN Nasal Congestion      FAMILY HISTORY:      SOCIAL HISTORY:    CIGARETTES:        Vital Signs Last 24 Hrs  T(C): 36.4 (26 Aug 2023 08:08), Max: 36.5 (25 Aug 2023 17:00)  T(F): 97.5 (26 Aug 2023 08:08), Max: 97.7 (25 Aug 2023 17:00)  HR: 79 (26 Aug 2023 08:08) (73 - 79)  BP: 104/56 (26 Aug 2023 08:08) (104/56 - 111/64)  BP(mean): 91 (25 Aug 2023 17:00) (91 - 91)  RR: 18 (26 Aug 2023 08:08) (18 - 18)  SpO2: 95% (26 Aug 2023 08:08) (95% - 96%)    Parameters below as of 26 Aug 2023 08:08  Patient On (Oxygen Delivery Method): room air                INTERPRETATION OF TELEMETRY:    ECG:    I&O's Detail    25 Aug 2023 07:01  -  26 Aug 2023 07:00  --------------------------------------------------------  IN:  Total IN: 0 mL    OUT:    Voided (mL): 1100 mL  Total OUT: 1100 mL    Total NET: -1100 mL          LABS:                        14.1   5.33  )-----------( 135      ( 26 Aug 2023 07:54 )             40.8     08-    132<L>  |  101  |  18  ----------------------------<  150<H>  4.7   |  25  |  1.18    Ca    9.1      26 Aug 2023 07:54  Phos  2.6       Mg     1.8                 Urinalysis Basic - ( 26 Aug 2023 07:54 )    Color: x / Appearance: x / SG: x / pH: x  Gluc: 150 mg/dL / Ketone: x  / Bili: x / Urobili: x   Blood: x / Protein: x / Nitrite: x   Leuk Esterase: x / RBC: x / WBC x   Sq Epi: x / Non Sq Epi: x / Bacteria: x      I&O's Summary    25 Aug 2023 07:01  -  26 Aug 2023 07:00  --------------------------------------------------------  IN: 0 mL / OUT: 1100 mL / NET: -1100 mL      BNP  RADIOLOGY & ADDITIONAL STUDIES:

## 2023-08-26 NOTE — PROGRESS NOTE ADULT - ASSESSMENT
89 y/o male with a PMHx of  CAD s/p CABG, Cardiac Arrest, CHF with EF ~40%, HTN, AICD, Lung Ca s/p Resection, peripheral artery disease, CKD, s/p lobectomy of lung, s/p carotid endarterectomy; presenting to the ED s/p unwitnessed  fall in bathroom concerning for syncope.      #Syncope/ Fall:    Suspect more vasovagal related to infection.  Babesiosis.    Appreciate cardio eval.      #Babesiosis:    Cont mepron 750mg BID/azithromycin 500mg   10 day course  Improved.    PLT improving.    Other tick related illness negative.      #Ischemic Cardiomyopathy:    Cont BB.    Cont entresto.    ECHO with EF 40%.    Moderate AS.      #Nonsustained VT:    Cont BB.      #Thrombocytopenia and anemia:    Secondary to babesiosis.    Improving.      #Liver cirrhosis /splenomegaly:    Suspect related to ETOH use.  F/u outpatient.      #Hyponatremia:    Improved.  Hypovolemic.      #RLE ulcer present on admission:    US and CT angio with significant PVD.    Appreciate vascular eval.    Plan for right femoral endarterectomy 8/30.    Patient overall is moderate to high risk for surgery.    Appreciate cardio eval.    Recommend cont all cardiac meds pre/ intraop.    Hold entresto 24 hours prior to OR.    ECHO reviewed-- moderate AS and EF 40%.    Patient with babesiosis but improving on treatment.    Patient medically optimized for OR.      #Lung Cancer:  stable.    S/p lobectomy.      #Lower back pain s/p fall  improved   PT eval    #CKD stable   hx CAD/PAD/ICD/Lung CA /CHF stable  cw home meds    #DVT Proph:  Heparin SQ.   87 y/o male with a PMHx of  CAD s/p CABG, Cardiac Arrest, CHF with EF ~40%, HTN, AICD, Lung Ca s/p Resection, peripheral artery disease, CKD, s/p lobectomy of lung, s/p carotid endarterectomy; presenting to the ED s/p unwitnessed  fall in bathroom concerning for syncope.      #Syncope/ Fall:    Suspect more vasovagal related to infection.  Babesiosis.    Appreciate cardio eval.      #Babesiosis:    Cont mepron 750mg BID/azithromycin 500mg   Day 4 of 10 day course  Improved.    PLT improving.    Other tick related illness negative.      #Ischemic Cardiomyopathy:    Cont BB.    Cont entresto.    ECHO with EF 40%.    Moderate AS.      #Nonsustained VT:    Cont BB.      #Thrombocytopenia and anemia:    Secondary to babesiosis.    Improving.      #Liver cirrhosis /splenomegaly:    Suspect related to ETOH use.  F/u outpatient.      #Hyponatremia:    Improved.  Hypovolemic.      #RLE ulcer present on admission:    US and CT angio with significant PVD.    Appreciate vascular eval.    Plan for right femoral endarterectomy 8/30.    Patient overall is moderate to high risk for surgery.    Appreciate cardio eval.    Recommend cont all cardiac meds pre/ intraop.    Hold entresto 24 hours prior to OR.    ECHO reviewed-- moderate AS and EF 40%.    Patient with babesiosis but improving on treatment.    Patient medically optimized for OR.      #Lung Cancer:  stable.    S/p lobectomy.      #Lower back pain s/p fall  improved   PT eval    #CKD stable   hx CAD/PAD/ICD/Lung CA /CHF stable  cw home meds    #DVT Proph:  Heparin SQ.

## 2023-08-26 NOTE — PROGRESS NOTE ADULT - ASSESSMENT
89 yo M with extensive PMHX and hsitory of PAD. Vascular consulted for PAD and ulcer shin.  A dup done showed Multifocal plaque without duplex evidence of hemodynamically significant stenosis or occlusion in the arteries of the right lower extremity. Altered hemodynamics with low velocity tardus parvus blood flow suggestive of upstream flow limiting disease, likely at the level of the  right common or external iliac artery.   OLIVIA  CTa:  R; no iliac stenosis, mod sev stenosis comm fem + severe stenosis at bifurcation comm fem + pop, no infra pop / L; mod stenosis comm iliac, severe stenosis at SFA + pop, no infra pop,    P:  -planning for R fem artery endarterectomy 8/30  -please provide MEDICAL/CARDIOLOG clearance/risk assessment   - cont Iv hydration   -cont ASA/ H5K  - Wound care for shin wound management  - Rest of management as per primary team.    Plan discussed with Dr. Williamson

## 2023-08-26 NOTE — PROGRESS NOTE ADULT - SUBJECTIVE AND OBJECTIVE BOX
SURGERY DAILY PROGRESS NOTE:     Subjective:  Patient seen and examined at bedside during am rounds. Pt is doing well. Pt had CTa which shows occlusio of femoral artery AVSS. Denies any fevers, chills, n/v/d, chest pain or shortness of breath    Objective:    MEDICATIONS  (STANDING):  aspirin enteric coated 81 milliGRAM(s) Oral daily  atorvastatin 10 milliGRAM(s) Oral at bedtime  atovaquone  Suspension 750 milliGRAM(s) Oral every 12 hours  azithromycin  IVPB      azithromycin  IVPB 500 milliGRAM(s) IV Intermittent every 24 hours  heparin   Injectable 5000 Unit(s) SubCutaneous every 8 hours  melatonin 5 milliGRAM(s) Oral at bedtime  metoprolol succinate ER 37.5 milliGRAM(s) Oral daily  pantoprazole    Tablet 40 milliGRAM(s) Oral before breakfast  sacubitril 24 mG/valsartan 26 mG 1 Tablet(s) Oral two times a day  sodium chloride 0.9%. 1000 milliLiter(s) (75 mL/Hr) IV Continuous <Continuous>    MEDICATIONS  (PRN):  acetaminophen     Tablet .. 650 milliGRAM(s) Oral every 6 hours PRN Mild Pain (1 - 3), Moderate Pain (4 - 6), Severe Pain (7 - 10)  aluminum hydroxide/magnesium hydroxide/simethicone Suspension 30 milliLiter(s) Oral every 4 hours PRN Dyspepsia  ondansetron Injectable 4 milliGRAM(s) IV Push every 8 hours PRN Nausea and/or Vomiting  sodium chloride 0.65% Nasal 1 Spray(s) Both Nostrils two times a day PRN Nasal Congestion      Vital Signs Last 24 Hrs  T(C): 36.5 (25 Aug 2023 23:41), Max: 36.6 (25 Aug 2023 08:14)  T(F): 97.7 (25 Aug 2023 23:41), Max: 97.9 (25 Aug 2023 08:14)  HR: 79 (25 Aug 2023 23:41) (73 - 79)  BP: 111/64 (25 Aug 2023 23:41) (99/69 - 111/64)  BP(mean): 91 (25 Aug 2023 17:00) (75 - 91)  RR: 18 (25 Aug 2023 23:41) (18 - 18)  SpO2: 95% (25 Aug 2023 23:41) (92% - 96%)    Parameters below as of 25 Aug 2023 23:41  Patient On (Oxygen Delivery Method): room air          PHYSICAL EXAM   Gen: well-appearing, in no acute distress  CV: pulse regularly present   Resp: airway patent, non-labored breathing  ABD:  soft, nontender, non distended, no rebound, no guarding, no rigidity  Extremity: Pt demonstrates grossly intact sensoromotor function. Right mid shin ulcer; 1 cm diameter, mild erythema surronding. B/L dopplerable DP and PT pulses. Non palpable femoral pulse b/l.      I&O's Detail    24 Aug 2023 07:01  -  25 Aug 2023 07:00  --------------------------------------------------------  IN:  Total IN: 0 mL    OUT:    Voided (mL): 550 mL  Total OUT: 550 mL    Total NET: -550 mL      25 Aug 2023 07:01  -  26 Aug 2023 05:22  --------------------------------------------------------  IN:  Total IN: 0 mL    OUT:    Voided (mL): 700 mL  Total OUT: 700 mL    Total NET: -700 mL          Daily     Daily     LABS:                        12.7   5.16  )-----------( 100      ( 25 Aug 2023 07:09 )             36.2     08-25    129<L>  |  100  |  20  ----------------------------<  148<H>  4.5   |  23  |  1.09    Ca    8.4<L>      25 Aug 2023 07:09  Phos  2.3     08-25  Mg     1.7     08-25    TPro  5.9<L>  /  Alb  2.5<L>  /  TBili  1.3<H>  /  DBili  x   /  AST  28  /  ALT  20  /  AlkPhos  54  08-24      Urinalysis Basic - ( 25 Aug 2023 07:09 )    Color: x / Appearance: x / SG: x / pH: x  Gluc: 148 mg/dL / Ketone: x  / Bili: x / Urobili: x   Blood: x / Protein: x / Nitrite: x   Leuk Esterase: x / RBC: x / WBC x   Sq Epi: x / Non Sq Epi: x / Bacteria: x

## 2023-08-26 NOTE — PROGRESS NOTE ADULT - ATTENDING COMMENTS
pt with right anterior shin wound as well as short distance claudication plan for right CFA endarterectomy

## 2023-08-26 NOTE — CONSULT NOTE ADULT - ASSESSMENT
89 y/o male with a PMHx of  MI  CAD s/p CABG, Cardiac Arrest, CHF, HTN, AICD, Lung Ca s/p Resection ,, peripheral artery disease, CKD, ,present, s/p lobectomy of lung, s/p carotid endarterectomy; presenting to the ED s/p unwitnessed  fall in bathroom, noted -LOC, -head strike, -blood thinner use. Patient states he suddenly felt dizzy while he was urinating, he lost his balance and fell; he called EMS through a medical emergency device around his neck and was wedged in between the toilet for about two hours. Patient c/o of lower back pain. Patient able to bear weight but feels unsteady. Denies weakness or numbness. Patient admits to EtOH consumption tonight, approximately 1 beer prior to fall. Also endorses knee pain, neck pain.    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 He is nor in any overt CHF and his preop CR is <2 and his AS both on exam and by echo seems only mild to moderate   1) would cont BBlockers and statins periop , can hold entresto 24 hours preop then resume post op   2) DVT prophylaxis

## 2023-08-26 NOTE — PROGRESS NOTE ADULT - SUBJECTIVE AND OBJECTIVE BOX
89 y/o male with a PMHx of  MI  CAD s/p CABG, Cardiac Arrest, CHF, HTN, AICD, Lung Ca s/p Resection, peripheral artery disease, CKD, s/p lobectomy of lung, s/p carotid endarterectomy; presenting to the ED s/p unwitnessed  fall in bathroom concerning for syncope.  Patient states he suddenly felt dizzy while he was urinating, he lost his balance and fell.  He called EMS through a medical emergency device around his neck and was wedged in between the toilet for about two hours. Patient c/o of lower back pain. In ED found to be afebrile, hemodynamically stable , thrombocytopenia , hyponatremia EKG, CT Chest/abd/pelvis reviewed- no acute traumatic injury, head CT neg, Ct C spine neg for acute fx.      Work up inpatient revealed babesiosis.  Patient started on therapy.      8/26:  Pt seen.  Denies CP/ SOB.  No complaints.      Vital Signs Last 24 Hrs  T(C): 36.6 (26 Aug 2023 16:00), Max: 36.6 (26 Aug 2023 16:00)  T(F): 97.9 (26 Aug 2023 16:00), Max: 97.9 (26 Aug 2023 16:00)  HR: 72 (26 Aug 2023 16:00) (72 - 79)  BP: 99/64 (26 Aug 2023 16:00) (99/64 - 111/64)  BP(mean): 91 (25 Aug 2023 17:00) (91 - 91)  RR: 18 (26 Aug 2023 16:00) (18 - 18)  SpO2: 95% (26 Aug 2023 16:00) (95% - 96%)    Parameters below as of 26 Aug 2023 16:00  Patient On (Oxygen Delivery Method): room air      PHYSICAL EXAM:  GENERAL: NAD, sitting up in bed  HEAD:  Atraumatic, Normocephalic  EYES: EOMI, PERRLA, normal sclera  ENT: Moist mucous membranes  NECK: Supple, No JVD, no nuchal rigidity  CHEST/LUNG: Clear to auscultation bilaterally; No rales, rhonchi, wheezing, or rubs. Unlabored respirations  HEART: Regular rate and rhythm; +II/VI FIOR.    ABDOMEN: Bowel sounds present; Soft, Nontender, Nondistended. No hepatomegaly  EXTREMITIES:  trace edema.  small ulcer ant right leg.    NERVOUS SYSTEM:  Alert, follows command   SKIN: No rashes or lesions     08-26    132<L>  |  101  |  18  ----------------------------<  150<H>  4.7   |  25  |  1.18    Ca    9.1      26 Aug 2023 07:54  Phos  2.6     08-26  Mg     1.8     08-26                          14.1   5.33  )-----------( 135      ( 26 Aug 2023 07:54 )             40.8     Urinalysis Basic - ( 26 Aug 2023 07:54 )  Color: x / Appearance: x / SG: x / pH: x  Gluc: 150 mg/dL / Ketone: x  / Bili: x / Urobili: x   Blood: x / Protein: x / Nitrite: x   Leuk Esterase: x / RBC: x / WBC x   Sq Epi: x / Non Sq Epi: x / Bacteria: x    Parasitemia, Blood (collected 24 Aug 2023 06:29)  Source: .Blood None  Final Report (24 Aug 2023 13:50):    Positive for Babesia species    by Giemsa Stain    Parasitemia = <1 %      MEDICATIONS  (STANDING):  aspirin enteric coated 81 milliGRAM(s) Oral daily  atorvastatin 10 milliGRAM(s) Oral at bedtime  atovaquone  Suspension 750 milliGRAM(s) Oral every 12 hours  azithromycin  IVPB      azithromycin  IVPB 500 milliGRAM(s) IV Intermittent every 24 hours  heparin   Injectable 5000 Unit(s) SubCutaneous every 8 hours  melatonin 5 milliGRAM(s) Oral at bedtime  metoprolol succinate ER 37.5 milliGRAM(s) Oral daily  pantoprazole    Tablet 40 milliGRAM(s) Oral before breakfast  sacubitril 24 mG/valsartan 26 mG 1 Tablet(s) Oral two times a day  sodium chloride 0.9%. 1000 milliLiter(s) (75 mL/Hr) IV Continuous <Continuous>    MEDICATIONS  (PRN):  acetaminophen     Tablet .. 650 milliGRAM(s) Oral every 6 hours PRN Mild Pain (1 - 3), Moderate Pain (4 - 6), Severe Pain (7 - 10)  aluminum hydroxide/magnesium hydroxide/simethicone Suspension 30 milliLiter(s) Oral every 4 hours PRN Dyspepsia  ondansetron Injectable 4 milliGRAM(s) IV Push every 8 hours PRN Nausea and/or Vomiting  sodium chloride 0.65% Nasal 1 Spray(s) Both Nostrils two times a day PRN Nasal Congestion

## 2023-08-27 LAB
ANION GAP SERPL CALC-SCNC: 3 MMOL/L — LOW (ref 5–17)
BASOPHILS # BLD AUTO: 0 K/UL — SIGNIFICANT CHANGE UP (ref 0–0.2)
BASOPHILS NFR BLD AUTO: 0 % — SIGNIFICANT CHANGE UP (ref 0–2)
BUN SERPL-MCNC: 19 MG/DL — SIGNIFICANT CHANGE UP (ref 7–23)
CALCIUM SERPL-MCNC: 9.4 MG/DL — SIGNIFICANT CHANGE UP (ref 8.5–10.1)
CHLORIDE SERPL-SCNC: 101 MMOL/L — SIGNIFICANT CHANGE UP (ref 96–108)
CO2 SERPL-SCNC: 30 MMOL/L — SIGNIFICANT CHANGE UP (ref 22–31)
CREAT SERPL-MCNC: 1.27 MG/DL — SIGNIFICANT CHANGE UP (ref 0.5–1.3)
EGFR: 54 ML/MIN/1.73M2 — LOW
EOSINOPHIL # BLD AUTO: 0.16 K/UL — SIGNIFICANT CHANGE UP (ref 0–0.5)
EOSINOPHIL NFR BLD AUTO: 3 % — SIGNIFICANT CHANGE UP (ref 0–6)
GLUCOSE SERPL-MCNC: 151 MG/DL — HIGH (ref 70–99)
HCT VFR BLD CALC: 42.7 % — SIGNIFICANT CHANGE UP (ref 39–50)
HGB BLD-MCNC: 14.5 G/DL — SIGNIFICANT CHANGE UP (ref 13–17)
LYMPHOCYTES # BLD AUTO: 1.27 K/UL — SIGNIFICANT CHANGE UP (ref 1–3.3)
LYMPHOCYTES # BLD AUTO: 24 % — SIGNIFICANT CHANGE UP (ref 13–44)
MAGNESIUM SERPL-MCNC: 1.8 MG/DL — SIGNIFICANT CHANGE UP (ref 1.6–2.6)
MCHC RBC-ENTMCNC: 30.3 PG — SIGNIFICANT CHANGE UP (ref 27–34)
MCHC RBC-ENTMCNC: 34 GM/DL — SIGNIFICANT CHANGE UP (ref 32–36)
MCV RBC AUTO: 89.3 FL — SIGNIFICANT CHANGE UP (ref 80–100)
MONOCYTES # BLD AUTO: 0.63 K/UL — SIGNIFICANT CHANGE UP (ref 0–0.9)
MONOCYTES NFR BLD AUTO: 12 % — SIGNIFICANT CHANGE UP (ref 2–14)
NEUTROPHILS # BLD AUTO: 3.12 K/UL — SIGNIFICANT CHANGE UP (ref 1.8–7.4)
NEUTROPHILS NFR BLD AUTO: 56 % — SIGNIFICANT CHANGE UP (ref 43–77)
NRBC # BLD: SIGNIFICANT CHANGE UP /100 WBCS (ref 0–0)
PHOSPHATE SERPL-MCNC: 2.9 MG/DL — SIGNIFICANT CHANGE UP (ref 2.5–4.5)
PLATELET # BLD AUTO: 173 K/UL — SIGNIFICANT CHANGE UP (ref 150–400)
POTASSIUM SERPL-MCNC: 4.4 MMOL/L — SIGNIFICANT CHANGE UP (ref 3.5–5.3)
POTASSIUM SERPL-SCNC: 4.4 MMOL/L — SIGNIFICANT CHANGE UP (ref 3.5–5.3)
RBC # BLD: 4.78 M/UL — SIGNIFICANT CHANGE UP (ref 4.2–5.8)
RBC # FLD: 17.2 % — HIGH (ref 10.3–14.5)
SODIUM SERPL-SCNC: 134 MMOL/L — LOW (ref 135–145)
WBC # BLD: 5.29 K/UL — SIGNIFICANT CHANGE UP (ref 3.8–10.5)
WBC # FLD AUTO: 5.29 K/UL — SIGNIFICANT CHANGE UP (ref 3.8–10.5)

## 2023-08-27 PROCEDURE — 99232 SBSQ HOSP IP/OBS MODERATE 35: CPT

## 2023-08-27 RX ADMIN — Medication 1 SPRAY(S): at 13:31

## 2023-08-27 RX ADMIN — Medication 37.5 MILLIGRAM(S): at 09:16

## 2023-08-27 RX ADMIN — Medication 650 MILLIGRAM(S): at 07:46

## 2023-08-27 RX ADMIN — Medication 650 MILLIGRAM(S): at 20:09

## 2023-08-27 RX ADMIN — Medication 650 MILLIGRAM(S): at 08:32

## 2023-08-27 RX ADMIN — PANTOPRAZOLE SODIUM 40 MILLIGRAM(S): 20 TABLET, DELAYED RELEASE ORAL at 09:17

## 2023-08-27 RX ADMIN — HEPARIN SODIUM 5000 UNIT(S): 5000 INJECTION INTRAVENOUS; SUBCUTANEOUS at 13:31

## 2023-08-27 RX ADMIN — ATOVAQUONE 750 MILLIGRAM(S): 750 SUSPENSION ORAL at 09:19

## 2023-08-27 RX ADMIN — ATOVAQUONE 750 MILLIGRAM(S): 750 SUSPENSION ORAL at 21:04

## 2023-08-27 RX ADMIN — Medication 650 MILLIGRAM(S): at 20:39

## 2023-08-27 RX ADMIN — Medication 5 MILLIGRAM(S): at 21:03

## 2023-08-27 RX ADMIN — HEPARIN SODIUM 5000 UNIT(S): 5000 INJECTION INTRAVENOUS; SUBCUTANEOUS at 06:15

## 2023-08-27 RX ADMIN — ATORVASTATIN CALCIUM 10 MILLIGRAM(S): 80 TABLET, FILM COATED ORAL at 21:03

## 2023-08-27 RX ADMIN — AZITHROMYCIN 255 MILLIGRAM(S): 500 TABLET, FILM COATED ORAL at 12:08

## 2023-08-27 RX ADMIN — Medication 650 MILLIGRAM(S): at 14:41

## 2023-08-27 RX ADMIN — HEPARIN SODIUM 5000 UNIT(S): 5000 INJECTION INTRAVENOUS; SUBCUTANEOUS at 21:03

## 2023-08-27 RX ADMIN — Medication 81 MILLIGRAM(S): at 09:17

## 2023-08-27 NOTE — PROGRESS NOTE ADULT - ASSESSMENT
87 y/o male with a PMHx of  MI  CAD s/p CABG, Cardiac Arrest, CHF, HTN, AICD, Lung Ca s/p Resection ,, peripheral artery disease, CKD, ,present, s/p lobectomy of lung, s/p carotid endarterectomy; presenting to the ED s/p unwitnessed  fall in bathroom, noted -LOC, -head strike, -blood thinner use. Patient states he suddenly felt dizzy while he was urinating, he lost his balance and fell; he called EMS through a medical emergency device around his neck and was wedged in between the toilet for about two hours. Patient c/o of lower back pain. Patient able to bear weight but feels unsteady. Denies weakness or numbness. Patient admits to EtOH consumption tonight, approximately 1 beer prior to fall. Also endorses knee pain, neck pain.    In ED found to be afebrile, hemodynamically stable , thrombocytopenia , hyponatremia EKG, CT Chest/abd/pelvis reviewed- no acute traumatic injury, head CT neg, Ct C spine neg for acute fx   Now with significant right femoral artery occlusion causing nonhealing ulcer and rest pain   Given  type of surgery and patient clinical risk factors he has at least 2 points with revised cardiac risk index which gives him approximatley a 10% 30 day risk of death MI or cardiac arrest . He is not in any overt CHF and his preop CR is <2 and his AS both on exam and by echo seems only mild to moderate   1) would cont BBlockers and statins periop , can hold entresto 24 hours preop then resume post op   2) DVT prophylaxis

## 2023-08-27 NOTE — PROGRESS NOTE ADULT - ASSESSMENT
87 yo M with extensive PMHX and hsitory of PAD. Vascular consulted for PAD and ulcer shin.  A dup done showed Multifocal plaque without duplex evidence of hemodynamically significant stenosis or occlusion in the arteries of the right lower extremity. Altered hemodynamics with low velocity tardus parvus blood flow suggestive of upstream flow limiting disease, likely at the level of the  right common or external iliac artery.   OLIVIA  CTa:  R; no iliac stenosis, mod sev stenosis comm fem + severe stenosis at bifurcation comm fem + pop, no infra pop / L; mod stenosis comm iliac, severe stenosis at SFA + pop, no infra pop,    P:  -planning for R fem artery endarterectomy 8/30  -please provide CARDIOLOGY clearance/risk assessment   - Medical clearance appreciated  - cont Iv hydration   -cont ASA/ H5K  - Wound care for shin wound management  - Rest of management as per primary team.    Plan to be discussed with Dr. Williamson

## 2023-08-27 NOTE — PROGRESS NOTE ADULT - SUBJECTIVE AND OBJECTIVE BOX
Patient is a 88y old  Male who presents with a chief complaint of fall/syncope (27 Aug 2023 05:31)    8/27- no new complaints    MEDICATIONS  (STANDING):  aspirin enteric coated 81 milliGRAM(s) Oral daily  atorvastatin 10 milliGRAM(s) Oral at bedtime  atovaquone  Suspension 750 milliGRAM(s) Oral every 12 hours  azithromycin  IVPB 500 milliGRAM(s) IV Intermittent every 24 hours  azithromycin  IVPB      heparin   Injectable 5000 Unit(s) SubCutaneous every 8 hours  melatonin 5 milliGRAM(s) Oral at bedtime  metoprolol succinate ER 37.5 milliGRAM(s) Oral daily  pantoprazole    Tablet 40 milliGRAM(s) Oral before breakfast  sacubitril 24 mG/valsartan 26 mG 1 Tablet(s) Oral two times a day  sodium chloride 0.9%. 1000 milliLiter(s) (75 mL/Hr) IV Continuous <Continuous>    MEDICATIONS  (PRN):  acetaminophen     Tablet .. 650 milliGRAM(s) Oral every 6 hours PRN Mild Pain (1 - 3), Moderate Pain (4 - 6), Severe Pain (7 - 10)  aluminum hydroxide/magnesium hydroxide/simethicone Suspension 30 milliLiter(s) Oral every 4 hours PRN Dyspepsia  ondansetron Injectable 4 milliGRAM(s) IV Push every 8 hours PRN Nausea and/or Vomiting  sodium chloride 0.65% Nasal 1 Spray(s) Both Nostrils two times a day PRN Nasal Congestion            Vital Signs Last 24 Hrs  T(C): 36.5 (27 Aug 2023 07:57), Max: 36.6 (26 Aug 2023 16:00)  T(F): 97.7 (27 Aug 2023 07:57), Max: 97.9 (26 Aug 2023 16:00)  HR: 73 (27 Aug 2023 07:57) (72 - 73)  BP: 105/70 (27 Aug 2023 07:57) (96/65 - 105/70)  BP(mean): --  RR: 18 (27 Aug 2023 07:57) (18 - 18)  SpO2: 96% (27 Aug 2023 07:57) (95% - 96%)    Parameters below as of 27 Aug 2023 07:57  Patient On (Oxygen Delivery Method): room air                INTERPRETATION OF TELEMETRY:    ECG:        LABS:                        14.5   5.29  )-----------( 173      ( 27 Aug 2023 07:27 )             42.7     08-27    134<L>  |  101  |  19  ----------------------------<  151<H>  4.4   |  30  |  1.27    Ca    9.4      27 Aug 2023 07:27  Phos  2.9     08-27  Mg     1.8     08-27            Urinalysis Basic - ( 27 Aug 2023 07:27 )    Color: x / Appearance: x / SG: x / pH: x  Gluc: 151 mg/dL / Ketone: x  / Bili: x / Urobili: x   Blood: x / Protein: x / Nitrite: x   Leuk Esterase: x / RBC: x / WBC x   Sq Epi: x / Non Sq Epi: x / Bacteria: x      I&O's Summary    BNP  RADIOLOGY & ADDITIONAL STUDIES:

## 2023-08-27 NOTE — PROGRESS NOTE ADULT - ASSESSMENT
87 y/o male with a PMHx of  CAD s/p CABG, Cardiac Arrest, CHF with EF ~40%, HTN, AICD, Lung Ca s/p Resection, peripheral artery disease, CKD, s/p lobectomy of lung, s/p carotid endarterectomy; presenting to the ED s/p unwitnessed  fall in bathroom concerning for syncope.      #Syncope/ Fall:    Suspect more vasovagal related to infection.  Babesiosis.    Appreciate cardio eval.      #Babesiosis:    Cont mepron 750mg BID/azithromycin 500mg   Day 5 of 10 day course  Improved.    PLT improving.    Other tick related illness negative.      #RLE ulcer present on admission:    US and CT angio with significant PVD.    Appreciate vascular eval.    Plan for right femoral endarterectomy 8/30.    Patient overall is moderate to high risk for surgery.    Appreciate cardio eval.    Recommend cont all cardiac meds pre/ intraop.    Hold entresto 24 hours prior to OR.    ECHO reviewed-- moderate AS and EF 40%.    Patient with babesiosis but improving on treatment.    Patient medically optimized for OR.      #Ischemic Cardiomyopathy:    Cont BB.    Cont entresto.    ECHO with EF 40%.    Moderate AS.      #Nonsustained VT:    Cont BB.      #Thrombocytopenia and anemia:    Secondary to babesiosis.    Improving.      #Liver cirrhosis /splenomegaly:    Suspect related to ETOH use.  F/u outpatient.      #Hyponatremia:    Improved.  Hypovolemic.      #Lung Cancer:  stable.    S/p lobectomy.      #Lower back pain s/p fall  improved   PT eval    #CKD stable   hx CAD/PAD/ICD/Lung CA /CHF stable  cw home meds    #DVT Proph:  Heparin SQ.

## 2023-08-27 NOTE — PROGRESS NOTE ADULT - SUBJECTIVE AND OBJECTIVE BOX
89 y/o male with a PMHx of  MI  CAD s/p CABG, Cardiac Arrest, CHF, HTN, AICD, Lung Ca s/p Resection, peripheral artery disease, CKD, s/p lobectomy of lung, s/p carotid endarterectomy; presenting to the ED s/p unwitnessed  fall in bathroom concerning for syncope.  Patient states he suddenly felt dizzy while he was urinating, he lost his balance and fell.  He called EMS through a medical emergency device around his neck and was wedged in between the toilet for about two hours. Patient c/o of lower back pain. In ED found to be afebrile, hemodynamically stable , thrombocytopenia , hyponatremia EKG, CT Chest/abd/pelvis reviewed- no acute traumatic injury, head CT neg, Ct C spine neg for acute fx.      Work up inpatient revealed babesiosis.  Patient started on therapy.      8/26:  Pt seen.  Denies CP/ SOB.  No complaints.    8/27:  Pt seen.  Sitting in chair eating breakfast.  Feeling well.      Vital Signs Last 24 Hrs  T(C): 36.5 (27 Aug 2023 14:56), Max: 36.5 (27 Aug 2023 07:57)  T(F): 97.7 (27 Aug 2023 14:56), Max: 97.7 (27 Aug 2023 07:57)  HR: 66 (27 Aug 2023 14:56) (66 - 73)  BP: 102/90 (27 Aug 2023 14:56) (96/65 - 105/70)  BP(mean): --  RR: 18 (27 Aug 2023 14:56) (18 - 18)  SpO2: 96% (27 Aug 2023 14:56) (95% - 96%)    PHYSICAL EXAM:  GENERAL: NAD, sitting up in bed  HEAD:  Atraumatic, Normocephalic  EYES: EOMI, PERRLA, normal sclera  ENT: Moist mucous membranes  NECK: Supple, No JVD, no nuchal rigidity  CHEST/LUNG: Clear to auscultation bilaterally; No rales, rhonchi, wheezing, or rubs. Unlabored respirations  HEART: Regular rate and rhythm; +II/VI IFOR.    ABDOMEN: Bowel sounds present; Soft, Nontender, Nondistended. No hepatomegaly  EXTREMITIES:  trace edema.  small ulcer ant right leg.    NERVOUS SYSTEM:  Alert, follows command   SKIN: No rashes or lesions    08-27    134<L>  |  101  |  19  ----------------------------<  151<H>  4.4   |  30  |  1.27    Ca    9.4      27 Aug 2023 07:27  Phos  2.9     08-27  Mg     1.8     08-27                        14.5   5.29  )-----------( 173      ( 27 Aug 2023 07:27 )             42.7       Urinalysis Basic - ( 27 Aug 2023 07:27 )  Color: x / Appearance: x / SG: x / pH: x  Gluc: 151 mg/dL / Ketone: x  / Bili: x / Urobili: x   Blood: x / Protein: x / Nitrite: x   Leuk Esterase: x / RBC: x / WBC x   Sq Epi: x / Non Sq Epi: x / Bacteria: x         MEDICATIONS  (STANDING):  aspirin enteric coated 81 milliGRAM(s) Oral daily  atorvastatin 10 milliGRAM(s) Oral at bedtime  atovaquone  Suspension 750 milliGRAM(s) Oral every 12 hours  azithromycin  IVPB      azithromycin  IVPB 500 milliGRAM(s) IV Intermittent every 24 hours  heparin   Injectable 5000 Unit(s) SubCutaneous every 8 hours  melatonin 5 milliGRAM(s) Oral at bedtime  metoprolol succinate ER 37.5 milliGRAM(s) Oral daily  pantoprazole    Tablet 40 milliGRAM(s) Oral before breakfast  sacubitril 24 mG/valsartan 26 mG 1 Tablet(s) Oral two times a day  sodium chloride 0.9%. 1000 milliLiter(s) (75 mL/Hr) IV Continuous <Continuous>    MEDICATIONS  (PRN):  acetaminophen     Tablet .. 650 milliGRAM(s) Oral every 6 hours PRN Mild Pain (1 - 3), Moderate Pain (4 - 6), Severe Pain (7 - 10)  aluminum hydroxide/magnesium hydroxide/simethicone Suspension 30 milliLiter(s) Oral every 4 hours PRN Dyspepsia  ondansetron Injectable 4 milliGRAM(s) IV Push every 8 hours PRN Nausea and/or Vomiting  sodium chloride 0.65% Nasal 1 Spray(s) Both Nostrils two times a day PRN Nasal Congestion

## 2023-08-27 NOTE — PROGRESS NOTE ADULT - SUBJECTIVE AND OBJECTIVE BOX
SURGERY DAILY PROGRESS NOTE:     Subjective:  Patient seen and examined at bedside during am rounds. Pt is doing well. Pt had CTA which shows occlusion of femoral artery. Complaining of worsening right leg pain.  AFVSS. Denies any fevers, chills, n/v/d, chest pain or shortness of breath    Objective:    MEDICATIONS  (STANDING):  aspirin enteric coated 81 milliGRAM(s) Oral daily  atorvastatin 10 milliGRAM(s) Oral at bedtime  atovaquone  Suspension 750 milliGRAM(s) Oral every 12 hours  azithromycin  IVPB      azithromycin  IVPB 500 milliGRAM(s) IV Intermittent every 24 hours  heparin   Injectable 5000 Unit(s) SubCutaneous every 8 hours  melatonin 5 milliGRAM(s) Oral at bedtime  metoprolol succinate ER 37.5 milliGRAM(s) Oral daily  pantoprazole    Tablet 40 milliGRAM(s) Oral before breakfast  sacubitril 24 mG/valsartan 26 mG 1 Tablet(s) Oral two times a day  sodium chloride 0.9%. 1000 milliLiter(s) (75 mL/Hr) IV Continuous <Continuous>    MEDICATIONS  (PRN):  acetaminophen     Tablet .. 650 milliGRAM(s) Oral every 6 hours PRN Mild Pain (1 - 3), Moderate Pain (4 - 6), Severe Pain (7 - 10)  aluminum hydroxide/magnesium hydroxide/simethicone Suspension 30 milliLiter(s) Oral every 4 hours PRN Dyspepsia  ondansetron Injectable 4 milliGRAM(s) IV Push every 8 hours PRN Nausea and/or Vomiting  sodium chloride 0.65% Nasal 1 Spray(s) Both Nostrils two times a day PRN Nasal Congestion    Vital Signs Last 24 Hrs  T(C): 36.4 (26 Aug 2023 23:43), Max: 36.6 (26 Aug 2023 16:00)  T(F): 97.5 (26 Aug 2023 23:43), Max: 97.9 (26 Aug 2023 16:00)  HR: 72 (26 Aug 2023 23:43) (72 - 79)  BP: 96/65 (26 Aug 2023 23:43) (96/65 - 104/56)  BP(mean): --  RR: 18 (26 Aug 2023 16:00) (18 - 18)  SpO2: 95% (26 Aug 2023 23:43) (95% - 95%)    Parameters below as of 26 Aug 2023 23:43  Patient On (Oxygen Delivery Method): room air          PHYSICAL EXAM   Gen: well-appearing, in no acute distress  CV: pulse regularly present   Resp: airway patent, non-labored breathing  ABD:  soft, nontender, non distended, no rebound, no guarding, no rigidity  Extremity: Pt demonstrates grossly intact sensoromotor function. Right mid shin ulcer; 1 cm diameter, mild erythema surronding. B/L dopplerable DP and PT pulses. Non palpable femoral pulse b/l.                                14.1   5.33  )-----------( 135      ( 26 Aug 2023 07:54 )             40.8   08-26    132<L>  |  101  |  18  ----------------------------<  150<H>  4.7   |  25  |  1.18    Ca    9.1      26 Aug 2023 07:54  Phos  2.6     08-26  Mg     1.8     08-26

## 2023-08-28 ENCOUNTER — APPOINTMENT (OUTPATIENT)
Dept: VASCULAR SURGERY | Facility: CLINIC | Age: 88
End: 2023-08-28

## 2023-08-28 LAB — BABESIA MICROTI PCR, BLD RESULT: DETECTED

## 2023-08-28 PROCEDURE — 99232 SBSQ HOSP IP/OBS MODERATE 35: CPT

## 2023-08-28 RX ORDER — OXYCODONE HYDROCHLORIDE 5 MG/1
2.5 TABLET ORAL EVERY 4 HOURS
Refills: 0 | Status: DISCONTINUED | OUTPATIENT
Start: 2023-08-28 | End: 2023-08-31

## 2023-08-28 RX ORDER — CADEXOMER IODINE 0.9 %
1 PADS, MEDICATED (EA) TOPICAL
Refills: 0 | Status: DISCONTINUED | OUTPATIENT
Start: 2023-08-28 | End: 2023-08-31

## 2023-08-28 RX ADMIN — ATORVASTATIN CALCIUM 10 MILLIGRAM(S): 80 TABLET, FILM COATED ORAL at 21:02

## 2023-08-28 RX ADMIN — PANTOPRAZOLE SODIUM 40 MILLIGRAM(S): 20 TABLET, DELAYED RELEASE ORAL at 09:52

## 2023-08-28 RX ADMIN — Medication 650 MILLIGRAM(S): at 05:49

## 2023-08-28 RX ADMIN — OXYCODONE HYDROCHLORIDE 2.5 MILLIGRAM(S): 5 TABLET ORAL at 23:25

## 2023-08-28 RX ADMIN — HEPARIN SODIUM 5000 UNIT(S): 5000 INJECTION INTRAVENOUS; SUBCUTANEOUS at 13:01

## 2023-08-28 RX ADMIN — OXYCODONE HYDROCHLORIDE 2.5 MILLIGRAM(S): 5 TABLET ORAL at 11:20

## 2023-08-28 RX ADMIN — Medication 1 APPLICATION(S): at 18:27

## 2023-08-28 RX ADMIN — Medication 81 MILLIGRAM(S): at 09:51

## 2023-08-28 RX ADMIN — AZITHROMYCIN 255 MILLIGRAM(S): 500 TABLET, FILM COATED ORAL at 11:20

## 2023-08-28 RX ADMIN — Medication 650 MILLIGRAM(S): at 05:07

## 2023-08-28 RX ADMIN — HEPARIN SODIUM 5000 UNIT(S): 5000 INJECTION INTRAVENOUS; SUBCUTANEOUS at 05:06

## 2023-08-28 RX ADMIN — OXYCODONE HYDROCHLORIDE 2.5 MILLIGRAM(S): 5 TABLET ORAL at 12:20

## 2023-08-28 RX ADMIN — ATOVAQUONE 750 MILLIGRAM(S): 750 SUSPENSION ORAL at 09:52

## 2023-08-28 RX ADMIN — HEPARIN SODIUM 5000 UNIT(S): 5000 INJECTION INTRAVENOUS; SUBCUTANEOUS at 21:03

## 2023-08-28 RX ADMIN — Medication 5 MILLIGRAM(S): at 21:02

## 2023-08-28 RX ADMIN — ATOVAQUONE 750 MILLIGRAM(S): 750 SUSPENSION ORAL at 21:02

## 2023-08-28 NOTE — PROGRESS NOTE ADULT - SUBJECTIVE AND OBJECTIVE BOX
CHIEF COMPLAINT: Patient is a 88y old  Male who presents with a chief complaint of fall/syncope (22 Aug 2023 09:24)    FROM H&P: 87 y/o male with a PMHx of  MI  CAD s/p CABG, Cardiac Arrest, CHF, HTN, AICD, Lung Ca s/p Resection ,, peripheral artery disease, CKD, ,present, s/p lobectomy of lung, s/p carotid endarterectomy; presenting to the ED s/p unwitnessed  fall in bathroom, noted -LOC, -head strike, -blood thinner use. Patient states he suddenly felt dizzy while he was urinating, he lost his balance and fell; he called EMS through a medical emergency device around his neck and was wedged in between the toilet for about two hours. Patient c/o of lower back pain. Patient able to bear weight but feels unsteady. Denies weakness or numbness. Patient admits to EtOH consumption tonight, approximately 1 beer prior to fall. Also endorses knee pain, neck pain.    In ED found to be afebrile, hemodynamically stable , thrombocytopenia , hyponatremia EKG, CT Chest/abd/pelvis reviewed- no acute traumatic injury, head CT neg, Ct C spine neg for acute fx     PMH:  CAD s/p CABG, Cardiac Arrest, CHF, HTN, AICD, Lung Ca s/p Resection (reported to be in remission)  Surgical History: Left TKA, CABG, Lung mass/nodular resection  Family History; Mother  in 40s from breast ca. Father  age 80s from HD  Social History: Former TObacco/smoking. Quit 20 years ago. Former 40 PPD smoker. Daily 1-2 beers. Denies illicit drug use.     . Patient reported dizziness prior to syncopal event  Occ with SOB.    . Feeling better, +babesia PCR    : Feeling better, dizziness improved.    : no new complaints    .  RT leg pain, sensitive to tactile stimuli  Shin wound         MEDICATIONS:  aspirin enteric coated 81 milliGRAM(s) Oral daily  atorvastatin 10 milliGRAM(s) Oral at bedtime  atovaquone  Suspension 750 milliGRAM(s) Oral every 12 hours  azithromycin  IVPB 500 milliGRAM(s) IV Intermittent every 24 hours  azithromycin  IVPB      heparin   Injectable 5000 Unit(s) SubCutaneous every 8 hours  melatonin 5 milliGRAM(s) Oral at bedtime  metoprolol succinate ER 37.5 milliGRAM(s) Oral daily  pantoprazole    Tablet 40 milliGRAM(s) Oral before breakfast  sacubitril 24 mG/valsartan 26 mG 1 Tablet(s) Oral two times a day  sodium chloride 0.9%. 1000 milliLiter(s) (75 mL/Hr) IV Continuous <Continuous>    MEDICATIONS  (PRN):  acetaminophen     Tablet .. 650 milliGRAM(s) Oral every 6 hours PRN Mild Pain (1 - 3), Moderate Pain (4 - 6), Severe Pain (7 - 10)  aluminum hydroxide/magnesium hydroxide/simethicone Suspension 30 milliLiter(s) Oral every 4 hours PRN Dyspepsia  ondansetron Injectable 4 milliGRAM(s) IV Push every 8 hours PRN Nausea and/or Vomiting  oxyCODONE    IR 2.5 milliGRAM(s) Oral every 4 hours PRN Moderate Pain (4 - 6)  sodium chloride 0.65% Nasal 1 Spray(s) Both Nostrils two times a day PRN Nasal Congestion    HOME MEDICATIONS:  Ecotrin Adult Low Strength 81 mg oral delayed release tablet: 1 tab(s) orally once a day (22 Aug 2023 09:00)  Entresto 24 mg-26 mg oral tablet: 1 tab(s) orally 2 times a day (22 Aug 2023 09:01)  Farxiga 10 mg oral tablet: 1 tab(s) orally once a day (22 Aug 2023 09:01)  melatonin 5 mg oral tablet: 1 tab(s) orally once a day (at bedtime) (22 Aug 2023 09:00)  metoprolol succinate 25 mg oral tablet, extended release: 1.5 tab(s) orally once a day (at bedtime) (22 Aug 2023 09:04)  pantoprazole 40 mg oral delayed release tablet: 1 tab(s) orally once a day (22 Aug 2023 09:00)  pravastatin 40 mg oral tablet: 1 tab(s) orally once a day (at bedtime) (22 Aug 2023 09:00)    PHYSICAL EXAM:  T(C): 37.1 (28 Aug 2023 07:46), Max: 37.1 (28 Aug 2023 07:46)  T(F): 98.8 (28 Aug 2023 07:46), Max: 98.8 (28 Aug 2023 07:46)  HR: 92 (28 Aug 2023 07:46) (63 - 92)  BP: 127/77 (28 Aug 2023 07:46) (96/66 - 127/77)  RR: 18 (28 Aug 2023 07:46) (18 - 18)  SpO2: 100% (28 Aug 2023 07:46) (96% - 100%)    Parameters below as of 28 Aug 2023 07:46  Patient On (Oxygen Delivery Method): room air    Constitutional: NAD, awake and alert  HEENT: PERR, EOMI, Normal Hearing, MMM  Neck: Soft and supple, No LAD, No JVD  Respiratory: Breath sounds are clear bilaterally, No wheezing, rales or rhonchi  Cardiovascular: S1 and S2, regular rate and rhythm, no Murmurs, gallops or rubs  Gastrointestinal: Bowel Sounds present, soft, nontender, nondistended, no guarding, no rebound  Extremities: No peripheral edema  Vascular: 2+ peripheral pulses  Neurological: A/O x 3, no focal deficits  Musculoskeletal: 5/5 strength b/l upper and lower extremities  Skin: No rashes    =======================================    INTERPRETATION OF TELEMETRY: Paced, run NSVT    ECG: Ordered, pending    ========================================    LABS:               12.7   4.49  )-----------( 82       ( 24 Aug 2023 06:29 )             36.5     08-24    131<L>  |  101  |  21  ----------------------------<  164<H>  4.2   |  23  |  1.12    Ca    8.1<L>      24 Aug 2023 06:29    TPro  5.9<L>  /  Alb  2.5<L>  /  TBili  1.3<H>  /  DBili  x   /  AST  28  /  ALT  20  /  AlkPhos  54  08-24    Troponin: 34.48 ng/L, Troponin: 52.41 ng/L    CARDIAC TESTING:    < from: TTE Echo Complete w/ Contrast w/ Doppler (23 @ 11:56) >   There is calcification of mitral valve leaflets. The leaflet opening is   restricted.   Mild (1+) mitral regurgitation is present.   EA reversal of the mitral inflow consistent with reduced compliance of   the   left ventricle.   The aortic valve appears severely calcified. Valve opening seems to be   restricted.   Transaortic gradients are underestimated due to impaired left ventricle   systolic function.   KACIE by continuity equation and dimensionless index (.29) suggest at least   moderate aortic stenosis.   Left ventricle systolic function appears impaired in the presence of a   cardiac arrhythmia. Left ventricle size and structure are within normal   limitations.   Definity was used to better visualizethe endocardial border. septal ,   apical wall hypokinesis   Estimated left ventricular ejection fraction is 40 %.   Normal appearing right ventricle structure and function.    < end of copied text >      RADIOLOGY & ADDITIONAL STUDIES:    CT Head No Cont (23 @ 20:30) >  Head CT: No CT evidence of acute intracranial hemorrhage.    C-spine CT:  No acute fracture.    CT Chest, Abdomen and Pelvis w/ IV Cont (23 @ 22:46) >  No acute posttraumatic injury in the chest. Postsurgical changes from   lobectomy. Reticular opacities in bilateral lower lobes may be scarring.   Interstitial lung disease can have similar appearance. No pneumothorax.    No acute posttraumatic organ injury in the abdomen or pelvis. No   retroperitoneal or intraperitoneal hematoma. No acute fractures of the   spine, bony pelvis or proximal femora.    Questionable contour irregularity of the fourth and fifth left anterior   rib which may relate to nondisplaced rib fracture versus sequela from   remote impact, correlate clinically.    Other chronic findings as above.   CHIEF COMPLAINT: Patient is a 88y old  Male who presents with a chief complaint of fall/syncope (22 Aug 2023 09:24)    FROM H&P: 87 y/o male with a PMHx of  MI  CAD s/p CABG, Cardiac Arrest, CHF, HTN, AICD, Lung Ca s/p Resection ,, peripheral artery disease, CKD, ,present, s/p lobectomy of lung, s/p carotid endarterectomy; presenting to the ED s/p unwitnessed  fall in bathroom, noted -LOC, -head strike, -blood thinner use. Patient states he suddenly felt dizzy while he was urinating, he lost his balance and fell; he called EMS through a medical emergency device around his neck and was wedged in between the toilet for about two hours. Patient c/o of lower back pain. Patient able to bear weight but feels unsteady. Denies weakness or numbness. Patient admits to EtOH consumption tonight, approximately 1 beer prior to fall. Also endorses knee pain, neck pain.    In ED found to be afebrile, hemodynamically stable , thrombocytopenia , hyponatremia EKG, CT Chest/abd/pelvis reviewed- no acute traumatic injury, head CT neg, Ct C spine neg for acute fx     PMH:  CAD s/p CABG, Cardiac Arrest, CHF, HTN, AICD, Lung Ca s/p Resection (reported to be in remission)  Surgical History: Left TKA, CABG, Lung mass/nodular resection  Family History; Mother  in 40s from breast ca. Father  age 80s from HD  Social History: Former TObacco/smoking. Quit 20 years ago. Former 40 PPD smoker. Daily 1-2 beers. Denies illicit drug use.     . Patient reported dizziness prior to syncopal event  Occ with SOB.    . Feeling better, +babesia PCR    : Feeling better, dizziness improved.    : no new complaints    .  RT leg pain, sensitive to tactile stimuli  Shin wound         MEDICATIONS:  MEDICATIONS  (STANDING):  aspirin enteric coated 81 milliGRAM(s) Oral daily  atorvastatin 10 milliGRAM(s) Oral at bedtime  atovaquone  Suspension 750 milliGRAM(s) Oral every 12 hours  azithromycin  IVPB 500 milliGRAM(s) IV Intermittent every 24 hours  azithromycin  IVPB      heparin   Injectable 5000 Unit(s) SubCutaneous every 8 hours  melatonin 5 milliGRAM(s) Oral at bedtime  metoprolol succinate ER 37.5 milliGRAM(s) Oral daily  pantoprazole    Tablet 40 milliGRAM(s) Oral before breakfast  sacubitril 24 mG/valsartan 26 mG 1 Tablet(s) Oral two times a day  sodium chloride 0.9%. 1000 milliLiter(s) (75 mL/Hr) IV Continuous <Continuous>    MEDICATIONS  (PRN):  acetaminophen     Tablet .. 650 milliGRAM(s) Oral every 6 hours PRN Mild Pain (1 - 3), Moderate Pain (4 - 6), Severe Pain (7 - 10)  aluminum hydroxide/magnesium hydroxide/simethicone Suspension 30 milliLiter(s) Oral every 4 hours PRN Dyspepsia  ondansetron Injectable 4 milliGRAM(s) IV Push every 8 hours PRN Nausea and/or Vomiting  oxyCODONE    IR 2.5 milliGRAM(s) Oral every 4 hours PRN Moderate Pain (4 - 6)  sodium chloride 0.65% Nasal 1 Spray(s) Both Nostrils two times a day PRN Nasal Congestion    HOME MEDICATIONS:  Ecotrin Adult Low Strength 81 mg oral delayed release tablet: 1 tab(s) orally once a day (22 Aug 2023 09:00)  Entresto 24 mg-26 mg oral tablet: 1 tab(s) orally 2 times a day (22 Aug 2023 09:01)  Farxiga 10 mg oral tablet: 1 tab(s) orally once a day (22 Aug 2023 09:01)  melatonin 5 mg oral tablet: 1 tab(s) orally once a day (at bedtime) (22 Aug 2023 09:00)  metoprolol succinate 25 mg oral tablet, extended release: 1.5 tab(s) orally once a day (at bedtime) (22 Aug 2023 09:04)  pantoprazole 40 mg oral delayed release tablet: 1 tab(s) orally once a day (22 Aug 2023 09:00)  pravastatin 40 mg oral tablet: 1 tab(s) orally once a day (at bedtime) (22 Aug 2023 09:00)    PHYSICAL EXAM:  T(C): 37.1 (28 Aug 2023 07:46), Max: 37.1 (28 Aug 2023 07:46)  T(F): 98.8 (28 Aug 2023 07:46), Max: 98.8 (28 Aug 2023 07:46)  HR: 92 (28 Aug 2023 07:46) (63 - 92)  BP: 127/77 (28 Aug 2023 07:46) (96/66 - 127/77)  RR: 18 (28 Aug 2023 07:46) (18 - 18)  SpO2: 100% (28 Aug 2023 07:46) (96% - 100%)    Parameters below as of 28 Aug 2023 07:46  Patient On (Oxygen Delivery Method): room air    Constitutional: NAD, awake and alert  HEENT: PERR, EOMI, Normal Hearing, MMM  Neck: Soft and supple, No LAD, No JVD  Respiratory: Breath sounds are clear bilaterally, No wheezing, rales or rhonchi  Cardiovascular: S1 and S2, regular rate and rhythm, no Murmurs, gallops or rubs  Gastrointestinal: Bowel Sounds present, soft, nontender, nondistended, no guarding, no rebound  Extremities: No peripheral edema  Vascular: 2+ peripheral pulses  Neurological: A/O x 3, no focal deficits  Musculoskeletal: 5/5 strength b/l upper and lower extremities  Skin: No rashes    =======================================    INTERPRETATION OF TELEMETRY: Paced, run NSVT    ========================================    LABS:               12.7   4.49  )-----------( 82       ( 24 Aug 2023 06:29 )             36.5     08-24    131<L>  |  101  |  21  ----------------------------<  164<H>  4.2   |  23  |  1.12    Ca    8.1<L>      24 Aug 2023 06:29    TPro  5.9<L>  /  Alb  2.5<L>  /  TBili  1.3<H>  /  DBili  x   /  AST  28  /  ALT  20  /  AlkPhos  54  08-24    Troponin: 34.48 ng/L, Troponin: 52.41 ng/L    CARDIAC TESTING:    < from: TTE Echo Complete w/ Contrast w/ Doppler (23 @ 11:56) >   There is calcification of mitral valve leaflets. The leaflet opening is   restricted.   Mild (1+) mitral regurgitation is present.   EA reversal of the mitral inflow consistent with reduced compliance of   the   left ventricle.   The aortic valve appears severely calcified. Valve opening seems to be   restricted.   Transaortic gradients are underestimated due to impaired left ventricle   systolic function.   KACIE by continuity equation and dimensionless index (.29) suggest at least   moderate aortic stenosis.   Left ventricle systolic function appears impaired in the presence of a   cardiac arrhythmia. Left ventricle size and structure are within normal   limitations.   Definity was used to better visualize the endocardial border. septal,   apical wall hypokinesis   Estimated left ventricular ejection fraction is 40 %.   Normal appearing right ventricle structure and function.    < end of copied text >      RADIOLOGY & ADDITIONAL STUDIES:    CT Head No Cont (23 @ 20:30) >    Head CT: No CT evidence of acute intracranial hemorrhage.    C-spine CT:  No acute fracture.    CT Chest, Abdomen and Pelvis w/ IV Cont (23 @ 22:46) >  No acute posttraumatic injury in the chest. Postsurgical changes from   lobectomy. Reticular opacities in bilateral lower lobes may be scarring.   Interstitial lung disease can have similar appearance. No pneumothorax.    No acute posttraumatic organ injury in the abdomen or pelvis. No   retroperitoneal or intraperitoneal hematoma. No acute fractures of the   spine, bony pelvis or proximal femora.    Questionable contour irregularity of the fourth and fifth left anterior   rib which may relate to nondisplaced rib fracture versus sequela from   remote impact, correlate clinically.    Other chronic findings as above.   CHIEF COMPLAINT: Patient is a 88y old  Male who presents with a chief complaint of fall/syncope (22 Aug 2023 09:24)    FROM H&P: 89 y/o male with a PMHx of  MI  CAD s/p CABG, Cardiac Arrest, CHF, HTN, AICD, Lung Ca s/p Resection ,, peripheral artery disease, CKD, ,present, s/p lobectomy of lung, s/p carotid endarterectomy; presenting to the ED s/p unwitnessed  fall in bathroom, noted -LOC, -head strike, -blood thinner use. Patient states he suddenly felt dizzy while he was urinating, he lost his balance and fell; he called EMS through a medical emergency device around his neck and was wedged in between the toilet for about two hours. Patient c/o of lower back pain. Patient able to bear weight but feels unsteady. Denies weakness or numbness. Patient admits to EtOH consumption tonight, approximately 1 beer prior to fall. Also endorses knee pain, neck pain.    In ED found to be afebrile, hemodynamically stable , thrombocytopenia , hyponatremia EKG, CT Chest/abd/pelvis reviewed- no acute traumatic injury, head CT neg, Ct C spine neg for acute fx     PMH:  CAD s/p CABG, Cardiac Arrest, CHF, HTN, AICD, Lung Ca s/p Resection (reported to be in remission)  Surgical History: Left TKA, CABG, Lung mass/nodular resection  Family History; Mother  in 40s from breast ca. Father  age 80s from HD  Social History: Former TObacco/smoking. Quit 20 years ago. Former 40 PPD smoker. Daily 1-2 beers. Denies illicit drug use.     . Patient reported dizziness prior to syncopal event  Occ with SOB.    . Feeling better, +babesia PCR    : Feeling better, dizziness improved.    : no new complaints    .  RT leg pain, sensitive to tactile stimuli  Shin wound       MEDICATIONS:  MEDICATIONS  (STANDING):  aspirin enteric coated 81 milliGRAM(s) Oral daily  atorvastatin 10 milliGRAM(s) Oral at bedtime  atovaquone  Suspension 750 milliGRAM(s) Oral every 12 hours  azithromycin  IVPB 500 milliGRAM(s) IV Intermittent every 24 hours  azithromycin  IVPB      heparin   Injectable 5000 Unit(s) SubCutaneous every 8 hours  melatonin 5 milliGRAM(s) Oral at bedtime  metoprolol succinate ER 37.5 milliGRAM(s) Oral daily  pantoprazole    Tablet 40 milliGRAM(s) Oral before breakfast  sacubitril 24 mG/valsartan 26 mG 1 Tablet(s) Oral two times a day  sodium chloride 0.9%. 1000 milliLiter(s) (75 mL/Hr) IV Continuous <Continuous>    MEDICATIONS  (PRN):  acetaminophen     Tablet .. 650 milliGRAM(s) Oral every 6 hours PRN Mild Pain (1 - 3), Moderate Pain (4 - 6), Severe Pain (7 - 10)  aluminum hydroxide/magnesium hydroxide/simethicone Suspension 30 milliLiter(s) Oral every 4 hours PRN Dyspepsia  ondansetron Injectable 4 milliGRAM(s) IV Push every 8 hours PRN Nausea and/or Vomiting  oxyCODONE    IR 2.5 milliGRAM(s) Oral every 4 hours PRN Moderate Pain (4 - 6)  sodium chloride 0.65% Nasal 1 Spray(s) Both Nostrils two times a day PRN Nasal Congestion    HOME MEDICATIONS:  Ecotrin Adult Low Strength 81 mg oral delayed release tablet: 1 tab(s) orally once a day (22 Aug 2023 09:00)  Entresto 24 mg-26 mg oral tablet: 1 tab(s) orally 2 times a day (22 Aug 2023 09:01)  Farxiga 10 mg oral tablet: 1 tab(s) orally once a day (22 Aug 2023 09:01)  melatonin 5 mg oral tablet: 1 tab(s) orally once a day (at bedtime) (22 Aug 2023 09:00)  metoprolol succinate 25 mg oral tablet, extended release: 1.5 tab(s) orally once a day (at bedtime) (22 Aug 2023 09:04)  pantoprazole 40 mg oral delayed release tablet: 1 tab(s) orally once a day (22 Aug 2023 09:00)  pravastatin 40 mg oral tablet: 1 tab(s) orally once a day (at bedtime) (22 Aug 2023 09:00)    PHYSICAL EXAM:  T(C): 37.1 (28 Aug 2023 07:46), Max: 37.1 (28 Aug 2023 07:46)  T(F): 98.8 (28 Aug 2023 07:46), Max: 98.8 (28 Aug 2023 07:46)  HR: 92 (28 Aug 2023 07:46) (63 - 92)  BP: 127/77 (28 Aug 2023 07:46) (96/66 - 127/77)  RR: 18 (28 Aug 2023 07:46) (18 - 18)  SpO2: 100% (28 Aug 2023 07:46) (96% - 100%)    Parameters below as of 28 Aug 2023 07:46  Patient On (Oxygen Delivery Method): room air    Constitutional: NAD, awake and alert  HEENT: PERR, EOMI, Normal Hearing, MMM  Neck: Soft and supple, No LAD, No JVD  Respiratory: Breath sounds are clear bilaterally, No wheezing, rales or rhonchi  Cardiovascular: S1 and S2, regular rate and rhythm, no Murmurs, gallops or rubs  Gastrointestinal: Bowel Sounds present, soft, nontender, nondistended, no guarding, no rebound  Extremities: No peripheral edema  Vascular: 2+ peripheral pulses  Neurological: A/O x 3, no focal deficits  Musculoskeletal: 5/5 strength b/l upper and lower extremities  Skin: skin wounds    =======================================    INTERPRETATION OF TELEMETRY: Paced, run NSVT    ========================================    LABS:               12.7   4.49  )-----------( 82       ( 24 Aug 2023 06:29 )             36.5     08-24    131<L>  |  101  |  21  ----------------------------<  164<H>  4.2   |  23  |  1.12    Ca    8.1<L>      24 Aug 2023 06:29    TPro  5.9<L>  /  Alb  2.5<L>  /  TBili  1.3<H>  /  DBili  x   /  AST  28  /  ALT  20  /  AlkPhos  54  08-24    Troponin: 34.48 ng/L, Troponin: 52.41 ng/L    CARDIAC TESTING:    < from: TTE Echo Complete w/ Contrast w/ Doppler (23 @ 11:56) >   There is calcification of mitral valve leaflets. The leaflet opening is   restricted.   Mild (1+) mitral regurgitation is present.   EA reversal of the mitral inflow consistent with reduced compliance of   the   left ventricle.   The aortic valve appears severely calcified. Valve opening seems to be   restricted.   Transaortic gradients are underestimated due to impaired left ventricle   systolic function.   KACIE by continuity equation and dimensionless index (.29) suggest at least   moderate aortic stenosis.   Left ventricle systolic function appears impaired in the presence of a   cardiac arrhythmia. Left ventricle size and structure are within normal   limitations.   Definity was used to better visualize the endocardial border. septal,   apical wall hypokinesis   Estimated left ventricular ejection fraction is 40 %.   Normal appearing right ventricle structure and function.    < end of copied text >    Device check:  · Underlying Rhythm	SR with 7 month longevity  · Comments	Pt is aware of approaching JASMIN  · Additional Procedure Details	one event of NSVT , no therapy delivered        RADIOLOGY & ADDITIONAL STUDIES:    CT Head No Cont (23 @ 20:30) >    Head CT: No CT evidence of acute intracranial hemorrhage.    C-spine CT:  No acute fracture.    CT Chest, Abdomen and Pelvis w/ IV Cont (23 @ 22:46) >  No acute posttraumatic injury in the chest. Postsurgical changes from   lobectomy. Reticular opacities in bilateral lower lobes may be scarring.   Interstitial lung disease can have similar appearance. No pneumothorax.    No acute posttraumatic organ injury in the abdomen or pelvis. No   retroperitoneal or intraperitoneal hematoma. No acute fractures of the   spine, bony pelvis or proximal femora.    Questionable contour irregularity of the fourth and fifth left anterior   rib which may relate to nondisplaced rib fracture versus sequela from   remote impact, correlate clinically.    Other chronic findings as above.

## 2023-08-28 NOTE — PROGRESS NOTE ADULT - SUBJECTIVE AND OBJECTIVE BOX
87 y/o male with a PMHx of  MI  CAD s/p CABG, Cardiac Arrest, CHF, HTN, AICD, Lung Ca s/p Resection, peripheral artery disease, CKD, s/p lobectomy of lung, s/p carotid endarterectomy; presenting to the ED s/p unwitnessed  fall in bathroom concerning for syncope.  Patient states he suddenly felt dizzy while he was urinating, he lost his balance and fell.  He called EMS through a medical emergency device around his neck and was wedged in between the toilet for about two hours. Patient c/o of lower back pain. In ED found to be afebrile, hemodynamically stable , thrombocytopenia , hyponatremia EKG, CT Chest/abd/pelvis reviewed- no acute traumatic injury, head CT neg, Ct C spine neg for acute fx.      Work up inpatient revealed babesiosis.  Patient started on therapy.      8/26:  Pt seen.  Denies CP/ SOB.  No complaints.    8/27:  Pt seen.  Sitting in chair eating breakfast.  Feeling well.    8/28:  Pt see.  No new issues.      Vital Signs Last 24 Hrs  T(C): 37 (28 Aug 2023 15:32), Max: 37.1 (28 Aug 2023 07:46)  T(F): 98.6 (28 Aug 2023 15:32), Max: 98.8 (28 Aug 2023 07:46)  HR: 67 (28 Aug 2023 15:32) (63 - 92)  BP: 124/62 (28 Aug 2023 15:32) (96/66 - 127/77)  BP(mean): --  RR: 18 (28 Aug 2023 15:32) (18 - 18)  SpO2: 97% (28 Aug 2023 15:32) (97% - 100%)    Parameters below as of 28 Aug 2023 15:32  Patient On (Oxygen Delivery Method): room air    PHYSICAL EXAM:  GENERAL: NAD, sitting up in bed  HEAD:  Atraumatic, Normocephalic  EYES: EOMI, PERRLA, normal sclera  ENT: Moist mucous membranes  NECK: Supple, No JVD, no nuchal rigidity  CHEST/LUNG: Clear to auscultation bilaterally; No rales, rhonchi, wheezing, or rubs. Unlabored respirations  HEART: Regular rate and rhythm; +II/VI FIOR.    ABDOMEN: Bowel sounds present; Soft, Nontender, Nondistended. No hepatomegaly  EXTREMITIES:  trace edema.  small ulcer ant right leg.    NERVOUS SYSTEM:  Alert, follows command   SKIN: No rashes or lesions    08-27    134<L>  |  101  |  19  ----------------------------<  151<H>  4.4   |  30  |  1.27    Ca    9.4      27 Aug 2023 07:27  Phos  2.9     08-27  Mg     1.8     08-27                        14.5   5.29  )-----------( 173      ( 27 Aug 2023 07:27 )             42.7     Urinalysis Basic - ( 27 Aug 2023 07:27 )  Color: x / Appearance: x / SG: x / pH: x  Gluc: 151 mg/dL / Ketone: x  / Bili: x / Urobili: x   Blood: x / Protein: x / Nitrite: x   Leuk Esterase: x / RBC: x / WBC x   Sq Epi: x / Non Sq Epi: x / Bacteria: x      MEDICATIONS  (STANDING):  aspirin enteric coated 81 milliGRAM(s) Oral daily  atorvastatin 10 milliGRAM(s) Oral at bedtime  atovaquone  Suspension 750 milliGRAM(s) Oral every 12 hours  azithromycin  IVPB 500 milliGRAM(s) IV Intermittent every 24 hours  azithromycin  IVPB      heparin   Injectable 5000 Unit(s) SubCutaneous every 8 hours  melatonin 5 milliGRAM(s) Oral at bedtime  metoprolol succinate ER 37.5 milliGRAM(s) Oral daily  pantoprazole    Tablet 40 milliGRAM(s) Oral before breakfast  sacubitril 24 mG/valsartan 26 mG 1 Tablet(s) Oral two times a day  sodium chloride 0.9%. 1000 milliLiter(s) (75 mL/Hr) IV Continuous <Continuous>    MEDICATIONS  (PRN):  acetaminophen     Tablet .. 650 milliGRAM(s) Oral every 6 hours PRN Mild Pain (1 - 3), Moderate Pain (4 - 6), Severe Pain (7 - 10)  aluminum hydroxide/magnesium hydroxide/simethicone Suspension 30 milliLiter(s) Oral every 4 hours PRN Dyspepsia  ondansetron Injectable 4 milliGRAM(s) IV Push every 8 hours PRN Nausea and/or Vomiting  oxyCODONE    IR 2.5 milliGRAM(s) Oral every 4 hours PRN Moderate Pain (4 - 6)  sodium chloride 0.65% Nasal 1 Spray(s) Both Nostrils two times a day PRN Nasal Congestion

## 2023-08-28 NOTE — ADVANCED PRACTICE NURSE CONSULT - RECOMMEDATIONS
Right Shin Wound:  -Clean with Saline  -Pat Dry   -Apply Iodosorb to wound bed   -Cover with Dry Sterile dressing   -Wrap with Gila   Change every other day     -Continue turning/positioning patient from side-to-side q2h while in bed, q1h when/if OOB chair, or in accordance w/ pt's plan of care. Utilize pillows and/or Spry positioner pillow to assist w/ turning/positioning. When/if OOB chair, utilize pillows or chair cushion to offload pressure.   -Continue to offload heels from bed surface with soft pillow under calfs or by applying offloading boots to BLEs.   -Continue utilizing one underpad underneath patient to contain incontinence episodes; change pad when saturated/soiled.   -Continue nutrition consult for optimal wound healing & nutritional status.   -Assess skin/wound qshift, report changes to primary provider.   -recommend vascular follow-up outpatient   -Patient can follow-up at [facility] if patient wants [phone] for outpatient wound care     Plan of Care: Primary RN made aware of above recs. Spoke w/ Dr MIRNA Martell  in regards to above. No further needs/recs from [facility] service at this time. Staff RN to perform routine skin/wound assessment and manage wound care. Questions or concerns or if wound worsens and reconsult needed, please contact ON

## 2023-08-28 NOTE — PROGRESS NOTE ADULT - ASSESSMENT
89 y/o male with a PMHx of  MI  CAD s/p CABG, Cardiac Arrest, CHF, HTN, AICD, Lung Ca s/p Resection, peripheral artery disease, CKD, ,present, s/p lobectomy of lung, s/p carotid endarterectomy; presenting to the ED s/p unwitnessed  fall in bathroom, noted -LOC, -head strike, -blood thinner use. Patient states he suddenly felt dizzy while he was urinating, he lost his balance and fell; he called EMS through a medical emergency device around his neck and was wedged in between the toilet for about two hours. Patient c/o of lower back pain. Patient able to bear weight but feels unsteady. Denies weakness or numbness. Patient admits to EtOH consumption tonight, approximately 1 beer prior to fall. Also endorses knee pain, neck pain.    #Syncope - Suspect in the setting of +Babesia /tick bourne illness.   - Monitor on remote tele, run NSVT  - Troponin negative x2   - 8/22: TTE: Mod AS, EF 40%   - Qrthostatic vital signs still pending  - Interrogate device - run NSVT noted - can consider uptitrating BB - check OSVS first  - ID following - patient on Azithro and Mepron for Babesia     #Ischemic Cardiomyopathy. EF 35-40%. s/p ICD. Device Check reviewed.  Farxiga held - resume on DC. Run NSVT. Keep K4/MG2    #CAD. CABG. Continue aspirin and statin.     #Moderate AS. Stable. Monitoring.    #HTN. Chronic and stable. Continue Metoprolol and Entresto    #HLD. Continue statin. Check lipids.    #Carotid stenosis s/p endarterectomy. Continue aspirin and statin.    Case d/w Dr. Romo  OUTPATIENT FOLLOW UP ON 9/8 @1pm, will sign off.   87 y/o male with a PMHx of  MI  CAD s/p CABG, Cardiac Arrest, CHF, HTN, AICD, Lung Ca s/p Resection, peripheral artery disease, CKD, ,present, s/p lobectomy of lung, s/p carotid endarterectomy; presenting to the ED s/p unwitnessed  fall in bathroom, noted -LOC, -head strike, -blood thinner use. Patient states he suddenly felt dizzy while he was urinating, he lost his balance and fell; he called EMS through a medical emergency device around his neck and was wedged in between the toilet for about two hours. Patient c/o of lower back pain. Patient able to bear weight but feels unsteady. Denies weakness or numbness. Patient admits to EtOH consumption tonight, approximately 1 beer prior to fall. Also endorses knee pain, neck pain.    #Syncope - Suspect in the setting of +Babesia /tick bourne illness.   - Monitor on remote tele, run NSVT  - Troponin negative x2   - 8/22: TTE: Mod AS, EF 40%   - Qrthostatic vital signs still pending  - Interrogate device - run NSVT noted - can consider uptitrating BB - check OSVS first  - ID following - patient on Azithro and Mepron for Babesia     #Ischemic Cardiomyopathy. EF 35-40%. s/p ICD. Device Check reviewed.  Farxiga held - resume on DC. Run NSVT. Keep K4/MG2    #CAD. CABG. Continue aspirin and statin.     #Moderate AS. Stable. Monitoring.    #HTN. Chronic and stable. Continue Metoprolol and Entresto    #HLD. Continue statin. Check lipids.    #Carotid stenosis s/p endarterectomy. Continue aspirin and statin.    ** Given type of surgery and patient clinical risk factors he has at least 2 points with revised cardiac risk index which gives him approximately a 10% ~ 30 day risk of death MI or cardiac arrest . He is not in any overt CHF and his preop CR is <2 and his AS both on exam and by echo seems only mild to moderate   - Would cont B Blockers and statins periop, can hold Entresto 24 hours preop then resume post op      87 y/o male with a PMHx of  MI  CAD s/p CABG, Cardiac Arrest, CHF, HTN, AICD, Lung Ca s/p Resection, peripheral artery disease, CKD, ,present, s/p lobectomy of lung, s/p carotid endarterectomy; presenting to the ED s/p unwitnessed  fall in bathroom, noted -LOC, -head strike, -blood thinner use. Patient states he suddenly felt dizzy while he was urinating, he lost his balance and fell; he called EMS through a medical emergency device around his neck and was wedged in between the toilet for about two hours. Patient c/o of lower back pain. Patient able to bear weight but feels unsteady. Denies weakness or numbness. Patient admits to EtOH consumption tonight, approximately 1 beer prior to fall. Also endorses knee pain, neck pain.    #Syncope - Suspect in the setting of +Babesia /tick bourne illness.   - Monitor on remote tele, run NSVT  - Troponin negative x2   - 8/22: TTE: Mod AS, EF 40%   - Interrogate device - run NSVT noted   - ID following - patient on Azithro and Mepron for Babesia     #Ischemic Cardiomyopathy. EF 35-40%. s/p ICD. Device Check reviewed.  Farxiga held - resume on DC. Run NSVT. Keep K4/MG2    #CAD. CABG. Continue aspirin and statin.     #Moderate AS. Stable. Monitoring.    #HTN. Chronic and stable. Continue Metoprolol and Entresto    #HLD. Continue statin. Check lipids.    #Carotid stenosis s/p endarterectomy. Continue aspirin and statin.    ** Given type of surgery and patient clinical risk factors he has at least 2 points with revised cardiac risk index which gives him approximately a 10% ~ 30 day risk of death MI or cardiac arrest . He is not in any overt CHF and his preop CR is <2 and his AS both on exam and by echo seems only mild to moderate - Would cont B Blockers and statins periop, can hold Entresto 24 hours preop then resume post op     Case d/w Dr. Enrique  Will follow

## 2023-08-28 NOTE — ADVANCED PRACTICE NURSE CONSULT - ASSESSMENT
This is a 88 year old male admitted on 8/22/2023 and per MD note on 8/22 @ 09:24 "PMHx of  MI  CAD s/p CABG, Cardiac Arrest, CHF, HTN, AICD, Lung Ca s/p Resection ,, peripheral artery disease, CKD, ,present, s/p lobectomy of lung, s/p carotid endarterectomy; presenting to the ED s/p unwitnessed  fall in bathroom, noted -LOC, -head strike, -blood thinner use. Patient states he suddenly felt dizzy while he was urinating, he lost his balance and fell; he called EMS through a medical emergency device around his neck and was wedged in between the toilet for about two hours. Patient c/o of lower back pain. Patient able to bear weight but feels unsteady. Denies weakness or numbness. Patient admits to EtOH consumption tonight, approximately 1 beer prior to fall. Also endorses knee pain, neck pain."    Assessed patient on 2 SW  Vascular Team following patient.     Right Distal Shin wound 0.8cm x 0.5cm x 0.4cm with 100% with slough to wound bed and patient reports that this wound heals, scabs and fall off creating another wound. Noted right lower extremity from mid shin to foot with slight coolness and unable to palpate pedal pulse. Noted note from Vascular that patient may go for procedure see vascular note for more details. Periwound skin with erythema and pain to touch. Recommend Iodosorb to wound bed and cover with Telfa and secure with Gila change every other day. This will decrease bioburden and aid in wound cleaning especially with arterial wound.

## 2023-08-28 NOTE — PROGRESS NOTE ADULT - ASSESSMENT
87 yo M with extensive PMHX and hsitory of PAD. Vascular consulted for PAD and ulcer shin.  A dup done showed Multifocal plaque without duplex evidence of hemodynamically significant stenosis or occlusion in the arteries of the right lower extremity. Altered hemodynamics with low velocity tardus parvus blood flow suggestive of upstream flow limiting disease, likely at the level of the  right common or external iliac artery.   CTa:  R; no iliac stenosis, mod sev stenosis comm fem + severe stenosis at bifurcation comm fem + pop, no infra pop / L; mod stenosis comm iliac, severe stenosis at SFA + pop, no infra pop,    P:  -planning for R fem artery endarterectomy 8/31  - Cardio clearance/risk assessment appreciated; RCRI 2  - Medical clearance appreciated; medically optimized  - cont Iv hydration   -cont ASA/ H5K  - Wound care for shin wound management  - Rest of management as per primary team.    Plan to be discussed with Dr. Pineda

## 2023-08-28 NOTE — PROGRESS NOTE ADULT - SUBJECTIVE AND OBJECTIVE BOX
SURGERY DAILY PROGRESS NOTE:     Subjective:  Patient seen and examined at bedside during am rounds. Pt is doing well. Still complaining of worsening right leg pain.  No other complaints. AFVSS. Denies any fevers, chills, n/v/d, chest pain or shortness of breath    Objective:    MEDICATIONS  (STANDING):  aspirin enteric coated 81 milliGRAM(s) Oral daily  atorvastatin 10 milliGRAM(s) Oral at bedtime  atovaquone  Suspension 750 milliGRAM(s) Oral every 12 hours  azithromycin  IVPB      azithromycin  IVPB 500 milliGRAM(s) IV Intermittent every 24 hours  heparin   Injectable 5000 Unit(s) SubCutaneous every 8 hours  melatonin 5 milliGRAM(s) Oral at bedtime  metoprolol succinate ER 37.5 milliGRAM(s) Oral daily  pantoprazole    Tablet 40 milliGRAM(s) Oral before breakfast  sacubitril 24 mG/valsartan 26 mG 1 Tablet(s) Oral two times a day  sodium chloride 0.9%. 1000 milliLiter(s) (75 mL/Hr) IV Continuous <Continuous>    MEDICATIONS  (PRN):  acetaminophen     Tablet .. 650 milliGRAM(s) Oral every 6 hours PRN Mild Pain (1 - 3), Moderate Pain (4 - 6), Severe Pain (7 - 10)  aluminum hydroxide/magnesium hydroxide/simethicone Suspension 30 milliLiter(s) Oral every 4 hours PRN Dyspepsia  ondansetron Injectable 4 milliGRAM(s) IV Push every 8 hours PRN Nausea and/or Vomiting  sodium chloride 0.65% Nasal 1 Spray(s) Both Nostrils two times a day PRN Nasal Congestion      ICU Vital Signs Last 24 Hrs  T(C): 37.1 (28 Aug 2023 07:46), Max: 37.1 (28 Aug 2023 07:46)  T(F): 98.8 (28 Aug 2023 07:46), Max: 98.8 (28 Aug 2023 07:46)  HR: 92 (28 Aug 2023 07:46) (63 - 92)  BP: 127/77 (28 Aug 2023 07:46) (96/66 - 127/77)  BP(mean): --  ABP: --  ABP(mean): --  RR: 18 (28 Aug 2023 07:46) (18 - 18)  SpO2: 100% (28 Aug 2023 07:46) (96% - 100%)    O2 Parameters below as of 28 Aug 2023 07:46  Patient On (Oxygen Delivery Method): room air              PHYSICAL EXAM   Gen: well-appearing, in no acute distress  CV: pulse regularly present   Resp: airway patent, non-labored breathing  ABD:  soft, nontender, non distended, no rebound, no guarding, no rigidity  Extremity: Pt demonstrates grossly intact sensoromotor function. Right mid shin ulcer; 1 cm diameter, mild erythema surronding. B/L dopplerable DP and PT pulses. Non palpable femoral pulse b/l.

## 2023-08-28 NOTE — PROVIDER CONTACT NOTE (CRITICAL VALUE NOTIFICATION) - ACTION/TREATMENT ORDERED:
MD aware, no change in treatment plan.
MD and ID MD already aware, placed on appropriate antibiotics.

## 2023-08-28 NOTE — PROGRESS NOTE ADULT - ASSESSMENT
87 y/o male with a PMHx of  CAD s/p CABG, Cardiac Arrest, CHF with EF ~40%, HTN, AICD, Lung Ca s/p Resection, peripheral artery disease, CKD, s/p lobectomy of lung, s/p carotid endarterectomy; presenting to the ED s/p unwitnessed  fall in bathroom concerning for syncope.      #Syncope/ Fall:    Suspect more vasovagal related to infection.  Babesiosis.    Appreciate cardio eval.      #Babesiosis:    Cont mepron 750mg BID/azithromycin 500mg   Day 6 of 10 day course  Improved.    PLT improving.    Other tick related illness negative.   Repeat % babesia.       #RLE ulcer present on admission:    US and CT angio with significant PVD.    Appreciate vascular eval.    Plan for right femoral endarterectomy 8/30.    Patient overall is moderate to high risk for surgery.    Appreciate cardio eval.    Recommend cont all cardiac meds pre/ intraop.    Hold entresto 24 hours prior to OR.    ECHO reviewed-- moderate AS and EF 40%.    Patient with babesiosis but improving on treatment.    Patient medically optimized for OR.      #Ischemic Cardiomyopathy:    Cont BB.    Cont entresto.    ECHO with EF 40%.    Moderate AS.      #Nonsustained VT:    Cont BB.      #Thrombocytopenia and anemia:    Secondary to babesiosis.    Improving.      #Liver cirrhosis /splenomegaly:    Suspect related to ETOH use.  F/u outpatient.      #Hyponatremia:    Improved.  Hypovolemic.      #Lung Cancer:  stable.    S/p lobectomy.      #Lower back pain s/p fall  improved   PT eval    #CKD stable   hx CAD/PAD/ICD/Lung CA /CHF stable  cw home meds    #DVT Proph:  Heparin SQ.      D/w Michelet Santos [phone]

## 2023-08-29 LAB
ANION GAP SERPL CALC-SCNC: 2 MMOL/L — LOW (ref 5–17)
BABESIA MICROTI PCR, BLD RESULT: DETECTED
BASOPHILS # BLD AUTO: 0.05 K/UL — SIGNIFICANT CHANGE UP (ref 0–0.2)
BASOPHILS NFR BLD AUTO: 0.8 % — SIGNIFICANT CHANGE UP (ref 0–2)
BLD GP AB SCN SERPL QL: SIGNIFICANT CHANGE UP
BUN SERPL-MCNC: 19 MG/DL — SIGNIFICANT CHANGE UP (ref 7–23)
CALCIUM SERPL-MCNC: 9.5 MG/DL — SIGNIFICANT CHANGE UP (ref 8.5–10.1)
CHLORIDE SERPL-SCNC: 103 MMOL/L — SIGNIFICANT CHANGE UP (ref 96–108)
CO2 SERPL-SCNC: 29 MMOL/L — SIGNIFICANT CHANGE UP (ref 22–31)
CREAT SERPL-MCNC: 1.35 MG/DL — HIGH (ref 0.5–1.3)
CULTURE RESULTS: SIGNIFICANT CHANGE UP
EGFR: 50 ML/MIN/1.73M2 — LOW
EOSINOPHIL # BLD AUTO: 0.16 K/UL — SIGNIFICANT CHANGE UP (ref 0–0.5)
EOSINOPHIL NFR BLD AUTO: 2.6 % — SIGNIFICANT CHANGE UP (ref 0–6)
GLUCOSE SERPL-MCNC: 143 MG/DL — HIGH (ref 70–99)
HCT VFR BLD CALC: 38.8 % — LOW (ref 39–50)
HGB BLD-MCNC: 12.9 G/DL — LOW (ref 13–17)
IMM GRANULOCYTES NFR BLD AUTO: 1.3 % — HIGH (ref 0–0.9)
LYMPHOCYTES # BLD AUTO: 1.52 K/UL — SIGNIFICANT CHANGE UP (ref 1–3.3)
LYMPHOCYTES # BLD AUTO: 24.8 % — SIGNIFICANT CHANGE UP (ref 13–44)
MAGNESIUM SERPL-MCNC: 1.8 MG/DL — SIGNIFICANT CHANGE UP (ref 1.6–2.6)
MCHC RBC-ENTMCNC: 30.1 PG — SIGNIFICANT CHANGE UP (ref 27–34)
MCHC RBC-ENTMCNC: 33.2 GM/DL — SIGNIFICANT CHANGE UP (ref 32–36)
MCV RBC AUTO: 90.7 FL — SIGNIFICANT CHANGE UP (ref 80–100)
MONOCYTES # BLD AUTO: 0.82 K/UL — SIGNIFICANT CHANGE UP (ref 0–0.9)
MONOCYTES NFR BLD AUTO: 13.4 % — SIGNIFICANT CHANGE UP (ref 2–14)
NEUTROPHILS # BLD AUTO: 3.51 K/UL — SIGNIFICANT CHANGE UP (ref 1.8–7.4)
NEUTROPHILS NFR BLD AUTO: 57.1 % — SIGNIFICANT CHANGE UP (ref 43–77)
PHOSPHATE SERPL-MCNC: 2.8 MG/DL — SIGNIFICANT CHANGE UP (ref 2.5–4.5)
PLATELET # BLD AUTO: 194 K/UL — SIGNIFICANT CHANGE UP (ref 150–400)
POTASSIUM SERPL-MCNC: 5 MMOL/L — SIGNIFICANT CHANGE UP (ref 3.5–5.3)
POTASSIUM SERPL-SCNC: 5 MMOL/L — SIGNIFICANT CHANGE UP (ref 3.5–5.3)
RBC # BLD: 4.28 M/UL — SIGNIFICANT CHANGE UP (ref 4.2–5.8)
RBC # FLD: 17 % — HIGH (ref 10.3–14.5)
SODIUM SERPL-SCNC: 134 MMOL/L — LOW (ref 135–145)
SPECIMEN SOURCE: SIGNIFICANT CHANGE UP
WBC # BLD: 6.14 K/UL — SIGNIFICANT CHANGE UP (ref 3.8–10.5)
WBC # FLD AUTO: 6.14 K/UL — SIGNIFICANT CHANGE UP (ref 3.8–10.5)

## 2023-08-29 PROCEDURE — 99233 SBSQ HOSP IP/OBS HIGH 50: CPT

## 2023-08-29 PROCEDURE — 93010 ELECTROCARDIOGRAM REPORT: CPT

## 2023-08-29 RX ORDER — SODIUM CHLORIDE 9 MG/ML
1000 INJECTION INTRAMUSCULAR; INTRAVENOUS; SUBCUTANEOUS
Refills: 0 | Status: DISCONTINUED | OUTPATIENT
Start: 2023-08-29 | End: 2023-08-31

## 2023-08-29 RX ADMIN — ATORVASTATIN CALCIUM 10 MILLIGRAM(S): 80 TABLET, FILM COATED ORAL at 21:13

## 2023-08-29 RX ADMIN — Medication 81 MILLIGRAM(S): at 09:23

## 2023-08-29 RX ADMIN — ATOVAQUONE 750 MILLIGRAM(S): 750 SUSPENSION ORAL at 09:25

## 2023-08-29 RX ADMIN — HEPARIN SODIUM 5000 UNIT(S): 5000 INJECTION INTRAVENOUS; SUBCUTANEOUS at 13:04

## 2023-08-29 RX ADMIN — OXYCODONE HYDROCHLORIDE 2.5 MILLIGRAM(S): 5 TABLET ORAL at 00:05

## 2023-08-29 RX ADMIN — Medication 650 MILLIGRAM(S): at 11:39

## 2023-08-29 RX ADMIN — SODIUM CHLORIDE 75 MILLILITER(S): 9 INJECTION INTRAMUSCULAR; INTRAVENOUS; SUBCUTANEOUS at 21:14

## 2023-08-29 RX ADMIN — Medication 37.5 MILLIGRAM(S): at 09:24

## 2023-08-29 RX ADMIN — HEPARIN SODIUM 5000 UNIT(S): 5000 INJECTION INTRAVENOUS; SUBCUTANEOUS at 21:13

## 2023-08-29 RX ADMIN — HEPARIN SODIUM 5000 UNIT(S): 5000 INJECTION INTRAVENOUS; SUBCUTANEOUS at 05:02

## 2023-08-29 RX ADMIN — AZITHROMYCIN 255 MILLIGRAM(S): 500 TABLET, FILM COATED ORAL at 11:22

## 2023-08-29 RX ADMIN — Medication 650 MILLIGRAM(S): at 21:43

## 2023-08-29 RX ADMIN — Medication 650 MILLIGRAM(S): at 21:13

## 2023-08-29 RX ADMIN — OXYCODONE HYDROCHLORIDE 2.5 MILLIGRAM(S): 5 TABLET ORAL at 09:30

## 2023-08-29 RX ADMIN — PANTOPRAZOLE SODIUM 40 MILLIGRAM(S): 20 TABLET, DELAYED RELEASE ORAL at 09:25

## 2023-08-29 RX ADMIN — ATOVAQUONE 750 MILLIGRAM(S): 750 SUSPENSION ORAL at 21:13

## 2023-08-29 RX ADMIN — Medication 5 MILLIGRAM(S): at 21:13

## 2023-08-29 RX ADMIN — OXYCODONE HYDROCHLORIDE 2.5 MILLIGRAM(S): 5 TABLET ORAL at 10:30

## 2023-08-29 RX ADMIN — Medication 650 MILLIGRAM(S): at 12:37

## 2023-08-29 NOTE — PROGRESS NOTE ADULT - SUBJECTIVE AND OBJECTIVE BOX
89 y/o male with a PMHx of  MI  CAD s/p CABG, Cardiac Arrest, CHF, HTN, AICD, Lung Ca s/p Resection, peripheral artery disease, CKD, s/p lobectomy of lung, s/p carotid endarterectomy; presenting to the ED s/p unwitnessed  fall in bathroom concerning for syncope.  Patient states he suddenly felt dizzy while he was urinating, he lost his balance and fell.  He called EMS through a medical emergency device around his neck and was wedged in between the toilet for about two hours. Patient c/o of lower back pain. In ED found to be afebrile, hemodynamically stable , thrombocytopenia , hyponatremia EKG, CT Chest/abd/pelvis reviewed- no acute traumatic injury, head CT neg, Ct C spine neg for acute fx.      Work up inpatient revealed babesiosis.  Patient started on therapy.      8/26:  Pt seen.  Denies CP/ SOB.  No complaints.    8/27:  Pt seen.  Sitting in chair eating breakfast.  Feeling well.    8/28:  Pt see.  No new issues.      Vital Signs Last 24 Hrs  T(C): 37 (28 Aug 2023 15:32), Max: 37.1 (28 Aug 2023 07:46)  T(F): 98.6 (28 Aug 2023 15:32), Max: 98.8 (28 Aug 2023 07:46)  HR: 67 (28 Aug 2023 15:32) (63 - 92)  BP: 124/62 (28 Aug 2023 15:32) (96/66 - 127/77)  BP(mean): --  RR: 18 (28 Aug 2023 15:32) (18 - 18)  SpO2: 97% (28 Aug 2023 15:32) (97% - 100%)    Parameters below as of 28 Aug 2023 15:32  Patient On (Oxygen Delivery Method): room air    PHYSICAL EXAM:  GENERAL: NAD, sitting up in bed  HEAD:  Atraumatic, Normocephalic  EYES: EOMI, PERRLA, normal sclera  ENT: Moist mucous membranes  NECK: Supple, No JVD, no nuchal rigidity  CHEST/LUNG: Clear to auscultation bilaterally; No rales, rhonchi, wheezing, or rubs. Unlabored respirations  HEART: Regular rate and rhythm; +II/VI FIOR.    ABDOMEN: Bowel sounds present; Soft, Nontender, Nondistended. No hepatomegaly  EXTREMITIES:  trace edema.  small ulcer ant right leg.    NERVOUS SYSTEM:  Alert, follows command   SKIN: No rashes or lesions    08-27    134<L>  |  101  |  19  ----------------------------<  151<H>  4.4   |  30  |  1.27    Ca    9.4      27 Aug 2023 07:27  Phos  2.9     08-27  Mg     1.8     08-27                        14.5   5.29  )-----------( 173      ( 27 Aug 2023 07:27 )             42.7     Urinalysis Basic - ( 27 Aug 2023 07:27 )  Color: x / Appearance: x / SG: x / pH: x  Gluc: 151 mg/dL / Ketone: x  / Bili: x / Urobili: x   Blood: x / Protein: x / Nitrite: x   Leuk Esterase: x / RBC: x / WBC x   Sq Epi: x / Non Sq Epi: x / Bacteria: x      MEDICATIONS  (STANDING):  aspirin enteric coated 81 milliGRAM(s) Oral daily  atorvastatin 10 milliGRAM(s) Oral at bedtime  atovaquone  Suspension 750 milliGRAM(s) Oral every 12 hours  azithromycin  IVPB 500 milliGRAM(s) IV Intermittent every 24 hours  azithromycin  IVPB      heparin   Injectable 5000 Unit(s) SubCutaneous every 8 hours  melatonin 5 milliGRAM(s) Oral at bedtime  metoprolol succinate ER 37.5 milliGRAM(s) Oral daily  pantoprazole    Tablet 40 milliGRAM(s) Oral before breakfast  sacubitril 24 mG/valsartan 26 mG 1 Tablet(s) Oral two times a day  sodium chloride 0.9%. 1000 milliLiter(s) (75 mL/Hr) IV Continuous <Continuous>    MEDICATIONS  (PRN):  acetaminophen     Tablet .. 650 milliGRAM(s) Oral every 6 hours PRN Mild Pain (1 - 3), Moderate Pain (4 - 6), Severe Pain (7 - 10)  aluminum hydroxide/magnesium hydroxide/simethicone Suspension 30 milliLiter(s) Oral every 4 hours PRN Dyspepsia  ondansetron Injectable 4 milliGRAM(s) IV Push every 8 hours PRN Nausea and/or Vomiting  oxyCODONE    IR 2.5 milliGRAM(s) Oral every 4 hours PRN Moderate Pain (4 - 6)  sodium chloride 0.65% Nasal 1 Spray(s) Both Nostrils two times a day PRN Nasal Congestion

## 2023-08-29 NOTE — PROGRESS NOTE ADULT - SUBJECTIVE AND OBJECTIVE BOX
CHIEF COMPLAINT: Patient is a 88y old  Male who presents with a chief complaint of fall/syncope (22 Aug 2023 09:24)    FROM H&P: 87 y/o male with a PMHx of  MI  CAD s/p CABG, Cardiac Arrest, CHF, HTN, AICD, Lung Ca s/p Resection ,, peripheral artery disease, CKD, ,present, s/p lobectomy of lung, s/p carotid endarterectomy; presenting to the ED s/p unwitnessed  fall in bathroom, noted -LOC, -head strike, -blood thinner use. Patient states he suddenly felt dizzy while he was urinating, he lost his balance and fell; he called EMS through a medical emergency device around his neck and was wedged in between the toilet for about two hours. Patient c/o of lower back pain. Patient able to bear weight but feels unsteady. Denies weakness or numbness. Patient admits to EtOH consumption tonight, approximately 1 beer prior to fall. Also endorses knee pain, neck pain.    In ED found to be afebrile, hemodynamically stable , thrombocytopenia , hyponatremia EKG, CT Chest/abd/pelvis reviewed- no acute traumatic injury, head CT neg, Ct C spine neg for acute fx     PMH:  CAD s/p CABG, Cardiac Arrest, CHF, HTN, AICD, Lung Ca s/p Resection (reported to be in remission)  Surgical History: Left TKA, CABG, Lung mass/nodular resection  Family History; Mother  in 40s from breast ca. Father  age 80s from HD  Social History: Former TObacco/smoking. Quit 20 years ago. Former 40 PPD smoker. Daily 1-2 beers. Denies illicit drug use.     . Patient reported dizziness prior to syncopal event  Occ with SOB.    . Feeling better, +babesia PCR    : Feeling better, dizziness improved.    : no new complaints    .  RT leg pain, sensitive to tactile stimuli  Spangler wound     .    PAST MEDICAL HISTORY:  CAD (coronary artery disease)   Cardiac arrest with successful resuscitation   CKD (chronic kidney disease)   Heart failure   Hypertension   Lung cancer   PAD (peripheral artery disease).     PAST SURGICAL HISTORY:  AICD (automatic cardioverter/defibrillator) present   H/O left knee surgery   History of appendectomy   History of lobectomy of lung   History of tonsillectomy and adenoidectomy   S/P carotid endarterectomy.    SOCIAL HISTORY:  1-2  beers daily    MEDICATIONS:  aspirin enteric coated 81 milliGRAM(s) Oral daily  atorvastatin 10 milliGRAM(s) Oral at bedtime  atovaquone  Suspension 750 milliGRAM(s) Oral every 12 hours  azithromycin  IVPB 500 milliGRAM(s) IV Intermittent every 24 hours  azithromycin  IVPB      cadexomer iodine 0.9% Gel 1 Application(s) Topical <User Schedule>  heparin   Injectable 5000 Unit(s) SubCutaneous every 8 hours  melatonin 5 milliGRAM(s) Oral at bedtime  metoprolol succinate ER 37.5 milliGRAM(s) Oral daily  pantoprazole    Tablet 40 milliGRAM(s) Oral before breakfast  sacubitril 24 mG/valsartan 26 mG 1 Tablet(s) Oral two times a day  sodium chloride 0.9%. 1000 milliLiter(s) (75 mL/Hr) IV Continuous <Continuous>    MEDICATIONS  (PRN):  acetaminophen     Tablet .. 650 milliGRAM(s) Oral every 6 hours PRN Mild Pain (1 - 3), Moderate Pain (4 - 6), Severe Pain (7 - 10)  aluminum hydroxide/magnesium hydroxide/simethicone Suspension 30 milliLiter(s) Oral every 4 hours PRN Dyspepsia  ondansetron Injectable 4 milliGRAM(s) IV Push every 8 hours PRN Nausea and/or Vomiting  oxyCODONE    IR 2.5 milliGRAM(s) Oral every 4 hours PRN Moderate Pain (4 - 6)  sodium chloride 0.65% Nasal 1 Spray(s) Both Nostrils two times a day PRN Nasal Congestion    HOME MEDICATIONS:  Ecotrin Adult Low Strength 81 mg oral delayed release tablet: 1 tab(s) orally once a day (22 Aug 2023 09:00)  Entresto 24 mg-26 mg oral tablet: 1 tab(s) orally 2 times a day (22 Aug 2023 09:01)  Farxiga 10 mg oral tablet: 1 tab(s) orally once a day (22 Aug 2023 09:01)  melatonin 5 mg oral tablet: 1 tab(s) orally once a day (at bedtime) (22 Aug 2023 09:00)  metoprolol succinate 25 mg oral tablet, extended release: 1.5 tab(s) orally once a day (at bedtime) (22 Aug 2023 09:04)  pantoprazole 40 mg oral delayed release tablet: 1 tab(s) orally once a day (22 Aug 2023 09:00)  pravastatin 40 mg oral tablet: 1 tab(s) orally once a day (at bedtime) (22 Aug 2023 09:00)    PHYSICAL EXAM:  T(C): 36.5 (28 Aug 2023 23:42), Max: 37 (28 Aug 2023 15:32)  T(F): 97.7 (28 Aug 2023 23:42), Max: 98.6 (28 Aug 2023 15:32)  HR: 75 (28 Aug 2023 23:42) (67 - 77)  BP: 115/64 (28 Aug 2023 23:42) (93/62 - 124/62)  RR: 18 (28 Aug 2023 23:42) (16 - 18)  SpO2: 95% (28 Aug 2023 23:42) (95% - 98%)    Parameters below as of 28 Aug 2023 23:42  Patient On (Oxygen Delivery Method): room air    Constitutional: NAD, awake and alert  HEENT: PERR, EOMI, Normal Hearing, MMM  Neck: Soft and supple, No LAD, No JVD  Respiratory: Breath sounds are clear bilaterally, No wheezing, rales or rhonchi  Cardiovascular: S1 and S2, regular rate and rhythm, no Murmurs, gallops or rubs  Gastrointestinal: Bowel Sounds present, soft, nontender, nondistended, no guarding, no rebound  Extremities: No peripheral edema  Vascular: 2+ peripheral pulses  Neurological: A/O x 3, no focal deficits  Musculoskeletal: 5/5 strength b/l upper and lower extremities  Skin: No rashes    =======================================    INTERPRETATION OF TELEMETRY: Paced, run NSVT    ECG: Ordered, pending    ========================================    LABS:               12.9   6.14  )-----------( 194      ( 29 Aug 2023 06:16 )             38.8     08-29    134<L>  |  103  |  19  ----------------------------<  143<H>  5.0   |  29  |  1.35<H>    Ca    9.5      29 Aug 2023 06:16  Phos  2.8       Mg     1.8           HDL 10 LDL 73    Troponin neg x2   BNP 95    + Babesia PCR    CARDIAC TESTIN/22/23: TTE Echo Complete w/ Contrast w/ Doppler: There is calcification of mitral valve leaflets. The leaflet opening is restricted. Mild (1+) mitral regurgitation is present. EA reversal of the mitral inflow consistent with reduced compliance of the left ventricle. The aortic valve appears severely calcified. Valve opening seems to be restricted. Transaortic gradients are underestimated due to impaired left ventricle systolic function. KACIE by continuity equation and dimensionless index (.29) suggest at least moderate aortic stenosis. Left ventricle systolic function appears impaired in the presence of a cardiac arrhythmia. Left ventricle size and structure are within normal limitations. Definity was used to better visualize the endocardial border. septal , apical wall hypokinesis Estimated left ventricular ejection fraction is 40 %. Normal appearing right ventricle structure and function.    RADIOLOGY & ADDITIONAL STUDIES:    23: CT Angio Abd Aorta w/run-off w/ IV Cont (23 @ 08:56) > Right iliac and lower extremity CT angiogram: No right iliac stenosis. Moderately severe stenosis of the right common femoral artery and severe stenosis at the bifurcation of the right common femoral artery. Multiple severe popliteal artery stenoses. No significant infrapopliteal disease with three-vessel runoff to the right foot. Left iliac and lower extremity CT angiogram: Moderate stenosis of the left common iliac artery. Severe stenosis in the proximal superficial femoral artery. Multiple severe popliteal artery stenoses. No significant infrapopliteal disease with three-vessel runoff to the foot.     23: US Duplex Arterial Lower Ext Ltd, Right (23 @ 11:33) > Multifocal plaque without duplex evidence of hemodynamically significant stenosis or occlusion in the arteries of the right lower extremity. Altered hemodynamics with low velocity tardus parvus blood flow suggestive of upstream flow limiting disease, likely at the level of the right common or external iliac artery. Consider CTA of the aorta and runoff for further evaluation.     23: CT Head No Cont (23 @ 20:30) > Head CT: No CT evidence of acute intracranial hemorrhage. C-spine CT:  No acute fracture. CT Chest, Abdomen and Pelvis w/ IV Cont (23 @ 22:46) > No acute posttraumatic injury in the chest. Postsurgical changes from lobectomy. Reticular opacities in bilateral lower lobes may be scarring. Interstitial lung disease can have similar appearance. No pneumothorax. No acute posttraumatic organ injury in the abdomen or pelvis. No retroperitoneal or intraperitoneal hematoma. No acute fractures of the spine, bony pelvis or proximal femora. Questionable contour irregularity of the fourth and fifth left anterior rib which may relate to nondisplaced rib fracture versus sequela from remote impact, correlate clinically. Other chronic findings as above. CHIEF COMPLAINT: Patient is a 88y old  Male who presents with a chief complaint of fall/syncope (22 Aug 2023 09:24)    FROM H&P: 89 y/o male with a PMHx of  MI  CAD s/p CABG, Cardiac Arrest, CHF, HTN, AICD, Lung Ca s/p Resection ,, peripheral artery disease, CKD, ,present, s/p lobectomy of lung, s/p carotid endarterectomy; presenting to the ED s/p unwitnessed  fall in bathroom, noted -LOC, -head strike, -blood thinner use. Patient states he suddenly felt dizzy while he was urinating, he lost his balance and fell; he called EMS through a medical emergency device around his neck and was wedged in between the toilet for about two hours. Patient c/o of lower back pain. Patient able to bear weight but feels unsteady. Denies weakness or numbness. Patient admits to EtOH consumption tonight, approximately 1 beer prior to fall. Also endorses knee pain, neck pain.    In ED found to be afebrile, hemodynamically stable , thrombocytopenia , hyponatremia EKG, CT Chest/abd/pelvis reviewed- no acute traumatic injury, head CT neg, Ct C spine neg for acute fx     PMH:  CAD s/p CABG, Cardiac Arrest, CHF, HTN, AICD, Lung Ca s/p Resection (reported to be in remission)  Surgical History: Left TKA, CABG, Lung mass/nodular resection  Family History; Mother  in 40s from breast ca. Father  age 80s from HD  Social History: Former TObacco/smoking. Quit 20 years ago. Former 40 PPD smoker. Daily 1-2 beers. Denies illicit drug use.     . Patient reported dizziness prior to syncopal event  Occ with SOB.    . Feeling better, +babesia PCR    : Feeling better, dizziness improved.    : no new complaints    .  RT leg pain, sensitive to tactile stimuli  Spangler wound     . Persistent RT leg pain  Discussed with son lamberto, plan for vascular surgery given progressive symptoms.    PAST MEDICAL HISTORY:  CAD (coronary artery disease)   Cardiac arrest with successful resuscitation   CKD (chronic kidney disease)   Heart failure   Hypertension   Lung cancer   PAD (peripheral artery disease).     PAST SURGICAL HISTORY:  AICD (automatic cardioverter/defibrillator) present   H/O left knee surgery   History of appendectomy   History of lobectomy of lung   History of tonsillectomy and adenoidectomy   S/P carotid endarterectomy.    SOCIAL HISTORY:  1-2  beers daily    MEDICATIONS:  aspirin enteric coated 81 milliGRAM(s) Oral daily  atorvastatin 10 milliGRAM(s) Oral at bedtime  atovaquone  Suspension 750 milliGRAM(s) Oral every 12 hours  azithromycin  IVPB 500 milliGRAM(s) IV Intermittent every 24 hours  azithromycin  IVPB      cadexomer iodine 0.9% Gel 1 Application(s) Topical <User Schedule>  heparin   Injectable 5000 Unit(s) SubCutaneous every 8 hours  melatonin 5 milliGRAM(s) Oral at bedtime  metoprolol succinate ER 37.5 milliGRAM(s) Oral daily  pantoprazole    Tablet 40 milliGRAM(s) Oral before breakfast  sacubitril 24 mG/valsartan 26 mG 1 Tablet(s) Oral two times a day  sodium chloride 0.9%. 1000 milliLiter(s) (75 mL/Hr) IV Continuous <Continuous>    MEDICATIONS  (PRN):  acetaminophen     Tablet .. 650 milliGRAM(s) Oral every 6 hours PRN Mild Pain (1 - 3), Moderate Pain (4 - 6), Severe Pain (7 - 10)  aluminum hydroxide/magnesium hydroxide/simethicone Suspension 30 milliLiter(s) Oral every 4 hours PRN Dyspepsia  ondansetron Injectable 4 milliGRAM(s) IV Push every 8 hours PRN Nausea and/or Vomiting  oxyCODONE    IR 2.5 milliGRAM(s) Oral every 4 hours PRN Moderate Pain (4 - 6)  sodium chloride 0.65% Nasal 1 Spray(s) Both Nostrils two times a day PRN Nasal Congestion    HOME MEDICATIONS:  Ecotrin Adult Low Strength 81 mg oral delayed release tablet: 1 tab(s) orally once a day (22 Aug 2023 09:00)  Entresto 24 mg-26 mg oral tablet: 1 tab(s) orally 2 times a day (22 Aug 2023 09:01)  Farxiga 10 mg oral tablet: 1 tab(s) orally once a day (22 Aug 2023 09:01)  melatonin 5 mg oral tablet: 1 tab(s) orally once a day (at bedtime) (22 Aug 2023 09:00)  metoprolol succinate 25 mg oral tablet, extended release: 1.5 tab(s) orally once a day (at bedtime) (22 Aug 2023 09:04)  pantoprazole 40 mg oral delayed release tablet: 1 tab(s) orally once a day (22 Aug 2023 09:00)  pravastatin 40 mg oral tablet: 1 tab(s) orally once a day (at bedtime) (22 Aug 2023 09:00)    PHYSICAL EXAM:  T(C): 36.5 (28 Aug 2023 23:42), Max: 37 (28 Aug 2023 15:32)  T(F): 97.7 (28 Aug 2023 23:42), Max: 98.6 (28 Aug 2023 15:32)  HR: 75 (28 Aug 2023 23:42) (67 - 77)  BP: 115/64 (28 Aug 2023 23:42) (93/62 - 124/62)  RR: 18 (28 Aug 2023 23:42) (16 - 18)  SpO2: 95% (28 Aug 2023 23:42) (95% - 98%)    Parameters below as of 28 Aug 2023 23:42  Patient On (Oxygen Delivery Method): room air    Constitutional: NAD, awake and alert  HEENT: PERR, EOMI, Normal Hearing, MMM  Neck: Soft and supple, No LAD, No JVD  Respiratory: Breath sounds are clear bilaterally, No wheezing, rales or rhonchi  Cardiovascular: S1 and S2, regular rate and rhythm, no Murmurs, gallops or rubs  Gastrointestinal: Bowel Sounds present, soft, nontender, nondistended, no guarding, no rebound  Extremities: No peripheral edema  Vascular: 2+ peripheral pulses  Neurological: A/O x 3, no focal deficits  Musculoskeletal: 5/5 strength b/l upper and lower extremities  Skin: No rashes    =======================================    INTERPRETATION OF TELEMETRY: Paced, run NSVT    ECG: Ordered, pending    ========================================    LABS:               12.9   6.14  )-----------( 194      ( 29 Aug 2023 06:16 )             38.8     08-29    134<L>  |  103  |  19  ----------------------------<  143<H>  5.0   |  29  |  1.35<H>    Ca    9.5      29 Aug 2023 06:16  Phos  2.8       Mg     1.8           HDL 10 LDL 73    Troponin neg x2   BNP 95    + Babesia PCR    CARDIAC TESTIN/22/23: TTE Echo Complete w/ Contrast w/ Doppler: There is calcification of mitral valve leaflets. The leaflet opening is restricted. Mild (1+) mitral regurgitation is present. EA reversal of the mitral inflow consistent with reduced compliance of the left ventricle. The aortic valve appears severely calcified. Valve opening seems to be restricted. Transaortic gradients are underestimated due to impaired left ventricle systolic function. KACIE by continuity equation and dimensionless index (.29) suggest at least moderate aortic stenosis. Left ventricle systolic function appears impaired in the presence of a cardiac arrhythmia. Left ventricle size and structure are within normal limitations. Definity was used to better visualize the endocardial border. septal , apical wall hypokinesis Estimated left ventricular ejection fraction is 40 %. Normal appearing right ventricle structure and function.    RADIOLOGY & ADDITIONAL STUDIES:    23: CT Angio Abd Aorta w/run-off w/ IV Cont (23 @ 08:56) > Right iliac and lower extremity CT angiogram: No right iliac stenosis. Moderately severe stenosis of the right common femoral artery and severe stenosis at the bifurcation of the right common femoral artery. Multiple severe popliteal artery stenoses. No significant infrapopliteal disease with three-vessel runoff to the right foot. Left iliac and lower extremity CT angiogram: Moderate stenosis of the left common iliac artery. Severe stenosis in the proximal superficial femoral artery. Multiple severe popliteal artery stenoses. No significant infrapopliteal disease with three-vessel runoff to the foot.     23: US Duplex Arterial Lower Ext Ltd, Right (23 @ 11:33) > Multifocal plaque without duplex evidence of hemodynamically significant stenosis or occlusion in the arteries of the right lower extremity. Altered hemodynamics with low velocity tardus parvus blood flow suggestive of upstream flow limiting disease, likely at the level of the right common or external iliac artery. Consider CTA of the aorta and runoff for further evaluation.     23: CT Head No Cont (23 @ 20:30) > Head CT: No CT evidence of acute intracranial hemorrhage. C-spine CT:  No acute fracture. CT Chest, Abdomen and Pelvis w/ IV Cont (23 @ 22:46) > No acute posttraumatic injury in the chest. Postsurgical changes from lobectomy. Reticular opacities in bilateral lower lobes may be scarring. Interstitial lung disease can have similar appearance. No pneumothorax. No acute posttraumatic organ injury in the abdomen or pelvis. No retroperitoneal or intraperitoneal hematoma. No acute fractures of the spine, bony pelvis or proximal femora. Questionable contour irregularity of the fourth and fifth left anterior rib which may relate to nondisplaced rib fracture versus sequela from remote impact, correlate clinically. Other chronic findings as above.

## 2023-08-29 NOTE — PROGRESS NOTE ADULT - ASSESSMENT
89 y/o male with a PMHx of  CAD s/p CABG, Cardiac Arrest, CHF with EF ~40%, HTN, AICD, Lung Ca s/p Resection, peripheral artery disease, CKD, s/p lobectomy of lung, s/p carotid endarterectomy; presenting to the ED s/p unwitnessed  fall in bathroom concerning for syncope.      #Syncope/ Fall:    Suspect more vasovagal related to infection.  Babesiosis.    Appreciate cardio eval.      #Babesiosis:    Cont mepron 750mg BID/azithromycin 500mg   Day 6 of 10 day course  Improved.    PLT improving.    Other tick related illness negative.   Repeat % babesia.       #RLE ulcer present on admission:    US and CT angio with significant PVD.    Appreciate vascular eval.    Plan for right femoral endarterectomy 8/31.    Patient overall is moderate to high risk for surgery.    Appreciate cardio eval.    Recommend cont all cardiac meds pre/ intraop.    Hold entresto 24 hours prior to OR.    ECHO reviewed-- moderate AS and EF 40%.    Patient with babesiosis but improving on treatment.    Patient medically optimized for OR.      #Ischemic Cardiomyopathy:    Cont BB.    Cont entresto.    ECHO with EF 40%.    Moderate AS.      #Nonsustained VT:    Cont BB.      #Thrombocytopenia and anemia:    Secondary to babesiosis.    Improving.      #Liver cirrhosis /splenomegaly:    Suspect related to ETOH use.  F/u outpatient.      #Hyponatremia:    Improved.  Hypovolemic.      #Lung Cancer:  stable.    S/p lobectomy.      #Lower back pain s/p fall  improved   PT eval    #CKD stable   hx CAD/PAD/ICD/Lung CA /CHF stable  cw home meds    #DVT Proph:  Heparin SQ.      D/w Michelet Santos [phone]

## 2023-08-29 NOTE — PROGRESS NOTE ADULT - RESPIRATORY
normal/clear to auscultation bilaterally/no wheezes/no rales/no rhonchi normal/no wheezes/no rales/no respiratory distress/rhonchi

## 2023-08-29 NOTE — PROGRESS NOTE ADULT - SUBJECTIVE AND OBJECTIVE BOX
SURGERY DAILY PROGRESS NOTE:     Subjective:  Patient seen and examined at bedside during am rounds. Pt is doing well. Still with right leg pain. No other complaints.    Objective:    PHYSICAL EXAM   Gen: well-appearing, in no acute distress  CV: pulse regularly present   Resp: airway patent, non-labored breathing  ABD:  soft, nontender, non distended, no rebound, no guarding, no rigidity  Extremity: Pt demonstrates grossly intact sensoromotor function. Right mid shin ulcer; 1 cm diameter, mild erythema surronding. B/L dopplerable DP and PT pulses. Non palpable femoral pulse b/l.

## 2023-08-29 NOTE — PROGRESS NOTE ADULT - SUBJECTIVE AND OBJECTIVE BOX
89 y/o male with a PMHx of  MI  CAD s/p CABG, Cardiac Arrest, CHF, HTN, AICD, Lung Ca s/p Resection, peripheral artery disease, CKD, s/p lobectomy of lung, s/p carotid endarterectomy; presenting to the ED s/p unwitnessed  fall in bathroom concerning for syncope.  Patient states he suddenly felt dizzy while he was urinating, he lost his balance and fell.  He called EMS through a medical emergency device around his neck and was wedged in between the toilet for about two hours. Patient c/o of lower back pain. In ED found to be afebrile, hemodynamically stable , thrombocytopenia , hyponatremia EKG, CT Chest/abd/pelvis reviewed- no acute traumatic injury, head CT neg, Ct C spine neg for acute fx.      Work up inpatient revealed babesiosis.  Patient started on therapy.      8/26:  Pt seen.  Denies CP/ SOB.  No complaints.    8/27:  Pt seen.  Sitting in chair eating breakfast.  Feeling well.    8/28:  Pt see.  No new issues.      8/29  Patient seen and examined at bedside in am and seen again with daughter in law in the evening. Answered questions to their satisfaction.     Vital Signs Last 24 Hrs  T(C): 36.8 (29 Aug 2023 15:18), Max: 37 (28 Aug 2023 20:55)  T(F): 98.2 (29 Aug 2023 15:18), Max: 98.6 (28 Aug 2023 20:55)  HR: 69 (29 Aug 2023 15:18) (69 - 77)  BP: 94/66 (29 Aug 2023 15:18) (93/62 - 127/68)  BP(mean): --  RR: 18 (29 Aug 2023 15:18) (16 - 18)  SpO2: 98% (29 Aug 2023 15:18) (95% - 98%)    Parameters below as of 29 Aug 2023 15:18  Patient On (Oxygen Delivery Method): room air    8/29 Babesia parasite level undetected

## 2023-08-29 NOTE — PROVIDER CONTACT NOTE (CRITICAL VALUE NOTIFICATION) - PERSON GIVING RESULT:
[facility] skip, brittney
[facility]
Erin

## 2023-08-29 NOTE — PROGRESS NOTE ADULT - ASSESSMENT
89 yo M with extensive PMHX and history of PAD. Vascular consulted for PAD and ulcer shin.  A dup done showed Multifocal plaque without duplex evidence of hemodynamically significant stenosis or occlusion in the arteries of the right lower extremity. Altered hemodynamics with low velocity tardus parvus blood flow suggestive of upstream flow limiting disease, likely at the level of the  right common or external iliac artery.   CTa:  R; no iliac stenosis, mod sev stenosis comm fem + severe stenosis at bifurcation comm fem + pop, no infra pop / L; mod stenosis comm iliac, severe stenosis at SFA + pop, no infra pop,    P:  -R fem artery endarterectomy 8/31  - Cardio clearance/risk assessment appreciated; RCRI 2  - Medical clearance appreciated; medically optimized  -cont ASA/ H5K  - Wound care for shin wound management  - Rest of management as per primary team.    Plan discussed with Dr. Pineda

## 2023-08-29 NOTE — PROGRESS NOTE ADULT - ASSESSMENT
87 y/o male with a PMHx of  CAD s/p CABG, Cardiac Arrest, CHF with EF ~40%, HTN, AICD, Lung Ca s/p Resection, peripheral artery disease, CKD, s/p lobectomy of lung, s/p carotid endarterectomy; presenting to the ED s/p unwitnessed  fall in bathroom concerning for syncope.      #Syncope/ Fall:    Suspect more vasovagal related to infection.  Babesiosis.    Appreciate cardio eval.      #Babesiosis:    Cont mepron 750mg BID/azithromycin 500mg   Day 7 of 10 day course  Improved.    PLT improving.    Other tick related illness negative.   Repeat % babesia.       #RLE ulcer present on admission:    US and CT angio with significant PVD.    Appreciate vascular eval.    Plan for right femoral endarterectomy 8/30.    Patient overall is moderate to high risk for surgery.    Appreciate cardio eval.    Recommend cont all cardiac meds pre/ intraop.    Hold entresto 24 hours prior to OR.    ECHO reviewed-- moderate AS and EF 40%.    Patient with babesiosis but improving on treatment.    Patient medically optimized for OR.      #Ischemic Cardiomyopathy:    Cont BB.    Cont entresto.    ECHO with EF 40%.    Moderate AS.      #Nonsustained VT and bigeminy seen on monitor - asymptomatic:    Cont BB.      #Thrombocytopenia and anemia:    Secondary to babesiosis.    Improving.      #Liver cirrhosis /splenomegaly:    Suspect related to ETOH use.  F/u outpatient.      #Hyponatremia:    Improved.  Hypovolemic.      #Lung Cancer:  stable.    S/p lobectomy.      #Lower back pain s/p fall  improved   PT eval    #CKD stable   hx CAD/PAD/ICD/Lung CA /CHF stable  cw home meds    #DVT Proph:  Heparin SQ.

## 2023-08-29 NOTE — PROVIDER CONTACT NOTE (CRITICAL VALUE NOTIFICATION) - TEST AND RESULT REPORTED:
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
Babesia microti positive PCR
babesia PCR positive

## 2023-08-29 NOTE — PROGRESS NOTE ADULT - ASSESSMENT
87 y/o male with a PMHx of  MI  CAD s/p CABG, Cardiac Arrest, CHF, HTN, AICD, Lung Ca s/p Resection, peripheral artery disease, CKD, ,present, s/p lobectomy of lung, s/p carotid endarterectomy; presenting to the ED s/p unwitnessed  fall in bathroom, noted -LOC, -head strike, -blood thinner use. Patient states he suddenly felt dizzy while he was urinating, he lost his balance and fell; he called EMS through a medical emergency device around his neck and was wedged in between the toilet for about two hours. Patient c/o of lower back pain. Patient able to bear weight but feels unsteady. Denies weakness or numbness. Patient admits to EtOH consumption tonight, approximately 1 beer prior to fall. Also endorses knee pain, neck pain.    #Syncope - Suspect in the setting of +Babesia /tick bourne illness.   - Monitor on remote tele, run NSVT  - Troponin negative x2   - 8/22: TTE: Mod AS, EF 40%   - Interrogate device - run NSVT noted   - ID following - patient on Azithro and Mepron for Babesia     #Ischemic Cardiomyopathy. EF 35-40%. s/p ICD. Device Check reviewed.  Farxiga held - resume on DC. Run NSVT. Keep K4/MG2    #CAD. CABG. Continue aspirin and statin.     #Moderate AS. Stable. Monitoring.    #HTN. Chronic and stable. Continue Metoprolol and Entresto    #HLD. Continue statin. Check lipids.    #Carotid stenosis s/p endarterectomy. Continue aspirin and statin.    ** Given type of surgery and patient clinical risk factors he has at least 2 points with revised cardiac risk index which gives him approximately a 10% ~ 30 day risk of death MI or cardiac arrest . He is not in any overt CHF and his preop CR is <2 and his AS both on exam and by echo seems only mild to moderate - Would cont B Blockers and statins periop, can hold Entresto 24 hours preop then resume post op     Case d/w Dr. Moore   89 y/o male with a PMHx of  MI  CAD s/p CABG, Cardiac Arrest, CHF, HTN, AICD, Lung Ca s/p Resection, peripheral artery disease, CKD, ,present, s/p lobectomy of lung, s/p carotid endarterectomy; presenting to the ED s/p unwitnessed  fall in bathroom, noted -LOC, -head strike, -blood thinner use. Patient states he suddenly felt dizzy while he was urinating, he lost his balance and fell; he called EMS through a medical emergency device around his neck and was wedged in between the toilet for about two hours. Patient c/o of lower back pain. Patient able to bear weight but feels unsteady. Denies weakness or numbness. Patient admits to EtOH consumption tonight, approximately 1 beer prior to fall. Also endorses knee pain, neck pain.    #Extensive PAD  Rest symptoms and wounds. Plan for R fem artery endarterectomy 8/30  **Given type of surgery and patient clinical risk factors he has at least 2 points with revised cardiac risk index which gives him approximately a 10% ~ 30 day risk of death MI or cardiac arrest . He is not in any overt CHF and his preop CR is <2 and his AS both on exam and by echo seems only mild to moderate - Would cont B Blockers and statins periop, can hold Entresto 24 hours preop then resume post op**    #Syncope - Suspect in the setting of +Babesia /tick bourne illness.   - Monitor on remote tele, run NSVT noted.  - Troponin negative x2   - 8/22: TTE: Mod AS, EF 40% (EF at baseline)  - Interrogate device - run NSVT noted   - ID following - patient on Azithro and Mepron for Babesia     #Ischemic Cardiomyopathy. EF 35-40%. s/p ICD. Device Check reviewed.  Farxiga held - resume on DC. Run NSVT. Keep K4/MG2    #CAD. CABG. Continue aspirin and statin.     #Moderate AS. Stable. Monitoring.    #HTN. Chronic and stable. Continue Metoprolol and Entresto    #HLD. Continue statin. Check lipids.    #Carotid stenosis s/p endarterectomy. Continue aspirin and statin.        Case d/w Dr. Moore

## 2023-08-30 LAB
ANION GAP SERPL CALC-SCNC: 2 MMOL/L — LOW (ref 5–17)
APTT BLD: 31.5 SEC — SIGNIFICANT CHANGE UP (ref 24.5–35.6)
BUN SERPL-MCNC: 19 MG/DL — SIGNIFICANT CHANGE UP (ref 7–23)
CALCIUM SERPL-MCNC: 8.9 MG/DL — SIGNIFICANT CHANGE UP (ref 8.5–10.1)
CHLORIDE SERPL-SCNC: 103 MMOL/L — SIGNIFICANT CHANGE UP (ref 96–108)
CO2 SERPL-SCNC: 28 MMOL/L — SIGNIFICANT CHANGE UP (ref 22–31)
CREAT SERPL-MCNC: 1.25 MG/DL — SIGNIFICANT CHANGE UP (ref 0.5–1.3)
EGFR: 55 ML/MIN/1.73M2 — LOW
GLUCOSE SERPL-MCNC: 117 MG/DL — HIGH (ref 70–99)
HCT VFR BLD CALC: 38.5 % — LOW (ref 39–50)
HGB BLD-MCNC: 12.9 G/DL — LOW (ref 13–17)
INR BLD: 1.05 RATIO — SIGNIFICANT CHANGE UP (ref 0.85–1.18)
MAGNESIUM SERPL-MCNC: 1.7 MG/DL — SIGNIFICANT CHANGE UP (ref 1.6–2.6)
MCHC RBC-ENTMCNC: 30.7 PG — SIGNIFICANT CHANGE UP (ref 27–34)
MCHC RBC-ENTMCNC: 33.5 GM/DL — SIGNIFICANT CHANGE UP (ref 32–36)
MCV RBC AUTO: 91.7 FL — SIGNIFICANT CHANGE UP (ref 80–100)
PHOSPHATE SERPL-MCNC: 2.8 MG/DL — SIGNIFICANT CHANGE UP (ref 2.5–4.5)
PLATELET # BLD AUTO: 202 K/UL — SIGNIFICANT CHANGE UP (ref 150–400)
POTASSIUM SERPL-MCNC: 4.5 MMOL/L — SIGNIFICANT CHANGE UP (ref 3.5–5.3)
POTASSIUM SERPL-SCNC: 4.5 MMOL/L — SIGNIFICANT CHANGE UP (ref 3.5–5.3)
PROTHROM AB SERPL-ACNC: 11.9 SEC — SIGNIFICANT CHANGE UP (ref 9.5–13)
RBC # BLD: 4.2 M/UL — SIGNIFICANT CHANGE UP (ref 4.2–5.8)
RBC # FLD: 16.8 % — HIGH (ref 10.3–14.5)
SODIUM SERPL-SCNC: 133 MMOL/L — LOW (ref 135–145)
WBC # BLD: 5.63 K/UL — SIGNIFICANT CHANGE UP (ref 3.8–10.5)
WBC # FLD AUTO: 5.63 K/UL — SIGNIFICANT CHANGE UP (ref 3.8–10.5)

## 2023-08-30 PROCEDURE — 99233 SBSQ HOSP IP/OBS HIGH 50: CPT

## 2023-08-30 RX ORDER — VALACYCLOVIR 500 MG/1
1000 TABLET, FILM COATED ORAL EVERY 8 HOURS
Refills: 0 | Status: DISCONTINUED | OUTPATIENT
Start: 2023-08-30 | End: 2023-08-31

## 2023-08-30 RX ORDER — ACETAMINOPHEN 500 MG
1000 TABLET ORAL ONCE
Refills: 0 | Status: COMPLETED | OUTPATIENT
Start: 2023-08-30 | End: 2023-08-30

## 2023-08-30 RX ADMIN — HEPARIN SODIUM 5000 UNIT(S): 5000 INJECTION INTRAVENOUS; SUBCUTANEOUS at 22:55

## 2023-08-30 RX ADMIN — Medication 400 MILLIGRAM(S): at 12:33

## 2023-08-30 RX ADMIN — Medication 650 MILLIGRAM(S): at 07:01

## 2023-08-30 RX ADMIN — Medication 81 MILLIGRAM(S): at 09:33

## 2023-08-30 RX ADMIN — Medication 650 MILLIGRAM(S): at 23:55

## 2023-08-30 RX ADMIN — VALACYCLOVIR 1000 MILLIGRAM(S): 500 TABLET, FILM COATED ORAL at 15:01

## 2023-08-30 RX ADMIN — PANTOPRAZOLE SODIUM 40 MILLIGRAM(S): 20 TABLET, DELAYED RELEASE ORAL at 09:33

## 2023-08-30 RX ADMIN — OXYCODONE HYDROCHLORIDE 2.5 MILLIGRAM(S): 5 TABLET ORAL at 20:38

## 2023-08-30 RX ADMIN — AZITHROMYCIN 255 MILLIGRAM(S): 500 TABLET, FILM COATED ORAL at 12:42

## 2023-08-30 RX ADMIN — ATORVASTATIN CALCIUM 10 MILLIGRAM(S): 80 TABLET, FILM COATED ORAL at 22:55

## 2023-08-30 RX ADMIN — Medication 37.5 MILLIGRAM(S): at 09:34

## 2023-08-30 RX ADMIN — Medication 1 APPLICATION(S): at 12:42

## 2023-08-30 RX ADMIN — ATOVAQUONE 750 MILLIGRAM(S): 750 SUSPENSION ORAL at 22:57

## 2023-08-30 RX ADMIN — VALACYCLOVIR 1000 MILLIGRAM(S): 500 TABLET, FILM COATED ORAL at 22:53

## 2023-08-30 RX ADMIN — ATOVAQUONE 750 MILLIGRAM(S): 750 SUSPENSION ORAL at 09:33

## 2023-08-30 RX ADMIN — Medication 5 MILLIGRAM(S): at 22:55

## 2023-08-30 RX ADMIN — Medication 650 MILLIGRAM(S): at 22:55

## 2023-08-30 RX ADMIN — OXYCODONE HYDROCHLORIDE 2.5 MILLIGRAM(S): 5 TABLET ORAL at 20:08

## 2023-08-30 RX ADMIN — SACUBITRIL AND VALSARTAN 1 TABLET(S): 24; 26 TABLET, FILM COATED ORAL at 22:56

## 2023-08-30 NOTE — PROGRESS NOTE ADULT - SUBJECTIVE AND OBJECTIVE BOX
Date of service: 08-30-23 @ 14:06    pt seen and examined  feels better  no fevers  has vesicular rash on L axilla  healed over skin lesions on back    ROS: no fever or chills; denies dizziness, no HA, no SOB or cough, no abdominal pain, no diarrhea or constipation; no dysuria, no urinary frequency, no legs pain, no rashes      MEDICATIONS  (STANDING):  aspirin enteric coated 81 milliGRAM(s) Oral daily  atorvastatin 10 milliGRAM(s) Oral at bedtime  atovaquone  Suspension 750 milliGRAM(s) Oral every 12 hours  azithromycin  IVPB 500 milliGRAM(s) IV Intermittent every 24 hours  azithromycin  IVPB      cadexomer iodine 0.9% Gel 1 Application(s) Topical <User Schedule>  heparin   Injectable 5000 Unit(s) SubCutaneous every 8 hours  melatonin 5 milliGRAM(s) Oral at bedtime  metoprolol succinate ER 37.5 milliGRAM(s) Oral daily  pantoprazole    Tablet 40 milliGRAM(s) Oral before breakfast  sacubitril 24 mG/valsartan 26 mG 1 Tablet(s) Oral two times a day  sodium chloride 0.9%. 1000 milliLiter(s) (75 mL/Hr) IV Continuous <Continuous>  sodium chloride 0.9%. 1000 milliLiter(s) (75 mL/Hr) IV Continuous <Continuous>  valACYclovir 1000 milliGRAM(s) Oral every 8 hours      Vital Signs Last 24 Hrs  T(C): 36.6 (30 Aug 2023 07:59), Max: 36.8 (29 Aug 2023 15:18)  T(F): 97.9 (30 Aug 2023 07:59), Max: 98.2 (29 Aug 2023 15:18)  HR: 72 (30 Aug 2023 07:59) (69 - 72)  BP: 107/62 (30 Aug 2023 07:59) (94/66 - 135/77)  BP(mean): --  RR: 18 (30 Aug 2023 07:59) (18 - 18)  SpO2: 100% (30 Aug 2023 07:59) (95% - 100%)    Parameters below as of 30 Aug 2023 07:59  Patient On (Oxygen Delivery Method): room air      PE:  Constitutional: frail looking  HEENT: NC/AT, EOMI, PERRLA, conjunctivae clear; ears and nose atraumatic; pharynx benign  Neck: supple; thyroid not palpable  Back: no tenderness  Respiratory: respiratory effort normal; clear to auscultation  Cardiovascular: S1S2 regular, no murmurs  Abdomen: soft, not tender, not distended, positive BS; liver and spleen WNL  Genitourinary: no suprapubic tenderness  Lymphatic: no LN palpable  Musculoskeletal: no muscle tenderness, no joint swelling or tenderness  Extremities: no pedal edema  Neurological/ Psychiatric:   moving all extremities  Skin: no rashes; no palpable lesions L axilla vesicular lesions on erythematous base     Labs: all available labs reviewed                                                         12.9   5.63  )-----------( 202      ( 30 Aug 2023 05:03 )             38.5     08-30    133<L>  |  103  |  19  ----------------------------<  117<H>  4.5   |  28  |  1.25    Ca    8.9      30 Aug 2023 05:03  Phos  2.8     08-30  Mg     1.7     08-30        Urinalysis Basic - ( 22 Aug 2023 09:28 )    Color: x / Appearance: x / SG: x / pH: x  Gluc: 170 mg/dL / Ketone: x  / Bili: x / Urobili: x   Blood: x / Protein: x / Nitrite: x   Leuk Esterase: x / RBC: x / WBC x   Sq Epi: x / Non Sq Epi: x / Bacteria: x        Culture Results:   Positive for Babesia species  by Giemsa Stain  Parasitemia = 1.6 %  ************************************************************  NEGATIVE for Plasmodium antigens. Microscopy is performed for  confirmation.  The Malaria Rapid antigen test does not detect the  presence of Babesia species. If Babesiosis is suspected  please order test Babesia PCR: Babesia microti PCR Bld    ************************************************************ (08-22 @ 09:14)  Culture Results:   Positive for Babesia species  by Giemsa Stain  Parasitemia = 1.7 %  ************************************************************  NEGATIVE for Plasmodium antigens. Microscopy is performed for  confirmation.  The Malaria Rapid antigen test does not detect the  presence of Babesia species. If Babesiosis is suspected  please order test Babesia PCR: Babesia microti PCR Bld  ************************************************************ (08-21 @ 21:09)    Radiology: all available radiological tests reviewed  < from: CT Abdomen and Pelvis w/ IV Cont (08.21.23 @ 22:46) >  ACC: [id number] EXAM:  CT ABDOMEN AND PELVIS IC   ORDERED BY: JULIO PEÑA     PROCEDURE DATE:  08/21/2023          INTERPRETATION:  PROCEDURE INFORMATION:  Exam: CT Chest With Contrast; Diagnostic  Exam date and time: 8/21/2023 10:30 PM  Age: 88 years old  Clinical indication: Fall    TECHNIQUE:  Imaging protocol: Diagnostic computed tomography of the chest, abdomen   and pelvis with contrast. Arterial phase imaging is obtained of the chest   and abdomen and venous phase imaging of the abdomen and pelvis. Coronal   and sagittal reformats were obtained.    Contrast material: IV: OMNIPAQUE 350; Contrast volume: 90 ml; Contrast   route: IV;    COMPARISON:  CR XR CHEST IMMEDIATE 12/2/2021 12:09 PM CT chest 11/3/2015    FINDINGS:  Tubes, catheters and devices: Cardiac device present  in the left chest   wall.    LUNGS: Subtle reticular opacities in the right and left lower lobe and   base of left upper lobe. Postsurgical changes and suture material from   probable right upper lobectomy. No consolidative opacity. Mild   atelectatic changes in dependent position. No  pulmonary contusion.  PLEURAL SPACES: No pneumothorax or hemothorax. Previously seen pleural   effusions are not visualized.  HEART: The heart is within normal limits of size. No pericardial   effusion. Aortic valve calcifications. Coronary artery calcifications,   mitral annular valve calcifications. Dual-chamber pacer leads.  MEDIASTINUM: Borderline left paratracheal lymph node measures 0.9 cm, no   lymphadenopathy. Right upper pretracheal lymph node measures 0.9 cm,   stable since 2015, image 2-8.  VESSELS: Severe atherosclerosis of the aorta. No aneurysm or acute aortic   syndrome. No traumatic aortic injury. No mediastinal hematoma,   pneumomediastinum,or hemopericardium.  BONES/JOINTS: Questionable contour irregularity at the 4th and 5th left   anterior rib which was not seen on prior study and may represent a non   displaced rib fracture or deformity from remote impact. No vertebral   fracture orcompression deformity.  Sternotomy.  SOFT TISSUES: Left prepectoral pacer device.    ABDOMEN:  LIVER: No hematoma or focal lesion multiple calcified granuloma. No   laceration or contusion.  GALLBLADDER AND BILE DUCTS: Cholelithiasis. No cholecystitis or biliary   ductal dilatation.  PANCREAS: Homogeneous enhancement, within normal limits.  SPLEEN: Splenomegaly. Spleen measures 15.6 cm. Multiple calcified   granulomata visualized.  ADRENAL GLANDS: Within normal limits. No masses.  KIDNEYS AND URETERS: No hydronephrosis, no perinephric hematoma,   symmetric enhancement. Non characterized subcentimeter hypodensities.    BLADDER: Within normal limits.  REPRODUCTIVE: Prostate with large coarse calcifications, mildly enlarged.    STOMACH AND BOWEL: Colonic diverticulosis. No diverticulitis. No bowel   wall thickening or intestinal obstruction.  APPENDIX: Normal appendix.    PERITONEUM: No hemoperitoneum, no pneumoperitoneum, no retroperitoneal   hematoma.  VESSELS: Severe atherosclerosis, no abdominal aortic aneurysm. Large   calcific plaques at the origin of the SMA and celiac axis.  LYMPH NODES: No lymphadenopathy  ABDOMINAL WALL: No obvious soft tissue hematoma.  BONES/JOINTS: No acute fractures of the bony pelvis or proximal femora.   No acute fractures of the spine. Multilevel discogenic degenerative   disease.    IMPRESSION:  No acute posttraumatic injury in the chest. Postsurgical changes from   lobectomy. Reticular opacities in bilateral lower lobes may be scarring.   Interstitial lung disease can have similar appearance. No pneumothorax.    No acute posttraumatic organ injury in the abdomen or pelvis. No   retroperitoneal or intraperitoneal hematoma. No acute fractures of the   spine, bony pelvis or proximal femora.    Questionable contour irregularity of the fourth and fifth left anterior   rib which may relate to nondisplaced rib fracture versus sequela from   remote impact, correlate clinically.    Other chronic findings as above.    --- End of Report ---    < end of copied text >    Advanced directives addressed: full resuscitation

## 2023-08-30 NOTE — PROGRESS NOTE ADULT - ASSESSMENT
89 yo M with extensive PMHX and history of PAD. Vascular consulted for PAD and ulcer shin.  A dup done showed Multifocal plaque without duplex evidence of hemodynamically significant stenosis or occlusion in the arteries of the right lower extremity. Altered hemodynamics with low velocity tardus parvus blood flow suggestive of upstream flow limiting disease, likely at the level of the  right common or external iliac artery.   CTa:  R; no iliac stenosis, mod sev stenosis comm fem + severe stenosis at bifurcation comm fem + pop, no infra pop / L; mod stenosis comm iliac, severe stenosis at SFA + pop, no infra pop,    P:  -R fem artery endarterectomy 8/31, NPO, IV fluids after midnight 8/31  - Cardio clearance/risk assessment appreciated; RCRI 2  - Medical clearance appreciated; medically optimized  -cont ASA/ H5K  - Wound care for shin wound management  - Rest of management as per primary team.    Plan discussed with Dr. Pineda

## 2023-08-30 NOTE — PROGRESS NOTE ADULT - SUBJECTIVE AND OBJECTIVE BOX
87 y/o male with a PMHx of  MI  CAD s/p CABG, Cardiac Arrest, CHF, HTN, AICD, Lung Ca s/p Resection, peripheral artery disease, CKD, s/p lobectomy of lung, s/p carotid endarterectomy; presenting to the ED s/p unwitnessed  fall in bathroom concerning for syncope.  Patient states he suddenly felt dizzy while he was urinating, he lost his balance and fell.  He called EMS through a medical emergency device around his neck and was wedged in between the toilet for about two hours. Patient c/o of lower back pain. In ED found to be afebrile, hemodynamically stable , thrombocytopenia , hyponatremia EKG, CT Chest/abd/pelvis reviewed- no acute traumatic injury, head CT neg, Ct C spine neg for acute fx.      Work up inpatient revealed babesiosis.  Patient started on therapy.      8/26:  Pt seen.  Denies CP/ SOB.  No complaints.    8/27:  Pt seen.  Sitting in chair eating breakfast.  Feeling well.    8/28:  Pt see.  No new issues.      8/29  Patient seen and examined at bedside in am and seen again with daughter in law in the evening. Answered questions to their satisfaction.     8/30  Patient seen and examined. Has rash over left side of back with out any itching or discomfort.  Has not received shingles vaccine.     Vital Signs Last 24 Hrs  T(C): 36.2 (30 Aug 2023 15:06), Max: 36.6 (30 Aug 2023 07:59)  T(F): 97.2 (30 Aug 2023 15:06), Max: 97.9 (30 Aug 2023 07:59)  HR: 69 (30 Aug 2023 15:06) (69 - 72)  BP: 126/79 (30 Aug 2023 15:06) (107/62 - 135/77)  BP(mean): --  RR: 18 (30 Aug 2023 15:06) (18 - 18)  SpO2: 94% (30 Aug 2023 15:06) (94% - 100%)    Parameters below as of 30 Aug 2023 15:06  Patient On (Oxygen Delivery Method): room air        8/29 Babesia parasite level undetected

## 2023-08-30 NOTE — PROGRESS NOTE ADULT - SUBJECTIVE AND OBJECTIVE BOX
CHIEF COMPLAINT: Patient is a 88y old  Male who presents with a chief complaint of fall/syncope (22 Aug 2023 09:24)    FROM H&P: 87 y/o male with a PMHx of  MI  CAD s/p CABG, Cardiac Arrest, CHF, HTN, AICD, Lung Ca s/p Resection ,, peripheral artery disease, CKD, ,present, s/p lobectomy of lung, s/p carotid endarterectomy; presenting to the ED s/p unwitnessed  fall in bathroom, noted -LOC, -head strike, -blood thinner use. Patient states he suddenly felt dizzy while he was urinating, he lost his balance and fell; he called EMS through a medical emergency device around his neck and was wedged in between the toilet for about two hours. Patient c/o of lower back pain. Patient able to bear weight but feels unsteady. Denies weakness or numbness. Patient admits to EtOH consumption tonight, approximately 1 beer prior to fall. Also endorses knee pain, neck pain.    In ED found to be afebrile, hemodynamically stable , thrombocytopenia , hyponatremia EKG, CT Chest/abd/pelvis reviewed- no acute traumatic injury, head CT neg, Ct C spine neg for acute fx     PMH:  CAD s/p CABG, Cardiac Arrest, CHF, HTN, AICD, Lung Ca s/p Resection (reported to be in remission)  Surgical History: Left TKA, CABG, Lung mass/nodular resection  Family History; Mother  in 40s from breast ca. Father  age 80s from HD  Social History: Former TObacco/smoking. Quit 20 years ago. Former 40 PPD smoker. Daily 1-2 beers. Denies illicit drug use.     . Patient reported dizziness prior to syncopal event  Occ with SOB.    . Feeling better, +babesia PCR    : Feeling better, dizziness improved.    : no new complaints    .  RT leg pain, sensitive to tactile stimuli  Spangler wound     . Persistent RT leg pain  Discussed with ida orantes, plan for vascular surgery given progressive symptoms.    . For surgery today, will follow    PAST MEDICAL HISTORY:  CAD (coronary artery disease)   Cardiac arrest with successful resuscitation   CKD (chronic kidney disease)   Heart failure   Hypertension   Lung cancer   PAD (peripheral artery disease).     PAST SURGICAL HISTORY:  AICD (automatic cardioverter/defibrillator) present   H/O left knee surgery   History of appendectomy   History of lobectomy of lung   History of tonsillectomy and adenoidectomy   S/P carotid endarterectomy.    SOCIAL HISTORY:  1-2  beers daily    MEDICATIONS:  MEDICATIONS  (STANDING):  aspirin enteric coated 81 milliGRAM(s) Oral daily  atorvastatin 10 milliGRAM(s) Oral at bedtime  atovaquone  Suspension 750 milliGRAM(s) Oral every 12 hours  azithromycin  IVPB 500 milliGRAM(s) IV Intermittent every 24 hours  azithromycin  IVPB      cadexomer iodine 0.9% Gel 1 Application(s) Topical <User Schedule>  heparin   Injectable 5000 Unit(s) SubCutaneous every 8 hours  melatonin 5 milliGRAM(s) Oral at bedtime  metoprolol succinate ER 37.5 milliGRAM(s) Oral daily  pantoprazole    Tablet 40 milliGRAM(s) Oral before breakfast  sacubitril 24 mG/valsartan 26 mG 1 Tablet(s) Oral two times a day  sodium chloride 0.9%. 1000 milliLiter(s) (75 mL/Hr) IV Continuous <Continuous>  sodium chloride 0.9%. 1000 milliLiter(s) (75 mL/Hr) IV Continuous <Continuous>    MEDICATIONS  (PRN):  acetaminophen     Tablet .. 650 milliGRAM(s) Oral every 6 hours PRN Mild Pain (1 - 3), Moderate Pain (4 - 6), Severe Pain (7 - 10)  aluminum hydroxide/magnesium hydroxide/simethicone Suspension 30 milliLiter(s) Oral every 4 hours PRN Dyspepsia  ondansetron Injectable 4 milliGRAM(s) IV Push every 8 hours PRN Nausea and/or Vomiting  oxyCODONE    IR 2.5 milliGRAM(s) Oral every 4 hours PRN Moderate Pain (4 - 6)  sodium chloride 0.65% Nasal 1 Spray(s) Both Nostrils two times a day PRN Nasal Congestion      HOME MEDICATIONS:  Ecotrin Adult Low Strength 81 mg oral delayed release tablet: 1 tab(s) orally once a day (22 Aug 2023 09:00)  Entresto 24 mg-26 mg oral tablet: 1 tab(s) orally 2 times a day (22 Aug 2023 09:01)  Farxiga 10 mg oral tablet: 1 tab(s) orally once a day (22 Aug 2023 09:01)  melatonin 5 mg oral tablet: 1 tab(s) orally once a day (at bedtime) (22 Aug 2023 09:00)  metoprolol succinate 25 mg oral tablet, extended release: 1.5 tab(s) orally once a day (at bedtime) (22 Aug 2023 09:04)  pantoprazole 40 mg oral delayed release tablet: 1 tab(s) orally once a day (22 Aug 2023 09:00)  pravastatin 40 mg oral tablet: 1 tab(s) orally once a day (at bedtime) (22 Aug 2023 09:00)    PHYSICAL EXAM:  T(C): 36.6 (30 Aug 2023 07:59), Max: 36.8 (29 Aug 2023 15:18)  T(F): 97.9 (30 Aug 2023 07:59), Max: 98.2 (29 Aug 2023 15:18)  HR: 72 (30 Aug 2023 07:59) (69 - 72)  BP: 107/62 (30 Aug 2023 07:59) (94/66 - 135/77)  RR: 18 (30 Aug 2023 07:59) (18 - 18)  SpO2: 100% (30 Aug 2023 07:59) (95% - 100%)    Parameters below as of 30 Aug 2023 07:59  Patient On (Oxygen Delivery Method): room air    Constitutional: NAD, awake and alert  HEENT: PERR, EOMI, Normal Hearing, MMM  Neck: Soft and supple, No LAD, No JVD  Respiratory: Breath sounds are clear bilaterally, No wheezing, rales or rhonchi  Cardiovascular: S1 and S2, regular rate and rhythm, no Murmurs, gallops or rubs  Gastrointestinal: Bowel Sounds present, soft, nontender, nondistended, no guarding, no rebound  Extremities: No peripheral edema  Vascular: 2+ peripheral pulses  Neurological: A/O x 3, no focal deficits  Musculoskeletal: 5/5 strength b/l upper and lower extremities  Skin: No rashes    =======================================    INTERPRETATION OF TELEMETRY: Paced, run NSVT    ECG:   < from: 12 Lead ECG (23 @ 10:41) >  Diagnosis Line Normal sinus rhythm  Non-specific intra-ventricular conduction delay  ST & T wave abnormality, consider inferior ischemia    < end of copied text >      ========================================    LABS:  LABS: All Labs Reviewed:                        12.9   5.63  )-----------( 202      ( 30 Aug 2023 05:03 )             38.5     08-30    133<L>  |  103  |  19  ----------------------------<  117<H>  4.5   |  28  |  1.25    Ca    8.9      30 Aug 2023 05:03  Phos  2.8     08-30  Mg     1.7     08-30      PT/INR - ( 30 Aug 2023 05:03 )   PT: 11.9 sec;   INR: 1.05 ratio       PTT - ( 30 Aug 2023 05:03 )  PTT:31.5 sec      HDL 10 LDL 73    Troponin neg x2   BNP 95    + Babesia PCR    CARDIAC TESTIN/22/23: TTE Echo Complete w/ Contrast w/ Doppler: There is calcification of mitral valve leaflets. The leaflet opening is restricted. Mild (1+) mitral regurgitation is present. EA reversal of the mitral inflow consistent with reduced compliance of the left ventricle. The aortic valve appears severely calcified. Valve opening seems to be restricted. Transaortic gradients are underestimated due to impaired left ventricle systolic function. KACIE by continuity equation and dimensionless index (.29) suggest at least moderate aortic stenosis. Left ventricle systolic function appears impaired in the presence of a cardiac arrhythmia. Left ventricle size and structure are within normal limitations. Definity was used to better visualize the endocardial border. septal , apical wall hypokinesis Estimated left ventricular ejection fraction is 40 %. Normal appearing right ventricle structure and function.    RADIOLOGY & ADDITIONAL STUDIES:    23: CT Angio Abd Aorta w/run-off w/ IV Cont (23 @ 08:56) > Right iliac and lower extremity CT angiogram: No right iliac stenosis. Moderately severe stenosis of the right common femoral artery and severe stenosis at the bifurcation of the right common femoral artery. Multiple severe popliteal artery stenoses. No significant infrapopliteal disease with three-vessel runoff to the right foot. Left iliac and lower extremity CT angiogram: Moderate stenosis of the left common iliac artery. Severe stenosis in the proximal superficial femoral artery. Multiple severe popliteal artery stenoses. No significant infrapopliteal disease with three-vessel runoff to the foot.     23: US Duplex Arterial Lower Ext Ltd, Right (23 @ 11:33) > Multifocal plaque without duplex evidence of hemodynamically significant stenosis or occlusion in the arteries of the right lower extremity. Altered hemodynamics with low velocity tardus parvus blood flow suggestive of upstream flow limiting disease, likely at the level of the right common or external iliac artery. Consider CTA of the aorta and runoff for further evaluation.     23: CT Head No Cont (23 @ 20:30) > Head CT: No CT evidence of acute intracranial hemorrhage. C-spine CT:  No acute fracture. CT Chest, Abdomen and Pelvis w/ IV Cont (23 @ 22:46) > No acute posttraumatic injury in the chest. Postsurgical changes from lobectomy. Reticular opacities in bilateral lower lobes may be scarring. Interstitial lung disease can have similar appearance. No pneumothorax. No acute posttraumatic organ injury in the abdomen or pelvis. No retroperitoneal or intraperitoneal hematoma. No acute fractures of the spine, bony pelvis or proximal femora. Questionable contour irregularity of the fourth and fifth left anterior rib which may relate to nondisplaced rib fracture versus sequela from remote impact, correlate clinically. Other chronic findings as above.

## 2023-08-30 NOTE — PROGRESS NOTE ADULT - SUBJECTIVE AND OBJECTIVE BOX
Patient seen and examined at bedside during am rounds. Pt is doing well. Still with right leg pain. No other complaints.    Objective:    PHYSICAL EXAM   Gen: well-appearing, in no acute distress  CV: pulse regularly present   Resp: airway patent, non-labored breathing  ABD:  soft, nontender, non distended, no rebound, no guarding, no rigidity  Extremity: Pt demonstrates grossly intact sensoromotor function. Right mid shin ulcer; 1 cm diameter, mild erythema surronding. B/L dopplerable DP and PT pulses. Non palpable femoral pulse b/l.

## 2023-08-30 NOTE — PROGRESS NOTE ADULT - ASSESSMENT
89 y/o male with a PMHx of  MI  CAD s/p CABG, Cardiac Arrest, CHF, HTN, AICD, Lung Ca s/p Resection, peripheral artery disease, CKD, ,present, s/p lobectomy of lung, s/p carotid endarterectomy; presenting to the ED s/p unwitnessed  fall in bathroom, noted -LOC, -head strike, -blood thinner use. Patient states he suddenly felt dizzy while he was urinating, he lost his balance and fell; he called EMS through a medical emergency device around his neck and was wedged in between the toilet for about two hours. Patient c/o of lower back pain. Patient able to bear weight but feels unsteady. Denies weakness or numbness. Patient admits to EtOH consumption tonight, approximately 1 beer prior to fall. Also endorses knee pain, neck pain.    #Extensive PAD  Rest symptoms and wounds. Plan for R fem artery endarterectomy 8/30 v ?8/31  **Given type of surgery and patient clinical risk factors he has at least 2 points with revised cardiac risk index which gives him approximately a 10% ~ 30 day risk of death MI or cardiac arrest . He is not in any overt CHF and his preop CR is <2 and his AS both on exam and by echo seems only mild to moderate - Would cont B Blockers and statins periop, can hold Entresto 24 hours preop then resume post op**    #Syncope - Suspect in the setting of +Babesia /tick bourne illness.   - Monitor on remote tele, run NSVT noted.  - Troponin negative x2   - 8/22: TTE: Mod AS, EF 40% (EF at baseline)  - Interrogate device - run NSVT noted   - ID following - patient on Azithro and Mepron for Babesia     #Ischemic Cardiomyopathy. EF 35-40%. s/p ICD. Device Check reviewed.  Farxiga held - resume on DC. Run NSVT. Keep K4/MG2    #CAD. CABG. Continue aspirin and statin.     #Moderate AS. Stable. Monitoring.    #HTN. Chronic and stable. Continue Metoprolol and Entresto    #HLD. Continue statin. Check lipids.    #Carotid stenosis s/p endarterectomy. Continue aspirin and statin.        Case d/w Dr. Willard  Will follow post op

## 2023-08-30 NOTE — PROGRESS NOTE ADULT - ASSESSMENT
89 y/o male with a PMHx of  MI  CAD s/p CABG, Cardiac Arrest, CHF, HTN, AICD, Lung Ca s/p Resection ,, peripheral artery disease, CKD, ,present, s/p lobectomy of lung, s/p carotid endarterectomy; presenting to the ED s/p unwitnessed  fall in bathroom, noted -LOC, -head strike, -blood thinner use. Patient states he suddenly felt dizzy while he was urinating, he lost his balance and fell; he called EMS through a medical emergency device around his neck and was wedged in between the toilet for about two hours. Patient c/o of lower back pain. Patient able to bear weight but feels unsteady. Denies weakness or numbness. Patient admits to EtOH consumption, approximately 1 beer prior to fall. Also endorses knee pain, neck pain. In ED found to be afebrile, hemodynamically stable , thrombocytopenia , hyponatremia EKG, CT Chest/abd/pelvis reviewed- no acute traumatic injury, head CT neg, Ct C spine neg for acute fx Here noted with babesia 1.6 % parasitemia.     1. Babesiosis. Tick-borne Illness. L axilla shingles   - imaging reviewed, improving parasitemia resolved  - 1.6 % parasitemia c/w mild to moderate disease   - on mepron 750mg BID/azithromycin 500mg daily #9/10 day course  - r/o co infection - lyme screen (-), ehrlichia, anaplasma pcr (-)  - has L axilla shingles - start valtrex 1000mg TID x 7 days  - single dermatome - no precautions needed  - back lesions healing over   - plan for vascular surgery, can proceed as planned   - tolerating abx well so far; no side effects noted  - reason for abx use and side effects reviewed with patient  - supportive care  - fu cbc    2. other issues - care per medicine

## 2023-08-30 NOTE — PROGRESS NOTE ADULT - ASSESSMENT
89 y/o male with a PMHx of  CAD s/p CABG, Cardiac Arrest, CHF with EF ~40%, HTN, AICD, Lung Ca s/p Resection, peripheral artery disease, CKD, s/p lobectomy of lung, s/p carotid endarterectomy; presenting to the ED s/p unwitnessed  fall in bathroom concerning for syncope.      #Syncope/ Fall:    Suspect more vasovagal related to infection.  Babesiosis.    Appreciate cardio eval.      #Shingles  -Valtrex X 1 week    #Babesiosis:    Cont mepron 750mg BID/azithromycin 500mg   Day 7 of 10 day course  Improved.    PLT improving.    Other tick related illness negative.   Repeat % babesia.       #RLE ulcer present on admission:    US and CT angio with significant PVD.    Appreciate vascular eval.    Plan for right femoral endarterectomy 8/30 postponed due to shingles    Patient overall is moderate to high risk for surgery.    Appreciate cardio eval.    Recommend cont all cardiac meds pre/ intraop.    Hold entresto 24 hours prior to OR.    ECHO reviewed-- moderate AS and EF 40%.    Patient with babesiosis but improving on treatment.    Patient medically optimized for OR.      #Ischemic Cardiomyopathy:    Cont BB.    Cont entresto.    ECHO with EF 40%.    Moderate AS.      #Nonsustained VT and bigeminy seen on monitor - asymptomatic:    Cont BB.      #Thrombocytopenia and anemia:    Secondary to babesiosis.    Improving.      #Liver cirrhosis /splenomegaly:    Suspect related to ETOH use.  F/u outpatient.      #Hyponatremia:    Improved.  Hypovolemic.      #Lung Cancer:  stable.    S/p lobectomy.      #Lower back pain s/p fall  improved   PT eval    #CKD stable   hx CAD/PAD/ICD/Lung CA /CHF stable  cw home meds    #DVT Proph:  Heparin SQ.

## 2023-08-31 ENCOUNTER — TRANSCRIPTION ENCOUNTER (OUTPATIENT)
Age: 88
End: 2023-08-31

## 2023-08-31 VITALS — DIASTOLIC BLOOD PRESSURE: 61 MMHG | SYSTOLIC BLOOD PRESSURE: 117 MMHG | HEART RATE: 67 BPM

## 2023-08-31 LAB — SARS-COV-2 RNA SPEC QL NAA+PROBE: SIGNIFICANT CHANGE UP

## 2023-08-31 PROCEDURE — 99239 HOSP IP/OBS DSCHRG MGMT >30: CPT

## 2023-08-31 RX ORDER — VALACYCLOVIR 500 MG/1
1 TABLET, FILM COATED ORAL
Qty: 0 | Refills: 0 | DISCHARGE
Start: 2023-08-31

## 2023-08-31 RX ORDER — ACETAMINOPHEN 500 MG
2 TABLET ORAL
Qty: 0 | Refills: 0 | DISCHARGE
Start: 2023-08-31

## 2023-08-31 RX ORDER — SODIUM CHLORIDE 0.65 %
2 AEROSOL, SPRAY (ML) NASAL
Qty: 0 | Refills: 0 | DISCHARGE
Start: 2023-08-31

## 2023-08-31 RX ADMIN — AZITHROMYCIN 255 MILLIGRAM(S): 500 TABLET, FILM COATED ORAL at 12:32

## 2023-08-31 RX ADMIN — VALACYCLOVIR 1000 MILLIGRAM(S): 500 TABLET, FILM COATED ORAL at 13:38

## 2023-08-31 RX ADMIN — ATOVAQUONE 750 MILLIGRAM(S): 750 SUSPENSION ORAL at 10:24

## 2023-08-31 RX ADMIN — HEPARIN SODIUM 5000 UNIT(S): 5000 INJECTION INTRAVENOUS; SUBCUTANEOUS at 05:36

## 2023-08-31 RX ADMIN — Medication 650 MILLIGRAM(S): at 13:38

## 2023-08-31 RX ADMIN — HEPARIN SODIUM 5000 UNIT(S): 5000 INJECTION INTRAVENOUS; SUBCUTANEOUS at 13:38

## 2023-08-31 RX ADMIN — VALACYCLOVIR 1000 MILLIGRAM(S): 500 TABLET, FILM COATED ORAL at 05:35

## 2023-08-31 RX ADMIN — Medication 37.5 MILLIGRAM(S): at 10:38

## 2023-08-31 RX ADMIN — Medication 650 MILLIGRAM(S): at 05:34

## 2023-08-31 RX ADMIN — PANTOPRAZOLE SODIUM 40 MILLIGRAM(S): 20 TABLET, DELAYED RELEASE ORAL at 10:24

## 2023-08-31 RX ADMIN — SACUBITRIL AND VALSARTAN 1 TABLET(S): 24; 26 TABLET, FILM COATED ORAL at 10:38

## 2023-08-31 RX ADMIN — Medication 81 MILLIGRAM(S): at 10:24

## 2023-08-31 RX ADMIN — SODIUM CHLORIDE 75 MILLILITER(S): 9 INJECTION INTRAMUSCULAR; INTRAVENOUS; SUBCUTANEOUS at 05:34

## 2023-08-31 RX ADMIN — Medication 650 MILLIGRAM(S): at 14:25

## 2023-08-31 RX ADMIN — ATOVAQUONE 750 MILLIGRAM(S): 750 SUSPENSION ORAL at 15:04

## 2023-08-31 NOTE — DISCHARGE NOTE PROVIDER - ATTENDING DISCHARGE PHYSICAL EXAMINATION:
Vital Signs Last 24 Hrs  T(C): 36.4 (31 Aug 2023 08:04), Max: 36.4 (31 Aug 2023 00:12)  T(F): 97.5 (31 Aug 2023 08:04), Max: 97.5 (31 Aug 2023 00:12)  HR: 67 (31 Aug 2023 10:37) (61 - 71)  BP: 117/61 (31 Aug 2023 10:37) (104/68 - 130/59)  BP(mean): --  RR: 18 (31 Aug 2023 08:04) (18 - 18)  SpO2: 97% (31 Aug 2023 08:04) (92% - 97%)    Parameters below as of 31 Aug 2023 08:04  Patient On (Oxygen Delivery Method): room air      Review of Systems:   · General	negative  · ENMT	negative  · Respiratory and Thorax	negative  · Cardiovascular	negative  · Musculoskeletal Symptoms	leg pain R  · Neurological	negative  ' Derm rash    Physical Exam:   · Constitutional	well-groomed  · Respiratory	no wheezes; no rales; no respiratory distress; rhonchi  · Rhonchi	lower L; lower R  · Cardiovascular	III/VI systolic murmur  · Gastrointestinal	soft; nontender  · Skin	rash; left back  · Vesicles Location	Left axillae  · Psychiatric	normal affect

## 2023-08-31 NOTE — PROGRESS NOTE ADULT - PROVIDER SPECIALTY LIST ADULT
Cardiology
Hospitalist
Infectious Disease
Vascular Surgery
Vascular Surgery
Cardiology
Hospitalist
Infectious Disease
Vascular Surgery
Cardiology
Heme/Onc
Hospitalist
Hospitalist
Vascular Surgery
Cardiology
Hospitalist
Infectious Disease
Cardiology
Hospitalist
Hospitalist

## 2023-08-31 NOTE — DISCHARGE NOTE PROVIDER - HOSPITAL COURSE
Patient is a 88 y.o. male who presented to the ED after an unwitnessed fall in the bathroom, with loss of consciousness and called EMS through a medical emergency device around his neck with PMH CAD s/p CABG, Cardiac Arrest, CHF, HTN, Lung CA s/p resection, PAD, CKD s/p AICD, s/p lobectomy of lung, s/p carotid endarterectomy admitted for a syncopal fall found to have Babesia and later developed a Zoster rash during the course of the hospitalization. Patient was scheduled to have a Femoral endarterectomy during the hospital stay due to persistent right leg pain from extensive PAD which was canceled when the patient developed the shingles rash and is planned to be done as an outpatient in 2 weeks. Completed 10 day course of azithromycin and atovaquone for babesia and an additional 6 days of valcyclovir is necessary.     Culture Results:   No Blood Parasites observed by giemsa stain  (08.29.23 @ 09:41) Culture Results:   Positive for Babesia species by Giemsa Stain Parasitemia = <1 % (08.24.23 @ 06:29)

## 2023-08-31 NOTE — DISCHARGE NOTE PROVIDER - NSDCCAREPROVSEEN_GEN_ALL_CORE_FT
Raven, Jennifer Campbell, Mone Aiken, Chely Garcia, Alan Lucia, Omer Berry, Rajiv Wright, Jaqueline Waters, Yanick Concepcion, Donaldo Massey, Jake Dahl, Maria De Jesus Pineda, Gila Williamson, Tone Gonzáles, Matthias

## 2023-08-31 NOTE — PROGRESS NOTE ADULT - NS ATTEND AMEND GEN_ALL_CORE FT
Patient seen and examined, agree with plan above.
Patient seen and examined, agree with plan above.
per A/P
Patient seen and examined, agree with plan above.

## 2023-08-31 NOTE — PROGRESS NOTE ADULT - REASON FOR ADMISSION
fall/syncope

## 2023-08-31 NOTE — PROGRESS NOTE ADULT - SUBJECTIVE AND OBJECTIVE BOX
CHIEF COMPLAINT: Patient is a 88y old  Male who presents with a chief complaint of fall/syncope (22 Aug 2023 09:24)    FROM H&P: 89 y/o male with a PMHx of  MI  CAD s/p CABG, Cardiac Arrest, CHF, HTN, AICD, Lung Ca s/p Resection ,, peripheral artery disease, CKD, ,present, s/p lobectomy of lung, s/p carotid endarterectomy; presenting to the ED s/p unwitnessed  fall in bathroom, noted -LOC, -head strike, -blood thinner use. Patient states he suddenly felt dizzy while he was urinating, he lost his balance and fell; he called EMS through a medical emergency device around his neck and was wedged in between the toilet for about two hours. Patient c/o of lower back pain. Patient able to bear weight but feels unsteady. Denies weakness or numbness. Patient admits to EtOH consumption tonight, approximately 1 beer prior to fall. Also endorses knee pain, neck pain.    In ED found to be afebrile, hemodynamically stable , thrombocytopenia , hyponatremia EKG, CT Chest/abd/pelvis reviewed- no acute traumatic injury, head CT neg, Ct C spine neg for acute fx     PMH:  CAD s/p CABG, Cardiac Arrest, CHF, HTN, AICD, Lung Ca s/p Resection (reported to be in remission)  Surgical History: Left TKA, CABG, Lung mass/nodular resection  Family History; Mother  in 40s from breast ca. Father  age 80s from HD  Social History: Former TObacco/smoking. Quit 20 years ago. Former 40 PPD smoker. Daily 1-2 beers. Denies illicit drug use.     . Patient reported dizziness prior to syncopal event  Occ with SOB.    . Feeling better, +babesia PCR    : Feeling better, dizziness improved.    : no new complaints    .  RT leg pain, sensitive to tactile stimuli  Spangler wound     . Persistent RT leg pain  Discussed with ida orantes, plan for vascular surgery given progressive symptoms.    . For surgery today, will follow    . Found to have shingles. On PO meds. Procedure cancelled.     PAST MEDICAL HISTORY:  CAD (coronary artery disease)   Cardiac arrest with successful resuscitation   CKD (chronic kidney disease)   Heart failure   Hypertension   Lung cancer   PAD (peripheral artery disease).     PAST SURGICAL HISTORY:  AICD (automatic cardioverter/defibrillator) present   H/O left knee surgery   History of appendectomy   History of lobectomy of lung   History of tonsillectomy and adenoidectomy   S/P carotid endarterectomy.    SOCIAL HISTORY:  1-2  beers daily    MEDICATIONS:  aspirin enteric coated 81 milliGRAM(s) Oral daily  atorvastatin 10 milliGRAM(s) Oral at bedtime  atovaquone  Suspension 750 milliGRAM(s) Oral every 12 hours  azithromycin  IVPB      azithromycin  IVPB 500 milliGRAM(s) IV Intermittent every 24 hours  cadexomer iodine 0.9% Gel 1 Application(s) Topical <User Schedule>  heparin   Injectable 5000 Unit(s) SubCutaneous every 8 hours  melatonin 5 milliGRAM(s) Oral at bedtime  metoprolol succinate ER 37.5 milliGRAM(s) Oral daily  pantoprazole    Tablet 40 milliGRAM(s) Oral before breakfast  sacubitril 24 mG/valsartan 26 mG 1 Tablet(s) Oral two times a day  sodium chloride 0.9%. 1000 milliLiter(s) (75 mL/Hr) IV Continuous <Continuous>  sodium chloride 0.9%. 1000 milliLiter(s) (75 mL/Hr) IV Continuous <Continuous>  valACYclovir 1000 milliGRAM(s) Oral every 8 hours    MEDICATIONS  (PRN):  acetaminophen     Tablet .. 650 milliGRAM(s) Oral every 6 hours PRN Mild Pain (1 - 3), Moderate Pain (4 - 6), Severe Pain (7 - 10)  aluminum hydroxide/magnesium hydroxide/simethicone Suspension 30 milliLiter(s) Oral every 4 hours PRN Dyspepsia  ondansetron Injectable 4 milliGRAM(s) IV Push every 8 hours PRN Nausea and/or Vomiting  oxyCODONE    IR 2.5 milliGRAM(s) Oral every 4 hours PRN Moderate Pain (4 - 6)  sodium chloride 0.65% Nasal 1 Spray(s) Both Nostrils two times a day PRN Nasal Congestion      HOME MEDICATIONS:  Ecotrin Adult Low Strength 81 mg oral delayed release tablet: 1 tab(s) orally once a day (22 Aug 2023 09:00)  Entresto 24 mg-26 mg oral tablet: 1 tab(s) orally 2 times a day (22 Aug 2023 09:01)  Farxiga 10 mg oral tablet: 1 tab(s) orally once a day (22 Aug 2023 09:01)  melatonin 5 mg oral tablet: 1 tab(s) orally once a day (at bedtime) (22 Aug 2023 09:00)  metoprolol succinate 25 mg oral tablet, extended release: 1.5 tab(s) orally once a day (at bedtime) (22 Aug 2023 09:04)  pantoprazole 40 mg oral delayed release tablet: 1 tab(s) orally once a day (22 Aug 2023 09:00)  pravastatin 40 mg oral tablet: 1 tab(s) orally once a day (at bedtime) (22 Aug 2023 09:00)    PHYSICAL EXAM:  T(C): 36.4 (31 Aug 2023 08:04), Max: 36.4 (31 Aug 2023 00:12)  T(F): 97.5 (31 Aug 2023 08:04), Max: 97.5 (31 Aug 2023 00:12)  HR: 61 (31 Aug 2023 08:04) (61 - 71)  BP: 104/68 (31 Aug 2023 08:04) (104/68 - 130/59)  RR: 18 (31 Aug 2023 08:04) (18 - 18)  SpO2: 97% (31 Aug 2023 08:04) (92% - 97%)    Parameters below as of 31 Aug 2023 08:04  Patient On (Oxygen Delivery Method): room air    Constitutional: NAD, awake and alert  HEENT: PERR, EOMI, Normal Hearing, MMM  Neck: Soft and supple, No LAD, No JVD  Respiratory: Breath sounds are clear bilaterally, No wheezing, rales or rhonchi  Cardiovascular: S1 and S2, regular rate and rhythm, no Murmurs, gallops or rubs  Gastrointestinal: Bowel Sounds present, soft, nontender, nondistended, no guarding, no rebound  Extremities: No peripheral edema  Vascular: 2+ peripheral pulses  Neurological: A/O x 3, no focal deficits  Musculoskeletal: 5/5 strength b/l upper and lower extremities  Skin: No rashes    =======================================    INTERPRETATION OF TELEMETRY: Paced, run NSVT    ECG:   < from: 12 Lead ECG (23 @ 10:41) >  Diagnosis Line Normal sinus rhythm  Non-specific intra-ventricular conduction delay  ST & T wave abnormality, consider inferior ischemia    ========================================    LABS:               12.9   5.63  )-----------( 202      ( 30 Aug 2023 05:03 )             38.5     08-30    133<L>  |  103  |  19  ----------------------------<  117<H>  4.5   |  28  |  1.25    Ca    8.9      30 Aug 2023 05:03  Phos  2.8     08-30  Mg     1.7     08-30    PT/INR - ( 30 Aug 2023 05:03 )   PT: 11.9 sec;   INR: 1.05 ratio      PTT - ( 30 Aug 2023 05:03 )  PTT:31.5 sec      HDL 10 LDL 73    Troponin neg x2   BNP 95    + Babesia PCR    CARDIAC TESTIN/22/23: TTE Echo Complete w/ Contrast w/ Doppler: There is calcification of mitral valve leaflets. The leaflet opening is restricted. Mild (1+) mitral regurgitation is present. EA reversal of the mitral inflow consistent with reduced compliance of the left ventricle. The aortic valve appears severely calcified. Valve opening seems to be restricted. Transaortic gradients are underestimated due to impaired left ventricle systolic function. KACIE by continuity equation and dimensionless index (.29) suggest at least moderate aortic stenosis. Left ventricle systolic function appears impaired in the presence of a cardiac arrhythmia. Left ventricle size and structure are within normal limitations. Definity was used to better visualize the endocardial border. septal , apical wall hypokinesis Estimated left ventricular ejection fraction is 40 %. Normal appearing right ventricle structure and function.    RADIOLOGY & ADDITIONAL STUDIES:    23: CT Angio Abd Aorta w/run-off w/ IV Cont (23 @ 08:56) > Right iliac and lower extremity CT angiogram: No right iliac stenosis. Moderately severe stenosis of the right common femoral artery and severe stenosis at the bifurcation of the right common femoral artery. Multiple severe popliteal artery stenoses. No significant infrapopliteal disease with three-vessel runoff to the right foot. Left iliac and lower extremity CT angiogram: Moderate stenosis of the left common iliac artery. Severe stenosis in the proximal superficial femoral artery. Multiple severe popliteal artery stenoses. No significant infrapopliteal disease with three-vessel runoff to the foot.     23: US Duplex Arterial Lower Ext Ltd, Right (23 @ 11:33) > Multifocal plaque without duplex evidence of hemodynamically significant stenosis or occlusion in the arteries of the right lower extremity. Altered hemodynamics with low velocity tardus parvus blood flow suggestive of upstream flow limiting disease, likely at the level of the right common or external iliac artery. Consider CTA of the aorta and runoff for further evaluation.     23: CT Head No Cont (23 @ 20:30) > Head CT: No CT evidence of acute intracranial hemorrhage. C-spine CT:  No acute fracture. CT Chest, Abdomen and Pelvis w/ IV Cont (23 @ 22:46) > No acute posttraumatic injury in the chest. Postsurgical changes from lobectomy. Reticular opacities in bilateral lower lobes may be scarring. Interstitial lung disease can have similar appearance. No pneumothorax. No acute posttraumatic organ injury in the abdomen or pelvis. No retroperitoneal or intraperitoneal hematoma. No acute fractures of the spine, bony pelvis or proximal femora. Questionable contour irregularity of the fourth and fifth left anterior rib which may relate to nondisplaced rib fracture versus sequela from remote impact, correlate clinically. Other chronic findings as above.

## 2023-08-31 NOTE — DISCHARGE NOTE PROVIDER - PROVIDER TOKENS
PROVIDER:[TOKEN:[[id number]],FOLLOWUP:[1 week],ESTABLISHEDPATIENT:[T]],PROVIDER:[TOKEN:[[id number]],FOLLOWUP:[2 weeks],ESTABLISHEDPATIENT:[T]],PROVIDER:[TOKEN:[[id number]],FOLLOWUP:[2 weeks],ESTABLISHEDPATIENT:[T]]

## 2023-08-31 NOTE — PROGRESS NOTE ADULT - ASSESSMENT
87 yo M with extensive PMHX and history of PAD. Vascular consulted for PAD and ulcer shin.  A dup done showed Multifocal plaque without duplex evidence of hemodynamically significant stenosis or occlusion in the arteries of the right lower extremity. Altered hemodynamics with low velocity tardus parvus blood flow suggestive of upstream flow limiting disease, likely at the level of the  right common or external iliac artery.   CTa:  R; no iliac stenosis, mod sev stenosis comm fem + severe stenosis at bifurcation comm fem + pop, no infra pop / L; mod stenosis comm iliac, severe stenosis at SFA + pop, no infra pop,    P:  R fem artery endarterectomy as an outpatient due to shingles  No surgical intervention at this time. Reconsult if needed. Thanks for the consult.  Follow up in office in 2 weeks  Rest of management as per primary team    Plan discussed with Dr. Pineda

## 2023-08-31 NOTE — DISCHARGE NOTE PROVIDER - NSDCFUSCHEDAPPT_GEN_ALL_CORE_FT
[facility]  ELECTROPH [address]  Scheduled Appointment: 09/06/2023    [facility]  ELECTROPH [address]  Scheduled Appointment: 10/18/2023    Paul Garza  [facility]  INTMED [address]  Scheduled Appointment: 10/30/2023

## 2023-08-31 NOTE — PROGRESS NOTE ADULT - ASSESSMENT
89 y/o male with a PMHx of  MI  CAD s/p CABG, Cardiac Arrest, CHF, HTN, AICD, Lung Ca s/p Resection, peripheral artery disease, CKD, ,present, s/p lobectomy of lung, s/p carotid endarterectomy; presenting to the ED s/p unwitnessed  fall in bathroom, noted -LOC, -head strike, -blood thinner use. Patient states he suddenly felt dizzy while he was urinating, he lost his balance and fell; he called EMS through a medical emergency device around his neck and was wedged in between the toilet for about two hours. Patient c/o of lower back pain. Patient able to bear weight but feels unsteady. Denies weakness or numbness. Patient admits to EtOH consumption tonight, approximately 1 beer prior to fall. Also endorses knee pain, neck pain.    #Extensive PAD  Rest symptoms and wounds. Plan for R fem artery endarterectomy 8/30 v ?8/31  **Given type of surgery and patient clinical risk factors he has at least 2 points with revised cardiac risk index which gives him approximately a 10% ~ 30 day risk of death MI or cardiac arrest . He is not in any overt CHF and his preop CR is <2 and his AS both on exam and by echo seems only mild to moderate - Would cont B Blockers and statins periop, can hold Entresto 24 hours preop then resume post op**    #Syncope - Suspect in the setting of +Babesia /tick bourne illness.   - Monitor on remote tele, run NSVT noted.  - Troponin negative x2   - 8/22: TTE: Mod AS, EF 40% (EF at baseline)  - Interrogate device - run NSVT noted   - ID following - patient on Azithro and Mepron for Babesia     #Ischemic Cardiomyopathy. EF 35-40%. s/p ICD. Device Check reviewed.  Farxiga held - resume on DC. Run NSVT. Keep K4/MG2    #CAD. CABG. Continue aspirin and statin.     #Moderate AS. Stable. Monitoring.    #HTN. Chronic and stable. Continue Metoprolol and Entresto    #HLD. Continue statin. Check lipids.    #Carotid stenosis s/p endarterectomy. Continue aspirin and statin.        Case d/w Dr. Romo  Recommend outpatient follow up.  Will sign off, call with questions.

## 2023-08-31 NOTE — DISCHARGE NOTE PROVIDER - NSDCCPCAREPLAN_GEN_ALL_CORE_FT
PRINCIPAL DISCHARGE DIAGNOSIS  Diagnosis: Near syncope  Assessment and Plan of Treatment:       SECONDARY DISCHARGE DIAGNOSES  Diagnosis: Hyponatremia  Assessment and Plan of Treatment:     Diagnosis: Babesiosis  Assessment and Plan of Treatment:     Diagnosis: Thrombocytopenia  Assessment and Plan of Treatment:     Diagnosis: Shingles  Assessment and Plan of Treatment:

## 2023-08-31 NOTE — DISCHARGE NOTE PROVIDER - CARE PROVIDER_API CALL
NANCIE PEREZ  [address]  [place] [address]  Phone: [phone]  Fax: [phone]  Established Patient  Follow Up Time: 1 week    Gila Pineda  Vascular Surgery  [address] [facility]  [place] [address]  Phone: [phone]  Fax: [phone]  Established Patient  Follow Up Time: 2 weeks    Mone Campbell NP in Adult Gerontology Acute Care  [address] [place] [address]  Phone: [phone]  Fax: [phone]  Established Patient  Follow Up Time: 2 weeks

## 2023-08-31 NOTE — PROGRESS NOTE ADULT - NS_MD_PANP_GEN_ALL_CORE
Cardiac clearance placed on JENNA Friedman for signature   
Attending and PA/NP shared services statement (NON-critical care):

## 2023-08-31 NOTE — DISCHARGE NOTE PROVIDER - NSDCMRMEDTOKEN_GEN_ALL_CORE_FT
acetaminophen 325 mg oral tablet: 2 tab(s) orally every 6 hours As needed Mild Pain (1 - 3), Moderate Pain (4 - 6), Severe Pain (7 - 10)  Ecotrin Adult Low Strength 81 mg oral delayed release tablet: 1 tab(s) orally once a day  Entresto 24 mg-26 mg oral tablet: 1 tab(s) orally 2 times a day  Farxiga 10 mg oral tablet: 1 tab(s) orally once a day  melatonin 5 mg oral tablet: 1 tab(s) orally once a day (at bedtime)  metoprolol succinate 25 mg oral tablet, extended release: 1.5 tab(s) orally once a day (at bedtime)  pantoprazole 40 mg oral delayed release tablet: 1 tab(s) orally once a day  pravastatin 40 mg oral tablet: 1 tab(s) orally once a day (at bedtime)  sodium chloride 0.65% nasal spray: 2 spray(s) nasal 3 times a day as needed for  congestion  valACYclovir 1 g oral tablet: 1 tab(s) orally every 8 hours  valACYclovir 1 g oral tablet: 1 tab(s) orally every 8 hours

## 2023-09-06 ENCOUNTER — APPOINTMENT (OUTPATIENT)
Dept: ELECTROPHYSIOLOGY | Facility: CLINIC | Age: 88
End: 2023-09-06

## 2023-09-06 DIAGNOSIS — Z86.74 PERSONAL HISTORY OF SUDDEN CARDIAC ARREST: ICD-10-CM

## 2023-09-06 DIAGNOSIS — K70.30 ALCOHOLIC CIRRHOSIS OF LIVER WITHOUT ASCITES: ICD-10-CM

## 2023-09-06 DIAGNOSIS — N18.9 CHRONIC KIDNEY DISEASE, UNSPECIFIED: ICD-10-CM

## 2023-09-06 DIAGNOSIS — I70.221 ATHEROSCLEROSIS OF NATIVE ARTERIES OF EXTREMITIES WITH REST PAIN, RIGHT LEG: ICD-10-CM

## 2023-09-06 DIAGNOSIS — B60.00 BABESIOSIS, UNSPECIFIED: ICD-10-CM

## 2023-09-06 DIAGNOSIS — B02.9 ZOSTER WITHOUT COMPLICATIONS: ICD-10-CM

## 2023-09-06 DIAGNOSIS — L97.919 NON-PRESSURE CHRONIC ULCER OF UNSPECIFIED PART OF RIGHT LOWER LEG WITH UNSPECIFIED SEVERITY: ICD-10-CM

## 2023-09-06 DIAGNOSIS — Z53.8 PROCEDURE AND TREATMENT NOT CARRIED OUT FOR OTHER REASONS: ICD-10-CM

## 2023-09-06 DIAGNOSIS — M54.2 CERVICALGIA: ICD-10-CM

## 2023-09-06 DIAGNOSIS — Z95.1 PRESENCE OF AORTOCORONARY BYPASS GRAFT: ICD-10-CM

## 2023-09-06 DIAGNOSIS — I13.0 HYPERTENSIVE HEART AND CHRONIC KIDNEY DISEASE WITH HEART FAILURE AND STAGE 1 THROUGH STAGE 4 CHRONIC KIDNEY DISEASE, OR UNSPECIFIED CHRONIC KIDNEY DISEASE: ICD-10-CM

## 2023-09-06 DIAGNOSIS — R16.1 SPLENOMEGALY, NOT ELSEWHERE CLASSIFIED: ICD-10-CM

## 2023-09-06 DIAGNOSIS — D69.59 OTHER SECONDARY THROMBOCYTOPENIA: ICD-10-CM

## 2023-09-06 DIAGNOSIS — I50.9 HEART FAILURE, UNSPECIFIED: ICD-10-CM

## 2023-09-06 DIAGNOSIS — I47.1 SUPRAVENTRICULAR TACHYCARDIA: ICD-10-CM

## 2023-09-06 DIAGNOSIS — I35.0 NONRHEUMATIC AORTIC (VALVE) STENOSIS: ICD-10-CM

## 2023-09-06 DIAGNOSIS — E87.1 HYPO-OSMOLALITY AND HYPONATREMIA: ICD-10-CM

## 2023-09-06 DIAGNOSIS — D64.9 ANEMIA, UNSPECIFIED: ICD-10-CM

## 2023-09-06 DIAGNOSIS — M54.50 LOW BACK PAIN, UNSPECIFIED: ICD-10-CM

## 2023-09-06 DIAGNOSIS — I47.20 VENTRICULAR TACHYCARDIA, UNSPECIFIED: ICD-10-CM

## 2023-09-06 DIAGNOSIS — Z95.810 PRESENCE OF AUTOMATIC (IMPLANTABLE) CARDIAC DEFIBRILLATOR: ICD-10-CM

## 2023-09-06 DIAGNOSIS — I25.10 ATHEROSCLEROTIC HEART DISEASE OF NATIVE CORONARY ARTERY WITHOUT ANGINA PECTORIS: ICD-10-CM

## 2023-09-06 DIAGNOSIS — I25.5 ISCHEMIC CARDIOMYOPATHY: ICD-10-CM

## 2023-09-06 DIAGNOSIS — I25.2 OLD MYOCARDIAL INFARCTION: ICD-10-CM

## 2023-09-06 DIAGNOSIS — Z85.118 PERSONAL HISTORY OF OTHER MALIGNANT NEOPLASM OF BRONCHUS AND LUNG: ICD-10-CM

## 2023-10-01 ENCOUNTER — EMERGENCY (EMERGENCY)
Facility: HOSPITAL | Age: 88
LOS: 0 days | Discharge: HOME | End: 2023-10-01
Attending: STUDENT IN AN ORGANIZED HEALTH CARE EDUCATION/TRAINING PROGRAM
Payer: MEDICARE

## 2023-10-01 VITALS
DIASTOLIC BLOOD PRESSURE: 62 MMHG | OXYGEN SATURATION: 98 % | TEMPERATURE: 98 F | SYSTOLIC BLOOD PRESSURE: 115 MMHG | HEART RATE: 62 BPM | RESPIRATION RATE: 22 BRPM

## 2023-10-01 VITALS — HEIGHT: 71 IN | WEIGHT: 197.09 LBS

## 2023-10-01 DIAGNOSIS — I13.0 HYPERTENSIVE HEART AND CHRONIC KIDNEY DISEASE WITH HEART FAILURE AND STAGE 1 THROUGH STAGE 4 CHRONIC KIDNEY DISEASE, OR UNSPECIFIED CHRONIC KIDNEY DISEASE: ICD-10-CM

## 2023-10-01 DIAGNOSIS — Z90.49 ACQUIRED ABSENCE OF OTHER SPECIFIED PARTS OF DIGESTIVE TRACT: Chronic | ICD-10-CM

## 2023-10-01 DIAGNOSIS — Z98.890 OTHER SPECIFIED POSTPROCEDURAL STATES: Chronic | ICD-10-CM
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
## 2023-10-01 DIAGNOSIS — Z95.810 PRESENCE OF AUTOMATIC (IMPLANTABLE) CARDIAC DEFIBRILLATOR: ICD-10-CM

## 2023-10-01 DIAGNOSIS — S81.801A UNSPECIFIED OPEN WOUND, RIGHT LOWER LEG, INITIAL ENCOUNTER: ICD-10-CM

## 2023-10-01 DIAGNOSIS — Z95.810 PRESENCE OF AUTOMATIC (IMPLANTABLE) CARDIAC DEFIBRILLATOR: Chronic | ICD-10-CM

## 2023-10-01 DIAGNOSIS — I50.9 HEART FAILURE, UNSPECIFIED: ICD-10-CM
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
## 2023-10-01 DIAGNOSIS — Z90.49 ACQUIRED ABSENCE OF OTHER SPECIFIED PARTS OF DIGESTIVE TRACT: ICD-10-CM

## 2023-10-01 DIAGNOSIS — Z90.89 ACQUIRED ABSENCE OF OTHER ORGANS: ICD-10-CM

## 2023-10-01 DIAGNOSIS — Z90.2 ACQUIRED ABSENCE OF LUNG [PART OF]: ICD-10-CM

## 2023-10-01 DIAGNOSIS — J90 PLEURAL EFFUSION, NOT ELSEWHERE CLASSIFIED: ICD-10-CM

## 2023-10-01 DIAGNOSIS — Z86.74 PERSONAL HISTORY OF SUDDEN CARDIAC ARREST: ICD-10-CM

## 2023-10-01 DIAGNOSIS — I25.10 ATHEROSCLEROTIC HEART DISEASE OF NATIVE CORONARY ARTERY WITHOUT ANGINA PECTORIS: ICD-10-CM

## 2023-10-01 DIAGNOSIS — Z90.2 ACQUIRED ABSENCE OF LUNG [PART OF]: Chronic | ICD-10-CM

## 2023-10-01 DIAGNOSIS — R60.9 EDEMA, UNSPECIFIED: ICD-10-CM

## 2023-10-01 DIAGNOSIS — Z85.118 PERSONAL HISTORY OF OTHER MALIGNANT NEOPLASM OF BRONCHUS AND LUNG: ICD-10-CM

## 2023-10-01 LAB
ALBUMIN SERPL ELPH-MCNC: 3.9 G/DL — SIGNIFICANT CHANGE UP (ref 3.3–5)
ALP SERPL-CCNC: 68 U/L — SIGNIFICANT CHANGE UP (ref 40–120)
ALT FLD-CCNC: 19 U/L — SIGNIFICANT CHANGE UP (ref 12–78)
ANION GAP SERPL CALC-SCNC: 5 MMOL/L — SIGNIFICANT CHANGE UP (ref 5–17)
APTT BLD: 30 SEC — SIGNIFICANT CHANGE UP (ref 24.5–35.6)
AST SERPL-CCNC: 27 U/L — SIGNIFICANT CHANGE UP (ref 15–37)
BASOPHILS # BLD AUTO: 0.05 K/UL — SIGNIFICANT CHANGE UP (ref 0–0.2)
BASOPHILS NFR BLD AUTO: 0.9 % — SIGNIFICANT CHANGE UP (ref 0–2)
BILIRUB SERPL-MCNC: 0.6 MG/DL — SIGNIFICANT CHANGE UP (ref 0.2–1.2)
BLD GP AB SCN SERPL QL: SIGNIFICANT CHANGE UP
BUN SERPL-MCNC: 18 MG/DL — SIGNIFICANT CHANGE UP (ref 7–23)
CALCIUM SERPL-MCNC: 9.3 MG/DL — SIGNIFICANT CHANGE UP (ref 8.5–10.1)
CHLORIDE SERPL-SCNC: 110 MMOL/L — HIGH (ref 96–108)
CO2 SERPL-SCNC: 26 MMOL/L — SIGNIFICANT CHANGE UP (ref 22–31)
CREAT SERPL-MCNC: 1.19 MG/DL — SIGNIFICANT CHANGE UP (ref 0.5–1.3)
EGFR: 59 ML/MIN/1.73M2 — LOW
EOSINOPHIL # BLD AUTO: 0.26 K/UL — SIGNIFICANT CHANGE UP (ref 0–0.5)
EOSINOPHIL NFR BLD AUTO: 4.5 % — SIGNIFICANT CHANGE UP (ref 0–6)
ERYTHROCYTE [SEDIMENTATION RATE] IN BLOOD: 20 MM/HR — SIGNIFICANT CHANGE UP (ref 0–20)
GLUCOSE SERPL-MCNC: 120 MG/DL — HIGH (ref 70–99)
HCT VFR BLD CALC: 42.3 % — SIGNIFICANT CHANGE UP (ref 39–50)
HGB BLD-MCNC: 14.5 G/DL — SIGNIFICANT CHANGE UP (ref 13–17)
IMM GRANULOCYTES NFR BLD AUTO: 0.3 % — SIGNIFICANT CHANGE UP (ref 0–0.9)
INR BLD: 0.96 RATIO — SIGNIFICANT CHANGE UP (ref 0.85–1.18)
LYMPHOCYTES # BLD AUTO: 1.78 K/UL — SIGNIFICANT CHANGE UP (ref 1–3.3)
LYMPHOCYTES # BLD AUTO: 30.7 % — SIGNIFICANT CHANGE UP (ref 13–44)
MCHC RBC-ENTMCNC: 33.7 PG — SIGNIFICANT CHANGE UP (ref 27–34)
MCHC RBC-ENTMCNC: 34.3 GM/DL — SIGNIFICANT CHANGE UP (ref 32–36)
MCV RBC AUTO: 98.4 FL — SIGNIFICANT CHANGE UP (ref 80–100)
MONOCYTES # BLD AUTO: 0.42 K/UL — SIGNIFICANT CHANGE UP (ref 0–0.9)
MONOCYTES NFR BLD AUTO: 7.3 % — SIGNIFICANT CHANGE UP (ref 2–14)
NEUTROPHILS # BLD AUTO: 3.26 K/UL — SIGNIFICANT CHANGE UP (ref 1.8–7.4)
NEUTROPHILS NFR BLD AUTO: 56.3 % — SIGNIFICANT CHANGE UP (ref 43–77)
NT-PROBNP SERPL-SCNC: 5208 PG/ML — HIGH (ref 0–450)
PLATELET # BLD AUTO: 213 K/UL — SIGNIFICANT CHANGE UP (ref 150–400)
POTASSIUM SERPL-MCNC: 4.3 MMOL/L — SIGNIFICANT CHANGE UP (ref 3.5–5.3)
POTASSIUM SERPL-SCNC: 4.3 MMOL/L — SIGNIFICANT CHANGE UP (ref 3.5–5.3)
PROT SERPL-MCNC: 7.4 GM/DL — SIGNIFICANT CHANGE UP (ref 6–8.3)
PROTHROM AB SERPL-ACNC: 10.9 SEC — SIGNIFICANT CHANGE UP (ref 9.5–13)
RBC # BLD: 4.3 M/UL — SIGNIFICANT CHANGE UP (ref 4.2–5.8)
RBC # FLD: 18 % — HIGH (ref 10.3–14.5)
SODIUM SERPL-SCNC: 141 MMOL/L — SIGNIFICANT CHANGE UP (ref 135–145)
TROPONIN I, HIGH SENSITIVITY RESULT: 16.34 NG/L — SIGNIFICANT CHANGE UP
WBC # BLD: 5.79 K/UL — SIGNIFICANT CHANGE UP (ref 3.8–10.5)
WBC # FLD AUTO: 5.79 K/UL — SIGNIFICANT CHANGE UP (ref 3.8–10.5)

## 2023-10-01 PROCEDURE — 73630 X-RAY EXAM OF FOOT: CPT | Mod: RT

## 2023-10-01 PROCEDURE — 93005 ELECTROCARDIOGRAM TRACING: CPT

## 2023-10-01 PROCEDURE — 85610 PROTHROMBIN TIME: CPT

## 2023-10-01 PROCEDURE — 80053 COMPREHEN METABOLIC PANEL: CPT

## 2023-10-01 PROCEDURE — 99285 EMERGENCY DEPT VISIT HI MDM: CPT | Mod: 25

## 2023-10-01 PROCEDURE — 73630 X-RAY EXAM OF FOOT: CPT | Mod: 26,RT

## 2023-10-01 PROCEDURE — 36415 COLL VENOUS BLD VENIPUNCTURE: CPT

## 2023-10-01 PROCEDURE — 86900 BLOOD TYPING SEROLOGIC ABO: CPT

## 2023-10-01 PROCEDURE — 86140 C-REACTIVE PROTEIN: CPT

## 2023-10-01 PROCEDURE — 85730 THROMBOPLASTIN TIME PARTIAL: CPT

## 2023-10-01 PROCEDURE — 86850 RBC ANTIBODY SCREEN: CPT

## 2023-10-01 PROCEDURE — 99285 EMERGENCY DEPT VISIT HI MDM: CPT

## 2023-10-01 PROCEDURE — 71045 X-RAY EXAM CHEST 1 VIEW: CPT

## 2023-10-01 PROCEDURE — 71045 X-RAY EXAM CHEST 1 VIEW: CPT | Mod: 26

## 2023-10-01 PROCEDURE — 84484 ASSAY OF TROPONIN QUANT: CPT

## 2023-10-01 PROCEDURE — 85025 COMPLETE CBC W/AUTO DIFF WBC: CPT

## 2023-10-01 PROCEDURE — 93010 ELECTROCARDIOGRAM REPORT: CPT

## 2023-10-01 PROCEDURE — 86901 BLOOD TYPING SEROLOGIC RH(D): CPT

## 2023-10-01 PROCEDURE — 83880 ASSAY OF NATRIURETIC PEPTIDE: CPT

## 2023-10-01 PROCEDURE — 87040 BLOOD CULTURE FOR BACTERIA: CPT | Mod: 59

## 2023-10-01 PROCEDURE — 85652 RBC SED RATE AUTOMATED: CPT

## 2023-10-01 NOTE — ED STATDOCS - PROGRESS NOTE DETAILS
Jemal Doan for attending Dr. Pope:  89 y/o male w/ a PMHx of HTN, HLD, GERD, chronic back pain, breast CA, vernicose veins, [place], shingles, and OA presents to the ED c/o b/l ankle and feet swelling and heaviness. Pt reports he has a wound on this right calf, unknown cause of wound, pt visiting nurse sent him to ED for further eval as they thought it may be affected. Pt also reports is scheduled to have a bypass on his femoral artery in 2 days. Denies CP, SOB, or dizziness. Will send pt to main ED for further evaluation.

## 2023-10-01 NOTE — ED STATDOCS - CARE PLAN
Principal Discharge DX:	Wound of right leg, initial encounter  Secondary Diagnosis:	Lower extremity edema   1

## 2023-10-01 NOTE — ED PROVIDER NOTE - PROGRESS NOTE DETAILS
All labs reviewed, patient has normal ESR and white blood cell count making any infectious process unlikely.  Patient has elevated BNP without any difficulty breathing.  Chest x-ray with small pleural effusions.  Patient currently on Entresto, advised to follow-up with his cardiologist for possible addition of of diuretic.  Will not make any medication changes from the ED given patient is stable at this standpoint.  Patient to follow-up on Tuesday for angiogram of the right lower extremity.  Patient advised to apply compression socks to bilateral lower extremities to improve venous return.  Discussed return precautions and all questions answered. Pt in agreement w/ plan. CAOx3, NAD, VSS. Stable for d/c.

## 2023-10-01 NOTE — ED PROVIDER NOTE - CLINICAL SUMMARY MEDICAL DECISION MAKING FREE TEXT BOX
88 year old male w/ PAD presents for non-healing RLE wound x2 months. Has angiogram scheduled Tuesday. Wound does not appear infected. Will eval with blood work, x-ray, cardiac eval for LE swelling, anticipate d/c.

## 2023-10-01 NOTE — ED ADULT NURSE NOTE - OBJECTIVE STATEMENT
Pt with c/o swelling to bilateral ankles and feet.  Pt states he has a wound on his right calf, unknown what caused wound.  States his visiting nurse sent him here for further eval of wound stating wound may in infected.  Denies taking water pill.   As per son, pt is set to have surgery on Tuesday on his femoral artery, states the artery is clogged. No redness, foul smell or swelling noticed to wound. Pt on stretcher in hallway, dtr at bedside.

## 2023-10-01 NOTE — ED PROVIDER NOTE - PHYSICAL EXAMINATION
GENERAL: A&Ox3, non-toxic appearing, no acute distress  HEENT: NCAT, EOMI, oral mucosa moist, normal conjunctiva  RESP: CTAB, no respiratory distress, no wheezes/rhonchi/rales, speaking in full sentences  CV: RRR, no murmurs/rubs/gallops, no JVD, 2+ LE edema, normal cap refill  ABDOMEN: soft, non-tender, non-distended, no guarding  MSK: no visible deformities, no chest wall tenderness  NEURO: no focal sensory or motor deficits, CN 2-12 grossly intact  SKIN: warm, normal color, well perfused, no rash, R anterior shin wound w/ granulation tissue approx 3cm x 3cm, no surrounding erythema, healing wound to R posterior heel with tenderness, no discharge or erythema  PSYCH: normal affect GENERAL: A&Ox3, non-toxic appearing, no acute distress  HEENT: NCAT, EOMI, oral mucosa moist, normal conjunctiva  RESP: CTAB, no respiratory distress, no wheezes/rhonchi/rales, speaking in full sentences  CV: RRR, no murmurs/rubs/gallops, no JVD, 2+ LE edema, normal cap refill  ABDOMEN: soft, non-tender, non-distended, no guarding  MSK: no visible deformities  NEURO: no focal sensory or motor deficits, CN 2-12 grossly intact  SKIN: warm, normal color, well perfused, no rash, R anterior shin wound w/ granulation tissue approx 3cm x 3cm, no surrounding erythema, healing wound to R posterior heel with tenderness, no discharge or erythema  PSYCH: normal affect

## 2023-10-01 NOTE — ED ADULT NURSE NOTE - NSFALLHARMRISKINTERV_ED_ALL_ED

## 2023-10-01 NOTE — ED PROVIDER NOTE - PATIENT PORTAL LINK FT
You can access the FollowMyHealth Patient Portal offered by [facility] by registering at the following website: [email or web address]. By joining [facility]’s FollowMyHealth portal, you will also be able to view your health information using other applications (apps) compatible with our system.

## 2023-10-01 NOTE — ED ADULT TRIAGE NOTE - CHIEF COMPLAINT QUOTE
Pt with c/o swelling to bilateral ankles and feet.  Pt states he has a wound on his right calf, unknown what caused wound.  States his visiting nurse sent him here for further eval of wound stating wound may in infected.  Denies taking water pill.   As per son, pt is set to have surgery on Tuesday on his femoral artery, states the artery is clogged.

## 2023-10-01 NOTE — ED PROVIDER NOTE - OBJECTIVE STATEMENT
88 year old male PMH CAD, HTN, CKD, PAD pending RLE angiogram, CHF, presents to the ED for RLE non-healing wound and LE swelling. Patient states he started having bleeding from the R anterior shin ~2 months ago and the wound has not been healing. He has followed up with vascular and is pending a RLE angiogram on Tuesday. He also c/o R heel wound that is healing appropriately. Has not seen wound care. States his family member thought the R shin wound was infected, prompting his visit. He complains of shock-like pain to the site that occurs intermittently. No fever, chills, CP, or SOB. 88 year old male PMH CAD, HTN, CKD, PAD pending RLE angiogram for possible bypass, CHF, presents to the ED for RLE non-healing wound and LE swelling. Patient states he started having bleeding from the R anterior shin ~2 months ago and the wound has not been healing. He has followed up with vascular and is pending a RLE angiogram on Tuesday. He also c/o R heel wound that is healing appropriately. Has not seen wound care. States his family member thought the R shin wound was infected, prompting his visit. He complains of shock-like pain to the site that occurs intermittently. No fever, chills, CP, or SOB.

## 2023-10-01 NOTE — ED PROVIDER NOTE - NSFOLLOWUPINSTRUCTIONS_ED_ALL_ED_FT
You are seen in the emergency department for evaluation of lower extremity wounds.  These do not appear grossly infected.  Given their chronic nature, consider following up with wound care specialist to assist in healing.  Follow-up with your vascular surgeon on Tuesday for angiogram as scheduled.  Speak with your cardiologist about potentially adding a diuretic to your medication regimen.    A copy of your lab results is attached. Rest, drink plenty of fluids.  Advance activity as tolerated.  Continue all previously prescribed medications as directed.  Follow up with your primary care physician in 48-72 hours- bring copies of your results.  Return to the ER for worsening or persistent symptoms, and/or ANY NEW OR CONCERNING SYMPTOMS. If you have issues obtaining follow up, please call: [phone] to obtain a doctor or specialist who takes your insurance in your area.

## 2023-10-01 NOTE — ED STATDOCS - BIRTH SEX
Ricardo RN called writer back. 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     Informed Max of new dosing of 2mg of warfarin daily and recheck INR on 9/26. Informed Ricardo new Rx was sent electronically to [facility] and signed orders faxed to Minesh Newman.    Ricardo states he does have his laptop on his person and he will monitor the implementation of orders and call back should there be an question.   Male

## 2023-10-02 LAB — CRP SERPL-MCNC: <3 MG/L — SIGNIFICANT CHANGE UP

## 2023-10-12 NOTE — H&P ADULT - NSICDXPASTMEDICALHX_GEN_ALL_CORE_FT
PAST MEDICAL HISTORY:  CAD (coronary artery disease)     Cardiac arrest with successful resuscitation     CKD (chronic kidney disease)     Heart failure     HLD (hyperlipidemia)     Hypertension     Lung cancer     PAD (peripheral artery disease)

## 2023-10-12 NOTE — H&P ADULT - PROBLEM SELECTOR PLAN 1
[facility] with possible PCI    ALPHONSO prehydration NA 2/2 HFrEF    -Consent obtained for cardiac catheterization w/ coronary angiogram, possible sedation and/or analgesia and possible stent placement. Pt is competent, has capacity, and understands risks and benefits of procedure. Risks and benefits discussed. Risk discussed included, but not limited to MI, stroke, mortality, major bleeding, arrythmia, or infection. All questions answered

## 2023-10-12 NOTE — H&P ADULT - ASSESSMENT
89 y/o male with PMH CABG ([place] to LAD), PCI ( RCA), HTN, HLD, PAD, Mild-mod AS, cardiomyopathy (ICD), Babesia, shingles presented to cardiology for pre op clearance, pt is having R. femoral endarterectomy.  Cardiac stress test reported as abnormal, pt referred to cardiac cath for further evaluation    ASA class:  Creatinine:  GFR:  Bleeding  Risk score:  Steve Score:  89 y/o male with PMH CABG ([place] to LAD), PCI ( RCA), HTN, HLD, PAD, Mild-mod AS, cardiomyopathy (ICD), Babesia, shingles presented to cardiology for pre op clearance, pt is having R. femoral endarterectomy.  Cardiac stress test reported as abnormal, pt referred to cardiac cath for further evaluation    ASA class: II  Creatinine: 1.07  GFR: 67  Bleeding  Risk score:  1.2%  Steve Score: N/A

## 2023-10-12 NOTE — H&P ADULT - HISTORY OF PRESENT ILLNESS
87 y/o male with PMH CABG ([place] to LAD), PCI ( RCA), HTN, HLD, PAD, Mild-mod AS, cardiomyopathy (ICD), Babesia, shingles presented to cardiology for pre op clearance, pt is having R. femoral endarterectomy.  Cardiac stress test reported as abnormal, pt referred to cardiac cath for further evaluation.

## 2023-10-12 NOTE — H&P ADULT - NSHPREVIEWOFSYSTEMS_GEN_ALL_CORE
REVIEW OF SYSTEMS:    CONSTITUTIONAL: No fever, weight loss, chills, shakes, or fatigue  EYES: No eye pain, visual disturbances, or discharge  ENMT:  No difficulty hearing, tinnitus, vertigo; No sinus or throat pain  NECK: No pain or stiffness  RESPIRATORY: No cough, wheezing, hemoptysis, or shortness of breath  CARDIOVASCULAR: No chest pain, dyspnea, palpitations, dizziness, syncope, paroxysmal nocturnal dyspnea, orthopnea, or arm or leg swelling  GASTROINTESTINAL: No abdominal  or epigastric pain, nausea, vomiting, hematemesis, diarrhea, constipation, melena or bright red blood.  GENITOURINARY: No dysuria, nocturia, hematuria, or urinary incontinence  NEUROLOGICAL: No headaches, memory loss, slurred speech, limb weakness, loss of strength, numbness, or tremors  SKIN:+ r heal laceration No itching, burning, rashes, or lesions   MUSCULOSKELETAL: + LE pain, RLE swelling, red   PSYCHIATRIC: No depression, anxiety, or difficulty sleeping

## 2023-10-12 NOTE — H&P ADULT - NSHPPHYSICALEXAM_GEN_ALL_CORE
PHYSICAL EXAM  GENERAL: NAD, AAOx3  HEAD:  Atraumatic, Normocephalic  EYES: EOMI, PERRLA, conjunctiva and sclera clear  NECK: Supple, No JVD, No LAD  CHEST/LUNG: Clear to auscultation bilaterally; No wheeze  HEART: s1 s2 Regular rate and rhythm; No murmurs, rubs, or gallops  ABDOMEN: Soft, Nontender, Nondistended; Bowel sounds present X 4 quadrants   EXTREMITIES: RLE edema, B/L LE discoloration.   SKIN: RLE heal wound (being treated by visiting nurse)  NEURO: nonfocal CN/motor/sensory/reflexes  Psych: normal affect and behavior, calm and cooperative

## 2023-10-13 ENCOUNTER — OUTPATIENT (OUTPATIENT)
Dept: OUTPATIENT SERVICES | Facility: HOSPITAL | Age: 88
LOS: 1 days | Discharge: HOME | End: 2023-10-13
Payer: MEDICARE

## 2023-10-13 VITALS
TEMPERATURE: 98 F | RESPIRATION RATE: 16 BRPM | SYSTOLIC BLOOD PRESSURE: 147 MMHG | OXYGEN SATURATION: 100 % | WEIGHT: 197.09 LBS | HEART RATE: 65 BPM | HEIGHT: 72 IN | DIASTOLIC BLOOD PRESSURE: 64 MMHG

## 2023-10-13 VITALS
HEART RATE: 70 BPM | RESPIRATION RATE: 16 BRPM | SYSTOLIC BLOOD PRESSURE: 125 MMHG | OXYGEN SATURATION: 97 % | DIASTOLIC BLOOD PRESSURE: 58 MMHG

## 2023-10-13 DIAGNOSIS — R94.39 ABNORMAL RESULT OF OTHER CARDIOVASCULAR FUNCTION STUDY: ICD-10-CM

## 2023-10-13 DIAGNOSIS — Z90.2 ACQUIRED ABSENCE OF LUNG [PART OF]: Chronic | ICD-10-CM

## 2023-10-13 DIAGNOSIS — Z95.1 PRESENCE OF AORTOCORONARY BYPASS GRAFT: ICD-10-CM

## 2023-10-13 DIAGNOSIS — Z95.810 PRESENCE OF AUTOMATIC (IMPLANTABLE) CARDIAC DEFIBRILLATOR: Chronic | ICD-10-CM

## 2023-10-13 DIAGNOSIS — Z98.890 OTHER SPECIFIED POSTPROCEDURAL STATES: Chronic | ICD-10-CM

## 2023-10-13 DIAGNOSIS — I70.8 ATHEROSCLEROSIS OF OTHER ARTERIES: ICD-10-CM

## 2023-10-13 DIAGNOSIS — I25.118 ATHEROSCLEROTIC HEART DISEASE OF NATIVE CORONARY ARTERY WITH OTHER FORMS OF ANGINA PECTORIS: ICD-10-CM

## 2023-10-13 DIAGNOSIS — I25.82 CHRONIC TOTAL OCCLUSION OF CORONARY ARTERY: ICD-10-CM

## 2023-10-13 DIAGNOSIS — Z90.49 ACQUIRED ABSENCE OF OTHER SPECIFIED PARTS OF DIGESTIVE TRACT: Chronic | ICD-10-CM

## 2023-10-13 PROCEDURE — C1894: CPT

## 2023-10-13 PROCEDURE — C1769: CPT

## 2023-10-13 PROCEDURE — C1887: CPT

## 2023-10-13 PROCEDURE — 93459 L HRT ART/GRFT ANGIO: CPT

## 2023-10-13 PROCEDURE — 75710 ARTERY X-RAYS ARM/LEG: CPT | Mod: XU

## 2023-10-13 PROCEDURE — 36215 PLACE CATHETER IN ARTERY: CPT | Mod: XS

## 2023-10-13 NOTE — PROGRESS NOTE ADULT - SUBJECTIVE AND OBJECTIVE BOX
Nurse Practitioner Progress note:     HPI:  89 y/o male with PMH CABG ([place] to LAD), PCI ( RCA), HTN, HLD, PAD, Mild-mod AS, cardiomyopathy (ICD), Babesia, shingles presented to cardiology for pre op clearance, pt is having R. femoral endarterectomy.  Cardiac stress test reported as abnormal, pt referred to cardiac cath for further evaluation.  (12 Oct 2023 17:41)    S/P LHC with lesion to LCX, heavily calcified.       T(C): 36.7 (10-13-23 @ 09:34), Max: 36.7 (10-13-23 @ 09:34)  HR: 65 (10-13-23 @ 09:34) (65 - 65)  BP: 147/64 (10-13-23 @ 09:34) (147/64 - 147/64)  RR: 16 (10-13-23 @ 09:34) (16 - 16)  SpO2: 100% (10-13-23 @ 09:34) (100% - 100%)  Wt(kg): --        PHYSICAL EXAM:  NEURO: Non-focal, AxOx3.  No neuro deficits NECK: Supple, No JVD, No LAD  CHEST/LUNG: Clear to auscultation bilaterally; No wheeze  HEART: s1 s2 Regular rate and rhythm; No murmurs, rubs, or gallops  ABDOMEN: Soft, Nontender, Nondistended; Bowel sounds present X 4 quadrants   EXTREMITIES:  2+ Peripheral Pulses, No clubbing, cyanosis, or edema   VASCULAR: Peripheral pulses palpable 2+ bilaterally  PROCEDURE SITE: LFA pulled post procedure ,Site is without hematoma or bleeding. Sensation and FRANC intact. Distal pulses palpable 2+, capillary refill < 2 seconds. Patient denies pain, numbness, tingling, CP or SOB. Clean dry dressing applied     PROCEDURE RESULTS: Full report to follow     ASSESSMENT: HPI:  89 y/o male with PMH CABG ([place] to LAD), PCI ( RCA), HTN, HLD, PAD, Mild-mod AS, cardiomyopathy (ICD), Babesia, shingles presented to cardiology for pre op clearance, pt is having R. femoral endarterectomy.  Cardiac stress test reported as abnormal, pt referred to cardiac cath for further evaluation.  (12 Oct 2023 17:41)    S/P LHC lesion to LCX , heavily calcified     PLAN:  -VS, diet, activity as per post cath orders  -no emma post hydration very reduced EF  -Continue current medications  -Plan of care discussed with patient and Dr Enrique  -Post cath instructions reviewed, patient verbalizes and understands instructions  - Patient to be discharged home, will need to follow up with Dr. Garcia and vascular surgery

## 2023-10-13 NOTE — ASU PATIENT PROFILE, ADULT - FALL HARM RISK - HARM RISK INTERVENTIONS

## 2023-10-17 NOTE — POST DISCHARGE NOTE - DETAILS:
Post procedure phone call completed; patient understood all discharge paperwork. No questions regarding medications or pain management. MD follow up appointment made. Patient was able to rest when they were discharged. Patient will recommend [facility], no complaints of hospital stay, satisfied with care. Instructed patient to contact provider with any further questions or concerns.

## 2023-10-30 ENCOUNTER — APPOINTMENT (OUTPATIENT)
Dept: INTERNAL MEDICINE | Facility: CLINIC | Age: 88
End: 2023-10-30

## 2023-10-30 NOTE — ED ADULT NURSE NOTE - NSIMPLEMENTINTERV_GEN_ALL_ED
Implemented All Fall with Harm Risk Interventions:  [place] to call system. Call bell, personal items and telephone within reach. Instruct patient to call for assistance. Room bathroom lighting operational. Non-slip footwear when patient is off stretcher. Physically safe environment: no spills, clutter or unnecessary equipment. Stretcher in lowest position, wheels locked, appropriate side rails in place. Provide visual cue, wrist band, yellow gown, etc. Monitor gait and stability. Monitor for mental status changes and reorient to person, place, and time. Review medications for side effects contributing to fall risk. Reinforce activity limits and safety measures with patient and family. Provide visual clues: red socks. 31-Oct-2023

## 2023-10-31 ENCOUNTER — APPOINTMENT (OUTPATIENT)
Dept: ELECTROPHYSIOLOGY | Facility: CLINIC | Age: 88
End: 2023-10-31
Payer: MEDICARE

## 2023-10-31 ENCOUNTER — NON-APPOINTMENT (OUTPATIENT)
Age: 88
End: 2023-10-31

## 2023-10-31 VITALS
BODY MASS INDEX: 26.82 KG/M2 | HEIGHT: 72 IN | DIASTOLIC BLOOD PRESSURE: 70 MMHG | HEART RATE: 63 BPM | SYSTOLIC BLOOD PRESSURE: 129 MMHG | RESPIRATION RATE: 16 BRPM | WEIGHT: 198 LBS | OXYGEN SATURATION: 100 %

## 2023-10-31 DIAGNOSIS — I50.22 CHRONIC SYSTOLIC (CONGESTIVE) HEART FAILURE: ICD-10-CM

## 2023-10-31 PROBLEM — E78.5 HYPERLIPIDEMIA, UNSPECIFIED: Chronic | Status: ACTIVE | Noted: 2023-10-12

## 2023-10-31 PROCEDURE — 93284 PRGRMG EVAL IMPLANTABLE DFB: CPT

## 2023-12-07 ENCOUNTER — OUTPATIENT (OUTPATIENT)
Dept: OUTPATIENT SERVICES | Facility: HOSPITAL | Age: 88
LOS: 1 days | End: 2023-12-07
Payer: MEDICARE

## 2023-12-07 ENCOUNTER — RESULT REVIEW (OUTPATIENT)
Age: 88
End: 2023-12-07

## 2023-12-07 VITALS
HEIGHT: 72 IN | HEART RATE: 68 BPM | RESPIRATION RATE: 18 BRPM | SYSTOLIC BLOOD PRESSURE: 123 MMHG | DIASTOLIC BLOOD PRESSURE: 59 MMHG | OXYGEN SATURATION: 97 % | TEMPERATURE: 97 F | WEIGHT: 185.19 LBS

## 2023-12-07 DIAGNOSIS — Z90.49 ACQUIRED ABSENCE OF OTHER SPECIFIED PARTS OF DIGESTIVE TRACT: Chronic | ICD-10-CM

## 2023-12-07 DIAGNOSIS — Z98.890 OTHER SPECIFIED POSTPROCEDURAL STATES: Chronic | ICD-10-CM

## 2023-12-07 DIAGNOSIS — Z90.2 ACQUIRED ABSENCE OF LUNG [PART OF]: Chronic | ICD-10-CM

## 2023-12-07 DIAGNOSIS — Z95.810 PRESENCE OF AUTOMATIC (IMPLANTABLE) CARDIAC DEFIBRILLATOR: Chronic | ICD-10-CM

## 2023-12-07 DIAGNOSIS — Z95.810 PRESENCE OF AUTOMATIC (IMPLANTABLE) CARDIAC DEFIBRILLATOR: ICD-10-CM

## 2023-12-07 DIAGNOSIS — I50.22 CHRONIC SYSTOLIC (CONGESTIVE) HEART FAILURE: ICD-10-CM

## 2023-12-07 LAB
A1C WITH ESTIMATED AVERAGE GLUCOSE RESULT: 5.8 % — HIGH (ref 4–5.6)
A1C WITH ESTIMATED AVERAGE GLUCOSE RESULT: 5.8 % — HIGH (ref 4–5.6)
ANION GAP SERPL CALC-SCNC: 4 MMOL/L — LOW (ref 5–17)
ANION GAP SERPL CALC-SCNC: 4 MMOL/L — LOW (ref 5–17)
BASOPHILS # BLD AUTO: 0.05 K/UL — SIGNIFICANT CHANGE UP (ref 0–0.2)
BASOPHILS # BLD AUTO: 0.05 K/UL — SIGNIFICANT CHANGE UP (ref 0–0.2)
BASOPHILS NFR BLD AUTO: 0.7 % — SIGNIFICANT CHANGE UP (ref 0–2)
BASOPHILS NFR BLD AUTO: 0.7 % — SIGNIFICANT CHANGE UP (ref 0–2)
BLD GP AB SCN SERPL QL: SIGNIFICANT CHANGE UP
BLD GP AB SCN SERPL QL: SIGNIFICANT CHANGE UP
BUN SERPL-MCNC: 32 MG/DL — HIGH (ref 7–23)
BUN SERPL-MCNC: 32 MG/DL — HIGH (ref 7–23)
CALCIUM SERPL-MCNC: 8.9 MG/DL — SIGNIFICANT CHANGE UP (ref 8.5–10.1)
CALCIUM SERPL-MCNC: 8.9 MG/DL — SIGNIFICANT CHANGE UP (ref 8.5–10.1)
CHLORIDE SERPL-SCNC: 106 MMOL/L — SIGNIFICANT CHANGE UP (ref 96–108)
CHLORIDE SERPL-SCNC: 106 MMOL/L — SIGNIFICANT CHANGE UP (ref 96–108)
CO2 SERPL-SCNC: 28 MMOL/L — SIGNIFICANT CHANGE UP (ref 22–31)
CO2 SERPL-SCNC: 28 MMOL/L — SIGNIFICANT CHANGE UP (ref 22–31)
CREAT SERPL-MCNC: 1.35 MG/DL — HIGH (ref 0.5–1.3)
CREAT SERPL-MCNC: 1.35 MG/DL — HIGH (ref 0.5–1.3)
EGFR: 50 ML/MIN/1.73M2 — LOW
EGFR: 50 ML/MIN/1.73M2 — LOW
EOSINOPHIL # BLD AUTO: 0.32 K/UL — SIGNIFICANT CHANGE UP (ref 0–0.5)
EOSINOPHIL # BLD AUTO: 0.32 K/UL — SIGNIFICANT CHANGE UP (ref 0–0.5)
EOSINOPHIL NFR BLD AUTO: 4.3 % — SIGNIFICANT CHANGE UP (ref 0–6)
EOSINOPHIL NFR BLD AUTO: 4.3 % — SIGNIFICANT CHANGE UP (ref 0–6)
ESTIMATED AVERAGE GLUCOSE: 120 MG/DL — HIGH (ref 68–114)
ESTIMATED AVERAGE GLUCOSE: 120 MG/DL — HIGH (ref 68–114)
GLUCOSE SERPL-MCNC: 142 MG/DL — HIGH (ref 70–99)
GLUCOSE SERPL-MCNC: 142 MG/DL — HIGH (ref 70–99)
HCT VFR BLD CALC: 38.1 % — LOW (ref 39–50)
HCT VFR BLD CALC: 38.1 % — LOW (ref 39–50)
HGB BLD-MCNC: 12.8 G/DL — LOW (ref 13–17)
HGB BLD-MCNC: 12.8 G/DL — LOW (ref 13–17)
IMM GRANULOCYTES NFR BLD AUTO: 0.7 % — SIGNIFICANT CHANGE UP (ref 0–0.9)
IMM GRANULOCYTES NFR BLD AUTO: 0.7 % — SIGNIFICANT CHANGE UP (ref 0–0.9)
LYMPHOCYTES # BLD AUTO: 1.71 K/UL — SIGNIFICANT CHANGE UP (ref 1–3.3)
LYMPHOCYTES # BLD AUTO: 1.71 K/UL — SIGNIFICANT CHANGE UP (ref 1–3.3)
LYMPHOCYTES # BLD AUTO: 22.9 % — SIGNIFICANT CHANGE UP (ref 13–44)
LYMPHOCYTES # BLD AUTO: 22.9 % — SIGNIFICANT CHANGE UP (ref 13–44)
MCHC RBC-ENTMCNC: 31.3 PG — SIGNIFICANT CHANGE UP (ref 27–34)
MCHC RBC-ENTMCNC: 31.3 PG — SIGNIFICANT CHANGE UP (ref 27–34)
MCHC RBC-ENTMCNC: 33.6 GM/DL — SIGNIFICANT CHANGE UP (ref 32–36)
MCHC RBC-ENTMCNC: 33.6 GM/DL — SIGNIFICANT CHANGE UP (ref 32–36)
MCV RBC AUTO: 93.2 FL — SIGNIFICANT CHANGE UP (ref 80–100)
MCV RBC AUTO: 93.2 FL — SIGNIFICANT CHANGE UP (ref 80–100)
MONOCYTES # BLD AUTO: 0.49 K/UL — SIGNIFICANT CHANGE UP (ref 0–0.9)
MONOCYTES # BLD AUTO: 0.49 K/UL — SIGNIFICANT CHANGE UP (ref 0–0.9)
MONOCYTES NFR BLD AUTO: 6.6 % — SIGNIFICANT CHANGE UP (ref 2–14)
MONOCYTES NFR BLD AUTO: 6.6 % — SIGNIFICANT CHANGE UP (ref 2–14)
NEUTROPHILS # BLD AUTO: 4.86 K/UL — SIGNIFICANT CHANGE UP (ref 1.8–7.4)
NEUTROPHILS # BLD AUTO: 4.86 K/UL — SIGNIFICANT CHANGE UP (ref 1.8–7.4)
NEUTROPHILS NFR BLD AUTO: 64.8 % — SIGNIFICANT CHANGE UP (ref 43–77)
NEUTROPHILS NFR BLD AUTO: 64.8 % — SIGNIFICANT CHANGE UP (ref 43–77)
PLATELET # BLD AUTO: 207 K/UL — SIGNIFICANT CHANGE UP (ref 150–400)
PLATELET # BLD AUTO: 207 K/UL — SIGNIFICANT CHANGE UP (ref 150–400)
POTASSIUM SERPL-MCNC: 4.2 MMOL/L — SIGNIFICANT CHANGE UP (ref 3.5–5.3)
POTASSIUM SERPL-MCNC: 4.2 MMOL/L — SIGNIFICANT CHANGE UP (ref 3.5–5.3)
POTASSIUM SERPL-SCNC: 4.2 MMOL/L — SIGNIFICANT CHANGE UP (ref 3.5–5.3)
POTASSIUM SERPL-SCNC: 4.2 MMOL/L — SIGNIFICANT CHANGE UP (ref 3.5–5.3)
RBC # BLD: 4.09 M/UL — LOW (ref 4.2–5.8)
RBC # BLD: 4.09 M/UL — LOW (ref 4.2–5.8)
RBC # FLD: 13.4 % — SIGNIFICANT CHANGE UP (ref 10.3–14.5)
RBC # FLD: 13.4 % — SIGNIFICANT CHANGE UP (ref 10.3–14.5)
SODIUM SERPL-SCNC: 138 MMOL/L — SIGNIFICANT CHANGE UP (ref 135–145)
SODIUM SERPL-SCNC: 138 MMOL/L — SIGNIFICANT CHANGE UP (ref 135–145)
WBC # BLD: 7.48 K/UL — SIGNIFICANT CHANGE UP (ref 3.8–10.5)
WBC # BLD: 7.48 K/UL — SIGNIFICANT CHANGE UP (ref 3.8–10.5)
WBC # FLD AUTO: 7.48 K/UL — SIGNIFICANT CHANGE UP (ref 3.8–10.5)
WBC # FLD AUTO: 7.48 K/UL — SIGNIFICANT CHANGE UP (ref 3.8–10.5)

## 2023-12-07 PROCEDURE — 71046 X-RAY EXAM CHEST 2 VIEWS: CPT

## 2023-12-07 PROCEDURE — 93005 ELECTROCARDIOGRAM TRACING: CPT

## 2023-12-07 PROCEDURE — 86901 BLOOD TYPING SEROLOGIC RH(D): CPT

## 2023-12-07 PROCEDURE — 71046 X-RAY EXAM CHEST 2 VIEWS: CPT | Mod: 26

## 2023-12-07 PROCEDURE — 93010 ELECTROCARDIOGRAM REPORT: CPT

## 2023-12-07 PROCEDURE — 80048 BASIC METABOLIC PNL TOTAL CA: CPT

## 2023-12-07 PROCEDURE — 87641 MR-STAPH DNA AMP PROBE: CPT

## 2023-12-07 PROCEDURE — 87640 STAPH A DNA AMP PROBE: CPT

## 2023-12-07 PROCEDURE — 86850 RBC ANTIBODY SCREEN: CPT

## 2023-12-07 PROCEDURE — 36415 COLL VENOUS BLD VENIPUNCTURE: CPT

## 2023-12-07 PROCEDURE — 99214 OFFICE O/P EST MOD 30 MIN: CPT | Mod: 25

## 2023-12-07 PROCEDURE — 83036 HEMOGLOBIN GLYCOSYLATED A1C: CPT

## 2023-12-07 PROCEDURE — 85025 COMPLETE CBC W/AUTO DIFF WBC: CPT

## 2023-12-07 PROCEDURE — 86900 BLOOD TYPING SEROLOGIC ABO: CPT

## 2023-12-07 RX ORDER — LATANOPROST 0.05 MG/ML
1 SOLUTION/ DROPS OPHTHALMIC; TOPICAL
Refills: 0 | DISCHARGE

## 2023-12-07 RX ORDER — ASPIRIN/CALCIUM CARB/MAGNESIUM 324 MG
1 TABLET ORAL
Qty: 0 | Refills: 0 | DISCHARGE

## 2023-12-07 RX ORDER — PANTOPRAZOLE SODIUM 20 MG/1
1 TABLET, DELAYED RELEASE ORAL
Qty: 0 | Refills: 0 | DISCHARGE

## 2023-12-07 RX ORDER — SACUBITRIL AND VALSARTAN 24; 26 MG/1; MG/1
1 TABLET, FILM COATED ORAL
Refills: 0 | DISCHARGE

## 2023-12-07 RX ORDER — MULTIVIT-MIN/FERROUS GLUCONATE 9 MG/15 ML
0 LIQUID (ML) ORAL
Refills: 0 | DISCHARGE

## 2023-12-07 RX ORDER — IPRATROPIUM BROMIDE 21 MCG
2 AEROSOL, SPRAY (ML) NASAL
Refills: 0 | DISCHARGE

## 2023-12-07 RX ORDER — MECLIZINE HCL 12.5 MG
1 TABLET ORAL
Refills: 0 | DISCHARGE

## 2023-12-07 RX ORDER — LISINOPRIL 2.5 MG/1
1 TABLET ORAL
Refills: 0 | DISCHARGE

## 2023-12-07 NOTE — H&P PST ADULT - MUSCULOSKELETAL COMMENTS
Right LE dressing C/D/I and boot in place. Was changed at surgeons 12/6/23 Right LE dressing C/D/I and boot in place. Was changed at surgeons 12/6/2023

## 2023-12-07 NOTE — H&P PST ADULT - HISTORY OF PRESENT ILLNESS
This is an 89 y/o male nursing home patient with a PMH of CAD, CHF, COPD, CKD, thrombocytopenia, DM, PAD, recent femoral artery endarterectomy, lung cancer, BPH and multiple falls who is scheduled for a defibrillator battery change. He denies any chest pain, palpations or SOB or fevers.  This is an 87 y/o male nursing home patient with a PMH of CAD, CHF, COPD, CKD, thrombocytopenia, DM, PAD, recent femoral artery endarterectomy, lung cancer, BPH and multiple falls who is scheduled for a defibrillator battery change. He denies any chest pain, palpations or SOB or fevers.

## 2023-12-07 NOTE — H&P PST ADULT - NSICDXPASTSURGICALHX_GEN_ALL_CORE_FT
PAST SURGICAL HISTORY:  AICD (automatic cardioverter/defibrillator) present     H/O endarterectomy     H/O left knee surgery     History of appendectomy     History of lobectomy of lung     History of tonsillectomy and adenoidectomy     S/P carotid endarterectomy

## 2023-12-07 NOTE — H&P PST ADULT - NSICDXPASTMEDICALHX_GEN_ALL_CORE_FT
PAST MEDICAL HISTORY:  2019 novel coronavirus disease (COVID-19)     CAD (coronary artery disease)     Cardiac arrest with successful resuscitation     CKD (chronic kidney disease)     Diabetes     GERD (gastroesophageal reflux disease)     Heart failure     History of COPD     History of falling     HLD (hyperlipidemia)     Hypertension     Lung cancer     PAD (peripheral artery disease)     PVD (peripheral vascular disease)     Thrombocytopenia      PAST MEDICAL HISTORY:  2019 novel coronavirus disease (COVID-19)     CAD (coronary artery disease)     Cardiac arrest with successful resuscitation     CKD (chronic kidney disease)     Diabetes     GERD (gastroesophageal reflux disease)     Heart failure     History of BPH     History of COPD     History of falling     HLD (hyperlipidemia)     Hypertension     Lung cancer     PAD (peripheral artery disease)     PVD (peripheral vascular disease)     Thrombocytopenia      PAST MEDICAL HISTORY:  2019 novel coronavirus disease (COVID-19)     CAD (coronary artery disease)     Cardiac arrest with successful resuscitation     CKD (chronic kidney disease)     Diabetes     GERD (gastroesophageal reflux disease)     Heart failure     History of BPH     History of COPD     History of falling     HLD (hyperlipidemia)     Hypertension     Leg wound, right     Lung cancer     PAD (peripheral artery disease)     Thrombocytopenia

## 2023-12-07 NOTE — H&P PST ADULT - ASSESSMENT
This is an 87 y/o nursing home patient who is scheduled for a battery defibrillator change    Patient and daughter-in-law Lulú instructed on     1. To follow instructions as per ASU for NPO status   2. On the use of EZ sponges  3. Mupirocin use (only to use if he receives a phone call)  4.To follow instructions from cardiology about day of medications.   5. Hold Farxiga 3 days prior to procedure   This is an 87 y/o nursing home patient who is scheduled for a defibrillator battery change    Patient and daughter-in-law Lulú Rojas instructed on     1. To follow instructions as per cardiology for NPO status   2. On the use of EZ sponges  3. Mupirocin use (only to use if he receives a phone call)  4.To follow instructions from cardiology about day of medications.   5. Hold Farxiga 3 days prior to procedure    6. They were given instruction sheet from cardiology with day of instructions  This is an 89 y/o nursing home patient who is scheduled for a defibrillator battery change    Patient and daughter-in-law Lulú Rojas instructed on     1. To follow instructions as per cardiology for NPO status   2. On the use of EZ sponges  3. Mupirocin use (only to use if he receives a phone call)  4.To follow instructions from cardiology about day of medications.   5. Hold Farxiga 3 days prior to procedure    6. They were given instruction sheet from cardiology with day of instructions

## 2023-12-08 DIAGNOSIS — I50.22 CHRONIC SYSTOLIC (CONGESTIVE) HEART FAILURE: ICD-10-CM

## 2023-12-08 DIAGNOSIS — Z95.810 PRESENCE OF AUTOMATIC (IMPLANTABLE) CARDIAC DEFIBRILLATOR: ICD-10-CM

## 2023-12-08 LAB
MRSA PCR RESULT.: SIGNIFICANT CHANGE UP
MRSA PCR RESULT.: SIGNIFICANT CHANGE UP
S AUREUS DNA NOSE QL NAA+PROBE: SIGNIFICANT CHANGE UP
S AUREUS DNA NOSE QL NAA+PROBE: SIGNIFICANT CHANGE UP

## 2023-12-11 ENCOUNTER — NON-APPOINTMENT (OUTPATIENT)
Age: 88
End: 2023-12-11

## 2023-12-12 NOTE — ASU PATIENT PROFILE, ADULT - FALL HARM RISK - RISK INTERVENTIONS
Assistance OOB with selected safe patient handling equipment/Assistance with ambulation/Communicate Fall Risk and Risk Factors to all staff, patient, and family/Discuss with provider need for PT consult/Monitor gait and stability/Provide patient with walking aids - walker, cane, crutches/Reinforce activity limits and safety measures with patient and family/Visual Cue: Yellow wristband/Bed in lowest position, wheels locked, appropriate side rails in place/Call bell, personal items and telephone in reach/Instruct patient to call for assistance before getting out of bed or chair/Non-slip footwear when patient is out of bed/[place] to call system/Physically safe environment - no spills, clutter or unnecessary equipment/Purposeful Proactive Rounding/Room/bathroom lighting operational, light cord in reach Assistance OOB with selected safe patient handling equipment/Assistance with ambulation/Communicate Fall Risk and Risk Factors to all staff, patient, and family/Discuss with provider need for PT consult/Monitor gait and stability/Provide patient with walking aids - walker, cane, crutches/Reinforce activity limits and safety measures with patient and family/Visual Cue: Yellow wristband/Bed in lowest position, wheels locked, appropriate side rails in place/Call bell, personal items and telephone in reach/Instruct patient to call for assistance before getting out of bed or chair/Non-slip footwear when patient is out of bed/Given to call system/Physically safe environment - no spills, clutter or unnecessary equipment/Purposeful Proactive Rounding/Room/bathroom lighting operational, light cord in reach

## 2023-12-12 NOTE — ASU PATIENT PROFILE, ADULT - ABILITY TO HEAR (WITH HEARING AID OR HEARING APPLIANCE IF NORMALLY USED):
hearing aids with patient, will hand off to daughter, Claudia GUADALUPE [phone]/Mildly to Moderately Impaired: difficulty hearing in some environments or speaker may need to increase volume or speak distinctly hearing aids with patient, will hand off to daughter, Claudia GUADALUPE [phone]/[place]ild[place] to Moderately Impaired: difficulty hearing in some environments or speaker may need to increase volume or speak distinctly

## 2023-12-12 NOTE — ASU PATIENT PROFILE, ADULT - NSICDXPASTMEDICALHX_GEN_ALL_CORE_FT
PAST MEDICAL HISTORY:  2019 novel coronavirus disease (COVID-19)     CAD (coronary artery disease)     Cardiac arrest with successful resuscitation     CKD (chronic kidney disease)     Diabetes     GERD (gastroesophageal reflux disease)     Heart failure     History of BPH     History of COPD     History of falling     HLD (hyperlipidemia)     Hypertension     Leg wound, right     Lung cancer     PAD (peripheral artery disease)     Thrombocytopenia

## 2023-12-13 ENCOUNTER — OUTPATIENT (OUTPATIENT)
Dept: OUTPATIENT SERVICES | Facility: HOSPITAL | Age: 88
LOS: 1 days | Discharge: HOME | End: 2023-12-13
Payer: MEDICARE

## 2023-12-13 VITALS
HEART RATE: 60 BPM | DIASTOLIC BLOOD PRESSURE: 51 MMHG | SYSTOLIC BLOOD PRESSURE: 117 MMHG | RESPIRATION RATE: 16 BRPM | OXYGEN SATURATION: 98 %

## 2023-12-13 VITALS
HEART RATE: 60 BPM | WEIGHT: 186.95 LBS | DIASTOLIC BLOOD PRESSURE: 84 MMHG | SYSTOLIC BLOOD PRESSURE: 121 MMHG | RESPIRATION RATE: 21 BRPM | TEMPERATURE: 97 F | OXYGEN SATURATION: 99 % | HEIGHT: 70 IN

## 2023-12-13 DIAGNOSIS — Z98.890 OTHER SPECIFIED POSTPROCEDURAL STATES: Chronic | ICD-10-CM

## 2023-12-13 DIAGNOSIS — Z95.810 PRESENCE OF AUTOMATIC (IMPLANTABLE) CARDIAC DEFIBRILLATOR: Chronic | ICD-10-CM

## 2023-12-13 DIAGNOSIS — Z90.2 ACQUIRED ABSENCE OF LUNG [PART OF]: Chronic | ICD-10-CM

## 2023-12-13 DIAGNOSIS — I50.22 CHRONIC SYSTOLIC (CONGESTIVE) HEART FAILURE: ICD-10-CM

## 2023-12-13 DIAGNOSIS — Z90.49 ACQUIRED ABSENCE OF OTHER SPECIFIED PARTS OF DIGESTIVE TRACT: Chronic | ICD-10-CM

## 2023-12-13 DIAGNOSIS — Z95.810 PRESENCE OF AUTOMATIC (IMPLANTABLE) CARDIAC DEFIBRILLATOR: ICD-10-CM

## 2023-12-13 PROCEDURE — 93010 ELECTROCARDIOGRAM REPORT: CPT

## 2023-12-13 PROCEDURE — 33264 RMVL & RPLCMT DFB GEN MLT LD: CPT

## 2023-12-13 PROCEDURE — 93005 ELECTROCARDIOGRAM TRACING: CPT | Mod: XU

## 2023-12-13 PROCEDURE — C1889: CPT

## 2023-12-13 PROCEDURE — C1882: CPT

## 2023-12-13 RX ORDER — CEFAZOLIN SODIUM 1 G
2000 VIAL (EA) INJECTION ONCE
Refills: 0 | Status: DISCONTINUED | OUTPATIENT
Start: 2023-12-13 | End: 2023-12-13

## 2023-12-13 RX ORDER — CEPHALEXIN 500 MG
500 CAPSULE ORAL
Refills: 0 | Status: DISCONTINUED | OUTPATIENT
Start: 2023-12-13 | End: 2023-12-13

## 2023-12-13 RX ORDER — CEFAZOLIN SODIUM 1 G
2000 VIAL (EA) INJECTION ONCE
Refills: 0 | Status: COMPLETED | OUTPATIENT
Start: 2023-12-13 | End: 2023-12-13

## 2023-12-13 RX ADMIN — Medication 2000 MILLIGRAM(S): at 09:49

## 2023-12-13 NOTE — PACU DISCHARGE NOTE - COMMENTS
Discharge instructions given to patient and daughter Claudia. Answered all questions. Verbalized understanding. Home monitor device paired and education completed by Jaime Lawrence, [employer]. All belongings including home monitor are with the patient. Left the unit via wheelchair with transport staff. Discharge instructions given to patient and daughter Claudia. Answered all questions. Verbalized understanding. Home monitor device paired and education completed by Jaime Lawrence, [employer]. All belongings including home monitor are with the patient. Left the unit via wheelchair with transport staff.
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
## 2023-12-15 DIAGNOSIS — T82.111A BREAKDOWN (MECHANICAL) OF CARDIAC PULSE GENERATOR (BATTERY), INITIAL ENCOUNTER: ICD-10-CM

## 2023-12-18 PROBLEM — E11.9 TYPE 2 DIABETES MELLITUS WITHOUT COMPLICATIONS: Chronic | Status: ACTIVE | Noted: 2023-12-07

## 2023-12-18 PROBLEM — D69.6 THROMBOCYTOPENIA, UNSPECIFIED: Chronic | Status: ACTIVE | Noted: 2023-12-07

## 2023-12-18 PROBLEM — Z87.438 PERSONAL HISTORY OF OTHER DISEASES OF MALE GENITAL ORGANS: Chronic | Status: ACTIVE | Noted: 2023-12-07

## 2023-12-18 PROBLEM — S81.801A UNSPECIFIED OPEN WOUND, RIGHT LOWER LEG, INITIAL ENCOUNTER: Chronic | Status: ACTIVE | Noted: 2023-12-07

## 2023-12-18 PROBLEM — K21.9 GASTRO-ESOPHAGEAL REFLUX DISEASE WITHOUT ESOPHAGITIS: Chronic | Status: ACTIVE | Noted: 2023-12-07

## 2023-12-19 ENCOUNTER — APPOINTMENT (OUTPATIENT)
Dept: ELECTROPHYSIOLOGY | Facility: CLINIC | Age: 88
End: 2023-12-19
Payer: MEDICARE

## 2023-12-19 ENCOUNTER — NON-APPOINTMENT (OUTPATIENT)
Age: 88
End: 2023-12-19

## 2023-12-19 VITALS
HEART RATE: 60 BPM | OXYGEN SATURATION: 95 % | HEIGHT: 72 IN | BODY MASS INDEX: 25.73 KG/M2 | WEIGHT: 190 LBS | DIASTOLIC BLOOD PRESSURE: 42 MMHG | SYSTOLIC BLOOD PRESSURE: 93 MMHG

## 2023-12-19 DIAGNOSIS — I25.5 ISCHEMIC CARDIOMYOPATHY: ICD-10-CM

## 2023-12-19 PROBLEM — U07.1 COVID-19: Chronic | Status: ACTIVE | Noted: 2023-12-07

## 2023-12-19 PROBLEM — Z91.81 HISTORY OF FALLING: Chronic | Status: ACTIVE | Noted: 2023-12-07

## 2023-12-19 PROBLEM — Z87.09 PERSONAL HISTORY OF OTHER DISEASES OF THE RESPIRATORY SYSTEM: Chronic | Status: ACTIVE | Noted: 2023-12-07

## 2023-12-19 PROCEDURE — 93284 PRGRMG EVAL IMPLANTABLE DFB: CPT

## 2023-12-19 RX ORDER — MECLIZINE HYDROCHLORIDE 25 MG/1
25 TABLET ORAL EVERY 6 HOURS
Refills: 0 | Status: DISCONTINUED | COMMUNITY
End: 2023-12-19

## 2023-12-27 ENCOUNTER — INPATIENT (INPATIENT)
Facility: HOSPITAL | Age: 88
LOS: 1 days | Discharge: HOME HEALTH | DRG: 67 | End: 2023-12-29
Attending: FAMILY MEDICINE | Admitting: INTERNAL MEDICINE
Payer: MEDICARE

## 2023-12-27 ENCOUNTER — NON-APPOINTMENT (OUTPATIENT)
Age: 88
End: 2023-12-27

## 2023-12-27 ENCOUNTER — APPOINTMENT (OUTPATIENT)
Dept: ELECTROPHYSIOLOGY | Facility: CLINIC | Age: 88
End: 2023-12-27
Payer: MEDICARE

## 2023-12-27 VITALS — HEIGHT: 70 IN | WEIGHT: 203.05 LBS

## 2023-12-27 VITALS
SYSTOLIC BLOOD PRESSURE: 122 MMHG | HEIGHT: 72 IN | OXYGEN SATURATION: 98 % | DIASTOLIC BLOOD PRESSURE: 58 MMHG | RESPIRATION RATE: 16 BRPM | HEART RATE: 67 BPM

## 2023-12-27 DIAGNOSIS — Z98.890 OTHER SPECIFIED POSTPROCEDURAL STATES: Chronic | ICD-10-CM

## 2023-12-27 DIAGNOSIS — I50.9 HEART FAILURE, UNSPECIFIED: ICD-10-CM

## 2023-12-27 DIAGNOSIS — J96.01 ACUTE RESPIRATORY FAILURE WITH HYPOXIA: ICD-10-CM

## 2023-12-27 DIAGNOSIS — Z95.810 PRESENCE OF AUTOMATIC (IMPLANTABLE) CARDIAC DEFIBRILLATOR: ICD-10-CM

## 2023-12-27 DIAGNOSIS — Z90.2 ACQUIRED ABSENCE OF LUNG [PART OF]: Chronic | ICD-10-CM

## 2023-12-27 DIAGNOSIS — Z95.810 PRESENCE OF AUTOMATIC (IMPLANTABLE) CARDIAC DEFIBRILLATOR: Chronic | ICD-10-CM

## 2023-12-27 DIAGNOSIS — Z90.49 ACQUIRED ABSENCE OF OTHER SPECIFIED PARTS OF DIGESTIVE TRACT: Chronic | ICD-10-CM

## 2023-12-27 DIAGNOSIS — N18.9 CHRONIC KIDNEY DISEASE, UNSPECIFIED: ICD-10-CM

## 2023-12-27 DIAGNOSIS — H53.9 UNSPECIFIED VISUAL DISTURBANCE: ICD-10-CM

## 2023-12-27 DIAGNOSIS — I25.10 ATHEROSCLEROTIC HEART DISEASE OF NATIVE CORONARY ARTERY WITHOUT ANGINA PECTORIS: ICD-10-CM

## 2023-12-27 DIAGNOSIS — I10 ESSENTIAL (PRIMARY) HYPERTENSION: ICD-10-CM

## 2023-12-27 LAB
ALBUMIN SERPL ELPH-MCNC: 3.6 G/DL — SIGNIFICANT CHANGE UP (ref 3.3–5)
ALBUMIN SERPL ELPH-MCNC: 3.6 G/DL — SIGNIFICANT CHANGE UP (ref 3.3–5)
ALP SERPL-CCNC: 62 U/L — SIGNIFICANT CHANGE UP (ref 40–120)
ALP SERPL-CCNC: 62 U/L — SIGNIFICANT CHANGE UP (ref 40–120)
ALT FLD-CCNC: 17 U/L — SIGNIFICANT CHANGE UP (ref 12–78)
ALT FLD-CCNC: 17 U/L — SIGNIFICANT CHANGE UP (ref 12–78)
ANION GAP SERPL CALC-SCNC: 6 MMOL/L — SIGNIFICANT CHANGE UP (ref 5–17)
ANION GAP SERPL CALC-SCNC: 6 MMOL/L — SIGNIFICANT CHANGE UP (ref 5–17)
APTT BLD: 40.6 SEC — HIGH (ref 24.5–35.6)
APTT BLD: 40.6 SEC — HIGH (ref 24.5–35.6)
AST SERPL-CCNC: 19 U/L — SIGNIFICANT CHANGE UP (ref 15–37)
AST SERPL-CCNC: 19 U/L — SIGNIFICANT CHANGE UP (ref 15–37)
BASOPHILS # BLD AUTO: 0.03 K/UL — SIGNIFICANT CHANGE UP (ref 0–0.2)
BASOPHILS # BLD AUTO: 0.03 K/UL — SIGNIFICANT CHANGE UP (ref 0–0.2)
BASOPHILS NFR BLD AUTO: 0.4 % — SIGNIFICANT CHANGE UP (ref 0–2)
BASOPHILS NFR BLD AUTO: 0.4 % — SIGNIFICANT CHANGE UP (ref 0–2)
BILIRUB SERPL-MCNC: 1.1 MG/DL — SIGNIFICANT CHANGE UP (ref 0.2–1.2)
BILIRUB SERPL-MCNC: 1.1 MG/DL — SIGNIFICANT CHANGE UP (ref 0.2–1.2)
BUN SERPL-MCNC: 17 MG/DL — SIGNIFICANT CHANGE UP (ref 7–23)
BUN SERPL-MCNC: 17 MG/DL — SIGNIFICANT CHANGE UP (ref 7–23)
CALCIUM SERPL-MCNC: 9.1 MG/DL — SIGNIFICANT CHANGE UP (ref 8.5–10.1)
CALCIUM SERPL-MCNC: 9.1 MG/DL — SIGNIFICANT CHANGE UP (ref 8.5–10.1)
CHLORIDE SERPL-SCNC: 107 MMOL/L — SIGNIFICANT CHANGE UP (ref 96–108)
CHLORIDE SERPL-SCNC: 107 MMOL/L — SIGNIFICANT CHANGE UP (ref 96–108)
CK SERPL-CCNC: 70 U/L — SIGNIFICANT CHANGE UP (ref 26–308)
CK SERPL-CCNC: 70 U/L — SIGNIFICANT CHANGE UP (ref 26–308)
CO2 SERPL-SCNC: 27 MMOL/L — SIGNIFICANT CHANGE UP (ref 22–31)
CO2 SERPL-SCNC: 27 MMOL/L — SIGNIFICANT CHANGE UP (ref 22–31)
CREAT SERPL-MCNC: 1.33 MG/DL — HIGH (ref 0.5–1.3)
CREAT SERPL-MCNC: 1.33 MG/DL — HIGH (ref 0.5–1.3)
EGFR: 51 ML/MIN/1.73M2 — LOW
EGFR: 51 ML/MIN/1.73M2 — LOW
EOSINOPHIL # BLD AUTO: 0.1 K/UL — SIGNIFICANT CHANGE UP (ref 0–0.5)
EOSINOPHIL # BLD AUTO: 0.1 K/UL — SIGNIFICANT CHANGE UP (ref 0–0.5)
EOSINOPHIL NFR BLD AUTO: 1.2 % — SIGNIFICANT CHANGE UP (ref 0–6)
EOSINOPHIL NFR BLD AUTO: 1.2 % — SIGNIFICANT CHANGE UP (ref 0–6)
FLUAV AG NPH QL: SIGNIFICANT CHANGE UP
FLUAV AG NPH QL: SIGNIFICANT CHANGE UP
FLUBV AG NPH QL: SIGNIFICANT CHANGE UP
FLUBV AG NPH QL: SIGNIFICANT CHANGE UP
GLUCOSE BLDC GLUCOMTR-MCNC: 167 MG/DL — HIGH (ref 70–99)
GLUCOSE BLDC GLUCOMTR-MCNC: 167 MG/DL — HIGH (ref 70–99)
GLUCOSE SERPL-MCNC: 172 MG/DL — HIGH (ref 70–99)
GLUCOSE SERPL-MCNC: 172 MG/DL — HIGH (ref 70–99)
HCT VFR BLD CALC: 37.8 % — LOW (ref 39–50)
HCT VFR BLD CALC: 37.8 % — LOW (ref 39–50)
HGB BLD-MCNC: 12.2 G/DL — LOW (ref 13–17)
HGB BLD-MCNC: 12.2 G/DL — LOW (ref 13–17)
IMM GRANULOCYTES NFR BLD AUTO: 0.4 % — SIGNIFICANT CHANGE UP (ref 0–0.9)
IMM GRANULOCYTES NFR BLD AUTO: 0.4 % — SIGNIFICANT CHANGE UP (ref 0–0.9)
INR BLD: 2.14 RATIO — HIGH (ref 0.85–1.18)
INR BLD: 2.14 RATIO — HIGH (ref 0.85–1.18)
LYMPHOCYTES # BLD AUTO: 1.43 K/UL — SIGNIFICANT CHANGE UP (ref 1–3.3)
LYMPHOCYTES # BLD AUTO: 1.43 K/UL — SIGNIFICANT CHANGE UP (ref 1–3.3)
LYMPHOCYTES # BLD AUTO: 17.4 % — SIGNIFICANT CHANGE UP (ref 13–44)
LYMPHOCYTES # BLD AUTO: 17.4 % — SIGNIFICANT CHANGE UP (ref 13–44)
MCHC RBC-ENTMCNC: 29.8 PG — SIGNIFICANT CHANGE UP (ref 27–34)
MCHC RBC-ENTMCNC: 29.8 PG — SIGNIFICANT CHANGE UP (ref 27–34)
MCHC RBC-ENTMCNC: 32.3 GM/DL — SIGNIFICANT CHANGE UP (ref 32–36)
MCHC RBC-ENTMCNC: 32.3 GM/DL — SIGNIFICANT CHANGE UP (ref 32–36)
MCV RBC AUTO: 92.4 FL — SIGNIFICANT CHANGE UP (ref 80–100)
MCV RBC AUTO: 92.4 FL — SIGNIFICANT CHANGE UP (ref 80–100)
MONOCYTES # BLD AUTO: 0.59 K/UL — SIGNIFICANT CHANGE UP (ref 0–0.9)
MONOCYTES # BLD AUTO: 0.59 K/UL — SIGNIFICANT CHANGE UP (ref 0–0.9)
MONOCYTES NFR BLD AUTO: 7.2 % — SIGNIFICANT CHANGE UP (ref 2–14)
MONOCYTES NFR BLD AUTO: 7.2 % — SIGNIFICANT CHANGE UP (ref 2–14)
NEUTROPHILS # BLD AUTO: 6.04 K/UL — SIGNIFICANT CHANGE UP (ref 1.8–7.4)
NEUTROPHILS # BLD AUTO: 6.04 K/UL — SIGNIFICANT CHANGE UP (ref 1.8–7.4)
NEUTROPHILS NFR BLD AUTO: 73.4 % — SIGNIFICANT CHANGE UP (ref 43–77)
NEUTROPHILS NFR BLD AUTO: 73.4 % — SIGNIFICANT CHANGE UP (ref 43–77)
NT-PROBNP SERPL-SCNC: HIGH PG/ML (ref 0–450)
NT-PROBNP SERPL-SCNC: HIGH PG/ML (ref 0–450)
PLATELET # BLD AUTO: 247 K/UL — SIGNIFICANT CHANGE UP (ref 150–400)
PLATELET # BLD AUTO: 247 K/UL — SIGNIFICANT CHANGE UP (ref 150–400)
POTASSIUM SERPL-MCNC: 3.9 MMOL/L — SIGNIFICANT CHANGE UP (ref 3.5–5.3)
POTASSIUM SERPL-MCNC: 3.9 MMOL/L — SIGNIFICANT CHANGE UP (ref 3.5–5.3)
POTASSIUM SERPL-SCNC: 3.9 MMOL/L — SIGNIFICANT CHANGE UP (ref 3.5–5.3)
POTASSIUM SERPL-SCNC: 3.9 MMOL/L — SIGNIFICANT CHANGE UP (ref 3.5–5.3)
PROT SERPL-MCNC: 7.5 GM/DL — SIGNIFICANT CHANGE UP (ref 6–8.3)
PROT SERPL-MCNC: 7.5 GM/DL — SIGNIFICANT CHANGE UP (ref 6–8.3)
PROTHROM AB SERPL-ACNC: 23.7 SEC — HIGH (ref 9.5–13)
PROTHROM AB SERPL-ACNC: 23.7 SEC — HIGH (ref 9.5–13)
RBC # BLD: 4.09 M/UL — LOW (ref 4.2–5.8)
RBC # BLD: 4.09 M/UL — LOW (ref 4.2–5.8)
RBC # FLD: 13.8 % — SIGNIFICANT CHANGE UP (ref 10.3–14.5)
RBC # FLD: 13.8 % — SIGNIFICANT CHANGE UP (ref 10.3–14.5)
RSV RNA NPH QL NAA+NON-PROBE: SIGNIFICANT CHANGE UP
RSV RNA NPH QL NAA+NON-PROBE: SIGNIFICANT CHANGE UP
SARS-COV-2 RNA SPEC QL NAA+PROBE: SIGNIFICANT CHANGE UP
SARS-COV-2 RNA SPEC QL NAA+PROBE: SIGNIFICANT CHANGE UP
SODIUM SERPL-SCNC: 140 MMOL/L — SIGNIFICANT CHANGE UP (ref 135–145)
SODIUM SERPL-SCNC: 140 MMOL/L — SIGNIFICANT CHANGE UP (ref 135–145)
TROPONIN I, HIGH SENSITIVITY RESULT: 25.25 NG/L — SIGNIFICANT CHANGE UP
TROPONIN I, HIGH SENSITIVITY RESULT: 25.25 NG/L — SIGNIFICANT CHANGE UP
WBC # BLD: 8.22 K/UL — SIGNIFICANT CHANGE UP (ref 3.8–10.5)
WBC # BLD: 8.22 K/UL — SIGNIFICANT CHANGE UP (ref 3.8–10.5)
WBC # FLD AUTO: 8.22 K/UL — SIGNIFICANT CHANGE UP (ref 3.8–10.5)
WBC # FLD AUTO: 8.22 K/UL — SIGNIFICANT CHANGE UP (ref 3.8–10.5)

## 2023-12-27 PROCEDURE — 0042T: CPT | Mod: MA

## 2023-12-27 PROCEDURE — 99283 EMERGENCY DEPT VISIT LOW MDM: CPT

## 2023-12-27 PROCEDURE — 99233 SBSQ HOSP IP/OBS HIGH 50: CPT

## 2023-12-27 PROCEDURE — 97535 SELF CARE MNGMENT TRAINING: CPT | Mod: GO

## 2023-12-27 PROCEDURE — 80061 LIPID PANEL: CPT

## 2023-12-27 PROCEDURE — 71045 X-RAY EXAM CHEST 1 VIEW: CPT | Mod: 26

## 2023-12-27 PROCEDURE — 70496 CT ANGIOGRAPHY HEAD: CPT | Mod: 26,MA

## 2023-12-27 PROCEDURE — 97166 OT EVAL MOD COMPLEX 45 MIN: CPT | Mod: GO

## 2023-12-27 PROCEDURE — 92610 EVALUATE SWALLOWING FUNCTION: CPT | Mod: GN

## 2023-12-27 PROCEDURE — 83880 ASSAY OF NATRIURETIC PEPTIDE: CPT

## 2023-12-27 PROCEDURE — 80048 BASIC METABOLIC PNL TOTAL CA: CPT

## 2023-12-27 PROCEDURE — 97162 PT EVAL MOD COMPLEX 30 MIN: CPT | Mod: GP

## 2023-12-27 PROCEDURE — 93880 EXTRACRANIAL BILAT STUDY: CPT

## 2023-12-27 PROCEDURE — 70450 CT HEAD/BRAIN W/O DYE: CPT | Mod: 26,MA,XU

## 2023-12-27 PROCEDURE — 70498 CT ANGIOGRAPHY NECK: CPT | Mod: 26,MA

## 2023-12-27 PROCEDURE — 99024 POSTOP FOLLOW-UP VISIT: CPT

## 2023-12-27 PROCEDURE — 93010 ELECTROCARDIOGRAM REPORT: CPT

## 2023-12-27 PROCEDURE — 0241U: CPT

## 2023-12-27 PROCEDURE — 93306 TTE W/DOPPLER COMPLETE: CPT

## 2023-12-27 PROCEDURE — 36415 COLL VENOUS BLD VENIPUNCTURE: CPT

## 2023-12-27 PROCEDURE — 85027 COMPLETE CBC AUTOMATED: CPT

## 2023-12-27 PROCEDURE — 99285 EMERGENCY DEPT VISIT HI MDM: CPT

## 2023-12-27 RX ORDER — SACUBITRIL AND VALSARTAN 24; 26 MG/1; MG/1
1 TABLET, FILM COATED ORAL
Refills: 0 | Status: DISCONTINUED | OUTPATIENT
Start: 2023-12-27 | End: 2023-12-29

## 2023-12-27 RX ORDER — FUROSEMIDE 40 MG
40 TABLET ORAL
Refills: 0 | Status: DISCONTINUED | OUTPATIENT
Start: 2023-12-27 | End: 2023-12-28

## 2023-12-27 RX ORDER — TAMSULOSIN HYDROCHLORIDE 0.4 MG/1
0.4 CAPSULE ORAL AT BEDTIME
Refills: 0 | Status: DISCONTINUED | OUTPATIENT
Start: 2023-12-27 | End: 2023-12-29

## 2023-12-27 RX ORDER — ALBUTEROL 90 UG/1
2.5 AEROSOL, METERED ORAL ONCE
Refills: 0 | Status: DISCONTINUED | OUTPATIENT
Start: 2023-12-27 | End: 2023-12-29

## 2023-12-27 RX ORDER — DAPAGLIFLOZIN 10 MG/1
10 TABLET, FILM COATED ORAL EVERY 24 HOURS
Refills: 0 | Status: DISCONTINUED | OUTPATIENT
Start: 2023-12-27 | End: 2023-12-27

## 2023-12-27 RX ORDER — ALBUTEROL 90 UG/1
3 AEROSOL, METERED ORAL
Refills: 0 | DISCHARGE

## 2023-12-27 RX ORDER — ATORVASTATIN CALCIUM 80 MG/1
80 TABLET, FILM COATED ORAL AT BEDTIME
Refills: 0 | Status: DISCONTINUED | OUTPATIENT
Start: 2023-12-27 | End: 2023-12-29

## 2023-12-27 RX ORDER — PANTOPRAZOLE SODIUM 20 MG/1
40 TABLET, DELAYED RELEASE ORAL
Refills: 0 | Status: DISCONTINUED | OUTPATIENT
Start: 2023-12-27 | End: 2023-12-29

## 2023-12-27 RX ORDER — METOPROLOL TARTRATE 50 MG
12.5 TABLET ORAL DAILY
Refills: 0 | Status: DISCONTINUED | OUTPATIENT
Start: 2023-12-27 | End: 2023-12-29

## 2023-12-27 RX ORDER — ASPIRIN/CALCIUM CARB/MAGNESIUM 324 MG
81 TABLET ORAL DAILY
Refills: 0 | Status: DISCONTINUED | OUTPATIENT
Start: 2023-12-27 | End: 2023-12-29

## 2023-12-27 RX ORDER — ASPIRIN/CALCIUM CARB/MAGNESIUM 324 MG
81 TABLET ORAL DAILY
Refills: 0 | Status: DISCONTINUED | OUTPATIENT
Start: 2023-12-27 | End: 2023-12-27

## 2023-12-27 RX ORDER — FUROSEMIDE 40 MG
40 TABLET ORAL ONCE
Refills: 0 | Status: COMPLETED | OUTPATIENT
Start: 2023-12-27 | End: 2023-12-27

## 2023-12-27 RX ORDER — RIVAROXABAN 15 MG-20MG
20 KIT ORAL
Refills: 0 | Status: DISCONTINUED | OUTPATIENT
Start: 2023-12-27 | End: 2023-12-29

## 2023-12-27 RX ADMIN — ATORVASTATIN CALCIUM 80 MILLIGRAM(S): 80 TABLET, FILM COATED ORAL at 22:20

## 2023-12-27 RX ADMIN — SACUBITRIL AND VALSARTAN 1 TABLET(S): 24; 26 TABLET, FILM COATED ORAL at 22:20

## 2023-12-27 RX ADMIN — TAMSULOSIN HYDROCHLORIDE 0.4 MILLIGRAM(S): 0.4 CAPSULE ORAL at 22:20

## 2023-12-27 RX ADMIN — Medication 40 MILLIGRAM(S): at 11:23

## 2023-12-27 RX ADMIN — RIVAROXABAN 20 MILLIGRAM(S): KIT at 18:12

## 2023-12-27 NOTE — STROKE CODE NOTE - NSMDCONSULT QTN_Y FT
CC: R visual changes    HPI: 89 y/o male with a PMHx of AICD, BPH, CAD, cardiac arrest, COVID, CKD, COPD, diabetes, falling, GERD, heart failure, HTN, HLD, leg wound, lung cancer, macular degeneration, PAD, thrombocytopenia presents to the ED for right eye vision change. Pt reports he went to bed around 9:30 last night in his usual state of health. Pt woke up this morning at 6:30 with film over right eye and black spots. Pt s/p AICD battery replacement 2.5 weeks ago with Dr. Oliva and had follow up appt today. Pt told staff and was sent to the ED for further evaluation. On Xarelto. Pt also reports MONTGOMERY. Denies focal numbness, weakness, slurred speech, facial droop, dysphagia.  Also c/o MONTGOMERY.  NIHSS 0.  Not a thrombolytics candidate 2/2 OOTW, took xarelto last night, and no measurable deficits.  CT Head. CTA/CTP was neg for acute ischemia, infarct, LVO, bleed.             PAST MEDICAL & SURGICAL HISTORY:  Hypertension      CAD (coronary artery disease)      Cardiac arrest with successful resuscitation      CKD (chronic kidney disease)      PAD (peripheral artery disease)      Lung cancer      Heart failure      HLD (hyperlipidemia)      2019 novel coronavirus disease (COVID-19)      History of falling      Thrombocytopenia      GERD (gastroesophageal reflux disease)      History of COPD      Diabetes      History of BPH      Leg wound, right      AICD (automatic cardioverter/defibrillator) present      S/P carotid endarterectomy      History of lobectomy of lung      History of appendectomy      H/O left knee surgery      History of tonsillectomy and adenoidectomy      H/O endarterectomy          FAMILY HISTORY: Mother  in 40s from breast ca. Father  age 80s from HD      Social Hx: Former TObacco/smoking. Quit 30 years ago. Former 40 PPD smoker. Previoua  1-2 beers. Denies illicit drug use.       MEDICATIONS  (STANDING):  Xarelto  ASA  Entresto  Protonix  Pravastatin  Metoprolol  Amiodarone  Farxiga  Vit d3, visi mj, melatonid, Areds 2    Allergies  No Known Allergies      ROS: Pertinent positives in HPI, all other ROS were reviewed and are negative.          Constitutional: awake and alert.  HEENT: PERRLA, EOMI,   Neck: Supple.  Extremities:  no edema, R would boot in place  Musculoskeletal:  no abnormal movements  Skin: No rashes    Neurological exam:  HF: A x O x 3. Appropriately interactive, normal affect. Speech fluent, No Aphasia or paraphasic errors. Naming /repetition intact   CN: PRISCILLA, EOMI, Reduced vision R eye, facial sensation normal, no NLFD, tongue midline, Palate moves equally, SCM equal bilaterally  Motor: No pronator drift, Strength 5/5 in all 4 ext, normal bulk and tone, no tremors  Sens: Intact to light touch   Reflexes: Symmetric and normal,  downgoing toes b/l  Coord:  No FNFA, unable to do HTS on R due to recent surgery, L HTS intact  Gait/Balance: Cannot test    NIHSS: 0          Labs:                                   12.2   8.22  )-----------( 247      ( 27 Dec 2023 09:58 )             37.8       Radiology:  < from: CT Brain Stroke Protocol (23 @ 09:47) >      IMPRESSION:    1. No intracranial hemorrhage is appreciated.    2. No intracranial mass lesion is appreciated.    3.  No adverse interval change 2023    4. MR may provide higher sensitivity imaging evaluation for the clinical   indication of acute infarction.    < from: CT Angio Neck Stroke Protocol w/ IV Cont (23 @ 09:57) >    IMPRESSION:    1.   Right carotid system:    Atherosclerotic plaque causes focal   stenosis within the carotid bulb 70-80%    2.   Left carotid system:     Atherosclerotic plaque without    hemodynamically significant stenosis.    3.   Vertebral circulation:    Atherosclerotic plaque causes at least   mild ostial stenosis of the dominant caliber left vertebral artery.    4.  Anterior intracranial circulation:     Intracranial atherosclerosis   cavernous and clinoid segments of the internal carotid arteries, at least   moderate on the right somewhat larger greater on the left, and within the   peripheral segments of the middle cerebral arteries, at least   mild-to-moderate.    5.  Posterior intracranial circulation:    Intracranial atherosclerosis   left vertebral artery, mild-to-moderate, and peripheral right posterior   cerebral artery, moderate.    6.  No large vessel occlusion    7.  Brain perfusion:   Noacute infarction of the brain is convincingly         A/P 89 y/o male with a PMHx of AICD, BPH, CAD, cardiac arrest, COVID, CKD, COPD, diabetes, falling, GERD, heart failure, HTN, HLD, leg wound, lung cancer, macular degeneration, PAD, thrombocytopenia presents to the ED for right eye vision change. Pt reports he went to bed around 9:30 last night in his usual state of health. Pt woke up this morning at 6:30 with film over right eye and black spots. Pt s/p AICD battery replacement 2.5 weeks ago with Dr. Oliva and had follow up appt today. Pt told staff and was sent to the ED for further evaluation. On Xarelto. Pt also reports MONTGOMERY. Denies focal numbness, weakness, slurred speech, facial droop, dysphagia.  Also c/o MONTGOMERY.  NIHSS 0.  Not a thrombolytics candidate 2/2 OOTW, took xarelto last night, and no measurable deficits.  CT Head. CTA/CTP was neg for acute ischemia, infarct, LVO, bleed.  PE no focal deficits.  He does have some reduced vision described as a grey film initially now improved with black spots which move with movement of eye which remained.    #R visual loss which improved during evaluation-could be secondary to occular disease vs arterial thrombus.  The patient has been on Xarelto for a couple of weeks and is compliant.  Less likely stroke    #R carotid bulb 70-80% stenosis     #intracranial atherosclerosis      Recommendations:  -Vascular evaluation for R carotid stenosis-in vs outpatient  -urgent outpatient opthalmology follow up.  -Would recommend cardiac evaluation as pt. c/o new MONTGOMERY  -continue ASA, Statin    D/W Dr. Mejia CC: R visual changes    HPI: 87 y/o male with a PMHx of AICD, BPH, CAD, cardiac arrest, COVID, CKD, COPD, diabetes, falling, GERD, heart failure, HTN, HLD, leg wound, lung cancer, macular degeneration, PAD, thrombocytopenia presents to the ED for right eye vision change. Pt reports he went to bed around 9:30 last night in his usual state of health. Pt woke up this morning at 6:30 with film over right eye and black spots. Pt s/p AICD battery replacement 2.5 weeks ago with Dr. Oliva and had follow up appt today. Pt told staff and was sent to the ED for further evaluation. On Xarelto. Pt also reports MONTGOMERY. Denies focal numbness, weakness, slurred speech, facial droop, dysphagia.  Also c/o MONTGOMERY.  NIHSS 0.  Not a thrombolytics candidate 2/2 OOTW, took xarelto last night, and no measurable deficits.  CT Head. CTA/CTP was neg for acute ischemia, infarct, LVO, bleed.             PAST MEDICAL & SURGICAL HISTORY:  Hypertension      CAD (coronary artery disease)      Cardiac arrest with successful resuscitation      CKD (chronic kidney disease)      PAD (peripheral artery disease)      Lung cancer      Heart failure      HLD (hyperlipidemia)      2019 novel coronavirus disease (COVID-19)      History of falling      Thrombocytopenia      GERD (gastroesophageal reflux disease)      History of COPD      Diabetes      History of BPH      Leg wound, right      AICD (automatic cardioverter/defibrillator) present      S/P carotid endarterectomy      History of lobectomy of lung      History of appendectomy      H/O left knee surgery      History of tonsillectomy and adenoidectomy      H/O endarterectomy          FAMILY HISTORY: Mother  in 40s from breast ca. Father  age 80s from HD      Social Hx: Former TObacco/smoking. Quit 30 years ago. Former 40 PPD smoker. Previoua  1-2 beers. Denies illicit drug use.       MEDICATIONS  (STANDING):  Xarelto  ASA  Entresto  Protonix  Pravastatin  Metoprolol  Amiodarone  Farxiga  Vit d3, visi mj, melatonid, Areds 2    Allergies  No Known Allergies      ROS: Pertinent positives in HPI, all other ROS were reviewed and are negative.          Constitutional: awake and alert.  HEENT: PERRLA, EOMI,   Neck: Supple.  Extremities:  no edema, R would boot in place  Musculoskeletal:  no abnormal movements  Skin: No rashes    Neurological exam:  HF: A x O x 3. Appropriately interactive, normal affect. Speech fluent, No Aphasia or paraphasic errors. Naming /repetition intact   CN: PRISCILLA, EOMI, Reduced vision R eye, facial sensation normal, no NLFD, tongue midline, Palate moves equally, SCM equal bilaterally  Motor: No pronator drift, Strength 5/5 in all 4 ext, normal bulk and tone, no tremors  Sens: Intact to light touch   Reflexes: Symmetric and normal,  downgoing toes b/l  Coord:  No FNFA, unable to do HTS on R due to recent surgery, L HTS intact  Gait/Balance: Cannot test    NIHSS: 0          Labs:                                   12.2   8.22  )-----------( 247      ( 27 Dec 2023 09:58 )             37.8       Radiology:  < from: CT Brain Stroke Protocol (23 @ 09:47) >      IMPRESSION:    1. No intracranial hemorrhage is appreciated.    2. No intracranial mass lesion is appreciated.    3.  No adverse interval change 2023    4. MR may provide higher sensitivity imaging evaluation for the clinical   indication of acute infarction.    < from: CT Angio Neck Stroke Protocol w/ IV Cont (23 @ 09:57) >    IMPRESSION:    1.   Right carotid system:    Atherosclerotic plaque causes focal   stenosis within the carotid bulb 70-80%    2.   Left carotid system:     Atherosclerotic plaque without    hemodynamically significant stenosis.    3.   Vertebral circulation:    Atherosclerotic plaque causes at least   mild ostial stenosis of the dominant caliber left vertebral artery.    4.  Anterior intracranial circulation:     Intracranial atherosclerosis   cavernous and clinoid segments of the internal carotid arteries, at least   moderate on the right somewhat larger greater on the left, and within the   peripheral segments of the middle cerebral arteries, at least   mild-to-moderate.    5.  Posterior intracranial circulation:    Intracranial atherosclerosis   left vertebral artery, mild-to-moderate, and peripheral right posterior   cerebral artery, moderate.    6.  No large vessel occlusion    7.  Brain perfusion:   Noacute infarction of the brain is convincingly         A/P 87 y/o male with a PMHx of AICD, BPH, CAD, cardiac arrest, COVID, CKD, COPD, diabetes, falling, GERD, heart failure, HTN, HLD, leg wound, lung cancer, macular degeneration, PAD, thrombocytopenia presents to the ED for right eye vision change. Pt reports he went to bed around 9:30 last night in his usual state of health. Pt woke up this morning at 6:30 with film over right eye and black spots. Pt s/p AICD battery replacement 2.5 weeks ago with Dr. Oliva and had follow up appt today. Pt told staff and was sent to the ED for further evaluation. On Xarelto. Pt also reports MONTGOMERY. Denies focal numbness, weakness, slurred speech, facial droop, dysphagia.  Also c/o MONTGOMERY.  NIHSS 0.  Not a thrombolytics candidate 2/2 OOTW, took xarelto last night, and no measurable deficits.  CT Head. CTA/CTP was neg for acute ischemia, infarct, LVO, bleed.  PE no focal deficits.  He does have some reduced vision described as a grey film initially now improved with black spots which move with movement of eye which remained.    #R visual loss which improved during evaluation-could be secondary to occular disease vs arterial thrombus.  The patient has been on Xarelto for a couple of weeks and is compliant.  Less likely stroke    #R carotid bulb 70-80% stenosis     #intracranial atherosclerosis      Recommendations:  -Vascular evaluation for R carotid stenosis-in vs outpatient  -urgent outpatient opthalmology follow up.  -Would recommend cardiac evaluation as pt. c/o new MONTGOMERY  -continue ASA, Statin    D/W Dr. Mejia          88 yr old man comes to ED c/o right eye visual loss, no other sxs. NIHSS0. CT head negative, CT angios no LVO. 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 F/u with ophthalmology. Carotid stenosis, can f/u with carotid dopplers.  Karthik Mejia MD    Discussed with Dr. Cruz. CC: R visual changes    HPI: 89 y/o male with a PMHx of AICD, BPH, CAD, cardiac arrest, COVID, CKD, COPD, diabetes, falling, GERD, heart failure, HTN, HLD, leg wound, lung cancer, macular degeneration, PAD, thrombocytopenia presents to the ED for right eye vision change. Pt reports he went to bed around 9:30 last night in his usual state of health. Pt woke up this morning at 6:30 with film over right eye and black spots. Pt s/p AICD battery replacement 2.5 weeks ago with Dr. Oliva and had follow up appt today. Pt told staff and was sent to the ED for further evaluation. On Xarelto. Pt also reports MONTGOMERY. Denies focal numbness, weakness, slurred speech, facial droop, dysphagia.  Also c/o MONTGOMERY.  NIHSS 0.  Not a thrombolytics candidate 2/2 OOTW, took xarelto last night, and no measurable deficits.  CT Head. CTA/CTP was neg for acute ischemia, infarct, LVO, bleed.             PAST MEDICAL & SURGICAL HISTORY:  Hypertension      CAD (coronary artery disease)      Cardiac arrest with successful resuscitation      CKD (chronic kidney disease)      PAD (peripheral artery disease)      Lung cancer      Heart failure      HLD (hyperlipidemia)      2019 novel coronavirus disease (COVID-19)      History of falling      Thrombocytopenia      GERD (gastroesophageal reflux disease)      History of COPD      Diabetes      History of BPH      Leg wound, right      AICD (automatic cardioverter/defibrillator) present      S/P carotid endarterectomy      History of lobectomy of lung      History of appendectomy      H/O left knee surgery      History of tonsillectomy and adenoidectomy      H/O endarterectomy          FAMILY HISTORY: Mother  in 40s from breast ca. Father  age 80s from HD      Social Hx: Former TObacco/smoking. Quit 30 years ago. Former 40 PPD smoker. Previoua  1-2 beers. Denies illicit drug use.       MEDICATIONS  (STANDING):  Xarelto  ASA  Entresto  Protonix  Pravastatin  Metoprolol  Amiodarone  Farxiga  Vit d3, visi mj, melatonid, Areds 2    Allergies  No Known Allergies      ROS: Pertinent positives in HPI, all other ROS were reviewed and are negative.          Constitutional: awake and alert.  HEENT: PERRLA, EOMI,   Neck: Supple.  Extremities:  no edema, R would boot in place  Musculoskeletal:  no abnormal movements  Skin: No rashes    Neurological exam:  HF: A x O x 3. Appropriately interactive, normal affect. Speech fluent, No Aphasia or paraphasic errors. Naming /repetition intact   CN: PRISCILLA, EOMI, Reduced vision R eye, facial sensation normal, no NLFD, tongue midline, Palate moves equally, SCM equal bilaterally  Motor: No pronator drift, Strength 5/5 in all 4 ext, normal bulk and tone, no tremors  Sens: Intact to light touch   Reflexes: Symmetric and normal,  downgoing toes b/l  Coord:  No FNFA, unable to do HTS on R due to recent surgery, L HTS intact  Gait/Balance: Cannot test    NIHSS: 0          Labs:                                   12.2   8.22  )-----------( 247      ( 27 Dec 2023 09:58 )             37.8       Radiology:  < from: CT Brain Stroke Protocol (23 @ 09:47) >      IMPRESSION:    1. No intracranial hemorrhage is appreciated.    2. No intracranial mass lesion is appreciated.    3.  No adverse interval change 2023    4. MR may provide higher sensitivity imaging evaluation for the clinical   indication of acute infarction.    < from: CT Angio Neck Stroke Protocol w/ IV Cont (23 @ 09:57) >    IMPRESSION:    1.   Right carotid system:    Atherosclerotic plaque causes focal   stenosis within the carotid bulb 70-80%    2.   Left carotid system:     Atherosclerotic plaque without    hemodynamically significant stenosis.    3.   Vertebral circulation:    Atherosclerotic plaque causes at least   mild ostial stenosis of the dominant caliber left vertebral artery.    4.  Anterior intracranial circulation:     Intracranial atherosclerosis   cavernous and clinoid segments of the internal carotid arteries, at least   moderate on the right somewhat larger greater on the left, and within the   peripheral segments of the middle cerebral arteries, at least   mild-to-moderate.    5.  Posterior intracranial circulation:    Intracranial atherosclerosis   left vertebral artery, mild-to-moderate, and peripheral right posterior   cerebral artery, moderate.    6.  No large vessel occlusion    7.  Brain perfusion:   Noacute infarction of the brain is convincingly         A/P 89 y/o male with a PMHx of AICD, BPH, CAD, cardiac arrest, COVID, CKD, COPD, diabetes, falling, GERD, heart failure, HTN, HLD, leg wound, lung cancer, macular degeneration, PAD, thrombocytopenia presents to the ED for right eye vision change. Pt reports he went to bed around 9:30 last night in his usual state of health. Pt woke up this morning at 6:30 with film over right eye and black spots. Pt s/p AICD battery replacement 2.5 weeks ago with Dr. Oliva and had follow up appt today. Pt told staff and was sent to the ED for further evaluation. On Xarelto. Pt also reports MONTGOMERY. Denies focal numbness, weakness, slurred speech, facial droop, dysphagia.  Also c/o MONTGOMERY.  NIHSS 0.  Not a thrombolytics candidate 2/2 OOTW, took xarelto last night, and no measurable deficits.  CT Head. CTA/CTP was neg for acute ischemia, infarct, LVO, bleed.  PE no focal deficits.  He does have some reduced vision described as a grey film initially now improved with black spots which move with movement of eye which remained.    #R visual loss which improved during evaluation-could be secondary to occular disease vs arterial thrombus.  The patient has been on Xarelto for a couple of weeks and is compliant.  Less likely stroke    #R carotid bulb 70-80% stenosis     #intracranial atherosclerosis      Recommendations:  -Vascular evaluation for R carotid stenosis-in vs outpatient  -urgent outpatient opthalmology follow up.  -Would recommend cardiac evaluation as pt. c/o new MONTGOMERY  -continue ASA, Statin    D/W Dr. Mejia          88 yr old man comes to ED c/o right eye visual loss, no other sxs. NIHSS0. CT head negative, CT angios no LVO. 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 F/u with ophthalmology. Carotid stenosis, can f/u with carotid dopplers.  Karthik Mejia MD    Discussed with Dr. Cruz.

## 2023-12-27 NOTE — POST DISCHARGE NOTE - ADDITIONAL DOCUMENTATION:
Post procedure phone call completed; patient understood all discharge paperwork. No questions regarding medications or pain management. MD follow up appointment made. Patient was able to rest when they were discharged. Patient will recommend [facility], no complaints of hospital stay, satisfied with care. Instructed patient to contact provider with any further questions or concerns. Post procedure phone call completed; patient understood all discharge paperwork. No questions regarding medications or pain management. MD follow up appointment made. Patient was able to rest when they were discharged. Patient will recommend [facility], no complaints of hospital stay, satisfied with care. Instructed patient to contact provider with any further questions or concerns.

## 2023-12-27 NOTE — ED PROVIDER NOTE - NS_ ATTENDINGSCRIBEDETAILS _ED_A_ED_FT
I, Balta Cruz MD,  performed the initial face to face bedside interview with this patient regarding history of present illness, review of symptoms and relevant past medical, social and family history.  I completed an independent physical examination.  I was the initial provider who evaluated this patient.   I personally saw the patient and performed a substantive portion of the visit including all aspects of the medical decision making.  The history, relevant review of systems, past medical and surgical history, medical decision making, and physical examination was documented by the scribe in my presence and I attest to the accuracy of the documentation.

## 2023-12-27 NOTE — SWALLOW BEDSIDE ASSESSMENT ADULT - COMMENTS
89 y/o male with a PMHx of AICD, BPH, CAD, cardiac arrest, COVID, CKD, COPD(not on home O2), diabetes, falling, GERD, heart failure, HTN, HLD, leg wound, lung cancer, macular degeneration, PAD, thrombocytopenia presents to the ED for right eye vision change. Pt reports he went to bed around 9:30 last night in his usual state of health. Pt woke up this morning at 6:30 with film over right eye and black spots. Pt s/p AICD battery replacement 2.5 weeks ago with Dr. Oliva and had follow up appt today. Pt told staff and was sent to the ED for further evaluation. On Xarelto. Pt also reports MONTGOMERY. Denies numbness, weakness, tingling, black/bloody stools, slurred speech. No other complaints at this time.  Service is consulted for po intake, and to determine the texture of the diet.

## 2023-12-27 NOTE — H&P ADULT - PROBLEM SELECTOR PLAN 1
# (R) visual loss which improved during evaluation-could be secondary to occular disease vs arterial thrombus.  The patient has been on Xarelto for a couple of weeks and is compliant.   # (R) carotid bulb 70-80% stenosis   # intracranial atherosclerosis  - Vascular evaluation for R carotid stenosis-in vs outpatient  - urgent outpatient opthalmology follow up.  - Would recommend cardiac evaluation as pt. c/o new MONTGOMERY  - cotninue with ASA /  STATIN # (R) visual loss which improved during evaluation-could be secondary to occular disease vs arterial thrombus.  The patient has been on Xarelto for a couple of weeks and is compliant.   # (R) carotid bulb 70-80% stenosis   # intracranial atherosclerosis  - Vascular evaluation for R carotid stenosis-in vs outpatient  - urgent outpatient opthalmology follow up.  - Would recommend cardiac evaluation as pt. c/o new MONTGOMERY  - continue with ASA /  STATIN  - neurology following

## 2023-12-27 NOTE — ED ADULT TRIAGE NOTE - CHIEF COMPLAINT QUOTE
Pt presents to ED c/o SOB and right eye vision loss. pt reports going to bed around 9:30pm last night with normal vision. woke up today around 6:30 with what he describes as a "film with dark spots" to right eye. pt describes having SOB since Monday. CODE Stroke called at 09:36, pt sent to Ct scan. Pt on daily blood thinners. . pt weighs 92.1kg per Dr. Roque

## 2023-12-27 NOTE — H&P ADULT - HISTORY OF PRESENT ILLNESS
Patient is a 89 y/o male with a PMHx of AICD, BPH, CAD, cardiac arrest, COVID, CKD, COPD, diabetes, falling, GERD, heart failure, HTN, HLD, leg wound, lung cancer, macular degeneration, PAD, thrombocytopenia presents to the ED for right eye vision change. Pt reports he went to bed around 9:30 last night in his usual state of health. Pt woke up this morning at 6:30 with film over right eye and black spots. Pt s/p AICD battery replacement 2.5 weeks ago with Dr. Oilva and had follow up appt today. Pt told staff and was sent to the ED for further evaluation. On Xarelto. Pt also reports MONTGOMERY. Denies numbness, weakness, tingling, black/bloody stools, slurred speech. NKDA. Former smoker. No other complaints at this time.   Patient is a 87 y/o male with a PMHx of AICD, BPH, CAD, cardiac arrest, COVID, CKD, COPD, diabetes, falling, GERD, heart failure, HTN, HLD, leg wound, lung cancer, macular degeneration, PAD, thrombocytopenia presents to the ED for right eye vision change. Pt reports he went to bed around 9:30 last night in his usual state of health. Pt woke up this morning at 6:30 with film over right eye and black spots. Pt s/p AICD battery replacement 2.5 weeks ago with Dr. Oliva and had follow up appt today. Pt told staff and was sent to the ED for further evaluation. On Xarelto. Pt also reports MONTGOMERY. Denies numbness, weakness, tingling, black/bloody stools, slurred speech. NKDA. Former smoker. No other complaints at this time.   Pt is a 89 y/o/m w/ PMHx of AICD, BPH, CAD, cardiac arrest, COVID, CKD, COPD, diabetes, falling, GERD, heart failure, HTN, HLD, leg wound, lung cancer, macular degeneration, PAD, thrombocytopenia presents to the ED for right eye vision change. Pt reports he went to bed around 9:30 last night in his usual state of health. Pt woke up this morning at 6:30 with film over right eye and black spots. Pt s/p AICD battery replacement 2.5 weeks ago with Dr. Oliva and had follow up appt today. Pt told staff and was sent to the ED for further evaluation. On Xarelto. Pt also reports MONTGOMERY. Denies numbness, weakness, tingling, black/bloody stools, slurred speech. NKDA. Former smoker.    Patient is a 87 y/o male with a PMHx of AICD, BPH, CAD, cardiac arrest, COVID, CKD, COPD(not on home O2), diabetes, falling, GERD, heart failure, HTN, HLD, leg wound, lung cancer, macular degeneration, PAD, thrombocytopenia presents to the ED for right eye vision change. Pt reports he went to bed around 9:30 last night in his usual state of health. Pt woke up this morning at 6:30 with film over right eye and black spots. Pt s/p AICD battery replacement 2.5 weeks ago with Dr. Oliva and had follow up appt today. Pt told staff and was sent to the ED for further evaluation. On Xarelto. Pt also reports MONTGOMERY. Denies numbness, weakness, tingling, black/bloody stools, slurred speech. No other complaints at this time.    Patient clinically appears to be deconditioned, frail, chronically sick. + SOB last couple of days with leg swelling.    Patient is a 89 y/o male with a PMHx of AICD, BPH, CAD, cardiac arrest, COVID, CKD, COPD(not on home O2), diabetes, falling, GERD, heart failure, HTN, HLD, leg wound, lung cancer, macular degeneration, PAD, thrombocytopenia presents to the ED for right eye vision change. Pt reports he went to bed around 9:30 last night in his usual state of health. Pt woke up this morning at 6:30 with film over right eye and black spots. Pt s/p AICD battery replacement 2.5 weeks ago with Dr. Oliva and had follow up appt today. Pt told staff and was sent to the ED for further evaluation. On Xarelto. Pt also reports MONTGOMERY. Denies numbness, weakness, tingling, black/bloody stools, slurred speech. No other complaints at this time.    Patient clinically appears to be deconditioned, frail, chronically sick. + SOB last couple of days with leg swelling.

## 2023-12-27 NOTE — ED PROVIDER NOTE - CLINICAL SUMMARY MEDICAL DECISION MAKING FREE TEXT BOX
89 y/o male presents with right sided vision loss. Pt went to bed at 9:30 pm last night and woke up around 6:30 with film over right eye and black spots. Pt also with MONTGOMERY. Pt saw cardio this morning and sent for stroke workup. Pt evaluated in triage and code stroke called although outside TPA window. Will workup for stroke, evaluate for retinal detachment, workup for SOB and reassess. 87 y/o male presents with right sided vision loss. Pt went to bed at 9:30 pm last night and woke up around 6:30 with film over right eye and black spots. Pt also with MONTGOMERY. Pt saw cardio this morning and sent for stroke workup. Pt evaluated in triage and code stroke called although outside TPA window. Will workup for stroke, evaluate for retinal detachment, workup for SOB and reassess.    [id number] labs imaging reviewed by myself.  Chest x-ray independently interpreted by myself shows congestive heart failure with pleural effusions bedside ultrasound performed by myself shows bilateral pleural effusions bedside ultrasound by myself shows right posterior vitreous hemorrhage no retinal or vitreal detachment.  Case discussed with neurology attending and radiology attending lower suspicion for acute stroke likely vitreous hemorrhage causing visual changes.  Patient also with hypoxia and CHF Lasix given Case discussed with hospitalist accepts 89 y/o male presents with right sided vision loss. Pt went to bed at 9:30 pm last night and woke up around 6:30 with film over right eye and black spots. Pt also with MONTGOMERY. Pt saw cardio this morning and sent for stroke workup. Pt evaluated in triage and code stroke called although outside TPA window. Will workup for stroke, evaluate for retinal detachment, workup for SOB and reassess.    1058All labs imaging reviewed by myself.  Chest x-ray independently interpreted by myself shows congestive heart failure with pleural effusions bedside ultrasound performed by myself shows bilateral pleural effusions bedside ultrasound by myself shows right posterior vitreous hemorrhage no retinal or vitreal detachment.  Case discussed with neurology attending and radiology attending lower suspicion for acute stroke likely vitreous hemorrhage causing visual changes.  Patient also with hypoxia and CHF Lasix given Case discussed with hospitalist accepts

## 2023-12-27 NOTE — H&P ADULT - CONVERSATION DETAILS
Patient wants everything done at this time, if he feels sob to be intubated if needed and if he needs chest compressions to have chest compressions. Full code.

## 2023-12-27 NOTE — ED ADULT NURSE NOTE - OBJECTIVE STATEMENT
pt presents to Ed with complaints of right eye vision loss and then spots in front of eye starting at 0630 this am. pt also endorses feeling dyspnea on exertion while walking to bathroom this am. pt went to [facility] this am for routine pacemaker check and was referred to ED for further work up of symptoms. 18 g placed to left FA. labs sent. EKG performed. cardiac and VS monitoring initiated.

## 2023-12-27 NOTE — ED PROVIDER NOTE - PROGRESS NOTE DETAILS
Jemal Diaz for attending Dr. Cruz: Bedside US performed. Pt with b/l pleural effusions. Occular US performed. Pt with vitreous hemorrhage. No signs of retinal or vitreous detachment. Jemal Diaz for attending Dr. Cruz: Spoke with neuro attending. States unlikely stroke. Agrees symptoms likely secondary to primary eye issue. Jemal Diaz for attending Dr. Cruz: Spoke with radiology. CT head and neck negative.

## 2023-12-27 NOTE — ED PROVIDER NOTE - CARE PLAN
1 Principal Discharge DX:	Acute respiratory failure with hypoxia  Secondary Diagnosis:	Acute CHF  Secondary Diagnosis:	Vitreous hemorrhage, right

## 2023-12-27 NOTE — H&P ADULT - PROBLEM SELECTOR PLAN 2
- this is due to heart failure  - patient with hypervolemia  - continue with IV Lasix q12h  - daily weights  - low salt diet  - continue with goal directed medical therapy

## 2023-12-27 NOTE — H&P ADULT - NSHPREVIEWOFSYSTEMS_GEN_ALL_CORE
REVIEW OF SYSTEMS:  General: NAD, hemodynamically stable, (-)  fever, (-) chills, (-) weakness  HEENT:  Eyes:  No visual loss, blurred vision, double vision or yellow sclerae. Ears, Nose, Throat:  No hearing loss, sneezing, congestion, runny nose or sore throat.  SKIN:  No rash or itching.  CARDIOVASCULAR:  No chest pain, chest pressure or chest discomfort. No palpitations or edema.  RESPIRATORY:  No shortness of breath, cough or sputum.  GASTROINTESTINAL:  No anorexia, nausea, vomiting or diarrhea. No abdominal pain or blood.  NEUROLOGICAL:  No headache, dizziness, syncope, paralysis, ataxia, numbness or tingling in the extremities. No change in bowel or bladder control.  MUSCULOSKELETAL:  No muscle, back pain, joint pain or stiffness.  HEMATOLOGIC:  No anemia, bleeding or bruising.  LYMPHATICS:  No enlarged nodes. No history of splenectomy.  ENDOCRINOLOGIC:  No reports of sweating, cold or heat intolerance. No polyuria or polydipsia.  ALLERGIES:  No history of asthma, hives, eczema or rhinitis. REVIEW OF SYSTEMS:  General: NAD, hemodynamically stable, + weakness  HEENT:  + change in vision  SKIN:  No rash or itching.  CARDIOVASCULAR:  No chest pain, chest pressure or chest discomfort. No palpitations or edema.  RESPIRATORY:  No shortness of breath, cough or sputum.  GASTROINTESTINAL:  No anorexia, nausea, vomiting or diarrhea. No abdominal pain or blood.  NEUROLOGICAL:  No headache, dizziness, syncope, paralysis, ataxia, numbness or tingling in the extremities. No change in bowel or bladder control.  MUSCULOSKELETAL:  No muscle, back pain, joint pain or stiffness.  HEMATOLOGIC:  No anemia, bleeding or bruising.  LYMPHATICS:  No enlarged nodes. No history of splenectomy.  ENDOCRINOLOGIC:  No reports of sweating, cold or heat intolerance. No polyuria or polydipsia.  ALLERGIES:  No history of asthma, hives, eczema or rhinitis.

## 2023-12-27 NOTE — PATIENT PROFILE ADULT - FALL HARM RISK - HARM RISK INTERVENTIONS
Assistance with ambulation/Assistance OOB with selected safe patient handling equipment/Communicate Risk of Fall with Harm to all staff/Discuss with provider need for PT consult/Monitor gait and stability/Reinforce activity limits and safety measures with patient and family/Tailored Fall Risk Interventions/Visual Cue: Yellow wristband and red socks/Bed in lowest position, wheels locked, appropriate side rails in place/Call bell, personal items and telephone in reach/Instruct patient to call for assistance before getting out of bed or chair/Non-slip footwear when patient is out of bed/[place] to call system/Physically safe environment - no spills, clutter or unnecessary equipment/Purposeful Proactive Rounding/Room/bathroom lighting operational, light cord in reach Assistance with ambulation/Assistance OOB with selected safe patient handling equipment/Communicate Risk of Fall with Harm to all staff/Discuss with provider need for PT consult/Monitor gait and stability/Reinforce activity limits and safety measures with patient and family/Tailored Fall Risk Interventions/Visual Cue: Yellow wristband and red socks/Bed in lowest position, wheels locked, appropriate side rails in place/Call bell, personal items and telephone in reach/Instruct patient to call for assistance before getting out of bed or chair/Non-slip footwear when patient is out of bed/[place] to call system/Physically safe environment - no spills, clutter or unnecessary equipment/Purposeful Proactive Rounding/Room/bathroom lighting operational, light cord in reach

## 2023-12-27 NOTE — SWALLOW BEDSIDE ASSESSMENT ADULT - SWALLOW EVAL: RECOMMENDED FEEDING/EATING TECHNIQUES
allow for swallow between intakes/check mouth frequently for oral residue/pocketing/maintain upright posture during/after eating for 30 mins/no straws/position upright (90 degrees)/small sips/bites

## 2023-12-27 NOTE — ED PROVIDER NOTE - PHYSICAL EXAMINATION
Constitutional: NAD AAOx3  Eyes: PERRLA EOMI  Head: Normocephalic atraumatic  Mouth: MMM  Cardiac: regular rate   Resp: unlabored breathing  GI: Abd s/nt/nd  Neuro: grossly normal and intact. Normal strength, sensation and coordination. NIH 0.   Skin: No visible rashes Constitutional: NAD AAOx3  Eyes: PERRLA EOMI  Head: Normocephalic atraumatic  Mouth: MMM  Cardiac: regular rate mild LE swelling  Resp: diminished at bases   GI: Abd s/nt/nd  Neuro: grossly normal and intact. Normal strength, sensation and coordination. NIH 0.   Skin: No visible rashes

## 2023-12-27 NOTE — ED ADULT NURSE NOTE - CHIEF COMPLAINT QUOTE
Pt presents to ED c/o SOB and right eye vision loss. pt reports going to bed around 9:30pm last night with normal vision. woke up today around 6:30 with what he describes as a "film with dark spots" to right eye. pt describes having SOB since Monday. CODE Stroke called at 09:36, pt sent to Ct scan. Pt on daily blood thinners. . pt weighs 92.1kg

## 2023-12-27 NOTE — PATIENT PROFILE ADULT - VISION (WITH CORRECTIVE LENSES IF THE PATIENT USUALLY WEARS THEM):
Med refill request:   Atorvastatin Calcium 80 mg Oral DAILY   Finasteride 5 mg Oral DAILY  Lisinopril-hydroCHLOROthiazide 1 tablet Oral DAILY  Tamsulosin HCl 0.8 mg Oral DAILY AFTER A MEAL    Last refilled: all on 4/9/20 for 1 month supply and 0 RF.    Last seen: 2/11/19  Next appointment: 7/17/20    Med refilled.           Normal vision: sees adequately in most situations; can see medication labels, newsprint

## 2023-12-27 NOTE — SWALLOW BEDSIDE ASSESSMENT ADULT - SWALLOW EVAL: DIAGNOSIS
presbyphagia, which might rise to dysphagia in setting of illness or injury. Yet Pt is showing appropriate oral-pharyngeal  capacity for regular texture diet.  Note GERD, Lung Lobectomy.

## 2023-12-27 NOTE — ED PROVIDER NOTE - OBJECTIVE STATEMENT
87 y/o male with a PMHx of AICD, BPH, CAD, cardiac arrest, COVID, CKD, COPD, diabetes, falling, GERD, heart failure, HTN, HLD, leg wound, lung cancer, macular degeneration, PAD, thrombocytopenia presents to the ED for right eye vision change. Pt reports he went to bed around 9:30 last night in his usual state of health. Pt woke up this morning at 6:30 with film over right eye and black spots. Pt s/p AICD battery replacement 2.5 weeks ago with Dr. Oliva and had follow up appt today. Pt told staff and was sent to the ED for further evaluation. On Xarelto. Pt also reports MONTGOMERY. Denies numbness, weakness, tingling, black/bloody stools, slurred speech. NKDA. Former smoker. No other complaints at this time. 89 y/o male with a PMHx of AICD, BPH, CAD, cardiac arrest, COVID, CKD, COPD, diabetes, falling, GERD, heart failure, HTN, HLD, leg wound, lung cancer, macular degeneration, PAD, thrombocytopenia presents to the ED for right eye vision change. Pt reports he went to bed around 9:30 last night in his usual state of health. Pt woke up this morning at 6:30 with film over right eye and black spots. Pt s/p AICD battery replacement 2.5 weeks ago with Dr. Oliva and had follow up appt today. Pt told staff and was sent to the ED for further evaluation. On Xarelto. Pt also reports MONTGOMERY. Denies numbness, weakness, tingling, black/bloody stools, slurred speech. NKDA. Former smoker. No other complaints at this time.

## 2023-12-27 NOTE — ED PROVIDER NOTE - EKG #1 DATE/TIME
Catarina is here for port flush and lab draw. Port accessed with 20 gauge, 3/4 inch Perez needle and excellent blood return obtained.  Labs drawn per [facility] procedure without difficulty.  10ml 0.9 NS flush before and after, followed by 500 units Heparin, deaccessd and gauze bandage applied.  Procedure tolerated without distress.    She states she has felt more fatigued with this third cycle.  Nausea more prominent, but managed with po zofran.  No constipation as she takes Miralax daily.      Component      Latest Ref Rng & Units 3/7/2019   WHITE BLOOD CELL COUNT      4.0 - 10.0 10*3/uL 4.2   RBC      3.70 - 5.20 10*6/uL 3.40 (L)   HGB      11.2 - 15.7 g/dL 10.6 (L)   HCT      34.0 - 45.0 % 31.4 (L)   MCV      79.0 - 95.0 fL 92.4   MCH      27.0 - 34.0 pg 31.2   MCHC      32.0 - 36.0 % 33.8   PLATELET COUNT      150 - 400 10*3/uL 272   MPV      8.6 - 12.4 fL 7.6 (L)   RDW-CV      11.3 - 14.8 % 15.6 (H)   Neutrophil      34.0 - 73.5 % 63.5   Absolute Neutrophil      1.4 - 6.5 10*3/uL 2.7   LYMPH      20.5 - 51.1 % 31.1   Absolute Lymph      1.2 - 3.4 10*3/uL 1.3   MID (MONO,EO&BASO)      4.3 - 12.9 % 5.4   Absolute Mid      0.2 - 0.9 10*3/uL 0.2   DIFFERENTIAL TYPE       AUTO DIFF     Labs reviewed with Catarina, she states understanding and is given a copy  Electronically signed by: Amrita Saleh RN  3/7/2019        
27-Dec-2023 10:56

## 2023-12-27 NOTE — ED ADULT NURSE NOTE - NSFALLHARMRISKINTERV_ED_ALL_ED
Communicate risk of Fall with Harm to all staff, patient, and family/Provide visual cue: red socks, yellow wristband, yellow gown, etc/Reinforce activity limits and safety measures with patient and family/Bed in lowest position, wheels locked, appropriate side rails in place/Call bell, personal items and telephone in reach/Instruct patient to call for assistance before getting out of bed/chair/stretcher/Non-slip footwear applied when patient is off stretcher/[place] to call system/Physically safe environment - no spills, clutter or unnecessary equipment/Purposeful Proactive Rounding/Room/bathroom lighting operational, light cord in reach Communicate risk of Fall with Harm to all staff, patient, and family/Provide visual cue: red socks, yellow wristband, yellow gown, etc/Reinforce activity limits and safety measures with patient and family/Bed in lowest position, wheels locked, appropriate side rails in place/Call bell, personal items and telephone in reach/Instruct patient to call for assistance before getting out of bed/chair/stretcher/Non-slip footwear applied when patient is off stretcher/[place] to call system/Physically safe environment - no spills, clutter or unnecessary equipment/Purposeful Proactive Rounding/Room/bathroom lighting operational, light cord in reach

## 2023-12-27 NOTE — SWALLOW BEDSIDE ASSESSMENT ADULT - NS SPL SWALLOW CLINIC TRIAL FT
pt presents with oral-pharyngeal swallow skills within normal limits for age for regular texture diet.  Disc aspiration and GERD precautions with Pt.    Will follow peripherally.  Disc with Nsg.

## 2023-12-27 NOTE — H&P ADULT - NSHPLABSRESULTS_GEN_ALL_CORE
CBC Full  -  ( 27 Dec 2023 09:58 )  WBC Count : 8.22 K/uL  RBC Count : 4.09 M/uL  Hemoglobin : 12.2 g/dL  Hematocrit : 37.8 %  Platelet Count - Automated : 247 K/uL    PT/INR - ( 27 Dec 2023 09:58 )   PT: 23.7 sec;   INR: 2.14 ratio         PTT - ( 27 Dec 2023 09:58 )  PTT:40.6 sec  Urinalysis Basic - ( 27 Dec 2023 09:58 )    Color: x / Appearance: x / SG: x / pH: x  Gluc: 172 mg/dL / Ketone: x  / Bili: x / Urobili: x   Blood: x / Protein: x / Nitrite: x   Leuk Esterase: x / RBC: x / WBC x   Sq Epi: x / Non Sq Epi: x / Bacteria: x      140  |  107  |  17  ----------------------------<  172<H>  3.9   |  27  |  1.33<H>    Ca    9.1      27 Dec 2023 09:58    TPro  7.5  /  Alb  3.6  /  TBili  1.1  /  DBili  x   /  AST  19  /  ALT  17  /  AlkPhos  62  12-27 CBC Full  -  ( 27 Dec 2023 09:58 )  WBC Count : 8.22 K/uL  RBC Count : 4.09 M/uL  Hemoglobin : 12.2 g/dL  Hematocrit : 37.8 %  Platelet Count - Automated : 247 K/uL    PT/INR - ( 27 Dec 2023 09:58 )   PT: 23.7 sec;   INR: 2.14 ratio         PTT - ( 27 Dec 2023 09:58 )  PTT:40.6 sec  Urinalysis Basic - ( 27 Dec 2023 09:58 )    Color: x / Appearance: x / SG: x / pH: x  Gluc: 172 mg/dL / Ketone: x  / Bili: x / Urobili: x   Blood: x / Protein: x / Nitrite: x   Leuk Esterase: x / RBC: x / WBC x   Sq Epi: x / Non Sq Epi: x / Bacteria: x      CT:   1.   Right carotid system:    Atherosclerotic plaque causes focal   stenosis within the carotid bulb 70-80%    2.   Left carotid system:     Atherosclerotic plaque without    hemodynamically significant stenosis.    3.   Vertebral circulation:    Atherosclerotic plaque causes at least   mild ostial stenosis of the dominant caliber left vertebral artery.    4.  Anterior intracranial circulation:     Intracranial atherosclerosis   cavernous and clinoid segments of the internal carotid arteries, at least   moderate on the right somewhat larger greater on the left, and within the   peripheral segments of the middle cerebral arteries, at least   mild-to-moderate.    5.  Posterior intracranial circulation:    Intracranial atherosclerosis   left vertebral artery, mild-to-moderate, and peripheral right posterior   cerebral artery, moderate.    6.  No large vessel occlusion    7.  Brain perfusion:   No acute infarction of the brain is convincingly   demonstrated.    140  |  107  |  17  ----------------------------<  172<H>  3.9   |  27  |  1.33<H>    Ca    9.1      27 Dec 2023 09:58    TPro  7.5  /  Alb  3.6  /  TBili  1.1  /  DBili  x   /  AST  19  /  ALT  17  /  AlkPhos  62  12-27

## 2023-12-27 NOTE — H&P ADULT - NSHPPHYSICALEXAM_GEN_ALL_CORE
Physical Exam:   GENERAL APPEARANCE:  NAD, hemodynamically stable  HR: 63 (12-27-23 @ 11:01) (63 - 63)  BP: 139/62 (12-27-23 @ 11:01) (139/62 - 139/62)  RR: 27 (12-27-23 @ 11:01) (27 - 27)  HEENT:  Head is normocephalic    Skin:  Warm and dry without any rash   NECK:  Supple without lymphadenopathy.   HEART:  Regular rate and rhythm. normal S1 and S2, No M/R/G  LUNGS:  Good ins/exp effort, no W/R/R/C  ABDOMEN:  Soft, nontender, nondistended with good bowel sounds heard  EXTREMITIES:  Without cyanosis, clubbing or edema.   NEUROLOGICAL:  Gross nonfocal Physical Exam:   GENERAL APPEARANCE:  Deconditioned, frail, chronically sick.   HR: 63 (12-27-23 @ 11:01) (63 - 63)  BP: 139/62 (12-27-23 @ 11:01) (139/62 - 139/62)  RR: 27 (12-27-23 @ 11:01) (27 - 27)  HEENT:  normocephalic  Skin:  frail, thin, dry  NECK:  Supple without lymphadenopathy.   HEART:  Regular rate and rhythm   LUNGS:  Good ins / exp effort   ABDOMEN:  Soft, nontender  EXTREMITIES:  Without cyanosis, clubbing or edema.   NEUROLOGICAL:  Gross nonfocal

## 2023-12-27 NOTE — SWALLOW BEDSIDE ASSESSMENT ADULT - SLP GENERAL OBSERVATIONS
seated in bed, awake and alert, verbally interactive:  participating with good humor and interest.   explained why heis not wearing his hearing aids.

## 2023-12-27 NOTE — PATIENT PROFILE ADULT - FLU SEASON?
Mom would like to talk to someone about managing fever, it is currently at 103 1 goes down when given motrin but goes right back up after the 5 hours  Yes...

## 2023-12-28 LAB
A1C WITH ESTIMATED AVERAGE GLUCOSE RESULT: 5.9 % — HIGH (ref 4–5.6)
A1C WITH ESTIMATED AVERAGE GLUCOSE RESULT: 5.9 % — HIGH (ref 4–5.6)
ANION GAP SERPL CALC-SCNC: 5 MMOL/L — SIGNIFICANT CHANGE UP (ref 5–17)
ANION GAP SERPL CALC-SCNC: 5 MMOL/L — SIGNIFICANT CHANGE UP (ref 5–17)
BUN SERPL-MCNC: 16 MG/DL — SIGNIFICANT CHANGE UP (ref 7–23)
BUN SERPL-MCNC: 16 MG/DL — SIGNIFICANT CHANGE UP (ref 7–23)
CALCIUM SERPL-MCNC: 8.9 MG/DL — SIGNIFICANT CHANGE UP (ref 8.5–10.1)
CALCIUM SERPL-MCNC: 8.9 MG/DL — SIGNIFICANT CHANGE UP (ref 8.5–10.1)
CHLORIDE SERPL-SCNC: 102 MMOL/L — SIGNIFICANT CHANGE UP (ref 96–108)
CHLORIDE SERPL-SCNC: 102 MMOL/L — SIGNIFICANT CHANGE UP (ref 96–108)
CHOLEST SERPL-MCNC: 178 MG/DL — SIGNIFICANT CHANGE UP
CHOLEST SERPL-MCNC: 178 MG/DL — SIGNIFICANT CHANGE UP
CO2 SERPL-SCNC: 29 MMOL/L — SIGNIFICANT CHANGE UP (ref 22–31)
CO2 SERPL-SCNC: 29 MMOL/L — SIGNIFICANT CHANGE UP (ref 22–31)
CREAT SERPL-MCNC: 1.12 MG/DL — SIGNIFICANT CHANGE UP (ref 0.5–1.3)
CREAT SERPL-MCNC: 1.12 MG/DL — SIGNIFICANT CHANGE UP (ref 0.5–1.3)
EGFR: 63 ML/MIN/1.73M2 — SIGNIFICANT CHANGE UP
EGFR: 63 ML/MIN/1.73M2 — SIGNIFICANT CHANGE UP
ESTIMATED AVERAGE GLUCOSE: 123 MG/DL — HIGH (ref 68–114)
ESTIMATED AVERAGE GLUCOSE: 123 MG/DL — HIGH (ref 68–114)
GLUCOSE SERPL-MCNC: 136 MG/DL — HIGH (ref 70–99)
GLUCOSE SERPL-MCNC: 136 MG/DL — HIGH (ref 70–99)
HDLC SERPL-MCNC: 73 MG/DL — SIGNIFICANT CHANGE UP
HDLC SERPL-MCNC: 73 MG/DL — SIGNIFICANT CHANGE UP
LIPID PNL WITH DIRECT LDL SERPL: 92 MG/DL — SIGNIFICANT CHANGE UP
LIPID PNL WITH DIRECT LDL SERPL: 92 MG/DL — SIGNIFICANT CHANGE UP
NON HDL CHOLESTEROL: 106 MG/DL — SIGNIFICANT CHANGE UP
NON HDL CHOLESTEROL: 106 MG/DL — SIGNIFICANT CHANGE UP
NT-PROBNP SERPL-SCNC: HIGH PG/ML (ref 0–450)
NT-PROBNP SERPL-SCNC: HIGH PG/ML (ref 0–450)
POTASSIUM SERPL-MCNC: 3.2 MMOL/L — LOW (ref 3.5–5.3)
POTASSIUM SERPL-MCNC: 3.2 MMOL/L — LOW (ref 3.5–5.3)
POTASSIUM SERPL-SCNC: 3.2 MMOL/L — LOW (ref 3.5–5.3)
POTASSIUM SERPL-SCNC: 3.2 MMOL/L — LOW (ref 3.5–5.3)
SODIUM SERPL-SCNC: 136 MMOL/L — SIGNIFICANT CHANGE UP (ref 135–145)
SODIUM SERPL-SCNC: 136 MMOL/L — SIGNIFICANT CHANGE UP (ref 135–145)
TRIGL SERPL-MCNC: 71 MG/DL — SIGNIFICANT CHANGE UP
TRIGL SERPL-MCNC: 71 MG/DL — SIGNIFICANT CHANGE UP

## 2023-12-28 PROCEDURE — 99221 1ST HOSP IP/OBS SF/LOW 40: CPT

## 2023-12-28 PROCEDURE — 99232 SBSQ HOSP IP/OBS MODERATE 35: CPT

## 2023-12-28 PROCEDURE — 93880 EXTRACRANIAL BILAT STUDY: CPT | Mod: 26

## 2023-12-28 RX ORDER — POTASSIUM CHLORIDE 20 MEQ
40 PACKET (EA) ORAL EVERY 4 HOURS
Refills: 0 | Status: COMPLETED | OUTPATIENT
Start: 2023-12-28 | End: 2023-12-28

## 2023-12-28 RX ORDER — SODIUM CHLORIDE 9 MG/ML
500 INJECTION INTRAMUSCULAR; INTRAVENOUS; SUBCUTANEOUS ONCE
Refills: 0 | Status: COMPLETED | OUTPATIENT
Start: 2023-12-28 | End: 2023-12-28

## 2023-12-28 RX ORDER — ACETAMINOPHEN 500 MG
650 TABLET ORAL EVERY 6 HOURS
Refills: 0 | Status: DISCONTINUED | OUTPATIENT
Start: 2023-12-28 | End: 2023-12-29

## 2023-12-28 RX ADMIN — SACUBITRIL AND VALSARTAN 1 TABLET(S): 24; 26 TABLET, FILM COATED ORAL at 10:59

## 2023-12-28 RX ADMIN — Medication 650 MILLIGRAM(S): at 05:57

## 2023-12-28 RX ADMIN — Medication 81 MILLIGRAM(S): at 10:41

## 2023-12-28 RX ADMIN — SODIUM CHLORIDE 250 MILLILITER(S): 9 INJECTION INTRAMUSCULAR; INTRAVENOUS; SUBCUTANEOUS at 13:20

## 2023-12-28 RX ADMIN — Medication 40 MILLIEQUIVALENT(S): at 17:04

## 2023-12-28 RX ADMIN — RIVAROXABAN 20 MILLIGRAM(S): KIT at 17:04

## 2023-12-28 RX ADMIN — Medication 12.5 MILLIGRAM(S): at 10:41

## 2023-12-28 RX ADMIN — PANTOPRAZOLE SODIUM 40 MILLIGRAM(S): 20 TABLET, DELAYED RELEASE ORAL at 05:58

## 2023-12-28 RX ADMIN — Medication 40 MILLIGRAM(S): at 05:57

## 2023-12-28 RX ADMIN — ATORVASTATIN CALCIUM 80 MILLIGRAM(S): 80 TABLET, FILM COATED ORAL at 21:38

## 2023-12-28 RX ADMIN — SACUBITRIL AND VALSARTAN 1 TABLET(S): 24; 26 TABLET, FILM COATED ORAL at 21:38

## 2023-12-28 RX ADMIN — TAMSULOSIN HYDROCHLORIDE 0.4 MILLIGRAM(S): 0.4 CAPSULE ORAL at 21:37

## 2023-12-28 RX ADMIN — Medication 40 MILLIEQUIVALENT(S): at 21:37

## 2023-12-28 NOTE — OCCUPATIONAL THERAPY INITIAL EVALUATION ADULT - VISUAL ASSESSMENT: TRACKING
R eye with noted black dots in visual field- MD at bedside and further assessed, r/o clogged carotid artetry. vision improves during visual assessment. R eye imp sup/inf temproal saccades/pursuits, denies diplopia

## 2023-12-28 NOTE — DIETITIAN INITIAL EVALUATION ADULT - OTHER INFO
Patient is a 89 y/o male with a PMHx of AICD, BPH, CAD, cardiac arrest, COVID, CKD, COPD(not on home O2), diabetes, falling, GERD, heart failure, HTN, HLD, leg wound, lung cancer, macular degeneration, PAD, thrombocytopenia presents to the ED for right eye vision change. Pt reports he went to bed around 9:30 last night in his usual state of health.  Admit for visual changes, SOB, CHF exacerbation and R eye vision change    Seen by SLP on 12/27/23 - rec'd regular consistency diet w/ thin liquids. Reports improved appetite and feeling "very hungry," since admit (x 1 day). Unable to confirm UBW; bed scale wt of 181# taken by RD on 12/28/23. Appears thin/frail; NFPE reveals moderate-severe muscle/ fat wasting. Liberalize diet to regular to maximize caloric and nutrient intake. Add premier protein shake BID (provides 160kcal, 30g protein) and John BID 2/2 unstageable PI (provides 90 kcal, 2.5 g collagen, 7 g L-Arginine, 7 g L-Glutamine/ 1 packet) to promote wound healing. John is intended to support tissue building and collagen formation in the dietary management of wounds, which has been clinically shown to support wound healing (including pressure injuries, diabetic foot ulcers surgical incisions, burns, and other acute/chronic wounds) by enhancing collagen formation in as little as 2 weeks. See below for other recommendations.

## 2023-12-28 NOTE — OCCUPATIONAL THERAPY INITIAL EVALUATION ADULT - NSACTIVITYREC_GEN_A_OT
Pt demo impaired R eye vision- premobrid macular degeneration, improving vision during session, being followed and addressed by MD
No

## 2023-12-28 NOTE — DIETITIAN INITIAL EVALUATION ADULT - ORAL INTAKE PTA/DIET HISTORY
Pt lives at home w/ 24-hr aides that help cook/grocery shop. Reports "so-so" appetite, likely w/ fair PO intake 50-75% of ENN x >/= 1 mo 2/2 SOB, weakness Pt lives at home w/ 24-hr aides that cook/grocery shop/help w/ ADLs. Reports "so-so" appetite, likely w/ fair PO intake 50-75% 2/2 SOB, weakness

## 2023-12-28 NOTE — PHYSICAL THERAPY INITIAL EVALUATION ADULT - PERTINENT HX OF CURRENT PROBLEM, REHAB EVAL
Pt is an 88 year old male with a PMHx of AICD, BPH, CAD, cardiac arrest, COVID, CKD, COPD (not on home O2), diabetes, falling, GERD, heart failure, HTN, HLD, leg wound, lung cancer, macular degeneration, PAD, thrombocytopenia presents to the ED for right eye vision change. Of note: Pt s/p AICD battery replacement 2.5 weeks ago with Dr. Oliva. Admitted for further management. Admission

## 2023-12-28 NOTE — PHYSICAL THERAPY INITIAL EVALUATION ADULT - GENERAL OBSERVATIONS, REHAB EVAL
Pt rec'd semi-supine in bed on 3N, +tele, +heel dressing c/d/i, in NAD. RN cleared pt for PT eval. Co-tx with OT.

## 2023-12-28 NOTE — PROGRESS NOTE ADULT - SUBJECTIVE AND OBJECTIVE BOX
89 y/o man with a PMHx of AICD, BPH, CAD, cardiac arrest, COVID, CKD, COPD(not on home O2), diabetes, falling, GERD, heart failure, HTN, HLD, leg wound, lung cancer, macular degeneration, PAD, thrombocytopenia presents to the ED for right eye vision change. Pt reports he went to bed around 9:30 last night in his usual state of health. Pt woke up this morning at 6:30 with film over right eye and black spots. Pt s/p AICD battery replacement 2.5 weeks ago with Dr. Oliva and had follow up appt today. Pt told staff and was sent to the ED for further evaluation. On Xarelto. Pt also reports MONTGOMERY. Denies numbness, weakness, tingling, black/bloody stools, slurred speech. Patient was a stroke code on admission - NIHSS- 0 Not a thrombolytics candidate 2/2 OOTW, took xarelto last night, and no measurable deficits.     12/28- patient was seen and examined- stated that he felt dizzy when he got out of bed this morning, +orthostasis. Oriented x3.    Vital Signs Last 24 Hrs  T(C): 36.8 (28 Dec 2023 08:58), Max: 37.1 (27 Dec 2023 20:43)  T(F): 98.2 (28 Dec 2023 08:58), Max: 98.7 (27 Dec 2023 20:43)  HR: 67 (28 Dec 2023 08:58) (62 - 74)  BP: 105/81 (28 Dec 2023 08:58) (105/81 - 135/61)  BP(mean): --  RR: 18 (28 Dec 2023 08:58) (18 - 18)  SpO2: 96% (28 Dec 2023 08:58) (96% - 99%)    Parameters below as of 28 Dec 2023 08:58  Patient On (Oxygen Delivery Method): room air    ROS:   All 10 systems reviewed and found to be negative with the exception of what has been described above.     PE:  Constitutional: NAD, laying in bed  HEENT: NC/AT  Back: no tenderness  Respiratory: respirations even and non labored, LCTA  Cardiovascular: S1S2 regular, +murmur  Abdomen: soft, not tender, not distended, positive BS  Genitourinary: voiding  Musculoskeletal: no muscle tenderness, no joint swelling or tenderness  Extremities: no pedal edema   Neurological: no focal deficits    MEDICATIONS  (STANDING):  albuterol    0.083%. 2.5 milliGRAM(s) Nebulizer once  aspirin enteric coated 81 milliGRAM(s) Oral daily  atorvastatin 80 milliGRAM(s) Oral at bedtime  metoprolol succinate ER 12.5 milliGRAM(s) Oral daily  pantoprazole    Tablet 40 milliGRAM(s) Oral before breakfast  rivaroxaban 20 milliGRAM(s) Oral with dinner  sacubitril 24 mG/valsartan 26 mG 1 Tablet(s) Oral two times a day  sodium chloride 0.9% Bolus 500 milliLiter(s) IV Bolus once  tamsulosin 0.4 milliGRAM(s) Oral at bedtime    MEDICATIONS  (PRN):  acetaminophen     Tablet .. 650 milliGRAM(s) Oral every 6 hours PRN Temp greater or equal to 38C (100.4F), Mild Pain (1 - 3)      12-28    136  |  102  |  16  ----------------------------<  136<H>  3.2<L>   |  29  |  1.12    Ca    8.9      28 Dec 2023 08:57    TPro  7.5  /  Alb  3.6  /  TBili  1.1  /  DBili  x   /  AST  19  /  ALT  17  /  AlkPhos  62  12-27                          12.2   8.22  )-----------( 247      ( 27 Dec 2023 09:58 )             37.8        ACC: [id number] EXAM:  XR CHEST 1 VIEW       PROCEDURE DATE:  12/27/2023      INTERPRETATION:  AP erect chest on December 27, 2023 at 9:54 AM. This is   a stroke code.    Heart magnified by technique. Sternotomy and left-sided defibrillator   again noted.    On December 7 this year there was infiltration of both lateral   costophrenic angles is likely chronic.    Present film shows increasing infiltrates more superior to the lower lung   infiltrates suggesting acute onchronic lung disease.    IMPRESSION: Acute on chronic lung disease. Sternotomy and defibrillator   again noted.    IMPRESSION:    1.   Right carotid system:    Atherosclerotic plaque causes focal   stenosis within the carotid bulb 70-80%    2.   Left carotid system:     Atherosclerotic plaque without    hemodynamically significant stenosis.    3.   Vertebral circulation:    Atherosclerotic plaque causes at least   mild ostial stenosis of the dominant caliber left vertebral artery.    4.  Anterior intracranial circulation:     Intracranial atherosclerosis   cavernous and clinoid segments of the internal carotid arteries, at least   moderate on the right somewhat larger greater on the left, and within the   peripheral segments of the middle cerebral arteries, at least   mild-to-moderate.    5.  Posterior intracranial circulation:    Intracranial atherosclerosis   left vertebral artery, mild-to-moderate, and peripheral right posterior   cerebral artery, moderate.    6.  No large vessel occlusion    7.  Brain perfusion:   Noacute infarction of the brain is convincingly   demonstrated.   89 y/o man with a PMHx of AICD, BPH, CAD, cardiac arrest, COVID, CKD, COPD(not on home O2), diabetes, falling, GERD, heart failure, HTN, HLD, leg wound, lung cancer, macular degeneration, PAD, thrombocytopenia presents to the ED for right eye vision change. Pt reports he went to bed around 9:30 last night in his usual state of health. Pt woke up this morning at 6:30 with film over right eye and black spots. Pt s/p AICD battery replacement 2.5 weeks ago with Dr. Oliva and had follow up appt today. Pt told staff and was sent to the ED for further evaluation. On Xarelto. Pt also reports MONTGOMERY. Denies numbness, weakness, tingling, black/bloody stools, slurred speech. Patient was a stroke code on admission - NIHSS- 0 Not a thrombolytics candidate 2/2 OOTW, took xarelto last night, and no measurable deficits.     12/28- patient was seen and examined- stated that he felt dizzy when he got out of bed this morning, +orthostasis. Oriented x3.    Vital Signs Last 24 Hrs  T(C): 36.8 (28 Dec 2023 08:58), Max: 37.1 (27 Dec 2023 20:43)  T(F): 98.2 (28 Dec 2023 08:58), Max: 98.7 (27 Dec 2023 20:43)  HR: 67 (28 Dec 2023 08:58) (62 - 74)  BP: 105/81 (28 Dec 2023 08:58) (105/81 - 135/61)  BP(mean): --  RR: 18 (28 Dec 2023 08:58) (18 - 18)  SpO2: 96% (28 Dec 2023 08:58) (96% - 99%)    Parameters below as of 28 Dec 2023 08:58  Patient On (Oxygen Delivery Method): room air    ROS:   All 10 systems reviewed and found to be negative with the exception of what has been described above.     PE:  Constitutional: NAD, laying in bed  HEENT: NC/AT  Back: no tenderness  Respiratory: respirations even and non labored, LCTA  Cardiovascular: S1S2 regular, +murmur  Abdomen: soft, not tender, not distended, positive BS  Genitourinary: voiding  Musculoskeletal: no muscle tenderness, no joint swelling or tenderness  Extremities: no pedal edema   Neurological: no focal deficits    MEDICATIONS  (STANDING):  albuterol    0.083%. 2.5 milliGRAM(s) Nebulizer once  aspirin enteric coated 81 milliGRAM(s) Oral daily  atorvastatin 80 milliGRAM(s) Oral at bedtime  metoprolol succinate ER 12.5 milliGRAM(s) Oral daily  pantoprazole    Tablet 40 milliGRAM(s) Oral before breakfast  rivaroxaban 20 milliGRAM(s) Oral with dinner  sacubitril 24 mG/valsartan 26 mG 1 Tablet(s) Oral two times a day  sodium chloride 0.9% Bolus 500 milliLiter(s) IV Bolus once  tamsulosin 0.4 milliGRAM(s) Oral at bedtime    MEDICATIONS  (PRN):  acetaminophen     Tablet .. 650 milliGRAM(s) Oral every 6 hours PRN Temp greater or equal to 38C (100.4F), Mild Pain (1 - 3)      12-28    136  |  102  |  16  ----------------------------<  136<H>  3.2<L>   |  29  |  1.12    Ca    8.9      28 Dec 2023 08:57    TPro  7.5  /  Alb  3.6  /  TBili  1.1  /  DBili  x   /  AST  19  /  ALT  17  /  AlkPhos  62  12-27                          12.2   8.22  )-----------( 247      ( 27 Dec 2023 09:58 )             37.8        ACC: [id number] EXAM:  XR CHEST 1 VIEW       PROCEDURE DATE:  12/27/2023      INTERPRETATION:  AP erect chest on December 27, 2023 at 9:54 AM. This is   a stroke code.    Heart magnified by technique. Sternotomy and left-sided defibrillator   again noted.    On December 7 this year there was infiltration of both lateral   costophrenic angles is likely chronic.    Present film shows increasing infiltrates more superior to the lower lung   infiltrates suggesting acute onchronic lung disease.    IMPRESSION: Acute on chronic lung disease. Sternotomy and defibrillator   again noted.    IMPRESSION:    1.   Right carotid system:    Atherosclerotic plaque causes focal   stenosis within the carotid bulb 70-80%    2.   Left carotid system:     Atherosclerotic plaque without    hemodynamically significant stenosis.    3.   Vertebral circulation:    Atherosclerotic plaque causes at least   mild ostial stenosis of the dominant caliber left vertebral artery.    4.  Anterior intracranial circulation:     Intracranial atherosclerosis   cavernous and clinoid segments of the internal carotid arteries, at least   moderate on the right somewhat larger greater on the left, and within the   peripheral segments of the middle cerebral arteries, at least   mild-to-moderate.    5.  Posterior intracranial circulation:    Intracranial atherosclerosis   left vertebral artery, mild-to-moderate, and peripheral right posterior   cerebral artery, moderate.    6.  No large vessel occlusion    7.  Brain perfusion:   Noacute infarction of the brain is convincingly   demonstrated.

## 2023-12-28 NOTE — PROGRESS NOTE ADULT - ASSESSMENT
88 yr old man ADM c/o right eye visual loss, no other sxs. NIHSS0. CT head negative, CT angios no LVO. R CCA stenosis ~ 70-80%. Sx due right eye intrinsic disease.  Today dizzy, orthostatic.   Suggest:  F/u with ophthalmology.   Carotid dopplers, can be f/u as OPT  fluids.

## 2023-12-28 NOTE — DIETITIAN INITIAL EVALUATION ADULT - NSFNSGIIOFT_GEN_A_CORE
I&O's Detail    27 Dec 2023 07:01  -  28 Dec 2023 07:00  --------------------------------------------------------  IN:  Total IN: 0 mL    OUT:    Blood Loss (mL): 2325 mL    Voided (mL): 700 mL  Total OUT: 3025 mL    Total NET: -3025 mL

## 2023-12-28 NOTE — DIETITIAN INITIAL EVALUATION ADULT - PERTINENT LABORATORY DATA
12-28    136  |  102  |  16  ----------------------------<  136<H>  3.2<L>   |  29  |  1.12    Ca    8.9      28 Dec 2023 08:57    TPro  7.5  /  Alb  3.6  /  TBili  1.1  /  DBili  x   /  AST  19  /  ALT  17  /  AlkPhos  62  12-27  A1C with Estimated Average Glucose Result: 5.9 % (12-27-23 @ 09:58)  A1C with Estimated Average Glucose Result: 5.8 % (12-07-23 @ 16:01)

## 2023-12-28 NOTE — CHART NOTE - NSCHARTNOTEFT_GEN_A_CORE
Pt has [employer] CRT-D with Guid[employer] RA lead, also has abandoned RV lead from 2009, therefore CAN'T have MRI.

## 2023-12-28 NOTE — DIETITIAN INITIAL EVALUATION ADULT - PROBLEM SELECTOR PLAN 1
# (R) visual loss which improved during evaluation-could be secondary to occular disease vs arterial thrombus.  The patient has been on Xarelto for a couple of weeks and is compliant.   # (R) carotid bulb 70-80% stenosis   # intracranial atherosclerosis  - Vascular evaluation for R carotid stenosis-in vs outpatient  - urgent outpatient opthalmology follow up.  - Would recommend cardiac evaluation as pt. c/o new MONTGMOERY  - continue with ASA /  STATIN  - neurology following # (R) visual loss which improved during evaluation-could be secondary to occular disease vs arterial thrombus.  The patient has been on Xarelto for a couple of weeks and is compliant.   # (R) carotid bulb 70-80% stenosis   # intracranial atherosclerosis  - Vascular evaluation for R carotid stenosis-in vs outpatient  - urgent outpatient opthalmology follow up.  - Would recommend cardiac evaluation as pt. c/o new MONTGOMERY  - continue with ASA /  STATIN  - neurology following

## 2023-12-28 NOTE — CONSULT NOTE ADULT - SUBJECTIVE AND OBJECTIVE BOX
Patient is a 88y old  Male who presents with a chief complaint of     HPI:    Patient is a 87 y/o male with a PMHx of AICD, BPH, CAD, cardiac arrest, COVID, CKD, COPD(not on home O2), diabetes, falling, GERD, heart failure, HTN, HLD, leg wound, lung cancer, macular degeneration, PAD, thrombocytopenia presents to the ED for right eye vision change. Pt reports he went to bed around 9:30 last night in his usual state of health. Pt woke up this morning at 6:30 with film over right eye and black spots. Pt s/p AICD battery replacement 2.5 weeks ago with Dr. Oliva and had follow up appt today. Pt told staff and was sent to the ED for further evaluation. On Xarelto. Pt also reports MONTGOMERY. Denies numbness, weakness, tingling, black/bloody stools, slurred speech. No other complaints at this time.    Patient clinically appears to be deconditioned, frail, chronically sick. + SOB last couple of days with leg swelling.  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   PAST MEDICAL & SURGICAL HISTORY:  Hypertension      CAD (coronary artery disease)      Cardiac arrest with successful resuscitation      CKD (chronic kidney disease)      PAD (peripheral artery disease)      Lung cancer      Heart failure      HLD (hyperlipidemia)      2019 novel coronavirus disease (COVID-19)      History of falling      Thrombocytopenia      GERD (gastroesophageal reflux disease)      History of COPD      Diabetes      History of BPH      Leg wound, right      AICD (automatic cardioverter/defibrillator) present      S/P carotid endarterectomy      History of lobectomy of lung      History of appendectomy      H/O left knee surgery      History of tonsillectomy and adenoidectomy      H/O endarterectomy                                        MEDICATIONS  (STANDING):  albuterol    0.083%. 2.5 milliGRAM(s) Nebulizer once  aspirin enteric coated 81 milliGRAM(s) Oral daily  atorvastatin 80 milliGRAM(s) Oral at bedtime  furosemide   Injectable 40 milliGRAM(s) IV Push two times a day  metoprolol succinate ER 12.5 milliGRAM(s) Oral daily  pantoprazole    Tablet 40 milliGRAM(s) Oral before breakfast  rivaroxaban 20 milliGRAM(s) Oral with dinner  sacubitril 24 mG/valsartan 26 mG 1 Tablet(s) Oral two times a day  tamsulosin 0.4 milliGRAM(s) Oral at bedtime    MEDICATIONS  (PRN):  acetaminophen     Tablet .. 650 milliGRAM(s) Oral every 6 hours PRN Temp greater or equal to 38C (100.4F), Mild Pain (1 - 3)      FAMILY HISTORY:      SOCIAL HISTORY:    CIGARETTES:        Vital Signs Last 24 Hrs  T(C): 36.8 (28 Dec 2023 05:42), Max: 37.1 (27 Dec 2023 20:43)  T(F): 98.2 (28 Dec 2023 05:42), Max: 98.7 (27 Dec 2023 20:43)  HR: 67 (28 Dec 2023 05:42) (62 - 74)  BP: 128/55 (28 Dec 2023 05:42) (117/53 - 139/62)  BP(mean): 85 (27 Dec 2023 11:01) (85 - 85)  RR: 18 (28 Dec 2023 05:42) (18 - 27)  SpO2: 97% (28 Dec 2023 05:42) (97% - 100%)    Parameters below as of 27 Dec 2023 20:43  Patient On (Oxygen Delivery Method): room air                INTERPRETATION OF TELEMETRY:    ECG:    I&O's Detail    27 Dec 2023 07:01  -  28 Dec 2023 07:00  --------------------------------------------------------  IN:  Total IN: 0 mL    OUT:    Blood Loss (mL): 2325 mL    Voided (mL): 700 mL  Total OUT: 3025 mL    Total NET: -3025 mL          LABS:                        12.2   8.22  )-----------( 247      ( 27 Dec 2023 09:58 )             37.8     12-27    140  |  107  |  17  ----------------------------<  172<H>  3.9   |  27  |  1.33<H>    Ca    9.1      27 Dec 2023 09:58    TPro  7.5  /  Alb  3.6  /  TBili  1.1  /  DBili  x   /  AST  19  /  ALT  17  /  AlkPhos  62  12-27    CARDIAC MARKERS ( 27 Dec 2023 09:58 )  x     / x     / 70 U/L / x     / x          PT/INR - ( 27 Dec 2023 09:58 )   PT: 23.7 sec;   INR: 2.14 ratio         PTT - ( 27 Dec 2023 09:58 )  PTT:40.6 sec  Urinalysis Basic - ( 27 Dec 2023 09:58 )    Color: x / Appearance: x / SG: x / pH: x  Gluc: 172 mg/dL / Ketone: x  / Bili: x / Urobili: x   Blood: x / Protein: x / Nitrite: x   Leuk Esterase: x / RBC: x / WBC x   Sq Epi: x / Non Sq Epi: x / Bacteria: x      I&O's Summary    27 Dec 2023 07:01  -  28 Dec 2023 07:00  --------------------------------------------------------  IN: 0 mL / OUT: 3025 mL / NET: -3025 mL      BNP  RADIOLOGY & ADDITIONAL STUDIES: Patient is a 88y old  Male who presents with a chief complaint of     HPI:    Patient is a 89 y/o male with a PMHx of AICD, BPH, CAD, cardiac arrest, COVID, CKD, COPD(not on home O2), diabetes, falling, GERD, heart failure, HTN, HLD, leg wound, lung cancer, macular degeneration, PAD, thrombocytopenia presents to the ED for right eye vision change. Pt reports he went to bed around 9:30 last night in his usual state of health. Pt woke up this morning at 6:30 with film over right eye and black spots. Pt s/p AICD battery replacement 2.5 weeks ago with Dr. Oliva and had follow up appt today. Pt told staff and was sent to the ED for further evaluation. On Xarelto. Pt also reports MONTGOMERY. Denies numbness, weakness, tingling, black/bloody stools, slurred speech. No other complaints at this time.    Patient clinically appears to be deconditioned, frail, chronically sick. + SOB last couple of days with leg swelling.  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   PAST MEDICAL & SURGICAL HISTORY:  Hypertension      CAD (coronary artery disease)      Cardiac arrest with successful resuscitation      CKD (chronic kidney disease)      PAD (peripheral artery disease)      Lung cancer      Heart failure      HLD (hyperlipidemia)      2019 novel coronavirus disease (COVID-19)      History of falling      Thrombocytopenia      GERD (gastroesophageal reflux disease)      History of COPD      Diabetes      History of BPH      Leg wound, right      AICD (automatic cardioverter/defibrillator) present      S/P carotid endarterectomy      History of lobectomy of lung      History of appendectomy      H/O left knee surgery      History of tonsillectomy and adenoidectomy      H/O endarterectomy                                        MEDICATIONS  (STANDING):  albuterol    0.083%. 2.5 milliGRAM(s) Nebulizer once  aspirin enteric coated 81 milliGRAM(s) Oral daily  atorvastatin 80 milliGRAM(s) Oral at bedtime  furosemide   Injectable 40 milliGRAM(s) IV Push two times a day  metoprolol succinate ER 12.5 milliGRAM(s) Oral daily  pantoprazole    Tablet 40 milliGRAM(s) Oral before breakfast  rivaroxaban 20 milliGRAM(s) Oral with dinner  sacubitril 24 mG/valsartan 26 mG 1 Tablet(s) Oral two times a day  tamsulosin 0.4 milliGRAM(s) Oral at bedtime    MEDICATIONS  (PRN):  acetaminophen     Tablet .. 650 milliGRAM(s) Oral every 6 hours PRN Temp greater or equal to 38C (100.4F), Mild Pain (1 - 3)      FAMILY HISTORY:      SOCIAL HISTORY:    CIGARETTES:        Vital Signs Last 24 Hrs  T(C): 36.8 (28 Dec 2023 05:42), Max: 37.1 (27 Dec 2023 20:43)  T(F): 98.2 (28 Dec 2023 05:42), Max: 98.7 (27 Dec 2023 20:43)  HR: 67 (28 Dec 2023 05:42) (62 - 74)  BP: 128/55 (28 Dec 2023 05:42) (117/53 - 139/62)  BP(mean): 85 (27 Dec 2023 11:01) (85 - 85)  RR: 18 (28 Dec 2023 05:42) (18 - 27)  SpO2: 97% (28 Dec 2023 05:42) (97% - 100%)    Parameters below as of 27 Dec 2023 20:43  Patient On (Oxygen Delivery Method): room air                INTERPRETATION OF TELEMETRY:    ECG:    I&O's Detail    27 Dec 2023 07:01  -  28 Dec 2023 07:00  --------------------------------------------------------  IN:  Total IN: 0 mL    OUT:    Blood Loss (mL): 2325 mL    Voided (mL): 700 mL  Total OUT: 3025 mL    Total NET: -3025 mL          LABS:                        12.2   8.22  )-----------( 247      ( 27 Dec 2023 09:58 )             37.8     12-27    140  |  107  |  17  ----------------------------<  172<H>  3.9   |  27  |  1.33<H>    Ca    9.1      27 Dec 2023 09:58    TPro  7.5  /  Alb  3.6  /  TBili  1.1  /  DBili  x   /  AST  19  /  ALT  17  /  AlkPhos  62  12-27    CARDIAC MARKERS ( 27 Dec 2023 09:58 )  x     / x     / 70 U/L / x     / x          PT/INR - ( 27 Dec 2023 09:58 )   PT: 23.7 sec;   INR: 2.14 ratio         PTT - ( 27 Dec 2023 09:58 )  PTT:40.6 sec  Urinalysis Basic - ( 27 Dec 2023 09:58 )    Color: x / Appearance: x / SG: x / pH: x  Gluc: 172 mg/dL / Ketone: x  / Bili: x / Urobili: x   Blood: x / Protein: x / Nitrite: x   Leuk Esterase: x / RBC: x / WBC x   Sq Epi: x / Non Sq Epi: x / Bacteria: x      I&O's Summary    27 Dec 2023 07:01  -  28 Dec 2023 07:00  --------------------------------------------------------  IN: 0 mL / OUT: 3025 mL / NET: -3025 mL      BNP  RADIOLOGY & ADDITIONAL STUDIES:

## 2023-12-28 NOTE — CONSULT NOTE ADULT - SUBJECTIVE AND OBJECTIVE BOX
89 yo M with extensive medical hx presents with right eye    ROS neg except as above.    PMH:  Allergies: as per chart  Meds: as per chart  PSH:   Sohx: no tobacco, etoh, or illicit drug use 89 yo M with extensive medical hx presents with right eye "cloudy" vision that began one day ago. HE states that he woke up from sleep with this sudden change in his vision. He also noticed black spots that has now gone away. He states that the vision is better however he feels like he is seeing a "fog." He denies slurred speech, numbness or tingling. He is able to walk with a walker. He denies previous episodes. He denies previous stroke.     ROS neg except as above.    PMH: AICD, BPH, CAD, cardiac arrest, COVID, CKD, COPD(not on home O2), diabetes, falling, GERD, heart failure, HTN, HLD, leg wound, lung cancer, macular degeneration, PAD, thrombocytopenia  Allergies: as per chart  Meds: as per chart  PSH: open heart surgery   Sohx: no tobacco, etoh, or illicit drug use 87 yo M with extensive medical hx presents with right eye "cloudy" vision that began one day ago. HE states that he woke up from sleep with this sudden change in his vision. He also noticed black spots that has now gone away. He states that the vision is better however he feels like he is seeing a "fog." He denies slurred speech, numbness or tingling. He is able to walk with a walker. He denies previous episodes. He denies previous stroke or hx of these symptoms. He denies chest pain, SOB.    ROS neg except as above.    PMH: AICD, BPH, CAD, cardiac arrest, COVID, CKD, COPD (not on home O2), diabetes, falling, GERD, heart failure, HTN, HLD, leg wound, lung cancer, macular degeneration, PAD, thrombocytopenia  Allergies: as per chart  Meds: as per chart  PSH: open heart surgery, rt lower extremity bypass  Sohx: no tobacco, former smoker, no etoh, or illicit drug use    Physical Exam:  General: AOx3, Well developed, NAD  HEENT: NC/AT, normal pinnae and tragi  Chest: Normal respiratory effort, equal chest rise  Heart: RRR  Abdomen: Soft, NTND. Right groin scar  Neuro/Psych: No localized deficits. Normal speech, normal tone, normal affect  Skin: Normal, warm, no rashes, no lesions noted.  Extremities: Warm, well perfused, no edema. B/l lower extremity xerosis, hyperpigmentation, varicose veins. Right medial lower leg scar    Vitals:  T(C): 36.3 (12-28 @ 16:32), Max: 37.1 (12-27 @ 20:43)  HR: 66 (12-28 @ 16:32) (62 - 67)  BP: 134/62 (12-28 @ 16:32) (105/81 - 134/62)  RR: 18 (12-28 @ 16:32) (18 - 18)  SpO2: 99% (12-28 @ 16:32) (96% - 99%)    12-27 @ 07:01  -  12-28 @ 07:00  --------------------------------------------------------  IN:  Total IN: 0 mL    OUT:    Blood Loss (mL): 2325 mL    Voided (mL): 700 mL  Total OUT: 3025 mL    Total NET: -3025 mL          12-28 @ 08:57                    -  CBC: ->)-------(<-                     -                 136 | 102 | 16    CMP:  ----------------------< 136               3.2 | 29 | 1.12                      Ca:8.9  Phos:-  Mg:-               -|      |-        LFTs:  ------|-|-----             -|      |-  12-27 @ 09:58                    12.2  CBC: 8.22>)-------(<247                     37.8                 140 | 107 | 17    CMP:  ----------------------< 172               3.9 | 27 | 1.33                      Ca:9.1  Phos:-  Mg:-               1.1|      |19        LFTs:  ------|62|-----             -|      |-

## 2023-12-28 NOTE — DIETITIAN INITIAL EVALUATION ADULT - ADD RECOMMEND
1) Liberalize diet to regular to maximize caloric and nutrient intake.  2) Add premier protein shake BID (provides 160kcal, 30g protein) and John BID (provides 90 kcal, 2.5 g collagen, 7 g L-Arginine, 7 g L-Glutamine/ 1 packet) to promote wound healing. John is intended to support tissue building and collagen formation in the dietary management of wounds, which has been clinically shown to support wound healing (including pressure injuries, diabetic foot ulcers surgical incisions, burns, and other acute/chronic wounds) by enhancing collagen formation in as little as 2 weeks.  3) Monitor bowel movements, if no BM for >3 days, consider implementing bowel regimen.  4) Encourage protein-rich foods, maximize food preferences  5) Recommend to add MVI w/minerals, Vit C 500 mg BID, add Zinc Sulfate 220 mg x 10 days to promote wound healing.  6) Consider adding thiamine 100 mg daily 2/2 poor PO intake/ malnutrition  7) Monitor lytes/ min and replete prn.  8) Confirm goals of care regarding nutrition support  RD will continue to monitor PO intake, labs, hydration, and wt prn.

## 2023-12-28 NOTE — OCCUPATIONAL THERAPY INITIAL EVALUATION ADULT - GENERAL OBSERVATIONS, REHAB EVAL
Pt received semi-supine in bed, requesting to go to bathroom. Pt left seated in chair with chair alarm on.

## 2023-12-28 NOTE — CONSULT NOTE ADULT - SUBJECTIVE AND OBJECTIVE BOX
HPI:    Patient is a 89 y/o male with a PMHx of AICD, BPH, CAD, cardiac arrest, COVID, CKD, COPD(not on home O2), diabetes, falling, GERD, heart failure, HTN, HLD, leg wound, lung cancer, macular degeneration, PAD, thrombocytopenia presents to the ED for right eye vision change. Pt reports he went to bed around 9:30 last night in his usual state of health. Pt woke up this morning at 6:30 with film over right eye and black spots. Pt s/p AICD battery replacement 2.5 weeks ago with Dr. Oliva and had follow up appt today. Pt told staff and was sent to the ED for further evaluation. On Xarelto. Pt also reports MONTGOMERY. Denies numbness, weakness, tingling, black/bloody stools, slurred speech. No other complaints at this time.    Patient clinically appears to be deconditioned, frail, chronically sick. + SOB last couple of days with leg swelling.  (27 Dec 2023 12:59)      PAST MEDICAL & SURGICAL HISTORY:  Hypertension      CAD (coronary artery disease)      Cardiac arrest with successful resuscitation      CKD (chronic kidney disease)      PAD (peripheral artery disease)      Lung cancer      Heart failure      HLD (hyperlipidemia)      2019 novel coronavirus disease (COVID-19)      History of falling      Thrombocytopenia      GERD (gastroesophageal reflux disease)      History of COPD      Diabetes      History of BPH      Leg wound, right      AICD (automatic cardioverter/defibrillator) present      S/P carotid endarterectomy      History of lobectomy of lung      History of appendectomy      H/O left knee surgery      History of tonsillectomy and adenoidectomy      H/O endarterectomy          Home Medications:  aspirin 81 mg oral tablet: 1 tab(s) orally once a day (27 Dec 2023 13:07)  cholecalciferol 25 mcg (1000 intl units) oral capsule: 2 cap(s) orally once a day (27 Dec 2023 13:04)  Farxiga 10 mg oral tablet: 1 tab(s) orally once a day (27 Dec 2023 13:07)  Flomax 0.4 mg oral capsule: 1 cap(s) orally once a day (27 Dec 2023 13:07)  melatonin 5 mg oral tablet: 1 tab(s) orally once a day (at bedtime) (27 Dec 2023 13:07)  metoprolol succinate 25 mg oral tablet, extended release: 0.5 tab(s) orally once a day (at bedtime) (27 Dec 2023 13:05)  pravastatin 40 mg oral tablet: 1 tab(s) orally once a day (at bedtime) (27 Dec 2023 13:07)  PreserVision AREDS oral capsule: 1 orally once a day (27 Dec 2023 13:07)  Protonix 40 mg oral delayed release tablet: 1 tab(s) orally once a day (27 Dec 2023 13:07)  sacubitril-valsartan 24 mg-26 mg oral tablet: 1 tab(s) orally 2 times a day (27 Dec 2023 13:07)  Visivite: Take 1 tablet orally once daily (27 Dec 2023 13:07)  Xarelto 20 mg oral tablet: 1 tab(s) orally once a day (27 Dec 2023 13:07)      MEDICATIONS  (STANDING):  albuterol    0.083%. 2.5 milliGRAM(s) Nebulizer once  aspirin enteric coated 81 milliGRAM(s) Oral daily  atorvastatin 80 milliGRAM(s) Oral at bedtime  furosemide   Injectable 40 milliGRAM(s) IV Push two times a day  metoprolol succinate ER 12.5 milliGRAM(s) Oral daily  pantoprazole    Tablet 40 milliGRAM(s) Oral before breakfast  rivaroxaban 20 milliGRAM(s) Oral with dinner  sacubitril 24 mG/valsartan 26 mG 1 Tablet(s) Oral two times a day  tamsulosin 0.4 milliGRAM(s) Oral at bedtime    MEDICATIONS  (PRN):  acetaminophen     Tablet .. 650 milliGRAM(s) Oral every 6 hours PRN Temp greater or equal to 38C (100.4F), Mild Pain (1 - 3)      Allergies    No Known Allergies    Intolerances        SOCIAL HISTORY: Denies tobacco, etoh abuse or illicit drug use    FAMILY HISTORY:      Vital Signs Last 24 Hrs  T(C): 36.8 (28 Dec 2023 08:58), Max: 37.1 (27 Dec 2023 20:43)  T(F): 98.2 (28 Dec 2023 08:58), Max: 98.7 (27 Dec 2023 20:43)  HR: 67 (28 Dec 2023 08:58) (62 - 74)  BP: 105/81 (28 Dec 2023 08:58) (105/81 - 139/62)  BP(mean): 85 (27 Dec 2023 11:01) (85 - 85)  RR: 18 (28 Dec 2023 08:58) (18 - 27)  SpO2: 96% (28 Dec 2023 08:58) (96% - 100%)    Parameters below as of 28 Dec 2023 08:58  Patient On (Oxygen Delivery Method): room air            REVIEW OF SYSTEMS:    CONSTITUTIONAL:  As per HPI.  HEENT:  Eyes:  No diplopia or blurred vision. ENT:  No earache, sore throat or runny nose.  CARDIOVASCULAR:  No pressure, squeezing, tightness, heaviness or aching about the chest, neck, axilla or epigastrium.  RESPIRATORY:  No cough, shortness of breath, PND or orthopnea.  GASTROINTESTINAL:  No nausea, vomiting or diarrhea.  GENITOURINARY:  No dysuria, frequency or urgency.  MUSCULOSKELETAL:  As per HPI.  SKIN:  No change in skin, hair or nails.  NEUROLOGIC:  No paresthesias, fasciculations, seizures or weakness.  PSYCHIATRIC:  No disorder of thought or mood.  ENDOCRINE:  No heat or cold intolerance, polyuria or polydipsia.  HEMATOLOGICAL:  No easy bruising or bleedings:  .     PHYSICAL EXAMINATION:    GENERAL APPEARANCE:  Pt. is not currently dyspneic, in no distress. Pt. is alert, oriented, and pleasant.  HEENT:  Pupils are normal and react normally. No icterus. Mucous membranes well colored.  NECK:  Supple. No lymphadenopathy. Jugular venous pressure not elevated. Carotids equal.   HEART:   The cardiac impulse has a normal quality. Regular. Normal S1 and S2. There are no murmurs, rubs or gallops noted  CHEST:  Chest is clear to auscultation. Normal respiratory effort.  ABDOMEN:  Soft and nontender.   EXTREMITIES:  There is no cyanosis, clubbing or edema.   SKIN:  No rash or significant lesions are noted.    LABS:                        12.2   8.22  )-----------( 247      ( 27 Dec 2023 09:58 )             37.8     12-27    140  |  107  |  17  ----------------------------<  172<H>  3.9   |  27  |  1.33<H>    Ca    9.1      27 Dec 2023 09:58    TPro  7.5  /  Alb  3.6  /  TBili  1.1  /  DBili  x   /  AST  19  /  ALT  17  /  AlkPhos  62  12-27    LIVER FUNCTIONS - ( 27 Dec 2023 09:58 )  Alb: 3.6 g/dL / Pro: 7.5 gm/dL / ALK PHOS: 62 U/L / ALT: 17 U/L / AST: 19 U/L / GGT: x           PT/INR - ( 27 Dec 2023 09:58 )   PT: 23.7 sec;   INR: 2.14 ratio         PTT - ( 27 Dec 2023 09:58 )  PTT:40.6 sec  CARDIAC MARKERS ( 27 Dec 2023 09:58 )  x     / x     / 70 U/L / x     / x          Urinalysis Basic - ( 27 Dec 2023 09:58 )    Color: x / Appearance: x / SG: x / pH: x  Gluc: 172 mg/dL / Ketone: x  / Bili: x / Urobili: x   Blood: x / Protein: x / Nitrite: x   Leuk Esterase: x / RBC: x / WBC x   Sq Epi: x / Non Sq Epi: x / Bacteria: x            RADIOLOGY & ADDITIONAL STUDIES:        HPI:    89 y/o male with a PMHx of AICD, BPH, CAD, cardiac arrest, COVID, CKD, COPD(not on home O2), diabetes, falling, GERD, heart failure, HTN, HLD, leg wound, lung cancer, macular degeneration, PAD, thrombocytopenia admitted for right eye vision change. Pt reports he went to bed around 9:30 last night in his usual state of health. Pt woke up this morning at 6:30 with film over right eye and black spots. Pt s/p AICD battery replacement 2.5 weeks ago with Dr. Oliva and had follow up appt today. Pt told staff and was sent to the ED for further evaluation. On Xarelto. Pt also reports [place]. Denies numbness, weakness, tingling, black/bloody stools, slurred speech. No other complaints at this time. Patient clinically appears to be deconditioned, frail, chronically sick. + SOB last couple of days with leg swelling. pat seen for pulmonary eval, pat sitting in bed, no respiratory distress.      PAST MEDICAL & SURGICAL HISTORY:  Hypertension      CAD (coronary artery disease)      Cardiac arrest with successful resuscitation      CKD (chronic kidney disease)      PAD (peripheral artery disease)      Lung cancer      Heart failure      HLD (hyperlipidemia)      2019 novel coronavirus disease (COVID-19)      History of falling      Thrombocytopenia      GERD (gastroesophageal reflux disease)      History of COPD      Diabetes      History of BPH      Leg wound, right      AICD (automatic cardioverter/defibrillator) present      S/P carotid endarterectomy      History of lobectomy of lung      History of appendectomy      H/O left knee surgery      History of tonsillectomy and adenoidectomy      H/O endarterectomy          Home Medications:  aspirin 81 mg oral tablet: 1 tab(s) orally once a day (27 Dec 2023 13:07)  cholecalciferol 25 mcg (1000 intl units) oral capsule: 2 cap(s) orally once a day (27 Dec 2023 13:04)  Farxiga 10 mg oral tablet: 1 tab(s) orally once a day (27 Dec 2023 13:07)  Flomax 0.4 mg oral capsule: 1 cap(s) orally once a day (27 Dec 2023 13:07)  melatonin 5 mg oral tablet: 1 tab(s) orally once a day (at bedtime) (27 Dec 2023 13:07)  metoprolol succinate 25 mg oral tablet, extended release: 0.5 tab(s) orally once a day (at bedtime) (27 Dec 2023 13:05)  pravastatin 40 mg oral tablet: 1 tab(s) orally once a day (at bedtime) (27 Dec 2023 13:07)  PreserVision AREDS oral capsule: 1 orally once a day (27 Dec 2023 13:07)  Protonix 40 mg oral delayed release tablet: 1 tab(s) orally once a day (27 Dec 2023 13:07)  sacubitril-valsartan 24 mg-26 mg oral tablet: 1 tab(s) orally 2 times a day (27 Dec 2023 13:07)  Visivite: Take 1 tablet orally once daily (27 Dec 2023 13:07)  Xarelto 20 mg oral tablet: 1 tab(s) orally once a day (27 Dec 2023 13:07)      MEDICATIONS  (STANDING):  albuterol    0.083%. 2.5 milliGRAM(s) Nebulizer once  aspirin enteric coated 81 milliGRAM(s) Oral daily  atorvastatin 80 milliGRAM(s) Oral at bedtime  furosemide   Injectable 40 milliGRAM(s) IV Push two times a day  metoprolol succinate ER 12.5 milliGRAM(s) Oral daily  pantoprazole    Tablet 40 milliGRAM(s) Oral before breakfast  rivaroxaban 20 milliGRAM(s) Oral with dinner  sacubitril 24 mG/valsartan 26 mG 1 Tablet(s) Oral two times a day  tamsulosin 0.4 milliGRAM(s) Oral at bedtime    MEDICATIONS  (PRN):  acetaminophen     Tablet .. 650 milliGRAM(s) Oral every 6 hours PRN Temp greater or equal to 38C (100.4F), Mild Pain (1 - 3)      Allergies    No Known Allergies    Intolerances        SOCIAL HISTORY: Denies tobacco, etoh abuse or illicit drug use    FAMILY HISTORY:      Vital Signs Last 24 Hrs  T(C): 36.8 (28 Dec 2023 08:58), Max: 37.1 (27 Dec 2023 20:43)  T(F): 98.2 (28 Dec 2023 08:58), Max: 98.7 (27 Dec 2023 20:43)  HR: 67 (28 Dec 2023 08:58) (62 - 74)  BP: 105/81 (28 Dec 2023 08:58) (105/81 - 139/62)  BP(mean): 85 (27 Dec 2023 11:01) (85 - 85)  RR: 18 (28 Dec 2023 08:58) (18 - 27)  SpO2: 96% (28 Dec 2023 08:58) (96% - 100%)    Parameters below as of 28 Dec 2023 08:58  Patient On (Oxygen Delivery Method): room air            REVIEW OF SYSTEMS:    CONSTITUTIONAL:  As per HPI.  HEENT:  Eyes:  No diplopia or blurred vision. ENT:  No earache, sore throat or runny nose.  CARDIOVASCULAR:  No pressure, squeezing, tightness, heaviness or aching about the chest, neck, axilla or epigastrium.  RESPIRATORY:  No cough, +shortness of breath, PND or orthopnea.  GASTROINTESTINAL:  No nausea, vomiting or diarrhea.  GENITOURINARY:  No dysuria, frequency or urgency.  MUSCULOSKELETAL:  As per HPI.  SKIN:  No change in skin, hair or nails.  NEUROLOGIC:  No paresthesias, fasciculations, seizures or weakness.  PSYCHIATRIC:  No disorder of thought or mood.  ENDOCRINE:  No heat or cold intolerance, polyuria or polydipsia.  HEMATOLOGICAL:  No easy bruising or bleedings:  .     PHYSICAL EXAMINATION:    GENERAL APPEARANCE:  Pt. is not currently dyspneic, in no distress. Pt. is alert, oriented, and pleasant.  HEENT:  Pupils are normal and react normally. No icterus. Mucous membranes well colored.  NECK:  Supple. No lymphadenopathy. Jugular venous pressure not elevated. Carotids equal.   HEART:   The cardiac impulse has a normal quality. Regular. Normal S1 and S2. There are no murmurs, rubs or gallops noted  CHEST:  Chest crackles to auscultation. Normal respiratory effort.  ABDOMEN:  Soft and nontender.   EXTREMITIES:  There is no cyanosis, clubbing or edema.   SKIN:  No rash or significant lesions are noted.    LABS:                        12.2   8.22  )-----------( 247      ( 27 Dec 2023 09:58 )             37.8     12-27    140  |  107  |  17  ----------------------------<  172<H>  3.9   |  27  |  1.33<H>    Ca    9.1      27 Dec 2023 09:58    TPro  7.5  /  Alb  3.6  /  TBili  1.1  /  DBili  x   /  AST  19  /  ALT  17  /  AlkPhos  62  12-27    LIVER FUNCTIONS - ( 27 Dec 2023 09:58 )  Alb: 3.6 g/dL / Pro: 7.5 gm/dL / ALK PHOS: 62 U/L / ALT: 17 U/L / AST: 19 U/L / GGT: x           PT/INR - ( 27 Dec 2023 09:58 )   PT: 23.7 sec;   INR: 2.14 ratio         PTT - ( 27 Dec 2023 09:58 )  PTT:40.6 sec  CARDIAC MARKERS ( 27 Dec 2023 09:58 )  x     / x     / 70 U/L / x     / x          Urinalysis Basic - ( 27 Dec 2023 09:58 )    Color: x / Appearance: x / SG: x / pH: x  Gluc: 172 mg/dL / Ketone: x  / Bili: x / Urobili: x   Blood: x / Protein: x / Nitrite: x   Leuk Esterase: x / RBC: x / WBC x   Sq Epi: x / Non Sq Epi: x / Bacteria: x            RADIOLOGY & ADDITIONAL STUDIES:     Xray Chest 1 View AP/PA. (12.27.23 @ 10:03) >  IMPRESSION: Acute on chronic lung disease. Sternotomy and defibrillator   again noted.      CT Brain Perfusion Maps Stroke (12.27.23 @ 09:57) >  1.   Right carotid system:    Atherosclerotic plaque causes focal   stenosis within the carotid bulb 70-80%    2.   Left carotid system:     Atherosclerotic plaque without    hemodynamically significant stenosis.    3.   Vertebral circulation:    Atherosclerotic plaque causes at least   mild ostial stenosis of the dominant caliber left vertebral artery.    4.  Anterior intracranial circulation:     Intracranial atherosclerosis   cavernous and clinoid segments of the internal carotid arteries, at least   moderate on the right somewhat larger greater on the left, and within the   peripheral segments of the middle cerebral arteries, at least   mild-to-moderate.    5.  Posterior intracranial circulation:    Intracranial atherosclerosis   left vertebral artery, mild-to-moderate, and peripheral right posterior   cerebral artery, moderate.    6.  No large vessel occlusion    7.  Brain perfusion:   Noacute infarction of the brain is convincingly   demonstrated.         HPI:    87 y/o male with a PMHx of AICD, BPH, CAD, cardiac arrest, COVID, CKD, COPD(not on home O2), diabetes, falling, GERD, heart failure, HTN, HLD, leg wound, lung cancer, macular degeneration, PAD, thrombocytopenia admitted for right eye vision change. Pt reports he went to bed around 9:30 last night in his usual state of health. Pt woke up this morning at 6:30 with film over right eye and black spots. Pt s/p AICD battery replacement 2.5 weeks ago with Dr. Oliva and had follow up appt today. Pt told staff and was sent to the ED for further evaluation. On Xarelto. Pt also reports [place]. Denies numbness, weakness, tingling, black/bloody stools, slurred speech. No other complaints at this time. Patient clinically appears to be deconditioned, frail, chronically sick. + SOB last couple of days with leg swelling. pat seen for pulmonary eval, pat sitting in bed, no respiratory distress.      PAST MEDICAL & SURGICAL HISTORY:  Hypertension      CAD (coronary artery disease)      Cardiac arrest with successful resuscitation      CKD (chronic kidney disease)      PAD (peripheral artery disease)      Lung cancer      Heart failure      HLD (hyperlipidemia)      2019 novel coronavirus disease (COVID-19)      History of falling      Thrombocytopenia      GERD (gastroesophageal reflux disease)      History of COPD      Diabetes      History of BPH      Leg wound, right      AICD (automatic cardioverter/defibrillator) present      S/P carotid endarterectomy      History of lobectomy of lung      History of appendectomy      H/O left knee surgery      History of tonsillectomy and adenoidectomy      H/O endarterectomy          Home Medications:  aspirin 81 mg oral tablet: 1 tab(s) orally once a day (27 Dec 2023 13:07)  cholecalciferol 25 mcg (1000 intl units) oral capsule: 2 cap(s) orally once a day (27 Dec 2023 13:04)  Farxiga 10 mg oral tablet: 1 tab(s) orally once a day (27 Dec 2023 13:07)  Flomax 0.4 mg oral capsule: 1 cap(s) orally once a day (27 Dec 2023 13:07)  melatonin 5 mg oral tablet: 1 tab(s) orally once a day (at bedtime) (27 Dec 2023 13:07)  metoprolol succinate 25 mg oral tablet, extended release: 0.5 tab(s) orally once a day (at bedtime) (27 Dec 2023 13:05)  pravastatin 40 mg oral tablet: 1 tab(s) orally once a day (at bedtime) (27 Dec 2023 13:07)  PreserVision AREDS oral capsule: 1 orally once a day (27 Dec 2023 13:07)  Protonix 40 mg oral delayed release tablet: 1 tab(s) orally once a day (27 Dec 2023 13:07)  sacubitril-valsartan 24 mg-26 mg oral tablet: 1 tab(s) orally 2 times a day (27 Dec 2023 13:07)  Visivite: Take 1 tablet orally once daily (27 Dec 2023 13:07)  Xarelto 20 mg oral tablet: 1 tab(s) orally once a day (27 Dec 2023 13:07)      MEDICATIONS  (STANDING):  albuterol    0.083%. 2.5 milliGRAM(s) Nebulizer once  aspirin enteric coated 81 milliGRAM(s) Oral daily  atorvastatin 80 milliGRAM(s) Oral at bedtime  furosemide   Injectable 40 milliGRAM(s) IV Push two times a day  metoprolol succinate ER 12.5 milliGRAM(s) Oral daily  pantoprazole    Tablet 40 milliGRAM(s) Oral before breakfast  rivaroxaban 20 milliGRAM(s) Oral with dinner  sacubitril 24 mG/valsartan 26 mG 1 Tablet(s) Oral two times a day  tamsulosin 0.4 milliGRAM(s) Oral at bedtime    MEDICATIONS  (PRN):  acetaminophen     Tablet .. 650 milliGRAM(s) Oral every 6 hours PRN Temp greater or equal to 38C (100.4F), Mild Pain (1 - 3)      Allergies    No Known Allergies    Intolerances        SOCIAL HISTORY: Denies tobacco, etoh abuse or illicit drug use    FAMILY HISTORY:      Vital Signs Last 24 Hrs  T(C): 36.8 (28 Dec 2023 08:58), Max: 37.1 (27 Dec 2023 20:43)  T(F): 98.2 (28 Dec 2023 08:58), Max: 98.7 (27 Dec 2023 20:43)  HR: 67 (28 Dec 2023 08:58) (62 - 74)  BP: 105/81 (28 Dec 2023 08:58) (105/81 - 139/62)  BP(mean): 85 (27 Dec 2023 11:01) (85 - 85)  RR: 18 (28 Dec 2023 08:58) (18 - 27)  SpO2: 96% (28 Dec 2023 08:58) (96% - 100%)    Parameters below as of 28 Dec 2023 08:58  Patient On (Oxygen Delivery Method): room air            REVIEW OF SYSTEMS:    CONSTITUTIONAL:  As per HPI.  HEENT:  Eyes:  No diplopia or blurred vision. ENT:  No earache, sore throat or runny nose.  CARDIOVASCULAR:  No pressure, squeezing, tightness, heaviness or aching about the chest, neck, axilla or epigastrium.  RESPIRATORY:  No cough, +shortness of breath, PND or orthopnea.  GASTROINTESTINAL:  No nausea, vomiting or diarrhea.  GENITOURINARY:  No dysuria, frequency or urgency.  MUSCULOSKELETAL:  As per HPI.  SKIN:  No change in skin, hair or nails.  NEUROLOGIC:  No paresthesias, fasciculations, seizures or weakness.  PSYCHIATRIC:  No disorder of thought or mood.  ENDOCRINE:  No heat or cold intolerance, polyuria or polydipsia.  HEMATOLOGICAL:  No easy bruising or bleedings:  .     PHYSICAL EXAMINATION:    GENERAL APPEARANCE:  Pt. is not currently dyspneic, in no distress. Pt. is alert, oriented, and pleasant.  HEENT:  Pupils are normal and react normally. No icterus. Mucous membranes well colored.  NECK:  Supple. No lymphadenopathy. Jugular venous pressure not elevated. Carotids equal.   HEART:   The cardiac impulse has a normal quality. Regular. Normal S1 and S2. There are no murmurs, rubs or gallops noted  CHEST:  Chest crackles to auscultation. Normal respiratory effort.  ABDOMEN:  Soft and nontender.   EXTREMITIES:  There is no cyanosis, clubbing or edema.   SKIN:  No rash or significant lesions are noted.    LABS:                        12.2   8.22  )-----------( 247      ( 27 Dec 2023 09:58 )             37.8     12-27    140  |  107  |  17  ----------------------------<  172<H>  3.9   |  27  |  1.33<H>    Ca    9.1      27 Dec 2023 09:58    TPro  7.5  /  Alb  3.6  /  TBili  1.1  /  DBili  x   /  AST  19  /  ALT  17  /  AlkPhos  62  12-27    LIVER FUNCTIONS - ( 27 Dec 2023 09:58 )  Alb: 3.6 g/dL / Pro: 7.5 gm/dL / ALK PHOS: 62 U/L / ALT: 17 U/L / AST: 19 U/L / GGT: x           PT/INR - ( 27 Dec 2023 09:58 )   PT: 23.7 sec;   INR: 2.14 ratio         PTT - ( 27 Dec 2023 09:58 )  PTT:40.6 sec  CARDIAC MARKERS ( 27 Dec 2023 09:58 )  x     / x     / 70 U/L / x     / x          Urinalysis Basic - ( 27 Dec 2023 09:58 )    Color: x / Appearance: x / SG: x / pH: x  Gluc: 172 mg/dL / Ketone: x  / Bili: x / Urobili: x   Blood: x / Protein: x / Nitrite: x   Leuk Esterase: x / RBC: x / WBC x   Sq Epi: x / Non Sq Epi: x / Bacteria: x            RADIOLOGY & ADDITIONAL STUDIES:     Xray Chest 1 View AP/PA. (12.27.23 @ 10:03) >  IMPRESSION: Acute on chronic lung disease. Sternotomy and defibrillator   again noted.      CT Brain Perfusion Maps Stroke (12.27.23 @ 09:57) >  1.   Right carotid system:    Atherosclerotic plaque causes focal   stenosis within the carotid bulb 70-80%    2.   Left carotid system:     Atherosclerotic plaque without    hemodynamically significant stenosis.    3.   Vertebral circulation:    Atherosclerotic plaque causes at least   mild ostial stenosis of the dominant caliber left vertebral artery.    4.  Anterior intracranial circulation:     Intracranial atherosclerosis   cavernous and clinoid segments of the internal carotid arteries, at least   moderate on the right somewhat larger greater on the left, and within the   peripheral segments of the middle cerebral arteries, at least   mild-to-moderate.    5.  Posterior intracranial circulation:    Intracranial atherosclerosis   left vertebral artery, mild-to-moderate, and peripheral right posterior   cerebral artery, moderate.    6.  No large vessel occlusion    7.  Brain perfusion:   Noacute infarction of the brain is convincingly   demonstrated.

## 2023-12-28 NOTE — PHYSICAL THERAPY INITIAL EVALUATION ADULT - RANGE OF MOTION EXAMINATION, REHAB EVAL
except B shld flex to 90 only (cardiac prec)/bilateral upper extremity ROM was WFL (within functional limits)/bilateral lower extremity ROM was WFL (within functional limits)

## 2023-12-28 NOTE — PHYSICAL THERAPY INITIAL EVALUATION ADULT - MODALITIES TREATMENT COMMENTS
Pt left seated in bedside chair with chair alarm donned, all lines intact, call bell in reach and RN aware of outcome.

## 2023-12-28 NOTE — PROGRESS NOTE ADULT - SUBJECTIVE AND OBJECTIVE BOX
HPI:    87 y/o man with a PMHx of AICD, BPH, CAD, cardiac arrest, COVID, CKD, COPD(not on home O2), diabetes, falling, GERD, heart failure, HTN, HLD, leg wound, lung cancer, macular degeneration, PAD, thrombocytopenia presented to the ED for right eye vision change. Today had hi PPM checked. Later felt transient dizziness, now resolved.    MEDICATIONS  (STANDING):  albuterol    0.083%. 2.5 milliGRAM(s) Nebulizer once  aspirin enteric coated 81 milliGRAM(s) Oral daily  atorvastatin 80 milliGRAM(s) Oral at bedtime  furosemide   Injectable 40 milliGRAM(s) IV Push two times a day  metoprolol succinate ER 12.5 milliGRAM(s) Oral daily  pantoprazole    Tablet 40 milliGRAM(s) Oral before breakfast  rivaroxaban 20 milliGRAM(s) Oral with dinner  sacubitril 24 mG/valsartan 26 mG 1 Tablet(s) Oral two times a day  sodium chloride 0.9% Bolus 500 milliLiter(s) IV Bolus once  tamsulosin 0.4 milliGRAM(s) Oral at bedtime    MEDICATIONS  (PRN):  acetaminophen     Tablet .. 650 milliGRAM(s) Oral every 6 hours PRN Temp greater or equal to 38C (100.4F), Mild Pain (1 - 3)    Orthostatic VS    12-28-23 @ 13:08  Lying BP: Orthostatic BP (Lying Systolic): 98/Orthostatic BP (Lying Diastolic (mm Hg)): 49 HR: Orthostatic Pulse (Heart Rate (beats/min)): 68   Sitting BP: Orthostatic BP (Sitting Systolic): 79/Orthostatic BP (Sitting Diastolic (mm Hg)): 32 HR: Orthostatic Pulse (Heart Rate (beats/min)): 75  Standing BP: Orthostatic BP (Standing Systolic): 68/Orthostatic BP (Standing Diastolic (mm Hg)): 54 HR: Orthostatic Pulse (Heart Rate (beats/min)): 84  Site: --   Mode: --    Neurological Exam:    HF: Patient is alert and oriented x 3. There is no aphasia or dysarthria. Follows  commands.   CN: Vision in right eye still affected. Pupils are equal and reactive. Extra ocular muscles are intact. There is no facial droop or asymmetry. Tongue is midline. Sensation is intact in the face. Other CN II-XII are intact.   Motor: motor examination all muscles are 5/5 and there is no pronator drift.   Sensory: intact to touch.   DTR: 0-1/4 all 4 extremities. Babinski is negative bilateral.  Co-ord:  Finger to finger to nose is intact. Heel to shin is intact bilaterally.         Basic Metabolic Panel (12.28.23 @ 08:57)   Sodium: 136 mmol/L  Potassium: 3.2 mmol/L  Chloride: 102 mmol/L  Carbon Dioxide: 29 mmol/L  Anion Gap: 5 mmol/L  Blood Urea Nitrogen: 16 mg/dL  Creatinine: 1.12 mg/dL  Glucose: 136 mg/dL  Calcium: 8.9 mg/dL  eGFR: 63    < from: CT Brain Perfusion Maps Stroke (12.27.23 @ 09:57) >  IMPRESSION:    1.   Right carotid system:    Atherosclerotic plaque causes focal   stenosis within the carotid bulb 70-80%    2.   Left carotid system:     Atherosclerotic plaque without    hemodynamically significant stenosis.    3.   Vertebral circulation:    Atherosclerotic plaque causes at least   mild ostial stenosis of the dominant caliber left vertebral artery.    4.  Anterior intracranial circulation:     Intracranial atherosclerosis   cavernous and clinoid segments of the internal carotid arteries, at least   moderate on the right somewhat larger greater on the left, and within the   peripheral segments of the middle cerebral arteries, at least   mild-to-moderate.    5.  Posterior intracranial circulation:    Intracranial atherosclerosis   left vertebral artery, mild-to-moderate, and peripheral right posterior   cerebral artery, moderate.    6.  No large vessel occlusion    7.  Brain perfusion:   No acute infarction of the brain is convincingly   demonstrated.    < end of copied text >

## 2023-12-28 NOTE — DIETITIAN INITIAL EVALUATION ADULT - FACTORS AFF FOOD INTAKE
2/2 SOB, weakness/other (specify) 2/2 SOB, weakness/difficulty with food procurement/preparation/other (specify)

## 2023-12-28 NOTE — PROGRESS NOTE ADULT - ASSESSMENT
Visual changes- resolved   ·  Plan: # (R) visual loss which improved during evaluation-could be secondary to occular disease vs arterial thrombus.  The patient has been on Xarelto for a couple of weeks and is compliant.   # (R) carotid bulb 70-80% stenosis   # intracranial atherosclerosis  - Vascular evaluation for R carotid stenosis-in vs outpatient  - urgent outpatient opthalmology follow up.  - Cardiology consulted  - continue with ASA /  STATIN  - neurology following  - Pt has [employer] CRT-D with Guidant RA lead, also has abandoned RV lead from 2009, therefore CAN'T have MRI  - +orthostatic- will give 500 cc IVF over 2 hours- d/w Dr Romo  - ECHO reviewed- severe mitral annular calcification,  The aortic valve appears moderately to severely calcified. Valve opening seems to be restricted.  The inferior, inferoseptal, and inferolateral walls appear severely hypokinetic. Estimated left ventricular ejection fraction is 35-40 %.  - hold lasix       Acute hypoxic respiratory failure.   ·  Plan: - this is due to heart failure  - patient with hypervolemia  - daily weights  - low salt diet  - continue with goal directed medical therapy.     CAD (coronary artery disease).   ·  Plan: - continue with ASA / STATIN.     CKD (chronic kidney disease).   ·  Plan: - continue monitoring.    Hypertension.   ·  Plan: - continue aggressive BP management.    Case d/w team on IDR.

## 2023-12-28 NOTE — DIETITIAN INITIAL EVALUATION ADULT - ETIOLOGY
r/t inability to meet increased needs 2/2 CHF exac r/t inability to meet increased needs 2/2 CHF exac on PI

## 2023-12-28 NOTE — PHYSICAL THERAPY INITIAL EVALUATION ADULT - ADDITIONAL COMMENTS
Pt lives in a house with 1 small exterior step to enter. +FOS within home, equipped with chair lift. Bathroom set-up is a walk-in shower with a built in bench and grab bars. Pt has 24/7 home health aide's that assist with cooking, cleaning and supervising pt. Pt ambulates with a rolling walker or cane at baseline.

## 2023-12-28 NOTE — CONSULT NOTE ADULT - ASSESSMENT
Patient is a 87 y/o male with a PMHx of AICD, BPH, CAD, cardiac arrest, COVID, CKD, COPD(not on home O2), diabetes, falling, GERD, heart failure, HTN, HLD, leg wound, lung cancer, macular degeneration, PAD, thrombocytopenia presents to the ED for right eye vision change. Pt reports he went to bed around 9:30 last night in his usual state of health. Pt woke up this morning at 6:30 with film over right eye and black spots. Recent Stebt to [facility]X last  admission Pt s/p AICD battery replacement 2.5 weeks ago with Dr. Oliva and had follow up appt  Pt told staff and was sent to the ED for further evaluation. On Xarelto. Pt also reports MONTGOMERY. Denies numbness, weakness, tingling, black/bloody stools, slurred speech. No other complaints at this time.    Patient clinically appears to be deconditioned, frail, chronically sick. + SOB last couple of days with leg swelling.  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   It does not appear that he has had a CVA , He did have some decompensated CHF for which he received IV lasix   1) neuro f/u   2) vascular eval for carotid dz  3) cont xeralto , high dose statins , cont asa entresto , should also be on SGLT 2 inh and aldactone we can add as oupt , would start PO lasix upon DC 40 mg daily , we can see in the office within 1 week   will sign off no other active cardiac issues
PROBLEMS;     Acute hypoxic respiratory failure.   CHF  COPD  CAD (coronary artery disease).    CKD (chronic kidney disease).   Hypertension.     PLAN:    Keep neg balance  Daily weights  Low salt diet  Goal directed medical therapy.  Aerosols  CKD-continue monitoring.  D/W STAFF    
88 year old male presents with rt carotid bulb stenosis, 70-80% seen on CTA.     Plan:  B/l carotid arterial duplex  Continue ASA, Atorvastatin  Neurology recs  Rest of management as per primary team    Plan discussed with Dr. Willard.

## 2023-12-28 NOTE — PHYSICAL THERAPY INITIAL EVALUATION ADULT - LEVEL OF INDEPENDENCE: STAND/SIT, REHAB EVAL
GENERAL SURGERY OFFICE NOTE    SUBJECTIVE:    Patient presenting today referred from Lakeshia Honeycutt MD and No ref. provider found, for   Chief Complaint   Patient presents with    Post-Op Check     2nd P/O Dustin Supraumbilical/Ventral Hernia Repair W/Mesh @ [facility] 1/9/20         HPI: Roslyn Burk is a 35 y.o. female presenting in second, follow up 2 weeks post op 1/9/20. Procedure:   1. Laparoscopic DaVinci robotic assisted ventral incisional herniorrhaphy  with primary repair followed by an underlay Bard dual mesh underlay 6 cm in diameter. 2. Laparoscopic DaVinci robotic assisted Significant lysis of omental adhesions supra pubically. Pathology:     CT Scan 1/13/20  Impression   Interval postsurgical changes from ventral abdominal hernia repair, including   subcutaneous gas and soft tissue stranding.  No focal fluid collection.       Few foci of gas along the ventral abdominal wall in the left lower quadrant   is also likely postoperative.       Unchanged hepatosplenomegaly and hepatic steatosis. Wounds very nicely healing, no erythema, no induration, no purulent discharge. No constipation, BMs back to normal.    Thoroughly reviewed the patient's medical history, family history, social history and review of systems with the patient today in the office. Please see medical record for pertinent positives. Past Medical History:   Diagnosis Date    Anxiety disorder     Arthritis     Asthma     Follows with Lizbeth    Bipolar disorder ([facility] [address]     Not on meds as of 12/3/19    Depression     currently not on meds (12/3/19)    Drug addiction ([facility] [address]     states previous addiction to vicodin (2011), denies current use. Hx + UDS for cocaine, 12/3/2019    H/O echocardiogram 12/19/2018    Technically difficult examination due to body habitus. Left ventricular function is low normal, EF is estimated at 45-50%. Mild concentric left ventricular hypertrophy. Mildly dilated left atrium.  Right
supervision

## 2023-12-28 NOTE — DIETITIAN INITIAL EVALUATION ADULT - PERTINENT MEDS FT
MEDICATIONS  (STANDING):  albuterol    0.083%. 2.5 milliGRAM(s) Nebulizer once  aspirin enteric coated 81 milliGRAM(s) Oral daily  atorvastatin 80 milliGRAM(s) Oral at bedtime  furosemide   Injectable 40 milliGRAM(s) IV Push two times a day  metoprolol succinate ER 12.5 milliGRAM(s) Oral daily  pantoprazole    Tablet 40 milliGRAM(s) Oral before breakfast  rivaroxaban 20 milliGRAM(s) Oral with dinner  sacubitril 24 mG/valsartan 26 mG 1 Tablet(s) Oral two times a day  tamsulosin 0.4 milliGRAM(s) Oral at bedtime    MEDICATIONS  (PRN):  acetaminophen     Tablet .. 650 milliGRAM(s) Oral every 6 hours PRN Temp greater or equal to 38C (100.4F), Mild Pain (1 - 3)

## 2023-12-28 NOTE — OCCUPATIONAL THERAPY INITIAL EVALUATION ADULT - ADL RETRAINING, OT EVAL
Patient will demonstrate visual exercises with 100 % accuracy in 2 weeks. Patient will improve toileting to MOD I in 2 weeks. Patient will improve toileting to MOD I in 2 weeks

## 2023-12-28 NOTE — DIETITIAN INITIAL EVALUATION ADULT - MALNUTRITION
Pt meets criteria for severe malnutrition in the context of acute illness   Pt meets criteria for severe malnutrition in the context of acute on chronic illness

## 2023-12-28 NOTE — PHYSICAL THERAPY INITIAL EVALUATION ADULT - ADL SKILLS, REHAB EVAL
Products Recommended: Recommend Elta MD Sport SPF 50 sunblock General Sunscreen Counseling: I recommended a broad spectrum sunscreen with a SPF of 50 or higher.  I explained that SPF 50 sunscreens block approximately 97 percent of the sun's harmful rays.  Sunscreens should be applied at least 15 minutes prior to expected sun exposure and then every 2 hours after that as long as sun exposure continues. If swimming or exercising sunscreen should be reapplied every 45 minutes to an hour after getting wet or sweating.  One ounce, or the equivalent of a shot glass full of sunscreen, is adequate to protect the skin not covered by a bathing suit. I also recommended a lip balm with a sunscreen as well. Sun protective clothing can be used in lieu of sunscreen but must be worn the entire time you are exposed to the sun's rays. Detail Level: Detailed independent

## 2023-12-28 NOTE — OCCUPATIONAL THERAPY INITIAL EVALUATION ADULT - PERTINENT HX OF CURRENT PROBLEM, REHAB EVAL
Pt is an 88 year old male with a PMHx of AICD, BPH, CAD, cardiac arrest, COVID, CKD, COPD (not on home O2), diabetes, falling, GERD, heart failure, HTN, HLD, leg wound, lung cancer, macular degeneration, PAD, thrombocytopenia presents to the ED for right eye vision change. Of note: Pt s/p AICD battery replacement 2.5 weeks ago with Dr. Oliva. Admitted for further management.

## 2023-12-29 ENCOUNTER — TRANSCRIPTION ENCOUNTER (OUTPATIENT)
Age: 88
End: 2023-12-29

## 2023-12-29 VITALS
HEART RATE: 82 BPM | TEMPERATURE: 98 F | RESPIRATION RATE: 18 BRPM | SYSTOLIC BLOOD PRESSURE: 104 MMHG | DIASTOLIC BLOOD PRESSURE: 79 MMHG | OXYGEN SATURATION: 97 %

## 2023-12-29 LAB
ANION GAP SERPL CALC-SCNC: 5 MMOL/L — SIGNIFICANT CHANGE UP (ref 5–17)
ANION GAP SERPL CALC-SCNC: 5 MMOL/L — SIGNIFICANT CHANGE UP (ref 5–17)
BUN SERPL-MCNC: 23 MG/DL — SIGNIFICANT CHANGE UP (ref 7–23)
BUN SERPL-MCNC: 23 MG/DL — SIGNIFICANT CHANGE UP (ref 7–23)
CALCIUM SERPL-MCNC: 9.2 MG/DL — SIGNIFICANT CHANGE UP (ref 8.5–10.1)
CALCIUM SERPL-MCNC: 9.2 MG/DL — SIGNIFICANT CHANGE UP (ref 8.5–10.1)
CHLORIDE SERPL-SCNC: 103 MMOL/L — SIGNIFICANT CHANGE UP (ref 96–108)
CHLORIDE SERPL-SCNC: 103 MMOL/L — SIGNIFICANT CHANGE UP (ref 96–108)
CO2 SERPL-SCNC: 28 MMOL/L — SIGNIFICANT CHANGE UP (ref 22–31)
CO2 SERPL-SCNC: 28 MMOL/L — SIGNIFICANT CHANGE UP (ref 22–31)
CREAT SERPL-MCNC: 1.28 MG/DL — SIGNIFICANT CHANGE UP (ref 0.5–1.3)
CREAT SERPL-MCNC: 1.28 MG/DL — SIGNIFICANT CHANGE UP (ref 0.5–1.3)
EGFR: 54 ML/MIN/1.73M2 — LOW
EGFR: 54 ML/MIN/1.73M2 — LOW
GLUCOSE SERPL-MCNC: 202 MG/DL — HIGH (ref 70–99)
GLUCOSE SERPL-MCNC: 202 MG/DL — HIGH (ref 70–99)
HCT VFR BLD CALC: 39.5 % — SIGNIFICANT CHANGE UP (ref 39–50)
HCT VFR BLD CALC: 39.5 % — SIGNIFICANT CHANGE UP (ref 39–50)
HGB BLD-MCNC: 12.8 G/DL — LOW (ref 13–17)
HGB BLD-MCNC: 12.8 G/DL — LOW (ref 13–17)
MCHC RBC-ENTMCNC: 29.3 PG — SIGNIFICANT CHANGE UP (ref 27–34)
MCHC RBC-ENTMCNC: 29.3 PG — SIGNIFICANT CHANGE UP (ref 27–34)
MCHC RBC-ENTMCNC: 32.4 GM/DL — SIGNIFICANT CHANGE UP (ref 32–36)
MCHC RBC-ENTMCNC: 32.4 GM/DL — SIGNIFICANT CHANGE UP (ref 32–36)
MCV RBC AUTO: 90.4 FL — SIGNIFICANT CHANGE UP (ref 80–100)
MCV RBC AUTO: 90.4 FL — SIGNIFICANT CHANGE UP (ref 80–100)
PLATELET # BLD AUTO: 283 K/UL — SIGNIFICANT CHANGE UP (ref 150–400)
PLATELET # BLD AUTO: 283 K/UL — SIGNIFICANT CHANGE UP (ref 150–400)
POTASSIUM SERPL-MCNC: 3.9 MMOL/L — SIGNIFICANT CHANGE UP (ref 3.5–5.3)
POTASSIUM SERPL-MCNC: 3.9 MMOL/L — SIGNIFICANT CHANGE UP (ref 3.5–5.3)
POTASSIUM SERPL-SCNC: 3.9 MMOL/L — SIGNIFICANT CHANGE UP (ref 3.5–5.3)
POTASSIUM SERPL-SCNC: 3.9 MMOL/L — SIGNIFICANT CHANGE UP (ref 3.5–5.3)
RBC # BLD: 4.37 M/UL — SIGNIFICANT CHANGE UP (ref 4.2–5.8)
RBC # BLD: 4.37 M/UL — SIGNIFICANT CHANGE UP (ref 4.2–5.8)
RBC # FLD: 13.3 % — SIGNIFICANT CHANGE UP (ref 10.3–14.5)
RBC # FLD: 13.3 % — SIGNIFICANT CHANGE UP (ref 10.3–14.5)
SODIUM SERPL-SCNC: 136 MMOL/L — SIGNIFICANT CHANGE UP (ref 135–145)
SODIUM SERPL-SCNC: 136 MMOL/L — SIGNIFICANT CHANGE UP (ref 135–145)
WBC # BLD: 5.66 K/UL — SIGNIFICANT CHANGE UP (ref 3.8–10.5)
WBC # BLD: 5.66 K/UL — SIGNIFICANT CHANGE UP (ref 3.8–10.5)
WBC # FLD AUTO: 5.66 K/UL — SIGNIFICANT CHANGE UP (ref 3.8–10.5)
WBC # FLD AUTO: 5.66 K/UL — SIGNIFICANT CHANGE UP (ref 3.8–10.5)

## 2023-12-29 PROCEDURE — 99239 HOSP IP/OBS DSCHRG MGMT >30: CPT

## 2023-12-29 PROCEDURE — 99231 SBSQ HOSP IP/OBS SF/LOW 25: CPT

## 2023-12-29 RX ORDER — FUROSEMIDE 40 MG
1 TABLET ORAL
Qty: 30 | Refills: 0
Start: 2023-12-29 | End: 2024-01-27

## 2023-12-29 RX ORDER — LANOLIN ALCOHOL/MO/W.PET/CERES
3 CREAM (GRAM) TOPICAL AT BEDTIME
Refills: 0 | Status: DISCONTINUED | OUTPATIENT
Start: 2023-12-29 | End: 2023-12-29

## 2023-12-29 RX ADMIN — SACUBITRIL AND VALSARTAN 1 TABLET(S): 24; 26 TABLET, FILM COATED ORAL at 09:20

## 2023-12-29 RX ADMIN — Medication 12.5 MILLIGRAM(S): at 09:22

## 2023-12-29 RX ADMIN — Medication 3 MILLIGRAM(S): at 00:44

## 2023-12-29 RX ADMIN — Medication 81 MILLIGRAM(S): at 09:20

## 2023-12-29 RX ADMIN — PANTOPRAZOLE SODIUM 40 MILLIGRAM(S): 20 TABLET, DELAYED RELEASE ORAL at 06:09

## 2023-12-29 NOTE — PROGRESS NOTE ADULT - SUBJECTIVE AND OBJECTIVE BOX
Subjective:    pat better, sitting in bed, no new complaints.    Home Medications:  aspirin 81 mg oral tablet: 1 tab(s) orally once a day (27 Dec 2023 13:07)  cholecalciferol 25 mcg (1000 intl units) oral capsule: 2 cap(s) orally once a day (27 Dec 2023 13:04)  Farxiga 10 mg oral tablet: 1 tab(s) orally once a day (27 Dec 2023 13:07)  Flomax 0.4 mg oral capsule: 1 cap(s) orally once a day (27 Dec 2023 13:07)  melatonin 5 mg oral tablet: 1 tab(s) orally once a day (at bedtime) (27 Dec 2023 13:07)  metoprolol succinate 25 mg oral tablet, extended release: 0.5 tab(s) orally once a day (at bedtime) (27 Dec 2023 13:05)  pravastatin 40 mg oral tablet: 1 tab(s) orally once a day (at bedtime) (27 Dec 2023 13:07)  PreserVision AREDS oral capsule: 1 orally once a day (27 Dec 2023 13:07)  Protonix 40 mg oral delayed release tablet: 1 tab(s) orally once a day (27 Dec 2023 13:07)  sacubitril-valsartan 24 mg-26 mg oral tablet: 1 tab(s) orally 2 times a day (27 Dec 2023 13:07)  Visivite: Take 1 tablet orally once daily (27 Dec 2023 13:07)  Xarelto 20 mg oral tablet: 1 tab(s) orally once a day (27 Dec 2023 13:07)    MEDICATIONS  (STANDING):  albuterol    0.083%. 2.5 milliGRAM(s) Nebulizer once  aspirin enteric coated 81 milliGRAM(s) Oral daily  atorvastatin 80 milliGRAM(s) Oral at bedtime  metoprolol succinate ER 12.5 milliGRAM(s) Oral daily  pantoprazole    Tablet 40 milliGRAM(s) Oral before breakfast  rivaroxaban 20 milliGRAM(s) Oral with dinner  sacubitril 24 mG/valsartan 26 mG 1 Tablet(s) Oral two times a day  tamsulosin 0.4 milliGRAM(s) Oral at bedtime    MEDICATIONS  (PRN):  acetaminophen     Tablet .. 650 milliGRAM(s) Oral every 6 hours PRN Temp greater or equal to 38C (100.4F), Mild Pain (1 - 3)  melatonin 3 milliGRAM(s) Oral at bedtime PRN Insomnia      Allergies    No Known Allergies    Intolerances        Vital Signs Last 24 Hrs  T(C): 36.8 (29 Dec 2023 15:28), Max: 36.9 (28 Dec 2023 20:52)  T(F): 98.3 (29 Dec 2023 15:28), Max: 98.4 (28 Dec 2023 20:52)  HR: 82 (29 Dec 2023 15:28) (69 - 82)  BP: 104/79 (29 Dec 2023 15:28) (103/61 - 137/66)  BP(mean): --  RR: 18 (29 Dec 2023 15:28) (18 - 18)  SpO2: 97% (29 Dec 2023 15:28) (91% - 98%)    Parameters below as of 29 Dec 2023 15:28  Patient On (Oxygen Delivery Method): room air          PHYSICAL EXAMINATION:    NECK:  Supple. No lymphadenopathy. Jugular venous pressure not elevated. Carotids equal.   HEART:   The cardiac impulse has a normal quality. Reg., Nl S1 and S2.  There are no murmurs, rubs or gallops noted  CHEST:  Chest crackles to auscultation. Normal respiratory effort.  ABDOMEN:  Soft and nontender.   EXTREMITIES:  There is no edema.       LABS:                        12.8   5.66  )-----------( 283      ( 29 Dec 2023 11:02 )             39.5     12-29    136  |  103  |  23  ----------------------------<  202<H>  3.9   |  28  |  1.28    Ca    9.2      29 Dec 2023 11:02        Urinalysis Basic - ( 29 Dec 2023 11:02 )    Color: x / Appearance: x / SG: x / pH: x  Gluc: 202 mg/dL / Ketone: x  / Bili: x / Urobili: x   Blood: x / Protein: x / Nitrite: x   Leuk Esterase: x / RBC: x / WBC x   Sq Epi: x / Non Sq Epi: x / Bacteria: x

## 2023-12-29 NOTE — DISCHARGE NOTE PROVIDER - NSDCFUSCHEDAPPT_GEN_ALL_CORE_FT
[facility] Physician [address]  Scheduled Appointment: 03/27/2024     [facility] Physician [address]  Scheduled Appointment: 03/27/2024

## 2023-12-29 NOTE — PROGRESS NOTE ADULT - ASSESSMENT
Patient is a 89 y/o male with a PMHx of AICD, BPH, CAD, cardiac arrest, COVID, CKD, COPD(not on home O2), diabetes, falling, GERD, heart failure, HTN, HLD, leg wound, lung cancer, macular degeneration, PAD, thrombocytopenia presents to the ED for right eye vision change. Pt reports he went to bed around 9:30 last night in his usual state of health. Pt woke up this morning at 6:30 with film over right eye and black spots. Recent Stebt to LCX last  admission Pt s/p AICD battery replacement 2.5 weeks ago with Dr. Oliva and had follow up appt  Pt told staff and was sent to the ED for further evaluation. On Xarelto. Pt also reports MONTGOMERY. Denies numbness, weakness, tingling, black/bloody stools, slurred speech. No other complaints at this time.  Patient clinically appears to be deconditioned, frail, chronically sick. + SOB last couple of days with leg swelling.  (27 Dec 2023 12:59)  Visual changes resolved no evidence of CVA on CT has been on uninterrupted Xarelto   He does have carotid DZ on CTA   It does not appear that he has had a CVA , He did have some decompensated CHF for which he received IV Lasix     #RT Eye vision Changes - Occular disease v arterial thrombus  #Acute hypoxic respiratory failure, acute on chronic systolic HF exacerbation  #Carotid Stenosis - RT 70-80% RT carotid bulb.  #Orthostatic hypotension, also w/ HTN  #Severe AS  #CAD s/p PCI  #CKD    - Vascular eval for carotid dz  - Continue Xarelto, high dose statins, asa, Entresto  - S/p IV Lasix - resume Farxiga on DC with low dose Lasix 20mg, would first repeat orthostatic vitals  - Echo w/ severe AS - plan TBD   Patient is a 87 y/o male with a PMHx of AICD, BPH, CAD, cardiac arrest, COVID, CKD, COPD(not on home O2), diabetes, falling, GERD, heart failure, HTN, HLD, leg wound, lung cancer, macular degeneration, PAD, thrombocytopenia presents to the ED for right eye vision change. Pt reports he went to bed around 9:30 last night in his usual state of health. Pt woke up this morning at 6:30 with film over right eye and black spots. Recent Stebt to LCX last  admission Pt s/p AICD battery replacement 2.5 weeks ago with Dr. Oliva and had follow up appt  Pt told staff and was sent to the ED for further evaluation. On Xarelto. Pt also reports MONTGOMERY. Denies numbness, weakness, tingling, black/bloody stools, slurred speech. No other complaints at this time.  Patient clinically appears to be deconditioned, frail, chronically sick. + SOB last couple of days with leg swelling.  (27 Dec 2023 12:59)  Visual changes resolved no evidence of CVA on CT has been on uninterrupted Xarelto   He does have carotid DZ on CTA   It does not appear that he has had a CVA , He did have some decompensated CHF for which he received IV Lasix     #RT Eye vision Changes - Occular disease v arterial thrombus  #Acute hypoxic respiratory failure, acute on chronic systolic HF exacerbation  #Carotid Stenosis - RT 70-80% RT carotid bulb.  #Orthostatic hypotension, also w/ HTN  #Severe AS  #CAD s/p PCI  #CKD    - Vascular eval for carotid dz - would be high risk for surgical intervention given AS, would need transfer to tertiary center for intervention.   - Continue Xarelto, high dose statins, asa, Entresto  - S/p IV Lasix - resume Farxiga on DC with low dose Lasix 20mg, would first repeat orthostatic vitals  - Echo w/ severe AS - can be addressed as outpt unless carotid intervention needed.    Case d/w Dr. Enrique

## 2023-12-29 NOTE — PROGRESS NOTE ADULT - ASSESSMENT
PROBLEMS;     Acute hypoxic respiratory failure.   CHF  COPD  CAD (coronary artery disease).    CKD (chronic kidney disease).   Hypertension.     PLAN:    pulmonary stable-decd to fu in the office  Keep neg balance  Daily weights  Low salt diet  Goal directed medical therapy.  Aerosols  CKD-continue monitoring.  D/W STAFF

## 2023-12-29 NOTE — DISCHARGE NOTE NURSING/CASE MANAGEMENT/SOCIAL WORK - NSDCPEFALRISK_GEN_ALL_CORE
For information on Fall & Injury Prevention, visit: [email or web address] OR  [email or web address] OR  https://www.cdc.gov/steadi/patient.html For information on Fall & Injury Prevention, visit: [email or web address] OR  [email or web address] OR  https://www.cdc.gov/steadi/patient.html

## 2023-12-29 NOTE — DISCHARGE NOTE PROVIDER - CARE PROVIDER_API CALL
Alan Garcia  Cardiology  [address] [place] [address]  Phone: [phone]  Fax: [phone]  Follow Up Time:     Kirby Maldonado  Internal Medicine  [address] [place] [address], [address]  [place] [address]  Phone: [phone]  Fax: [phone]  Follow Up Time:     opthalmologist in the community,   as soon as possible  Phone: (   )    -  Fax: (   )    -  Follow Up Time:    Alan Garcia  Cardiology  [address] [place] [address]  Phone: [phone]  Fax: [phone]  Follow Up Time:     Kirby Maldonado  Internal Medicine  [address] [place] [address], [address]  [place] [address]  Phone: [phone]  Fax: [phone]  Follow Up Time:     opthalmologist in the community,   as soon as possible  Phone: (   )    -  Fax: (   )    -  Follow Up Time:

## 2023-12-29 NOTE — SBIRT NOTE ADULT - NSSBIRTUNABLESCR_GEN_A_CORE
Pt reported he drinks a beer or two when he watches TV. Reviewed healthy guidelines./Patient refused

## 2023-12-29 NOTE — DISCHARGE NOTE PROVIDER - NSDCHHCONTACT_GEN_ALL_CORE_FT
Spoke to patient and clarify medications.  Single medication sent in including olmesartan 40 and hydrochlorothiazide 12.5.  Patient made aware.   As certified below, I, or a nurse practitioner or physician assistant working with me, had a face-to-face encounter that meets the physician face-to-face encounter requirements.

## 2023-12-29 NOTE — DISCHARGE NOTE NURSING/CASE MANAGEMENT/SOCIAL WORK - NSDCVIVACCINE_GEN_ALL_CORE_FT
Tdap; 07-Jun-2021 21:51; Dylan Monahan (WOO); [employer]; [id number] (Exp. Date: 18-Nov-2022); IntraMuscular; Deltoid Right.; 0.5 milliLiter(s); VIS (VIS Published: 09-May-2013, VIS Presented: 07-Jun-2021);    Tdap; 07-Jun-2021 21:51; Dylan Monahan (WOO); [employer]; [id number] (Exp. Date: 18-Nov-2022); IntraMuscular; Deltoid Right.; 0.5 milliLiter(s); VIS (VIS Published: 09-May-2013, VIS Presented: 07-Jun-2021);

## 2023-12-29 NOTE — DISCHARGE NOTE PROVIDER - ATTENDING DISCHARGE PHYSICAL EXAMINATION:
Patient seen and examined with NIGEL Link.  I was physically present for the key portions of the evaluation and management (E/M) service provided.  I agree with the above history, physical, and plan which I have reviewed and edited where appropriate.  - case d/w dr gilliland and vascular  - plan for outpt vascular f/u for carotid stenosis   - aortic stenosis - severe - outpt f/u with dr limon  - dc on farxiga and low dose lasix as per cardio recs  - pt aware of need for f/u for transient right visual loss with opthalmology

## 2023-12-29 NOTE — PROGRESS NOTE ADULT - ASSESSMENT
88 year old male presents with rt carotid bulb stenosis, 70-80% seen on CTA.   B/l carotid arterial duplex-Right carotid bulb moderate stenosis, 50-69%      Plan:  No acute vascular surgery intervention. Reconsult prn   Continue ASA, Atorvastatin  Neurology recs  Rest of management as per primary team    Plan discussed with Dr. Willard.

## 2023-12-29 NOTE — DISCHARGE NOTE NURSING/CASE MANAGEMENT/SOCIAL WORK - NSDCFUADDAPPT_GEN_ALL_CORE_FT
CHF DILIA was attempted to be made but patient cardiologist  is booked office will call patient to follow up

## 2023-12-29 NOTE — DISCHARGE NOTE PROVIDER - HOSPITAL COURSE
87 y/o man with a PMHx of AICD, BPH, CAD, cardiac arrest, COVID, CKD, COPD(not on home O2), diabetes, falling, GERD, heart failure, HTN, HLD, leg wound, lung cancer, macular degeneration, PAD, thrombocytopenia presents to the ED for right eye vision change. Pt reports he went to bed around 9:30 last night in his usual state of health. Pt woke up this morning at 6:30 with film over right eye and black spots. Pt s/p AICD battery replacement 2.5 weeks ago with Dr. Oliva and had follow up appt on the day of admission. Pt told staff and was sent to the ED for further evaluation. On Xarelto. Pt also reports [place]TG[place]. Denies numbness, weakness, tingling, black/bloody stools, slurred speech. Patient was a stroke code on admission - NIHSS- 0 Not a thrombolytics candidate 2/2 OOTW, took xarelto last night, and no measurable deficits.     12/29- patient was seen and examined. VSS- denies dizziness/headache or chest pain.     Vital Signs Last 24 Hrs  T(C): 36.8 (29 Dec 2023 08:32), Max: 36.9 (28 Dec 2023 20:52)  T(F): 98.2 (29 Dec 2023 08:32), Max: 98.4 (28 Dec 2023 20:52)  HR: 69 (29 Dec 2023 08:32) (66 - 71)  BP: 137/66 (29 Dec 2023 08:32) (103/61 - 137/66)  BP(mean): --  RR: 18 (29 Dec 2023 08:32) (18 - 18)  SpO2: 98% (29 Dec 2023 08:32) (91% - 99%)    Parameters below as of 29 Dec 2023 08:32  Patient On (Oxygen Delivery Method): room air    PE:  Constitutional: NAD, laying in bed  HEENT: NC/AT  Back: no tenderness  Respiratory: respirations even and non labored, LCTA  Cardiovascular: S1S2 regular, no murmurs  Abdomen: soft, not tender, not distended, positive BS  Genitourinary: voiding  Musculoskeletal: no muscle tenderness, no joint swelling or tenderness  Extremities: no pedal edema   Neurological: no focal deficits    Medication/Labs- reviewed    PLAN    Visual changes- resolved   (R) visual loss which improved during evaluation-could be secondary to occular disease vs arterial thrombus.  The patient has been on Xarelto for a couple of weeks and is compliant.   (R) carotid bulb 70-80% stenosis   # intracranial atherosclerosis  - Vascular evaluation for R carotid stenosis- as per vascular - No acute vascular surgery intervention  - urgent outpatient opthalmology follow up after dc   - Cardiology consulted apprecaited   - continue with ASA /  STATIN  - neurology following  - Pt has [employer] CRT-D with Guidant RA lead, also has abandoned RV lead from 2009, therefore CAN'T have MRI  - +orthostatic- will give 500 cc IVF over 2 hours- d/w Dr Romo  - BP improved after FLuid bolus   - ECHO reviewed- severe mitral annular calcification,  The aortic valve appears moderately to severely calcified. Valve opening seems to be restricted.  The inferior, inferoseptal, and inferolateral walls appear severely hypokinetic. Estimated left ventricular ejection fraction is 35-40 %.  - Continue Xarelto, high dose statins, asa, Entresto  - S/p IV Lasix - resume Farxiga on DC with low dose Lasix 20mg  - Echo w/ severe AS - can be addressed as outpt       Acute hypoxic respiratory failure.   related to heart failure- resolved   - patient with hypervolemia  - daily weights  - low salt diet  - continue with goal directed medical therapy.     CAD (coronary artery disease).   - continue with ASA / STATIN.     CKD (chronic kidney disease).   - continue monitoring.    Hypertension.   - continue aggressive BP management.    Case d/w team on IDR.

## 2023-12-29 NOTE — INPATIENT CERTIFICATION FOR MEDICARE PATIENTS - IN ORDER TO MEET MEDICARE REQUIREMENTS.
In order to meet Medicare requirements, the clinical documentation must support the information cited in the admission order.  Please be sure to provide detailed and clear documentation about the following in the admitting note/history and physical:
COUGH

## 2023-12-29 NOTE — DISCHARGE NOTE PROVIDER - NSDCCAREPROVSEEN_GEN_ALL_CORE_FT
Eh, Izaiah Campbell, Mone Reno, Kassidy Baker, Marin Mejia, Karthik Lund, Denise Berry, Jennifer Leal, Sofia Medellin, Jermain Pretty, Ava Smith, Lilly Concepcion, Donaldo Romo, Lopez Willard, Gisel Willard, Beatrice

## 2023-12-29 NOTE — DISCHARGE NOTE PROVIDER - DETAILS OF MALNUTRITION DIAGNOSIS/DIAGNOSES
This patient has been assessed with a concern for Malnutrition and was treated during this hospitalization for the following Nutrition diagnosis/diagnoses:     -  12/28/2023: Severe protein-calorie malnutrition

## 2023-12-29 NOTE — INPATIENT CERTIFICATION FOR MEDICARE PATIENTS - RISKS OF ADVERSE EVENTS
Concern for neurologic deterioration/Concern for worsening infectious process/Concern for delay in diagnosis and treatment/Concern for renal deterioration SUBJECTIVE:    Patient is a 68y old Male who presents with a chief complaint of covid pneumonia (08 Mar 2021 10:55)    Currently admitted to medicine with the primary diagnosis of Shortness of breath    Today is hospital day 32d. This morning he is resting comfortably in bed and reports no new issues or overnight events.     Admit Diagnosis:  SHORTNESS OF BREATH SUSPECTED COVID 19 VIRUS INFECTION,,,,,        PAST MEDICAL & SURGICAL HISTORY  Chronic kidney disease (CKD)  III    Type 2 diabetes mellitus without complication, unspecified long term insulin use status    Hypertension, unspecified type    Dyspnea, unspecified type    ASHD (arteriosclerotic heart disease)  STEMI 12/31/17, PCI RCA    S/P cataract surgery    History of ligation of vein  2012    History of tonsillectomy  1957    Chronic kidney disease (CKD)    Type 2 diabetes mellitus without complication, unspecified long term insulin use status    Hypertension, unspecified type    Dyspnea, unspecified type    ASHD (arteriosclerotic heart disease)    S/P cataract surgery    History of ligation of vein    History of tonsillectomy        SOCIAL HISTORY:  Negative for smoking/alcohol/drug use.     ALLERGIES:  penicillin (Rash (Severe))    MEDICATIONS:  STANDING MEDICATIONS  albuterol/ipratropium for Nebulization 3 milliLiter(s) Nebulizer every 6 hours  ascorbic acid 500 milliGRAM(s) Oral daily  aspirin enteric coated 81 milliGRAM(s) Oral daily  BACItracin   Ointment 1 Application(s) Topical every 12 hours  chlorhexidine 4% Liquid 1 Application(s) Topical daily  cholecalciferol 5000 Unit(s) Oral daily  dexAMETHasone  Injectable 6 milliGRAM(s) IV Push daily  enoxaparin Injectable 40 milliGRAM(s) SubCutaneous at bedtime  ferrous    sulfate 325 milliGRAM(s) Oral daily  glucagon  Injectable 1 milliGRAM(s) IntraMuscular once  influenza   Vaccine 0.5 milliLiter(s) IntraMuscular once  insulin glargine Injectable (LANTUS) 25 Unit(s) SubCutaneous at bedtime  insulin lispro (ADMELOG) corrective regimen sliding scale   SubCutaneous three times a day before meals  insulin lispro Injectable (ADMELOG) 9 Unit(s) SubCutaneous three times a day before meals  levothyroxine 50 MICROGram(s) Oral daily  melatonin 5 milliGRAM(s) Oral at bedtime  metoprolol succinate ER 25 milliGRAM(s) Oral daily  multivitamin 1 Tablet(s) Oral daily  pantoprazole    Tablet 40 milliGRAM(s) Oral before breakfast  polyethylene glycol 3350 17 Gram(s) Oral daily  prasugrel 10 milliGRAM(s) Oral daily  senna 2 Tablet(s) Oral at bedtime  tamsulosin 0.8 milliGRAM(s) Oral at bedtime  zinc sulfate 220 milliGRAM(s) Oral daily    PRN MEDICATIONS  acetaminophen   Tablet .. 650 milliGRAM(s) Oral every 6 hours PRN  ALPRAZolam 0.25 milliGRAM(s) Oral daily PRN  bisacodyl Suppository 10 milliGRAM(s) Rectal daily PRN  guaifenesin/dextromethorphan  Syrup 5 milliLiter(s) Oral every 6 hours PRN  magnesium hydroxide Suspension 30 milliLiter(s) Oral daily PRN  ondansetron    Tablet 4 milliGRAM(s) Oral two times a day PRN  petrolatum white Ointment 1 Application(s) Topical every 6 hours PRN    VITALS:   T(F): 96.8  HR: 99  BP: 110/59  RR: 18  SpO2: 94%    I&Os:  03-07-21 @ 07:01  -  03-08-21 @ 07:00  --------------------------------------------------------  IN: 240 mL / OUT: 0 mL / NET: 240 mL        PHYSICAL EXAM:  GEN: No acute distress on rest, patient desaturated quickly on exertion and talking.  LUNGS: bilateral intermittent crackles.  HEART: S1/S2  ABD: Soft, NT/ND. BS +  EXT: no cyanosis/edema  NEURO: AAOX3    LABS:                        11.2   7.96  )-----------( 155      ( 08 Mar 2021 06:28 )             35.8     03-08    140  |  100  |  27<H>  ----------------------------<  132<H>  4.4   |  30  |  0.9    Ca    8.2<L>      08 Mar 2021 06:28  Phos  3.7     03-07  Mg     2.0     03-08    TPro  5.4<L>  /  Alb  3.1<L>  /  TBili  0.3  /  DBili  x   /  AST  32  /  ALT  41  /  AlkPhos  64  03-08    Creatinine trend: 0.9<--, 0.8<--, 0.7<--, 0.8<--, 0.9<--, 0.8<--, 1.0<--    COVID-19 PCR: Detected (03-04-21 @ 05:00)    Ferritin, Serum: 63 ng/mL (03-07-21 @ 11:40)  Ferritin, Serum: 132 ng/mL (02-23-21 @ 07:00)    D-Dimer Assay, Quantitative: 286 ng/mL DDU (03-08-21 @ 06:28)  D-Dimer Assay, Quantitative: 417 ng/mL DDU (03-07-21 @ 11:40)    C-Reactive Protein, Serum: 11 mg/L (03-07-21 @ 11:40)  C-Reactive Protein, Serum: 9 mg/L (03-06-21 @ 06:55)    Procalcitonin, Serum: 0.10 ng/mL (03-06-21 @ 06:55)  Procalcitonin, Serum: 0.08 ng/mL (03-04-21 @ 06:39)    Sedimentation Rate, Erythrocyte: 46 mm/Hr (03-07-21 @ 11:40)  Sedimentation Rate, Erythrocyte: 35 mm/Hr (03-04-21 @ 06:39)      RADIOLOGY:  < from: Xray Chest 1 View- PORTABLE-Routine (Xray Chest 1 View- PORTABLE-Routine in AM.) (03.03.21 @ 06:17) >  IMPRESSION:    Stable cardiomediastinal silhouette. Increased diffuse opacities. No pneumothorax. Unchanged osseous structures.    < end of copied text >

## 2023-12-29 NOTE — PROGRESS NOTE ADULT - SUBJECTIVE AND OBJECTIVE BOX
Patient seen and examined on routine rounds.   He denies nausea, vomiting, fever or chills. Still complain of R eye blurred vision. No weakness  Nurse report no acute event overnight   VS reviewed    ROS neg except as above.        Physical Exam:  General: AOx3, Well developed, NAD  HEENT: NC/AT, normal pinnae and tragi  Chest: Normal respiratory effort, equal chest rise  Heart: RRR  Abdomen: Soft, NTND. Right groin scar  Neuro/Psych: No localized deficits. Normal speech, normal tone, normal affect  Skin: Normal, warm, no rashes, no lesions noted.  Extremities: Warm, well perfused, no edema. B/l lower extremity xerosis, hyperpigmentation, varicose veins. Right medial lower leg scar    Vital Signs Last 24 Hrs  T(C): 36.8 (29 Dec 2023 08:32), Max: 36.9 (28 Dec 2023 20:52)  T(F): 98.2 (29 Dec 2023 08:32), Max: 98.4 (28 Dec 2023 20:52)  HR: 69 (29 Dec 2023 08:32) (66 - 71)  BP: 137/66 (29 Dec 2023 08:32) (103/61 - 137/66)  BP(mean): --  RR: 18 (29 Dec 2023 08:32) (18 - 18)  SpO2: 98% (29 Dec 2023 08:32) (91% - 99%)    Parameters below as of 29 Dec 2023 08:32  Patient On (Oxygen Delivery Method): room air

## 2023-12-29 NOTE — DISCHARGE NOTE PROVIDER - PROVIDER TOKENS
PROVIDER:[TOKEN:[[id number]]],PROVIDER:[TOKEN:[[id number]]],FREE:[LAST:[opthalmologist in the community],PHONE:[(   )    -],FAX:[(   )    -],ADDRESS:[as soon as possible]] PROVIDER:[TOKEN:[[id number]]],PROVIDER:[TOKEN:[[id number]]],FREE:[LAST:[opthalmologist in the community],PHONE:[(   )    -],FAX:[(   )    -],ADDRESS:[as soon as possible]]

## 2023-12-29 NOTE — DISCHARGE NOTE NURSING/CASE MANAGEMENT/SOCIAL WORK - PATIENT PORTAL LINK FT
You can access the FollowMyHealth Patient Portal offered by [facility] by registering at the following website: [email or web address]. By joining [employer]’s FollowMyHealth portal, you will also be able to view your health information using other applications (apps) compatible with our system. You can access the FollowMyHealth Patient Portal offered by [facility] by registering at the following website: [email or web address]. By joining [employer]’s FollowMyHealth portal, you will also be able to view your health information using other applications (apps) compatible with our system.

## 2023-12-29 NOTE — PROGRESS NOTE ADULT - ATTENDING COMMENTS
carotid duplex reviewed, does not meet indication for intervention as patient is asymptomatic  outpatient follow up

## 2023-12-29 NOTE — DISCHARGE NOTE PROVIDER - CARE PROVIDERS DIRECT ADDRESSES
,[email or web address],DirectAddress_Unknown,DirectAddress_Unknown ,[email or web address],DirectAddress_Unknown,DirectAddress_Unknown

## 2023-12-29 NOTE — PROGRESS NOTE ADULT - SUBJECTIVE AND OBJECTIVE BOX
Patient is a 88y old  Male who presents with a chief complaint of right eye visual changes    FROM HPI: Patient is a 87 y/o male with a PMHx of AICD, BPH, CAD, cardiac arrest, COVID, CKD, COPD(not on home O2), diabetes, falling, GERD, heart failure, HTN, HLD, leg wound, lung cancer, macular degeneration, PAD, thrombocytopenia presents to the ED for right eye vision change. Pt reports he went to bed around 9:30 last night in his usual state of health. Pt woke up this morning at 6:30 with film over right eye and black spots. Pt s/p AICD battery replacement 2.5 weeks ago with Dr. Oliva and had follow up appt today. Pt told staff and was sent to the ED for further evaluation. On Xarelto. Pt also reports MONTGOMERY. Denies numbness, weakness, tingling, black/bloody stools, slurred speech. No other complaints at this time.  Patient clinically appears to be deconditioned, frail, chronically sick. + SOB last couple of days with leg swelling.  (27 Dec 2023 12:59)  Visual changes resolved no evidence of CVA on CT has been on uninterrupted xeralto   He does have carotid DZ on CTA       12/29/23.    PAST MEDICAL & SURGICAL HISTORY:  Hypertension  CAD (coronary artery disease)  Cardiac arrest with successful resuscitation  CKD (chronic kidney disease)  PAD (peripheral artery disease)  Lung cancer  Heart failure  HLD (hyperlipidemia)  2019 novel coronavirus disease (COVID-19)  History of falling  Thrombocytopenia  GERD (gastroesophageal reflux disease)  History of COPD  Diabetes  History of BPH  Leg wound, right  AICD (automatic cardioverter/defibrillator) present  S/P carotid endarterectomy  History of lobectomy of lung  History of appendectomy  H/O left knee surgery  History of tonsillectomy and adenoidectomy  H/O endarterectomy    MEDICATIONS  (STANDING):  albuterol    0.083%. 2.5 milliGRAM(s) Nebulizer once  aspirin enteric coated 81 milliGRAM(s) Oral daily  atorvastatin 80 milliGRAM(s) Oral at bedtime  metoprolol succinate ER 12.5 milliGRAM(s) Oral daily  pantoprazole    Tablet 40 milliGRAM(s) Oral before breakfast  rivaroxaban 20 milliGRAM(s) Oral with dinner  sacubitril 24 mG/valsartan 26 mG 1 Tablet(s) Oral two times a day  tamsulosin 0.4 milliGRAM(s) Oral at bedtime    MEDICATIONS  (PRN):  acetaminophen     Tablet .. 650 milliGRAM(s) Oral every 6 hours PRN Temp greater or equal to 38C (100.4F), Mild Pain (1 - 3)  melatonin 3 milliGRAM(s) Oral at bedtime PRN Insomnia    HOME MEDICATIONS:  aspirin 81 mg oral tablet: 1 tab(s) orally once a day (27 Dec 2023 13:07)  cholecalciferol 25 mcg (1000 intl units) oral capsule: 2 cap(s) orally once a day (27 Dec 2023 13:04)  Farxiga 10 mg oral tablet: 1 tab(s) orally once a day (27 Dec 2023 13:07)  Flomax 0.4 mg oral capsule: 1 cap(s) orally once a day (27 Dec 2023 13:07)  melatonin 5 mg oral tablet: 1 tab(s) orally once a day (at bedtime) (27 Dec 2023 13:07)  metoprolol succinate 25 mg oral tablet, extended release: 0.5 tab(s) orally once a day (at bedtime) (27 Dec 2023 13:05)  pravastatin 40 mg oral tablet: 1 tab(s) orally once a day (at bedtime) (27 Dec 2023 13:07)  PreserVision AREDS oral capsule: 1 orally once a day (27 Dec 2023 13:07)  Protonix 40 mg oral delayed release tablet: 1 tab(s) orally once a day (27 Dec 2023 13:07)  sacubitril-valsartan 24 mg-26 mg oral tablet: 1 tab(s) orally 2 times a day (27 Dec 2023 13:07)  Visivite: Take 1 tablet orally once daily (27 Dec 2023 13:07)  Xarelto 20 mg oral tablet: 1 tab(s) orally once a day (27 Dec 2023 13:07)    PHYSICAL EXAM:   T(C): 36.8 (29 Dec 2023 08:32), Max: 36.9 (28 Dec 2023 20:52)  T(F): 98.2 (29 Dec 2023 08:32), Max: 98.4 (28 Dec 2023 20:52)  HR: 69 (29 Dec 2023 08:32) (66 - 71)  BP: 137/66 (29 Dec 2023 08:32) (103/61 - 137/66)  RR: 18 (29 Dec 2023 08:32) (18 - 18)  SpO2: 98% (29 Dec 2023 08:32) (91% - 99%)    Parameters below as of 29 Dec 2023 08:32  Patient On (Oxygen Delivery Method): room air    Constitutional: NAD, awake and alert  HEENT: PERR, EOMI, Normal Hearing, MMM  Neck: Soft and supple, No LAD, No JVD  Respiratory: Breath sounds are clear bilaterally, No wheezing, rales or rhonchi  Cardiovascular: S1 and S2, regular rate and rhythm, no Murmurs, gallops or rubs  Gastrointestinal: Bowel Sounds present, soft, nontender, nondistended, no guarding, no rebound  Extremities: No peripheral edema  Vascular: 2+ peripheral pulses  Neurological: A/O x 3, no focal deficits  Musculoskeletal: 5/5 strength b/l upper and lower extremities  Skin: No rashes    =======================================    INTERPRETATION OF TELEMETRY:    ECG:    ========================================    LABS:                        12.2   8.22  )-----------( 247      ( 27 Dec 2023 09:58 )             37.8     12-28    136  |  102  |  16  ----------------------------<  136<H>  3.2<L>   |  29  |  1.12    Ca    8.9      28 Dec 2023 08:57    TPro  7.5  /  Alb  3.6  /  TBili  1.1  /  DBili  x   /  AST  19  /  ALT  17  /  AlkPhos  62  12-27    < from: TTE Echo Complete w/ Contrast w/ Doppler (12.28.23 @ 10:11) >   Severe mitral annular calcification is present.   There is calcification of mitral valve leaflets. The leaflet opening   appears restricted.   Mild (1+) mitral regurgitation is present.   The aortic valve appears moderately to severely calcified. Valve opening   seems to be restricted.   Transaortic gradients are underestimated due to impaired left ventricle   systolic function.   KACIE by continuity equation (0.87 cm2) suggesting severe aortic stenosis.   The dimensionless index is .25 which suggests borderline severe aortic   stenosis.   Normal appearing left atrium.   The left ventricle size is normal, severe regional hypokinesis, and   moderately reduced function.   Definity was used to better visualize the endocardial border.   A false tendon is noted near the left ventricular apex.   The inferior, inferoseptal, and inferolateral walls appear severely   hypokinetic.   Estimated left ventricular ejection fraction is 35-40 %.      RADIOLOGY & ADDITIONAL STUDIES:    < from: US Duplex Carotid Arteries Complete, Bilateral (12.28.23 @ 19:58) >  Bilateral carotid artery plaque.  -Right carotid bulb moderate stenosis, 50-69%. Bulkyplaque in this   location produces shadowing artifact which limits evaluation.  -No hemodynamically significant stenosis of the left carotid artery.    Abnormal right vertebral artery waveform with may reflect proximal right   subclavian artery or brachiocephalic trunk stenosis. Correlate with   recent CTA.    Measurement of carotid stenosis is based on velocity parameters that   correlate the residual internal carotid diameter with that of the more   distal vessel in accordance with a method such as the North American   Symptomatic Carotid Endarterectomy Trial (NASCET).    < from: Xray Chest 1 View AP/PA. (12.27.23 @ 10:03) >  Acute on chronic lung disease. Sternotomy and defibrillator   again noted.    < from: CT Brain Stroke Protocol (12.27.23 @ 09:47) >  1. No intracranial hemorrhage is appreciated.    2. No intracranial mass lesion is appreciated.    3.  No adverse interval change August 2023    4. MR may provide higher sensitivity imaging evaluation for the clinical   indication of acute infarction.    < end of copied text >     Patient is a 88y old  Male who presents with a chief complaint of right eye visual changes    FROM HPI: Patient is a 89 y/o male with a PMHx of AICD, BPH, CAD, cardiac arrest, COVID, CKD, COPD(not on home O2), diabetes, falling, GERD, heart failure, HTN, HLD, leg wound, lung cancer, macular degeneration, PAD, thrombocytopenia presents to the ED for right eye vision change. Pt reports he went to bed around 9:30 last night in his usual state of health. Pt woke up this morning at 6:30 with film over right eye and black spots. Pt s/p AICD battery replacement 2.5 weeks ago with Dr. Oliva and had follow up appt today. Pt told staff and was sent to the ED for further evaluation. On Xarelto. Pt also reports MONTGOMERY. Denies numbness, weakness, tingling, black/bloody stools, slurred speech. No other complaints at this time.  Patient clinically appears to be deconditioned, frail, chronically sick. + SOB last couple of days with leg swelling.  (27 Dec 2023 12:59)  Visual changes resolved no evidence of CVA on CT has been on uninterrupted xeralto   He does have carotid DZ on CTA     12/29/23. Patient reporting still w/ rt visual loss.    PAST MEDICAL & SURGICAL HISTORY:  Hypertension  CAD (coronary artery disease)  Cardiac arrest with successful resuscitation  CKD (chronic kidney disease)  PAD (peripheral artery disease)  Lung cancer  Heart failure  HLD (hyperlipidemia)  2019 novel coronavirus disease (COVID-19)  History of falling  Thrombocytopenia  GERD (gastroesophageal reflux disease)  History of COPD  Diabetes  History of BPH  Leg wound, right  AICD (automatic cardioverter/defibrillator) present  S/P carotid endarterectomy  History of lobectomy of lung  History of appendectomy  H/O left knee surgery  History of tonsillectomy and adenoidectomy  H/O endarterectomy    MEDICATIONS  (STANDING):  albuterol    0.083%. 2.5 milliGRAM(s) Nebulizer once  aspirin enteric coated 81 milliGRAM(s) Oral daily  atorvastatin 80 milliGRAM(s) Oral at bedtime  metoprolol succinate ER 12.5 milliGRAM(s) Oral daily  pantoprazole    Tablet 40 milliGRAM(s) Oral before breakfast  rivaroxaban 20 milliGRAM(s) Oral with dinner  sacubitril 24 mG/valsartan 26 mG 1 Tablet(s) Oral two times a day  tamsulosin 0.4 milliGRAM(s) Oral at bedtime    MEDICATIONS  (PRN):  acetaminophen     Tablet .. 650 milliGRAM(s) Oral every 6 hours PRN Temp greater or equal to 38C (100.4F), Mild Pain (1 - 3)  melatonin 3 milliGRAM(s) Oral at bedtime PRN Insomnia    HOME MEDICATIONS:  aspirin 81 mg oral tablet: 1 tab(s) orally once a day (27 Dec 2023 13:07)  cholecalciferol 25 mcg (1000 intl units) oral capsule: 2 cap(s) orally once a day (27 Dec 2023 13:04)  Farxiga 10 mg oral tablet: 1 tab(s) orally once a day (27 Dec 2023 13:07)  Flomax 0.4 mg oral capsule: 1 cap(s) orally once a day (27 Dec 2023 13:07)  melatonin 5 mg oral tablet: 1 tab(s) orally once a day (at bedtime) (27 Dec 2023 13:07)  metoprolol succinate 25 mg oral tablet, extended release: 0.5 tab(s) orally once a day (at bedtime) (27 Dec 2023 13:05)  pravastatin 40 mg oral tablet: 1 tab(s) orally once a day (at bedtime) (27 Dec 2023 13:07)  PreserVision AREDS oral capsule: 1 orally once a day (27 Dec 2023 13:07)  Protonix 40 mg oral delayed release tablet: 1 tab(s) orally once a day (27 Dec 2023 13:07)  sacubitril-valsartan 24 mg-26 mg oral tablet: 1 tab(s) orally 2 times a day (27 Dec 2023 13:07)  Visivite: Take 1 tablet orally once daily (27 Dec 2023 13:07)  Xarelto 20 mg oral tablet: 1 tab(s) orally once a day (27 Dec 2023 13:07)    PHYSICAL EXAM:   T(C): 36.8 (29 Dec 2023 08:32), Max: 36.9 (28 Dec 2023 20:52)  T(F): 98.2 (29 Dec 2023 08:32), Max: 98.4 (28 Dec 2023 20:52)  HR: 69 (29 Dec 2023 08:32) (66 - 71)  BP: 137/66 (29 Dec 2023 08:32) (103/61 - 137/66)  RR: 18 (29 Dec 2023 08:32) (18 - 18)  SpO2: 98% (29 Dec 2023 08:32) (91% - 99%)    Parameters below as of 29 Dec 2023 08:32  Patient On (Oxygen Delivery Method): room air    Constitutional: NAD, awake and alert  HEENT: PERR, EOMI, Normal Hearing, MMM  Neck: Soft and supple, No LAD, No JVD  Respiratory: Breath sounds are clear bilaterally, No wheezing, rales or rhonchi  Cardiovascular: S1 and S2, regular rate and rhythm, no Murmurs, gallops or rubs  Gastrointestinal: Bowel Sounds present, soft, nontender, nondistended, no guarding, no rebound  Extremities: No peripheral edema  Vascular: 2+ peripheral pulses  Neurological: A/O x 3, no focal deficits  Musculoskeletal: 5/5 strength b/l upper and lower extremities  Skin: No rashes    =======================================    INTERPRETATION OF TELEMETRY:    ECG:    ========================================    LABS:                        12.2   8.22  )-----------( 247      ( 27 Dec 2023 09:58 )             37.8     12-28    136  |  102  |  16  ----------------------------<  136<H>  3.2<L>   |  29  |  1.12    Ca    8.9      28 Dec 2023 08:57    TPro  7.5  /  Alb  3.6  /  TBili  1.1  /  DBili  x   /  AST  19  /  ALT  17  /  AlkPhos  62  12-27    < from: TTE Echo Complete w/ Contrast w/ Doppler (12.28.23 @ 10:11) >   Severe mitral annular calcification is present.   There is calcification of mitral valve leaflets. The leaflet opening   appears restricted.   Mild (1+) mitral regurgitation is present.   The aortic valve appears moderately to severely calcified. Valve opening   seems to be restricted.   Transaortic gradients are underestimated due to impaired left ventricle   systolic function.   KACIE by continuity equation (0.87 cm2) suggesting severe aortic stenosis.   The dimensionless index is .25 which suggests borderline severe aortic   stenosis.   Normal appearing left atrium.   The left ventricle size is normal, severe regional hypokinesis, and   moderately reduced function.   Definity was used to better visualize the endocardial border.   A false tendon is noted near the left ventricular apex.   The inferior, inferoseptal, and inferolateral walls appear severely   hypokinetic.   Estimated left ventricular ejection fraction is 35-40 %.      RADIOLOGY & ADDITIONAL STUDIES:    < from: US Duplex Carotid Arteries Complete, Bilateral (12.28.23 @ 19:58) >  Bilateral carotid artery plaque.  -Right carotid bulb moderate stenosis, 50-69%. Bulkyplaque in this   location produces shadowing artifact which limits evaluation.  -No hemodynamically significant stenosis of the left carotid artery.    Abnormal right vertebral artery waveform with may reflect proximal right   subclavian artery or brachiocephalic trunk stenosis. Correlate with   recent CTA.    Measurement of carotid stenosis is based on velocity parameters that   correlate the residual internal carotid diameter with that of the more   distal vessel in accordance with a method such as the [place] American   Symptomatic Carotid Endarterectomy Trial (NASCET).    < from: Xray Chest 1 View AP/PA. (12.27.23 @ 10:03) >  Acute on chronic lung disease. Sternotomy and defibrillator   again noted.    < from: CT Brain Stroke Protocol (12.27.23 @ 09:47) >  1. No intracranial hemorrhage is appreciated.    2. No intracranial mass lesion is appreciated.    3.  No adverse interval change August 2023    4. MR may provide higher sensitivity imaging evaluation for the clinical   indication of acute infarction.    < end of copied text >     Patient is a 88y old  Male who presents with a chief complaint of right eye visual changes    FROM HPI: Patient is a 87 y/o male with a PMHx of AICD, BPH, CAD, cardiac arrest, COVID, CKD, COPD(not on home O2), diabetes, falling, GERD, heart failure, HTN, HLD, leg wound, lung cancer, macular degeneration, PAD, thrombocytopenia presents to the ED for right eye vision change. Pt reports he went to bed around 9:30 last night in his usual state of health. Pt woke up this morning at 6:30 with film over right eye and black spots. Pt s/p AICD battery replacement 2.5 weeks ago with Dr. Oliva and had follow up appt today. Pt told staff and was sent to the ED for further evaluation. On Xarelto. Pt also reports MONTGOMERY. Denies numbness, weakness, tingling, black/bloody stools, slurred speech. No other complaints at this time.  Patient clinically appears to be deconditioned, frail, chronically sick. + SOB last couple of days with leg swelling.  (27 Dec 2023 12:59)  Visual changes resolved no evidence of CVA on CT has been on uninterrupted xeralto   He does have carotid DZ on CTA     12/29/23. Patient reporting still w/ rt visual loss.    PAST MEDICAL & SURGICAL HISTORY:  Hypertension  CAD (coronary artery disease)  Cardiac arrest with successful resuscitation  CKD (chronic kidney disease)  PAD (peripheral artery disease)  Lung cancer  Heart failure  HLD (hyperlipidemia)  2019 novel coronavirus disease (COVID-19)  History of falling  Thrombocytopenia  GERD (gastroesophageal reflux disease)  History of COPD  Diabetes  History of BPH  Leg wound, right  AICD (automatic cardioverter/defibrillator) present  S/P carotid endarterectomy  History of lobectomy of lung  History of appendectomy  H/O left knee surgery  History of tonsillectomy and adenoidectomy  H/O endarterectomy    MEDICATIONS  (STANDING):  albuterol    0.083%. 2.5 milliGRAM(s) Nebulizer once  aspirin enteric coated 81 milliGRAM(s) Oral daily  atorvastatin 80 milliGRAM(s) Oral at bedtime  metoprolol succinate ER 12.5 milliGRAM(s) Oral daily  pantoprazole    Tablet 40 milliGRAM(s) Oral before breakfast  rivaroxaban 20 milliGRAM(s) Oral with dinner  sacubitril 24 mG/valsartan 26 mG 1 Tablet(s) Oral two times a day  tamsulosin 0.4 milliGRAM(s) Oral at bedtime    MEDICATIONS  (PRN):  acetaminophen     Tablet .. 650 milliGRAM(s) Oral every 6 hours PRN Temp greater or equal to 38C (100.4F), Mild Pain (1 - 3)  melatonin 3 milliGRAM(s) Oral at bedtime PRN Insomnia    HOME MEDICATIONS:  aspirin 81 mg oral tablet: 1 tab(s) orally once a day (27 Dec 2023 13:07)  cholecalciferol 25 mcg (1000 intl units) oral capsule: 2 cap(s) orally once a day (27 Dec 2023 13:04)  Farxiga 10 mg oral tablet: 1 tab(s) orally once a day (27 Dec 2023 13:07)  Flomax 0.4 mg oral capsule: 1 cap(s) orally once a day (27 Dec 2023 13:07)  melatonin 5 mg oral tablet: 1 tab(s) orally once a day (at bedtime) (27 Dec 2023 13:07)  metoprolol succinate 25 mg oral tablet, extended release: 0.5 tab(s) orally once a day (at bedtime) (27 Dec 2023 13:05)  pravastatin 40 mg oral tablet: 1 tab(s) orally once a day (at bedtime) (27 Dec 2023 13:07)  PreserVision AREDS oral capsule: 1 orally once a day (27 Dec 2023 13:07)  Protonix 40 mg oral delayed release tablet: 1 tab(s) orally once a day (27 Dec 2023 13:07)  sacubitril-valsartan 24 mg-26 mg oral tablet: 1 tab(s) orally 2 times a day (27 Dec 2023 13:07)  Visivite: Take 1 tablet orally once daily (27 Dec 2023 13:07)  Xarelto 20 mg oral tablet: 1 tab(s) orally once a day (27 Dec 2023 13:07)    PHYSICAL EXAM:   T(C): 36.8 (29 Dec 2023 08:32), Max: 36.9 (28 Dec 2023 20:52)  T(F): 98.2 (29 Dec 2023 08:32), Max: 98.4 (28 Dec 2023 20:52)  HR: 69 (29 Dec 2023 08:32) (66 - 71)  BP: 137/66 (29 Dec 2023 08:32) (103/61 - 137/66)  RR: 18 (29 Dec 2023 08:32) (18 - 18)  SpO2: 98% (29 Dec 2023 08:32) (91% - 99%)    Parameters below as of 29 Dec 2023 08:32  Patient On (Oxygen Delivery Method): room air    Constitutional: NAD, awake and alert  HEENT: PERR, EOMI, Normal Hearing, MMM  Neck: Soft and supple, No LAD, No JVD  Respiratory: Breath sounds are clear bilaterally, No wheezing, rales or rhonchi  Cardiovascular: S1 and S2, regular rate and rhythm, no Murmurs, gallops or rubs  Gastrointestinal: Bowel Sounds present, soft, nontender, nondistended, no guarding, no rebound  Extremities: No peripheral edema  Vascular: 2+ peripheral pulses  Neurological: A/O x 3, no focal deficits  Musculoskeletal: 5/5 strength b/l upper and lower extremities  Skin: No rashes    =======================================    INTERPRETATION OF TELEMETRY:    ECG:    ========================================    LABS:                        12.2   8.22  )-----------( 247      ( 27 Dec 2023 09:58 )             37.8     12-28    136  |  102  |  16  ----------------------------<  136<H>  3.2<L>   |  29  |  1.12    Ca    8.9      28 Dec 2023 08:57    TPro  7.5  /  Alb  3.6  /  TBili  1.1  /  DBili  x   /  AST  19  /  ALT  17  /  AlkPhos  62  12-27    < from: TTE Echo Complete w/ Contrast w/ Doppler (12.28.23 @ 10:11) >   Severe mitral annular calcification is present.   There is calcification of mitral valve leaflets. The leaflet opening   appears restricted.   Mild (1+) mitral regurgitation is present.   The aortic valve appears moderately to severely calcified. Valve opening   seems to be restricted.   Transaortic gradients are underestimated due to impaired left ventricle   systolic function.   KACIE by continuity equation (0.87 cm2) suggesting severe aortic stenosis.   The dimensionless index is .25 which suggests borderline severe aortic   stenosis.   Normal appearing left atrium.   The left ventricle size is normal, severe regional hypokinesis, and   moderately reduced function.   Definity was used to better visualize the endocardial border.   A false tendon is noted near the left ventricular apex.   The inferior, inferoseptal, and inferolateral walls appear severely   hypokinetic.   Estimated left ventricular ejection fraction is 35-40 %.      RADIOLOGY & ADDITIONAL STUDIES:    < from: US Duplex Carotid Arteries Complete, Bilateral (12.28.23 @ 19:58) >  Bilateral carotid artery plaque.  -Right carotid bulb moderate stenosis, 50-69%. Bulkyplaque in this   location produces shadowing artifact which limits evaluation.  -No hemodynamically significant stenosis of the left carotid artery.    Abnormal right vertebral artery waveform with may reflect proximal right   subclavian artery or brachiocephalic trunk stenosis. Correlate with   recent CTA.    Measurement of carotid stenosis is based on velocity parameters that   correlate the residual internal carotid diameter with that of the more   distal vessel in accordance with a method such as the North American   Symptomatic Carotid Endarterectomy Trial (NASCET).    < from: Xray Chest 1 View AP/PA. (12.27.23 @ 10:03) >  Acute on chronic lung disease. Sternotomy and defibrillator   again noted.    < from: CT Brain Stroke Protocol (12.27.23 @ 09:47) >  1. No intracranial hemorrhage is appreciated.    2. No intracranial mass lesion is appreciated.    3.  No adverse interval change August 2023    4. MR may provide higher sensitivity imaging evaluation for the clinical   indication of acute infarction.    < end of copied text >

## 2023-12-29 NOTE — PROGRESS NOTE ADULT - NUTRITIONAL ASSESSMENT
This patient has been assessed with a concern for Malnutrition and has been determined to have a diagnosis/diagnoses of Severe protein-calorie malnutrition.    This patient is being managed with:   Diet DASH/TLC-  Sodium & Cholesterol Restricted  Entered: Dec 27 2023  4:53PM

## 2023-12-29 NOTE — DISCHARGE NOTE PROVIDER - NSDCMRMEDTOKEN_GEN_ALL_CORE_FT
aspirin 81 mg oral tablet: 1 tab(s) orally once a day  cholecalciferol 25 mcg (1000 intl units) oral capsule: 2 cap(s) orally once a day  Farxiga 10 mg oral tablet: 1 tab(s) orally once a day  Flomax 0.4 mg oral capsule: 1 cap(s) orally once a day  Lasix 20 mg oral tablet: 1 tab(s) orally once a day  melatonin 5 mg oral tablet: 1 tab(s) orally once a day (at bedtime)  metoprolol succinate 25 mg oral tablet, extended release: 0.5 tab(s) orally once a day (at bedtime)  pravastatin 40 mg oral tablet: 1 tab(s) orally once a day (at bedtime)  PreserVision AREDS oral capsule: 1 orally once a day  Protonix 40 mg oral delayed release tablet: 1 tab(s) orally once a day  sacubitril-valsartan 24 mg-26 mg oral tablet: 1 tab(s) orally 2 times a day  Visivite: Take 1 tablet orally once daily  Xarelto 20 mg oral tablet: 1 tab(s) orally once a day

## 2024-01-04 DIAGNOSIS — I35.0 NONRHEUMATIC AORTIC (VALVE) STENOSIS: ICD-10-CM

## 2024-01-04 DIAGNOSIS — Z79.82 LONG TERM (CURRENT) USE OF ASPIRIN: ICD-10-CM

## 2024-01-04 DIAGNOSIS — H35.30 UNSPECIFIED MACULAR DEGENERATION: ICD-10-CM

## 2024-01-04 DIAGNOSIS — Z86.74 PERSONAL HISTORY OF SUDDEN CARDIAC ARREST: ICD-10-CM

## 2024-01-04 DIAGNOSIS — Z95.810 PRESENCE OF AUTOMATIC (IMPLANTABLE) CARDIAC DEFIBRILLATOR: ICD-10-CM

## 2024-01-04 DIAGNOSIS — N18.9 CHRONIC KIDNEY DISEASE, UNSPECIFIED: ICD-10-CM

## 2024-01-04 DIAGNOSIS — D69.6 THROMBOCYTOPENIA, UNSPECIFIED: ICD-10-CM

## 2024-01-04 DIAGNOSIS — J96.01 ACUTE RESPIRATORY FAILURE WITH HYPOXIA: ICD-10-CM

## 2024-01-04 DIAGNOSIS — I95.1 ORTHOSTATIC HYPOTENSION: ICD-10-CM

## 2024-01-04 DIAGNOSIS — E11.22 TYPE 2 DIABETES MELLITUS WITH DIABETIC CHRONIC KIDNEY DISEASE: ICD-10-CM

## 2024-01-04 DIAGNOSIS — J44.9 CHRONIC OBSTRUCTIVE PULMONARY DISEASE, UNSPECIFIED: ICD-10-CM

## 2024-01-04 DIAGNOSIS — N40.0 BENIGN PROSTATIC HYPERPLASIA WITHOUT LOWER URINARY TRACT SYMPTOMS: ICD-10-CM

## 2024-01-04 DIAGNOSIS — I13.0 HYPERTENSIVE HEART AND CHRONIC KIDNEY DISEASE WITH HEART FAILURE AND STAGE 1 THROUGH STAGE 4 CHRONIC KIDNEY DISEASE, OR UNSPECIFIED CHRONIC KIDNEY DISEASE: ICD-10-CM

## 2024-01-04 DIAGNOSIS — E43 UNSPECIFIED SEVERE PROTEIN-CALORIE MALNUTRITION: ICD-10-CM

## 2024-01-04 DIAGNOSIS — K21.9 GASTRO-ESOPHAGEAL REFLUX DISEASE WITHOUT ESOPHAGITIS: ICD-10-CM

## 2024-01-04 DIAGNOSIS — E78.5 HYPERLIPIDEMIA, UNSPECIFIED: ICD-10-CM

## 2024-01-04 DIAGNOSIS — Z86.16 PERSONAL HISTORY OF COVID-19: ICD-10-CM

## 2024-01-04 DIAGNOSIS — H43.11 VITREOUS HEMORRHAGE, RIGHT EYE: ICD-10-CM

## 2024-01-04 DIAGNOSIS — I50.9 HEART FAILURE, UNSPECIFIED: ICD-10-CM

## 2024-01-04 DIAGNOSIS — I65.21 OCCLUSION AND STENOSIS OF RIGHT CAROTID ARTERY: ICD-10-CM

## 2024-01-04 DIAGNOSIS — Z85.118 PERSONAL HISTORY OF OTHER MALIGNANT NEOPLASM OF BRONCHUS AND LUNG: ICD-10-CM

## 2024-01-04 DIAGNOSIS — I25.10 ATHEROSCLEROTIC HEART DISEASE OF NATIVE CORONARY ARTERY WITHOUT ANGINA PECTORIS: ICD-10-CM

## 2024-01-04 DIAGNOSIS — E11.51 TYPE 2 DIABETES MELLITUS WITH DIABETIC PERIPHERAL ANGIOPATHY WITHOUT GANGRENE: ICD-10-CM

## 2024-01-05 ENCOUNTER — TRANSCRIPTION ENCOUNTER (OUTPATIENT)
Age: 89
End: 2024-01-05

## 2024-03-13 NOTE — DIETITIAN NUTRITION RISK NOTIFICATION - FINDINGS BASED ON COMPREHENSIVE NUTRITION ASSESSMENT, CONSULTATION PERFORMED ON
"./70   Pulse (!) 111   Temp 37.2 °C (99 °F) (Temporal)   Resp 20   Ht 1.6 m (5' 3\")   Wt 38.6 kg (85 lb)   LMP 09/21/2011   SpO2 96%   BMI 15.06 kg/m²   .  Chief Complaint   Patient presents with    Abdominal Pain     Pt BIBA. States they have been having abd pain x 3 weeks. Blood in stool last thursday Pt had a fall today, (-) LOC (-) trauma         " 28-Dec-2023

## 2024-03-26 ENCOUNTER — APPOINTMENT (OUTPATIENT)
Dept: ELECTROPHYSIOLOGY | Facility: CLINIC | Age: 89
End: 2024-03-26
Payer: MEDICARE

## 2024-03-26 ENCOUNTER — NON-APPOINTMENT (OUTPATIENT)
Age: 89
End: 2024-03-26

## 2024-03-27 PROCEDURE — 93296 REM INTERROG EVL PM/IDS: CPT

## 2024-03-27 PROCEDURE — 93295 DEV INTERROG REMOTE 1/2/MLT: CPT

## 2024-04-17 ENCOUNTER — APPOINTMENT (OUTPATIENT)
Dept: OTOLARYNGOLOGY | Facility: CLINIC | Age: 89
End: 2024-04-17

## 2024-04-24 ENCOUNTER — EMERGENCY (EMERGENCY)
Facility: HOSPITAL | Age: 89
LOS: 0 days | Discharge: HOME | End: 2024-04-25
Attending: EMERGENCY MEDICINE
Payer: MEDICARE

## 2024-04-24 VITALS
OXYGEN SATURATION: 94 % | WEIGHT: 211.2 LBS | RESPIRATION RATE: 16 BRPM | HEIGHT: 70 IN | SYSTOLIC BLOOD PRESSURE: 142 MMHG | DIASTOLIC BLOOD PRESSURE: 57 MMHG | HEART RATE: 66 BPM | TEMPERATURE: 98 F

## 2024-04-24 DIAGNOSIS — I25.2 OLD MYOCARDIAL INFARCTION: ICD-10-CM

## 2024-04-24 DIAGNOSIS — Z79.4 LONG TERM (CURRENT) USE OF INSULIN: ICD-10-CM

## 2024-04-24 DIAGNOSIS — R26.2 DIFFICULTY IN WALKING, NOT ELSEWHERE CLASSIFIED: ICD-10-CM

## 2024-04-24 DIAGNOSIS — Z95.810 PRESENCE OF AUTOMATIC (IMPLANTABLE) CARDIAC DEFIBRILLATOR: Chronic | ICD-10-CM

## 2024-04-24 DIAGNOSIS — N40.0 BENIGN PROSTATIC HYPERPLASIA WITHOUT LOWER URINARY TRACT SYMPTOMS: ICD-10-CM

## 2024-04-24 DIAGNOSIS — I50.23 ACUTE ON CHRONIC SYSTOLIC (CONGESTIVE) HEART FAILURE: ICD-10-CM

## 2024-04-24 DIAGNOSIS — Z90.49 ACQUIRED ABSENCE OF OTHER SPECIFIED PARTS OF DIGESTIVE TRACT: Chronic | ICD-10-CM

## 2024-04-24 DIAGNOSIS — R29.6 REPEATED FALLS: ICD-10-CM

## 2024-04-24 DIAGNOSIS — Z98.890 OTHER SPECIFIED POSTPROCEDURAL STATES: Chronic | ICD-10-CM

## 2024-04-24 DIAGNOSIS — N18.9 CHRONIC KIDNEY DISEASE, UNSPECIFIED: ICD-10-CM

## 2024-04-24 DIAGNOSIS — E11.65 TYPE 2 DIABETES MELLITUS WITH HYPERGLYCEMIA: ICD-10-CM

## 2024-04-24 DIAGNOSIS — I65.21 OCCLUSION AND STENOSIS OF RIGHT CAROTID ARTERY: ICD-10-CM

## 2024-04-24 DIAGNOSIS — N17.9 ACUTE KIDNEY FAILURE, UNSPECIFIED: ICD-10-CM

## 2024-04-24 DIAGNOSIS — J44.9 CHRONIC OBSTRUCTIVE PULMONARY DISEASE, UNSPECIFIED: ICD-10-CM

## 2024-04-24 DIAGNOSIS — Z86.16 PERSONAL HISTORY OF COVID-19: ICD-10-CM

## 2024-04-24 DIAGNOSIS — Z86.74 PERSONAL HISTORY OF SUDDEN CARDIAC ARREST: ICD-10-CM

## 2024-04-24 DIAGNOSIS — E11.51 TYPE 2 DIABETES MELLITUS WITH DIABETIC PERIPHERAL ANGIOPATHY WITHOUT GANGRENE: ICD-10-CM

## 2024-04-24 DIAGNOSIS — Z90.2 ACQUIRED ABSENCE OF LUNG [PART OF]: Chronic | ICD-10-CM

## 2024-04-24 DIAGNOSIS — I25.10 ATHEROSCLEROTIC HEART DISEASE OF NATIVE CORONARY ARTERY WITHOUT ANGINA PECTORIS: ICD-10-CM

## 2024-04-24 DIAGNOSIS — H35.30 UNSPECIFIED MACULAR DEGENERATION: ICD-10-CM

## 2024-04-24 DIAGNOSIS — Z20.822 CONTACT WITH AND (SUSPECTED) EXPOSURE TO COVID-19: ICD-10-CM

## 2024-04-24 DIAGNOSIS — Z95.810 PRESENCE OF AUTOMATIC (IMPLANTABLE) CARDIAC DEFIBRILLATOR: ICD-10-CM

## 2024-04-24 DIAGNOSIS — E78.5 HYPERLIPIDEMIA, UNSPECIFIED: ICD-10-CM

## 2024-04-24 DIAGNOSIS — D69.6 THROMBOCYTOPENIA, UNSPECIFIED: ICD-10-CM

## 2024-04-24 DIAGNOSIS — I95.1 ORTHOSTATIC HYPOTENSION: ICD-10-CM

## 2024-04-24 DIAGNOSIS — E11.22 TYPE 2 DIABETES MELLITUS WITH DIABETIC CHRONIC KIDNEY DISEASE: ICD-10-CM

## 2024-04-24 DIAGNOSIS — Z85.118 PERSONAL HISTORY OF OTHER MALIGNANT NEOPLASM OF BRONCHUS AND LUNG: ICD-10-CM

## 2024-04-24 DIAGNOSIS — Z95.5 PRESENCE OF CORONARY ANGIOPLASTY IMPLANT AND GRAFT: ICD-10-CM

## 2024-04-24 DIAGNOSIS — I70.209 UNSPECIFIED ATHEROSCLEROSIS OF NATIVE ARTERIES OF EXTREMITIES, UNSPECIFIED EXTREMITY: ICD-10-CM

## 2024-04-24 DIAGNOSIS — I13.0 HYPERTENSIVE HEART AND CHRONIC KIDNEY DISEASE WITH HEART FAILURE AND STAGE 1 THROUGH STAGE 4 CHRONIC KIDNEY DISEASE, OR UNSPECIFIED CHRONIC KIDNEY DISEASE: ICD-10-CM

## 2024-04-24 DIAGNOSIS — Z79.01 LONG TERM (CURRENT) USE OF ANTICOAGULANTS: ICD-10-CM

## 2024-04-24 DIAGNOSIS — I35.0 NONRHEUMATIC AORTIC (VALVE) STENOSIS: ICD-10-CM

## 2024-04-24 DIAGNOSIS — K21.9 GASTRO-ESOPHAGEAL REFLUX DISEASE WITHOUT ESOPHAGITIS: ICD-10-CM

## 2024-04-24 DIAGNOSIS — R06.09 OTHER FORMS OF DYSPNEA: ICD-10-CM

## 2024-04-24 LAB
ALBUMIN SERPL ELPH-MCNC: 3.6 G/DL — SIGNIFICANT CHANGE UP (ref 3.3–5)
ALP SERPL-CCNC: 75 U/L — SIGNIFICANT CHANGE UP (ref 40–120)
ALT FLD-CCNC: 12 U/L — SIGNIFICANT CHANGE UP (ref 12–78)
ANION GAP SERPL CALC-SCNC: 5 MMOL/L — SIGNIFICANT CHANGE UP (ref 5–17)
APTT BLD: 39.1 SEC — HIGH (ref 24.5–35.6)
AST SERPL-CCNC: 16 U/L — SIGNIFICANT CHANGE UP (ref 15–37)
BASOPHILS # BLD AUTO: 0.05 K/UL — SIGNIFICANT CHANGE UP (ref 0–0.2)
BASOPHILS NFR BLD AUTO: 0.6 % — SIGNIFICANT CHANGE UP (ref 0–2)
BILIRUB SERPL-MCNC: 0.8 MG/DL — SIGNIFICANT CHANGE UP (ref 0.2–1.2)
BUN SERPL-MCNC: 22 MG/DL — SIGNIFICANT CHANGE UP (ref 7–23)
CALCIUM SERPL-MCNC: 9.5 MG/DL — SIGNIFICANT CHANGE UP (ref 8.5–10.1)
CHLORIDE SERPL-SCNC: 102 MMOL/L — SIGNIFICANT CHANGE UP (ref 96–108)
CO2 SERPL-SCNC: 29 MMOL/L — SIGNIFICANT CHANGE UP (ref 22–31)
CREAT SERPL-MCNC: 1.63 MG/DL — HIGH (ref 0.5–1.3)
EGFR: 40 ML/MIN/1.73M2 — LOW
EOSINOPHIL # BLD AUTO: 0.26 K/UL — SIGNIFICANT CHANGE UP (ref 0–0.5)
EOSINOPHIL NFR BLD AUTO: 3.2 % — SIGNIFICANT CHANGE UP (ref 0–6)
FLUAV AG NPH QL: SIGNIFICANT CHANGE UP
FLUBV AG NPH QL: SIGNIFICANT CHANGE UP
GLUCOSE SERPL-MCNC: 130 MG/DL — HIGH (ref 70–99)
HCT VFR BLD CALC: 38 % — LOW (ref 39–50)
HGB BLD-MCNC: 11.7 G/DL — LOW (ref 13–17)
IMM GRANULOCYTES NFR BLD AUTO: 0.2 % — SIGNIFICANT CHANGE UP (ref 0–0.9)
INR BLD: 1.56 RATIO — HIGH (ref 0.85–1.18)
LYMPHOCYTES # BLD AUTO: 1.75 K/UL — SIGNIFICANT CHANGE UP (ref 1–3.3)
LYMPHOCYTES # BLD AUTO: 21.6 % — SIGNIFICANT CHANGE UP (ref 13–44)
MCHC RBC-ENTMCNC: 27.3 PG — SIGNIFICANT CHANGE UP (ref 27–34)
MCHC RBC-ENTMCNC: 30.8 GM/DL — LOW (ref 32–36)
MCV RBC AUTO: 88.8 FL — SIGNIFICANT CHANGE UP (ref 80–100)
MONOCYTES # BLD AUTO: 0.6 K/UL — SIGNIFICANT CHANGE UP (ref 0–0.9)
MONOCYTES NFR BLD AUTO: 7.4 % — SIGNIFICANT CHANGE UP (ref 2–14)
NEUTROPHILS # BLD AUTO: 5.43 K/UL — SIGNIFICANT CHANGE UP (ref 1.8–7.4)
NEUTROPHILS NFR BLD AUTO: 67 % — SIGNIFICANT CHANGE UP (ref 43–77)
NT-PROBNP SERPL-SCNC: HIGH PG/ML (ref 0–450)
PLATELET # BLD AUTO: 371 K/UL — SIGNIFICANT CHANGE UP (ref 150–400)
POTASSIUM SERPL-MCNC: 3.7 MMOL/L — SIGNIFICANT CHANGE UP (ref 3.5–5.3)
POTASSIUM SERPL-SCNC: 3.7 MMOL/L — SIGNIFICANT CHANGE UP (ref 3.5–5.3)
PROT SERPL-MCNC: 7.7 GM/DL — SIGNIFICANT CHANGE UP (ref 6–8.3)
PROTHROM AB SERPL-ACNC: 17.4 SEC — HIGH (ref 9.5–13)
RBC # BLD: 4.28 M/UL — SIGNIFICANT CHANGE UP (ref 4.2–5.8)
RBC # FLD: 16.4 % — HIGH (ref 10.3–14.5)
RSV RNA NPH QL NAA+NON-PROBE: SIGNIFICANT CHANGE UP
SARS-COV-2 RNA SPEC QL NAA+PROBE: SIGNIFICANT CHANGE UP
SODIUM SERPL-SCNC: 136 MMOL/L — SIGNIFICANT CHANGE UP (ref 135–145)
TROPONIN I, HIGH SENSITIVITY RESULT: 22.36 NG/L — SIGNIFICANT CHANGE UP
TROPONIN I, HIGH SENSITIVITY RESULT: 25.8 NG/L — SIGNIFICANT CHANGE UP
WBC # BLD: 8.11 K/UL — SIGNIFICANT CHANGE UP (ref 3.8–10.5)
WBC # FLD AUTO: 8.11 K/UL — SIGNIFICANT CHANGE UP (ref 3.8–10.5)

## 2024-04-24 PROCEDURE — 93005 ELECTROCARDIOGRAM TRACING: CPT

## 2024-04-24 PROCEDURE — 85730 THROMBOPLASTIN TIME PARTIAL: CPT

## 2024-04-24 PROCEDURE — 99285 EMERGENCY DEPT VISIT HI MDM: CPT

## 2024-04-24 PROCEDURE — 83735 ASSAY OF MAGNESIUM: CPT

## 2024-04-24 PROCEDURE — 0241U: CPT

## 2024-04-24 PROCEDURE — 99285 EMERGENCY DEPT VISIT HI MDM: CPT | Mod: 25

## 2024-04-24 PROCEDURE — 96374 THER/PROPH/DIAG INJ IV PUSH: CPT

## 2024-04-24 PROCEDURE — 83036 HEMOGLOBIN GLYCOSYLATED A1C: CPT

## 2024-04-24 PROCEDURE — 93306 TTE W/DOPPLER COMPLETE: CPT

## 2024-04-24 PROCEDURE — 93010 ELECTROCARDIOGRAM REPORT: CPT

## 2024-04-24 PROCEDURE — 85610 PROTHROMBIN TIME: CPT

## 2024-04-24 PROCEDURE — 82962 GLUCOSE BLOOD TEST: CPT

## 2024-04-24 PROCEDURE — 83880 ASSAY OF NATRIURETIC PEPTIDE: CPT

## 2024-04-24 PROCEDURE — 85025 COMPLETE CBC W/AUTO DIFF WBC: CPT

## 2024-04-24 PROCEDURE — 84484 ASSAY OF TROPONIN QUANT: CPT | Mod: 59

## 2024-04-24 PROCEDURE — 80053 COMPREHEN METABOLIC PANEL: CPT

## 2024-04-24 PROCEDURE — 84443 ASSAY THYROID STIM HORMONE: CPT

## 2024-04-24 PROCEDURE — 84100 ASSAY OF PHOSPHORUS: CPT

## 2024-04-24 PROCEDURE — 96376 TX/PRO/DX INJ SAME DRUG ADON: CPT

## 2024-04-24 PROCEDURE — 36415 COLL VENOUS BLD VENIPUNCTURE: CPT

## 2024-04-24 PROCEDURE — G0378: CPT

## 2024-04-24 PROCEDURE — 71045 X-RAY EXAM CHEST 1 VIEW: CPT | Mod: 26

## 2024-04-24 PROCEDURE — 71045 X-RAY EXAM CHEST 1 VIEW: CPT

## 2024-04-24 RX ORDER — METOPROLOL TARTRATE 50 MG
0.5 TABLET ORAL
Refills: 0 | DISCHARGE

## 2024-04-24 RX ORDER — PANTOPRAZOLE SODIUM 20 MG/1
1 TABLET, DELAYED RELEASE ORAL
Refills: 0 | DISCHARGE

## 2024-04-24 RX ORDER — SACUBITRIL AND VALSARTAN 24; 26 MG/1; MG/1
1 TABLET, FILM COATED ORAL
Refills: 0 | DISCHARGE

## 2024-04-24 RX ORDER — LANOLIN ALCOHOL/MO/W.PET/CERES
1 CREAM (GRAM) TOPICAL
Qty: 0 | Refills: 0 | DISCHARGE

## 2024-04-24 RX ORDER — ACETAMINOPHEN 500 MG
650 TABLET ORAL ONCE
Refills: 0 | Status: DISCONTINUED | OUTPATIENT
Start: 2024-04-24 | End: 2024-04-25

## 2024-04-24 RX ORDER — MULTIVIT-MIN/FERROUS GLUCONATE 9 MG/15 ML
1 LIQUID (ML) ORAL
Refills: 0 | DISCHARGE

## 2024-04-24 RX ORDER — CHOLECALCIFEROL (VITAMIN D3) 125 MCG
2 CAPSULE ORAL
Refills: 0 | DISCHARGE

## 2024-04-24 RX ORDER — TAMSULOSIN HYDROCHLORIDE 0.4 MG/1
1 CAPSULE ORAL
Refills: 0 | DISCHARGE

## 2024-04-24 RX ORDER — SODIUM CHLORIDE 9 MG/ML
3 INJECTION INTRAMUSCULAR; INTRAVENOUS; SUBCUTANEOUS ONCE
Refills: 0 | Status: COMPLETED | OUTPATIENT
Start: 2024-04-24 | End: 2024-04-24

## 2024-04-24 RX ORDER — FUROSEMIDE 40 MG
60 TABLET ORAL ONCE
Refills: 0 | Status: COMPLETED | OUTPATIENT
Start: 2024-04-24 | End: 2024-04-24

## 2024-04-24 RX ORDER — ASPIRIN/CALCIUM CARB/MAGNESIUM 324 MG
1 TABLET ORAL
Refills: 0 | DISCHARGE

## 2024-04-24 RX ORDER — DAPAGLIFLOZIN 10 MG/1
1 TABLET, FILM COATED ORAL
Refills: 0 | DISCHARGE

## 2024-04-24 RX ADMIN — SODIUM CHLORIDE 3 MILLILITER(S): 9 INJECTION INTRAMUSCULAR; INTRAVENOUS; SUBCUTANEOUS at 18:51

## 2024-04-24 RX ADMIN — Medication 60 MILLIGRAM(S): at 20:33
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
## 2024-04-24 NOTE — ED PROVIDER NOTE - CONSTITUTIONAL, MLM
normal... Elderly white male, awake, alert, oriented to person, place, time/situation and in no respiratory distress. Elderly white male, awake, alert, oriented to person, place, time/situation and in no respiratory distress.  No sentence shortening.

## 2024-04-24 NOTE — ED ADULT TRIAGE NOTE - CHIEF COMPLAINT QUOTE
Pt presents to the ED c/o intermittent SOB for the past few weeks. Pt reports weight gain. PMH of CHF. Pt sent for EKG.
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
## 2024-04-24 NOTE — ED ADULT NURSE NOTE - OBJECTIVE STATEMENT
Pt is an 89yr old male, A&OX4 and ambulatory with a cane, c/o MONTGOMERY x1 week. Instructed by cardiologist Dr. Garcia to come to ED for further eval. Noted b/l LE edema. Denies chest pain, HA, dizziness, abd. pain, N/V, fever/chills, cough, and urinary symptoms. Labs drawn and sent. EKG completed. Placed on cardiac monitor. In NAD.

## 2024-04-24 NOTE — PATIENT PROFILE ADULT - NSPROPOAURINARYCATHETER_GEN_A_NUR
Called both pt and spouse's phone, could not leave a message.  There's sooner follow up appointment spot open on 6/4 (Thu) at 10 am to be seen. Will send My chart message as well. Kerrie Chen CNP, 6/3/2020      
no

## 2024-04-24 NOTE — ED PROVIDER NOTE - SKIN, MLM
Skin normal color for race, warm, dry and intact. No evidence of rash. Skin normal color for race, warm, dry and intact. No evidence of rash.  No tactile warmth nor diaphoresis.

## 2024-04-24 NOTE — PHARMACOTHERAPY INTERVENTION NOTE - COMMENTS
Medication reconciliation completed.  Patient was unable to provide medication information, spoke to ida Santos ([phone]) and they provided current medication list; confirmed with Dr. [facility].

## 2024-04-24 NOTE — ED PROVIDER NOTE - RESPIRATORY, MLM
Grossly clear b/l. Normal respirations. No retractions. O2 94 to 95% on RA Grossly clear b/l. Normal respirations. No retractions. O2 94 to 95% on R/A

## 2024-04-24 NOTE — PATIENT PROFILE ADULT - FALL HARM RISK - HARM RISK INTERVENTIONS
Assistance with ambulation/Assistance OOB with selected safe patient handling equipment/Communicate Risk of Fall with Harm to all staff/Monitor for mental status changes/Monitor gait and stability/Provide patient with walking aids - walker, cane, crutches/Reinforce activity limits and safety measures with patient and family/Tailored Fall Risk Interventions/Use of alarms - bed, chair and/or voice tab/Visual Cue: Yellow wristband and red socks/Bed in lowest position, wheels locked, appropriate side rails in place/Call bell, personal items and telephone in reach/Instruct patient to call for assistance before getting out of bed or chair/Non-slip footwear when patient is out of bed/[place] to call system/Physically safe environment - no spills, clutter or unnecessary equipment/Purposeful Proactive Rounding/Room/bathroom lighting operational, light cord in reach

## 2024-04-24 NOTE — PATIENT PROFILE ADULT - FUNCTIONAL ASSESSMENT - BASIC MOBILITY 6.
Left message for patient to schedule service to neurological surgery as ordered by Dr. Mccall.     4-calculated by average/Not able to assess (calculate score using [facility] averaging method)

## 2024-04-24 NOTE — ED PROVIDER NOTE - ENMT, MLM
Oropharynx clear. Airway patent, Nasal mucosa clear. Mouth with minimally dry mucosa. Throat has no vesicles, no oropharyngeal exudates and uvula is midline.

## 2024-04-24 NOTE — ED PROVIDER NOTE - MUSCULOSKELETAL, MLM
Spine appears normal, MAEx4. RLE 2+ pitting edema. Tender anteriorly but no calf tenderness. LLE no edema, nontender. +focal tenderness left anterior rib/breast area. No obvious palpable mass. No associated discoloration. Spine appears normal, MAEx4. RLE 2+ pitting edema. Tender anteriorly but no calf tenderness. LLE no edema, nontender. +focal tenderness left anterior rib/breast area, no obvious palpable mass, no associated discoloration.

## 2024-04-24 NOTE — PATIENT PROFILE ADULT - FALL HARM RISK - TYPE OF ASSESSMENT
2018  EMPLOYEE INFORMATION: EMPLOYER INFORMATION:   NAME: Jose Reyes [employer]   : 1983 [phone]   DATE OF INJURY/EVENT: 2018           Location: [facility]   Treating Provider: Kaykay Hua PA-C  Time In:  2:02 PM Time Out:  3:03 PM      DIAGNOSIS:   1. Laceration of left index finger without foreign body without damage to nail, initial encounter      STATUS: This injury is determined to be WORK RELATED.  RETURN TO WORK:   Mr. Reyes may return to work with restrictions    on 2018                RESTRICTIONS:  Restrictions are to be followed at work and at home. Restrictions are in effect until next follow-up visit. Limited gripping and grasping with left hand. May wear brace on finger.  No lifting or carrying more than 5 lbs with left hand.  Keep finger.clean and dry   Diagnostic Testing:   None         ANTICIPATED MAXIMUM MEDICAL IMPROVEMENT: 2 weeks  TREATMENT PLAN:  Medications for this injury/condition:   Take 1 to 2 tylenol or 400 to 600 mg ibuprofen every 6 hours as needed for pain  Instructions:   Change bandages daily or more frequently if wet or dirty.  Wash with mild soap and water between bandage changes.  FOLLOW UP: Return in 4 days (on 2018). at 9:00AM with Dr Veena Landeros  Thank you for the privilege of providing medical care for this injury/condition.  If there are any questions, please call the occupational health clinic at Dept: [phone].    Electronically signed on 2018 at 3:03 PM by:   Kaykay Hua PA-C   [facility]    The physician below agrees with the restrictions placed on the patient by the provider above.  Veena Landeros MD  
Admission
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
## 2024-04-25 ENCOUNTER — RESULT REVIEW (OUTPATIENT)
Age: 89
End: 2024-04-25

## 2024-04-25 ENCOUNTER — TRANSCRIPTION ENCOUNTER (OUTPATIENT)
Age: 89
End: 2024-04-25

## 2024-04-25 VITALS — DIASTOLIC BLOOD PRESSURE: 61 MMHG | HEART RATE: 73 BPM | SYSTOLIC BLOOD PRESSURE: 120 MMHG

## 2024-04-25 LAB
A1C WITH ESTIMATED AVERAGE GLUCOSE RESULT: 6.3 % — HIGH (ref 4–5.6)
ALBUMIN SERPL ELPH-MCNC: 3.5 G/DL — SIGNIFICANT CHANGE UP (ref 3.3–5)
ALP SERPL-CCNC: 71 U/L — SIGNIFICANT CHANGE UP (ref 40–120)
ALT FLD-CCNC: 11 U/L — LOW (ref 12–78)
ANION GAP SERPL CALC-SCNC: 5 MMOL/L — SIGNIFICANT CHANGE UP (ref 5–17)
APTT BLD: 38.2 SEC — HIGH (ref 24.5–35.6)
AST SERPL-CCNC: 14 U/L — LOW (ref 15–37)
BASOPHILS # BLD AUTO: 0.07 K/UL — SIGNIFICANT CHANGE UP (ref 0–0.2)
BASOPHILS NFR BLD AUTO: 0.9 % — SIGNIFICANT CHANGE UP (ref 0–2)
BILIRUB SERPL-MCNC: 0.9 MG/DL — SIGNIFICANT CHANGE UP (ref 0.2–1.2)
BUN SERPL-MCNC: 20 MG/DL — SIGNIFICANT CHANGE UP (ref 7–23)
CALCIUM SERPL-MCNC: 9.2 MG/DL — SIGNIFICANT CHANGE UP (ref 8.5–10.1)
CHLORIDE SERPL-SCNC: 101 MMOL/L — SIGNIFICANT CHANGE UP (ref 96–108)
CO2 SERPL-SCNC: 31 MMOL/L — SIGNIFICANT CHANGE UP (ref 22–31)
CREAT SERPL-MCNC: 1.51 MG/DL — HIGH (ref 0.5–1.3)
EGFR: 44 ML/MIN/1.73M2 — LOW
EOSINOPHIL # BLD AUTO: 0.28 K/UL — SIGNIFICANT CHANGE UP (ref 0–0.5)
EOSINOPHIL NFR BLD AUTO: 3.5 % — SIGNIFICANT CHANGE UP (ref 0–6)
ESTIMATED AVERAGE GLUCOSE: 134 MG/DL — HIGH (ref 68–114)
GLUCOSE BLDC GLUCOMTR-MCNC: 118 MG/DL — HIGH (ref 70–99)
GLUCOSE BLDC GLUCOMTR-MCNC: 129 MG/DL — HIGH (ref 70–99)
GLUCOSE BLDC GLUCOMTR-MCNC: 161 MG/DL — HIGH (ref 70–99)
GLUCOSE SERPL-MCNC: 146 MG/DL — HIGH (ref 70–99)
HCT VFR BLD CALC: 36.9 % — LOW (ref 39–50)
HGB BLD-MCNC: 12 G/DL — LOW (ref 13–17)
IMM GRANULOCYTES NFR BLD AUTO: 0.5 % — SIGNIFICANT CHANGE UP (ref 0–0.9)
INR BLD: 1.31 RATIO — HIGH (ref 0.85–1.18)
LYMPHOCYTES # BLD AUTO: 1.54 K/UL — SIGNIFICANT CHANGE UP (ref 1–3.3)
LYMPHOCYTES # BLD AUTO: 19.3 % — SIGNIFICANT CHANGE UP (ref 13–44)
MAGNESIUM SERPL-MCNC: 2 MG/DL — SIGNIFICANT CHANGE UP (ref 1.6–2.6)
MCHC RBC-ENTMCNC: 28 PG — SIGNIFICANT CHANGE UP (ref 27–34)
MCHC RBC-ENTMCNC: 32.5 GM/DL — SIGNIFICANT CHANGE UP (ref 32–36)
MCV RBC AUTO: 86 FL — SIGNIFICANT CHANGE UP (ref 80–100)
MONOCYTES # BLD AUTO: 0.61 K/UL — SIGNIFICANT CHANGE UP (ref 0–0.9)
MONOCYTES NFR BLD AUTO: 7.6 % — SIGNIFICANT CHANGE UP (ref 2–14)
NEUTROPHILS # BLD AUTO: 5.44 K/UL — SIGNIFICANT CHANGE UP (ref 1.8–7.4)
NEUTROPHILS NFR BLD AUTO: 68.2 % — SIGNIFICANT CHANGE UP (ref 43–77)
PHOSPHATE SERPL-MCNC: 2.7 MG/DL — SIGNIFICANT CHANGE UP (ref 2.5–4.5)
PLATELET # BLD AUTO: 373 K/UL — SIGNIFICANT CHANGE UP (ref 150–400)
POTASSIUM SERPL-MCNC: 3.6 MMOL/L — SIGNIFICANT CHANGE UP (ref 3.5–5.3)
POTASSIUM SERPL-SCNC: 3.6 MMOL/L — SIGNIFICANT CHANGE UP (ref 3.5–5.3)
PROT SERPL-MCNC: 7.5 GM/DL — SIGNIFICANT CHANGE UP (ref 6–8.3)
PROTHROM AB SERPL-ACNC: 14.7 SEC — HIGH (ref 9.5–13)
RBC # BLD: 4.29 M/UL — SIGNIFICANT CHANGE UP (ref 4.2–5.8)
RBC # FLD: 16.2 % — HIGH (ref 10.3–14.5)
SODIUM SERPL-SCNC: 137 MMOL/L — SIGNIFICANT CHANGE UP (ref 135–145)
TSH SERPL-MCNC: 1.54 UU/ML — SIGNIFICANT CHANGE UP (ref 0.34–4.82)
WBC # BLD: 7.98 K/UL — SIGNIFICANT CHANGE UP (ref 3.8–10.5)
WBC # FLD AUTO: 7.98 K/UL — SIGNIFICANT CHANGE UP (ref 3.8–10.5)

## 2024-04-25 PROCEDURE — 99497 ADVNCD CARE PLAN 30 MIN: CPT | Mod: 25

## 2024-04-25 PROCEDURE — 99223 1ST HOSP IP/OBS HIGH 75: CPT

## 2024-04-25 PROCEDURE — 93306 TTE W/DOPPLER COMPLETE: CPT | Mod: 26

## 2024-04-25 RX ORDER — SODIUM CHLORIDE 9 MG/ML
1000 INJECTION, SOLUTION INTRAVENOUS
Refills: 0 | Status: DISCONTINUED | OUTPATIENT
Start: 2024-04-25 | End: 2024-04-25

## 2024-04-25 RX ORDER — MULTIVIT-MIN/FERROUS GLUCONATE 9 MG/15 ML
1 LIQUID (ML) ORAL DAILY
Refills: 0 | Status: DISCONTINUED | OUTPATIENT
Start: 2024-04-25 | End: 2024-04-25

## 2024-04-25 RX ORDER — LANOLIN ALCOHOL/MO/W.PET/CERES
5 CREAM (GRAM) TOPICAL AT BEDTIME
Refills: 0 | Status: DISCONTINUED | OUTPATIENT
Start: 2024-04-25 | End: 2024-04-25

## 2024-04-25 RX ORDER — RIVAROXABAN 15 MG-20MG
1 KIT ORAL
Refills: 0 | DISCHARGE

## 2024-04-25 RX ORDER — DEXTROSE 50 % IN WATER 50 %
12.5 SYRINGE (ML) INTRAVENOUS ONCE
Refills: 0 | Status: DISCONTINUED | OUTPATIENT
Start: 2024-04-25 | End: 2024-04-25

## 2024-04-25 RX ORDER — ATORVASTATIN CALCIUM 80 MG/1
10 TABLET, FILM COATED ORAL AT BEDTIME
Refills: 0 | Status: DISCONTINUED | OUTPATIENT
Start: 2024-04-25 | End: 2024-04-25

## 2024-04-25 RX ORDER — CHOLECALCIFEROL (VITAMIN D3) 125 MCG
2000 CAPSULE ORAL DAILY
Refills: 0 | Status: DISCONTINUED | OUTPATIENT
Start: 2024-04-25 | End: 2024-04-25

## 2024-04-25 RX ORDER — GLUCAGON INJECTION, SOLUTION 0.5 MG/.1ML
1 INJECTION, SOLUTION SUBCUTANEOUS ONCE
Refills: 0 | Status: DISCONTINUED | OUTPATIENT
Start: 2024-04-25 | End: 2024-04-25

## 2024-04-25 RX ORDER — ACETAMINOPHEN 500 MG
650 TABLET ORAL EVERY 6 HOURS
Refills: 0 | Status: DISCONTINUED | OUTPATIENT
Start: 2024-04-25 | End: 2024-04-25

## 2024-04-25 RX ORDER — INSULIN LISPRO 100/ML
VIAL (ML) SUBCUTANEOUS
Refills: 0 | Status: DISCONTINUED | OUTPATIENT
Start: 2024-04-25 | End: 2024-04-25

## 2024-04-25 RX ORDER — PANTOPRAZOLE SODIUM 20 MG/1
40 TABLET, DELAYED RELEASE ORAL
Refills: 0 | Status: DISCONTINUED | OUTPATIENT
Start: 2024-04-25 | End: 2024-04-25

## 2024-04-25 RX ORDER — RIVAROXABAN 15 MG-20MG
20 KIT ORAL DAILY
Refills: 0 | Status: DISCONTINUED | OUTPATIENT
Start: 2024-04-25 | End: 2024-04-25

## 2024-04-25 RX ORDER — FUROSEMIDE 40 MG
20 TABLET ORAL
Refills: 0 | Status: DISCONTINUED | OUTPATIENT
Start: 2024-04-25 | End: 2024-04-25

## 2024-04-25 RX ORDER — DEXTROSE 10 % IN WATER 10 %
125 INTRAVENOUS SOLUTION INTRAVENOUS ONCE
Refills: 0 | Status: DISCONTINUED | OUTPATIENT
Start: 2024-04-25 | End: 2024-04-25

## 2024-04-25 RX ORDER — RIVAROXABAN 15 MG-20MG
1 KIT ORAL
Qty: 30 | Refills: 0 | DISCHARGE
Start: 2024-04-25 | End: 2024-05-24

## 2024-04-25 RX ORDER — ASPIRIN/CALCIUM CARB/MAGNESIUM 324 MG
81 TABLET ORAL DAILY
Refills: 0 | Status: DISCONTINUED | OUTPATIENT
Start: 2024-04-25 | End: 2024-04-25

## 2024-04-25 RX ORDER — INSULIN LISPRO 100/ML
VIAL (ML) SUBCUTANEOUS AT BEDTIME
Refills: 0 | Status: DISCONTINUED | OUTPATIENT
Start: 2024-04-25 | End: 2024-04-25

## 2024-04-25 RX ORDER — METOPROLOL TARTRATE 50 MG
12.5 TABLET ORAL AT BEDTIME
Refills: 0 | Status: DISCONTINUED | OUTPATIENT
Start: 2024-04-25 | End: 2024-04-25

## 2024-04-25 RX ORDER — SACUBITRIL AND VALSARTAN 24; 26 MG/1; MG/1
1 TABLET, FILM COATED ORAL
Refills: 0 | Status: DISCONTINUED | OUTPATIENT
Start: 2024-04-25 | End: 2024-04-25

## 2024-04-25 RX ORDER — DEXTROSE 50 % IN WATER 50 %
15 SYRINGE (ML) INTRAVENOUS ONCE
Refills: 0 | Status: DISCONTINUED | OUTPATIENT
Start: 2024-04-25 | End: 2024-04-25

## 2024-04-25 RX ORDER — DAPAGLIFLOZIN 10 MG/1
10 TABLET, FILM COATED ORAL DAILY
Refills: 0 | Status: DISCONTINUED | OUTPATIENT
Start: 2024-04-25 | End: 2024-04-25

## 2024-04-25 RX ORDER — RIVAROXABAN 15 MG-20MG
15 KIT ORAL
Refills: 0 | Status: DISCONTINUED | OUTPATIENT
Start: 2024-04-25 | End: 2024-04-25

## 2024-04-25 RX ORDER — DEXTROSE 50 % IN WATER 50 %
25 SYRINGE (ML) INTRAVENOUS ONCE
Refills: 0 | Status: DISCONTINUED | OUTPATIENT
Start: 2024-04-25 | End: 2024-04-25

## 2024-04-25 RX ORDER — FUROSEMIDE 40 MG
2 TABLET ORAL
Qty: 60 | Refills: 0
Start: 2024-04-25 | End: 2024-05-24

## 2024-04-25 RX ORDER — RIVAROXABAN 15 MG-20MG
1 KIT ORAL
Qty: 30 | Refills: 0
Start: 2024-04-25 | End: 2024-05-24

## 2024-04-25 RX ADMIN — Medication 2000 UNIT(S): at 09:14

## 2024-04-25 RX ADMIN — Medication 1 TABLET(S): at 09:14

## 2024-04-25 RX ADMIN — Medication 12.5 MILLIGRAM(S): at 00:54

## 2024-04-25 RX ADMIN — Medication 20 MILLIGRAM(S): at 13:46

## 2024-04-25 RX ADMIN — RIVAROXABAN 20 MILLIGRAM(S): KIT at 09:14

## 2024-04-25 RX ADMIN — Medication 81 MILLIGRAM(S): at 09:14

## 2024-04-25 RX ADMIN — SACUBITRIL AND VALSARTAN 1 TABLET(S): 24; 26 TABLET, FILM COATED ORAL at 19:48

## 2024-04-25 RX ADMIN — DAPAGLIFLOZIN 10 MILLIGRAM(S): 10 TABLET, FILM COATED ORAL at 10:57

## 2024-04-25 RX ADMIN — PANTOPRAZOLE SODIUM 40 MILLIGRAM(S): 20 TABLET, DELAYED RELEASE ORAL at 05:08

## 2024-04-25 RX ADMIN — Medication 2: at 08:06

## 2024-04-25 RX ADMIN — Medication 20 MILLIGRAM(S): at 05:07

## 2024-04-25 RX ADMIN — SACUBITRIL AND VALSARTAN 1 TABLET(S): 24; 26 TABLET, FILM COATED ORAL at 10:58

## 2024-04-25 RX ADMIN — RIVAROXABAN 15 MILLIGRAM(S): KIT at 16:34

## 2024-04-25 RX ADMIN — ATORVASTATIN CALCIUM 10 MILLIGRAM(S): 80 TABLET, FILM COATED ORAL at 19:47

## 2024-04-25 RX ADMIN — Medication 12.5 MILLIGRAM(S): at 19:47

## 2024-04-25 NOTE — DISCHARGE NOTE PROVIDER - NSDCPNSUBOBJ_GEN_ALL_CORE
VITALS:  T(F): 98.1 (04-25-24 @ 09:00), Max: 98.1 (04-25-24 @ 09:00)  HR: 73 (04-25-24 @ 13:40) (65 - 80)  BP: 120/61 (04-25-24 @ 13:40) (117/50 - 148/71)  RR: 18 (04-25-24 @ 09:00) (18 - 20)  SpO2: 95% (04-25-24 @ 09:00) (94% - 100%)    PHYSICAL EXAM:  Gen: NAD  HEENT:  pupils equal and reactive, EOMI, no oropharyngeal lesions, erythema, exudates, oral thrush  NECK:   supple, no carotid bruits, no palpable lymph nodes, no thyromegaly  CV:  +S1, +S2, regular, no murmurs or rubs  RESP:   lungs clear to auscultation bilaterally, no wheezing, rales, rhonchi, good air entry bilaterally  BREAST:  not examined  GI:  abdomen soft, non-tender, non-distended, normal BS, no bruits, no abdominal masses, no palpable masses  RECTAL:  not examined  :  not examined  MSK:   normal muscle tone, no atrophy, no rigidity, no contractions  EXT:  no clubbing, no cyanosis, no edema, no calf pain, swelling or erythema  VASCULAR:  pulses equal and symmetric in the upper and lower extremities  NEURO:  AAOX3, no focal neurological deficits, follows all commands, able to move extremities spontaneously  SKIN:  no ulcers, lesions or rashes    #Acute on chronic HFmrEF  -tele paced  -BNP 10K, trop neg  -Echo w EF 45%, severe AS  -s/p iv lasix, d/c on oral lasix 40mg daily increased from 20mg daily  -entresto, BB, farxiga  -appreciate cards input    #severe AS  -outpt f/u re: TAVR    #OLIVIA on CKD  -cardiorenal, improved w diuresis    D/c home w F2F.

## 2024-04-25 NOTE — DIETITIAN INITIAL EVALUATION ADULT - BUCCAL DEPLETION IS
January 11, 2024     Patient: Randi Bright   YOB: 2011   Date of Visit: 1/11/2024       To Whom It May Concern:    Randi Bright was seen in my clinic on 1/11/2024 at 2:00 pm. Please excuse Randi for her absence from school on this day to make the appointment.    If you have any questions or concerns, please don't hesitate to call.         Sincerely,         Mateo Jaime MD        CC: No Recipients   moderate

## 2024-04-25 NOTE — DIETITIAN INITIAL EVALUATION ADULT - PERTINENT LABORATORY DATA
04-25    137  |  101  |  20  ----------------------------<  146<H>  3.6   |  31  |  1.51<H>    Ca    9.2      25 Apr 2024 06:23  Phos  2.7     04-25  Mg     2.0     04-25    TPro  7.5  /  Alb  3.5  /  TBili  0.9  /  DBili  x   /  AST  14<L>  /  ALT  11<L>  /  AlkPhos  71  04-25  POCT Blood Glucose.: 161 mg/dL (04-25-24 @ 07:32)  A1C with Estimated Average Glucose Result: 5.9 % (12-27-23 @ 09:58)  A1C with Estimated Average Glucose Result: 5.8 % (12-07-23 @ 16:01)

## 2024-04-25 NOTE — DIETITIAN INITIAL EVALUATION ADULT - ORAL INTAKE PTA/DIET HISTORY
Pt connie with family, has 2 aides, daughters and sons who take care of him and cook and shop for him.  Pt has not been watching his sodium and was somewhat unaware of his high blood sugars, despite having T2DM as a pmh. Pt eats three meals a day, Breakfast and lunch are light.  PO intake estimated < 75% ENN > one month, per dietary recall.

## 2024-04-25 NOTE — H&P ADULT - TIME BILLING
A minimum of 75 min was spent completing this admission not including time spent discussing advanced care planning or discussing goals of care with a minimum of 36 min spent face to face with patient while in ED unit on admission, counseling patient on current acute on chronic conditions and discussing plan and coordination of care including need for repeat TTE in AM, IV diuresis and cardiology consult with restricted diet and possible renal consult given OLIVIA.

## 2024-04-25 NOTE — DIETITIAN NUTRITION RISK NOTIFICATION - TREATMENT: THE FOLLOWING DIET HAS BEEN RECOMMENDED
Diet, DASH/TLC:   Sodium & Cholesterol Restricted  Consistent Carbohydrate {Evening Snack} (CSTCHOSN)  1500mL Fluid Restriction ([id number])  For patients receiving Renal Replacement - No Protein Restr, No Conc K, No Conc Phos, Low Sodium (RENAL) (04-25-24 @ 00:30) [Active]

## 2024-04-25 NOTE — H&P ADULT - MUSCULOSKELETAL
negative ROM intact/no calf tenderness/strength 5/5 bilateral upper extremities/strength 5/5 bilateral lower extremities

## 2024-04-25 NOTE — DISCHARGE NOTE PROVIDER - HOSPITAL COURSE
CC: CHF  HPI and Hospital Course:  88 yo male with a pmh/o s/p AICD, BPH, CAD, MI, COVID, CKD, COPD, DMII, GERD, Falls frequently, HTN, HLD, Lung cancer, Macular degeneration, PAD, Thrombocytopenia, who presents to ED secondary to increasing leg swelling and shortness of breath with exertion. Pt states that he went to see his cardiologist yesterday and has since increased his lasix however sx continued and he called and was told to come to ED where he was found to be in CHF. Pt given IV lasix and states that since then he feels much better and that his legs are less swollen now that he has had them elevated in the stretcher and after urinating due to lasix.     Managed for acute on chronic HFmrEF, severe AS and OLIVIA on CKD. Received iv diuresis w improvement. D/c on increased dose po lasix. For outpt f/u re: TAVR eval for severe AS.     D/c to home, F2F.  I have spent 33 min on day of d/c coordinating care.  See progress note for problem based plan.

## 2024-04-25 NOTE — H&P ADULT - HISTORY OF PRESENT ILLNESS
Pt is an 88 yo male with a pmh/o s/p AICD, BPH, CAD, MI, COVID, CKD, COPD, DMII, GERD, Falls frequently, HTN, HLD, Lung cancer, Macular degeneration, PAD, Thrombocytopenia, who presents to ED secondary to increasing leg swelling and shortness of breath with exertion. Pt states that he went to see his cardiologist yesterday and has since increased his lasix however sx continued and he called and was told to come to ED where he was found to be in CHF. Pt given IV lasix and states that since then he feels much better and that his legs are less swollen now that he has had them elevated in the stretcher and after urinating due to lasix.

## 2024-04-25 NOTE — DIETITIAN INITIAL EVALUATION ADULT - ADD RECOMMEND
Liberalize diet to 2 Gm Na to optimize po/nutrient intake.  Add Glucerna BID  Record PO intake in EMR after each meal (nursing.)   Add renal MVI  Maintain POCT  A target glucose range of 140-180 mg/dL is recommended for most critically ill and non-critically ill patients.   Consider adding thiamine 100 mg daily 2/2 poor PO intake/ malnutrition  Monitor bowel movements, if no BM for >3 days, consider implementing bowel regimen.   suggest Confirm Goals of Care regarding nutrition support   Monitor PO intake, tolerance, labs and weight.     C

## 2024-04-25 NOTE — DIETITIAN NUTRITION RISK NOTIFICATION - ADDITIONAL COMMENTS/DIETITIAN RECOMMENDATIONS
Liberalize diet to 2 Gm Na to optimize po/nutrient intake.  Add Glucerna BID  Record PO intake in EMR after each meal (nursing.)   Add renal MVI  Maintain POCT  A target glucose range of 140-180 mg/dL is recommended for most critically ill and non-critically ill patients.   Consider adding thiamine 100 mg daily 2/2 poor PO intake/ malnutrition  Monitor bowel movements, if no BM for >3 days, consider implementing bowel regimen.   suggest Confirm Goals of Care regarding nutrition support   Monitor PO intake, tolerance, labs and weight.

## 2024-04-25 NOTE — PROGRESS NOTE ADULT - ASSESSMENT
Patient is a 89 y/o male with a PMHx of AICD, BPH, CAD, cardiac arrest, COVID, CKD, COPD(not on home O2), diabetes, falling, GERD, heart failure, HTN, HLD, leg wound, lung cancer, macular degeneration, PAD, thrombocytopenia presents to the ED for right eye vision change. Pt reports he went to bed around 9:30 last night in his usual state of health. Pt woke up this morning at 6:30 with film over right eye and black spots. Recent Stebt to LCX last  admission Pt s/p AICD battery replacement 2.5 weeks ago with Dr. Oliva and had follow up appt  Pt told staff and was sent to the ED for further evaluation. On Xarelto. Pt also reports MONTGOMERY. Denies numbness, weakness, tingling, black/bloody stools, slurred speech. No other complaints at this time.  Patient clinically appears to be deconditioned, frail, chronically sick. + SOB last couple of days with leg swelling.  (27 Dec 2023 12:59)  Visual changes resolved no evidence of CVA on CT has been on uninterrupted Xarelto   He does have carotid DZ on CTA   It does not appear that he has had a CVA , He did have some decompensated CHF for which he received IV Lasix     #RT Eye vision Changes - Occular disease v arterial thrombus  #Acute hypoxic respiratory failure, acute on chronic systolic HF exacerbation improved   #Carotid Stenosis - RT 70-80% RT carotid bulb.  #Orthostatic hypotension, also w/ HTN  #Severe AS  #CAD s/p PCI  #CKD    - carotid dz - no intervention planned , apprec vascular sx input   - Continue Xarelto, high dose statins, asa, Entresto  - S/p IV Lasix - resume Farxiga on DC with low dose Lasix 20mg, would first repeat orthostatic vitals seems better and stable for DC from a cardiac standpoint with close f/u in our office next week   - Echo w/ severe AS - can be addressed as outpt  Patient is a 89 y/o male with a PMHx of AICD, BPH, CAD, cardiac arrest, COVID, CKD, COPD(not on home O2), diabetes, falling, GERD, heart failure, HTN, HLD, leg wound, lung cancer, macular degeneration, PAD, thrombocytopenia presents to the ED for right eye vision change. Pt reports he went to bed around 9:30 last night in his usual state of health. Pt woke up this morning at 6:30 with film over right eye and black spots. Recent Stebt to LCX last  admission Pt s/p AICD battery replacement 2.5 weeks ago with Dr. Oliva and had follow up appt  Pt told staff and was sent to the ED for further evaluation. On Xarelto. Pt also reports MONTGOMERY. Denies numbness, weakness, tingling, black/bloody stools, slurred speech. No other complaints at this time.  Patient clinically appears to be deconditioned, frail, chronically sick. + SOB last couple of days with leg swelling.  (27 Dec 2023 12:59)  Visual changes resolved no evidence of CVA on CT has been on uninterrupted Xarelto   He does have carotid DZ on CTA   It does not appear that he has had a CVA , He did have some decompensated CHF for which he received IV Lasix     #RT Eye vision Changes - Occular disease v arterial thrombus  #Acute hypoxic respiratory failure, acute on chronic systolic HF exacerbation improved   #Carotid Stenosis - RT 70-80% RT carotid bulb.  #Orthostatic hypotension, also w/ HTN  #Severe AS  #CAD s/p PCI  #CKD    - carotid dz - no intervention planned , apprec vascular sx input   - Continue Xarelto, high dose statins, asa, Entresto  - S/p IV Lasix - resume Farxiga on DC with   Lasix 40mg, would first repeat orthostatic vitals seems better and stable for DC from a cardiac standpoint with close f/u in our office next week   - Echo w/ severe AS - can be addressed as outpt  Patient is a 89 y/o male with a PMHx of AICD, BPH, CAD, cardiac arrest, COVID, CKD, COPD(not on home O2), diabetes, falling, GERD, heart failure, HTN, HLD, leg wound, lung cancer, macular degeneration, PAD, thrombocytopenia presents to the ED for right eye vision change. Pt reports he went to bed around 9:30 last night in his usual state of health. Pt woke up this morning at 6:30 with film over right eye and black spots. Recent Stebt to LCX last  admission Pt s/p AICD battery replacement 2.5 weeks ago with Dr. Oliva and had follow up appt  Pt told staff and was sent to the ED for further evaluation. On Xarelto. Pt also reports MONTGOMERY. Denies numbness, weakness, tingling, black/bloody stools, slurred speech. No other complaints at this time.  Patient clinically appears to be deconditioned, frail, chronically sick. + SOB last couple of days with leg swelling.  (27 Dec 2023 12:59)  Visual changes resolved no evidence of CVA on CT has been on uninterrupted Xarelto   He does have carotid DZ on CTA   It does not appear that he has had a CVA , He did have some decompensated CHF for which he received IV Lasix     #Acute respiratory distress, acute on chronic systolic HF exacerbation improved   #Carotid Stenosis - RT 70-80% RT carotid bulb.  #Orthostatic hypotension, also w/ HTN  #Severe AS  #CAD s/p PCI  #CKD    - carotid dz - no intervention planned , apprec vascular sx input   - Continue Xarelto, high dose statins, asa, Entresto  - S/p IV Lasix - resume Farxiga on DC with   Lasix 40mg, would first repeat orthostatic vitals seems better and stable for DC from a cardiac standpoint with close f/u in our office next week   - Echo w/ severe AS - can be addressed as outpt

## 2024-04-25 NOTE — H&P ADULT - ENDOCRINE
Pt informed of Sarah's message below.  Patient has verbalized understanding and has no further questions/concerns at this time.    negative

## 2024-04-25 NOTE — DIETITIAN INITIAL EVALUATION ADULT - ETIOLOGY
r/t inability to consume sufficient energy/protein 2/2 acute exacerbation of CHF, hx of chronic CHF, Lung Cancer, CKD

## 2024-04-25 NOTE — DISCHARGE NOTE PROVIDER - DETAILS OF MALNUTRITION DIAGNOSIS/DIAGNOSES
This patient has been assessed with a concern for Malnutrition and was treated during this hospitalization for the following Nutrition diagnosis/diagnoses:     -  04/25/2024: Severe protein-calorie malnutrition

## 2024-04-25 NOTE — DISCHARGE NOTE PROVIDER - NSDCCPCAREPLAN_GEN_ALL_CORE_FT
PRINCIPAL DISCHARGE DIAGNOSIS  Diagnosis: Acute exacerbation of CHF (congestive heart failure)  Assessment and Plan of Treatment: Take lasix 40mg daily (dose increase). Of note, your Xarelto dose has been reduced to 15mg instead of 20mg. Follow up with cardiology.

## 2024-04-25 NOTE — DIETITIAN INITIAL EVALUATION ADULT - NS FNS DIET ORDER
Diet, DASH/TLC:   Sodium & Cholesterol Restricted  Consistent Carbohydrate {Evening Snack} (CSTCHOSN)  1500mL Fluid Restriction ([id number])  For patients receiving Renal Replacement - No Protein Restr, No Conc K, No Conc Phos, Low Sodium (RENAL) (04-25-24 @ 00:30)

## 2024-04-25 NOTE — H&P ADULT - CONVERSATION DETAILS
16 min spent discussing advanced care planning and pt is a full code and accepts cpr and intubation trial along with IV medications and fluids and blood products should they be needed.

## 2024-04-25 NOTE — DISCHARGE NOTE NURSING/CASE MANAGEMENT/SOCIAL WORK - NSDCPEFALRISK_GEN_ALL_CORE
For information on Fall & Injury Prevention, visit: [email or web address] OR  [email or web address] OR  https://www.cdc.gov/steadi/patient.html

## 2024-04-25 NOTE — PROGRESS NOTE ADULT - SUBJECTIVE AND OBJECTIVE BOX
Patient is a 89y old  Male who presents with a chief complaint of Acute decompensated heart failure, OLIVIA on CKD (25 Apr 2024 00:18)      4/25-"  last was the first night i slept through without SOB "  MEDICATIONS  (STANDING):  aspirin enteric coated 81 milliGRAM(s) Oral daily  atorvastatin 10 milliGRAM(s) Oral at bedtime  cholecalciferol 2000 Unit(s) Oral daily  dextrose 10% Bolus 125 milliLiter(s) IV Bolus once  dextrose 5%. 1000 milliLiter(s) (50 mL/Hr) IV Continuous <Continuous>  dextrose 5%. 1000 milliLiter(s) (100 mL/Hr) IV Continuous <Continuous>  dextrose 50% Injectable 25 Gram(s) IV Push once  dextrose 50% Injectable 12.5 Gram(s) IV Push once  furosemide   Injectable 20 milliGRAM(s) IV Push two times a day  glucagon  Injectable 1 milliGRAM(s) IntraMuscular once  insulin lispro (ADMELOG) corrective regimen sliding scale   SubCutaneous three times a day before meals  melatonin 5 milliGRAM(s) Oral at bedtime  metoprolol succinate ER 12.5 milliGRAM(s) Oral at bedtime  multivitamin/minerals 1 Tablet(s) Oral daily  pantoprazole    Tablet 40 milliGRAM(s) Oral before breakfast  rivaroxaban 20 milliGRAM(s) Oral daily    MEDICATIONS  (PRN):  acetaminophen     Tablet .. 650 milliGRAM(s) Oral once PRN Temp greater or equal to 38C (100.4F), Mild Pain (1 - 3)  acetaminophen     Tablet .. 650 milliGRAM(s) Oral every 6 hours PRN Temp greater or equal to 38C (100.4F), Mild Pain (1 - 3), Moderate Pain (4 - 6)  dextrose Oral Gel 15 Gram(s) Oral once PRN Blood Glucose LESS THAN 70 milliGRAM(s)/deciliter            Vital Signs Last 24 Hrs  T(C): 36.6 (24 Apr 2024 23:30), Max: 36.7 (24 Apr 2024 21:10)  T(F): 97.9 (24 Apr 2024 23:30), Max: 98 (24 Apr 2024 21:10)  HR: 70 (25 Apr 2024 05:05) (65 - 80)  BP: 148/71 (25 Apr 2024 05:05) (118/60 - 148/71)  BP(mean): 79 (24 Apr 2024 22:26) (79 - 84)  RR: 18 (24 Apr 2024 23:30) (16 - 20)  SpO2: 94% (24 Apr 2024 23:30) (94% - 100%)    Parameters below as of 24 Apr 2024 23:30  Patient On (Oxygen Delivery Method): room air                INTERPRETATION OF TELEMETRY:    ECG:        LABS:                        12.0   7.98  )-----------( 373      ( 25 Apr 2024 06:23 )             36.9     04-25    137  |  101  |  20  ----------------------------<  146<H>  3.6   |  31  |  1.51<H>    Ca    9.2      25 Apr 2024 06:23  Phos  2.7     04-25  Mg     2.0     04-25    TPro  7.5  /  Alb  3.5  /  TBili  0.9  /  DBili  x   /  AST  14<L>  /  ALT  11<L>  /  AlkPhos  71  04-25        PT/INR - ( 25 Apr 2024 06:23 )   PT: 14.7 sec;   INR: 1.31 ratio         PTT - ( 25 Apr 2024 06:23 )  PTT:38.2 sec  Urinalysis Basic - ( 25 Apr 2024 06:23 )    Color: x / Appearance: x / SG: x / pH: x  Gluc: 146 mg/dL / Ketone: x  / Bili: x / Urobili: x   Blood: x / Protein: x / Nitrite: x   Leuk Esterase: x / RBC: x / WBC x   Sq Epi: x / Non Sq Epi: x / Bacteria: x      I&O's Summary    24 Apr 2024 07:01  -  25 Apr 2024 07:00  --------------------------------------------------------  IN: 0 mL / OUT: 1100 mL / NET: -1100 mL      BNP  RADIOLOGY & ADDITIONAL STUDIES:

## 2024-04-25 NOTE — DIETITIAN INITIAL EVALUATION ADULT - OTHER INFO
Pt is an 89 yo male with a pmh/o s/p AICD, BPH, CAD, MI, COVID, CKD, COPD, DMII, GERD, Falls frequently, HTN, HLD, Lung cancer, Macular degeneration, PAD, Thrombocytopenia, who presents to ED secondary to increasing leg swelling and shortness of breath with exertion. Pt states that he went to see his cardiologist yesterday and has since increased his lasix however sx continued and he called and was told to come to ED where he was found to be in CHF. Pt given IV lasix and states that since then he feels much better and that his legs are less swollen now that he has had them elevated in the stretcher and after urinating due to lasix. Pt is an 89 yo male with a pmh/o s/p AICD, BPH, CAD, MI, COVID, CKD, COPD, DMII, GERD, Falls frequently, HTN, HLD, Lung cancer, Macular degeneration, PAD, Thrombocytopenia, who presents to ED secondary to increasing leg swelling and shortness of breath with exertion. Pt states that he went to see his cardiologist yesterday and has since increased his lasix however sx continued and he called and was told to come to ED where he was found to be in CHF. Pt given IV lasix and states that since then he feels much better and that his legs are less swollen now that he has had them elevated in the stretcher and after urinating due to lasix.    Admit dx:  Acute Exacerbation of CHF.   Visited with pt while he was in bed.  Pt is [place], in good spirits.  RD bedscale weight is 93 kg   205#, pt reports height as 6'0"  Pt reports UBW as 209#  NFPE reveals moderate/severe muscle and fat wasting  No issues with chewing/swallowing, no issues with N/V/D  SPBNP is elevated, BG elevated, pt on POCT  HgbA1c is 5.9  12/27/2023  Suggest new HgbA1c  A target glucose range of 140-180 mg/dL is recommended for most critically ill and non-critically ill patients.   no home meds documented for DM  Pt given oral and written education on CHF MNT and nutrition for Diabetes  Pt on DASH diet, Consistent Carb diet, Renal diet, 1500 cc FR  Given pt's risk factors for malnutrition, suggest liberalize diet to 2 Gm Na  Add Premier shake 8 oz bid  Pt's K+ and Phos WNL, if elevated, reconsider adding restrictions via renal diet  suggest Confirm Goals of Care regarding nutrition support   Recommendations to follow in Plan/Intervention

## 2024-04-25 NOTE — DIETITIAN INITIAL EVALUATION ADULT - PERTINENT MEDS FT
MEDICATIONS  (STANDING):  aspirin enteric coated 81 milliGRAM(s) Oral daily  atorvastatin 10 milliGRAM(s) Oral at bedtime  cholecalciferol 2000 Unit(s) Oral daily  dextrose 10% Bolus 125 milliLiter(s) IV Bolus once  dextrose 5%. 1000 milliLiter(s) (100 mL/Hr) IV Continuous <Continuous>  dextrose 5%. 1000 milliLiter(s) (50 mL/Hr) IV Continuous <Continuous>  dextrose 50% Injectable 25 Gram(s) IV Push once  dextrose 50% Injectable 12.5 Gram(s) IV Push once  furosemide   Injectable 20 milliGRAM(s) IV Push two times a day  glucagon  Injectable 1 milliGRAM(s) IntraMuscular once  insulin lispro (ADMELOG) corrective regimen sliding scale   SubCutaneous three times a day before meals  melatonin 5 milliGRAM(s) Oral at bedtime  metoprolol succinate ER 12.5 milliGRAM(s) Oral at bedtime  multivitamin/minerals 1 Tablet(s) Oral daily  pantoprazole    Tablet 40 milliGRAM(s) Oral before breakfast  rivaroxaban 20 milliGRAM(s) Oral daily    MEDICATIONS  (PRN):  acetaminophen     Tablet .. 650 milliGRAM(s) Oral every 6 hours PRN Temp greater or equal to 38C (100.4F), Mild Pain (1 - 3), Moderate Pain (4 - 6)  acetaminophen     Tablet .. 650 milliGRAM(s) Oral once PRN Temp greater or equal to 38C (100.4F), Mild Pain (1 - 3)  dextrose Oral Gel 15 Gram(s) Oral once PRN Blood Glucose LESS THAN 70 milliGRAM(s)/deciliter

## 2024-04-25 NOTE — DISCHARGE NOTE PROVIDER - CARE PROVIDER_API CALL
Kirby Maldonado  Internal Medicine  [address] [place] [address], [address]  [place] [address]  Phone: [phone]  Fax: [phone]  Follow Up Time: 4-6 Days

## 2024-04-25 NOTE — DISCHARGE NOTE PROVIDER - NSDCMRMEDTOKEN_GEN_ALL_CORE_FT
aspirin 81 mg oral tablet: 1 tab(s) orally once a day  cholecalciferol 25 mcg (1000 intl units) oral capsule: 2 cap(s) orally once a day  Farxiga 10 mg oral tablet: 1 tab(s) orally once a day  Lasix 20 mg oral tablet: 2 tab(s) orally once a day  melatonin 5 mg oral tablet: 1 tab(s) orally once a day (at bedtime)  metoprolol succinate 25 mg oral tablet, extended release: 0.5 tab(s) orally once a day (at bedtime)  pravastatin 40 mg oral tablet: 1 tab(s) orally once a day (at bedtime)  PreserVision AREDS oral capsule: 1 orally once a day  Protonix 40 mg oral delayed release tablet: 1 tab(s) orally once a day  rivaroxaban 15 mg oral tablet: 1 tab(s) orally once a day (before a meal)  sacubitril-valsartan 24 mg-26 mg oral tablet: 1 tab(s) orally 2 times a day  Visivite: Take 1 tablet orally once daily

## 2024-04-25 NOTE — H&P ADULT - ASSESSMENT
Pt is an 89 yo male with a pmh/o CKD and CHF who presents to ED after attempting to increase lasix at home due to leg swelling and shortness of breath with suspected CHF exacerbation who has had no improvment, admitted due to:    #Acute decompensated heart failure  admit to telemetry  cardiology consult  strict i/o's  daily weights  TTE ordered  ACE/ARB held due to OLIVIA  metoprolol continued  EKG paced   fluid and DASH/TLC diet  serial troponins wnl   Pro-BnP elevated >10K  cbc, cmp, coags, mg, phos for AM  IV diuresis with close monitoring of renal function- lasix 20 mg bid, takes 20 mg orally at home  fall precautions, OOB and ambulate with assistance    #OLIVIA on CKD  baseline Cr approx. 1.3  today 1.63  ACE/ARB held as pt getting diuresis with lasix  monitor renal fxn on serum chemistry  likely due to cardiorenal syndrome rather than prerenal  renal restriction diet    #DMII with hyperglycemia  hold oral med  AISS  carb restriction    #HTN/HLD  c/w pravastatin with interchange  c/w metoprolol  holding ACE/ARB-reinstate pending renal fxn Pt is an 88 yo male with a pmh/o CKD and CHF who presents to ED after attempting to increase lasix at home due to leg swelling and shortness of breath with suspected CHF exacerbation who has had no improvment, admitted due to:    #Acute decompensated heart failure  place in observation status to telemetry floor  cardiology consult  strict i/o's  daily weights  TTE ordered  ACE/ARB held due to OLIVIA  metoprolol continued  EKG paced   fluid and DASH/TLC diet  serial troponins wnl   Pro-BnP elevated >10K  cbc, cmp, coags, mg, phos for AM  IV diuresis with close monitoring of renal function- lasix 20 mg bid, takes 20 mg orally at home  fall precautions, OOB and ambulate with assistance    #OLIVIA on CKD  baseline Cr approx. 1.3  today 1.63  ACE/ARB held as pt getting diuresis with lasix  monitor renal fxn on serum chemistry  likely due to cardiorenal syndrome rather than prerenal  renal restriction diet    #DMII with hyperglycemia  hold oral med  AISS  carb restriction    #HTN/HLD  c/w pravastatin with interchange  c/w metoprolol  holding ACE/ARB-reinstate pending renal fxn

## 2024-04-25 NOTE — DISCHARGE NOTE NURSING/CASE MANAGEMENT/SOCIAL WORK - PATIENT PORTAL LINK FT
You can access the FollowMyHealth Patient Portal offered by [facility] by registering at the following website: [email or web address]. By joining [facility]’s FollowMyHealth portal, you will also be able to view your health information using other applications (apps) compatible with our system.

## 2024-04-25 NOTE — DIETITIAN INITIAL EVALUATION ADULT - NAME AND PHONE
Assessment & Plan     Laceration of groin, initial encounter  I discussed wound closure with parents and Veto including: single suture with local anesthesia, tissue glue, steri-strip.    Pt and parents prefer steri-strip.  Veto is concerned about pain associated with tissue glue,  and given it is not actively bleeding and low concern for scaring in this area they prefer steri strip.    Given this the area was cleaned well with sterile water and hibaclenz. I did offer topical anesthetic for cleaning which he defers.  Steri strip was placed, light dressing to cover with a gauze pad over for some pressure tonight.    He may remove dressing tomorrow, keep clean and dry.  May shower after 24 hours.  Watch for signs of infection.  PI given and discussed.        Need for Tdap vaccination  Tdap given.         [facility]    Subjective     Veto is a 12 year old male who presents to clinic today for the following health issues:  Chief Complaint   Patient presents with     Puncture Wound     Puncture wound from pocket knife on left side of groin      HPI    PT is a pleasant 12 year old male, presents to urgent care with concerns re: puncture wound to the L inguinal region. He was sitting in the car with wittling knife in the L pocket, it had come out of the sheath and punctured the L inguinal region.  Bleeding has been well controlled.    He is in need of tetanus update.  Last of childhood series was 2015.    Area is tender.    No T/N.          Objective    Pulse 99   Resp 16   Wt 42.6 kg (94 lb)   SpO2 100%   Physical Exam   nad appears well   exam of the L inguinal region reveals a full thickness wound approximately 5 mm in size, shallow, no deep involvement.  Wound is gapping approximately 3 mm and able to pull together nicely.    No active bleeding.  No deep structure involvement.  Wound is lateral to the neurovascular bundle of the  femoral Nerve/Artery/Vein.           Rosmeary Angel RDN, CDN, [facility]      [phone]   [email or web address]

## 2024-04-26 NOTE — ASU PATIENT PROFILE, ADULT - FALL HARM RISK - FALL HARM RISK
[FreeTextEntry1] : EKG done for hx palpitations - NSR  HCM  Preventive medicine discussed - including importance of lifestyle modification - with incorporation of healthy diet + regular exercise  #HLD  lifestyle modification discussed at length increase atorvastatin 40 qhs given family hx of heart disease - goal LDL < 70  [x] stress test - negative as of 2023 [ ] shingrix [ ] colonoscopy due     I spent a total of 30 minutes on the date of this encounter that EXCLUDES time spent on AWV, preventive exam, depression screening, tobacco cessation, lung cancer screening and behavioral counseling.  This additional time included: Preparing to see the patient (e.g., review of test results) Obtaining and/or reviewing separately obtained history Performing a medically appropriate exam and/or evaluation Counseling and educating the patient/family/caregiver Ordering medications, tests, procedures Referring and communicating with other healthcare professionals, when not separately reported Documenting clinical information in the health record Care coordination not separately reported
Age/Coagulation

## 2024-05-13 ENCOUNTER — NON-APPOINTMENT (OUTPATIENT)
Age: 89
End: 2024-05-13

## 2024-05-13 ENCOUNTER — APPOINTMENT (OUTPATIENT)
Dept: ELECTROPHYSIOLOGY | Facility: CLINIC | Age: 89
End: 2024-05-13
Payer: MEDICARE

## 2024-05-13 VITALS
HEART RATE: 62 BPM | HEIGHT: 72 IN | WEIGHT: 202 LBS | SYSTOLIC BLOOD PRESSURE: 132 MMHG | DIASTOLIC BLOOD PRESSURE: 74 MMHG | BODY MASS INDEX: 27.36 KG/M2 | OXYGEN SATURATION: 99 %

## 2024-05-13 DIAGNOSIS — Z95.810 PRESENCE OF AUTOMATIC (IMPLANTABLE) CARDIAC DEFIBRILLATOR: ICD-10-CM

## 2024-05-13 DIAGNOSIS — I47.20 VENTRICULAR TACHYCARDIA, UNSPECIFIED: ICD-10-CM

## 2024-05-13 PROCEDURE — 93284 PRGRMG EVAL IMPLANTABLE DFB: CPT

## 2024-05-13 RX ORDER — CIPROFLOXACIN AND DEXAMETHASONE 3; 1 MG/ML; MG/ML
0.3-0.1 SUSPENSION/ DROPS AURICULAR (OTIC)
Qty: 1 | Refills: 2 | Status: DISCONTINUED | COMMUNITY
Start: 2023-04-25 | End: 2024-05-13

## 2024-05-13 RX ORDER — RIVAROXABAN 2.5 MG/1
TABLET, FILM COATED ORAL
Refills: 0 | Status: ACTIVE | COMMUNITY

## 2024-05-13 RX ORDER — METOPROLOL SUCCINATE 25 MG/1
25 TABLET, EXTENDED RELEASE ORAL DAILY
Refills: 0 | Status: ACTIVE | COMMUNITY

## 2024-05-13 RX ORDER — ASPIRIN/ACETAMINOPHEN/CAFFEINE 500-325-65
POWDER IN PACKET (EA) ORAL
Refills: 0 | Status: ACTIVE | COMMUNITY

## 2024-05-13 RX ORDER — SACUBITRIL AND VALSARTAN 49; 51 MG/1; MG/1
TABLET, FILM COATED ORAL
Refills: 0 | Status: ACTIVE | COMMUNITY

## 2024-06-25 ENCOUNTER — NON-APPOINTMENT (OUTPATIENT)
Age: 89
End: 2024-06-25

## 2024-06-25 ENCOUNTER — APPOINTMENT (OUTPATIENT)
Dept: ELECTROPHYSIOLOGY | Facility: CLINIC | Age: 89
End: 2024-06-25
Payer: MEDICARE

## 2024-06-26 PROCEDURE — 93295 DEV INTERROG REMOTE 1/2/MLT: CPT

## 2024-06-26 PROCEDURE — 93296 REM INTERROG EVL PM/IDS: CPT

## 2024-07-01 ENCOUNTER — RX RENEWAL (OUTPATIENT)
Age: 89
End: 2024-07-01

## 2024-07-09 NOTE — PATIENT PROFILE ADULT - PRO INTERPRETER NEED 2
of esophageal stricture, had EGD and dilation in 11/2023  -TUMS as needed     Health Maintenance:  -Colonoscopy completed in 11/2023, next due in 2028    -Previous medical records and/or labs/tests available to me reviewed, any records outstanding not available requested  -The risks, benefits, and medical necessity of all medications, tests labs, and any other orders that were ordered at today's visit were discussed with the patient including all elective or patient requested orders.  Decision to order or not order tests are based on this risk/benefit ratio and medical necessity.  -The patient expresses understanding of the plan as I've explained it to him and is in agreement with the current plan.    Follow up: 6 months    Total Time: 45 minutes (chart review and in-exam time)    Signed: Kenneth Dumont D.O.  07/09/24  3:21 PM   English

## 2024-08-05 ENCOUNTER — NON-APPOINTMENT (OUTPATIENT)
Age: 89
End: 2024-08-05

## 2024-08-05 ENCOUNTER — INPATIENT (INPATIENT)
Facility: HOSPITAL | Age: 89
LOS: 2 days | Discharge: HOME HEALTH | DRG: 293 | End: 2024-08-08
Attending: STUDENT IN AN ORGANIZED HEALTH CARE EDUCATION/TRAINING PROGRAM | Admitting: INTERNAL MEDICINE
Payer: MEDICARE

## 2024-08-05 VITALS — WEIGHT: 212.75 LBS

## 2024-08-05 DIAGNOSIS — Z98.890 OTHER SPECIFIED POSTPROCEDURAL STATES: Chronic | ICD-10-CM

## 2024-08-05 DIAGNOSIS — Z95.810 PRESENCE OF AUTOMATIC (IMPLANTABLE) CARDIAC DEFIBRILLATOR: Chronic | ICD-10-CM

## 2024-08-05 DIAGNOSIS — I50.9 HEART FAILURE, UNSPECIFIED: ICD-10-CM

## 2024-08-05 DIAGNOSIS — Z90.2 ACQUIRED ABSENCE OF LUNG [PART OF]: Chronic | ICD-10-CM

## 2024-08-05 DIAGNOSIS — Z90.49 ACQUIRED ABSENCE OF OTHER SPECIFIED PARTS OF DIGESTIVE TRACT: Chronic | ICD-10-CM

## 2024-08-05 LAB
ALBUMIN SERPL ELPH-MCNC: 3.2 G/DL — LOW (ref 3.3–5)
ALP SERPL-CCNC: 64 U/L — SIGNIFICANT CHANGE UP (ref 40–120)
ALT FLD-CCNC: 17 U/L — SIGNIFICANT CHANGE UP (ref 12–78)
ANION GAP SERPL CALC-SCNC: 7 MMOL/L — SIGNIFICANT CHANGE UP (ref 5–17)
AST SERPL-CCNC: 25 U/L — SIGNIFICANT CHANGE UP (ref 15–37)
BASOPHILS # BLD AUTO: 0.05 K/UL — SIGNIFICANT CHANGE UP (ref 0–0.2)
BASOPHILS NFR BLD AUTO: 0.5 % — SIGNIFICANT CHANGE UP (ref 0–2)
BILIRUB SERPL-MCNC: 0.6 MG/DL — SIGNIFICANT CHANGE UP (ref 0.2–1.2)
BUN SERPL-MCNC: 26 MG/DL — HIGH (ref 7–23)
CALCIUM SERPL-MCNC: 8.9 MG/DL — SIGNIFICANT CHANGE UP (ref 8.5–10.1)
CHLORIDE SERPL-SCNC: 100 MMOL/L — SIGNIFICANT CHANGE UP (ref 96–108)
CO2 SERPL-SCNC: 27 MMOL/L — SIGNIFICANT CHANGE UP (ref 22–31)
CREAT SERPL-MCNC: 1.78 MG/DL — HIGH (ref 0.5–1.3)
EGFR: 36 ML/MIN/1.73M2 — LOW
EOSINOPHIL # BLD AUTO: 0.28 K/UL — SIGNIFICANT CHANGE UP (ref 0–0.5)
EOSINOPHIL NFR BLD AUTO: 3 % — SIGNIFICANT CHANGE UP (ref 0–6)
GLUCOSE BLDC GLUCOMTR-MCNC: 139 MG/DL — HIGH (ref 70–99)
GLUCOSE SERPL-MCNC: 143 MG/DL — HIGH (ref 70–99)
HCT VFR BLD CALC: 29.6 % — LOW (ref 39–50)
HGB BLD-MCNC: 9.5 G/DL — LOW (ref 13–17)
IMM GRANULOCYTES NFR BLD AUTO: 0.4 % — SIGNIFICANT CHANGE UP (ref 0–0.9)
LYMPHOCYTES # BLD AUTO: 1.3 K/UL — SIGNIFICANT CHANGE UP (ref 1–3.3)
LYMPHOCYTES # BLD AUTO: 14.1 % — SIGNIFICANT CHANGE UP (ref 13–44)
MCHC RBC-ENTMCNC: 26.3 PG — LOW (ref 27–34)
MCHC RBC-ENTMCNC: 32.1 GM/DL — SIGNIFICANT CHANGE UP (ref 32–36)
MCV RBC AUTO: 82 FL — SIGNIFICANT CHANGE UP (ref 80–100)
MONOCYTES # BLD AUTO: 0.8 K/UL — SIGNIFICANT CHANGE UP (ref 0–0.9)
MONOCYTES NFR BLD AUTO: 8.7 % — SIGNIFICANT CHANGE UP (ref 2–14)
NEUTROPHILS # BLD AUTO: 6.77 K/UL — SIGNIFICANT CHANGE UP (ref 1.8–7.4)
NEUTROPHILS NFR BLD AUTO: 73.3 % — SIGNIFICANT CHANGE UP (ref 43–77)
NT-PROBNP SERPL-SCNC: 3437 PG/ML — HIGH (ref 0–450)
PLATELET # BLD AUTO: 316 K/UL — SIGNIFICANT CHANGE UP (ref 150–400)
POTASSIUM SERPL-MCNC: 4.3 MMOL/L — SIGNIFICANT CHANGE UP (ref 3.5–5.3)
POTASSIUM SERPL-SCNC: 4.3 MMOL/L — SIGNIFICANT CHANGE UP (ref 3.5–5.3)
PROT SERPL-MCNC: 7.4 GM/DL — SIGNIFICANT CHANGE UP (ref 6–8.3)
RBC # BLD: 3.61 M/UL — LOW (ref 4.2–5.8)
RBC # FLD: 16.4 % — HIGH (ref 10.3–14.5)
SODIUM SERPL-SCNC: 134 MMOL/L — LOW (ref 135–145)
TROPONIN I, HIGH SENSITIVITY RESULT: 29.23 NG/L — SIGNIFICANT CHANGE UP
WBC # BLD: 9.24 K/UL — SIGNIFICANT CHANGE UP (ref 3.8–10.5)
WBC # FLD AUTO: 9.24 K/UL — SIGNIFICANT CHANGE UP (ref 3.8–10.5)

## 2024-08-05 PROCEDURE — 80048 BASIC METABOLIC PNL TOTAL CA: CPT

## 2024-08-05 PROCEDURE — 84484 ASSAY OF TROPONIN QUANT: CPT

## 2024-08-05 PROCEDURE — 99291 CRITICAL CARE FIRST HOUR: CPT

## 2024-08-05 PROCEDURE — 93010 ELECTROCARDIOGRAM REPORT: CPT

## 2024-08-05 PROCEDURE — 85025 COMPLETE CBC W/AUTO DIFF WBC: CPT

## 2024-08-05 PROCEDURE — 84100 ASSAY OF PHOSPHORUS: CPT

## 2024-08-05 PROCEDURE — 93321 DOPPLER ECHO F-UP/LMTD STD: CPT

## 2024-08-05 PROCEDURE — 85027 COMPLETE CBC AUTOMATED: CPT

## 2024-08-05 PROCEDURE — 99223 1ST HOSP IP/OBS HIGH 75: CPT

## 2024-08-05 PROCEDURE — 82962 GLUCOSE BLOOD TEST: CPT

## 2024-08-05 PROCEDURE — 83735 ASSAY OF MAGNESIUM: CPT

## 2024-08-05 PROCEDURE — 83880 ASSAY OF NATRIURETIC PEPTIDE: CPT

## 2024-08-05 PROCEDURE — 93308 TTE F-UP OR LMTD: CPT

## 2024-08-05 PROCEDURE — 71045 X-RAY EXAM CHEST 1 VIEW: CPT

## 2024-08-05 PROCEDURE — 36415 COLL VENOUS BLD VENIPUNCTURE: CPT

## 2024-08-05 PROCEDURE — 82550 ASSAY OF CK (CPK): CPT

## 2024-08-05 PROCEDURE — 80053 COMPREHEN METABOLIC PANEL: CPT

## 2024-08-05 PROCEDURE — 83036 HEMOGLOBIN GLYCOSYLATED A1C: CPT

## 2024-08-05 PROCEDURE — 80069 RENAL FUNCTION PANEL: CPT

## 2024-08-05 PROCEDURE — 71045 X-RAY EXAM CHEST 1 VIEW: CPT | Mod: 26

## 2024-08-05 RX ORDER — DEXTROSE 4 G
12.5 TABLET,CHEWABLE ORAL ONCE
Refills: 0 | Status: DISCONTINUED | OUTPATIENT
Start: 2024-08-05 | End: 2024-08-08

## 2024-08-05 RX ORDER — DEXTROSE 4 G
25 TABLET,CHEWABLE ORAL ONCE
Refills: 0 | Status: DISCONTINUED | OUTPATIENT
Start: 2024-08-05 | End: 2024-08-08

## 2024-08-05 RX ORDER — MELATONIN 3 MG
5 TABLET ORAL AT BEDTIME
Refills: 0 | Status: DISCONTINUED | OUTPATIENT
Start: 2024-08-05 | End: 2024-08-08

## 2024-08-05 RX ORDER — DEXTROSE MONOHYDRATE, SODIUM CHLORIDE, SODIUM LACTATE, CALCIUM CHLORIDE, MAGNESIUM CHLORIDE 1.5; 538; 448; 18.4; 5.08 G/100ML; MG/100ML; MG/100ML; MG/100ML; MG/100ML
1000 SOLUTION INTRAPERITONEAL
Refills: 0 | Status: DISCONTINUED | OUTPATIENT
Start: 2024-08-05 | End: 2024-08-08

## 2024-08-05 RX ORDER — RIVAROXABAN 15 MG-20MG
15 KIT ORAL
Refills: 0 | Status: DISCONTINUED | OUTPATIENT
Start: 2024-08-05 | End: 2024-08-08

## 2024-08-05 RX ORDER — FUROSEMIDE 10 MG/ML
20 INJECTION, SOLUTION INTRAVENOUS
Refills: 0 | Status: DISCONTINUED | OUTPATIENT
Start: 2024-08-06 | End: 2024-08-07

## 2024-08-05 RX ORDER — MULTIVITAMIN WITH MINERALS
1 SYRUP ORAL
Refills: 0 | DISCHARGE

## 2024-08-05 RX ORDER — FUROSEMIDE 10 MG/ML
80 INJECTION, SOLUTION INTRAVENOUS ONCE
Refills: 0 | Status: COMPLETED | OUTPATIENT
Start: 2024-08-05 | End: 2024-08-05

## 2024-08-05 RX ORDER — ASPIRIN 500 MG
81 TABLET ORAL DAILY
Refills: 0 | Status: DISCONTINUED | OUTPATIENT
Start: 2024-08-05 | End: 2024-08-08

## 2024-08-05 RX ORDER — PANTOPRAZOLE SODIUM 20 MG/1
40 TABLET, DELAYED RELEASE ORAL
Refills: 0 | Status: DISCONTINUED | OUTPATIENT
Start: 2024-08-05 | End: 2024-08-08

## 2024-08-05 RX ORDER — AMIODARONE HYDROCHLORIDE 50 MG/ML
1 INJECTION, SOLUTION INTRAVENOUS
Refills: 0 | DISCHARGE

## 2024-08-05 RX ORDER — ATORVASTATIN CALCIUM 40 MG/1
10 TABLET, FILM COATED ORAL AT BEDTIME
Refills: 0 | Status: DISCONTINUED | OUTPATIENT
Start: 2024-08-05 | End: 2024-08-08

## 2024-08-05 RX ORDER — DEXTROSE 4 G
15 TABLET,CHEWABLE ORAL ONCE
Refills: 0 | Status: DISCONTINUED | OUTPATIENT
Start: 2024-08-05 | End: 2024-08-08

## 2024-08-05 RX ORDER — GLUCAGON INJECTION, SOLUTION 0.5 MG/.1ML
1 INJECTION, SOLUTION SUBCUTANEOUS ONCE
Refills: 0 | Status: DISCONTINUED | OUTPATIENT
Start: 2024-08-05 | End: 2024-08-08

## 2024-08-05 RX ORDER — METOPROLOL TARTRATE 100 MG
12.5 TABLET ORAL DAILY
Refills: 0 | Status: DISCONTINUED | OUTPATIENT
Start: 2024-08-05 | End: 2024-08-08

## 2024-08-05 RX ORDER — INSULIN LISPRO 100/ML
VIAL (ML) SUBCUTANEOUS
Refills: 0 | Status: DISCONTINUED | OUTPATIENT
Start: 2024-08-05 | End: 2024-08-08

## 2024-08-05 RX ORDER — AMIODARONE HYDROCHLORIDE 50 MG/ML
200 INJECTION, SOLUTION INTRAVENOUS AT BEDTIME
Refills: 0 | Status: DISCONTINUED | OUTPATIENT
Start: 2024-08-05 | End: 2024-08-08

## 2024-08-05 RX ADMIN — ATORVASTATIN CALCIUM 10 MILLIGRAM(S): 40 TABLET, FILM COATED ORAL at 22:52

## 2024-08-05 RX ADMIN — FUROSEMIDE 80 MILLIGRAM(S): 10 INJECTION, SOLUTION INTRAVENOUS at 19:45

## 2024-08-05 RX ADMIN — Medication 5 MILLIGRAM(S): at 22:53

## 2024-08-05 NOTE — ED PROVIDER NOTE - CRITICAL CARE ATTENDING CONTRIBUTION TO CARE
Yes
Critical care time spent evaluating and reevaluating patient, ordering and interpreting diagnostics, ordering therapeutics, speaking to consulting and admitting MDs and documenting and reviewing old records. Tamela SCHMID

## 2024-08-05 NOTE — ED STATDOCS - PROGRESS NOTE DETAILS
Dr. Delacruz ED attending- 89yoM s/p TAVR on 7/25/24 at [place] by Dr. Renee, follows with Dr. Garcia, reportedly in afib for the last few days p/w 2d of MONTGOMERY and b/l LE edema. No CP, fever, chills, cough, nvd, abdominal pain. Will send to main for full work up. Spoke with EP NP who will eval pt at bedside

## 2024-08-05 NOTE — ED PROVIDER NOTE - PHYSICAL EXAMINATION
-alert, hr is irregular irregular, lungs have crackles at bases, abdominal soft nontender, and 2+ edema of the LE.   -Liam anders EP NP present at [facility].

## 2024-08-05 NOTE — H&P ADULT - ASSESSMENT
This is a pleasant 88 yo male with a pmh chf,ckd,pad, severe as s/p tavr two weeks ago, presented with CHF exacerbation       Acute on chronic diastolic CHF  -noted P afib on AICD interrogation, all day 7/31  -s/p Lasix 80 mg iv in er with improvement in symptoms   -lasix 20 mg iv bid  -obtain echo  -continue amiodarone 200 mg daily  -continue metoprolol xl 25 mg daily   -continue xarelto 15 mg daily   -cardiology consult  -hold entresto and farxiga for now given OLIVIA    acute kidney injury on ckd iiib, likely cardiorenal   -cre 1.7 , baseline 1.2-1.5  -hold entresto and farxiga for now  -avoid nephrotoxins  -consider nephro consult if creatinine worsens    DMII with hyperglycemia  -a1c 6.3 in april 2024  -diet control  -on farxiga for chf. will hold for now given olivia     HLD  -pravastatin 40 mg qhs     disp  discussed goals of care with pt, pt would like to be full code

## 2024-08-05 NOTE — PROCEDURE NOTE - NSINTPROGMODE_CARD_ALL_CORE
Dr. Arce gives verbal order to order: Vyvanse 30 mg -1 capsule po daily (#30) . Would you like adderall discontinued off medication list?   DDDR

## 2024-08-05 NOTE — ED ADULT TRIAGE NOTE - CHIEF COMPLAINT QUOTE
VALERIE ARANA FOR SHORTNESS OF BREATH. AS PER SON " PT HAD A HEART VALVE REPLACEMENT 2 WEEKS AGO AT [facility], AND SINCE HAS HAD DIFFICULTY BREATHING WORSENING  TODAY. PT LASIX WAS DOUBLED TO 40MG THIS WEEK NOT HELPING. PT HAS BILATERAL LEG SWELLING. PT FINDING IT HARD TO WALK WITHOUT LOOSING BREATH. PMH: DMII, VALVE REPLACEMENT, HTN, LUNG CA.

## 2024-08-05 NOTE — ED ADULT NURSE NOTE - OBJECTIVE STATEMENT
Ambulatory to ER with c/o worsening S.O.B upon exertion, hx of heart valve replacement x 2 weeks ago; patient denies chest pain. A & ox4, respirations even and unlabored on 2 L nasal cannula. Await MD fernandes.

## 2024-08-05 NOTE — PHARMACOTHERAPY INTERVENTION NOTE - COMMENTS
Medication reconciliation completed.  Patient was unable to provide medication information, spoke to ida Santos ([phone]) and they provided current medication list; confirmed with Dr. [facility].

## 2024-08-05 NOTE — ED PROVIDER NOTE - CLINICAL SUMMARY MEDICAL DECISION MAKING FREE TEXT BOX
88 y/o male w/ a PMHx of DM, BPH, COPD, GERD, Thrombocytopenia, HLD, HF, Lung Cancer s/p lobectomy of lung, PAD, CKD, CAD s/p carotid endarterectomy, AICD, appendectomy, Hypertension, Cardiac Arrest, carotid endarterectomy, and valve replacement (2 weeks ago) .Pt has SOB and LE swelling. Rule out CHD, labs, Chest xray, EP consult, cardiology consult, admission. 89 y/o male w/ a PMHx of DM, BPH, COPD, GERD, Thrombocytopenia, HLD, HF, Lung Cancer s/p lobectomy of lung, PAD, CKD, CAD s/p carotid endarterectomy, AICD, appendectomy, Hypertension, Cardiac Arrest, carotid endarterectomy, and valve replacement (2 weeks ago) .Pt has SOB and LE swelling. Rule out CHF, labs, Chest xray,Lasix. EP consult, cardiology consult, admission.

## 2024-08-05 NOTE — ED ADULT NURSE NOTE - CHIEF COMPLAINT QUOTE
VALERIE ARANA FOR SHORTNESS OF BREATH. AS PER SON " PT HAD A HEART VALVE REPLACEMENT 2 WEEKS AGO AT [place], AND SINCE HAS HAD DIFFICULTY BREATHING WORSENING  TODAY. PT LASIX WAS DOUBLED TO 40MG THIS WEEK NOT HELPING. PT HAS BILATERAL LEG SWELLING. PT FINDING IT HARD TO WALK WITHOUT LOOSING BREATH. PMH: DMII, VALVE REPLACEMENT, HTN, LUNG CA.

## 2024-08-05 NOTE — ED ADULT NURSE NOTE - NSHOSCREENINGQ1_ED_ALL_ED
DISPLAY PLAN FREE TEXT DISPLAY PLAN FREE TEXT DISPLAY PLAN FREE TEXT DISPLAY PLAN FREE TEXT DISPLAY PLAN FREE TEXT DISPLAY PLAN FREE TEXT DISPLAY PLAN FREE TEXT DISPLAY PLAN FREE TEXT DISPLAY PLAN FREE TEXT DISPLAY PLAN FREE TEXT DISPLAY PLAN FREE TEXT DISPLAY PLAN FREE TEXT DISPLAY PLAN FREE TEXT DISPLAY PLAN FREE TEXT DISPLAY PLAN FREE TEXT DISPLAY PLAN FREE TEXT DISPLAY PLAN FREE TEXT DISPLAY PLAN FREE TEXT No

## 2024-08-05 NOTE — PATIENT PROFILE ADULT - FALL HARM RISK - HARM RISK INTERVENTIONS

## 2024-08-05 NOTE — H&P ADULT - NSHPPHYSICALEXAM_GEN_ALL_CORE
Gen: awake, alert, hard of hearing  HEENT:  ears externally normal  without lesions or deformities, nose externally normal without lesions or deformities  NECK:   supple, no carotid bruits, no palpable lymph nodes   CV:  +S1, +S2, regular   RESP:   symmetrical expansion no wheezing or rhonchi, no increase in work of breathing  GI:  soft, non tender non distended  :  no suprapubic distension   MSK:   no rigidity   EXT:  bilateral lower ext edema noted Rt > left - rt leg is chronically more swollen than left per pt.   VASCULAR:  bilateral varicose veins, shiny skin of lower ext, DP felt weak bilaterally, no cyanosis   NEURO:  AAOX3, no focal neurological deficits, follows all commands   SKIN:  normal temp noted  Psych: normal mood and affect
no

## 2024-08-05 NOTE — PROCEDURE NOTE - ADDITIONAL PROCEDURE DETAILS
CRT-D with normal function, adequate sensing and pacing thresholds. Stored data reveals PAF episodes with longest episode lasting 24 hours on 7/31.  Patient is maintained on Amiodarone for maintenance of NSR. Optivol fluid index WNL but increasing.  Recommend routine device follow up in three months.  Patient in ED for LE edema and SOB after TAVR, recommend cardiology evaluation. Full interrogation report in chart.

## 2024-08-05 NOTE — PATIENT PROFILE ADULT - ABILITY TO HEAR (WITH HEARING AID OR HEARING APPLIANCE IF NORMALLY USED):
pt did not bring hearing aid with him/Mildly to Moderately Impaired: difficulty hearing in some environments or speaker may need to increase volume or speak distinctly

## 2024-08-05 NOTE — H&P ADULT - HISTORY OF PRESENT ILLNESS
this is a pleasant 89 y/o male w/ a PMHx of DM, BPH, COPD, GERD, Thrombocytopenia, HLD, CHF, Lung Cancer s/p lobectomy of lung, PAD, CKD, CAD s/p carotid endarterectomy, AICD, appendectomy, Hypertension, Cardiac Arrest, aortic stenosis s/p TAVR two weeks ago    Pt presented  to the ED with progressive sob with exertion, he said he is fine sitting in chair and sleeping however when he walks for 5-10 feet he starts getting sob and stops walking because of shortness of breath    he denies chest pain or palpitations    he also reported increasing bilateral lower extremity swelling    he denies any fevers or chills or nausea or vomiting or diarrhea    he received 80 mg of IV lasix in ER and feels much better    AICD interrogated in ER and showed P afib episodes, longest lasting 24 hours on 7/31      PAST MEDICAL HISTORY:  2019 novel coronavirus disease (COVID-19)     CAD (coronary artery disease)     Cardiac arrest with successful resuscitation     CKD (chronic kidney disease)     Diabetes     GERD (gastroesophageal reflux disease)     Heart failure     History of BPH     History of COPD     History of falling     HLD (hyperlipidemia)     Hypertension     Leg wound, right     Lung cancer     PAD (peripheral artery disease)     Thrombocytopenia.     PAST SURGICAL HISTORY:  AICD (automatic cardioverter/defibrillator) present     H/O endarterectomy     H/O left knee surgery     History of appendectomy     History of lobectomy of lung     History of tonsillectomy and adenoidectomy     S/P carotid endarterectomy.

## 2024-08-05 NOTE — ED PROVIDER NOTE - OBJECTIVE STATEMENT
89 y/o male w/ a PMHx of DM, BPH, COPD, GERD, Thrombocytopenia, HLD, HF, Lung Cancer s/p lobectomy of lung, PAD, CKD, CAD s/p carotid endarterectomy, AICD, appendectomy, Hypertension, Cardiac Arrest, carotid endarterectomy, and valve replacement (2 weeks ago) . Pt is presenting to the ED c/o bilateral swelling of extremities which began today and SOB upon excursion for the past two week. Pt saw Dr. Jose GONZALEZ  (cardiologist ) for SOB a few days ago, and he prescribed the Pt  an increased dosage pf diuretic. Pt denies n/v/d/f. Pt has decreased p/o intake, able to intake liquids but decreased food. Pt has + AC (Xarelto). Prior to surgery, Pt was not in A-Fib.

## 2024-08-06 ENCOUNTER — RESULT REVIEW (OUTPATIENT)
Age: 89
End: 2024-08-06

## 2024-08-06 LAB
A1C WITH ESTIMATED AVERAGE GLUCOSE RESULT: 6.7 % — HIGH (ref 4–5.6)
ALBUMIN SERPL ELPH-MCNC: 3.1 G/DL — LOW (ref 3.3–5)
ALP SERPL-CCNC: 60 U/L — SIGNIFICANT CHANGE UP (ref 40–120)
ALT FLD-CCNC: 16 U/L — SIGNIFICANT CHANGE UP (ref 12–78)
ANION GAP SERPL CALC-SCNC: 7 MMOL/L — SIGNIFICANT CHANGE UP (ref 5–17)
AST SERPL-CCNC: 19 U/L — SIGNIFICANT CHANGE UP (ref 15–37)
BILIRUB SERPL-MCNC: 1 MG/DL — SIGNIFICANT CHANGE UP (ref 0.2–1.2)
BUN SERPL-MCNC: 22 MG/DL — SIGNIFICANT CHANGE UP (ref 7–23)
CALCIUM SERPL-MCNC: 9.1 MG/DL — SIGNIFICANT CHANGE UP (ref 8.5–10.1)
CHLORIDE SERPL-SCNC: 99 MMOL/L — SIGNIFICANT CHANGE UP (ref 96–108)
CK SERPL-CCNC: 52 U/L — SIGNIFICANT CHANGE UP (ref 26–308)
CO2 SERPL-SCNC: 30 MMOL/L — SIGNIFICANT CHANGE UP (ref 22–31)
CREAT SERPL-MCNC: 1.53 MG/DL — HIGH (ref 0.5–1.3)
EGFR: 43 ML/MIN/1.73M2 — LOW
ESTIMATED AVERAGE GLUCOSE: 146 MG/DL — HIGH (ref 68–114)
GLUCOSE BLDC GLUCOMTR-MCNC: 116 MG/DL — HIGH (ref 70–99)
GLUCOSE BLDC GLUCOMTR-MCNC: 142 MG/DL — HIGH (ref 70–99)
GLUCOSE BLDC GLUCOMTR-MCNC: 142 MG/DL — HIGH (ref 70–99)
GLUCOSE BLDC GLUCOMTR-MCNC: 255 MG/DL — HIGH (ref 70–99)
GLUCOSE SERPL-MCNC: 145 MG/DL — HIGH (ref 70–99)
HCT VFR BLD CALC: 30.5 % — LOW (ref 39–50)
HGB BLD-MCNC: 9.7 G/DL — LOW (ref 13–17)
MCHC RBC-ENTMCNC: 26.1 PG — LOW (ref 27–34)
MCHC RBC-ENTMCNC: 31.8 GM/DL — LOW (ref 32–36)
MCV RBC AUTO: 82 FL — SIGNIFICANT CHANGE UP (ref 80–100)
NT-PROBNP SERPL-SCNC: 3909 PG/ML — HIGH (ref 0–450)
PLATELET # BLD AUTO: 314 K/UL — SIGNIFICANT CHANGE UP (ref 150–400)
POTASSIUM SERPL-MCNC: 3.2 MMOL/L — LOW (ref 3.5–5.3)
POTASSIUM SERPL-SCNC: 3.2 MMOL/L — LOW (ref 3.5–5.3)
PROT SERPL-MCNC: 7.2 GM/DL — SIGNIFICANT CHANGE UP (ref 6–8.3)
RBC # BLD: 3.72 M/UL — LOW (ref 4.2–5.8)
RBC # FLD: 16.2 % — HIGH (ref 10.3–14.5)
SODIUM SERPL-SCNC: 136 MMOL/L — SIGNIFICANT CHANGE UP (ref 135–145)
TROPONIN I, HIGH SENSITIVITY RESULT: 34.51 NG/L — SIGNIFICANT CHANGE UP
WBC # BLD: 8.08 K/UL — SIGNIFICANT CHANGE UP (ref 3.8–10.5)
WBC # FLD AUTO: 8.08 K/UL — SIGNIFICANT CHANGE UP (ref 3.8–10.5)

## 2024-08-06 PROCEDURE — 93321 DOPPLER ECHO F-UP/LMTD STD: CPT | Mod: 26

## 2024-08-06 PROCEDURE — 93308 TTE F-UP OR LMTD: CPT | Mod: 26

## 2024-08-06 PROCEDURE — 99232 SBSQ HOSP IP/OBS MODERATE 35: CPT

## 2024-08-06 RX ORDER — POTASSIUM CHLORIDE 1500 MG/1
40 TABLET, EXTENDED RELEASE ORAL EVERY 4 HOURS
Refills: 0 | Status: COMPLETED | OUTPATIENT
Start: 2024-08-06 | End: 2024-08-06

## 2024-08-06 RX ADMIN — POTASSIUM CHLORIDE 40 MILLIEQUIVALENT(S): 1500 TABLET, EXTENDED RELEASE ORAL at 22:01

## 2024-08-06 RX ADMIN — FUROSEMIDE 20 MILLIGRAM(S): 10 INJECTION, SOLUTION INTRAVENOUS at 14:59

## 2024-08-06 RX ADMIN — Medication 12.5 MILLIGRAM(S): at 09:56

## 2024-08-06 RX ADMIN — AMIODARONE HYDROCHLORIDE 200 MILLIGRAM(S): 50 INJECTION, SOLUTION INTRAVENOUS at 22:02

## 2024-08-06 RX ADMIN — POTASSIUM CHLORIDE 40 MILLIEQUIVALENT(S): 1500 TABLET, EXTENDED RELEASE ORAL at 16:46

## 2024-08-06 RX ADMIN — ATORVASTATIN CALCIUM 10 MILLIGRAM(S): 40 TABLET, FILM COATED ORAL at 22:02

## 2024-08-06 RX ADMIN — Medication 5 MILLIGRAM(S): at 22:01

## 2024-08-06 RX ADMIN — PANTOPRAZOLE SODIUM 40 MILLIGRAM(S): 20 TABLET, DELAYED RELEASE ORAL at 06:25

## 2024-08-06 RX ADMIN — Medication 3: at 12:27

## 2024-08-06 RX ADMIN — Medication 81 MILLIGRAM(S): at 09:56

## 2024-08-06 RX ADMIN — RIVAROXABAN 15 MILLIGRAM(S): KIT at 16:46

## 2024-08-06 RX ADMIN — FUROSEMIDE 20 MILLIGRAM(S): 10 INJECTION, SOLUTION INTRAVENOUS at 06:23

## 2024-08-07 DIAGNOSIS — I50.33 ACUTE ON CHRONIC DIASTOLIC (CONGESTIVE) HEART FAILURE: ICD-10-CM

## 2024-08-07 LAB
ANION GAP SERPL CALC-SCNC: 4 MMOL/L — LOW (ref 5–17)
BASOPHILS # BLD AUTO: 0.05 K/UL — SIGNIFICANT CHANGE UP (ref 0–0.2)
BASOPHILS NFR BLD AUTO: 0.6 % — SIGNIFICANT CHANGE UP (ref 0–2)
BUN SERPL-MCNC: 23 MG/DL — SIGNIFICANT CHANGE UP (ref 7–23)
CALCIUM SERPL-MCNC: 9.2 MG/DL — SIGNIFICANT CHANGE UP (ref 8.5–10.1)
CHLORIDE SERPL-SCNC: 101 MMOL/L — SIGNIFICANT CHANGE UP (ref 96–108)
CO2 SERPL-SCNC: 32 MMOL/L — HIGH (ref 22–31)
CREAT SERPL-MCNC: 1.7 MG/DL — HIGH (ref 0.5–1.3)
EGFR: 38 ML/MIN/1.73M2 — LOW
EOSINOPHIL # BLD AUTO: 0.15 K/UL — SIGNIFICANT CHANGE UP (ref 0–0.5)
EOSINOPHIL NFR BLD AUTO: 1.7 % — SIGNIFICANT CHANGE UP (ref 0–6)
GLUCOSE BLDC GLUCOMTR-MCNC: 133 MG/DL — HIGH (ref 70–99)
GLUCOSE BLDC GLUCOMTR-MCNC: 134 MG/DL — HIGH (ref 70–99)
GLUCOSE BLDC GLUCOMTR-MCNC: 186 MG/DL — HIGH (ref 70–99)
GLUCOSE BLDC GLUCOMTR-MCNC: 224 MG/DL — HIGH (ref 70–99)
GLUCOSE SERPL-MCNC: 160 MG/DL — HIGH (ref 70–99)
HCT VFR BLD CALC: 33.9 % — LOW (ref 39–50)
HGB BLD-MCNC: 10.7 G/DL — LOW (ref 13–17)
IMM GRANULOCYTES NFR BLD AUTO: 0.4 % — SIGNIFICANT CHANGE UP (ref 0–0.9)
LYMPHOCYTES # BLD AUTO: 0.81 K/UL — LOW (ref 1–3.3)
LYMPHOCYTES # BLD AUTO: 9.1 % — LOW (ref 13–44)
MAGNESIUM SERPL-MCNC: 2 MG/DL — SIGNIFICANT CHANGE UP (ref 1.6–2.6)
MCHC RBC-ENTMCNC: 26 PG — LOW (ref 27–34)
MCHC RBC-ENTMCNC: 31.6 GM/DL — LOW (ref 32–36)
MCV RBC AUTO: 82.5 FL — SIGNIFICANT CHANGE UP (ref 80–100)
MONOCYTES # BLD AUTO: 0.65 K/UL — SIGNIFICANT CHANGE UP (ref 0–0.9)
MONOCYTES NFR BLD AUTO: 7.3 % — SIGNIFICANT CHANGE UP (ref 2–14)
NEUTROPHILS # BLD AUTO: 7.25 K/UL — SIGNIFICANT CHANGE UP (ref 1.8–7.4)
NEUTROPHILS NFR BLD AUTO: 80.9 % — HIGH (ref 43–77)
PHOSPHATE SERPL-MCNC: 2.1 MG/DL — LOW (ref 2.5–4.5)
PLATELET # BLD AUTO: 365 K/UL — SIGNIFICANT CHANGE UP (ref 150–400)
POTASSIUM SERPL-MCNC: 4 MMOL/L — SIGNIFICANT CHANGE UP (ref 3.5–5.3)
POTASSIUM SERPL-SCNC: 4 MMOL/L — SIGNIFICANT CHANGE UP (ref 3.5–5.3)
RBC # BLD: 4.11 M/UL — LOW (ref 4.2–5.8)
RBC # FLD: 16.3 % — HIGH (ref 10.3–14.5)
SODIUM SERPL-SCNC: 137 MMOL/L — SIGNIFICANT CHANGE UP (ref 135–145)
WBC # BLD: 8.95 K/UL — SIGNIFICANT CHANGE UP (ref 3.8–10.5)
WBC # FLD AUTO: 8.95 K/UL — SIGNIFICANT CHANGE UP (ref 3.8–10.5)

## 2024-08-07 PROCEDURE — 99223 1ST HOSP IP/OBS HIGH 75: CPT

## 2024-08-07 PROCEDURE — 99233 SBSQ HOSP IP/OBS HIGH 50: CPT

## 2024-08-07 RX ORDER — FUROSEMIDE 10 MG/ML
40 INJECTION, SOLUTION INTRAVENOUS DAILY
Refills: 0 | Status: DISCONTINUED | OUTPATIENT
Start: 2024-08-08 | End: 2024-08-08

## 2024-08-07 RX ORDER — SOD PHOS DI, MONO/K PHOS MONO 250 MG
1 TABLET ORAL THREE TIMES A DAY
Refills: 0 | Status: COMPLETED | OUTPATIENT
Start: 2024-08-07 | End: 2024-08-08

## 2024-08-07 RX ADMIN — RIVAROXABAN 15 MILLIGRAM(S): KIT at 19:48

## 2024-08-07 RX ADMIN — Medication 12.5 MILLIGRAM(S): at 09:35

## 2024-08-07 RX ADMIN — PANTOPRAZOLE SODIUM 40 MILLIGRAM(S): 20 TABLET, DELAYED RELEASE ORAL at 05:50

## 2024-08-07 RX ADMIN — Medication 1 PACKET(S): at 22:05

## 2024-08-07 RX ADMIN — Medication 1: at 09:32

## 2024-08-07 RX ADMIN — AMIODARONE HYDROCHLORIDE 200 MILLIGRAM(S): 50 INJECTION, SOLUTION INTRAVENOUS at 22:01

## 2024-08-07 RX ADMIN — ATORVASTATIN CALCIUM 10 MILLIGRAM(S): 40 TABLET, FILM COATED ORAL at 22:01

## 2024-08-07 RX ADMIN — FUROSEMIDE 20 MILLIGRAM(S): 10 INJECTION, SOLUTION INTRAVENOUS at 05:50

## 2024-08-07 RX ADMIN — Medication 81 MILLIGRAM(S): at 09:35

## 2024-08-07 RX ADMIN — FUROSEMIDE 20 MILLIGRAM(S): 10 INJECTION, SOLUTION INTRAVENOUS at 13:54

## 2024-08-07 RX ADMIN — Medication 5 MILLIGRAM(S): at 22:02

## 2024-08-07 NOTE — CONSULT NOTE ADULT - CONVERSATION DETAILS
The role of Palliative Medicne was reviewed with the pt. He stated that he lives home with 24 hr aide support and is able to get around. He had a TAVR done approx 2 weeks ago at [facility] and now presents to the hospital with mod-severe dyspnea on exertion with BLE edema. Symptoms have improved and he is hopeful to return home when medically stable. We reviewed his HCP naming son lamberto and the MOLST was discussed. He does not want to set any limits at this time.

## 2024-08-07 NOTE — PROGRESS NOTE ADULT - SUBJECTIVE AND OBJECTIVE BOX
HOSPITALIST ATTENDING PROGRESS NOTE     Chart and meds reviewed. Patient seen and examined     CC: SOB    Interval Hx/Events: Pt seen and evaluated. States the breathing is improved. Denies chest pain, palpitations No other acute complaints.       All other systems and founds to be negative with exception of what has been described above.         PHYSICAL EXAM:  Vital Signs Last 24 Hrs  T(C): 36.9 (07 Aug 2024 08:44), Max: 37.1 (06 Aug 2024 16:14)  T(F): 98.4 (07 Aug 2024 08:44), Max: 98.8 (06 Aug 2024 16:14)  HR: 66 (07 Aug 2024 14:03) (61 - 67)  BP: 122/61 (07 Aug 2024 14:03) (115/85 - 131/54)  BP(mean): 65 (07 Aug 2024 08:44) (65 - 80)  RR: 18 (07 Aug 2024 08:44) (16 - 18)  SpO2: 93% (07 Aug 2024 08:44) (92% - 93%)    Parameters below as of 07 Aug 2024 08:44  Patient On (Oxygen Delivery Method): room air      Daily     Daily Weight in k.4 (07 Aug 2024 06:29)    GEN: NAD   HEENT: EOMI,  moist mucous membranes, +[place]   NECK : Soft and supple, no JVD  LUNG: +decreased breath sounds   CVS: S1S2+, RRR, no M/G/R  GI: BS+, soft, NT/ND, no guarding, no rebound  EXTREMITIES: No peripheral edema  VASCULAR: 2+ peripheral pulses  NEURO: AAOx3, grossly non-focal   SKIN: No rashes    HOME MEDICATIONS:  Home Medications:  amiodarone 200 mg oral tablet: 1 tab(s) orally once a day (at bedtime) (05 Aug 2024 20:04)  aspirin 81 mg oral tablet: 1 tab(s) orally once a day (05 Aug 2024 19:01)  cholecalciferol 25 mcg (1000 intl units) oral capsule: 2 cap(s) orally once a day (05 Aug 2024 19:01)  Farxiga 10 mg oral tablet: 1 tab(s) orally once a day (05 Aug 2024 19:01)  melatonin 5 mg oral tablet: 1 tab(s) orally once a day (at bedtime) (05 Aug 2024 19:01)  metoprolol succinate 25 mg oral tablet, extended release: 0.5 tab(s) orally once a day (at bedtime) (05 Aug 2024 19:01)  pravastatin 40 mg oral tablet: 1 tab(s) orally once a day (at bedtime) (05 Aug 2024 19:01)  PreserVision AREDS 2 oral capsule: 1 cap(s) orally once a day (at bedtime) (05 Aug 2024 20:04)  Protonix 40 mg oral delayed release tablet: 1 tab(s) orally once a day (05 Aug 2024 19:02)  rivaroxaban 15 mg oral tablet: 1 tab(s) orally once a day (in the evening) (05 Aug 2024 20:04)  sacubitril-valsartan 24 mg-26 mg oral tablet: 1 tab(s) orally 2 times a day (05 Aug 2024 19:01)  Visivite OTC eye health supplement: 2x a day (05 Aug 2024 20:04)      MEDICATIONS  MEDICATIONS  (STANDING):  aMIOdarone    Tablet 200 milliGRAM(s) Oral at bedtime  aspirin enteric coated 81 milliGRAM(s) Oral daily  atorvastatin 10 milliGRAM(s) Oral at bedtime  dextrose 5%. 1000 milliLiter(s) (50 mL/Hr) IV Continuous <Continuous>  dextrose 5%. 1000 milliLiter(s) (100 mL/Hr) IV Continuous <Continuous>  dextrose 50% Injectable 25 Gram(s) IV Push once  dextrose 50% Injectable 12.5 Gram(s) IV Push once  dextrose 50% Injectable 25 Gram(s) IV Push once  glucagon  Injectable 1 milliGRAM(s) IntraMuscular once  insulin lispro (ADMELOG) corrective regimen sliding scale   SubCutaneous three times a day before meals  melatonin 5 milliGRAM(s) Oral at bedtime  metoprolol succinate ER 12.5 milliGRAM(s) Oral daily  pantoprazole    Tablet 40 milliGRAM(s) Oral before breakfast  rivaroxaban 15 milliGRAM(s) Oral with dinner      LABS: All Labs Reviewed:                        10.7   8.95  )-----------( 365      ( 07 Aug 2024 10:30 )             33.9     08-07    137  |  101  |  23  ----------------------------<  160<H>  4.0   |  32<H>  |  1.70<H>    Ca    9.2      07 Aug 2024 10:30  Phos  2.1     08-  Mg     2.0     08-    TPro  7.2  /  Alb  3.1<L>  /  TBili  1.0  /  DBili  x   /  AST  19  /  ALT  16  /  AlkPhos  60  08-06        Urinalysis Basic - ( 07 Aug 2024 10:30 )    Color: x / Appearance: x / SG: x / pH: x  Gluc: 160 mg/dL / Ketone: x  / Bili: x / Urobili: x   Blood: x / Protein: x / Nitrite: x   Leuk Esterase: x / RBC: x / WBC x   Sq Epi: x / Non Sq Epi: x / Bacteria: x        Blood Culture:   I&O's Detail    06 Aug 2024 07:01  -  07 Aug 2024 07:00  --------------------------------------------------------  IN:  Total IN: 0 mL    OUT:    Voided (mL): 1100 mL  Total OUT: 1100 mL    Total NET: -1100 mL        CAPILLARY BLOOD GLUCOSE      POCT Blood Glucose.: 134 mg/dL (07 Aug 2024 11:36)  POCT Blood Glucose.: 186 mg/dL (07 Aug 2024 09:30)  POCT Blood Glucose.: 142 mg/dL (06 Aug 2024 21:00)  POCT Blood Glucose.: 116 mg/dL (06 Aug 2024 16:38)        CARDIOLOGY TESTING   EKG: reviewed     ECHO   < from: TTE Limited W or WO Ultrasound Enhancing Agent (24 @ 15:05) >  CONCLUSIONS:      1. Limited study to assess left ventricular and aortic valve function.   2. Technically difficult image quality.   3. Left ventricular systolic function is normal with an ejection fraction of 58 % by Barlow's method of disks.   4. There is a well seated TAVR in the aortic position with normal gradients across the valve.    ________________________________________________________________________________________  FINDINGS:     Left Ventricle:  Left ventricular systolic function is normal with a calculated ejection fraction of 58 % by the Barlow's biplane method of disks with an ejection fraction visually estimated at 50 to 55%.     Aortic Valve:  There is a well seated TAVR in the aortic position with normal gradients across the valve. No perivalvular leak visualized. The peak transaortic velocity is 0.96 m/s, peak transaortic gradient is 3.7 mmHg and mean transaortic gradient is 3.0 mmHg with an LVOT/aortic valve VTI ratio of 0.66. The aortic valve area is estimated at 3.24 cm² by the continuity equation.    < end of copied text >        RADIOLOGY  < from: Xray Chest 1 View- PORTABLE-Urgent (24 @ 17:48) >  INTERPRETATION:  AP chest on 2024 at 5:38 PM. Patient has chest   pain.    Heart magnified by technique.    Sternotomy, left-sided defibrillator, calcified mitral area again noted.   A TAVR aortic valve is new since  this year.    There are persistent mild to moderate congestive findings.    IMPRESSION: TAVR aortic valve new since prior. Persistent congestive   findings.    < end of copied text >    
patient seen and examined  VERY HARD OF HEARING  denies fever chills  urinating well  on lasix  Review of Systems:  General:denies fever chills, headache, weakness  HEENT: denies blurry vision,diffculty swallowing, difficulty hearing, tinnitus  Cardiovascular: denies chest pain  ,palpitations  Pulmonary:denies shortness of breath, cough, wheezing, hemoptysis  Gastrointestinal: denies abdominal pain, constipation, diarrhea,nausea , vomiting, hematochezia  : denies hematuria, dysuria, or incontinence  Neurological: denies weakness, numbness , tingling, dizziness, tremors  MSK: denies muscle pain, difficulty ambulating, swelling, back pain  skin: denies skin rash, itching, burning, or  skin lesions  Psychiatrical: denies mood disturbances, anxierty, feeling depressed, depression , or difficulty sleeping    Objective:  Vitals  T(C): 36.8 (08-06-24 @ 08:28), Max: 37.4 (08-05-24 @ 17:21)  HR: 63 (08-06-24 @ 08:28) (63 - 79)  BP: 117/50 (08-06-24 @ 08:28) (117/50 - 145/56)  RR: 18 (08-06-24 @ 08:28) (18 - 20)  SpO2: 94% (08-06-24 @ 08:28) (94% - 98%)    Physical Exam:  General: comfortable, no acute distress  HEENT: Atraumatic, no LAD, trachea midline, PERRLA  Cardiovascular: normal s1s2, no murmurs, gallops or fricition rubs  Pulmonary: clear to ausculation Bilaterally, no wheezing , rhonchi  Gastrointestinal: soft non tender non distended, no masses felt, no organomegally  Muscloskeletal: no lower extremity edema, intact bilateral lower extremity pulses  Neurological: CN II-12 intact. No focal weakness  Psychiatrical: normal mood, cooperative  SKIN: no rash, lesions or ulcers    Labs:                          9.7    8.08  )-----------( 314      ( 06 Aug 2024 07:36 )             30.5     08-06    136  |  99  |  22  ----------------------------<  145<H>  3.2<L>   |  30  |  1.53<H>    Ca    9.1      06 Aug 2024 07:36    TPro  7.2  /  Alb  3.1<L>  /  TBili  1.0  /  DBili  x   /  AST  19  /  ALT  16  /  AlkPhos  60  08-06    LIVER FUNCTIONS - ( 06 Aug 2024 07:36 )  Alb: 3.1 g/dL / Pro: 7.2 gm/dL / ALK PHOS: 60 U/L / ALT: 16 U/L / AST: 19 U/L / GGT: x                 Active Medications  MEDICATIONS  (STANDING):  aMIOdarone    Tablet 200 milliGRAM(s) Oral at bedtime  aspirin enteric coated 81 milliGRAM(s) Oral daily  atorvastatin 10 milliGRAM(s) Oral at bedtime  dextrose 5%. 1000 milliLiter(s) (50 mL/Hr) IV Continuous <Continuous>  dextrose 5%. 1000 milliLiter(s) (100 mL/Hr) IV Continuous <Continuous>  dextrose 50% Injectable 25 Gram(s) IV Push once  dextrose 50% Injectable 12.5 Gram(s) IV Push once  dextrose 50% Injectable 25 Gram(s) IV Push once  furosemide   Injectable 20 milliGRAM(s) IV Push two times a day  glucagon  Injectable 1 milliGRAM(s) IntraMuscular once  insulin lispro (ADMELOG) corrective regimen sliding scale   SubCutaneous three times a day before meals  melatonin 5 milliGRAM(s) Oral at bedtime  metoprolol succinate ER 12.5 milliGRAM(s) Oral daily  pantoprazole    Tablet 40 milliGRAM(s) Oral before breakfast  potassium chloride    Tablet ER 40 milliEquivalent(s) Oral every 4 hours  rivaroxaban 15 milliGRAM(s) Oral with dinner    MEDICATIONS  (PRN):  dextrose Oral Gel 15 Gram(s) Oral once PRN Blood Glucose LESS THAN 70 milliGRAM(s)/deciliter

## 2024-08-07 NOTE — CONSULT NOTE ADULT - SUBJECTIVE AND OBJECTIVE BOX
CHIEF COMPLAINT:  Patient is a 89y old  Male who presents with a chief complaint of sob (06 Aug 2024 14:40)      ROS/SUBJECTIVE:    HPI:  HPI:  this is a pleasant 88 y/o male w/ a PMHx of DM, BPH, COPD, GERD, Thrombocytopenia, HLD, CHF, Lung Cancer s/p lobectomy of lung, PAD, CKD, CAD s/p carotid endarterectomy, AICD, appendectomy, Hypertension, Cardiac Arrest, aortic stenosis s/p TAVR two weeks ago    Pt presented  to the ED with progressive sob with exertion, he said he is fine sitting in chair and sleeping however when he walks for 5-10 feet he starts getting sob and stops walking because of shortness of breath    he denies chest pain or palpitations    he also reported increasing bilateral lower extremity swelling    he denies any fevers or chills or nausea or vomiting or diarrhea    he received 80 mg of IV lasix in ER and feels much better    AICD interrogated in ER and showed P afib episodes, longest lasting 24 hours on 7/31      PAST MEDICAL HISTORY:  2019 novel coronavirus disease (COVID-19)     CAD (coronary artery disease)     Cardiac arrest with successful resuscitation     CKD (chronic kidney disease)     Diabetes     GERD (gastroesophageal reflux disease)     Heart failure     History of BPH     History of COPD     History of falling     HLD (hyperlipidemia)     Hypertension     Leg wound, right     Lung cancer     PAD (peripheral artery disease)     Thrombocytopenia.     PAST SURGICAL HISTORY:  AICD (automatic cardioverter/defibrillator) present     H/O endarterectomy     H/O left knee surgery     History of appendectomy     History of lobectomy of lung     History of tonsillectomy and adenoidectomy     S/P carotid endarterectomy.        (05 Aug 2024 21:13)          PREVIOUS DIAGNOSTIC TESTING:      ECHO: FINDINGS:    STRESS: FINDINGS:    CATHETERIZATION: FINDINGS:    MEDICATIONS  (STANDING):  aMIOdarone    Tablet 200 milliGRAM(s) Oral at bedtime  aspirin enteric coated 81 milliGRAM(s) Oral daily  atorvastatin 10 milliGRAM(s) Oral at bedtime  dextrose 5%. 1000 milliLiter(s) (50 mL/Hr) IV Continuous <Continuous>  dextrose 5%. 1000 milliLiter(s) (100 mL/Hr) IV Continuous <Continuous>  dextrose 50% Injectable 12.5 Gram(s) IV Push once  dextrose 50% Injectable 25 Gram(s) IV Push once  dextrose 50% Injectable 25 Gram(s) IV Push once  furosemide   Injectable 20 milliGRAM(s) IV Push two times a day  glucagon  Injectable 1 milliGRAM(s) IntraMuscular once  insulin lispro (ADMELOG) corrective regimen sliding scale   SubCutaneous three times a day before meals  melatonin 5 milliGRAM(s) Oral at bedtime  metoprolol succinate ER 12.5 milliGRAM(s) Oral daily  pantoprazole    Tablet 40 milliGRAM(s) Oral before breakfast  potassium chloride    Tablet ER 40 milliEquivalent(s) Oral every 4 hours  rivaroxaban 15 milliGRAM(s) Oral with dinner    MEDICATIONS  (PRN):  dextrose Oral Gel 15 Gram(s) Oral once PRN Blood Glucose LESS THAN 70 milliGRAM(s)/deciliter          PHYSICAL EXAM:  Vital Signs Last 24 Hrs  T(C): 37.1 (06 Aug 2024 16:14), Max: 37.1 (06 Aug 2024 16:14)  T(F): 98.8 (06 Aug 2024 16:14), Max: 98.8 (06 Aug 2024 16:14)  HR: 61 (06 Aug 2024 16:14) (61 - 75)  BP: 115/85 (06 Aug 2024 16:14) (115/85 - 145/56)  BP(mean): 80 (06 Aug 2024 16:14) (64 - 83)  RR: 18 (06 Aug 2024 16:14) (18 - 20)  SpO2: 92% (06 Aug 2024 16:14) (92% - 98%)    Parameters below as of 06 Aug 2024 16:14  Patient On (Oxygen Delivery Method): room air        Constitutional: NAD, awake and alert  HEENT: PERR, EOMI, Normal Hearing, MMM  Neck: Soft and supple, No LAD, No JVD  Respiratory: Breath sounds are clear bilaterally, No wheezing, rales or rhonchi  Cardiovascular: S1 and S2, regular rate and rhythm, no Murmurs, gallops or rubs  Gastrointestinal: Bowel Sounds present, soft, nontender, nondistended, no guarding, no rebound  Extremities: No peripheral edema  Vascular: 2+ peripheral pulses  Neurological: A/O x 3, no focal deficits  Musculoskeletal: 5/5 strength b/l upper and lower extremities  Skin: No rashes    =======================================    INTERPRETATION OF TELEMETRY:    ECG:    ========================================    LABS:                        9.7    8.08  )-----------( 314      ( 06 Aug 2024 07:36 )             30.5     08-06    136  |  99  |  22  ----------------------------<  145<H>  3.2<L>   |  30  |  1.53<H>    Ca    9.1      06 Aug 2024 07:36    TPro  7.2  /  Alb  3.1<L>  /  TBili  1.0  /  DBili  x   /  AST  19  /  ALT  16  /  AlkPhos  60  08-06          Urinalysis Basic - ( 06 Aug 2024 07:36 )    Color: x / Appearance: x / SG: x / pH: x  Gluc: 145 mg/dL / Ketone: x  / Bili: x / Urobili: x   Blood: x / Protein: x / Nitrite: x   Leuk Esterase: x / RBC: x / WBC x   Sq Epi: x / Non Sq Epi: x / Bacteria: x      I&O's Summary    06 Aug 2024 07:01  -  06 Aug 2024 17:51  --------------------------------------------------------  IN: 0 mL / OUT: 1100 mL / NET: -1100 mL      BNP      RADIOLOGY & ADDITIONAL STUDIES:
  HPI: Pt is a 89y old Male with hx of  DM, BPH, COPD, GERD, Thrombocytopenia, HLD, CHF, Lung Cancer s/p lobectomy of lung, PAD, CKD, CAD s/p carotid endarterectomy, AICD, appendectomy, Hypertension, Cardiac Arrest, aortic stenosis s/p TAVR two weeks ago. Presented  with progressive sob on exertion. Palliative Medicne Consult to further establish GOC  24 Seen and examined at bedside. Awake and alert with no C/O pain. Endorses dyspnea on exertion however much improved since adm.     PAIN: ( )Yes   (X )No  Impact on ADLs:    DYSPNEA: (X ) Yes  ( ) No  Mod-severe on exertion improving    PAST MEDICAL & SURGICAL HISTORY:  Hypertension  CAD (coronary artery disease)  Cardiac arrest with successful resuscitation  CKD (chronic kidney disease)  PAD (peripheral artery disease)  Lung cancer  Heart failure  HLD (hyperlipidemia)  2019 novel coronavirus disease (COVID-19)  History of falling  Thrombocytopenia  GERD (gastroesophageal reflux disease)  History of COPD  Diabetes  History of BPH  Leg wound, right  AICD (automatic cardioverter/defibrillator) present  S/P carotid endarterectomy  History of lobectomy of lung  History of appendectomy  H/O left knee surgery  History of tonsillectomy and adenoidectomy  H/O endarterectomy      SOCIAL HX:  Lives home with aide support    Hx opiate tolerance ( )YES  ( X)NO    Baseline ADLs  (Prior to Admission)  (X ) Independent   ( )Dependent    FAMILY HISTORY:  Patient unable to provide medical history        Review of Systems:    Depression-denies  Physical Discomfort-mild  Dyspnea-on exertion  Anorexia-mod  Fatigue-mod  Weakness-mod    All other systems reviewed and negative      PHYSICAL EXAM:    Vital Signs Last 24 Hrs  T(C): 36.9 (07 Aug 2024 08:44), Max: 37.1 (06 Aug 2024 16:14)  T(F): 98.4 (07 Aug 2024 08:44), Max: 98.8 (06 Aug 2024 16:14)  HR: 64 (07 Aug 2024 08:44) (61 - 67)  BP: 117/43 (07 Aug 2024 08:44) (115/85 - 131/54)  BP(mean): 65 (07 Aug 2024 08:44) (65 - 80)  RR: 18 (07 Aug 2024 08:44) (16 - 18)  SpO2: 93% (07 Aug 2024 08:44) (92% - 93%)  Parameters below as of 07 Aug 2024 08:44  Patient On (Oxygen Delivery Method): room air    Daily Weight in k.4 (07 Aug 2024 06:29)    PPSV2: 40  %    General: Elderly male in bed in NAD  Mental Status: A&O X3  HEENT: oral mucosa moist  Lungs: clear to auscultation micheal  Cardiac: S1S2+  GI: abd soft NT ND + BS  : voids  Ext: moves on bed  Neuro: no focal def      LABS:                        10.7   8.95  )-----------( 365      ( 07 Aug 2024 10:30 )             33.9         137  |  101  |  23  ----------------------------<  160<H>  4.0   |  32<H>  |  1.70<H>    Ca    9.2      07 Aug 2024 10:30    Albumin: Albumin: 3.1 g/dL ( @ 07:36)      Allergies    No Known Allergies    Intolerances      MEDICATIONS  (STANDING):  aMIOdarone    Tablet 200 milliGRAM(s) Oral at bedtime  aspirin enteric coated 81 milliGRAM(s) Oral daily  atorvastatin 10 milliGRAM(s) Oral at bedtime  dextrose 5%. 1000 milliLiter(s) (50 mL/Hr) IV Continuous <Continuous>  dextrose 5%. 1000 milliLiter(s) (100 mL/Hr) IV Continuous <Continuous>  dextrose 50% Injectable 25 Gram(s) IV Push once  dextrose 50% Injectable 12.5 Gram(s) IV Push once  dextrose 50% Injectable 25 Gram(s) IV Push once  furosemide   Injectable 20 milliGRAM(s) IV Push two times a day  glucagon  Injectable 1 milliGRAM(s) IntraMuscular once  insulin lispro (ADMELOG) corrective regimen sliding scale   SubCutaneous three times a day before meals  melatonin 5 milliGRAM(s) Oral at bedtime  metoprolol succinate ER 12.5 milliGRAM(s) Oral daily  pantoprazole    Tablet 40 milliGRAM(s) Oral before breakfast  rivaroxaban 15 milliGRAM(s) Oral with dinner    MEDICATIONS  (PRN):  dextrose Oral Gel 15 Gram(s) Oral once PRN Blood Glucose LESS THAN 70 milliGRAM(s)/deciliter      RADIOLOGY/ADDITIONAL STUDIES:  < from: Xray Chest 1 View- PORTABLE-Urgent (24 @ 17:48) >    ACC: [id number] EXAM:  XR CHEST PORTABLE URGENT 1V   ORDERED BY: ANTIONETTE SLOAN     PROCEDURE DATE:  2024          INTERPRETATION:  AP chest on 2024 at 5:38 PM. Patient has chest   pain.    Heart magnified by technique.    Sternotomy, left-sided defibrillator, calcified mitral area again noted.   A TAVR aortic valve is new since  this year.    There are persistent mild to moderate congestive findings.    IMPRESSION: TAVR aortic valve new since prior. Persistent congestive   findings.    < end of copied text >

## 2024-08-07 NOTE — CONSULT NOTE ADULT - ASSESSMENT
HPI: Pt is a 89y old Male with hx of  DM, BPH, COPD, GERD, Thrombocytopenia, HLD, CHF, Lung Cancer s/p lobectomy of lung, PAD, CKD, CAD s/p carotid endarterectomy, AICD, appendectomy, Hypertension, Cardiac Arrest, aortic stenosis s/p TAVR two weeks ago. Presented  with progressive sob on exertion. Palliative Medicne Consult to further establish GOC  8/7/24 Seen and examined at bedside. Awake and alert with no C/O pain. Endorses dyspnea on exertion however much improved since adm.     Assessment and Plan:    1) Acute on chronic diastolic CHF  -noted P afib on AICD interrogation, all day 7/31  -s/p Lasix 80 mg IV with improvement in symptoms   -Cardio eval  -Cont meds as per cardio  -CXR noted  -ECHO noted  -S/P TAVR approx 2 weeks ago    2) OLIVIA on CKD   -cre 1.7 , baseline 1.2-1.5  -hold entresto and farxiga for now  -avoid nephrotoxins  -consider nephro consult if creatinine worsens    3) Debility  -Weakness  -S/P TAVR  -Poor PO intake  -PT eval    4) Advanced Directives  -Pt with capacity  -HCP naming Medhat Rojas on file  -GOC done  -No limits set      Time Spent: 75 minutes including the care, coordination and counseling of this patient, 50% of which was spent coordinating and counseling.  
Impression;  Decompensated CHF from Atrial fib and dietary indiscretion of Salt.    S/P TAVR and may take a while to improve LV function.    Advised him to limit fluid intake to 35 OZs a day and call  if he gains more than 3 LBs in 24 hours.  Thank you.

## 2024-08-07 NOTE — PROGRESS NOTE ADULT - ASSESSMENT
This is a pleasant 89 yo male with a pmh chf, ckd, pad, severe as s/p tavr two weeks ago, presented with CHF exacerbation     #Acute Hypoxic Respiratory Failure due to acute on chronic HFpEF  -PPM interrogation: noted P afib on AICD interrogation, all day 7/31  -lasix 20 mg iv bid, switch to PO Lasix   -c/w Metoprolol Succinate   -hold Farxiga and Entresto for now   -cardiology following     #OLIVIA on CKD  -stable   -monitor renal function   -avoid nephrotoxins  -hold entresto and farxiga for now    #Chronic Afib   -Xarelto 15mg  -Amiodarone     #Diabetes  -hold oral medications  -HbA1c: 6.7  -POC qAC/hs  -RISS   -target BGM <180    #LD  -pravastatin 40 mg qhs     #Advanced Care Directives   -FULL CODE     Palliative eval appreciated   
This is a pleasant 89 yo male with a pmh chf, ckd, pad, severe as s/p tavr two weeks ago, presented with CHF exacerbation       Acute on chronic diastolic CHF  -noted P afib on AICD interrogation, all day 7/31  -s/p Lasix 80 mg iv in er with improvement in symptoms   -lasix 20 mg iv bid  -echo  - amiodarone 200 mg daily  - metoprolol xl 25 mg daily     xarelto 15 mg daily   -cardiology consult  -hold entresto and farxiga for now given OLIVIA    acute kidney injury on ckd iiib, likely cardiorenal   -cre 1.7 , baseline 1.2-1.5  -hold entresto and farxiga for now  -avoid nephrotoxins  -consider nephro consult if creatinine worsens    DMII with hyperglycemia  -a1c 6.3 in april 2024  -diet control  -on farxiga for chf. will hold for now given olivia     HLD  -pravastatin 40 mg qhs     disp  discussed goals of care with pt, pt would like to be full code

## 2024-08-08 ENCOUNTER — TRANSCRIPTION ENCOUNTER (OUTPATIENT)
Age: 89
End: 2024-08-08

## 2024-08-08 VITALS
DIASTOLIC BLOOD PRESSURE: 62 MMHG | HEART RATE: 63 BPM | OXYGEN SATURATION: 96 % | SYSTOLIC BLOOD PRESSURE: 95 MMHG | RESPIRATION RATE: 18 BRPM | TEMPERATURE: 98 F

## 2024-08-08 LAB
ALBUMIN SERPL ELPH-MCNC: 3.1 G/DL — LOW (ref 3.3–5)
ANION GAP SERPL CALC-SCNC: 7 MMOL/L — SIGNIFICANT CHANGE UP (ref 5–17)
BUN SERPL-MCNC: 23 MG/DL — SIGNIFICANT CHANGE UP (ref 7–23)
CALCIUM SERPL-MCNC: 9.4 MG/DL — SIGNIFICANT CHANGE UP (ref 8.5–10.1)
CHLORIDE SERPL-SCNC: 100 MMOL/L — SIGNIFICANT CHANGE UP (ref 96–108)
CO2 SERPL-SCNC: 30 MMOL/L — SIGNIFICANT CHANGE UP (ref 22–31)
CREAT SERPL-MCNC: 1.53 MG/DL — HIGH (ref 0.5–1.3)
EGFR: 43 ML/MIN/1.73M2 — LOW
GLUCOSE BLDC GLUCOMTR-MCNC: 171 MG/DL — HIGH (ref 70–99)
GLUCOSE BLDC GLUCOMTR-MCNC: 174 MG/DL — HIGH (ref 70–99)
GLUCOSE SERPL-MCNC: 168 MG/DL — HIGH (ref 70–99)
NT-PROBNP SERPL-SCNC: 3745 PG/ML — HIGH (ref 0–450)
PHOSPHATE SERPL-MCNC: 3.3 MG/DL — SIGNIFICANT CHANGE UP (ref 2.5–4.5)
POTASSIUM SERPL-MCNC: 4 MMOL/L — SIGNIFICANT CHANGE UP (ref 3.5–5.3)
POTASSIUM SERPL-SCNC: 4 MMOL/L — SIGNIFICANT CHANGE UP (ref 3.5–5.3)
SODIUM SERPL-SCNC: 137 MMOL/L — SIGNIFICANT CHANGE UP (ref 135–145)

## 2024-08-08 PROCEDURE — 71045 X-RAY EXAM CHEST 1 VIEW: CPT | Mod: 26

## 2024-08-08 PROCEDURE — 99239 HOSP IP/OBS DSCHRG MGMT >30: CPT

## 2024-08-08 RX ADMIN — Medication 1 PACKET(S): at 15:33

## 2024-08-08 RX ADMIN — Medication 12.5 MILLIGRAM(S): at 09:37

## 2024-08-08 RX ADMIN — FUROSEMIDE 40 MILLIGRAM(S): 10 INJECTION, SOLUTION INTRAVENOUS at 05:25

## 2024-08-08 RX ADMIN — Medication 1: at 08:00

## 2024-08-08 RX ADMIN — Medication 81 MILLIGRAM(S): at 09:38

## 2024-08-08 RX ADMIN — PANTOPRAZOLE SODIUM 40 MILLIGRAM(S): 20 TABLET, DELAYED RELEASE ORAL at 05:25

## 2024-08-08 RX ADMIN — Medication 1 PACKET(S): at 05:25

## 2024-08-08 NOTE — DISCHARGE NOTE PROVIDER - PROVIDER TOKENS
PROVIDER:[TOKEN:[[id number]],FOLLOWUP:[1-3 days],ESTABLISHEDPATIENT:[T]],PROVIDER:[TOKEN:[[id number]],FOLLOWUP:[1-3 days],ESTABLISHEDPATIENT:[T]]

## 2024-08-08 NOTE — DISCHARGE NOTE PROVIDER - NSDCCPCAREPLAN_GEN_ALL_CORE_FT
PRINCIPAL DISCHARGE DIAGNOSIS  Diagnosis: Acute hypoxic respiratory failure  Assessment and Plan of Treatment: resolved. Please continue to take your medications as prescribed. Please follow up with your Cardiologist and PCP soon after discharge

## 2024-08-08 NOTE — DISCHARGE NOTE PROVIDER - CARE PROVIDER_API CALL
Alan Garcia  Cardiology  [address] [place] [address]  Phone: [phone]  Fax: [phone]  Established Patient  Follow Up Time: 1-3 days    Kirby Maldonado  Internal Medicine  [address] [place] [address], [address]  [place] [address]  Phone: [phone]  Fax: [phone]  Established Patient  Follow Up Time: 1-3 days

## 2024-08-08 NOTE — DISCHARGE NOTE NURSING/CASE MANAGEMENT/SOCIAL WORK - NSDCVIVACCINE_GEN_ALL_CORE_FT
Tdap; 07-Jun-2021 21:51; Dylan Monahan (WOO); [employer]; [id number] (Exp. Date: 18-Nov-2022); IntraMuscular; Deltoid Right.; 0.5 milliLiter(s); VIS (VIS Published: 09-May-2013, VIS Presented: 07-Jun-2021);

## 2024-08-08 NOTE — DISCHARGE NOTE PROVIDER - NSDCMRMEDTOKEN_GEN_ALL_CORE_FT
amiodarone 200 mg oral tablet: 1 tab(s) orally once a day (at bedtime)  aspirin 81 mg oral tablet: 1 tab(s) orally once a day  cholecalciferol 25 mcg (1000 intl units) oral capsule: 2 cap(s) orally once a day  Farxiga 10 mg oral tablet: 1 tab(s) orally once a day  Lasix 20 mg oral tablet: 2 tab(s) orally once a day  melatonin 5 mg oral tablet: 1 tab(s) orally once a day (at bedtime)  metoprolol succinate 25 mg oral tablet, extended release: 0.5 tab(s) orally once a day (at bedtime)  pravastatin 40 mg oral tablet: 1 tab(s) orally once a day (at bedtime)  PreserVision AREDS 2 oral capsule: 1 cap(s) orally once a day (at bedtime)  Protonix 40 mg oral delayed release tablet: 1 tab(s) orally once a day  rivaroxaban 15 mg oral tablet: 1 tab(s) orally once a day (in the evening)  sacubitril-valsartan 24 mg-26 mg oral tablet: 1 tab(s) orally 2 times a day  Visivite OTC eye health supplement: 2x a day

## 2024-08-08 NOTE — DISCHARGE NOTE PROVIDER - HOSPITAL COURSE
89 y/o male w/ a PMHx of DM, BPH, COPD, GERD, Thrombocytopenia, HLD, CHF, Lung Cancer s/p lobectomy of lung, PAD, CKD, CAD s/p carotid endarterectomy, AICD, appendectomy, Hypertension, Cardiac Arrest, aortic stenosis s/p TAVR two weeks ago, Pt presented  to the ED with progressive sob with exertion, he said he is fine sitting in chair and sleeping however when he walks for 5-10 feet he starts getting sob and stops walking because of shortness of breath he denies chest pain or palpitations, he also reported increasing bilateral lower extremity swellinghe denies any fevers or chills or nausea or vomiting or diarrhea received 80 mg of IV lasix in ER and feels much better. AICD interrogated in ER and showed P afib episodes, longest lasting 24 hours on     Sub: Pt seen and evaluated. No acute complaints. Breathing is improved  Vital Signs Last 24 Hrs  T(C): 36.9 (08 Aug 2024 08:26), Max: 36.9 (08 Aug 2024 08:26)  T(F): 98.4 (08 Aug 2024 08:26), Max: 98.4 (08 Aug 2024 08:26)  HR: 59 (08 Aug 2024 08:26) (59 - 66)  BP: 107/62 (08 Aug 2024 08:26) (105/68 - 126/52)  BP(mean): 77 (07 Aug 2024 21:20) (74 - 77)  RR: 18 (08 Aug 2024 08:26) (18 - 18)  SpO2: 92% (08 Aug 2024 08:26) (92% - 95%)    Parameters below as of 08 Aug 2024 08:26  Patient On (Oxygen Delivery Method): room air    Daily     Daily Weight in k (08 Aug 2024 06:21)  Daily     Daily Weight in k.4 (07 Aug 2024 06:29)    GEN: NAD   HEENT: EOMI,  moist mucous membranes, +[place]   NECK : Soft and supple, no JVD  LUNG: +decreased breath sounds   CVS: S1S2+, RRR, no M/G/R  GI: BS+, soft, NT/ND, no guarding, no rebound  EXTREMITIES: No peripheral edema  VASCULAR: 2+ peripheral pulses  NEURO: AAOx3, grossly non-focal   SKIN: No rashes      This is a pleasant 88 yo male with a pmh chf, ckd, pad, severe as s/p tavr two weeks ago, presented with CHF exacerbation     #Acute Hypoxic Respiratory Failure due to acute on chronic HFpEF  -PPM interrogation: noted P afib on AICD interrogation, all day   -currently euvolemic   -c/w Lasix, Entresto, Farxiga   -f/u with Cardiology as outpatient     #OLIVIA on CKD  -stable     #Chronic Afib   -Xarelto 15mg  -Amiodarone     #Diabetes  -c/w home medicatiosn   -HbA1c: 6.7    #HLD  -pravastatin 40 mg qhs     discharge home with close f/u with PCP and Cardiology

## 2024-08-08 NOTE — DISCHARGE NOTE NURSING/CASE MANAGEMENT/SOCIAL WORK - PATIENT PORTAL LINK FT
You can access the FollowMyHealth Patient Portal offered by [facility] by registering at the following website: [email or web address]. By joining [employer]’s FollowMyHealth portal, you will also be able to view your health information using other applications (apps) compatible with our system.

## 2024-08-14 NOTE — ED PROVIDER NOTE - RESPIRATORY, MLM
"Subjective   Patient ID: Kevin Dc \"Monalisa" is a 60 y.o. male who presents for No chief complaint on file..    Kofi Dc is seen for his chronic issues. Verbal consent was requested and obtained from patient on this date for a telehealth visit.   He has not been seen in office for past 1.5 years.  He was recently hospitalized due to lymphedema and cellulitis.  CKD - sees Dr. Chance,  Lymphedema - financial issues so he was managing on his own including several ulcers which ultimately led to the cellulitis  CAD - sees Dr. Hendrickson, no chest pain or palpitations  TANO severe - sees Dr. Dodson  Hypercholesterolemia - taking statin without side effects.    Review of Systems    Objective  Vitals:  There were no vitals taken for this visit.    Physical Exam    Assessment/Plan   {Assess/PlanSmartLinks:42499}    Follow up {FOLLOW UP INTERVALS:00336}, sooner with any problems or concerns.  " Breath sounds clear and equal bilaterally.

## 2024-08-17 DIAGNOSIS — N18.30 CHRONIC KIDNEY DISEASE, STAGE 3 UNSPECIFIED: ICD-10-CM

## 2024-08-17 DIAGNOSIS — Z95.2 PRESENCE OF PROSTHETIC HEART VALVE: ICD-10-CM

## 2024-08-17 DIAGNOSIS — I13.0 HYPERTENSIVE HEART AND CHRONIC KIDNEY DISEASE WITH HEART FAILURE AND STAGE 1 THROUGH STAGE 4 CHRONIC KIDNEY DISEASE, OR UNSPECIFIED CHRONIC KIDNEY DISEASE: ICD-10-CM

## 2024-08-17 DIAGNOSIS — E11.65 TYPE 2 DIABETES MELLITUS WITH HYPERGLYCEMIA: ICD-10-CM

## 2024-08-17 DIAGNOSIS — Z95.810 PRESENCE OF AUTOMATIC (IMPLANTABLE) CARDIAC DEFIBRILLATOR: ICD-10-CM

## 2024-08-17 DIAGNOSIS — N40.0 BENIGN PROSTATIC HYPERPLASIA WITHOUT LOWER URINARY TRACT SYMPTOMS: ICD-10-CM

## 2024-08-17 DIAGNOSIS — Z86.74 PERSONAL HISTORY OF SUDDEN CARDIAC ARREST: ICD-10-CM

## 2024-08-17 DIAGNOSIS — I48.0 PAROXYSMAL ATRIAL FIBRILLATION: ICD-10-CM

## 2024-08-17 DIAGNOSIS — E11.22 TYPE 2 DIABETES MELLITUS WITH DIABETIC CHRONIC KIDNEY DISEASE: ICD-10-CM

## 2024-08-17 DIAGNOSIS — Z86.16 PERSONAL HISTORY OF COVID-19: ICD-10-CM

## 2024-08-17 DIAGNOSIS — J96.01 ACUTE RESPIRATORY FAILURE WITH HYPOXIA: ICD-10-CM

## 2024-08-17 DIAGNOSIS — N17.9 ACUTE KIDNEY FAILURE, UNSPECIFIED: ICD-10-CM

## 2024-08-17 DIAGNOSIS — I50.33 ACUTE ON CHRONIC DIASTOLIC (CONGESTIVE) HEART FAILURE: ICD-10-CM

## 2024-08-17 DIAGNOSIS — Z90.49 ACQUIRED ABSENCE OF OTHER SPECIFIED PARTS OF DIGESTIVE TRACT: ICD-10-CM

## 2024-08-17 DIAGNOSIS — E78.5 HYPERLIPIDEMIA, UNSPECIFIED: ICD-10-CM

## 2024-08-17 DIAGNOSIS — I48.20 CHRONIC ATRIAL FIBRILLATION, UNSPECIFIED: ICD-10-CM

## 2024-08-17 DIAGNOSIS — K21.9 GASTRO-ESOPHAGEAL REFLUX DISEASE WITHOUT ESOPHAGITIS: ICD-10-CM

## 2024-08-17 DIAGNOSIS — J44.9 CHRONIC OBSTRUCTIVE PULMONARY DISEASE, UNSPECIFIED: ICD-10-CM

## 2024-08-17 DIAGNOSIS — Z79.82 LONG TERM (CURRENT) USE OF ASPIRIN: ICD-10-CM

## 2024-08-17 DIAGNOSIS — D69.6 THROMBOCYTOPENIA, UNSPECIFIED: ICD-10-CM

## 2024-08-17 DIAGNOSIS — E11.51 TYPE 2 DIABETES MELLITUS WITH DIABETIC PERIPHERAL ANGIOPATHY WITHOUT GANGRENE: ICD-10-CM

## 2024-08-17 DIAGNOSIS — I25.10 ATHEROSCLEROTIC HEART DISEASE OF NATIVE CORONARY ARTERY WITHOUT ANGINA PECTORIS: ICD-10-CM

## 2024-08-17 DIAGNOSIS — Z85.118 PERSONAL HISTORY OF OTHER MALIGNANT NEOPLASM OF BRONCHUS AND LUNG: ICD-10-CM

## 2024-08-28 ENCOUNTER — APPOINTMENT (OUTPATIENT)
Dept: OTOLARYNGOLOGY | Facility: CLINIC | Age: 89
End: 2024-08-28
Payer: MEDICARE

## 2024-08-28 VITALS — WEIGHT: 197 LBS | HEIGHT: 72 IN | BODY MASS INDEX: 26.68 KG/M2

## 2024-08-28 DIAGNOSIS — H90.3 SENSORINEURAL HEARING LOSS, BILATERAL: ICD-10-CM

## 2024-08-28 DIAGNOSIS — H61.23 IMPACTED CERUMEN, BILATERAL: ICD-10-CM

## 2024-08-28 PROCEDURE — 99213 OFFICE O/P EST LOW 20 MIN: CPT

## 2024-08-28 NOTE — PHYSICAL EXAM
[de-identified] : mel [Normal] : mucosa is normal [Midline] : trachea located in midline position

## 2024-08-28 NOTE — REVIEW OF SYSTEMS
[Dizziness] : dizziness [Vertigo] : vertigo [Patient Intake Form Reviewed] : Patient intake form was reviewed [Negative] : Heme/Lymph [de-identified] : fullness in both ears

## 2024-08-28 NOTE — ASSESSMENT
[FreeTextEntry1] : sn loss stable cerumen cleared  20 minutes spent with patient with exam and counseling excluding time for any procedure. to have hearing aid reeval f/u 6 mo

## 2024-09-05 NOTE — ED ADULT NURSE NOTE - IS THE PATIENT ABLE TO BE SCREENED?
Kristen has one incision where her fistula tract was. This is closed with stitches under the skin that will absorb. Her incision is also covered in bacitracin ointment. This should be used for 48 hours, so it should be re-applied any time the dressing is changed. Okay to change the dressing tomorrow or if it is soaking through. We expect some drainage from the wound but please call the office if there is an increase in drainage, foul-smelling drainage, or drainage that look like pus.     Kristen can continue to eat her normal diet.    No soaking for 2 weeks (baths/swimming), keep incision dry when bathing.     May use tylenol or motrin for pain control if needed.     Follow up with Dr Pa in 2 weeks. [phone]   Yes

## 2024-09-24 ENCOUNTER — APPOINTMENT (OUTPATIENT)
Dept: ELECTROPHYSIOLOGY | Facility: CLINIC | Age: 89
End: 2024-09-24
Payer: MEDICARE

## 2024-09-25 ENCOUNTER — NON-APPOINTMENT (OUTPATIENT)
Age: 89
End: 2024-09-25

## 2024-09-25 PROCEDURE — 93295 DEV INTERROG REMOTE 1/2/MLT: CPT

## 2024-09-25 PROCEDURE — 93296 REM INTERROG EVL PM/IDS: CPT

## 2024-09-26 ENCOUNTER — RX RENEWAL (OUTPATIENT)
Age: 89
End: 2024-09-26

## 2024-09-30 ENCOUNTER — INPATIENT (INPATIENT)
Facility: HOSPITAL | Age: 89
LOS: 2 days | Discharge: HOME | DRG: 603 | End: 2024-10-03
Attending: HOSPITALIST | Admitting: FAMILY MEDICINE
Payer: MEDICARE

## 2024-09-30 VITALS
WEIGHT: 198.42 LBS | DIASTOLIC BLOOD PRESSURE: 47 MMHG | HEART RATE: 56 BPM | TEMPERATURE: 98 F | SYSTOLIC BLOOD PRESSURE: 78 MMHG | OXYGEN SATURATION: 95 % | RESPIRATION RATE: 18 BRPM

## 2024-09-30 DIAGNOSIS — Z98.890 OTHER SPECIFIED POSTPROCEDURAL STATES: Chronic | ICD-10-CM

## 2024-09-30 DIAGNOSIS — Z95.810 PRESENCE OF AUTOMATIC (IMPLANTABLE) CARDIAC DEFIBRILLATOR: Chronic | ICD-10-CM

## 2024-09-30 DIAGNOSIS — Z90.49 ACQUIRED ABSENCE OF OTHER SPECIFIED PARTS OF DIGESTIVE TRACT: Chronic | ICD-10-CM

## 2024-09-30 DIAGNOSIS — Z90.2 ACQUIRED ABSENCE OF LUNG [PART OF]: Chronic | ICD-10-CM

## 2024-09-30 LAB
ALBUMIN SERPL ELPH-MCNC: 3.6 G/DL — SIGNIFICANT CHANGE UP (ref 3.3–5)
ALP SERPL-CCNC: 77 U/L — SIGNIFICANT CHANGE UP (ref 40–120)
ALT FLD-CCNC: 18 U/L — SIGNIFICANT CHANGE UP (ref 12–78)
ANION GAP SERPL CALC-SCNC: 6 MMOL/L — SIGNIFICANT CHANGE UP (ref 5–17)
APPEARANCE UR: CLEAR — SIGNIFICANT CHANGE UP
APTT BLD: 37.6 SEC — HIGH (ref 24.5–35.6)
AST SERPL-CCNC: 20 U/L — SIGNIFICANT CHANGE UP (ref 15–37)
BACTERIA # UR AUTO: NEGATIVE /HPF — SIGNIFICANT CHANGE UP
BASOPHILS # BLD AUTO: 0.05 K/UL — SIGNIFICANT CHANGE UP (ref 0–0.2)
BASOPHILS NFR BLD AUTO: 0.4 % — SIGNIFICANT CHANGE UP (ref 0–2)
BILIRUB SERPL-MCNC: 0.5 MG/DL — SIGNIFICANT CHANGE UP (ref 0.2–1.2)
BILIRUB UR-MCNC: NEGATIVE — SIGNIFICANT CHANGE UP
BUN SERPL-MCNC: 37 MG/DL — HIGH (ref 7–23)
CALCIUM SERPL-MCNC: 9.6 MG/DL — SIGNIFICANT CHANGE UP (ref 8.5–10.1)
CAST: 1 /LPF — SIGNIFICANT CHANGE UP (ref 0–4)
CHLORIDE SERPL-SCNC: 102 MMOL/L — SIGNIFICANT CHANGE UP (ref 96–108)
CO2 SERPL-SCNC: 29 MMOL/L — SIGNIFICANT CHANGE UP (ref 22–31)
COLOR SPEC: YELLOW — SIGNIFICANT CHANGE UP
CREAT SERPL-MCNC: 2.43 MG/DL — HIGH (ref 0.5–1.3)
DIFF PNL FLD: ABNORMAL
EGFR: 25 ML/MIN/1.73M2 — LOW
EOSINOPHIL # BLD AUTO: 0.43 K/UL — SIGNIFICANT CHANGE UP (ref 0–0.5)
EOSINOPHIL NFR BLD AUTO: 3.5 % — SIGNIFICANT CHANGE UP (ref 0–6)
FLUAV AG NPH QL: SIGNIFICANT CHANGE UP
FLUBV AG NPH QL: SIGNIFICANT CHANGE UP
GLUCOSE SERPL-MCNC: 133 MG/DL — HIGH (ref 70–99)
GLUCOSE UR QL: 500 MG/DL
HCT VFR BLD CALC: 36 % — LOW (ref 39–50)
HGB BLD-MCNC: 11.1 G/DL — LOW (ref 13–17)
IMM GRANULOCYTES NFR BLD AUTO: 0.5 % — SIGNIFICANT CHANGE UP (ref 0–0.9)
INR BLD: 1.54 RATIO — HIGH (ref 0.85–1.16)
KETONES UR-MCNC: NEGATIVE MG/DL — SIGNIFICANT CHANGE UP
LACTATE SERPL-SCNC: 1.6 MMOL/L — SIGNIFICANT CHANGE UP (ref 0.7–2)
LEUKOCYTE ESTERASE UR-ACNC: ABNORMAL
LYMPHOCYTES # BLD AUTO: 1.71 K/UL — SIGNIFICANT CHANGE UP (ref 1–3.3)
LYMPHOCYTES # BLD AUTO: 13.8 % — SIGNIFICANT CHANGE UP (ref 13–44)
MCHC RBC-ENTMCNC: 25.8 PG — LOW (ref 27–34)
MCHC RBC-ENTMCNC: 30.8 GM/DL — LOW (ref 32–36)
MCV RBC AUTO: 83.5 FL — SIGNIFICANT CHANGE UP (ref 80–100)
MONOCYTES # BLD AUTO: 0.91 K/UL — HIGH (ref 0–0.9)
MONOCYTES NFR BLD AUTO: 7.3 % — SIGNIFICANT CHANGE UP (ref 2–14)
NEUTROPHILS # BLD AUTO: 9.26 K/UL — HIGH (ref 1.8–7.4)
NEUTROPHILS NFR BLD AUTO: 74.5 % — SIGNIFICANT CHANGE UP (ref 43–77)
NITRITE UR-MCNC: NEGATIVE — SIGNIFICANT CHANGE UP
PH UR: 5.5 — SIGNIFICANT CHANGE UP (ref 5–8)
PLATELET # BLD AUTO: 216 K/UL — SIGNIFICANT CHANGE UP (ref 150–400)
POTASSIUM SERPL-MCNC: 3.8 MMOL/L — SIGNIFICANT CHANGE UP (ref 3.5–5.3)
POTASSIUM SERPL-SCNC: 3.8 MMOL/L — SIGNIFICANT CHANGE UP (ref 3.5–5.3)
PROT SERPL-MCNC: 7.7 GM/DL — SIGNIFICANT CHANGE UP (ref 6–8.3)
PROT UR-MCNC: SIGNIFICANT CHANGE UP MG/DL
PROTHROM AB SERPL-ACNC: 17.6 SEC — HIGH (ref 9.9–13.4)
RBC # BLD: 4.31 M/UL — SIGNIFICANT CHANGE UP (ref 4.2–5.8)
RBC # FLD: 17.5 % — HIGH (ref 10.3–14.5)
RBC CASTS # UR COMP ASSIST: 49 /HPF — HIGH (ref 0–4)
RSV RNA NPH QL NAA+NON-PROBE: SIGNIFICANT CHANGE UP
SARS-COV-2 RNA SPEC QL NAA+PROBE: SIGNIFICANT CHANGE UP
SODIUM SERPL-SCNC: 137 MMOL/L — SIGNIFICANT CHANGE UP (ref 135–145)
SP GR SPEC: 1.02 — SIGNIFICANT CHANGE UP (ref 1–1.03)
SQUAMOUS # UR AUTO: 0 /HPF — SIGNIFICANT CHANGE UP (ref 0–5)
TROPONIN I, HIGH SENSITIVITY RESULT: 17.88 NG/L — SIGNIFICANT CHANGE UP
UROBILINOGEN FLD QL: 0.2 MG/DL — SIGNIFICANT CHANGE UP (ref 0.2–1)
WBC # BLD: 12.42 K/UL — HIGH (ref 3.8–10.5)
WBC # FLD AUTO: 12.42 K/UL — HIGH (ref 3.8–10.5)
WBC UR QL: 6 /HPF — HIGH (ref 0–5)

## 2024-09-30 PROCEDURE — 93010 ELECTROCARDIOGRAM REPORT: CPT

## 2024-09-30 PROCEDURE — 73630 X-RAY EXAM OF FOOT: CPT | Mod: 26,LT

## 2024-09-30 PROCEDURE — 71045 X-RAY EXAM CHEST 1 VIEW: CPT | Mod: 26

## 2024-09-30 PROCEDURE — 84550 ASSAY OF BLOOD/URIC ACID: CPT

## 2024-09-30 PROCEDURE — 97116 GAIT TRAINING THERAPY: CPT | Mod: GP

## 2024-09-30 PROCEDURE — 82746 ASSAY OF FOLIC ACID SERUM: CPT

## 2024-09-30 PROCEDURE — 81001 URINALYSIS AUTO W/SCOPE: CPT

## 2024-09-30 PROCEDURE — 80048 BASIC METABOLIC PNL TOTAL CA: CPT

## 2024-09-30 PROCEDURE — 84100 ASSAY OF PHOSPHORUS: CPT

## 2024-09-30 PROCEDURE — 83735 ASSAY OF MAGNESIUM: CPT

## 2024-09-30 PROCEDURE — 97161 PT EVAL LOW COMPLEX 20 MIN: CPT | Mod: GP

## 2024-09-30 PROCEDURE — 87086 URINE CULTURE/COLONY COUNT: CPT

## 2024-09-30 PROCEDURE — 99222 1ST HOSP IP/OBS MODERATE 55: CPT

## 2024-09-30 PROCEDURE — 85018 HEMOGLOBIN: CPT

## 2024-09-30 PROCEDURE — 82962 GLUCOSE BLOOD TEST: CPT

## 2024-09-30 PROCEDURE — 86140 C-REACTIVE PROTEIN: CPT

## 2024-09-30 PROCEDURE — 83550 IRON BINDING TEST: CPT

## 2024-09-30 PROCEDURE — 99285 EMERGENCY DEPT VISIT HI MDM: CPT

## 2024-09-30 PROCEDURE — 82728 ASSAY OF FERRITIN: CPT

## 2024-09-30 PROCEDURE — 85014 HEMATOCRIT: CPT

## 2024-09-30 PROCEDURE — 83540 ASSAY OF IRON: CPT

## 2024-09-30 PROCEDURE — 36415 COLL VENOUS BLD VENIPUNCTURE: CPT

## 2024-09-30 PROCEDURE — 97110 THERAPEUTIC EXERCISES: CPT | Mod: GP

## 2024-09-30 PROCEDURE — 82607 VITAMIN B-12: CPT

## 2024-09-30 PROCEDURE — 85027 COMPLETE CBC AUTOMATED: CPT

## 2024-09-30 PROCEDURE — 85652 RBC SED RATE AUTOMATED: CPT

## 2024-09-30 RX ORDER — VANCOMYCIN HCL-SODIUM CHLORIDE IV SOLN 1.5 GM/250ML-0.9% 1.5-0.9/25 GM/ML-%
1250 SOLUTION INTRAVENOUS ONCE
Refills: 0 | Status: COMPLETED | OUTPATIENT
Start: 2024-09-30 | End: 2024-09-30

## 2024-09-30 RX ORDER — SODIUM CHLORIDE 0.9 % (FLUSH) 0.9 %
1000 SYRINGE (ML) INJECTION ONCE
Refills: 0 | Status: DISCONTINUED | OUTPATIENT
Start: 2024-09-30 | End: 2024-09-30

## 2024-09-30 RX ORDER — PANTOPRAZOLE SODIUM 40 MG/1
40 TABLET, DELAYED RELEASE ORAL
Refills: 0 | Status: DISCONTINUED | OUTPATIENT
Start: 2024-09-30 | End: 2024-10-03

## 2024-09-30 RX ORDER — VANCOMYCIN HCL-SODIUM CHLORIDE IV SOLN 1.5 GM/250ML-0.9% 1.5-0.9/25 GM/ML-%
1000 SOLUTION INTRAVENOUS EVERY 24 HOURS
Refills: 0 | Status: DISCONTINUED | OUTPATIENT
Start: 2024-10-01 | End: 2024-10-01

## 2024-09-30 RX ORDER — CEFEPIME 2 G/1
500 INJECTION, POWDER, FOR SOLUTION INTRAVENOUS EVERY 12 HOURS
Refills: 0 | Status: DISCONTINUED | OUTPATIENT
Start: 2024-10-01 | End: 2024-10-01

## 2024-09-30 RX ORDER — CRANBERRY FRUIT EXTRACT 650 MG
2000 CAPSULE ORAL DAILY
Refills: 0 | Status: DISCONTINUED | OUTPATIENT
Start: 2024-09-30 | End: 2024-10-03

## 2024-09-30 RX ORDER — RIVAROXABAN 10 MG/1
15 TABLET, FILM COATED ORAL
Refills: 0 | Status: DISCONTINUED | OUTPATIENT
Start: 2024-10-01 | End: 2024-10-03

## 2024-09-30 RX ORDER — CEFEPIME 2 G/1
1000 INJECTION, POWDER, FOR SOLUTION INTRAVENOUS ONCE
Refills: 0 | Status: DISCONTINUED | OUTPATIENT
Start: 2024-09-30 | End: 2024-09-30

## 2024-09-30 RX ORDER — CEFEPIME 2 G/1
1000 INJECTION, POWDER, FOR SOLUTION INTRAVENOUS ONCE
Refills: 0 | Status: COMPLETED | OUTPATIENT
Start: 2024-09-30 | End: 2024-09-30

## 2024-09-30 RX ORDER — VANCOMYCIN HCL-SODIUM CHLORIDE IV SOLN 1.5 GM/250ML-0.9% 1.5-0.9/25 GM/ML-%
1000 SOLUTION INTRAVENOUS ONCE
Refills: 0 | Status: DISCONTINUED | OUTPATIENT
Start: 2024-09-30 | End: 2024-09-30

## 2024-09-30 RX ORDER — SODIUM CHLORIDE 0.9 % (FLUSH) 0.9 %
500 SYRINGE (ML) INJECTION ONCE
Refills: 0 | Status: COMPLETED | OUTPATIENT
Start: 2024-09-30 | End: 2024-09-30

## 2024-09-30 RX ORDER — ATORVASTATIN CALCIUM 10 MG/1
10 TABLET, FILM COATED ORAL AT BEDTIME
Refills: 0 | Status: DISCONTINUED | OUTPATIENT
Start: 2024-09-30 | End: 2024-10-03

## 2024-09-30 RX ORDER — ASPIRIN 325 MG
81 TABLET ORAL DAILY
Refills: 0 | Status: DISCONTINUED | OUTPATIENT
Start: 2024-09-30 | End: 2024-10-03

## 2024-09-30 RX ORDER — AMIODARONE HYDROCHLORIDE 50 MG/ML
200 INJECTION, SOLUTION INTRAVENOUS AT BEDTIME
Refills: 0 | Status: DISCONTINUED | OUTPATIENT
Start: 2024-09-30 | End: 2024-10-03

## 2024-09-30 RX ORDER — METOPROLOL TARTRATE 50 MG
12.5 TABLET ORAL DAILY
Refills: 0 | Status: DISCONTINUED | OUTPATIENT
Start: 2024-09-30 | End: 2024-10-03

## 2024-09-30 RX ORDER — 5-HYDROXYTRYPTOPHAN (5-HTP) 100 MG
5 TABLET,DISINTEGRATING ORAL AT BEDTIME
Refills: 0 | Status: DISCONTINUED | OUTPATIENT
Start: 2024-09-30 | End: 2024-10-03

## 2024-09-30 RX ADMIN — AMIODARONE HYDROCHLORIDE 200 MILLIGRAM(S): 50 INJECTION, SOLUTION INTRAVENOUS at 23:05

## 2024-09-30 RX ADMIN — Medication 500 MILLILITER(S): at 15:16

## 2024-09-30 RX ADMIN — ATORVASTATIN CALCIUM 10 MILLIGRAM(S): 10 TABLET, FILM COATED ORAL at 22:00

## 2024-09-30 RX ADMIN — Medication 500 MILLILITER(S): at 17:18

## 2024-09-30 RX ADMIN — CEFEPIME 1000 MILLIGRAM(S): 2 INJECTION, POWDER, FOR SOLUTION INTRAVENOUS at 15:16

## 2024-09-30 RX ADMIN — Medication 5 MILLIGRAM(S): at 23:05

## 2024-09-30 RX ADMIN — VANCOMYCIN HCL-SODIUM CHLORIDE IV SOLN 1.5 GM/250ML-0.9% 166.67 MILLIGRAM(S): 1.5-0.9/25 SOLUTION at 15:31

## 2024-09-30 NOTE — PHARMACOTHERAPY INTERVENTION NOTE - COMMENTS
Medication reconciliation complete.  Home medications reviewed with the patient's family at the bedside with provided list.  Patient reports no known medication allergies.    Home Medications:  amiodarone 200 mg oral tablet: 1 tab(s) orally once a day (at bedtime) (30 Sep 2024 16:29)  aspirin 81 mg oral tablet: 1 tab(s) orally once a day (30 Sep 2024 16:29)  cholecalciferol 25 mcg (1000 intl units) oral capsule: 2 cap(s) orally once a day (30 Sep 2024 16:29)  Farxiga 10 mg oral tablet: 1 tab(s) orally once a day (30 Sep 2024 16:29)  melatonin 5 mg oral tablet: 1-2 tablets by mouth at bedtime (30 Sep 2024 16:27)  metoprolol succinate 25 mg oral tablet, extended release: 0.5 tab(s) orally once a day (at bedtime) (30 Sep 2024 16:29)  pravastatin 40 mg oral tablet: 1 tab(s) orally once a day (at bedtime) (30 Sep 2024 16:29)  PreserVision AREDS 2 oral capsule: 1 cap(s) orally once a day (at bedtime) (30 Sep 2024 16:29)  Protonix 40 mg oral delayed release tablet: 1 tab(s) orally once a day (30 Sep 2024 16:29)  rivaroxaban 15 mg oral tablet: 1 tab(s) orally once a day (in the evening) (30 Sep 2024 16:29)  sacubitril-valsartan 24 mg-26 mg oral tablet: 1 tab(s) orally 2 times a day (30 Sep 2024 16:29)  Visivite OTC eye health supplement: 2x a day (30 Sep 2024 16:29)

## 2024-09-30 NOTE — ED ADULT TRIAGE NOTE - WEIGHT IN LBS
198.4 sonogram-3 solid nodules are stable, the largest right 1.7 cm nodule, stable  diagnosed in 2019  FNA of the right 1.7 cm nodule 6/28/2022: Atypia of undetermined significance. Afirma benign  Completed:     06/16/2022   Perform Phys:  Pankaj Vang MD   ADDENDUM:   '[facility]' molecular genetic analysis is reported as having a   Genomic Sequencing  of \"Benign\"; see also that report, Afirma   Thyroid, right, fine needle aspiration:   Atypia of Undetermined Significance. Thyroid follicular cells focally with enlarged, irregular nuclear   features, nuclear overlap and occasional nuclear inclusions. SPECIMEN:   THYROID, RIGHT THYROID 1.7 CM NODULE     - US HEAD NECK SOFT TISSUE THYROID; Future    6. Hypertension  Continue metoprolol, benazepril  -CMP    7. Elevated liver function test  -18-13  AST 79-16  Improved    8. Alopecia   Improved, no more hair loss  Stopped Rogaine  DHEAS normal    9. Hashimoto's thyroiditis  TSH 2.22-1.89  Follow TSH, FT4  .    10. Hypercalcemia  Call for results, then decide on further testing. Calcium 10.4-10.5  New problem  Patient is not on calcium or vitamin D. No history of kidney stones  Patient has history of wrist fracture, but she stated that it was a hard fall. No long termn steroids, no smoking  No RA  On HCTZ per patient report, recent medication. 25OH vitamin 41  She is currently 3 months off vitamin D.     Reviewed and/or ordered clinical lab results Yes  Reviewed and/or ordered radiology tests Yes  Reviewed and/or ordered other diagnostic tests No  Discussed test results with performing physician No  Independently reviewed image, tracing, or specimen No  Made a decision to obtain old records No  Reviewed and summarized old records Yes   Microalbumin creatinine ratio 115.4      AST 79  Hemoglobin A1c 8.0  Obtained history from other than patient No    Brodie Carlie was counseled regarding symptoms of diabetes, hyperlipidemia,

## 2024-09-30 NOTE — ED STATDOCS - PROGRESS NOTE DETAILS
Presents with chief complaint of of left foot pain and redness.  Just noticed it today.  Denies any trauma.  Blood pressure also noted to be low in the 70s with multiple blood pressure checks in the intake room.  Patient denies fever, chills, lightheadedness, dizziness, chest pain, new shortness of breath, palpitations, or any other symptoms.  States he took his normal medications today.  Cardiologist is Dr. Garcia. Sent to [facility] for further evaluation and management. Claus Kearns D.O. 89 y/o male w/ a PMHx of DM, BPH, COPD, GERD, Thrombocytopenia, HLD, CHF, Lung Cancer s/p lobectomy of lung, PAD, CKD, CAD s/p carotid endarterectomy, AICD, appendectomy, Hypertension, Cardiac Arrest, aortic stenosis s/p TAVR presents with chief complaint of of left foot pain and redness.  Just noticed it today.  Denies any trauma.  Blood pressure also noted to be low in the 70s with multiple blood pressure checks in the intake room.  Patient denies fever, chills, lightheadedness, dizziness, chest pain, new shortness of breath, palpitations, or any other symptoms.  States he took his normal medications today.  Cardiologist is Dr. Garcia. Sent to [facility] for further evaluation and management. Claus Kearns D.O.

## 2024-09-30 NOTE — H&P ADULT - HISTORY OF PRESENT ILLNESS
Pt is  a pleasant 88 y/o male w/ a PMHx of DM, BPH, COPD, GERD, Thrombocytopenia, HLD, CHF, Lung Cancer s/p lobectomy of lung, PAD, CKD, CAD s/p carotid endarterectomy, AICD, appendectomy, Hypertension, Cardiac Arrest, aortic stenosis s/p TAVR presents with chief complaint of of left foot pain and redness.  Just noticed it today.  Denies any trauma.  Blood pressure also noted to be low in the 70s with multiple blood pressure checks in the intake room.  Patient denies fever, chills, lightheadedness, dizziness, chest pain, new shortness of breath, palpitations, or any other symptoms.  States he took his normal medications today.  Pt is  a pleasant 88 y/o male w/ a PMHx of DM, BPH, COPD, GERD, Thrombocytopenia, HLD, CHF, Lung Cancer s/p lobectomy of lung, PAD, CKD, CAD s/p carotid endarterectomy, AICD, appendectomy, Hypertension, Cardiac Arrest, aortic stenosis s/p TAVR presents with chief complaint of of left foot pain and redness.  Just noticed it today.  Denies any trauma.  Blood pressure also noted to be low in the 70s with multiple blood pressure checks in the intake room.  Pt denies hx of known MRSA/ other skin infection.     Patient denies fever, chills, lightheadedness, dizziness, chest pain, new shortness of breath, palpitations, or any other symptoms.  States he took his normal medications today.

## 2024-09-30 NOTE — H&P ADULT - CONVERSATION DETAILS
Pt is Full Code and would like all measures of resuscitation as needed.  He would like his HCP to be   1st  daughter Claudia,    2nd son Tom.

## 2024-09-30 NOTE — H&P ADULT - TIME BILLING
I spent a total of 70 minutes on the date of this encounter coordinating the patient's care. This includes reviewing documentation pertinent to this admission, results and imaging in addition to completing a history and physical examination on the patient. Further tests, medications, and procedures have been ordered as indicated. Laboratory results and the plan of care were communicated to the patient and or their family member. Supporting documentation was completed and added to the patient's chart.

## 2024-09-30 NOTE — H&P ADULT - ASSESSMENT
Pt is admitted w/    LLE  Cellulitis  LLE Pain   Possible Insect/Tick bite  OLIVIA DDX likely pre renal azotemia,  possible ATN  on Xaralto    Hx of Hypertension  Hx of DM  Hx of CKD II  Hx of CAD, AICD, CHF    - s/p 500ml NS in the ED   - s/p Cefepime/Vancomycin in the ED, cont to include cover for Staph/Strep and MRSA  - elevation of extr  - f/u blood cx, lyme screen  - ID consult  - cont asa, pravastatin,  metoprolol, amiodarone  - ISS  - hold. lasix, sacubritil/valsartan due to OLIVIA and hypotension  - f/u renal function  - DVT proph:  xaralto  - Full Code

## 2024-09-30 NOTE — ED ADULT NURSE NOTE - NSFALLHARMRISKINTERV_ED_ALL_ED

## 2024-09-30 NOTE — ED ADULT NURSE NOTE - NS PRO AD ANY ON CHART
----- Message from Pam Luong MD sent at 9/24/2020 10:16 AM CDT -----  Results are within normal limits except low HDL or good cholesterol to increase her exercise eating good fat, please advise patient.  
Barry ceja.  
Yes

## 2024-09-30 NOTE — ED PROVIDER NOTE - CLINICAL SUMMARY MEDICAL DECISION MAKING FREE TEXT BOX
Pt with left foot cellulitis. Pt hypotensive. Pt with known hx of CHF. Will hold fluids. Plan: IV abx, basic labs, XR, admit. Pt with left foot cellulitis. Pt hypotensive. Pt with known hx of CHF. Will hold 30cc/kg/bolus. Plan: IV abx, basic labs, XR, admit.    Zo DO: Repeat BP: 104/47 after NS 500cc bolus; patient's Cr today: 2.4 (baseline 1.7-1.9); additional 500cc NS ordered; admit to m/s for acute on chronic renal failure; cellulitis; endorsed to Dr. Rowley for admission.

## 2024-09-30 NOTE — H&P ADULT - CARDIOVASCULAR
FINN FUNEZ  S1 S2 present/no murmur

## 2024-09-30 NOTE — ED ADULT NURSE NOTE - OBJECTIVE STATEMENT
88 y/o male presenting to ER with c/o sudden onset L foot pain. top of L foot appears discolored/erythematous/ecchymotic. patient denies calf pain. reports the pain is localized to the foot and non-radiating. pmhx CHF on GDMT and DM. also on xarelto and amiodarone for hx a fib. 88 y/o male presenting to ER with c/o sudden onset L foot pain. top of L foot appears discolored/erythematous/ecchymotic. patient denies recent injury or calf pain. reports the pain is localized to the foot and non-radiating. +PMS intact. pmhx CHF on GDMT and DM. also on xarelto and amiodarone for hx a fib.

## 2024-09-30 NOTE — ED PROVIDER NOTE - CARE PLAN
Principal Discharge DX:	Cellulitis of left foot  Secondary Diagnosis:	Acute on chronic renal failure   1

## 2024-09-30 NOTE — ED ADULT TRIAGE NOTE - CHIEF COMPLAINT QUOTE
Pt presents to er with complaints of left foot pain and erythema, states he has a history of CHF and DM, denies fevers.

## 2024-09-30 NOTE — ED PROVIDER NOTE - OBJECTIVE STATEMENT
88 y/o male with a PMHx of AICD, Babesiosis, BPH, COPD, COVID, CAD, cardiac arrest, CKD, DM, falling, GERD, heart failure, HLD, HTN, leg wound, lung cancer, PAD, thrombocytopenia presents to the ED c/o left foot pain and redness. HHA noticed pt's left foot was red so pt brought to the ED for evaluation. On arrival, pt had low BP. Denies fevers, chills. No other complaints at this time.

## 2024-09-30 NOTE — ED STATDOCS - PATIENT'S PREFERRED PRONOUN
Your diagnosis is: Headache    Return to the Emergency Department for blurry or change in vision, passing out, numbness or weakness of the face/arms/legs, vomiting, fever greater than 101 F, body aches and pains, neck stiffness, if symptoms worsen of for other concerns.     You can take take Acetaminophen: 650-1000 mg by mouth every 4-6 hours as needed for pain. Do not take more than 4000mg of Tylenol in any 24 hour period.     Additional Instructions:  -Follow up with your primary doctor as needed.  If you do not have a primary doctor, please establish one in the near future.  -You may apply a warm compress to your head to help alleviate pain.  -Drink plenty of fluids, 8-12 glasses of water, vitamin water, gatorade per day.  If you are diabetic be aware of excess sugar intake.   
Him/He

## 2024-09-30 NOTE — H&P ADULT - NSHPPHYSICALEXAM_GEN_ALL_CORE
ICU Vital Signs Last 24 Hrs  T(C): 36.7 (30 Sep 2024 17:00), Max: 36.7 (30 Sep 2024 17:00)  T(F): 98.1 (30 Sep 2024 17:00), Max: 98.1 (30 Sep 2024 17:00)  HR: 75 (30 Sep 2024 17:00) (56 - 75)  BP: 128/58 (30 Sep 2024 17:00) (78/47 - 128/58)  BP(mean): 91 (30 Sep 2024 16:36) (57 - 91)    RR: 18 (30 Sep 2024 17:00) (13 - 20)  SpO2: 100% (30 Sep 2024 17:00) (95% - 100%)    O2 Parameters below as of 30 Sep 2024 17:00  Patient On (Oxygen Delivery Method): room air

## 2024-10-01 LAB
ANION GAP SERPL CALC-SCNC: 6 MMOL/L — SIGNIFICANT CHANGE UP (ref 5–17)
BUN SERPL-MCNC: 30 MG/DL — HIGH (ref 7–23)
CALCIUM SERPL-MCNC: 8.9 MG/DL — SIGNIFICANT CHANGE UP (ref 8.5–10.1)
CHLORIDE SERPL-SCNC: 108 MMOL/L — SIGNIFICANT CHANGE UP (ref 96–108)
CO2 SERPL-SCNC: 26 MMOL/L — SIGNIFICANT CHANGE UP (ref 22–31)
CREAT SERPL-MCNC: 1.59 MG/DL — HIGH (ref 0.5–1.3)
CULTURE RESULTS: SIGNIFICANT CHANGE UP
EGFR: 41 ML/MIN/1.73M2 — LOW
GLUCOSE BLDC GLUCOMTR-MCNC: 118 MG/DL — HIGH (ref 70–99)
GLUCOSE BLDC GLUCOMTR-MCNC: 119 MG/DL — HIGH (ref 70–99)
GLUCOSE BLDC GLUCOMTR-MCNC: 121 MG/DL — HIGH (ref 70–99)
GLUCOSE BLDC GLUCOMTR-MCNC: 135 MG/DL — HIGH (ref 70–99)
GLUCOSE SERPL-MCNC: 111 MG/DL — HIGH (ref 70–99)
HCT VFR BLD CALC: 33.2 % — LOW (ref 39–50)
HGB BLD-MCNC: 10.3 G/DL — LOW (ref 13–17)
MCHC RBC-ENTMCNC: 25.4 PG — LOW (ref 27–34)
MCHC RBC-ENTMCNC: 31 GM/DL — LOW (ref 32–36)
MCV RBC AUTO: 82 FL — SIGNIFICANT CHANGE UP (ref 80–100)
PLATELET # BLD AUTO: 196 K/UL — SIGNIFICANT CHANGE UP (ref 150–400)
POTASSIUM SERPL-MCNC: 3.5 MMOL/L — SIGNIFICANT CHANGE UP (ref 3.5–5.3)
POTASSIUM SERPL-SCNC: 3.5 MMOL/L — SIGNIFICANT CHANGE UP (ref 3.5–5.3)
RBC # BLD: 4.05 M/UL — LOW (ref 4.2–5.8)
RBC # FLD: 17.3 % — HIGH (ref 10.3–14.5)
SODIUM SERPL-SCNC: 140 MMOL/L — SIGNIFICANT CHANGE UP (ref 135–145)
SPECIMEN SOURCE: SIGNIFICANT CHANGE UP
WBC # BLD: 8.75 K/UL — SIGNIFICANT CHANGE UP (ref 3.8–10.5)
WBC # FLD AUTO: 8.75 K/UL — SIGNIFICANT CHANGE UP (ref 3.8–10.5)

## 2024-10-01 PROCEDURE — 99232 SBSQ HOSP IP/OBS MODERATE 35: CPT

## 2024-10-01 RX ORDER — INSULIN LISPRO 100/ML
VIAL (ML) SUBCUTANEOUS AT BEDTIME
Refills: 0 | Status: DISCONTINUED | OUTPATIENT
Start: 2024-10-01 | End: 2024-10-03

## 2024-10-01 RX ORDER — CEFTRIAXONE SODIUM 1 G
2000 VIAL (EA) INJECTION EVERY 24 HOURS
Refills: 0 | Status: DISCONTINUED | OUTPATIENT
Start: 2024-10-01 | End: 2024-10-03

## 2024-10-01 RX ORDER — ALCOHOL ANTISEPTIC PADS
25 PADS, MEDICATED (EA) TOPICAL ONCE
Refills: 0 | Status: DISCONTINUED | OUTPATIENT
Start: 2024-10-01 | End: 2024-10-03

## 2024-10-01 RX ORDER — INSULIN GLARGINE 300 U/ML
5 INJECTION, SOLUTION SUBCUTANEOUS AT BEDTIME
Refills: 0 | Status: DISCONTINUED | OUTPATIENT
Start: 2024-10-01 | End: 2024-10-03

## 2024-10-01 RX ORDER — ALCOHOL ANTISEPTIC PADS
15 PADS, MEDICATED (EA) TOPICAL ONCE
Refills: 0 | Status: DISCONTINUED | OUTPATIENT
Start: 2024-10-01 | End: 2024-10-03

## 2024-10-01 RX ORDER — INSULIN GLARGINE 300 U/ML
5 INJECTION, SOLUTION SUBCUTANEOUS EVERY MORNING
Refills: 0 | Status: DISCONTINUED | OUTPATIENT
Start: 2024-10-01 | End: 2024-10-03

## 2024-10-01 RX ORDER — SODIUM CHLORIDE IRRIG SOLUTION 0.9 %
1000 SOLUTION, IRRIGATION IRRIGATION
Refills: 0 | Status: DISCONTINUED | OUTPATIENT
Start: 2024-10-01 | End: 2024-10-03

## 2024-10-01 RX ORDER — ALCOHOL ANTISEPTIC PADS
12.5 PADS, MEDICATED (EA) TOPICAL ONCE
Refills: 0 | Status: DISCONTINUED | OUTPATIENT
Start: 2024-10-01 | End: 2024-10-03

## 2024-10-01 RX ORDER — CEFEPIME 2 G/1
1000 INJECTION, POWDER, FOR SOLUTION INTRAVENOUS EVERY 24 HOURS
Refills: 0 | Status: DISCONTINUED | OUTPATIENT
Start: 2024-10-01 | End: 2024-10-01

## 2024-10-01 RX ORDER — INSULIN LISPRO 100/ML
VIAL (ML) SUBCUTANEOUS
Refills: 0 | Status: DISCONTINUED | OUTPATIENT
Start: 2024-10-01 | End: 2024-10-03

## 2024-10-01 RX ORDER — DOXYCYCLINE HYCLATE 100 MG
100 CAPSULE ORAL EVERY 12 HOURS
Refills: 0 | Status: DISCONTINUED | OUTPATIENT
Start: 2024-10-01 | End: 2024-10-03

## 2024-10-01 RX ORDER — GLUCAGON INJECTION, SOLUTION 0.5 MG/.1ML
1 INJECTION, SOLUTION SUBCUTANEOUS ONCE
Refills: 0 | Status: DISCONTINUED | OUTPATIENT
Start: 2024-10-01 | End: 2024-10-03

## 2024-10-01 RX ORDER — INFLUENZA VIRUS VACCINE 15; 15; 15; 15 UG/.5ML; UG/.5ML; UG/.5ML; UG/.5ML
0.5 SUSPENSION INTRAMUSCULAR ONCE
Refills: 0 | Status: COMPLETED | OUTPATIENT
Start: 2024-10-01 | End: 2024-10-01

## 2024-10-01 RX ADMIN — Medication 100 MILLIGRAM(S): at 22:26

## 2024-10-01 RX ADMIN — Medication 5 MILLIGRAM(S): at 22:27

## 2024-10-01 RX ADMIN — INSULIN GLARGINE 5 UNIT(S): 300 INJECTION, SOLUTION SUBCUTANEOUS at 22:27

## 2024-10-01 RX ADMIN — PANTOPRAZOLE SODIUM 40 MILLIGRAM(S): 40 TABLET, DELAYED RELEASE ORAL at 06:21

## 2024-10-01 RX ADMIN — ATORVASTATIN CALCIUM 10 MILLIGRAM(S): 10 TABLET, FILM COATED ORAL at 22:27

## 2024-10-01 RX ADMIN — Medication 100 MILLIGRAM(S): at 09:57

## 2024-10-01 RX ADMIN — Medication 81 MILLIGRAM(S): at 09:56

## 2024-10-01 RX ADMIN — Medication 2000 MILLIGRAM(S): at 09:56

## 2024-10-01 RX ADMIN — AMIODARONE HYDROCHLORIDE 200 MILLIGRAM(S): 50 INJECTION, SOLUTION INTRAVENOUS at 22:26

## 2024-10-01 RX ADMIN — RIVAROXABAN 15 MILLIGRAM(S): 10 TABLET, FILM COATED ORAL at 18:07

## 2024-10-01 RX ADMIN — Medication 2000 UNIT(S): at 09:56

## 2024-10-01 RX ADMIN — INSULIN GLARGINE 5 UNIT(S): 300 INJECTION, SOLUTION SUBCUTANEOUS at 08:42

## 2024-10-01 RX ADMIN — Medication 12.5 MILLIGRAM(S): at 09:56

## 2024-10-01 NOTE — PHYSICAL THERAPY INITIAL EVALUATION ADULT - PERTINENT HX OF CURRENT PROBLEM, REHAB EVAL
89 y/o male with h/o DM type 2, BPH, COPD, GERD, Thrombocytopenia, HLD, CHF, Lung Cancer s/p lobectomy of lung, PAD, CKD, CAD s/p carotid endarterectomy, AICD, appendectomy, Hypertension, Cardiac Arrest, aortic stenosis s/p TAVR was admitted on 9/30 for left foot pain and redness x one day. Denies any trauma. Blood pressure also noted to be low in the 70s

## 2024-10-01 NOTE — PHYSICAL THERAPY INITIAL EVALUATION ADULT - LEVEL OF INDEPENDENCE: SCOOT/BRIDGE, REHAB EVAL
This is a recent snapshot of the patient's [facility] medical record.  For current drug dose and complete information and questions, call [phone] or In Basket pool, [facility] ([id number])  CSN Number:  [id number]     stand-by assist

## 2024-10-01 NOTE — PHYSICAL THERAPY INITIAL EVALUATION ADULT - LIVES WITH, PROFILE
has two 24/7 HHA at home, family involved in care, 1 step to enter house, chairlift inside house/alone

## 2024-10-01 NOTE — CONSULT NOTE ADULT - SUBJECTIVE AND OBJECTIVE BOX
Patient is a 89y old  Male who presents with a chief complaint of OLIVIA  Hypotension  Left foot pain  LLE cellulitis     HPI:  88 y/o male with h/o DM type 2, BPH, COPD, GERD, Thrombocytopenia, HLD, CHF, Lung Cancer s/p lobectomy of lung, PAD, CKD, CAD s/p carotid endarterectomy, AICD, appendectomy, Hypertension, Cardiac Arrest, aortic stenosis s/p TAVR was admitted on 9/30 for left foot pain and redness x one day. Denies any trauma. Blood pressure also noted to be low in the 70s with multiple blood pressure checks in the intake room. Pt denies hx of known MRSA/ other skin infection. No fever or chills at home. In ER he received cefepime and vancomycin IV.     PMH: as above  PSH: as above  Meds: per reconciliation sheet, noted below  MEDICATIONS  (STANDING):  aMIOdarone    Tablet 200 milliGRAM(s) Oral at bedtime  aspirin enteric coated 81 milliGRAM(s) Oral daily  atorvastatin 10 milliGRAM(s) Oral at bedtime  cefepime  Injectable. 1000 milliGRAM(s) IV Push every 24 hours  cholecalciferol 2000 Unit(s) Oral daily  dextrose 5%. 1000 milliLiter(s) (100 mL/Hr) IV Continuous <Continuous>  dextrose 5%. 1000 milliLiter(s) (50 mL/Hr) IV Continuous <Continuous>  dextrose 50% Injectable 25 Gram(s) IV Push once  dextrose 50% Injectable 12.5 Gram(s) IV Push once  dextrose 50% Injectable 25 Gram(s) IV Push once  glucagon  Injectable 1 milliGRAM(s) IntraMuscular once  influenza  Vaccine (HIGH DOSE) 0.5 milliLiter(s) IntraMuscular once  insulin glargine Injectable (LANTUS) 5 Unit(s) SubCutaneous every morning  insulin glargine Injectable (LANTUS) 5 Unit(s) SubCutaneous at bedtime  insulin lispro (ADMELOG) corrective regimen sliding scale   SubCutaneous at bedtime  insulin lispro (ADMELOG) corrective regimen sliding scale   SubCutaneous three times a day before meals  melatonin 5 milliGRAM(s) Oral at bedtime  metoprolol succinate ER 12.5 milliGRAM(s) Oral daily  pantoprazole    Tablet 40 milliGRAM(s) Oral before breakfast  rivaroxaban 15 milliGRAM(s) Oral with dinner  vancomycin  IVPB 1000 milliGRAM(s) IV Intermittent every 24 hours    MEDICATIONS  (PRN):  dextrose Oral Gel 15 Gram(s) Oral once PRN Blood Glucose LESS THAN 70 milliGRAM(s)/deciliter    Allergies    No Known Allergies    Intolerances      Social: no smoking, no alcohol, no illegal drugs; no recent travel, no exposure to TB  FAMILY HISTORY:  Patient unable to provide medical history      no history of premature cardiovascular disease in first degree relatives    ROS: the patient denies fever, no chills, no HA, no seizures, no dizziness, no sore throat, no nasal congestion, no blurry vision, no CP, no palpitations, no SOB, no cough, no abdominal pain, no diarrhea, no N/V, no dysuria, no leg pain, no claudication, has left foot rash, no joint aches, no rectal pain or bleeding, no night sweats  All other systems reviewed and are negative    Vital Signs Last 24 Hrs  T(C): 36.7 (30 Sep 2024 21:58), Max: 36.7 (30 Sep 2024 17:00)  T(F): 98.1 (30 Sep 2024 21:58), Max: 98.1 (30 Sep 2024 17:00)  HR: 61 (30 Sep 2024 21:58) (56 - 75)  BP: 125/53 (30 Sep 2024 21:58) (78/47 - 128/58)  BP(mean): 91 (30 Sep 2024 16:36) (57 - 91)  RR: 18 (30 Sep 2024 21:58) (13 - 20)  SpO2: 96% (30 Sep 2024 21:58) (95% - 100%)    Parameters below as of 30 Sep 2024 21:58  Patient On (Oxygen Delivery Method): room air    PE:    Constitutional:  No acute distress  HEENT: NC/AT, EOMI, PERRLA, conjunctivae clear; ears and nose atraumatic; pharynx benign  Neck: supple; thyroid not palpable  Back: no tenderness  Respiratory: respiratory effort normal; clear to auscultation  Cardiovascular: S1S2 regular, no murmurs  Abdomen: soft, not tender, not distended, positive BS; no liver or spleen organomegaly  Genitourinary: no suprapubic tenderness  Lymphatic: no LN palpable  Musculoskeletal: no muscle tenderness, no joint swelling or tenderness  Extremities: left foot erythema, pedal edema  Neurological/ Psychiatric: AxOx3, judgement and insight normal; moving all extremities  Skin: no rashes; no palpable lesions    Labs: all available labs reviewed                        10.3   8.75  )-----------( 196      ( 01 Oct 2024 06:29 )             33.2     09-30    137  |  102  |  37[H]  ----------------------------<  133[H]  3.8   |  29  |  2.43[H]    Ca    9.6      30 Sep 2024 15:11    TPro  7.7  /  Alb  3.6  /  TBili  0.5  /  DBili  x   /  AST  20  /  ALT  18  /  AlkPhos  77  09-30     LIVER FUNCTIONS - ( 30 Sep 2024 15:11 )  Alb: 3.6 g/dL / Pro: 7.7 gm/dL / ALK PHOS: 77 U/L / ALT: 18 U/L / AST: 20 U/L / GGT: x           Urinalysis with Rflx Culture (09-30 @ 20:40)  Urine Appearance: Clear  Protein, Urine: Trace mg/dL  Urine Microscopic-Add On (NC) (09-30 @ 20:40)  Red Blood Cell - Urine: 49 /HPF  White Blood Cell - Urine: 6 /HPF    Radiology: all available radiological tests reviewed    < from: Xray Foot AP + Lateral + Oblique, Left (09.30.24 @ 15:05) >  Left foot. 3 views.  There is a mild hallux valgus with some degeneration. No bone destruction   < end of copied text >    Advanced directives addressed: full resuscitation Patient is a 89y old  Male who presents with a chief complaint of OLIVIA  Hypotension  Left foot pain  LLE cellulitis     HPI:  89 y/o male with h/o DM type 2, BPH, COPD, GERD, Thrombocytopenia, HLD, CHF, Lung Cancer s/p lobectomy of lung, PAD, CKD, CAD s/p carotid endarterectomy, AICD, appendectomy, Hypertension, Cardiac Arrest, aortic stenosis s/p TAVR was admitted on 9/30 for left foot pain and redness x one day. Denies any trauma. Blood pressure also noted to be low in the 70s with multiple blood pressure checks in the intake room. Pt denies hx of known MRSA/ other skin infection. No fever or chills at home. In ER he received cefepime and vancomycin IV.     PMH: as above  PSH: as above  Meds: per reconciliation sheet, noted below  MEDICATIONS  (STANDING):  aMIOdarone    Tablet 200 milliGRAM(s) Oral at bedtime  aspirin enteric coated 81 milliGRAM(s) Oral daily  atorvastatin 10 milliGRAM(s) Oral at bedtime  cefepime  Injectable. 1000 milliGRAM(s) IV Push every 24 hours  cholecalciferol 2000 Unit(s) Oral daily  dextrose 5%. 1000 milliLiter(s) (100 mL/Hr) IV Continuous <Continuous>  dextrose 5%. 1000 milliLiter(s) (50 mL/Hr) IV Continuous <Continuous>  dextrose 50% Injectable 25 Gram(s) IV Push once  dextrose 50% Injectable 12.5 Gram(s) IV Push once  dextrose 50% Injectable 25 Gram(s) IV Push once  glucagon  Injectable 1 milliGRAM(s) IntraMuscular once  influenza  Vaccine (HIGH DOSE) 0.5 milliLiter(s) IntraMuscular once  insulin glargine Injectable (LANTUS) 5 Unit(s) SubCutaneous every morning  insulin glargine Injectable (LANTUS) 5 Unit(s) SubCutaneous at bedtime  insulin lispro (ADMELOG) corrective regimen sliding scale   SubCutaneous at bedtime  insulin lispro (ADMELOG) corrective regimen sliding scale   SubCutaneous three times a day before meals  melatonin 5 milliGRAM(s) Oral at bedtime  metoprolol succinate ER 12.5 milliGRAM(s) Oral daily  pantoprazole    Tablet 40 milliGRAM(s) Oral before breakfast  rivaroxaban 15 milliGRAM(s) Oral with dinner  vancomycin  IVPB 1000 milliGRAM(s) IV Intermittent every 24 hours    MEDICATIONS  (PRN):  dextrose Oral Gel 15 Gram(s) Oral once PRN Blood Glucose LESS THAN 70 milliGRAM(s)/deciliter    Allergies    No Known Allergies    Intolerances      Social: no smoking, no alcohol, no illegal drugs; no recent travel, no exposure to TB  FAMILY HISTORY:  Patient unable to provide medical history      no history of premature cardiovascular disease in first degree relatives    ROS: the patient denies fever, no chills, no HA, no seizures, no dizziness, no sore throat, no nasal congestion, no blurry vision, no CP, no palpitations, no SOB, no cough, no abdominal pain, no diarrhea, no N/V, no dysuria, no leg pain, no claudication, has left foot rash, no joint aches, no rectal pain or bleeding, no night sweats  All other systems reviewed and are negative    Vital Signs Last 24 Hrs  T(C): 36.7 (30 Sep 2024 21:58), Max: 36.7 (30 Sep 2024 17:00)  T(F): 98.1 (30 Sep 2024 21:58), Max: 98.1 (30 Sep 2024 17:00)  HR: 61 (30 Sep 2024 21:58) (56 - 75)  BP: 125/53 (30 Sep 2024 21:58) (78/47 - 128/58)  BP(mean): 91 (30 Sep 2024 16:36) (57 - 91)  RR: 18 (30 Sep 2024 21:58) (13 - 20)  SpO2: 96% (30 Sep 2024 21:58) (95% - 100%)    Parameters below as of 30 Sep 2024 21:58  Patient On (Oxygen Delivery Method): room air    PE:    Constitutional:  No acute distress  HEENT: NC/AT, EOMI, PERRLA, conjunctivae clear; ears and nose atraumatic; pharynx benign  Neck: supple; thyroid not palpable  Back: no tenderness  Respiratory: respiratory effort normal; clear to auscultation  Cardiovascular: S1S2 regular, no murmurs  Abdomen: soft, not tender, not distended, positive BS; no liver or spleen organomegaly  Genitourinary: no suprapubic tenderness  Lymphatic: no LN palpable  Musculoskeletal: no muscle tenderness, no joint swelling or tenderness  Extremities: left foot dorsal aspect erythema edema, warmth and tenderness, no skin break noted, pedal edema  Neurological/ Psychiatric: AxOx3, judgement and insight normal; moving all extremities  Skin: no rashes; no palpable lesions    Labs: all available labs reviewed                        10.3   8.75  )-----------( 196      ( 01 Oct 2024 06:29 )             33.2     09-30    137  |  102  |  37[H]  ----------------------------<  133[H]  3.8   |  29  |  2.43[H]    Ca    9.6      30 Sep 2024 15:11    TPro  7.7  /  Alb  3.6  /  TBili  0.5  /  DBili  x   /  AST  20  /  ALT  18  /  AlkPhos  77  09-30     LIVER FUNCTIONS - ( 30 Sep 2024 15:11 )  Alb: 3.6 g/dL / Pro: 7.7 gm/dL / ALK PHOS: 77 U/L / ALT: 18 U/L / AST: 20 U/L / GGT: x           Urinalysis with Rflx Culture (09-30 @ 20:40)  Urine Appearance: Clear  Protein, Urine: Trace mg/dL  Urine Microscopic-Add On (NC) (09-30 @ 20:40)  Red Blood Cell - Urine: 49 /HPF  White Blood Cell - Urine: 6 /HPF    Radiology: all available radiological tests reviewed    < from: Xray Foot AP + Lateral + Oblique, Left (09.30.24 @ 15:05) >  Left foot. 3 views.  There is a mild hallux valgus with some degeneration. No bone destruction   < end of copied text >    Advanced directives addressed: full resuscitation

## 2024-10-01 NOTE — CONSULT NOTE ADULT - ASSESSMENT
88 y/o male with h/o DM type 2, BPH, COPD, GERD, Thrombocytopenia, HLD, CHF, Lung Cancer s/p lobectomy of lung, PAD, CKD, CAD s/p carotid endarterectomy, AICD, appendectomy, Hypertension, Cardiac Arrest, aortic stenosis s/p TAVR was admitted on 9/30 for left foot pain and redness x one day. Denies any trauma. Blood pressure also noted to be low in the 70s with multiple blood pressure checks in the intake room. Pt denies hx of known MRSA/ other skin infection. No fever or chills at home. In ER he received cefepime and vancomycin IV.    1. Left foot cellulitis.   -hypotension improving  -obtain BC x 2, urine c/s     89 y/o male with h/o DM type 2, BPH, COPD, GERD, Thrombocytopenia, HLD, CHF, Lung Cancer s/p lobectomy of lung, PAD, CKD, CAD s/p carotid endarterectomy, AICD, appendectomy, Hypertension, Cardiac Arrest, aortic stenosis s/p TAVR was admitted on 9/30 for left foot pain and redness x one day. Denies any trauma. Blood pressure also noted to be low in the 70s with multiple blood pressure checks in the intake room. Pt denies hx of known MRSA/ other skin infection. No fever or chills at home. In ER he received cefepime and vancomycin IV.    1. Left foot cellulitis. Probable PVD. Possible sepsis. ARF on CRF stage 3-4.   -hypotension improving  -obtain BC x 2, urine c/s  -start doxycycline 100 mg IV q12h and ceftriaxone 2 gm IV qd   -reason for abx use and side effects reviewed with patient; monitor BMP   -elevate leg  -old chart reviewed to assess prior cultures  -monitor temps  -f/u CBC  -supportive care  2. Other issues:   -care per medicine    Clinical team may change from intravenous to oral antibiotics when the following criteria are met:   1. Patient is clinically improving/stable       a)	Improved signs and symptoms of infection from initial presentation       b)	Afebrile for 24 hours       c)	Leukocytosis trending towards normal range   2. Patient is tolerating oral intake   3. Initial/repeat blood cultures are negative     When above criteria met may change iv antibiotics to an oral agent  Cannot advise changing to oral antibiotic therapy until culture sensitivity is available.

## 2024-10-01 NOTE — PATIENT PROFILE ADULT - FALL HARM RISK - HARM RISK INTERVENTIONS

## 2024-10-02 LAB
ANION GAP SERPL CALC-SCNC: 6 MMOL/L — SIGNIFICANT CHANGE UP (ref 5–17)
BUN SERPL-MCNC: 24 MG/DL — HIGH (ref 7–23)
CALCIUM SERPL-MCNC: 9.4 MG/DL — SIGNIFICANT CHANGE UP (ref 8.5–10.1)
CHLORIDE SERPL-SCNC: 108 MMOL/L — SIGNIFICANT CHANGE UP (ref 96–108)
CO2 SERPL-SCNC: 27 MMOL/L — SIGNIFICANT CHANGE UP (ref 22–31)
CREAT SERPL-MCNC: 1.43 MG/DL — HIGH (ref 0.5–1.3)
CRP SERPL-MCNC: 54.1 MG/ML — HIGH (ref 0–5)
EGFR: 47 ML/MIN/1.73M2 — LOW
ERYTHROCYTE [SEDIMENTATION RATE] IN BLOOD: 43 MM/HR — HIGH (ref 0–20)
FERRITIN SERPL-MCNC: 53 NG/ML — SIGNIFICANT CHANGE UP (ref 30–400)
FOLATE SERPL-MCNC: 8.9 NG/ML — SIGNIFICANT CHANGE UP
GLUCOSE BLDC GLUCOMTR-MCNC: 112 MG/DL — HIGH (ref 70–99)
GLUCOSE BLDC GLUCOMTR-MCNC: 124 MG/DL — HIGH (ref 70–99)
GLUCOSE BLDC GLUCOMTR-MCNC: 129 MG/DL — HIGH (ref 70–99)
GLUCOSE BLDC GLUCOMTR-MCNC: 134 MG/DL — HIGH (ref 70–99)
GLUCOSE SERPL-MCNC: 115 MG/DL — HIGH (ref 70–99)
HCT VFR BLD CALC: 35.3 % — LOW (ref 39–50)
HGB BLD-MCNC: 11.1 G/DL — LOW (ref 13–17)
IRON SATN MFR SERPL: 11 % — LOW (ref 16–55)
IRON SATN MFR SERPL: 31 UG/DL — LOW (ref 45–165)
MAGNESIUM SERPL-MCNC: 2 MG/DL — SIGNIFICANT CHANGE UP (ref 1.6–2.6)
PHOSPHATE SERPL-MCNC: 2.4 MG/DL — LOW (ref 2.5–4.5)
POTASSIUM SERPL-MCNC: 3.6 MMOL/L — SIGNIFICANT CHANGE UP (ref 3.5–5.3)
POTASSIUM SERPL-SCNC: 3.6 MMOL/L — SIGNIFICANT CHANGE UP (ref 3.5–5.3)
SODIUM SERPL-SCNC: 141 MMOL/L — SIGNIFICANT CHANGE UP (ref 135–145)
TIBC SERPL-MCNC: 274 UG/DL — SIGNIFICANT CHANGE UP (ref 220–430)
UIBC SERPL-MCNC: 243 UG/DL — SIGNIFICANT CHANGE UP (ref 110–370)
URATE SERPL-MCNC: 6.2 MG/DL — SIGNIFICANT CHANGE UP (ref 3.4–8.8)
VIT B12 SERPL-MCNC: 241 PG/ML — SIGNIFICANT CHANGE UP (ref 232–1245)

## 2024-10-02 PROCEDURE — 99232 SBSQ HOSP IP/OBS MODERATE 35: CPT

## 2024-10-02 RX ORDER — SOD PHOS DI, MONO/K PHOS MONO 250 MG
1 TABLET ORAL
Refills: 0 | Status: COMPLETED | OUTPATIENT
Start: 2024-10-02 | End: 2024-10-02

## 2024-10-02 RX ORDER — CYANOCOBALAMIN (VITAMIN B-12) 1000MCG/ML
1000 VIAL (ML) INJECTION DAILY
Refills: 0 | Status: DISCONTINUED | OUTPATIENT
Start: 2024-10-03 | End: 2024-10-03

## 2024-10-02 RX ORDER — FUROSEMIDE 10 MG/ML
20 INJECTION INTRAVENOUS DAILY
Refills: 0 | Status: DISCONTINUED | OUTPATIENT
Start: 2024-10-03 | End: 2024-10-03

## 2024-10-02 RX ORDER — IRON SUCROSE 20 MG/ML
200 INJECTION, SOLUTION INTRAVENOUS EVERY 24 HOURS
Refills: 0 | Status: DISCONTINUED | OUTPATIENT
Start: 2024-10-02 | End: 2024-10-03

## 2024-10-02 RX ORDER — FOLIC ACID 1 MG/1
1 TABLET ORAL DAILY
Refills: 0 | Status: DISCONTINUED | OUTPATIENT
Start: 2024-10-02 | End: 2024-10-03

## 2024-10-02 RX ORDER — CYANOCOBALAMIN (VITAMIN B-12) 1000MCG/ML
1000 VIAL (ML) INJECTION ONCE
Refills: 0 | Status: COMPLETED | OUTPATIENT
Start: 2024-10-02 | End: 2024-10-03

## 2024-10-02 RX ORDER — SENNOSIDES 8.6 MG
1 TABLET ORAL AT BEDTIME
Refills: 0 | Status: DISCONTINUED | OUTPATIENT
Start: 2024-10-02 | End: 2024-10-03

## 2024-10-02 RX ADMIN — ATORVASTATIN CALCIUM 10 MILLIGRAM(S): 10 TABLET, FILM COATED ORAL at 22:41

## 2024-10-02 RX ADMIN — Medication 12.5 MILLIGRAM(S): at 10:11

## 2024-10-02 RX ADMIN — Medication 100 MILLIGRAM(S): at 22:42

## 2024-10-02 RX ADMIN — Medication 17 GRAM(S): at 17:50

## 2024-10-02 RX ADMIN — Medication 1 PACKET(S): at 22:41

## 2024-10-02 RX ADMIN — Medication 100 MILLIGRAM(S): at 10:10

## 2024-10-02 RX ADMIN — PANTOPRAZOLE SODIUM 40 MILLIGRAM(S): 40 TABLET, DELAYED RELEASE ORAL at 05:29

## 2024-10-02 RX ADMIN — RIVAROXABAN 15 MILLIGRAM(S): 10 TABLET, FILM COATED ORAL at 17:48

## 2024-10-02 RX ADMIN — Medication 81 MILLIGRAM(S): at 10:09

## 2024-10-02 RX ADMIN — Medication 2000 MILLIGRAM(S): at 10:11

## 2024-10-02 RX ADMIN — Medication 2000 UNIT(S): at 10:10

## 2024-10-02 RX ADMIN — INSULIN GLARGINE 5 UNIT(S): 300 INJECTION, SOLUTION SUBCUTANEOUS at 22:41

## 2024-10-02 RX ADMIN — INSULIN GLARGINE 5 UNIT(S): 300 INJECTION, SOLUTION SUBCUTANEOUS at 08:30

## 2024-10-02 RX ADMIN — Medication 1 PACKET(S): at 10:10

## 2024-10-02 RX ADMIN — Medication 1 PACKET(S): at 17:47

## 2024-10-02 RX ADMIN — AMIODARONE HYDROCHLORIDE 200 MILLIGRAM(S): 50 INJECTION, SOLUTION INTRAVENOUS at 22:41

## 2024-10-02 RX ADMIN — Medication 5 MILLIGRAM(S): at 22:43

## 2024-10-03 ENCOUNTER — TRANSCRIPTION ENCOUNTER (OUTPATIENT)
Age: 89
End: 2024-10-03

## 2024-10-03 VITALS
TEMPERATURE: 98 F | OXYGEN SATURATION: 99 % | HEART RATE: 61 BPM | RESPIRATION RATE: 18 BRPM | SYSTOLIC BLOOD PRESSURE: 149 MMHG | DIASTOLIC BLOOD PRESSURE: 60 MMHG

## 2024-10-03 LAB
ANION GAP SERPL CALC-SCNC: 5 MMOL/L — SIGNIFICANT CHANGE UP (ref 5–17)
BUN SERPL-MCNC: 23 MG/DL — SIGNIFICANT CHANGE UP (ref 7–23)
CALCIUM SERPL-MCNC: 9.8 MG/DL — SIGNIFICANT CHANGE UP (ref 8.5–10.1)
CHLORIDE SERPL-SCNC: 108 MMOL/L — SIGNIFICANT CHANGE UP (ref 96–108)
CO2 SERPL-SCNC: 28 MMOL/L — SIGNIFICANT CHANGE UP (ref 22–31)
CREAT SERPL-MCNC: 1.38 MG/DL — HIGH (ref 0.5–1.3)
EGFR: 49 ML/MIN/1.73M2 — LOW
GLUCOSE BLDC GLUCOMTR-MCNC: 103 MG/DL — HIGH (ref 70–99)
GLUCOSE SERPL-MCNC: 93 MG/DL — SIGNIFICANT CHANGE UP (ref 70–99)
HCT VFR BLD CALC: 39.2 % — SIGNIFICANT CHANGE UP (ref 39–50)
HGB BLD-MCNC: 12.1 G/DL — LOW (ref 13–17)
MAGNESIUM SERPL-MCNC: 2.1 MG/DL — SIGNIFICANT CHANGE UP (ref 1.6–2.6)
PHOSPHATE SERPL-MCNC: 2.8 MG/DL — SIGNIFICANT CHANGE UP (ref 2.5–4.5)
POTASSIUM SERPL-MCNC: 4 MMOL/L — SIGNIFICANT CHANGE UP (ref 3.5–5.3)
POTASSIUM SERPL-SCNC: 4 MMOL/L — SIGNIFICANT CHANGE UP (ref 3.5–5.3)
SODIUM SERPL-SCNC: 141 MMOL/L — SIGNIFICANT CHANGE UP (ref 135–145)

## 2024-10-03 PROCEDURE — 99239 HOSP IP/OBS DSCHRG MGMT >30: CPT

## 2024-10-03 RX ORDER — SACUBITRIL AND VALSARTAN 97; 103 MG/1; MG/1
1 TABLET, FILM COATED ORAL
Refills: 0 | Status: DISCONTINUED | OUTPATIENT
Start: 2024-10-03 | End: 2024-10-03

## 2024-10-03 RX ORDER — SENNOSIDES 8.6 MG
1 TABLET ORAL
Qty: 30 | Refills: 0
Start: 2024-10-03 | End: 2024-11-01

## 2024-10-03 RX ORDER — DOXYCYCLINE HYCLATE 100 MG
1 CAPSULE ORAL
Qty: 20 | Refills: 0
Start: 2024-10-03 | End: 2024-10-12

## 2024-10-03 RX ORDER — FOLIC ACID 1 MG/1
1 TABLET ORAL
Qty: 30 | Refills: 0
Start: 2024-10-03 | End: 2024-11-01

## 2024-10-03 RX ORDER — CYANOCOBALAMIN (VITAMIN B-12) 1000MCG/ML
1 VIAL (ML) INJECTION
Qty: 30 | Refills: 0
Start: 2024-10-03 | End: 2024-11-01

## 2024-10-03 RX ORDER — FUROSEMIDE 10 MG/ML
1 INJECTION INTRAVENOUS
Qty: 30 | Refills: 0
Start: 2024-10-03 | End: 2024-11-01

## 2024-10-03 RX ADMIN — Medication 1000 MICROGRAM(S): at 12:38

## 2024-10-03 RX ADMIN — Medication 1000 MICROGRAM(S): at 08:51

## 2024-10-03 RX ADMIN — SACUBITRIL AND VALSARTAN 1 TABLET(S): 97; 103 TABLET, FILM COATED ORAL at 10:35

## 2024-10-03 RX ADMIN — Medication 81 MILLIGRAM(S): at 10:33

## 2024-10-03 RX ADMIN — INSULIN GLARGINE 5 UNIT(S): 300 INJECTION, SOLUTION SUBCUTANEOUS at 08:51

## 2024-10-03 RX ADMIN — Medication 12.5 MILLIGRAM(S): at 10:33

## 2024-10-03 RX ADMIN — FOLIC ACID 1 MILLIGRAM(S): 1 TABLET ORAL at 10:32

## 2024-10-03 RX ADMIN — Medication 2000 UNIT(S): at 10:33

## 2024-10-03 RX ADMIN — Medication 17 GRAM(S): at 10:34

## 2024-10-03 RX ADMIN — Medication 100 MILLIGRAM(S): at 10:34

## 2024-10-03 RX ADMIN — FUROSEMIDE 20 MILLIGRAM(S): 10 INJECTION INTRAVENOUS at 10:34

## 2024-10-03 RX ADMIN — Medication 2000 MILLIGRAM(S): at 10:26

## 2024-10-03 RX ADMIN — IRON SUCROSE 110 MILLIGRAM(S): 20 INJECTION, SOLUTION INTRAVENOUS at 06:02

## 2024-10-03 NOTE — PROGRESS NOTE ADULT - REASON FOR ADMISSION
OLIVIA  Hypotension  Left foot pain  LLE cellulitis

## 2024-10-03 NOTE — DISCHARGE NOTE NURSING/CASE MANAGEMENT/SOCIAL WORK - NSDCVIVACCINE_GEN_ALL_CORE_FT
Tdap; 07-Jun-2021 21:51; Dylan Monahan (WOO); [employer]; [id number] (Exp. Date: 18-Nov-2022); IntraMuscular; Deltoid Right.; 0.5 milliLiter(s); VIS (VIS Published: 09-May-2013, VIS Presented: 07-Jun-2021);

## 2024-10-03 NOTE — DISCHARGE NOTE PROVIDER - NSDCMRMEDTOKEN_GEN_ALL_CORE_FT
amiodarone 200 mg oral tablet: 1 tab(s) orally once a day (at bedtime)  aspirin 81 mg oral tablet: 1 tab(s) orally once a day  cholecalciferol 25 mcg (1000 intl units) oral capsule: 2 cap(s) orally once a day  cyanocobalamin 1000 mcg oral tablet: 1 tab(s) orally once a day  Farxiga 10 mg oral tablet: 1 tab(s) orally once a day  folic acid 1 mg oral tablet: 1 tab(s) orally once a day  Lasix 20 mg oral tablet: 1 tab(s) orally once a day  melatonin 5 mg oral tablet: 1 tab(s) orally once a day (at bedtime) 1-2 tablets by mouth at bedtime  metoprolol succinate 25 mg oral tablet, extended release: 0.5 tab(s) orally once a day (at bedtime)  polyethylene glycol 3350 oral powder for reconstitution: 17 gram(s) orally once a day Hold if greater than 2 bowel movements in 24 hours  pravastatin 40 mg oral tablet: 1 tab(s) orally once a day (at bedtime)  PreserVision AREDS 2 oral capsule: 1 cap(s) orally once a day (at bedtime)  Protonix 40 mg oral delayed release tablet: 1 tab(s) orally once a day  rivaroxaban 15 mg oral tablet: 1 tab(s) orally once a day (in the evening)  sacubitril-valsartan 24 mg-26 mg oral tablet: 1 tab(s) orally 2 times a day  senna leaf extract oral tablet: 1 tab(s) orally once a day (at bedtime) as needed for Constipation  Vibramycin 100 mg oral capsule: 1 cap(s) orally every 12 hours  Visivite OTC eye health supplement: 2x a day

## 2024-10-03 NOTE — DISCHARGE NOTE PROVIDER - NSDCCPCAREPLAN_GEN_ALL_CORE_FT
PRINCIPAL DISCHARGE DIAGNOSIS  Diagnosis: Cellulitis of left foot  Assessment and Plan of Treatment: Underwent treatment with IV antibiotics with subsequent improvement in symptoms. Transitioned to oral antibiotics. Complete course of oral antibiotics and follow up with your primary medical doctor  Elevated leg when non-ambulatory.      SECONDARY DISCHARGE DIAGNOSES  Diagnosis: Acute on chronic renal failure  Assessment and Plan of Treatment: Drop in kidney function likely from infection, low blood pressure and dehydration. Improved with IV fluids and holding some your home medications that "work' the kidney. These medications have been resumed. Of note, Lasix/Furosemide, has been restarted at half the dose. Continue diastolic heart failure instructions including daily weight in conjunction with close outpatient follow up with your cardiologist to discern if there are any changes in diuretic dosing required.    Diagnosis: Chronic systolic heart failure  Assessment and Plan of Treatment: Normal heart pumps 55-65% of the blood in the main chamber of the heart (left ventricle) to the rest of the body. Your heart is pumping at 44% in April 2024 and subsequent improved to 57% in August 2024. We also have 4 valves in our heart that help blood move in one direction. Your valves are "leaky" There are many methods to optimize the blood flow with medication. This includes diuretics and other heart medications.  For your heart failure:  Weigh yourself daily with the same scale. Any weight gain/loss greater than 2-3 pounds over 2 days or 5 pounds in a week, notify your cardiologist and primary medical doctor as it may indicate that your are retaining too much water or losing too much water. In which case, it may require dose adjustments to your diuretic(s). Of note becuase of low blood pressure and kidney injury as described above, the diuretic, lasix/furosemide has only been resumed at half the dose. Follow up closely with your heart doctor to discern longitudinally ongoing changes to diuretic dose and frequency are necessary.   Maintain low salt diet.  -Less than 2 Grams (2 Grams = 2000 milligrams) of daily salt intake. Too much salt intake can potentiate fluid retention.  Get repeat blood work to check your kidney function and electrolytes in 1 week with your cardiologist or primary medical doctor or nephrologist (if applicable)    Diagnosis: Paroxysmal atrial fibrillation  Assessment and Plan of Treatment: Abnormal Heart Rythym leading to more inefficient blood flow at higher heart rates. Medications adjusted to control heart rate. Take medications as prescribed. In addition, with atrial fibrillation, increased risk of developing clots in the heart which can travel to the rest of the body and cause trouble (example is stroke). In order to prevent blood clots, you are on a blood thinner that helps prevent blood clot formation. Take medication as prescribed.  Your atrial fibrillation remained stable through out this admission.  Being on a blood thinner will make cuts bleeding quicker/easier/longer and can cause bruising. Maintain prolonged pressure on any bleeding cuts. Caution and avoid trauma as major and minor bleeding can be potentiated by being on a blood thinner. If you notice any prolonged/persistent bleeding, seek medical attention.    Diagnosis: Anemia  Assessment and Plan of Treatment: Hemoglobin is the molecule in our blood that carries oxygen. A low Hemoglobin (Hgb for short) is termed anemia. Multiple causes of anemia:  1)Blood loss can cause anemia. Red blood cells "dye off" every 3-4 months as well. No signs of blood loss and your hemoglobin level remained stable throughout this admission.  2)Production. Produced in the bone marrow. Three main "building blocks" to make hemoglobin include iron, Vitamin B12 and Folate. Deficiencies in any of these can lead to decreased production. Your folate and vitamin b12 levels were low normal so started on supplementation to maintain levels. Your iron level was low so you were administered IV iron in the hospital. Oral iron supplementation deferred in hospital and at discharge because of adverse effects of GI upset and constipation. Get levels rechecks in 2-3 weeks to reassess if additional supplementation required.   3)Autoimmune process or physical destruction of blood cells (your blood does NOT indicate that this is suspected in you)

## 2024-10-03 NOTE — DISCHARGE NOTE NURSING/CASE MANAGEMENT/SOCIAL WORK - NSDCPEFALRISK_GEN_ALL_CORE
For information on Fall & Injury Prevention, visit: [email or web address] OR  [email or web address] OR  https://www.cdc.gov/steadi/patient.html

## 2024-10-03 NOTE — DISCHARGE NOTE PROVIDER - HOSPITAL COURSE
FROM H&P:    "Pt is  a pleasant 88 y/o male w/ a PMHx of DM, BPH, COPD, GERD, Thrombocytopenia, HLD, CHF, Lung Cancer s/p lobectomy of lung, PAD, CKD, CAD s/p carotid endarterectomy, AICD, appendectomy, Hypertension, Cardiac Arrest, aortic stenosis s/p TAVR presents with chief complaint of of left foot pain and redness.  Just noticed it today.  Denies any trauma.  Blood pressure also noted to be low in the 70s with multiple blood pressure checks in the intake room.  Pt denies hx of known MRSA/ other skin infection.     Patient denies fever, chills, lightheadedness, dizziness, chest pain, new shortness of breath, palpitations, or any other symptoms.  States he took his normal medications today. "      10/1: Improved pain, swelling and erythema. Denies fever. Tolerating antibiotics    10/2: Continued interval improvement. Afebrile. Cr improved. Resume lasix at half dose (20mg) tomorrow. H/H stable. Iron low->venofer. B12 low normal. B2 inj x 1 and then po. PO folic acid. LLE  Cellulitis  Associated LLE Pain   Possible Insect/Tick bite  OLIVIA on CKD most likely pre renal azotemia,   on Xaralto    Hypertension  DM  CAD, AICD, CHF  PAF (on amidodarone and xarelto)    - s/p 500ml NS in the ED   - s/p Cefepime/Vancomycin in the ED, cont to include cover for Staph/Strep and MRSA  -Vanco->doxy  - elevation of extr  - f/u blood cx NGTD , lyme screen  - ID consult  - cont asa, pravastatin,  metoprolol, amiodarone  - ISS  - hold. lasix, sacubritil/valsartan due to OLIVIA and hypotension->resume lasix (at half home dose) and restart entresto (10/3)  -BP improved. LAsix to resume at half dose 10/3  - f/u renal function  - DVT proph:  xarelto  - Full Code    Clinically stable. Cleared by ID. Discharge home in stable condition and close outpatient follow up with primary medical provider and cardiology.    T(C): 36.5 (10-03-24 @ 07:54), Max: 36.5 (10-02-24 @ 16:52)  HR: 61 (10-03-24 @ 07:54) (61 - 82)  BP: 149/60 (10-03-24 @ 07:54) (133/87 - 150/74)  RR: 18 (10-03-24 @ 07:54) (18 - 18)  SpO2: 99% (10-03-24 @ 07:54) (97% - 99%)  General: AAOx3; NAD  Head: AT/NC  ENT: Moist Mucous Membranes; No Injury  Eyes: EOMI; PERRL  Neck: Non-tender; No JVD  CVS: RRR, S1&S2, No murmur, No edema  Respiratory: Lungs CTA B/L; Normal Respiratory Effort  Abdomen/GI: Soft, non-tender, non-distended, no guarding, no rebound, normal bowel sounds  : No bladder distention, No Weber  Extremities: No cyanosis, No clubbing, left dorsum of foot with erythema, mild edema, warmth and tenderness  MSK: No CVA tenderness, Normal ROM, No injury  Neuro: AAOx3, CNII-XII grossly intact except for baseline decreased visual acuite R>>L, non-focal  Psych: Appropriate, Cooperative,   Skin: Clean, Dry and Intact  Discharge Management: 38 minutes  Date of Discharge/Service: 10/3/2024

## 2024-10-03 NOTE — DISCHARGE NOTE PROVIDER - PROVIDER TOKENS
PROVIDER:[TOKEN:[[id number]],FOLLOWUP:[1 week],ESTABLISHEDPATIENT:[T]],PROVIDER:[TOKEN:[[id number]],FOLLOWUP:[2 weeks],ESTABLISHEDPATIENT:[T]]

## 2024-10-03 NOTE — DISCHARGE NOTE NURSING/CASE MANAGEMENT/SOCIAL WORK - PATIENT PORTAL LINK FT
You can access the FollowMyHealth Patient Portal offered by [facility] by registering at the following website: [email or web address]. By joining [email or web address]’s FollowMyHealth portal, you will also be able to view your health information using other applications (apps) compatible with our system.

## 2024-10-03 NOTE — DISCHARGE NOTE PROVIDER - CARE PROVIDER_API CALL
Kirby Maldonado  Internal Medicine  [address] [place] [address], [address]  [place] [address]  Phone: [phone]  Fax: [phone]  Established Patient  Follow Up Time: 1 week    Alan Garcia  Cardiology  [address] [place] [address]  Phone: [phone]  Fax: [phone]  Established Patient  Follow Up Time: 2 weeks

## 2024-10-03 NOTE — PROGRESS NOTE ADULT - SUBJECTIVE AND OBJECTIVE BOX
Date of service: 10-03-24 @ 09:00    Lying in bed in NAD  Left foot redness is improving  Mild tender    ROS: no fever or chills; denies dizziness, no HA, no SOB or cough, no abdominal pain, no diarrhea or constipation; no dysuria, no legs pain, no rashes    MEDICATIONS  (STANDING):  aMIOdarone    Tablet 200 milliGRAM(s) Oral at bedtime  aspirin enteric coated 81 milliGRAM(s) Oral daily  atorvastatin 10 milliGRAM(s) Oral at bedtime  cefTRIAXone Injectable. 2000 milliGRAM(s) IV Push every 24 hours  cholecalciferol 2000 Unit(s) Oral daily  cyanocobalamin 1000 MICROGram(s) Oral daily  dextrose 5%. 1000 milliLiter(s) (100 mL/Hr) IV Continuous <Continuous>  dextrose 5%. 1000 milliLiter(s) (50 mL/Hr) IV Continuous <Continuous>  dextrose 50% Injectable 25 Gram(s) IV Push once  dextrose 50% Injectable 25 Gram(s) IV Push once  dextrose 50% Injectable 12.5 Gram(s) IV Push once  doxycycline monohydrate Capsule 100 milliGRAM(s) Oral every 12 hours  folic acid 1 milliGRAM(s) Oral daily  furosemide    Tablet 20 milliGRAM(s) Oral daily  glucagon  Injectable 1 milliGRAM(s) IntraMuscular once  influenza  Vaccine (HIGH DOSE) 0.5 milliLiter(s) IntraMuscular once  insulin glargine Injectable (LANTUS) 5 Unit(s) SubCutaneous at bedtime  insulin glargine Injectable (LANTUS) 5 Unit(s) SubCutaneous every morning  insulin lispro (ADMELOG) corrective regimen sliding scale   SubCutaneous at bedtime  insulin lispro (ADMELOG) corrective regimen sliding scale   SubCutaneous three times a day before meals  iron sucrose IVPB 200 milliGRAM(s) IV Intermittent every 24 hours  melatonin 5 milliGRAM(s) Oral at bedtime  metoprolol succinate ER 12.5 milliGRAM(s) Oral daily  pantoprazole    Tablet 40 milliGRAM(s) Oral before breakfast  polyethylene glycol 3350 17 Gram(s) Oral daily  rivaroxaban 15 milliGRAM(s) Oral with dinner  sacubitril 24 mG/valsartan 26 mG 1 Tablet(s) Oral two times a day    Vital Signs Last 24 Hrs  T(C): 36.5 (03 Oct 2024 07:54), Max: 36.5 (02 Oct 2024 16:52)  T(F): 97.7 (03 Oct 2024 07:54), Max: 97.7 (02 Oct 2024 16:52)  HR: 61 (03 Oct 2024 07:54) (61 - 82)  BP: 149/60 (03 Oct 2024 07:54) (133/87 - 150/74)  BP(mean): --  RR: 18 (03 Oct 2024 07:54) (18 - 18)  SpO2: 99% (03 Oct 2024 07:54) (97% - 99%)    Parameters below as of 03 Oct 2024 07:54  Patient On (Oxygen Delivery Method): room air    Physical exam:    Constitutional:  No acute distress  HEENT: NC/AT, EOMI, PERRLA, conjunctivae clear; ears and nose atraumatic; pharynx benign  Neck: supple; thyroid not palpable  Back: no tenderness  Respiratory: respiratory effort normal; clear to auscultation  Cardiovascular: S1S2 regular, no murmurs  Abdomen: soft, not tender, not distended, positive BS; no liver or spleen organomegaly  Genitourinary: no suprapubic tenderness  Lymphatic: no LN palpable  Musculoskeletal: no muscle tenderness, no joint swelling or tenderness  Extremities: left foot dorsal aspect erythema edema, warmth and tenderness, no skin break noted, pedal edema - much improved  Neurological/ Psychiatric: AxOx3, judgement and insight normal; moving all extremities  Skin: no rashes; no palpable lesions    Labs: reviewed                        10.3   8.75  )-----------( 196      ( 01 Oct 2024 06:29 )             33.2     10-01    140  |  108  |  30[H]  ----------------------------<  111[H]  3.5   |  26  |  1.59[H]    Ca    8.9      01 Oct 2024 06:29    TPro  7.7  /  Alb  3.6  /  TBili  0.5  /  DBili  x   /  AST  20  /  ALT  18  /  AlkPhos  77  09-30                        10.3   8.75  )-----------( 196      ( 01 Oct 2024 06:29 )             33.2     09-30    137  |  102  |  37[H]  ----------------------------<  133[H]  3.8   |  29  |  2.43[H]    Ca    9.6      30 Sep 2024 15:11    TPro  7.7  /  Alb  3.6  /  TBili  0.5  /  DBili  x   /  AST  20  /  ALT  18  /  AlkPhos  77  09-30     LIVER FUNCTIONS - ( 30 Sep 2024 15:11 )  Alb: 3.6 g/dL / Pro: 7.7 gm/dL / ALK PHOS: 77 U/L / ALT: 18 U/L / AST: 20 U/L / GGT: x           Urinalysis with Rflx Culture (09-30 @ 20:40)  Urine Appearance: Clear  Protein, Urine: Trace mg/dL  Urine Microscopic-Add On (NC) (09-30 @ 20:40)  Red Blood Cell - Urine: 49 /HPF  White Blood Cell - Urine: 6 /HPF    Culture - Urine (collected 30 Sep 2024 20:40)  Source: Clean Catch None  Final Report (01 Oct 2024 23:08):    <10,000 CFU/mL Normal Urogenital Celina    Urinalysis with Rflx Culture (collected 30 Sep 2024 20:40)    Culture - Blood (collected 30 Sep 2024 15:23)  Source: .Blood BLOOD  Preliminary Report (01 Oct 2024 20:00):    No growth at 24 hours    Culture - Blood (collected 30 Sep 2024 15:11)  Source: .Blood BLOOD  Preliminary Report (01 Oct 2024 20:00):    No growth at 24 hours    Radiology: all available radiological tests reviewed    < from: Xray Foot AP + Lateral + Oblique, Left (09.30.24 @ 15:05) >  Left foot. 3 views.  There is a mild hallux valgus with some degeneration. No bone destruction   < end of copied text >    Advanced directives addressed: full resuscitation
Date of service: 10-02-24 @ 07:35    Lying in bed in NAD  Has left foot redness; swelling is down  No fever  Has local pain    ROS: no fever or chills; denies dizziness, no HA, no SOB or cough, no abdominal pain, no diarrhea or constipation; no dysuria, no rashes    MEDICATIONS  (STANDING):  aMIOdarone    Tablet 200 milliGRAM(s) Oral at bedtime  aspirin enteric coated 81 milliGRAM(s) Oral daily  atorvastatin 10 milliGRAM(s) Oral at bedtime  cefTRIAXone Injectable. 2000 milliGRAM(s) IV Push every 24 hours  cholecalciferol 2000 Unit(s) Oral daily  dextrose 5%. 1000 milliLiter(s) (50 mL/Hr) IV Continuous <Continuous>  dextrose 5%. 1000 milliLiter(s) (100 mL/Hr) IV Continuous <Continuous>  dextrose 50% Injectable 25 Gram(s) IV Push once  dextrose 50% Injectable 12.5 Gram(s) IV Push once  dextrose 50% Injectable 25 Gram(s) IV Push once  doxycycline monohydrate Capsule 100 milliGRAM(s) Oral every 12 hours  glucagon  Injectable 1 milliGRAM(s) IntraMuscular once  influenza  Vaccine (HIGH DOSE) 0.5 milliLiter(s) IntraMuscular once  insulin glargine Injectable (LANTUS) 5 Unit(s) SubCutaneous every morning  insulin glargine Injectable (LANTUS) 5 Unit(s) SubCutaneous at bedtime  insulin lispro (ADMELOG) corrective regimen sliding scale   SubCutaneous at bedtime  insulin lispro (ADMELOG) corrective regimen sliding scale   SubCutaneous three times a day before meals  melatonin 5 milliGRAM(s) Oral at bedtime  metoprolol succinate ER 12.5 milliGRAM(s) Oral daily  pantoprazole    Tablet 40 milliGRAM(s) Oral before breakfast  rivaroxaban 15 milliGRAM(s) Oral with dinner    Vital Signs Last 24 Hrs  T(C): 36.6 (01 Oct 2024 22:22), Max: 36.7 (01 Oct 2024 16:55)  T(F): 97.9 (01 Oct 2024 22:22), Max: 98.1 (01 Oct 2024 16:55)  HR: 76 (01 Oct 2024 22:22) (61 - 76)  BP: 137/79 (01 Oct 2024 22:22) (115/63 - 137/79)  BP(mean): --  RR: 18 (01 Oct 2024 22:22) (18 - 18)  SpO2: 97% (01 Oct 2024 22:22) (96% - 98%)    Parameters below as of 01 Oct 2024 22:22  Patient On (Oxygen Delivery Method): room air     Physical exam:    Constitutional:  No acute distress  HEENT: NC/AT, EOMI, PERRLA, conjunctivae clear; ears and nose atraumatic; pharynx benign  Neck: supple; thyroid not palpable  Back: no tenderness  Respiratory: respiratory effort normal; clear to auscultation  Cardiovascular: S1S2 regular, no murmurs  Abdomen: soft, not tender, not distended, positive BS; no liver or spleen organomegaly  Genitourinary: no suprapubic tenderness  Lymphatic: no LN palpable  Musculoskeletal: no muscle tenderness, no joint swelling or tenderness  Extremities: left foot dorsal aspect erythema edema, warmth and tenderness, no skin break noted, pedal edema  Neurological/ Psychiatric: AxOx3, judgement and insight normal; moving all extremities  Skin: no rashes; no palpable lesions    Labs: reviewed                        10.3   8.75  )-----------( 196      ( 01 Oct 2024 06:29 )             33.2     10-01    140  |  108  |  30[H]  ----------------------------<  111[H]  3.5   |  26  |  1.59[H]    Ca    8.9      01 Oct 2024 06:29    TPro  7.7  /  Alb  3.6  /  TBili  0.5  /  DBili  x   /  AST  20  /  ALT  18  /  AlkPhos  77  09-30                        10.3   8.75  )-----------( 196      ( 01 Oct 2024 06:29 )             33.2     09-30    137  |  102  |  37[H]  ----------------------------<  133[H]  3.8   |  29  |  2.43[H]    Ca    9.6      30 Sep 2024 15:11    TPro  7.7  /  Alb  3.6  /  TBili  0.5  /  DBili  x   /  AST  20  /  ALT  18  /  AlkPhos  77  09-30     LIVER FUNCTIONS - ( 30 Sep 2024 15:11 )  Alb: 3.6 g/dL / Pro: 7.7 gm/dL / ALK PHOS: 77 U/L / ALT: 18 U/L / AST: 20 U/L / GGT: x           Urinalysis with Rflx Culture (09-30 @ 20:40)  Urine Appearance: Clear  Protein, Urine: Trace mg/dL  Urine Microscopic-Add On (NC) (09-30 @ 20:40)  Red Blood Cell - Urine: 49 /HPF  White Blood Cell - Urine: 6 /HPF    Culture - Urine (collected 30 Sep 2024 20:40)  Source: Clean Catch None  Final Report (01 Oct 2024 23:08):    <10,000 CFU/mL Normal Urogenital Celina    Urinalysis with Rflx Culture (collected 30 Sep 2024 20:40)    Culture - Blood (collected 30 Sep 2024 15:23)  Source: .Blood BLOOD  Preliminary Report (01 Oct 2024 20:00):    No growth at 24 hours    Culture - Blood (collected 30 Sep 2024 15:11)  Source: .Blood BLOOD  Preliminary Report (01 Oct 2024 20:00):    No growth at 24 hours    Radiology: all available radiological tests reviewed    < from: Xray Foot AP + Lateral + Oblique, Left (09.30.24 @ 15:05) >  Left foot. 3 views.  There is a mild hallux valgus with some degeneration. No bone destruction   < end of copied text >    Advanced directives addressed: full resuscitation
CHIEF COMPLAINT: Left foot pain and erythema and swelling    SUBJECTIVE/SIGNIFICANT INTERVAL EVENTS/OVERNIGHT EVENTS:    10/1: Improved pain, swelling and erythema. Denies fever. Tolerating antibiotics    Review of Systems: 14 Point review of systems reviewed and reported as negative unless otherwise stated above    FROM H&P:  "Pt is  a pleasant 88 y/o male w/ a PMHx of DM, BPH, COPD, GERD, Thrombocytopenia, HLD, CHF, Lung Cancer s/p lobectomy of lung, PAD, CKD, CAD s/p carotid endarterectomy, AICD, appendectomy, Hypertension, Cardiac Arrest, aortic stenosis s/p TAVR presents with chief complaint of of left foot pain and redness.  Just noticed it today.  Denies any trauma.  Blood pressure also noted to be low in the 70s with multiple blood pressure checks in the intake room.  Pt denies hx of known MRSA/ other skin infection.     Patient denies fever, chills, lightheadedness, dizziness, chest pain, new shortness of breath, palpitations, or any other symptoms.  States he took his normal medications today. "    PHYSICAL EXAM:    T(C): 36.7 (10-01-24 @ 16:55), Max: 36.7 (09-30-24 @ 21:58)  HR: 70 (10-01-24 @ 16:55) (61 - 70)  BP: 115/63 (10-01-24 @ 16:55) (115/63 - 125/53)  RR: 18 (10-01-24 @ 16:55) (18 - 18)  SpO2: 96% (10-01-24 @ 16:55) (96% - 98%)    General: AAOx3; NAD  Head: AT/NC  ENT: Moist Mucous Membranes; No Injury  Eyes: EOMI; PERRL  Neck: Non-tender; No JVD  CVS: RRR, S1&S2, No murmur, No edema  Respiratory: Lungs CTA B/L; Normal Respiratory Effort  Abdomen/GI: Soft, non-tender, non-distended, no guarding, no rebound, normal bowel sounds  : No bladder distention, No Weber  Extremities: No cyanosis, No clubbing, left dorsum of foot with erythema, mild edema, warmth and tenderness  MSK: No CVA tenderness, Normal ROM, No injury  Neuro: AAOx3, CNII-XII grossly intact, non-focal  Psych: Appropriate, Cooperative, No depression, No anxiety  Skin: Clean, Dry and Intact      LABS:                          10.3   8.75  )-----------( 196      ( 01 Oct 2024 06:29 )             33.2     10-01    140  |  108  |  30[H]  ----------------------------<  111[H]  3.5   |  26  |  1.59[H]    Ca    8.9      01 Oct 2024 06:29    TPro  7.7  /  Alb  3.6  /  TBili  0.5  /  DBili  x   /  AST  20  /  ALT  18  /  AlkPhos  77  09-30      CAPILLARY BLOOD GLUCOSE      POCT Blood Glucose.: 121 mg/dL (01 Oct 2024 17:50)  POCT Blood Glucose.: 135 mg/dL (01 Oct 2024 12:16)  POCT Blood Glucose.: 119 mg/dL (01 Oct 2024 08:29)      Urinalysis with Rflx Culture (collected 30 Sep 2024 20:40)    Culture - Blood (collected 30 Sep 2024 15:23)  Source: .Blood BLOOD  Preliminary Report (01 Oct 2024 20:00):    No growth at 24 hours    Culture - Blood (collected 30 Sep 2024 15:11)  Source: .Blood BLOOD  Preliminary Report (01 Oct 2024 20:00):    No growth at 24 hours  Urinalysis with Rflx Culture (09.30.24 @ 20:40)   Urine Appearance: Clear  Color: Yellow  Specific Gravity: 1.018  pH Urine: 5.5  Protein, Urine: Trace mg/dL  Glucose Qualitative, Urine: 500 mg/dL  Ketone - Urine: Negative mg/dL  Blood, Urine: Moderate  Bilirubin: Negative  Urobilinogen: 0.2 mg/dL  Leukocyte Esterase Concentration: Small  Nitrite: Negative        RADIOLOGY:  < from: Xray Foot AP + Lateral + Oblique, Left (09.30.24 @ 15:05) >    IMPRESSION: Stable left foot findings.    Improved bibasilar infiltrates.    < end of copied text >      EKG:  < from: 12 Lead ECG (09.30.24 @ 14:31) >  Diagnosis Line AV dual-paced rhythm  Abnormal ECG    < end of copied text >          I personally reviewed labs, imaging, ekg, orders and vitals.    Discussed case with:  [X]RN  [X]CM/GALLO  [X]Patient  []Family  []Specialist:            
CHIEF COMPLAINT: Left foot pain and erythema and swelling    SUBJECTIVE/SIGNIFICANT INTERVAL EVENTS/OVERNIGHT EVENTS:    10/1: Improved pain, swelling and erythema. Denies fever. Tolerating antibiotics    10/2: Continued interval improvement. Afebrile. Cr improved. Resume lasix at half dose (20mg) tomorrow. H/H stable. Iron low->venofer. B12 low normal. B2 inj x 1 and then po. PO folic acid.     Review of Systems: 14 Point review of systems reviewed and reported as negative unless otherwise stated above    FROM H&P:  "Pt is  a pleasant 89 y/o male w/ a PMHx of DM, BPH, COPD, GERD, Thrombocytopenia, HLD, CHF, Lung Cancer s/p lobectomy of lung, PAD, CKD, CAD s/p carotid endarterectomy, AICD, appendectomy, Hypertension, Cardiac Arrest, aortic stenosis s/p TAVR presents with chief complaint of of left foot pain and redness.  Just noticed it today.  Denies any trauma.  Blood pressure also noted to be low in the 70s with multiple blood pressure checks in the intake room.  Pt denies hx of known MRSA/ other skin infection.     Patient denies fever, chills, lightheadedness, dizziness, chest pain, new shortness of breath, palpitations, or any other symptoms.  States he took his normal medications today. "    PHYSICAL EXAM:    T(C): 36.5 (10-02-24 @ 16:52), Max: 36.6 (10-01-24 @ 22:22)  HR: 66 (10-02-24 @ 16:52) (64 - 76)  BP: 133/87 (10-02-24 @ 16:52) (130/64 - 137/79)  RR: 18 (10-02-24 @ 16:52) (18 - 18)  SpO2: 99% (10-02-24 @ 16:52) (96% - 99%)    General: AAOx3; NAD  Head: AT/NC  ENT: Moist Mucous Membranes; No Injury  Eyes: EOMI; PERRL  Neck: Non-tender; No JVD  CVS: RRR, S1&S2, No murmur, No edema  Respiratory: Lungs CTA B/L; Normal Respiratory Effort  Abdomen/GI: Soft, non-tender, non-distended, no guarding, no rebound, normal bowel sounds  : No bladder distention, No Weber  Extremities: No cyanosis, No clubbing, left dorsum of foot with erythema, mild edema, warmth and tenderness  MSK: No CVA tenderness, Normal ROM, No injury  Neuro: AAOx3, CNII-XII grossly intact except for baseline decreased visual acuite R>>L, non-focal  Psych: Appropriate, Cooperative,   Skin: Clean, Dry and Intact      LABS:                          11.1   x     )-----------( x        ( 02 Oct 2024 07:09 )             35.3     10-02    141  |  108  |  24[H]  ----------------------------<  115[H]  3.6   |  27  |  1.43[H]    Ca    9.4      02 Oct 2024 07:09  Phos  2.4     10-02  Mg     2.0     10-02        CAPILLARY BLOOD GLUCOSE      POCT Blood Glucose.: 134 mg/dL (02 Oct 2024 17:41)  POCT Blood Glucose.: 129 mg/dL (02 Oct 2024 12:03)  POCT Blood Glucose.: 124 mg/dL (02 Oct 2024 08:25)  POCT Blood Glucose.: 118 mg/dL (01 Oct 2024 22:24)      Culture - Urine (collected 30 Sep 2024 20:40)  Source: Clean Catch None  Final Report (01 Oct 2024 23:08):    <10,000 CFU/mL Normal Urogenital Celina    Urinalysis with Rflx Culture (collected 30 Sep 2024 20:40)    Culture - Blood (collected 30 Sep 2024 15:23)  Source: .Blood BLOOD  Preliminary Report (01 Oct 2024 20:00):    No growth at 24 hours    Culture - Blood (collected 30 Sep 2024 15:11)  Source: .Blood BLOOD  Preliminary Report (01 Oct 2024 20:00):    No growth at 24 hours                            10.3   8.75  )-----------( 196      ( 01 Oct 2024 06:29 )             33.2     10-01    140  |  108  |  30[H]  ----------------------------<  111[H]  3.5   |  26  |  1.59[H]    Ca    8.9      01 Oct 2024 06:29    TPro  7.7  /  Alb  3.6  /  TBili  0.5  /  DBili  x   /  AST  20  /  ALT  18  /  AlkPhos  77  09-30      CAPILLARY BLOOD GLUCOSE      POCT Blood Glucose.: 121 mg/dL (01 Oct 2024 17:50)  POCT Blood Glucose.: 135 mg/dL (01 Oct 2024 12:16)  POCT Blood Glucose.: 119 mg/dL (01 Oct 2024 08:29)      Urinalysis with Rflx Culture (collected 30 Sep 2024 20:40)    Culture - Blood (collected 30 Sep 2024 15:23)  Source: .Blood BLOOD  Preliminary Report (01 Oct 2024 20:00):    No growth at 24 hours    Culture - Blood (collected 30 Sep 2024 15:11)  Source: .Blood BLOOD  Preliminary Report (01 Oct 2024 20:00):    No growth at 24 hours  Urinalysis with Rflx Culture (09.30.24 @ 20:40)   Urine Appearance: Clear  Color: Yellow  Specific Gravity: 1.018  pH Urine: 5.5  Protein, Urine: Trace mg/dL  Glucose Qualitative, Urine: 500 mg/dL  Ketone - Urine: Negative mg/dL  Blood, Urine: Moderate  Bilirubin: Negative  Urobilinogen: 0.2 mg/dL  Leukocyte Esterase Concentration: Small  Nitrite: Negative        RADIOLOGY:  < from: Xray Foot AP + Lateral + Oblique, Left (09.30.24 @ 15:05) >    IMPRESSION: Stable left foot findings.    Improved bibasilar infiltrates.    < end of copied text >      EKG:  < from: 12 Lead ECG (09.30.24 @ 14:31) >  Diagnosis Line AV dual-paced rhythm  Abnormal ECG    < end of copied text >          I personally reviewed labs,  orders and vitals.    Discussed case with:  [X]RN  [X]CM/SW  [X]Patient  []Family  [X]Specialist:

## 2024-10-03 NOTE — PROGRESS NOTE ADULT - ASSESSMENT
MEDICATIONS  (STANDING):  aMIOdarone    Tablet 200 milliGRAM(s) Oral at bedtime  aspirin enteric coated 81 milliGRAM(s) Oral daily  atorvastatin 10 milliGRAM(s) Oral at bedtime  cefTRIAXone Injectable. 2000 milliGRAM(s) IV Push every 24 hours  cholecalciferol 2000 Unit(s) Oral daily  cyanocobalamin Injectable 1000 MICROGram(s) IntraMuscular once  dextrose 5%. 1000 milliLiter(s) (50 mL/Hr) IV Continuous <Continuous>  dextrose 5%. 1000 milliLiter(s) (100 mL/Hr) IV Continuous <Continuous>  dextrose 50% Injectable 25 Gram(s) IV Push once  dextrose 50% Injectable 12.5 Gram(s) IV Push once  dextrose 50% Injectable 25 Gram(s) IV Push once  doxycycline monohydrate Capsule 100 milliGRAM(s) Oral every 12 hours  folic acid 1 milliGRAM(s) Oral daily  glucagon  Injectable 1 milliGRAM(s) IntraMuscular once  influenza  Vaccine (HIGH DOSE) 0.5 milliLiter(s) IntraMuscular once  insulin glargine Injectable (LANTUS) 5 Unit(s) SubCutaneous at bedtime  insulin glargine Injectable (LANTUS) 5 Unit(s) SubCutaneous every morning  insulin lispro (ADMELOG) corrective regimen sliding scale   SubCutaneous at bedtime  insulin lispro (ADMELOG) corrective regimen sliding scale   SubCutaneous three times a day before meals  iron sucrose IVPB 200 milliGRAM(s) IV Intermittent every 24 hours  melatonin 5 milliGRAM(s) Oral at bedtime  metoprolol succinate ER 12.5 milliGRAM(s) Oral daily  pantoprazole    Tablet 40 milliGRAM(s) Oral before breakfast  polyethylene glycol 3350 17 Gram(s) Oral daily  potassium phosphate / sodium phosphate Powder (PHOS-NaK) 1 Packet(s) Oral four times a day  rivaroxaban 15 milliGRAM(s) Oral with dinner    MEDICATIONS  (PRN):  dextrose Oral Gel 15 Gram(s) Oral once PRN Blood Glucose LESS THAN 70 milliGRAM(s)/deciliter  senna 1 Tablet(s) Oral at bedtime PRN Constipation        Pt is admitted w/    LLE  Cellulitis  Associated LLE Pain   Possible Insect/Tick bite  OLIVIA on CKD most likely pre renal azotemia,   on Xaralto    Hypertension  DM  CAD, AICD, CHF  PAF (on amidodarone and xarelto)    - s/p 500ml NS in the ED   - s/p Cefepime/Vancomycin in the ED, cont to include cover for Staph/Strep and MRSA  -Vanco->doxy  - elevation of extr  - f/u blood cx NGTD , lyme screen  - ID consult  - cont asa, pravastatin,  metoprolol, amiodarone  - ISS  - hold. lasix, sacubritil/valsartan due to OLIVIA and hypotension  -BP improved. LAsix to resume at half dose 10/3  - f/u renal function  - DVT proph:  xarelto  - Full Code
88 y/o male with h/o DM type 2, BPH, COPD, GERD, Thrombocytopenia, HLD, CHF, Lung Cancer s/p lobectomy of lung, PAD, CKD, CAD s/p carotid endarterectomy, AICD, appendectomy, Hypertension, Cardiac Arrest, aortic stenosis s/p TAVR was admitted on 9/30 for left foot pain and redness x one day. Denies any trauma. Blood pressure also noted to be low in the 70s with multiple blood pressure checks in the intake room. Pt denies hx of known MRSA/ other skin infection. No fever or chills at home. In ER he received cefepime and vancomycin IV.    1. Left foot cellulitis. Probable PVD. Possible sepsis. ARF on CRF stage 3-4.   -hypotension resolved  -BC x 2, urine c/s collected  -on doxycycline 100 mg IV q12h and ceftriaxone 2 gm IV qd # 2  -tolerating abx well so far; no side effects noted  -renal function is improving  -elevate leg  -continue abx coverage   -f/u cultures  -monitor temps  -f/u CBC  -supportive care  2. Other issues:   -care per medicine    Clinical team may change from intravenous to oral antibiotics when the following criteria are met:   1. Patient is clinically improving/stable       a)	Improved signs and symptoms of infection from initial presentation       b)	Afebrile for 24 hours       c)	Leukocytosis trending towards normal range   2. Patient is tolerating oral intake   3. Initial/repeat blood cultures are negative     When above criteria met may change iv antibiotics to an oral agent  Cannot advise changing to oral antibiotic therapy until culture sensitivity is available.        
88 y/o male with h/o DM type 2, BPH, COPD, GERD, Thrombocytopenia, HLD, CHF, Lung Cancer s/p lobectomy of lung, PAD, CKD, CAD s/p carotid endarterectomy, AICD, appendectomy, Hypertension, Cardiac Arrest, aortic stenosis s/p TAVR was admitted on 9/30 for left foot pain and redness x one day. Denies any trauma. Blood pressure also noted to be low in the 70s with multiple blood pressure checks in the intake room. Pt denies hx of known MRSA/ other skin infection. No fever or chills at home. In ER he received cefepime and vancomycin IV.    1. Left foot cellulitis. Probable PVD. Possible sepsis. ARF on CRF stage 3-4.   -hypotension resolved  -BC x 2, urine c/s collected  -on doxycycline 100 mg IV q12h and ceftriaxone 2 gm IV qd # 3  -tolerating abx well so far; no side effects noted  -renal function is improving  -elevate leg  -change abx to doxy 100 mg PO q12h for 10 more days  -f/u cultures  -monitor temps  -f/u CBC  -supportive care  2. Other issues:   -care per medicine    Clinical team may change from intravenous to oral antibiotics when the following criteria are met:   1. Patient is clinically improving/stable       a)	Improved signs and symptoms of infection from initial presentation       b)	Afebrile for 24 hours       c)	Leukocytosis trending towards normal range   2. Patient is tolerating oral intake   3. Initial/repeat blood cultures are negative     When above criteria met may change iv antibiotics to PO doxy as above         
MEDICATIONS  (STANDING):  aMIOdarone    Tablet 200 milliGRAM(s) Oral at bedtime  aspirin enteric coated 81 milliGRAM(s) Oral daily  atorvastatin 10 milliGRAM(s) Oral at bedtime  cefTRIAXone Injectable. 2000 milliGRAM(s) IV Push every 24 hours  cholecalciferol 2000 Unit(s) Oral daily  dextrose 5%. 1000 milliLiter(s) (50 mL/Hr) IV Continuous <Continuous>  dextrose 5%. 1000 milliLiter(s) (100 mL/Hr) IV Continuous <Continuous>  dextrose 50% Injectable 25 Gram(s) IV Push once  dextrose 50% Injectable 25 Gram(s) IV Push once  dextrose 50% Injectable 12.5 Gram(s) IV Push once  doxycycline monohydrate Capsule 100 milliGRAM(s) Oral every 12 hours  glucagon  Injectable 1 milliGRAM(s) IntraMuscular once  influenza  Vaccine (HIGH DOSE) 0.5 milliLiter(s) IntraMuscular once  insulin glargine Injectable (LANTUS) 5 Unit(s) SubCutaneous at bedtime  insulin glargine Injectable (LANTUS) 5 Unit(s) SubCutaneous every morning  insulin lispro (ADMELOG) corrective regimen sliding scale   SubCutaneous at bedtime  insulin lispro (ADMELOG) corrective regimen sliding scale   SubCutaneous three times a day before meals  melatonin 5 milliGRAM(s) Oral at bedtime  metoprolol succinate ER 12.5 milliGRAM(s) Oral daily  pantoprazole    Tablet 40 milliGRAM(s) Oral before breakfast  rivaroxaban 15 milliGRAM(s) Oral with dinner    MEDICATIONS  (PRN):  dextrose Oral Gel 15 Gram(s) Oral once PRN Blood Glucose LESS THAN 70 milliGRAM(s)/deciliter      Pt is admitted w/    LLE  Cellulitis  LLE Pain   Possible Insect/Tick bite  OLIVIA on CKD DDX pre renal azotemia,   on Xaralto    Hypertension  DM  CAD, AICD, CHF    - s/p 500ml NS in the ED   - s/p Cefepime/Vancomycin in the ED, cont to include cover for Staph/Strep and MRSA  -Vanco->doxy  - elevation of extr  - f/u blood cx NGTD , lyme screen  - ID consult  - cont asa, pravastatin,  metoprolol, amiodarone  - ISS  - hold. lasix, sacubritil/valsartan due to OLIVIA and hypotension  - f/u renal function  - DVT proph:  xaralto  - Full Code

## 2024-10-04 LAB
A PHAGOCYTOPH IGG TITR SER IF: SIGNIFICANT CHANGE UP
B BURGDOR AB SER QL IA: 0.19 IV — SIGNIFICANT CHANGE UP
B MICROTI IGG TITR SER: SIGNIFICANT CHANGE UP
E CHAFFEENSIS IGG TITR SER IF: SIGNIFICANT CHANGE UP

## 2024-10-09 ENCOUNTER — APPOINTMENT (OUTPATIENT)
Dept: HOME HEALTH SERVICES | Facility: HOME HEALTH | Age: 89
End: 2024-10-09

## 2024-10-09 VITALS
SYSTOLIC BLOOD PRESSURE: 102 MMHG | OXYGEN SATURATION: 97 % | BODY MASS INDEX: 26.72 KG/M2 | HEART RATE: 66 BPM | RESPIRATION RATE: 16 BRPM | WEIGHT: 197 LBS | DIASTOLIC BLOOD PRESSURE: 60 MMHG

## 2024-10-09 DIAGNOSIS — N18.30 CHRONIC KIDNEY DISEASE, STAGE 3 UNSPECIFIED: ICD-10-CM

## 2024-10-09 DIAGNOSIS — E11.9 TYPE 2 DIABETES MELLITUS W/OUT COMPLICATIONS: ICD-10-CM

## 2024-10-09 DIAGNOSIS — I10 ESSENTIAL (PRIMARY) HYPERTENSION: ICD-10-CM

## 2024-10-09 DIAGNOSIS — I73.9 PERIPHERAL VASCULAR DISEASE, UNSPECIFIED: ICD-10-CM

## 2024-10-09 DIAGNOSIS — N18.30 TYPE 2 DIABETES MELLITUS WITH DIABETIC CHRONIC KIDNEY DISEASE: ICD-10-CM

## 2024-10-09 DIAGNOSIS — Z23 ENCOUNTER FOR IMMUNIZATION: ICD-10-CM

## 2024-10-09 DIAGNOSIS — L03.116 CELLULITIS OF LEFT LOWER LIMB: ICD-10-CM

## 2024-10-09 DIAGNOSIS — J44.9 CHRONIC OBSTRUCTIVE PULMONARY DISEASE, UNSPECIFIED: ICD-10-CM

## 2024-10-09 DIAGNOSIS — I50.22 CHRONIC SYSTOLIC (CONGESTIVE) HEART FAILURE: ICD-10-CM

## 2024-10-09 DIAGNOSIS — E11.22 TYPE 2 DIABETES MELLITUS WITH DIABETIC CHRONIC KIDNEY DISEASE: ICD-10-CM

## 2024-10-09 PROCEDURE — 90662 IIV NO PRSV INCREASED AG IM: CPT

## 2024-10-09 PROCEDURE — 99344 HOME/RES VST NEW MOD MDM 60: CPT

## 2024-10-09 PROCEDURE — G0008: CPT

## 2024-10-09 RX ORDER — FOLIC ACID 1 MG/1
1 TABLET ORAL
Qty: 90 | Refills: 3 | Status: ACTIVE | COMMUNITY
Start: 2024-10-09

## 2024-10-09 RX ORDER — CYANOCOBALAMIN (VITAMIN B-12) 1000 MCG
1000 TABLET ORAL DAILY
Qty: 30 | Refills: 1 | Status: ACTIVE | COMMUNITY
Start: 2024-10-09

## 2024-10-09 RX ORDER — FUROSEMIDE 20 MG/1
20 TABLET ORAL
Qty: 90 | Refills: 3 | Status: ACTIVE | COMMUNITY
Start: 2024-10-09

## 2024-10-09 RX ORDER — SENNOSIDES 8.6 MG TABLETS 8.6 MG/1
8.6 TABLET ORAL AT BEDTIME
Refills: 0 | Status: ACTIVE | COMMUNITY
Start: 2024-10-09

## 2024-10-09 RX ORDER — VIT C/E/ZN/COPPR/LUTEIN/ZEAXAN 250MG-90MG
CAPSULE ORAL AT BEDTIME
Refills: 0 | Status: ACTIVE | COMMUNITY
Start: 2024-10-09

## 2024-10-09 RX ORDER — UBIDECARENONE/VIT E ACET 100MG-5
25 MCG CAPSULE ORAL DAILY
Refills: 0 | Status: ACTIVE | COMMUNITY
Start: 2024-10-09

## 2024-10-12 NOTE — PATIENT PROFILE ADULT - ABILITY TO HEAR (WITH HEARING AID OR HEARING APPLIANCE IF NORMALLY USED):
0 (no pain/absence of nonverbal indicators of pain)
pt did not bring hearing aid with him/Mildly to Moderately Impaired: difficulty hearing in some environments or speaker may need to increase volume or speak distinctly

## 2024-11-01 DIAGNOSIS — M10.9 GOUT, UNSPECIFIED: ICD-10-CM

## 2024-11-01 RX ORDER — PREDNISONE 20 MG/1
20 TABLET ORAL DAILY
Qty: 10 | Refills: 0 | Status: ACTIVE | COMMUNITY
Start: 2024-11-01 | End: 1900-01-01

## 2024-11-05 ENCOUNTER — LABORATORY RESULT (OUTPATIENT)
Age: 89
End: 2024-11-05

## 2024-11-07 ENCOUNTER — APPOINTMENT (OUTPATIENT)
Dept: PULMONOLOGY | Facility: CLINIC | Age: 89
End: 2024-11-07
Payer: MEDICARE

## 2024-11-07 ENCOUNTER — APPOINTMENT (OUTPATIENT)
Dept: INTERNAL MEDICINE | Facility: CLINIC | Age: 89
End: 2024-11-07
Payer: MEDICARE

## 2024-11-07 VITALS
DIASTOLIC BLOOD PRESSURE: 67 MMHG | RESPIRATION RATE: 16 BRPM | TEMPERATURE: 98.1 F | WEIGHT: 197 LBS | SYSTOLIC BLOOD PRESSURE: 106 MMHG | HEART RATE: 77 BPM | HEIGHT: 72 IN | BODY MASS INDEX: 26.68 KG/M2

## 2024-11-07 DIAGNOSIS — R06.02 SHORTNESS OF BREATH: ICD-10-CM

## 2024-11-07 PROCEDURE — 99204 OFFICE O/P NEW MOD 45 MIN: CPT | Mod: 25

## 2024-11-07 PROCEDURE — ZZZZZ: CPT

## 2024-11-07 PROCEDURE — 94060 EVALUATION OF WHEEZING: CPT

## 2024-11-12 PROBLEM — R06.02 SHORTNESS OF BREATH: Status: ACTIVE | Noted: 2024-11-12

## 2024-11-15 ENCOUNTER — LABORATORY RESULT (OUTPATIENT)
Age: 89
End: 2024-11-15

## 2024-11-19 ENCOUNTER — LABORATORY RESULT (OUTPATIENT)
Age: 89
End: 2024-11-19

## 2024-11-20 ENCOUNTER — LABORATORY RESULT (OUTPATIENT)
Age: 89
End: 2024-11-20

## 2024-12-20 ENCOUNTER — APPOINTMENT (OUTPATIENT)
Dept: HOME HEALTH SERVICES | Facility: HOME HEALTH | Age: 88
End: 2024-12-20
Payer: MEDICARE

## 2024-12-20 VITALS
RESPIRATION RATE: 16 BRPM | DIASTOLIC BLOOD PRESSURE: 56 MMHG | BODY MASS INDEX: 27.03 KG/M2 | HEART RATE: 74 BPM | WEIGHT: 199.31 LBS | SYSTOLIC BLOOD PRESSURE: 122 MMHG | OXYGEN SATURATION: 99 %

## 2024-12-20 DIAGNOSIS — I48.0 PAROXYSMAL ATRIAL FIBRILLATION: ICD-10-CM

## 2024-12-20 DIAGNOSIS — I50.22 CHRONIC SYSTOLIC (CONGESTIVE) HEART FAILURE: ICD-10-CM

## 2024-12-20 DIAGNOSIS — N18.30 CHRONIC KIDNEY DISEASE, STAGE 3 UNSPECIFIED: ICD-10-CM

## 2024-12-20 DIAGNOSIS — N18.30 TYPE 2 DIABETES MELLITUS WITH DIABETIC CHRONIC KIDNEY DISEASE: ICD-10-CM

## 2024-12-20 DIAGNOSIS — I73.9 PERIPHERAL VASCULAR DISEASE, UNSPECIFIED: ICD-10-CM

## 2024-12-20 DIAGNOSIS — J44.9 CHRONIC OBSTRUCTIVE PULMONARY DISEASE, UNSPECIFIED: ICD-10-CM

## 2024-12-20 DIAGNOSIS — D68.69 OTHER THROMBOPHILIA: ICD-10-CM

## 2024-12-20 DIAGNOSIS — E11.22 TYPE 2 DIABETES MELLITUS WITH DIABETIC CHRONIC KIDNEY DISEASE: ICD-10-CM

## 2024-12-20 PROCEDURE — 99349 HOME/RES VST EST MOD MDM 40: CPT

## 2024-12-20 RX ORDER — SENNOSIDES 8.6 MG TABLETS 8.6 MG/1
8.6 TABLET ORAL DAILY
Refills: 0 | Status: ACTIVE | COMMUNITY
Start: 2024-12-20

## 2024-12-26 ENCOUNTER — NON-APPOINTMENT (OUTPATIENT)
Age: 88
End: 2024-12-26

## 2024-12-26 ENCOUNTER — APPOINTMENT (OUTPATIENT)
Dept: ELECTROPHYSIOLOGY | Facility: CLINIC | Age: 88
End: 2024-12-26

## 2024-12-26 PROCEDURE — 93295 DEV INTERROG REMOTE 1/2/MLT: CPT

## 2024-12-26 PROCEDURE — 93296 REM INTERROG EVL PM/IDS: CPT

## 2025-03-03 ENCOUNTER — TRANSCRIPTION ENCOUNTER (OUTPATIENT)
Age: 89
End: 2025-03-03

## 2025-03-03 ENCOUNTER — RX RENEWAL (OUTPATIENT)
Age: 89
End: 2025-03-03

## 2025-03-27 ENCOUNTER — TRANSCRIPTION ENCOUNTER (OUTPATIENT)
Age: 89
End: 2025-03-27

## 2025-03-27 ENCOUNTER — APPOINTMENT (OUTPATIENT)
Dept: ELECTROPHYSIOLOGY | Facility: CLINIC | Age: 89
End: 2025-03-27
Payer: MEDICARE

## 2025-03-27 ENCOUNTER — EMERGENCY (EMERGENCY)
Facility: HOSPITAL | Age: 89
LOS: 0 days | Discharge: HOME | End: 2025-03-27
Attending: EMERGENCY MEDICINE
Payer: MEDICARE

## 2025-03-27 ENCOUNTER — NON-APPOINTMENT (OUTPATIENT)
Age: 89
End: 2025-03-27

## 2025-03-27 ENCOUNTER — EMERGENCY (EMERGENCY)
Facility: HOSPITAL | Age: 89
LOS: 0 days | Discharge: HOME | End: 2025-03-27
Attending: EMERGENCY MEDICINE

## 2025-03-27 VITALS — WEIGHT: 199.08 LBS | HEIGHT: 72 IN

## 2025-03-27 VITALS
HEART RATE: 66 BPM | DIASTOLIC BLOOD PRESSURE: 56 MMHG | SYSTOLIC BLOOD PRESSURE: 129 MMHG | TEMPERATURE: 97 F | OXYGEN SATURATION: 95 % | RESPIRATION RATE: 17 BRPM

## 2025-03-27 VITALS
DIASTOLIC BLOOD PRESSURE: 80 MMHG | WEIGHT: 201.06 LBS | HEIGHT: 72 IN | HEART RATE: 70 BPM | SYSTOLIC BLOOD PRESSURE: 133 MMHG | RESPIRATION RATE: 16 BRPM | TEMPERATURE: 98 F | OXYGEN SATURATION: 95 %

## 2025-03-27 DIAGNOSIS — R04.0 EPISTAXIS: ICD-10-CM

## 2025-03-27 DIAGNOSIS — Z95.810 PRESENCE OF AUTOMATIC (IMPLANTABLE) CARDIAC DEFIBRILLATOR: Chronic | ICD-10-CM

## 2025-03-27 DIAGNOSIS — Z98.890 OTHER SPECIFIED POSTPROCEDURAL STATES: Chronic | ICD-10-CM

## 2025-03-27 DIAGNOSIS — Z90.49 ACQUIRED ABSENCE OF OTHER SPECIFIED PARTS OF DIGESTIVE TRACT: Chronic | ICD-10-CM

## 2025-03-27 DIAGNOSIS — I48.91 UNSPECIFIED ATRIAL FIBRILLATION: ICD-10-CM

## 2025-03-27 DIAGNOSIS — Z79.01 LONG TERM (CURRENT) USE OF ANTICOAGULANTS: ICD-10-CM

## 2025-03-27 DIAGNOSIS — Z90.2 ACQUIRED ABSENCE OF LUNG [PART OF]: Chronic | ICD-10-CM

## 2025-03-27 PROCEDURE — 93296 REM INTERROG EVL PM/IDS: CPT

## 2025-03-27 PROCEDURE — L9995: CPT

## 2025-03-27 PROCEDURE — 30903 CONTROL OF NOSEBLEED: CPT | Mod: LT

## 2025-03-27 PROCEDURE — 93295 DEV INTERROG REMOTE 1/2/MLT: CPT

## 2025-03-27 PROCEDURE — 99284 EMERGENCY DEPT VISIT MOD MDM: CPT | Mod: 25

## 2025-03-27 PROCEDURE — 99283 EMERGENCY DEPT VISIT LOW MDM: CPT | Mod: 25

## 2025-03-27 PROCEDURE — 30901 CONTROL OF NOSEBLEED: CPT | Mod: LT

## 2025-03-27 RX ORDER — AMOXICILLIN AND CLAVULANATE POTASSIUM 500; 125 MG/1; MG/1
875 TABLET, FILM COATED ORAL
Qty: 20 | Refills: 0
Start: 2025-03-27 | End: 2025-04-05

## 2025-03-27 RX ADMIN — Medication 1 SPRAY(S): at 01:14

## 2025-03-27 NOTE — ED STATDOCS - NSFOLLOWUPINSTRUCTIONS_ED_ALL_ED_FT
You were seen for nasal packing on your left nostril that pulled out this morning.  You were examined by emergency physicians.  You had no signs of active bleeding.  You were feeling well.  We have advised you to stop rubbing your nose or put any tissues or use any saline sprays in your nostrils.  You can continue to take Xarelto as indicated for your A-fib.  Please follow-up with the ENT doctor.  Please return to emergency room for any rebleeding, shortness of breath, dizziness, feel like passing out, or any new/concerning symptoms.

## 2025-03-27 NOTE — ED STATDOCS - CLINICAL SUMMARY MEDICAL DECISION MAKING FREE TEXT BOX
89-year-old male history of A-fib on Xarelto presents the emergency department for epistaxis.  Patient was in the emergency department last night where they cauterized his nose, gave Afrin and placed a Rhino Rocket. Patient pulled out the Rhino Rocket and no started bleeding again so came in for evaluation. Upon my arrival the nose is not bleeding. The patient denies lightheadedness or dizziness, no chest pain or shortness of breath. Patient is here with daughter. Exam with abrasion to the left anterior nares with scab no active bleeding no bleeding to the posterior pharynx vital signs are normal. Counseled the patient and family on not picking or rubbing their nose. Daughter states that the patient does do these actions and that is likely what led to the bleeding.  No signs or concern for blood loss anemia. Patient and family comfortable in home will DC

## 2025-03-27 NOTE — ED ADULT NURSE NOTE - OBJECTIVE STATEMENT
Pt presents to the ED  w c/o nose bleed since 10 PM. Reports +AC use. Bleeding is controlled at this time.

## 2025-03-27 NOTE — ED STATDOCS - OBJECTIVE STATEMENT
89-year-old male history of A-fib on Xarelto presents the emergency department for epistaxis.  Patient was in the emergency department last night where they cauterized his nose, gave Afrin and placed a Rhino Rocket. Patient pulled out the Rhino Rocket and no started bleeding again so came in for evaluation. Upon my arrival the nose is not bleeding. The patient denies lightheadedness or dizziness, no chest pain or shortness of breath. Patient is here with daughter

## 2025-03-27 NOTE — ED STATDOCS - PATIENT PORTAL LINK FT
You can access the FollowMyHealth Patient Portal offered by [facility] by registering at the following website: [email or web address]. By joining [employer]’s FollowMyHealth portal, you will also be able to view your health information using other applications (apps) compatible with our system.

## 2025-03-27 NOTE — ED STATDOCS - PROGRESS NOTE DETAILS
Vic Funes, PGY3 - seen patient with Dr. Cruz. Patient at stable condition. No active L nostril bleeding. Offered obs vs discharge. patient and family wants to be discharged. given strict return precautions and precautions to not irritate the L nostril.

## 2025-03-27 NOTE — ED PROVIDER NOTE - PATIENT PORTAL LINK FT
You can access the FollowMyHealth Patient Portal offered by [facility] by registering at the following website: [email or web address]. By joining [employer]’s FollowMyHealth portal, you will also be able to view your health information using other applications (apps) compatible with our system.

## 2025-03-27 NOTE — ED CLERICAL - NS ED CARE COORDINATION INFORMATION
This patient is enrolled in the [facility]s Program and receives comprehensive home-based primary care.  To obtain additional clinical information , or to discuss any questions or concerns, you can call the [facility] team at [phone], 24 hours a day.  If discharged, this patient will be followed up by the [facility] team within 2 days.  If this patient requires admission, please use the hospitalist service.

## 2025-03-27 NOTE — ED PROVIDER NOTE - NSFOLLOWUPINSTRUCTIONS_ED_ALL_ED_FT
Nasal Packing, Care After  The following information offers guidance on how to care for yourself after your procedure. Your health care provider may also give you more specific instructions. If you have problems or questions, contact your health care provider.    What can I expect after the procedure?  After an anterior or posterior nasal packing is placed, it is common to have:  Pressure and congestion in your nose.  Decreased sense of smell.  Headaches.  Tearing of the eyes.  When packing is removed, it is normal for your nose to feel sore and dry.    If you have a posterior nasal pack placed, you will stay in the hospital for observation until the pack is removed.    Follow these instructions at home:  If you bleed from your nose:    The correct and incorrect way to hold your head and fingers for first aid during a nosebleed.  Sit down and lean forward while pinching the end of your nose for about 5 minutes. If bleeding continues, pinch for another 5 minutes.  If bleeding continues, becomes heavy, or goes down the back of your mouth, get help right away.  If told by your health care provider, spray your nose with a nasal decongestant.  You may gently blow your nose to remove any clots if your packing was already removed.  Medicines    Take over-the-counter and prescription medicines only as told by your health care provider.  If you were prescribed antibiotics, use them as told by your health care provider. Do not stop using the antibiotic even if you start to feel better.  Avoid taking medicines such as aspirin and ibuprofen unless your health care provider tells you to. These medicines can thin your blood.  Use your antibiotic ointment, nasal spray, or drops as told by your health care provider.  If you have a packing that dissolves in time (resorbable), use nasal saline spray to help it melt away. Follow instructions from your health care provider.  General instructions    A person covering both mouth and nose with a tissue while sneezing.  Avoid blowing your nose or sneezing. If you need to sneeze, keep your mouth open when you sneeze.  Avoid bending over. Raise (elevate) your head above the level of your heart while you are sitting or lying down.  Avoid straining or exercising hard. Ask your health care provider what activities are safe for you.  Do not lift anything that is heavier than 10 lb (4.5 kg), or the limit that you are told, until your health care provider says that it is safe.  Do not use any products that contain nicotine or tobacco. These products include cigarettes, chewing tobacco, and vaping devices, such as e-cigarettes. If you need help quitting, ask your health care provider.  Keep all follow-up visits. If you still have a nasal packing, return to your health care provider as instructed to have the packing removed.  Contact a health care provider if:  You have a fever.  You have a headache or facial pain.  You have vision changes.  You have any bleeding from your nose.  You notice the skin around your nose getting pale or losing color.  Get help right away if:  You have any bleeding from your nose or down the back of your mouth that is heavy and brisk or does not stop.  You have signs of an allergic reaction to the packing material such as:  Swelling of the face.  Skin rash.  Shortness of breath.  Trouble breathing.  These symptoms may be an emergency. Get help right away. Call 911.  Do not wait to see if the symptoms will go away.  Do not drive yourself to the hospital.  Summary  After having anterior or posterior nasal packing placed, it is common for your nose to be congested and have a decreased sense of smell.  After having packing removed, it is normal for your nose to feel sore and dry.  Use your antibiotic ointment, nasal spray, or drops as told by your health care provider.  Get help right away if you have any bleeding from your nose or down the back of your mouth that is heavy and brisk or does not stop.  This information is not intended to replace advice given to you by your health care provider. Make sure you discuss any questions you have with your health care provider.    Document Revised: 03/14/2023 Document Reviewed: 03/14/2023  [employer] Patient Education © 2025 [employer]  [employer] logo  Terms and Conditions  Privacy Policy  Editorial Policy  All content on this site: Copyright © 2025 [employer], its licensors, and contributors. All rights are reserved, including those for text and data mining, AI training, and similar technologies. For all open access content, the Creative Commons licensing terms apply.  Cookies are used by this site. To decline or learn more, visit our Cookies page.  [employer]

## 2025-03-27 NOTE — ED STATDOCS - PHYSICAL EXAMINATION
Constitutional: NAD AAOx3  Eyes: PERRLA EOMI  Head: Normocephalic atraumatic  Mouth: MMM  Cardiac: regular rate   Resp: unlabored breathing  GI: Abd s/nt/nd  Neuro: grossly normal and intact  Skin: No visible rashes   MSK: abrasion to the left anterior nares with scab, no active bleeding, no bleeding to the posterior pharynx, vital signs are normal.

## 2025-03-27 NOTE — ED PROVIDER NOTE - CARE PROVIDER_API CALL
Trevon Macias  Otolaryngology  [address] [place] [address], [address]  [place] [address]  Phone: [phone]  Fax: [phone]  Follow Up Time:

## 2025-03-27 NOTE — ED ADULT TRIAGE NOTE - CHIEF COMPLAINT QUOTE
brought in by EMS for epistaxis. EMS reports the "nose plug fell out". he was discharged at 0400 this AM. upon arrival to ED, no active nose bleed.

## 2025-03-27 NOTE — ED ADULT TRIAGE NOTE - CHIEF COMPLAINT QUOTE
Pt BIB daughter and aide c/o L sided nose bleed starting at 22:00. Aide reports that he is on blood thinners for afib. Aide reports "he keeps picking his nose and bleeding". Pt currently has towel on nose. Respirations even and unlabored, no distress noted.

## 2025-03-29 ENCOUNTER — NON-APPOINTMENT (OUTPATIENT)
Age: 89
End: 2025-03-29

## 2025-04-30 ENCOUNTER — NON-APPOINTMENT (OUTPATIENT)
Age: 89
End: 2025-04-30

## 2025-05-01 ENCOUNTER — APPOINTMENT (OUTPATIENT)
Dept: OTOLARYNGOLOGY | Facility: CLINIC | Age: 89
End: 2025-05-01
Payer: MEDICARE

## 2025-05-01 ENCOUNTER — NON-APPOINTMENT (OUTPATIENT)
Age: 89
End: 2025-05-01

## 2025-05-01 VITALS — BODY MASS INDEX: 27.09 KG/M2 | HEIGHT: 72 IN | WEIGHT: 200 LBS

## 2025-05-01 DIAGNOSIS — H61.23 IMPACTED CERUMEN, BILATERAL: ICD-10-CM

## 2025-05-01 DIAGNOSIS — H90.3 SENSORINEURAL HEARING LOSS, BILATERAL: ICD-10-CM

## 2025-05-01 PROCEDURE — 99213 OFFICE O/P EST LOW 20 MIN: CPT

## 2025-05-08 ENCOUNTER — APPOINTMENT (OUTPATIENT)
Dept: INTERNAL MEDICINE | Facility: CLINIC | Age: 89
End: 2025-05-08
Payer: MEDICARE

## 2025-05-08 ENCOUNTER — APPOINTMENT (OUTPATIENT)
Dept: PULMONOLOGY | Facility: CLINIC | Age: 89
End: 2025-05-08
Payer: MEDICARE

## 2025-05-08 VITALS
HEART RATE: 65 BPM | TEMPERATURE: 97.4 F | RESPIRATION RATE: 16 BRPM | BODY MASS INDEX: 26.82 KG/M2 | HEIGHT: 72 IN | WEIGHT: 198 LBS | SYSTOLIC BLOOD PRESSURE: 106 MMHG | DIASTOLIC BLOOD PRESSURE: 59 MMHG | OXYGEN SATURATION: 99 %

## 2025-05-08 DIAGNOSIS — J84.9 INTERSTITIAL PULMONARY DISEASE, UNSPECIFIED: ICD-10-CM

## 2025-05-08 DIAGNOSIS — R06.02 SHORTNESS OF BREATH: ICD-10-CM

## 2025-05-08 PROCEDURE — ZZZZZ: CPT

## 2025-05-08 PROCEDURE — 94010 BREATHING CAPACITY TEST: CPT

## 2025-05-08 PROCEDURE — 99214 OFFICE O/P EST MOD 30 MIN: CPT | Mod: 25

## 2025-05-08 PROCEDURE — 94729 DIFFUSING CAPACITY: CPT

## 2025-05-08 PROCEDURE — 94727 GAS DIL/WSHOT DETER LNG VOL: CPT

## 2025-05-14 ENCOUNTER — NON-APPOINTMENT (OUTPATIENT)
Age: 89
End: 2025-05-14

## 2025-05-14 DIAGNOSIS — S30.817A ABRASION OF ANUS, INITIAL ENCOUNTER: ICD-10-CM

## 2025-05-14 RX ORDER — BACITRACIN 500 [IU]/G
500 OINTMENT TOPICAL 3 TIMES DAILY
Qty: 30 | Refills: 0 | Status: ACTIVE | COMMUNITY
Start: 2025-05-14 | End: 1900-01-01

## 2025-05-15 ENCOUNTER — OUTPATIENT (OUTPATIENT)
Dept: OUTPATIENT SERVICES | Facility: HOSPITAL | Age: 89
LOS: 1 days | End: 2025-05-15
Payer: MEDICARE

## 2025-05-15 ENCOUNTER — APPOINTMENT (OUTPATIENT)
Dept: CT IMAGING | Facility: CLINIC | Age: 89
End: 2025-05-15
Payer: MEDICARE

## 2025-05-15 DIAGNOSIS — Z98.890 OTHER SPECIFIED POSTPROCEDURAL STATES: Chronic | ICD-10-CM

## 2025-05-15 DIAGNOSIS — Z90.49 ACQUIRED ABSENCE OF OTHER SPECIFIED PARTS OF DIGESTIVE TRACT: Chronic | ICD-10-CM

## 2025-05-15 DIAGNOSIS — Z90.2 ACQUIRED ABSENCE OF LUNG [PART OF]: Chronic | ICD-10-CM

## 2025-05-15 DIAGNOSIS — Z95.810 PRESENCE OF AUTOMATIC (IMPLANTABLE) CARDIAC DEFIBRILLATOR: Chronic | ICD-10-CM

## 2025-05-15 PROCEDURE — 71250 CT THORAX DX C-: CPT

## 2025-05-15 PROCEDURE — 71250 CT THORAX DX C-: CPT | Mod: 26

## 2025-06-26 ENCOUNTER — APPOINTMENT (OUTPATIENT)
Dept: HOME HEALTH SERVICES | Facility: HOME HEALTH | Age: 89
End: 2025-06-26

## 2025-06-26 VITALS
TEMPERATURE: 97.88 F | DIASTOLIC BLOOD PRESSURE: 58 MMHG | RESPIRATION RATE: 18 BRPM | HEART RATE: 63 BPM | SYSTOLIC BLOOD PRESSURE: 110 MMHG | OXYGEN SATURATION: 97 %

## 2025-06-30 ENCOUNTER — NON-APPOINTMENT (OUTPATIENT)
Age: 89
End: 2025-06-30

## 2025-06-30 ENCOUNTER — APPOINTMENT (OUTPATIENT)
Dept: ELECTROPHYSIOLOGY | Facility: CLINIC | Age: 89
End: 2025-06-30
Payer: MEDICARE

## 2025-06-30 PROCEDURE — 93295 DEV INTERROG REMOTE 1/2/MLT: CPT

## 2025-06-30 PROCEDURE — 93296 REM INTERROG EVL PM/IDS: CPT

## 2025-08-06 ENCOUNTER — APPOINTMENT (OUTPATIENT)
Dept: PULMONOLOGY | Facility: CLINIC | Age: 89
End: 2025-08-06

## 2025-08-12 ENCOUNTER — RESULT CHARGE (OUTPATIENT)
Age: 89
End: 2025-08-12

## 2025-08-12 ENCOUNTER — APPOINTMENT (OUTPATIENT)
Dept: INTERNAL MEDICINE | Facility: CLINIC | Age: 89
End: 2025-08-12
Payer: MEDICARE

## 2025-08-12 VITALS
DIASTOLIC BLOOD PRESSURE: 62 MMHG | TEMPERATURE: 97.9 F | WEIGHT: 200 LBS | OXYGEN SATURATION: 96 % | BODY MASS INDEX: 27.09 KG/M2 | HEIGHT: 72 IN | HEART RATE: 67 BPM | SYSTOLIC BLOOD PRESSURE: 130 MMHG

## 2025-08-12 DIAGNOSIS — J84.9 INTERSTITIAL PULMONARY DISEASE, UNSPECIFIED: ICD-10-CM

## 2025-08-12 DIAGNOSIS — J44.9 CHRONIC OBSTRUCTIVE PULMONARY DISEASE, UNSPECIFIED: ICD-10-CM

## 2025-08-12 DIAGNOSIS — I50.22 CHRONIC SYSTOLIC (CONGESTIVE) HEART FAILURE: ICD-10-CM

## 2025-08-12 DIAGNOSIS — J98.4 OTHER DISORDERS OF LUNG: ICD-10-CM

## 2025-08-12 DIAGNOSIS — Z87.891 PERSONAL HISTORY OF NICOTINE DEPENDENCE: ICD-10-CM

## 2025-08-12 DIAGNOSIS — C34.90 MALIGNANT NEOPLASM OF UNSPECIFIED PART OF UNSPECIFIED BRONCHUS OR LUNG: ICD-10-CM

## 2025-08-12 DIAGNOSIS — I48.0 PAROXYSMAL ATRIAL FIBRILLATION: ICD-10-CM

## 2025-08-12 DIAGNOSIS — R06.00 DYSPNEA, UNSPECIFIED: ICD-10-CM

## 2025-08-12 PROCEDURE — 94060 EVALUATION OF WHEEZING: CPT

## 2025-08-12 PROCEDURE — 99214 OFFICE O/P EST MOD 30 MIN: CPT | Mod: 25
